# Patient Record
Sex: FEMALE | Race: WHITE | NOT HISPANIC OR LATINO | Employment: OTHER | URBAN - NONMETROPOLITAN AREA
[De-identification: names, ages, dates, MRNs, and addresses within clinical notes are randomized per-mention and may not be internally consistent; named-entity substitution may affect disease eponyms.]

---

## 2017-01-03 RX ORDER — LIOTHYRONINE SODIUM 25 UG/1
TABLET ORAL
Qty: 90 TABLET | Refills: 1 | Status: SHIPPED | OUTPATIENT
Start: 2017-01-03 | End: 2017-03-03 | Stop reason: SDUPTHER

## 2017-01-12 ENCOUNTER — OFFICE VISIT (OUTPATIENT)
Dept: INTERNAL MEDICINE | Facility: CLINIC | Age: 67
End: 2017-01-12

## 2017-01-12 VITALS
RESPIRATION RATE: 12 BRPM | WEIGHT: 222 LBS | DIASTOLIC BLOOD PRESSURE: 70 MMHG | SYSTOLIC BLOOD PRESSURE: 104 MMHG | HEIGHT: 63 IN | HEART RATE: 82 BPM | BODY MASS INDEX: 39.34 KG/M2 | OXYGEN SATURATION: 98 %

## 2017-01-12 DIAGNOSIS — Z12.11 COLON CANCER SCREENING: ICD-10-CM

## 2017-01-12 DIAGNOSIS — D50.8 OTHER IRON DEFICIENCY ANEMIA: ICD-10-CM

## 2017-01-12 DIAGNOSIS — Z23 NEED FOR SHINGLES VACCINE: ICD-10-CM

## 2017-01-12 DIAGNOSIS — E06.3 HASHIMOTO'S THYROIDITIS: Primary | ICD-10-CM

## 2017-01-12 LAB
FERRITIN SERPL-MCNC: 314 NG/ML (ref 10–291)
IRON 24H UR-MRATE: 54 MCG/DL (ref 50–175)
IRON SATN MFR SERPL: 17 % (ref 15–50)
T4 FREE SERPL-MCNC: 0.73 NG/DL (ref 0.89–1.76)
TIBC SERPL-MCNC: 311 MCG/DL (ref 250–450)
TSH SERPL DL<=0.05 MIU/L-ACNC: <0.004 MIU/ML (ref 0.35–5.35)

## 2017-01-12 PROCEDURE — 84481 FREE ASSAY (FT-3): CPT | Performed by: FAMILY MEDICINE

## 2017-01-12 PROCEDURE — 36415 COLL VENOUS BLD VENIPUNCTURE: CPT | Performed by: FAMILY MEDICINE

## 2017-01-12 PROCEDURE — 99213 OFFICE O/P EST LOW 20 MIN: CPT | Performed by: FAMILY MEDICINE

## 2017-01-12 PROCEDURE — 82728 ASSAY OF FERRITIN: CPT | Performed by: FAMILY MEDICINE

## 2017-01-12 PROCEDURE — 84443 ASSAY THYROID STIM HORMONE: CPT | Performed by: FAMILY MEDICINE

## 2017-01-12 PROCEDURE — 83550 IRON BINDING TEST: CPT | Performed by: FAMILY MEDICINE

## 2017-01-12 PROCEDURE — 84439 ASSAY OF FREE THYROXINE: CPT | Performed by: FAMILY MEDICINE

## 2017-01-12 PROCEDURE — 84482 T3 REVERSE: CPT | Performed by: FAMILY MEDICINE

## 2017-01-12 PROCEDURE — 83540 ASSAY OF IRON: CPT | Performed by: FAMILY MEDICINE

## 2017-01-12 NOTE — PROGRESS NOTES
"Follow up, discuss labs, meds.       SUBJECTIVE: Salinas Monroy is a 66 y.o. female seen for a follow up visit;      Hashimotos: she just finished a 12 week course of trying to clear the reverse T3 - took 90 mg NP thyroid total daily, tried going down on the T3, but over the past month she has increased to 25 mcg of T3 daily.  She is concerned that her cortisol is elevated possibly causing the Rt3 to get elevated.  So she started taking \"boving adrenal cortex\" taking 1 tab in AM (150 mg adrenal cortex, 50 mg whole adrenal) & at 1 pm taking \"Holy basil\" (ursolic acid and oleanic acid).  She says she is feeling better.         The following portions of the patient's history were reviewed and updated as appropriate: She  has a past medical history of Anemia (?); Arthritis; Depression; GERD (gastroesophageal reflux disease); and Impingement syndrome of right shoulder (5/8/2016).  She has Hashimoto's thyroiditis and Obsessive-compulsive disorder on her pertinent problem list.  She  has a past surgical history that includes Hysterectomy; Gastric bypass (1978); Total abdominal hysterectomy w/ bilateral salpingoophorectomy (1990); Hernia repair (2012); Small intestine surgery (2014); Appendectomy; Joint replacement (2009 & 2012); and Colonoscopy (2012).  Her family history includes Arthritis in her mother; Asthma in her sister and sister; Cancer in her father; Heart disease in her paternal uncle; Hyperlipidemia in her mother and paternal uncle; Hypertension in her mother and paternal uncle; Kidney cancer in her father; Liver cancer in her father; No Known Problems in her brother and brother; Obesity in her mother; Rheum arthritis in her mother; Stroke in her mother and paternal uncle; Thyroid disease in her mother.  She  reports that she quit smoking about 9 years ago. Her smoking use included Cigarettes. She started smoking about 50 years ago. She has a 40.00 pack-year smoking history. She has never used smokeless " "tobacco. She reports that she does not drink alcohol or use illicit drugs..    Review of Systems   Constitutional: Negative.    Respiratory: Negative.    Cardiovascular: Negative.    Neurological: Positive for tremors.         OBJECTIVE:  Visit Vitals   • /70   • Pulse 82   • Resp 12   • Ht 63\" (160 cm)   • Wt 222 lb (101 kg)   • SpO2 98%   • BMI 39.33 kg/m2        Physical Exam   Constitutional: She appears well-developed and well-nourished. No distress.   Neurological: She is alert.   Worsening essential tremor   Psychiatric: Her speech is normal and behavior is normal. Her mood appears anxious.           ASSESSMENT:   Diagnosis Plan   1. Hashimoto's thyroiditis  T3, Free    TSH    T3, Reverse    T4, Free    T3, Free    TSH    T3, Reverse    T4, Free   2. Other iron deficiency anemia  Iron Profile    Ferritin    Iron Profile    Ferritin   3. Colon cancer screening  Ambulatory Referral to Gastroenterology   4. Need for shingles vaccine  zoster vaccine live PF (ZOSTAVAX) 12442 UNT/0.65ML injection       Again advised pt that I think some of her tremor is from the higher dose of T3.  Recommended decreasing back down to max 2 tabs daily.     Pt wants to try a few more months of adrenal medicine to see if she can get her immune system to settle down.  I advised I still recommend that she see the endocrine surgeon.           "

## 2017-01-12 NOTE — MR AVS SNAPSHOT
Salinas Monroy   1/12/2017 11:15 AM   Office Visit    Provider:  Morenita Carroll MD   Department:  [facility] PRIMARY CARE   Dept Phone:  [phone]                Your Full Care Plan              Today's Medication Changes          These changes are accurate as of: 1/12/17  2:12 PM.  If you have any questions, ask your nurse or doctor.               New Medication(s)Ordered:     zoster vaccine live PF 12410 UNT/0.65ML injection   Commonly known as:  ZOSTAVAX   Inject 19,400 Units under the skin 1 (One) Time for 1 dose.   Started by:  Morenita Carroll MD         Medication(s)that have changed:     liothyronine 25 MCG tablet   Commonly known as:  CYTOMEL   TAKE UP TO 3 TABLETS BY MOUTH DAILY   What changed:  Another medication with the same name was removed. Continue taking this medication, and follow the directions you see here.   Changed by:  Morenita Carroll MD            Where to Get Your Medications      You can get these medications from any pharmacy     Bring a paper prescription for each of these medications     zoster vaccine live PF 36579 UNT/0.65ML injection                  Your Updated Medication List          This list is accurate as of: 1/12/17  2:12 PM.  Always use your most recent med list.                acyclovir 400 MG tablet   Commonly known as:  ZOVIRAX       allopurinol 100 MG tablet   Commonly known as:  ZYLOPRIM   Take 2 tablets by mouth Daily.       escitalopram 5 MG tablet   Commonly known as:  LEXAPRO   TAKE ONE TABLET BY MOUTH DAILY       ferrous gluconate 324 MG tablet   Commonly known as:  FERGON   Take 2 tablets by mouth Daily With Breakfast.       liothyronine 25 MCG tablet   Commonly known as:  CYTOMEL   TAKE UP TO 3 TABLETS BY MOUTH DAILY       NP THYROID 60 MG tablet   Generic drug:  Thyroid   1 in the morning and 2 in the afternoon       zoster vaccine live PF 525[address] UNT/0.65ML injection   Commonly known as:  ZOSTAVAX   Inject 19,400  "Units under the skin 1 (One) Time for 1 dose.               We Performed the Following     Ambulatory Referral to Gastroenterology     Ferritin     Iron Profile     T3, Free     T3, Reverse     T4, Free     TSH       You Were Diagnosed With        Codes Comments    Hashimoto's thyroiditis    -  Primary ICD-10-CM: E06.3  ICD-9-CM: 245.2     Other iron deficiency anemia     ICD-10-CM: D50.8     Colon cancer screening     ICD-10-CM: Z12.11  ICD-9-CM: V76.51     Need for shingles vaccine     ICD-10-CM: Z23  ICD-9-CM: V04.89       Instructions     None    Patient Instructions History      [facility] Signup     Our records indicate that you have an active [email or web address] account.    You can view your After Visit Summary by going to [place] and logging in with your [employer] username and password.  If you don't have a [employer] username and password but a parent or guardian has access to your record, the parent or guardian should login with their own [employer] username and password and access your record to view the After Visit Summary.    If you have questions, you can email [email or web address] or call [phone] to talk to our [employer] staff.  Remember, [employer] is NOT to be used for urgent needs.  For medical emergencies, dial 911.               Other Info from Your Visit           Allergies     Diazepam        Reason for Visit     Hypothyroidism     Depression     Vitamin D Deficiency           Vital Signs     Blood Pressure Pulse Respirations Height Weight Oxygen Saturation    104/70 82 12 63\" (160 cm) 222 lb (101 kg) 98%    Body Mass Index Smoking Status                39.33 kg/m2 Former Smoker          Problems and Diagnoses Noted     Inflammation of the thyroid gland    Iron deficiency anemia    Screen for colon cancer        Need for shingles vaccine          Results       "

## 2017-01-14 LAB — T3FREE SERPL-MCNC: 4.2 PG/ML (ref 2–4.4)

## 2017-01-17 LAB — T3REVERSE SERPL-MCNC: 6.8 NG/DL (ref 9.2–24.1)

## 2017-03-03 RX ORDER — LIOTHYRONINE SODIUM 25 UG/1
TABLET ORAL
Qty: 90 TABLET | Refills: 3 | Status: SHIPPED | OUTPATIENT
Start: 2017-03-03 | End: 2017-06-30 | Stop reason: SDUPTHER

## 2017-03-09 ENCOUNTER — PREP FOR SURGERY (OUTPATIENT)
Dept: GASTROENTEROLOGY | Facility: CLINIC | Age: 67
End: 2017-03-09

## 2017-03-09 ENCOUNTER — OFFICE VISIT (OUTPATIENT)
Dept: GASTROENTEROLOGY | Facility: CLINIC | Age: 67
End: 2017-03-09

## 2017-03-09 VITALS
DIASTOLIC BLOOD PRESSURE: 39 MMHG | TEMPERATURE: 98.8 F | HEART RATE: 78 BPM | SYSTOLIC BLOOD PRESSURE: 59 MMHG | RESPIRATION RATE: 14 BRPM | BODY MASS INDEX: 40.04 KG/M2 | HEIGHT: 63 IN | WEIGHT: 226 LBS

## 2017-03-09 DIAGNOSIS — Z12.11 COLON CANCER SCREENING: ICD-10-CM

## 2017-03-09 DIAGNOSIS — R12 HEARTBURN: ICD-10-CM

## 2017-03-09 DIAGNOSIS — D50.0 IRON DEFICIENCY ANEMIA DUE TO CHRONIC BLOOD LOSS: Primary | ICD-10-CM

## 2017-03-09 DIAGNOSIS — Z12.11 COLON CANCER SCREENING: Primary | ICD-10-CM

## 2017-03-09 PROCEDURE — 99203 OFFICE O/P NEW LOW 30 MIN: CPT | Performed by: INTERNAL MEDICINE

## 2017-03-09 RX ORDER — MELATONIN 10 MG
7500 TABLET, SUBLINGUAL SUBLINGUAL DAILY
COMMUNITY

## 2017-03-09 RX ORDER — ASCORBIC ACID 500 MG
1000 TABLET ORAL 3 TIMES DAILY
COMMUNITY
End: 2018-01-08

## 2017-03-09 NOTE — PATIENT INSTRUCTIONS
1. Antireflux measures.  2. Low fat diet.  3. Weight reduction.  4. Colonoscopy: Description of the procedure, risks benefits alternatives and options including non-operative options were discussed with the patient in detail.  The patient understands and wishes to proceed.  5. It may be prudent to consider an upper endoscopy to exclude Salazar's.  The patient will think about it and will get back to us.  Description of the procedure, risks, benefits, alternatives and options including nonoperative options were discussed with the patient in detail.   6. Discussed with the patient in detail.  Opportunity was given to ask questions.

## 2017-03-09 NOTE — PROGRESS NOTES
[address] [facility]   [address]  [place] [address]    (H) [phone]  (W)     Chief Complaint   Patient presents with   • Colon Cancer Screening     History of Present Illness    For colon cancer screening.  The patient has history of iron deficiency anemia.  There is no history of overt GI bleeding (hematemesis, melena, or hematochezia).  The patient denies nosebleeds.  There is no history of hematuria.  She denies vaginal bleeding.  Of interest the patient is status post hysterectomy in 1990.  An attempt at colonoscopy in 2012 by Dr. Montoya was unsuccessful.  The patient denies recent change in bowel habits.  There is no diarrhea or constipation.  There is no history of recent weight loss.  There is no nausea or vomiting.  There is no family history of colon cancer.    The patient has history of reflux off and on for the last several years.  Reflux was described as moderately severe indigestion, and retrosternal burning sensation.  Frequency being several times a week.  Symptoms were both day and night time.  The patient has taken acid suppressive therapy with good results.  Currently the patient is not acid suppressive therapy.  Her symptoms have significantly improved over the last couple of years.  Now the patient has occasional reflux.  There is no dysphagia or odynophagia.  There is no history of liver or pancreatic disease.  Currently the patient has been having one and at ×2 bowel movements a day.    Review of Systems   Constitutional: Negative for appetite change, chills, fatigue, fever and unexpected weight change.   HENT: Negative for mouth sores, nosebleeds and trouble swallowing.    Eyes: Negative for discharge and redness.   Respiratory: Negative for apnea, cough and shortness of breath.    Cardiovascular: Negative for chest pain, palpitations and leg swelling.   Gastrointestinal: Negative for abdominal distention, abdominal pain, anal bleeding, blood in stool, constipation, diarrhea, nausea and vomiting.    Endocrine: Negative for cold intolerance, heat intolerance and polydipsia.   Genitourinary: Negative for dysuria, hematuria and urgency.   Musculoskeletal: Positive for arthralgias. Negative for joint swelling and myalgias.   Skin: Negative for rash.   Allergic/Immunologic: Positive for food allergies (gluten sensitivity). Negative for immunocompromised state.   Neurological: Negative for dizziness, seizures, syncope and headaches.   Hematological: Negative for adenopathy. Does not bruise/bleed easily.   Psychiatric/Behavioral: Negative for dysphoric mood. The patient is not nervous/anxious and is not hyperactive.      Patient Active Problem List   Diagnosis   • Hashimoto's thyroiditis   • Essential tremor   • Vitamin D deficiency   • Iron deficiency anemia   • Primary osteoarthritis involving multiple joints   • Bipolar affective disorder   • Gout   • Obsessive-compulsive disorder   • Postsurgical menopause     Past Medical History   Diagnosis Date   • Anemia ?     under control   • Arthritis    • Depression    • GERD (gastroesophageal reflux disease)      no longer bothers me...   • Gout    • Hernia 3-2012     repair surgery which failed in 2014   • Hypothyroid    • Impingement syndrome of right shoulder 5/8/2016     Past Surgical History   Procedure Laterality Date   • Hysterectomy     • Gastric bypass  1978   • Total abdominal hysterectomy with salpingo oophorectomy  1990     fibroids   • Hernia repair  2012   • Small intestine surgery  2014     with hernia repair & mesh   • Appendectomy     • Joint replacement  2009 & 2012     Left & Right Full Hip Replacements   • Colonoscopy  2012     failed due to hernia...   • Abdominal surgery     • Upper gastrointestinal endoscopy     • Tubal abdominal ligation       Family History   Problem Relation Age of Onset   • Obesity Mother    • Rheum arthritis Mother    • Thyroid disease Mother    • Hypertension Mother    • Stroke Mother      Most of her problems were caused by  Rheumatoid Ar.   • Hyperlipidemia Mother    • Arthritis Mother      Rheumatoid Arthritis..Passed 2004   • Cancer Father      Passed 1997   • Kidney cancer Father    • Liver cancer Father    • Asthma Sister    • No Known Problems Brother    • Stroke Paternal Uncle    • Hypertension Paternal Uncle    • Hyperlipidemia Paternal Uncle    • Heart disease Paternal Uncle    • No Known Problems Brother    • Asthma Sister      under control   • Colon cancer Neg Hx      Social History   Substance Use Topics   • Smoking status: Former Smoker     Packs/day: 1.00     Years: 40.00     Types: Cigarettes     Start date: 1967     Quit date: 2008   • Smokeless tobacco: Never Used   • Alcohol use No       Current Outpatient Prescriptions:   •  Acetylcarnitine HCl (ACETYL L-CARNITINE PO), Take  by mouth Daily., Disp: , Rfl:   •  Acetylcysteine (N-ACETYL-L-CYSTEINE PO), Take  by mouth Daily., Disp: , Rfl:   •  acyclovir (ZOVIRAX) 400 MG tablet, Take 4 tablets by mouth daily as needed., Disp: , Rfl:   •  allopurinol (ZYLOPRIM) 100 MG tablet, Take 2 tablets by mouth Daily., Disp: 60 tablet, Rfl: 5  •  B Complex Vitamins (VITAMIN B COMPLEX PO), Take  by mouth Daily., Disp: , Rfl:   •  BIOTIN PO, Take  by mouth Daily., Disp: , Rfl:   •  Cholecalciferol (VITAMIN D3) 39821 UNITS tablet, Take  by mouth., Disp: , Rfl:   •  CHOLINE-INOSITOL-METHIONINE-FA PO, Take  by mouth., Disp: , Rfl:   •  COCONUT OIL PO, Take  by mouth., Disp: , Rfl:   •  Coenzyme Q10 (CO Q 10 PO), Take  by mouth Daily., Disp: , Rfl:   •  Digestive Enzymes (DIGESTIVE ENZYME PO), Take  by mouth., Disp: , Rfl:   •  escitalopram (LEXAPRO) 5 MG tablet, TAKE ONE TABLET BY MOUTH DAILY, Disp: 90 tablet, Rfl: 0  •  ferrous gluconate (FERGON) 324 MG tablet, Take 2 tablets by mouth Daily With Breakfast., Disp: 60 tablet, Rfl: 5  •  GARLIC PO, Take  by mouth Daily., Disp: , Rfl:   •  liothyronine (CYTOMEL) 25 MCG tablet, TAKE UP TO 3 TABLETS BY MOUTH DAILY, Disp: 90 tablet, Rfl: 3  •   "MAGNESIUM PO, Take 1,000 mg by mouth Every Night., Disp: , Rfl:   •  Methylsulfonylmethane (MSM PO), Take  by mouth., Disp: , Rfl:   •  MILK THISTLE PO, Take  by mouth., Disp: , Rfl:   •  Misc Natural Products (TART CHERRY ADVANCED PO), Take 2 capsules by mouth Daily., Disp: , Rfl:   •  Multiple Vitamins-Minerals (MULTI VITAMIN/MINERALS) tablet, Take  by mouth., Disp: , Rfl:   •  Multiple Vitamins-Minerals (MULTIVITAMIN PO), Take 1 tablet by mouth Daily., Disp: , Rfl:   •  Multiple Vitamins-Minerals (ZINC PO), Take  by mouth Daily., Disp: , Rfl:   •  NP THYROID 60 MG tablet, 1 in the morning and 2 in the afternoon, Disp: 90 tablet, Rfl: 2  •  Omega-3 Fatty Acids (OMEGA-3 FISH OIL PO), Take 4,000 Units by mouth Daily., Disp: , Rfl:   •  Probiotic Product (PROBIOTIC PO), Take  by mouth., Disp: , Rfl:   •  SELENIUM PO, Take  by mouth Daily., Disp: , Rfl:   •  TURMERIC PO, Take  by mouth Daily., Disp: , Rfl:   •  vitamin C (ASCORBIC ACID) 500 MG tablet, Take 500 mg by mouth 3 (Three) Times a Day., Disp: , Rfl:   •  VITAMIN E PO, Take 1 capsule by mouth Daily., Disp: , Rfl:   •  VITAMIN K PO, Take  by mouth Daily., Disp: , Rfl:   Allergies   Allergen Reactions   • Diazepam      Visit Vitals   • BP (!) 59/39   • Pulse 78   • Temp 98.8 °F (37.1 °C)   • Resp 14   • Ht 63\" (160 cm)   • Wt 226 lb (103 kg)   • BMI 40.03 kg/m2     Physical Exam   Constitutional: She is oriented to person, place, and time. She appears well-developed and well-nourished. No distress.   HENT:   Head: Normocephalic and atraumatic.   Right Ear: Hearing and external ear normal.   Left Ear: Hearing and external ear normal.   Nose: Nose normal.   Mouth/Throat: Oropharynx is clear and moist and mucous membranes are normal. Mucous membranes are not pale, not dry and not cyanotic. No oral lesions. No oropharyngeal exudate.   Eyes: Conjunctivae and EOM are normal. Right eye exhibits no discharge. Left eye exhibits no discharge. No scleral icterus.   Neck: " Trachea normal. Neck supple. No JVD present. No edema present. No thyroid mass and no thyromegaly present.   Cardiovascular: Normal rate, regular rhythm, S2 normal and normal heart sounds.  Exam reveals no gallop, no S3 and no friction rub.    No murmur heard.  Pulmonary/Chest: Effort normal and breath sounds normal. No respiratory distress. She has no wheezes. She has no rales. She exhibits no tenderness.   Abdominal: Soft. Normal appearance and bowel sounds are normal. She exhibits no distension, no ascites and no mass. There is no splenomegaly or hepatomegaly. There is no tenderness. There is no rigidity, no rebound and no guarding. No hernia.   Upper midline well-healed scar.   Musculoskeletal: She exhibits no tenderness or deformity.       Vascular Status -  Her exam exhibits no right foot edema. Her exam exhibits no left foot edema.  Lymphadenopathy:     She has no cervical adenopathy.        Left: No supraclavicular adenopathy present.   Neurological: She is alert and oriented to person, place, and time. She has normal strength. No cranial nerve deficit or sensory deficit. She exhibits normal muscle tone. Coordination normal.   Skin: No rash noted. She is not diaphoretic. No cyanosis. No pallor. Nails show no clubbing.   Psychiatric: She has a normal mood and affect. Her behavior is normal. Judgment and thought content normal.   Nursing note and vitals reviewed.      Laboratory Tests:    Upon review of medical records:    Dated March 3, 2016 sodium 141 potassium 4.6 chloride 108 CO2 27 BUN 33 serum creatinine 0.6 and glucose 103.  Calcium 10.0.  Albumin 4.2.  T bili 0.4 AST 28 ALT 29 alkaline phosphatase 105.  Total cholesterol 121.  Triglycerides 131.    Dated January 12, 2017 serum iron 54.  TIBC 311.  Iron saturation 17%.  Ferritin 314.00.  TSH < 0.004.  Free T4 level 0.73.  Free T3 level 4.2.  Reverse T3 level 6.8.    Abdominal Imaging:  Upon Review of medical records:    Dated February 3, 2014 the  patient underwent a CT of the abdomen and pelvis with IV contrast which revealed: Clear lung bases.  Postsurgical changes from small bowel resection and re-anastomosis in the left upper quadrant.  There are multiple dilated loops of small bowel consistent with at least a partial obstruction.  There is dilated bowel seen within a ventral hernia with adjacent edema within the mesentery worrisome for incarcerated hernia.  There is no free fluid, free air, or adenopathy.  The solid abdominal organs and abdominal aorta is unremarkable.  The lower pelvis is obscured by streak artifact from hip replacement.  The visualized pelvic GI tract is unremarkable.    Assessment and Plan:      Salinas was seen today for colon cancer screening.    Diagnoses and all orders for this visit:    Iron deficiency anemia due to chronic blood loss  Comments:  Concerns regarding colonic source.    Colon cancer screening  Comments:  Previously attempted colonoscopy in 2012 was unsuccessful.    Heartburn  Comments:  History of reflux with significant improvement of symptoms without intervention.  Concerns regarding underlying Salazar's.          Discussion:       Plan     Patient Instructions     1. Antireflux measures.  2. Low fat diet.  3. Weight reduction.  4. Colonoscopy: Description of the procedure, risks benefits alternatives and options including non-operative options were discussed with the patient in detail.  The patient understands and wishes to proceed.  5. It may be prudent to consider an upper endoscopy to exclude Salazar's.  The patient will think about it and will get back to us.  Description of the procedure, risks, benefits, alternatives and options including nonoperative options were discussed with the patient in detail.   6. Discussed with the patient in detail.  Opportunity was given to ask questions.      Patient Care Team:  Moreinta Carroll MD as PCP - General  Morenita Carroll MD as PCP - Family Medicine    Rafiq Huang,  MD

## 2017-03-16 RX ORDER — SODIUM CHLORIDE 9 MG/ML
70 INJECTION, SOLUTION INTRAVENOUS CONTINUOUS PRN
Status: CANCELLED | OUTPATIENT
Start: 2017-06-08

## 2017-03-16 RX ORDER — SODIUM CHLORIDE 0.9 % (FLUSH) 0.9 %
1-10 SYRINGE (ML) INJECTION AS NEEDED
Status: CANCELLED | OUTPATIENT
Start: 2017-06-08

## 2017-04-03 RX ORDER — ESCITALOPRAM OXALATE 5 MG/1
TABLET ORAL
Qty: 90 TABLET | Refills: 3 | Status: SHIPPED | OUTPATIENT
Start: 2017-04-03 | End: 2018-05-02 | Stop reason: SDUPTHER

## 2017-05-01 ENCOUNTER — PATIENT MESSAGE (OUTPATIENT)
Dept: INTERNAL MEDICINE | Facility: CLINIC | Age: 67
End: 2017-05-01

## 2017-05-01 DIAGNOSIS — E06.3 HASHIMOTO'S THYROIDITIS: ICD-10-CM

## 2017-05-02 RX ORDER — LEVOTHYROXINE, LIOTHYRONINE 38; 9 UG/1; UG/1
TABLET ORAL
Qty: 90 TABLET | Refills: 2 | Status: SHIPPED | OUTPATIENT
Start: 2017-05-02 | End: 2017-09-05 | Stop reason: SDUPTHER

## 2017-05-04 ENCOUNTER — OFFICE VISIT (OUTPATIENT)
Dept: INTERNAL MEDICINE | Facility: CLINIC | Age: 67
End: 2017-05-04

## 2017-05-04 VITALS
DIASTOLIC BLOOD PRESSURE: 70 MMHG | WEIGHT: 224 LBS | BODY MASS INDEX: 39.69 KG/M2 | HEART RATE: 83 BPM | RESPIRATION RATE: 12 BRPM | OXYGEN SATURATION: 96 % | SYSTOLIC BLOOD PRESSURE: 106 MMHG | HEIGHT: 63 IN

## 2017-05-04 DIAGNOSIS — E55.9 VITAMIN D DEFICIENCY: ICD-10-CM

## 2017-05-04 DIAGNOSIS — G25.0 ESSENTIAL TREMOR: ICD-10-CM

## 2017-05-04 DIAGNOSIS — M1A.0710 CHRONIC IDIOPATHIC GOUT INVOLVING TOE OF RIGHT FOOT WITHOUT TOPHUS: ICD-10-CM

## 2017-05-04 DIAGNOSIS — Z00.00 HEALTHCARE MAINTENANCE: ICD-10-CM

## 2017-05-04 DIAGNOSIS — A60.09 HERPES SIMPLEX INFECTION OF OTHER SITE OF GENITOURINARY TRACT: ICD-10-CM

## 2017-05-04 DIAGNOSIS — D50.8 OTHER IRON DEFICIENCY ANEMIA: ICD-10-CM

## 2017-05-04 DIAGNOSIS — E06.3 HASHIMOTO'S THYROIDITIS: Primary | ICD-10-CM

## 2017-05-04 DIAGNOSIS — E89.40 POSTSURGICAL MENOPAUSE: ICD-10-CM

## 2017-05-04 PROCEDURE — 99214 OFFICE O/P EST MOD 30 MIN: CPT | Performed by: FAMILY MEDICINE

## 2017-05-04 RX ORDER — ACYCLOVIR 400 MG/1
800 TABLET ORAL 2 TIMES DAILY
Qty: 60 TABLET | Refills: 1 | Status: SHIPPED | OUTPATIENT
Start: 2017-05-04 | End: 2019-04-24 | Stop reason: SDUPTHER

## 2017-05-07 LAB — T3REVERSE SERPL-MCNC: 10.6 NG/DL (ref 9.2–24.1)

## 2017-05-12 LAB
25(OH)D3+25(OH)D2 SERPL-MCNC: 71.3 NG/ML
ALBUMIN SERPL-MCNC: 4.3 G/DL (ref 3.5–5)
ALBUMIN/GLOB SERPL: 1.5 G/DL (ref 1–2)
ALP SERPL-CCNC: 101 U/L (ref 38–126)
ALT SERPL-CCNC: 40 U/L (ref 13–69)
AST SERPL-CCNC: 27 U/L (ref 15–46)
BILIRUB SERPL-MCNC: 0.5 MG/DL (ref 0.2–1.3)
BUN SERPL-MCNC: 22 MG/DL (ref 7–20)
BUN/CREAT SERPL: 31.4 (ref 7.1–23.5)
CALCIUM SERPL-MCNC: 9.9 MG/DL (ref 8.4–10.2)
CHLORIDE SERPL-SCNC: 110 MMOL/L (ref 98–107)
CO2 SERPL-SCNC: 24 MMOL/L (ref 26–30)
CREAT SERPL-MCNC: 0.7 MG/DL (ref 0.6–1.3)
CRP SERPL HS-MCNC: 11.55 MG/L (ref 0–3)
ERYTHROCYTE [DISTWIDTH] IN BLOOD BY AUTOMATED COUNT: 12.3 % (ref 11.5–14.5)
FERRITIN SERPL-MCNC: 299 NG/ML (ref 11.1–264)
GLOBULIN SER CALC-MCNC: 2.8 GM/DL
GLUCOSE SERPL-MCNC: 99 MG/DL (ref 74–98)
HCT VFR BLD AUTO: 43 % (ref 37–47)
HGB BLD-MCNC: 14 G/DL (ref 12–16)
IRON SATN MFR SERPL: 18 % (ref 11–46)
IRON SERPL-MCNC: 54 MCG/DL (ref 37–181)
MCH RBC QN AUTO: 29.5 PG (ref 27–31)
MCHC RBC AUTO-ENTMCNC: 32.6 G/DL (ref 30–37)
MCV RBC AUTO: 90.5 FL (ref 81–99)
PLATELET # BLD AUTO: 263 10*3/MM3 (ref 130–400)
POTASSIUM SERPL-SCNC: 4.7 MMOL/L (ref 3.5–5.1)
PROT SERPL-MCNC: 7.1 G/DL (ref 6.3–8.2)
RBC # BLD AUTO: 4.75 10*6/MM3 (ref 4.2–5.4)
SODIUM SERPL-SCNC: 146 MMOL/L (ref 137–145)
T3FREE SERPL-MCNC: 3.4 PG/ML (ref 2–4.4)
T4 FREE SERPL-MCNC: 0.68 NG/DL (ref 0.78–2.19)
THYROGLOB AB SERPL-ACNC: >2250 IU/ML (ref 0–0.9)
THYROGLOB SERPL-MCNC: 117 NG/ML
THYROPEROXIDASE AB SERPL-ACNC: 169 IU/ML (ref 0–34)
TIBC SERPL-MCNC: 302 MCG/DL (ref 261–497)
TSH SERPL DL<=0.005 MIU/L-ACNC: <0.015 MIU/ML (ref 0.47–4.68)
UIBC SERPL-MCNC: 248 MCG/DL
URATE SERPL-MCNC: 6.5 MG/DL (ref 2.5–8.5)
WBC # BLD AUTO: 7.91 10*3/MM3 (ref 4.8–10.8)

## 2017-06-08 ENCOUNTER — HOSPITAL ENCOUNTER (OUTPATIENT)
Facility: HOSPITAL | Age: 67
Setting detail: HOSPITAL OUTPATIENT SURGERY
Discharge: HOME | End: 2017-06-08
Attending: INTERNAL MEDICINE | Admitting: INTERNAL MEDICINE

## 2017-06-08 ENCOUNTER — ANESTHESIA EVENT (OUTPATIENT)
Dept: GASTROENTEROLOGY | Facility: HOSPITAL | Age: 67
End: 2017-06-08

## 2017-06-08 ENCOUNTER — ANESTHESIA (OUTPATIENT)
Dept: GASTROENTEROLOGY | Facility: HOSPITAL | Age: 67
End: 2017-06-08

## 2017-06-08 VITALS
TEMPERATURE: 97.9 F | HEIGHT: 63 IN | DIASTOLIC BLOOD PRESSURE: 87 MMHG | HEART RATE: 84 BPM | WEIGHT: 215 LBS | OXYGEN SATURATION: 99 % | SYSTOLIC BLOOD PRESSURE: 144 MMHG | BODY MASS INDEX: 38.09 KG/M2 | RESPIRATION RATE: 16 BRPM

## 2017-06-08 DIAGNOSIS — Z12.11 COLON CANCER SCREENING: ICD-10-CM

## 2017-06-08 PROCEDURE — 25010000002 PROPOFOL 200 MG/20ML EMULSION: Performed by: NURSE ANESTHETIST, CERTIFIED REGISTERED

## 2017-06-08 PROCEDURE — 45382 COLONOSCOPY W/CONTROL BLEED: CPT | Performed by: INTERNAL MEDICINE

## 2017-06-08 PROCEDURE — 45390 COLONOSCOPY W/RESECTION: CPT | Performed by: INTERNAL MEDICINE

## 2017-06-08 PROCEDURE — 88305 TISSUE EXAM BY PATHOLOGIST: CPT | Performed by: INTERNAL MEDICINE

## 2017-06-08 PROCEDURE — 45380 COLONOSCOPY AND BIOPSY: CPT | Performed by: INTERNAL MEDICINE

## 2017-06-08 PROCEDURE — S0260 H&P FOR SURGERY: HCPCS | Performed by: INTERNAL MEDICINE

## 2017-06-08 PROCEDURE — 25010000002 PROPOFOL 1000 MG/ML EMULSION: Performed by: NURSE ANESTHETIST, CERTIFIED REGISTERED

## 2017-06-08 RX ORDER — SODIUM CHLORIDE 0.9 % (FLUSH) 0.9 %
1-10 SYRINGE (ML) INJECTION AS NEEDED
Status: DISCONTINUED | OUTPATIENT
Start: 2017-06-08 | End: 2017-06-08 | Stop reason: HOSPADM

## 2017-06-08 RX ORDER — PROPOFOL 10 MG/ML
INJECTION, EMULSION INTRAVENOUS AS NEEDED
Status: DISCONTINUED | OUTPATIENT
Start: 2017-06-08 | End: 2017-06-08 | Stop reason: SURG

## 2017-06-08 RX ORDER — SODIUM CHLORIDE 9 MG/ML
70 INJECTION, SOLUTION INTRAVENOUS CONTINUOUS PRN
Status: DISCONTINUED | OUTPATIENT
Start: 2017-06-08 | End: 2017-06-08 | Stop reason: HOSPADM

## 2017-06-08 RX ADMIN — PROPOFOL 50 MG: 10 INJECTION, EMULSION INTRAVENOUS at 13:03

## 2017-06-08 RX ADMIN — LIDOCAINE HYDROCHLORIDE 40 MG: 20 INJECTION, SOLUTION INTRAVENOUS at 12:45

## 2017-06-08 RX ADMIN — Medication 25 MG: at 12:49

## 2017-06-08 RX ADMIN — PROPOFOL 50 MG: 10 INJECTION, EMULSION INTRAVENOUS at 13:25

## 2017-06-08 RX ADMIN — SODIUM CHLORIDE: 9 INJECTION, SOLUTION INTRAVENOUS at 12:40

## 2017-06-08 RX ADMIN — SODIUM CHLORIDE 70 ML/HR: 9 INJECTION, SOLUTION INTRAVENOUS at 10:00

## 2017-06-08 RX ADMIN — PROPOFOL 100 MG: 10 INJECTION, EMULSION INTRAVENOUS at 12:45

## 2017-06-08 RX ADMIN — Medication 15 MG: at 13:19

## 2017-06-08 RX ADMIN — Medication 10 MG: at 13:06

## 2017-06-08 RX ADMIN — PROPOFOL 100 MCG/KG/MIN: 10 INJECTION, EMULSION INTRAVENOUS at 12:46

## 2017-06-08 NOTE — ANESTHESIA POSTPROCEDURE EVALUATION
Patient: Salinas Monroy    Procedure Summary     Date Anesthesia Start Anesthesia Stop Room / Location    06/08/17 1240 1406 [facility] ENDOSCOPY 2 / [facility] ENDOSCOPY       Procedure Diagnosis Surgeon Provider    COLONOSCOPY with cold snare polypectomies, argon thermal ablation, ns injection, yanira ink injection (N/A Anus) Colon cancer screening  (Colon cancer screening [Z12.11]) MD Armando Funk CRNA          Anesthesia Type: MAC  Last vitals  BP      Temp      Pulse     Resp      SpO2        Post Anesthesia Care and Evaluation    Patient location during evaluation: PACU  Patient participation: complete - patient participated  Level of consciousness: awake and alert  Pain score: 0  Pain management: satisfactory to patient  Airway patency: patent  Anesthetic complications: No anesthetic complications  PONV Status: none  Cardiovascular status: acceptable and hemodynamically stable  Respiratory status: acceptable  Hydration status: acceptable

## 2017-06-08 NOTE — H&P
For colon cancer screening. The patient has history of iron deficiency anemia. There is no history of overt GI bleeding (hematemesis, melena, or hematochezia). The patient denies nosebleeds. There is no history of hematuria. She denies vaginal bleeding. Of interest the patient is status post hysterectomy in 1990. An attempt at colonoscopy in 2012 by Dr. Montoya was unsuccessful. The patient denies recent change in bowel habits. There is no diarrhea or constipation. There is no history of recent weight loss. There is no nausea or vomiting. There is no family history of colon cancer.     The patient has history of reflux off and on for the last several years. Reflux was described as moderately severe indigestion, and retrosternal burning sensation. Frequency being several times a week. Symptoms were both day and night time. The patient has taken acid suppressive therapy with good results. Currently the patient is not acid suppressive therapy. Her symptoms have significantly improved over the last couple of years. Now the patient has occasional reflux. There is no dysphagia or odynophagia.  There is no history of liver or pancreatic disease. Currently the patient has been having one and at ×2 bowel movements a day.     Review of Systems   Constitutional: Negative for appetite change, chills, fatigue, fever and unexpected weight change.   HENT: Negative for mouth sores, nosebleeds and trouble swallowing.   Eyes: Negative for discharge and redness.   Respiratory: Negative for apnea, cough and shortness of breath.   Cardiovascular: Negative for chest pain, palpitations and leg swelling.   Gastrointestinal: Negative for abdominal distention, abdominal pain, anal bleeding, blood in stool, constipation, diarrhea, nausea and vomiting.   Endocrine: Negative for cold intolerance, heat intolerance and polydipsia.   Genitourinary: Negative for dysuria, hematuria and urgency.   Musculoskeletal: Positive for arthralgias. Negative for  joint swelling and myalgias.   Skin: Negative for rash.   Allergic/Immunologic: Positive for food allergies (gluten sensitivity). Negative for immunocompromised state.   Neurological: Negative for dizziness, seizures, syncope and headaches.   Hematological: Negative for adenopathy. Does not bruise/bleed easily.   Psychiatric/Behavioral: Negative for dysphoric mood. The patient is not nervous/anxious and is not hyperactive.          Patient Active Problem List   Diagnosis   • Hashimoto's thyroiditis   • Essential tremor   • Vitamin D deficiency   • Iron deficiency anemia   • Primary osteoarthritis involving multiple joints   • Bipolar affective disorder   • Gout   • Obsessive-compulsive disorder   • Postsurgical menopause       Medical History          Past Medical History   Diagnosis Date   • Anemia ?       under control   • Arthritis     • Depression     • GERD (gastroesophageal reflux disease)         no longer bothers me...   • Gout     • Hernia 3-2012       repair surgery which failed in 2014   • Hypothyroid     • Impingement syndrome of right shoulder 5/8/2016          Surgical History           Past Surgical History   Procedure Laterality Date   • Hysterectomy       • Gastric bypass   1978   • Total abdominal hysterectomy with salpingo oophorectomy   1990       fibroids   • Hernia repair   2012   • Small intestine surgery   2014       with hernia repair & mesh   • Appendectomy       • Joint replacement   2009 & 2012       Left & Right Full Hip Replacements   • Colonoscopy   2012       failed due to hernia...   • Abdominal surgery       • Upper gastrointestinal endoscopy       • Tubal abdominal ligation                    Family History   Problem Relation Age of Onset   • Obesity Mother     • Rheum arthritis Mother     • Thyroid disease Mother     • Hypertension Mother     • Stroke Mother         Most of her problems were caused by Rheumatoid Ar.   • Hyperlipidemia Mother     • Arthritis Mother          Rheumatoid Arthritis..Passed 2004   • Cancer Father         Passed 1997   • Kidney cancer Father     • Liver cancer Father     • Asthma Sister     • No Known Problems Brother     • Stroke Paternal Uncle     • Hypertension Paternal Uncle     • Hyperlipidemia Paternal Uncle     • Heart disease Paternal Uncle     • No Known Problems Brother     • Asthma Sister         under control   • Colon cancer Neg Hx              Social History   Substance Use Topics   • Smoking status: Former Smoker       Packs/day: 1.00       Years: 40.00       Types: Cigarettes       Start date: 1967       Quit date: 2008   • Smokeless tobacco: Never Used   • Alcohol use No         Current Outpatient Prescriptions:   • Acetylcarnitine HCl (ACETYL L-CARNITINE PO), Take by mouth Daily., Disp: , Rfl:   • Acetylcysteine (N-ACETYL-L-CYSTEINE PO), Take by mouth Daily., Disp: , Rfl:   • acyclovir (ZOVIRAX) 400 MG tablet, Take 4 tablets by mouth daily as needed., Disp: , Rfl:   • allopurinol (ZYLOPRIM) 100 MG tablet, Take 2 tablets by mouth Daily., Disp: 60 tablet, Rfl: 5  • B Complex Vitamins (VITAMIN B COMPLEX PO), Take by mouth Daily., Disp: , Rfl:   • BIOTIN PO, Take by mouth Daily., Disp: , Rfl:   • Cholecalciferol (VITAMIN D3) 53162 UNITS tablet, Take by mouth., Disp: , Rfl:   • CHOLINE-INOSITOL-METHIONINE-FA PO, Take by mouth., Disp: , Rfl:   • COCONUT OIL PO, Take by mouth., Disp: , Rfl:   • Coenzyme Q10 (CO Q 10 PO), Take by mouth Daily., Disp: , Rfl:   • Digestive Enzymes (DIGESTIVE ENZYME PO), Take by mouth., Disp: , Rfl:   • escitalopram (LEXAPRO) 5 MG tablet, TAKE ONE TABLET BY MOUTH DAILY, Disp: 90 tablet, Rfl: 0  • ferrous gluconate (FERGON) 324 MG tablet, Take 2 tablets by mouth Daily With Breakfast., Disp: 60 tablet, Rfl: 5  • GARLIC PO, Take by mouth Daily., Disp: , Rfl:   • liothyronine (CYTOMEL) 25 MCG tablet, TAKE UP TO 3 TABLETS BY MOUTH DAILY, Disp: 90 tablet, Rfl: 3  • MAGNESIUM PO, Take 1,000 mg by mouth Every Night., Disp: ,  "Rfl:   • Methylsulfonylmethane (MSM PO), Take by mouth., Disp: , Rfl:   • MILK THISTLE PO, Take by mouth., Disp: , Rfl:   • Misc Natural Products (TART CHERRY ADVANCED PO), Take 2 capsules by mouth Daily., Disp: , Rfl:   • Multiple Vitamins-Minerals (MULTI VITAMIN/MINERALS) tablet, Take by mouth., Disp: , Rfl:   • Multiple Vitamins-Minerals (MULTIVITAMIN PO), Take 1 tablet by mouth Daily., Disp: , Rfl:   • Multiple Vitamins-Minerals (ZINC PO), Take by mouth Daily., Disp: , Rfl:   • NP THYROID 60 MG tablet, 1 in the morning and 2 in the afternoon, Disp: 90 tablet, Rfl: 2  • Omega-3 Fatty Acids (OMEGA-3 FISH OIL PO), Take 4,000 Units by mouth Daily., Disp: , Rfl:   • Probiotic Product (PROBIOTIC PO), Take by mouth., Disp: , Rfl:   • SELENIUM PO, Take by mouth Daily., Disp: , Rfl:   • TURMERIC PO, Take by mouth Daily., Disp: , Rfl:   • vitamin C (ASCORBIC ACID) 500 MG tablet, Take 500 mg by mouth 3 (Three) Times a Day., Disp: , Rfl:   • VITAMIN E PO, Take 1 capsule by mouth Daily., Disp: , Rfl:   • VITAMIN K PO, Take by mouth Daily., Disp: , Rfl:        Allergies   Allergen Reactions   • Diazepam        Blood pressure 133/65, pulse 89, temperature 98.3 °F (36.8 °C), temperature source Temporal Artery , resp. rate 18, height 63\" (160 cm), weight 215 lb (97.5 kg), SpO2 96 %.        Physical Exam   Constitutional: She is oriented to person, place, and time. She appears well-developed and well-nourished. No distress.   HENT:   Head: Normocephalic and atraumatic.   Right Ear: Hearing and external ear normal.   Left Ear: Hearing and external ear normal.   Nose: Nose normal.   Mouth/Throat: Oropharynx is clear and moist and mucous membranes are normal. Mucous membranes are not pale, not dry and not cyanotic. No oral lesions. No oropharyngeal exudate.   Eyes: Conjunctivae and EOM are normal. Right eye exhibits no discharge. Left eye exhibits no discharge. No scleral icterus.   Neck: Trachea normal. Neck supple. No JVD present. " No edema present. No thyroid mass and no thyromegaly present.   Cardiovascular: Normal rate, regular rhythm, S2 normal and normal heart sounds. Exam reveals no gallop, no S3 and no friction rub.   No murmur heard.  Pulmonary/Chest: Effort normal and breath sounds normal. No respiratory distress. She has no wheezes. She has no rales. She exhibits no tenderness.   Abdominal: Soft. Normal appearance and bowel sounds are normal. She exhibits no distension, no ascites and no mass. There is no splenomegaly or hepatomegaly. There is no tenderness. There is no rigidity, no rebound and no guarding. No hernia.   Upper midline well-healed scar.   Musculoskeletal: She exhibits no tenderness or deformity.     Vascular Status - Her exam exhibits no right foot edema. Her exam exhibits no left foot edema.  Lymphadenopathy:   She has no cervical adenopathy.   Left: No supraclavicular adenopathy present.   Neurological: She is alert and oriented to person, place, and time. She has normal strength. No cranial nerve deficit or sensory deficit. She exhibits normal muscle tone. Coordination normal.   Skin: No rash noted. She is not diaphoretic. No cyanosis. No pallor. Nails show no clubbing.   Psychiatric: She has a normal mood and affect. Her behavior is normal. Judgment and thought content normal.   Nursing note and vitals reviewed.        Laboratory Tests:   Upon review of medical records:     Dated March 3, 2016 sodium 141 potassium 4.6 chloride 108 CO2 27 BUN 33 serum creatinine 0.6 and glucose 103. Calcium 10.0. Albumin 4.2. T bili 0.4 AST 28 ALT 29 alkaline phosphatase 105. Total cholesterol 121. Triglycerides 131.     Dated January 12, 2017 serum iron 54. TIBC 311. Iron saturation 17%. Ferritin 314.00. TSH < 0.004. Free T4 level 0.73. Free T3 level 4.2. Reverse T3 level 6.8.     Abdominal Imaging:  Upon Review of medical records:     Dated February 3, 2014 the patient underwent a CT of the abdomen and pelvis with IV contrast  which revealed: Clear lung bases. Postsurgical changes from small bowel resection and re-anastomosis in the left upper quadrant. There are multiple dilated loops of small bowel consistent with at least a partial obstruction. There is dilated bowel seen within a ventral hernia with adjacent edema within the mesentery worrisome for incarcerated hernia. There is no free fluid, free air, or adenopathy. The solid abdominal organs and abdominal aorta is unremarkable. The lower pelvis is obscured by streak artifact from hip replacement. The visualized pelvic GI tract is unremarkable.     Assessment and Plan:       Iron deficiency anemia due to chronic blood loss  Comments:  Concerns regarding colonic source.     Colon cancer screening  Comments:  Previously attempted colonoscopy in 2012 was unsuccessful.     Heartburn  Comments:  History of reflux with significant improvement of symptoms without intervention. Concerns regarding underlying Salazar's.           Discussion:         Plan          Patient Instructions      1. Antireflux measures.  2. Low fat diet.  3. Weight reduction.  4. Colonoscopy: Description of the procedure, risks benefits alternatives and options including non-operative options were discussed with the patient in detail. The patient understands and wishes to proceed.  5. It may be prudent to consider an upper endoscopy to exclude Salazar's. The patient will think about it and will get back to us. Description of the procedure, risks, benefits, alternatives and options including nonoperative options were discussed with the patient in detail.   6. Discussed with the patient in detail. Opportunity was given to ask questions.

## 2017-06-08 NOTE — PLAN OF CARE
Problem: GI Endoscopy (Adult)  Goal: Signs and Symptoms of Listed Potential Problems Will be Absent or Manageable (GI Endoscopy)    06/08/17 0910   GI Endoscopy   Problems Present (GI Endoscopy) none

## 2017-06-08 NOTE — OP NOTE
PROCEDURE:  Colonoscopy to the terminal ileum with thermal ablation of colonic vascular ectasia using argon plasma coagulator, 2 cold biopsy polypectomies submucosal injection of normal saline to raise the polypoid area and submucosal injection of Ju[place] ink for marking     DATE OF PROCEDURE:  June 8, 2017    REFERRING PROVIDER:  Morenita Carroll MD     INSTRUMENT USED:  Olympus PCF H 190 videocolonoscope.      INDICATIONS OF THE PROCEDURE:  This is a 67-year-old white female for colon cancer screening.  There is history of iron deficiency anemia.      BIOPSIES:  Hepatic flexure: A cold biopsy polypectomy.  Descending colon: A cold biopsy polypectomy.      PHOTOGRAPHS:  Photographs were included in the medical records.     MEDICATIONS:  MAC.       CONSENT/PREPROCEDURE EVALUATION:  Risks, benefits, alternatives and options of the procedure including risks of sedation/anesthesia were discussed with the patient and informed consent was obtained prior to the procedure.  History and physical examination were performed and nothing precluded the test.      REPORT:  The patient was placed in left lateral decubitus position and a digital examination was performed.  Once under the influence of IV sedation, the instrument was inserted into the rectum and advanced under direct vision to cecum which was identified by the ileocecal valve, triradiate folds and appendiceal orifice. The scope was then maneuvered into the terminal ileum.        FINDINGS:      Digital rectal examination:  Good anal tone.  No perianal pathology.  No mass.        Terminal ileum:  7-8 cm.  Normal.     Cecum and ascending colon: Ileocecal valve was noted to be lipomatous.  Vascular ectasia was seen.  This was treated with thermal ablation using argon plasma coagulator.     Hepatic flexure, transverse colon, splenic flexure:  Tortuous and spastic.  At the hepatic flexure 1 cm polypoid appearance of the mucosa was seen.  Submucosal injection of normal  saline 1 ml was undertaken to raise the area.  Before the closure of the snare at the time of resection this field was lost. This area is behind a fold and with significant spasm could not be kept in field of vision despite changing patient's positions. Submucosal injection of Ju[place] ink 0.5 ml was undertaken for marking.  A 3 mm sessile polyp was removed with cold biopsy forceps.     Descending colon, sigmoid colon and rectum: Diverticulosis was noted.  A 3 mm sessile polyp ascending colon was removed with cold biopsy forceps.   A retroflex examination within the rectum revealed internal hemorrhoids.        The scope was then straightened, the lower GI tract was decompressed, and the scope was pulled out of the patient.  The patient tolerated the procedure well.  There were no immediate complications and the patient was transferred in stable condition for post procedure observation.      TECHNICAL DATA:   1. Berkeley prep score: 6(2+2+2).   Extensive irrigation of the colon was undertaken.  2. Difficulty of examination:  Average.  3. Initial rectal to cecum time 5 minutes.     4. Withdrawal time: 20 min.    5. Total procedure time:  1 hour 11 minutes   6. Retroflex examination in right colon: Yes.    7. Second look Rectum to cecum with decompression: Yes.    DIAGNOSES:    1. Left-sided diverticulosis.  2. Colon polyps.  3. Nonbleeding Colonic vascular ectasia.  Status post thermal ablation therapy.  4. Polypoid area at hepatic flexure. Not biopsied or removed for technical reasons.   Juana ink marking close to the site.  5. Internal hemorrhoids.     RECOMMENDATIONS:     1. Follow biopsies.    2. Follow-up:  3-4 weeks.    3. Followup colonoscopy: The patient will need a follow-up colonoscopy with extended preparation for removal of the hepatic flexure polypoid lesion.  Will discuss it further.     4. Dietary instructions.     Thank you very much for letting me participate in the care of this patient. Please do not  hesitate to call me if you have any questions.

## 2017-06-08 NOTE — ANESTHESIA PREPROCEDURE EVALUATION
Anesthesia Evaluation     Patient summary reviewed and Nursing notes reviewed   history of anesthetic complications: PONV  NPO Solid Status: > 8 hours  NPO Liquid Status: > 8 hours     Airway   Mallampati: I  TM distance: >3 FB  Neck ROM: full  no difficulty expected  Dental - normal exam     Pulmonary - negative pulmonary ROS and normal exam   Cardiovascular - negative cardio ROS and normal exam        Neuro/Psych- negative ROS  GI/Hepatic/Renal/Endo    (+) obesity, morbid obesity, GERD, hypothyroidism,     Musculoskeletal (-) negative ROS    Abdominal    Substance History - negative use     OB/GYN negative ob/gyn ROS         Other - negative ROS                                       Anesthesia Plan    ASA 3     MAC     intravenous induction   Anesthetic plan and risks discussed with patient.

## 2017-06-13 LAB
LAB AP CASE REPORT: NORMAL
Lab: NORMAL
PATH REPORT.FINAL DX SPEC: NORMAL

## 2017-07-03 RX ORDER — LIOTHYRONINE SODIUM 25 UG/1
TABLET ORAL
Qty: 90 TABLET | Refills: 3 | Status: SHIPPED | OUTPATIENT
Start: 2017-07-03 | End: 2017-09-05 | Stop reason: SDUPTHER

## 2017-07-05 ENCOUNTER — TELEPHONE (OUTPATIENT)
Dept: INTERNAL MEDICINE | Facility: CLINIC | Age: 67
End: 2017-07-05

## 2017-07-05 DIAGNOSIS — E06.3 HASHIMOTO'S THYROIDITIS: ICD-10-CM

## 2017-07-05 RX ORDER — ALLOPURINOL 100 MG/1
TABLET ORAL
Qty: 60 TABLET | Refills: 5 | Status: SHIPPED | OUTPATIENT
Start: 2017-07-05 | End: 2017-09-05 | Stop reason: SDUPTHER

## 2017-07-05 RX ORDER — LEVOTHYROXINE, LIOTHYRONINE 38; 9 UG/1; UG/1
TABLET ORAL
Qty: 90 TABLET | Refills: 2 | Status: CANCELLED | OUTPATIENT
Start: 2017-07-05

## 2017-07-05 RX ORDER — ALLOPURINOL 100 MG/1
200 TABLET ORAL DAILY
Qty: 60 TABLET | Refills: 5 | Status: SHIPPED | OUTPATIENT
Start: 2017-07-05 | End: 2018-01-03 | Stop reason: SDUPTHER

## 2017-07-28 ENCOUNTER — TELEPHONE (OUTPATIENT)
Dept: GASTROENTEROLOGY | Facility: CLINIC | Age: 67
End: 2017-07-28

## 2017-07-28 NOTE — TELEPHONE ENCOUNTER
----- Message from Madelyn Rhodes sent at 7/27/2017 12:36 PM EDT -----  PT WANTS TO GET RESULTS OVER THE PHONE, I ALREADY CX APPT FOR NEXT Thursday.      Called and discussed with patient. Advised she needs to come in to discuss removal of polypoid lesion with extended prep with Dr. Huang. Patient rescheduled office visit.

## 2017-09-05 ENCOUNTER — OFFICE VISIT (OUTPATIENT)
Dept: INTERNAL MEDICINE | Facility: CLINIC | Age: 67
End: 2017-09-05

## 2017-09-05 VITALS
HEIGHT: 63 IN | HEART RATE: 70 BPM | RESPIRATION RATE: 12 BRPM | BODY MASS INDEX: 38.55 KG/M2 | WEIGHT: 217.6 LBS | OXYGEN SATURATION: 96 % | DIASTOLIC BLOOD PRESSURE: 80 MMHG | SYSTOLIC BLOOD PRESSURE: 116 MMHG

## 2017-09-05 DIAGNOSIS — E06.3 HASHIMOTO'S THYROIDITIS: ICD-10-CM

## 2017-09-05 DIAGNOSIS — Z12.31 ENCOUNTER FOR SCREENING MAMMOGRAM FOR MALIGNANT NEOPLASM OF BREAST: ICD-10-CM

## 2017-09-05 DIAGNOSIS — R79.9 ELEVATED BUN: ICD-10-CM

## 2017-09-05 DIAGNOSIS — Z11.59 NEED FOR HEPATITIS C SCREENING TEST: ICD-10-CM

## 2017-09-05 DIAGNOSIS — E89.40 POSTSURGICAL MENOPAUSE: ICD-10-CM

## 2017-09-05 DIAGNOSIS — F42.9 OBSESSIVE-COMPULSIVE DISORDER: ICD-10-CM

## 2017-09-05 DIAGNOSIS — M1A.0710 CHRONIC IDIOPATHIC GOUT INVOLVING TOE OF RIGHT FOOT WITHOUT TOPHUS: ICD-10-CM

## 2017-09-05 DIAGNOSIS — D50.8 OTHER IRON DEFICIENCY ANEMIA: ICD-10-CM

## 2017-09-05 DIAGNOSIS — Z23 NEED FOR PNEUMOCOCCAL VACCINATION: Primary | ICD-10-CM

## 2017-09-05 DIAGNOSIS — M15.9 PRIMARY OSTEOARTHRITIS INVOLVING MULTIPLE JOINTS: ICD-10-CM

## 2017-09-05 DIAGNOSIS — Z87.891 HISTORY OF SMOKING 30 OR MORE PACK YEARS: ICD-10-CM

## 2017-09-05 DIAGNOSIS — G25.0 ESSENTIAL TREMOR: ICD-10-CM

## 2017-09-05 DIAGNOSIS — E55.9 VITAMIN D DEFICIENCY: ICD-10-CM

## 2017-09-05 PROCEDURE — 90732 PPSV23 VACC 2 YRS+ SUBQ/IM: CPT | Performed by: FAMILY MEDICINE

## 2017-09-05 PROCEDURE — 99397 PER PM REEVAL EST PAT 65+ YR: CPT | Performed by: FAMILY MEDICINE

## 2017-09-05 PROCEDURE — G0438 PPPS, INITIAL VISIT: HCPCS | Performed by: FAMILY MEDICINE

## 2017-09-05 PROCEDURE — G0009 ADMIN PNEUMOCOCCAL VACCINE: HCPCS | Performed by: FAMILY MEDICINE

## 2017-09-05 RX ORDER — LIOTHYRONINE SODIUM 25 UG/1
TABLET ORAL
Qty: 90 TABLET | Refills: 3 | Status: SHIPPED | OUTPATIENT
Start: 2017-09-05 | End: 2018-07-03 | Stop reason: SDUPTHER

## 2017-09-05 RX ORDER — PYRANTEL PAMOATE 100 %
POWDER (GRAM) MISCELLANEOUS
COMMUNITY
End: 2018-06-05 | Stop reason: HOSPADM

## 2017-09-05 RX ORDER — LIOTHYRONINE SODIUM 25 UG/1
TABLET ORAL
Qty: 90 TABLET | Refills: 3 | Status: SHIPPED | OUTPATIENT
Start: 2017-09-05 | End: 2017-09-05 | Stop reason: SDUPTHER

## 2017-09-05 RX ORDER — LEVOTHYROXINE, LIOTHYRONINE 38; 9 UG/1; UG/1
TABLET ORAL
Qty: 90 TABLET | Refills: 2
Start: 2017-09-05 | End: 2018-01-08 | Stop reason: SDUPTHER

## 2017-09-05 NOTE — PATIENT INSTRUCTIONS
Medicare Wellness  Personal Prevention Plan of Service     Date of Office Visit:  2017  Encounter Provider:  Morenita Carroll MD  Place of Service:  [facility]  Patient Name: Salinas Monroy  :  1950    As part of the Medicare Wellness portion of your visit today, we are providing you with this personalized preventive plan of services (PPPS). This plan is based upon recommendations of the [place] Preventive Services Task Force (USPSTF) and the Advisory Committee on Immunization Practices (ACIP).    This lists the preventive care services that should be considered, and provides dates of when you are due. Items listed as completed are up-to-date and do not require any further intervention.    Health Maintenance   Topic Date Due   • TDAP/TD VACCINES (1 - Tdap) 1968   • [facility] Lung Cancer Screening  2004   • HEPATITIS C SCREENING  2016   • PNEUMOCOCCAL VACCINES (65+ LOW/MEDIUM RISK) (2 of 2 - PPSV23) 02/10/2017   • INFLUENZA VACCINE  2017   • ZOSTER VACCINE  2018 (Originally 2016)   • MAMMOGRAM  02/10/2018   • MEDICARE ANNUAL WELLNESS  2018   • DXA SCAN  02/10/2021   • COLONOSCOPY  2027       Orders Placed This Encounter   Procedures   • Mammo Screening Digital Tomosynthesis Bilateral With CAD     Standing Status:   Future     Standing Expiration Date:   2018     Scheduling Instructions:      Please schedule at [facility], wants it in November     Order Specific Question:   Reason for Exam:     Answer:   breast cancer screening   • CT Chest Low Dose Wo     Standing Status:   Future     Standing Expiration Date:   2018     Scheduling Instructions:      Please schedule for same day as mammogram, at [facility]     Order Specific Question:   Reason for Exam:     Answer:   former smoker, lung cancer screening   • Pneumococcal Polysaccharide Vaccine 23-Valent Greater Than or Equal To 3yo Subcutaneous / IM      Standing Status:   Future   • Basic Metabolic Panel   • Hepatitis C Antibody   • Ferritin   • Iron Profile   • Vitamin D 25 Hydroxy   • Uric Acid   • Vitamin B12   • CBC (No Diff)   • Magnesium       Return in about 3 months (around 12/5/2017).

## 2017-09-05 NOTE — PROGRESS NOTES
"66 yo female here for Medicare Wellness exam.   Has started iodine, B2 and B3 to \"detox\".    She d/c iron x 2-3 months. She was getting so tired, she went back on one of the prescription strength Fe, and one OTC. She would like to discuss anemia.     QUICK REFERENCE INFORMATION:  The ABCs of the Annual Wellness Visit    Subsequent Medicare Wellness Visit    Subjective   History of Present Illness    Salinas Monroy is a 67 y.o. female who presents for an Subsequent Wellness Visit. In addition, we addressed the following health issues: hashimoto's thyroiditis - has adjusted some of her thyroid medications.  - Decreased to NP 90 mg daily (1 & 1/2) T3 - 25 mcg TID - doing better on specific .      Iron deficiency: had recommended pt to stop iron for a few months.  She started getting tired after 2 months and restarted 1 OTC daily and 1 prescription strength daily.     HEALTH RISK ASSESSMENT    Recent Hospitalizations:  No recent hospitalization(s)..        Current Medical Providers:  Patient Care Team:  Morenita Carroll MD as PCP - General  Morenita Carroll MD as PCP - Family Medicine        Smoking Status:  History   Smoking Status   • Former Smoker   • Packs/day: 1.00   • Years: 40.00   • Types: Cigarettes   • Start date: 1967   • Quit date: 2008   Smokeless Tobacco   • Never Used       Alcohol Consumption:  History   Alcohol Use No       Depression Screen:   PHQ-2/PHQ-9 Depression Screening 9/5/2017   Little interest or pleasure in doing things 0   Feeling down, depressed, or hopeless 0   Total Score 0       Health Habits and Functional and Cognitive Screening:  Functional & Cognitive Status 9/5/2017   Do you have difficulty preparing food and eating? No   Do you have difficulty bathing yourself? No   Do you have difficulty getting dressed? No   Do you have difficulty using the toilet? No   Do you have difficulty moving around from place to place? No   In the past year have you fallen or experienced " a near fall? No   Do you need help using the phone?  No   Are you deaf or do you have serious difficulty hearing?  No   Do you need help with transportation? Yes   Do you need help shopping? No   Do you need help preparing meals?  No   Do you need help with housework?  No   Do you need help with laundry? No   Do you need help taking your medications? No   Do you need help managing money? No   Do you have difficulty concentrating, remembering or making decisions? No       Health Habits  Current Diet:  (no dairy, no gluten, no processed foods. )  Dental Exam: Up to date  Eye Exam: Not up to date  Exercise (times per week): 2 times per week  Current Exercise Activities Include: Cardiovasular Workout on Exercise Equipment (isometric, weights)      Does the patient have evidence of cognitive impairment? No    Asprin use counseling:no    Finger Rub Hearing Test (right ear):passed  Finger Rub Hearing Test (left ear):passed    Recent Lab Results:  CMP:  Lab Results   Component Value Date    GLU 99 (H) 09/05/2017    BUN 20 09/05/2017    CREATININE 0.60 09/05/2017    EGFRIFNONA 99 09/05/2017    EGFRIFAFRI 120 09/05/2017    BCR 33.3 (H) 09/05/2017     09/05/2017    K 4.2 09/05/2017    CO2 21.0 (L) 09/05/2017    CALCIUM 10.2 09/05/2017    PROTENTOTREF 7.1 05/04/2017    ALBUMIN 4.30 05/04/2017    LABGLOBREF 2.8 05/04/2017    LABIL2 1.5 05/04/2017    BILITOT 0.5 05/04/2017    ALKPHOS 101 05/04/2017    AST 27 05/04/2017    ALT 40 05/04/2017     Lipid Panel:  Lab Results   Component Value Date    CHLPL 121 03/03/2016    TRIG 131 03/03/2016    HDL 41 03/03/2016     LDL:     HbA1c:     Urine Microalbumin:     Visual Acuity:  No exam data present    Age-appropriate Screening Schedule:  Refer to the list below for future screening recommendations based on patient's age, sex and/or medical conditions. Orders for these recommended tests are listed in the plan section. The patient has been provided with a written plan.    Health  Maintenance   Topic Date Due   • TDAP/TD VACCINES (1 - Tdap) [birth year]/1968   • INFLUENZA VACCINE  08/01/2017   • ZOSTER VACCINE  09/03/2018 (Originally 5/4/2016)   • MAMMOGRAM  02/10/2018   • DXA SCAN  02/10/2021   • COLONOSCOPY  06/08/2027   • PNEUMOCOCCAL VACCINES (65+ LOW/MEDIUM RISK)  Completed          The following portions of the patient's history were reviewed and updated as appropriate: She  has a past medical history of Anemia (?); Arthritis; Depression; GERD (gastroesophageal reflux disease); Gout; Hashimoto's disease; Hernia (3-2012); Hypothyroid; Impingement syndrome of right shoulder (5/8/2016); PONV (postoperative nausea and vomiting); and Wears glasses.  She has Hashimoto's thyroiditis and Obsessive-compulsive disorder on her pertinent problem list.  She  has a past surgical history that includes Hysterectomy; Gastric bypass (1978); Total abdominal hysterectomy w/ bilateral salpingoophorectomy (1990); Hernia repair (2012); Small intestine surgery (2014); Appendectomy; Joint replacement (2009 & 2012); Colonoscopy (2012); Abdominal surgery; Upper gastrointestinal endoscopy; Tubal ligation; Hernia repair; Hernia mesh removal (2014); and Colonoscopy (N/A, 6/8/2017).  Her family history includes Alcohol abuse in her maternal uncle; Arthritis in her mother; Asthma in her sister; Cancer in her father and maternal uncle; Diabetes in her maternal aunt; Heart disease in her paternal uncle; Hyperlipidemia in her mother and paternal uncle; Hypertension in her mother and paternal uncle; Kidney cancer in her father; Liver cancer in her father; Lung cancer in her maternal uncle; No Known Problems in her brother, brother, and paternal aunt; Obesity in her mother; Rheum arthritis in her mother; Stroke in her mother and paternal uncle; Thyroid disease in her mother. There is no history of Colon cancer.  She  reports that she quit smoking about 9 years ago. Her smoking use included Cigarettes. She started smoking about 50  years ago. She has a 40.00 pack-year smoking history. She has never used smokeless tobacco. She reports that she does not drink alcohol or use illicit drugs..    Outpatient Medications Prior to Visit   Medication Sig Dispense Refill   • Acetylcysteine (N-ACETYL-L-CYSTEINE PO) Take  by mouth Daily.     • acyclovir (ZOVIRAX) 400 MG tablet Take 2 tablets by mouth 2 (Two) Times a Day. As needed for herpes outbreak 60 tablet 1   • allopurinol (ZYLOPRIM) 100 MG tablet Take 2 tablets by mouth Daily. 60 tablet 5   • B Complex Vitamins (VITAMIN B COMPLEX PO) Take  by mouth Daily.     • BIOTIN PO Take 1 tablet by mouth Daily.     • Cholecalciferol (VITAMIN D3) 51958 UNITS tablet Take 5,000 Units by mouth Daily.     • CHOLINE-INOSITOL-METHIONINE-FA PO Take 1 tablet by mouth Daily.     • COCONUT OIL PO Take 1 dose by mouth Daily.     • Coenzyme Q10 (CO Q 10 PO) Take 1 tablet by mouth Daily.     • Cyanocobalamin (VITAMIN B 12) 100 MCG lozenge Place 1 lozenge under the tongue 3 (Three) Times a Week.     • Digestive Enzymes (DIGESTIVE ENZYME PO) Take 1 tablet by mouth Daily.     • escitalopram (LEXAPRO) 5 MG tablet TAKE ONE TABLET BY MOUTH DAILY 90 tablet 3   • GARLIC PO Take 1 tablet by mouth Daily.     • MAGNESIUM PO Take 1,000 mg by mouth Every Night.     • Methylsulfonylmethane (MSM PO) Take 1 tablet by mouth Daily.     • MILK THISTLE PO Take 1 tablet by mouth Daily.     • Misc Natural Products (TART CHERRY ADVANCED PO) Take 2 capsules by mouth Daily.     • Multiple Vitamins-Minerals (MULTI VITAMIN/MINERALS) tablet Take 1 tablet by mouth Daily.     • Omega-3 Fatty Acids (OMEGA-3 FISH OIL PO) Take 4,000 Units by mouth Daily.     • Probiotic Product (PROBIOTIC PO) Take 1 tablet by mouth Daily.     • SELENIUM PO Take 1 tablet by mouth Daily.     • TURMERIC PO Take 1 tablet by mouth Daily.     • vitamin C (ASCORBIC ACID) 500 MG tablet Take 500 mg by mouth 3 (Three) Times a Day.     • VITAMIN E PO Take 1 capsule by mouth Daily.      • VITAMIN K PO Take 1 tablet by mouth Daily.     • liothyronine (CYTOMEL) 25 MCG tablet Take up to 3 tablets per day. Needs to come from [facility]  ONLY 90 tablet 3   • NP THYROID 60 MG tablet 1 in the morning and 2 in the afternoon (Patient taking differently: Take 90 mg by mouth Daily. 1 in the morning and 2 in the afternoon) 90 tablet 2   • allopurinol (ZYLOPRIM) 100 MG tablet TAKE TWO TABLETS BY MOUTH DAILY NEEDS FOLLOW UP APPOINTMENT 60 tablet 5   • liothyronine (CYTOMEL) 25 MCG tablet TAKE UP TO 3 TABLETS BY MOUTH DAILY 90 tablet 3     No facility-administered medications prior to visit.        Patient Active Problem List   Diagnosis   • Hashimoto's thyroiditis   • Essential tremor   • Vitamin D deficiency   • Iron deficiency anemia   • Primary osteoarthritis involving multiple joints   • Bipolar affective disorder   • Chronic idiopathic gout involving toe of right foot without tophus   • Obsessive-compulsive disorder   • Postsurgical menopause       Identification of Risk Factors:  Risk factors include: weight , inadequate social support, isolation, financial stress and polypharmacy.    Review of Systems   Constitutional: Positive for fatigue.   HENT: Negative.    Eyes: Negative.    Respiratory: Negative.    Cardiovascular: Negative.    Gastrointestinal: Negative.    Endocrine: Negative.    Genitourinary: Negative.    Musculoskeletal: Negative.    Skin: Negative.    Allergic/Immunologic: Negative.    Neurological: Positive for tremors.   Hematological: Negative.    Psychiatric/Behavioral: The patient is nervous/anxious.        Compared to one year ago, the patient feels her physical health is better.  Compared to one year ago, the patient feels her mental health is better.    Objective     Physical Exam   Constitutional: She is oriented to person, place, and time. She appears well-developed and well-nourished. No distress.   HENT:   Head: Normocephalic and atraumatic.   Right Ear: Tympanic  "membrane, external ear and ear canal normal.   Left Ear: Tympanic membrane, external ear and ear canal normal.   Nose: No rhinorrhea.   Mouth/Throat: Uvula is midline, oropharynx is clear and moist and mucous membranes are normal. No posterior oropharyngeal erythema. No tonsillar exudate.   Eyes: Conjunctivae and lids are normal. Pupils are equal, round, and reactive to light. Right eye exhibits no discharge. Left eye exhibits no discharge. No scleral icterus.   Neck: Trachea normal, normal range of motion and phonation normal. Neck supple. No thyroid mass and no thyromegaly present.   Cardiovascular: Normal rate, regular rhythm and normal heart sounds.    No murmur heard.  Pulmonary/Chest: Effort normal and breath sounds normal.   Abdominal: Soft. Normal appearance. There is no splenomegaly or hepatomegaly. There is no tenderness. No hernia.   Musculoskeletal: Normal range of motion. She exhibits no edema, tenderness or deformity.   Lymphadenopathy:        Head (right side): No submental, no submandibular, no preauricular and no posterior auricular adenopathy present.        Head (left side): No submental, no submandibular, no preauricular and no posterior auricular adenopathy present.     She has no cervical adenopathy.        Right: No supraclavicular adenopathy present.        Left: No supraclavicular adenopathy present.   Neurological: She is alert and oriented to person, place, and time. She has normal strength. She displays tremor.   Reflex Scores:       Patellar reflexes are 2+ on the right side and 2+ on the left side.  Skin: Skin is warm and dry. No rash noted. No pallor.   Psychiatric: She has a normal mood and affect. Her behavior is normal. Judgment and thought content normal.       Vitals:    09/05/17 1416   BP: 116/80   Pulse: 70   Resp: 12   SpO2: 96%   Weight: 217 lb 9.6 oz (98.7 kg)   Height: 63\" (160 cm)       Body mass index is 38.55 kg/(m^2).  Discussed the patient's BMI with her. The BMI is " above average; no BMI management plan is appropriate..    Assessment/Plan   Patient Self-Management and Personalized Health Advice  The patient has been provided with information about: fall prevention, designing advance directives and supplements and preventive services including:   · Advance directive, Pneumococcal vaccine , Screening mammography, referral placed, lung cancer screening.    Visit Diagnoses:    ICD-10-CM ICD-9-CM   1. Need for pneumococcal vaccination Z23 V03.82   2. History of smoking 30 or more pack years Z87.891 V15.82   3. Encounter for screening mammogram for malignant neoplasm of breast Z12.31 V76.12   4. Hashimoto's thyroiditis E06.3 245.2   5. Other iron deficiency anemia D50.8    6. Vitamin D deficiency E55.9 268.9   7. Chronic idiopathic gout involving toe of right foot without tophus [id number].0710 274.02   8. Elevated BUN R79.9 790.6   9. Need for hepatitis C screening test Z11.59 V73.89   10. Obsessive-compulsive disorder F42.9 300.3   11. Essential tremor G25.0 333.1   12. Postsurgical menopause E89.40 627.4   13. Primary osteoarthritis involving multiple joints M15.0 715.09       Orders Placed This Encounter   Procedures   • Mammo Screening Digital Tomosynthesis Bilateral With CAD     Standing Status:   Future     Standing Expiration Date:   9/5/2018     Scheduling Instructions:      Please schedule at [facility], wants it in November     Order Specific Question:   Reason for Exam:     Answer:   breast cancer screening   • CT Chest Low Dose Wo     Standing Status:   Future     Standing Expiration Date:   9/5/2018     Scheduling Instructions:      Please schedule for same day as mammogram, at [facility]     Order Specific Question:   Reason for Exam:     Answer:   former smoker, lung cancer screening   • Pneumococcal Polysaccharide Vaccine 23-Valent Greater Than or Equal To 1yo Subcutaneous / IM     Standing Status:   Future     Number of Occurrences:   1   • Basic Metabolic Panel      Order Specific Question:   [facility] Has the patient fasted?     Answer:   No   • Hepatitis C Antibody     Order Specific Question:   [facility] Has the patient fasted?     Answer:   No   • Ferritin     Order Specific Question:   [facility] Has the patient fasted?     Answer:   No   • Iron Profile     Order Specific Question:   [facility] Has the patient fasted?     Answer:   No   • Vitamin D 25 Hydroxy     Order Specific Question:   [facility] Has the patient fasted?     Answer:   No   • Uric Acid     Order Specific Question:   [facility] Has the patient fasted?     Answer:   No   • Vitamin B12     Order Specific Question:   [facility] Has the patient fasted?     Answer:   No   • CBC (No Diff)     Order Specific Question:   [facility] Has the patient fasted?     Answer:   No   • Magnesium     Order Specific Question:   [facility] Has the patient fasted?     Answer:   No       Outpatient Encounter Prescriptions as of 9/5/2017   Medication Sig Dispense Refill   • Acetylcysteine (N-ACETYL-L-CYSTEINE PO) Take  by mouth Daily.     • acyclovir (ZOVIRAX) 400 MG tablet Take 2 tablets by mouth 2 (Two) Times a Day. As needed for herpes outbreak 60 tablet 1   • allopurinol (ZYLOPRIM) 100 MG tablet Take 2 tablets by mouth Daily. 60 tablet 5   • B Complex Vitamins (VITAMIN B COMPLEX PO) Take  by mouth Daily.     • BIOTIN PO Take 1 tablet by mouth Daily.     • Cholecalciferol (VITAMIN D3) 64583 UNITS tablet Take 5,000 Units by mouth Daily.     • CHOLINE-INOSITOL-METHIONINE-FA PO Take 1 tablet by mouth Daily.     • COCONUT OIL PO Take 1 dose by mouth Daily.     • Coenzyme Q10 (CO Q 10 PO) Take 1 tablet by mouth Daily.     • Cyanocobalamin (VITAMIN B 12) 100 MCG lozenge Place 1 lozenge under the tongue 3 (Three) Times a Week.     • Digestive Enzymes (DIGESTIVE ENZYME PO) Take 1 tablet by mouth Daily.     • escitalopram (LEXAPRO) 5 MG tablet TAKE ONE TABLET BY MOUTH DAILY 90 tablet 3   • ferrous fumarate (IRMA-SEQUELS) 18 MG CR tablet Take 18 mg by mouth  Daily.     • GARLIC PO Take 1 tablet by mouth Daily.     • Iodine Strong, Lugol's, solution 2%: 20 ggt qd (50 mg) five days a week, no weekends.     • liothyronine (CYTOMEL) 25 MCG tablet Take up to 3 tablets per day. Needs to come from [facility]  ONLY 90 tablet 3   • MAGNESIUM PO Take 1,000 mg by mouth Every Night.     • Methylsulfonylmethane (MSM PO) Take 1 tablet by mouth Daily.     • MILK THISTLE PO Take 1 tablet by mouth Daily.     • Misc Natural Products (TART CHERRY ADVANCED PO) Take 2 capsules by mouth Daily.     • Multiple Vitamins-Minerals (MULTI VITAMIN/MINERALS) tablet Take 1 tablet by mouth Daily.     • Niacin (VITAMIN B-3 PO) Take  by mouth.     • NP THYROID 60 MG tablet 1 in the morning and 1/2 in afternoon 90 tablet 2   • Omega-3 Fatty Acids (OMEGA-3 FISH OIL PO) Take 4,000 Units by mouth Daily.     • Probiotic Product (PROBIOTIC PO) Take 1 tablet by mouth Daily.     • Riboflavin (VITAMIN B-2 PO) Take  by mouth.     • SELENIUM PO Take 1 tablet by mouth Daily.     • TURMERIC PO Take 1 tablet by mouth Daily.     • vitamin C (ASCORBIC ACID) 500 MG tablet Take 500 mg by mouth 3 (Three) Times a Day.     • VITAMIN E PO Take 1 capsule by mouth Daily.     • VITAMIN K PO Take 1 tablet by mouth Daily.     • [DISCONTINUED] liothyronine (CYTOMEL) 25 MCG tablet Take up to 3 tablets per day. Needs to come from [facility]  ONLY 90 tablet 3   • [DISCONTINUED] NP THYROID 60 MG tablet 1 in the morning and 2 in the afternoon (Patient taking differently: Take 90 mg by mouth Daily. 1 in the morning and 2 in the afternoon) 90 tablet 2   • Ferrous Gluconate 325 (36 Fe) MG tablet Take 1 tablet by mouth Daily. 90 tablet 1   • [DISCONTINUED] allopurinol (ZYLOPRIM) 100 MG tablet TAKE TWO TABLETS BY MOUTH DAILY NEEDS FOLLOW UP APPOINTMENT 60 tablet 5   • [DISCONTINUED] liothyronine (CYTOMEL) 25 MCG tablet TAKE UP TO 3 TABLETS BY MOUTH DAILY 90 tablet 3     No facility-administered encounter medications on  file as of 9/5/2017.        Reviewed use of high risk medication in the elderly: yes  Reviewed for potential of harmful drug interactions in the elderly: yes    Follow Up:  Return in about 3 months (around 12/5/2017).     An After Visit Summary and PPPS with all of these plans were given to the patient.

## 2017-09-06 LAB
25(OH)D3+25(OH)D2 SERPL-MCNC: 88.8 NG/ML
BUN SERPL-MCNC: 20 MG/DL (ref 7–20)
BUN/CREAT SERPL: 33.3 (ref 7.1–23.5)
CALCIUM SERPL-MCNC: 10.2 MG/DL (ref 8.4–10.2)
CHLORIDE SERPL-SCNC: 110 MMOL/L (ref 98–107)
CO2 SERPL-SCNC: 21 MMOL/L (ref 26–30)
CREAT SERPL-MCNC: 0.6 MG/DL (ref 0.6–1.3)
ERYTHROCYTE [DISTWIDTH] IN BLOOD BY AUTOMATED COUNT: 13.2 % (ref 11.5–14.5)
FERRITIN SERPL-MCNC: 253 NG/ML (ref 11.1–264)
GLUCOSE SERPL-MCNC: 99 MG/DL (ref 74–98)
HCT VFR BLD AUTO: 43.2 % (ref 37–47)
HCV AB S/CO SERPL IA: <0.1 S/CO RATIO (ref 0–0.9)
HGB BLD-MCNC: 13.9 G/DL (ref 12–16)
IRON SATN MFR SERPL: 12 % (ref 11–46)
IRON SERPL-MCNC: 36 MCG/DL (ref 37–181)
MAGNESIUM SERPL-MCNC: 2.1 MG/DL (ref 1.6–2.3)
MCH RBC QN AUTO: 29.4 PG (ref 27–31)
MCHC RBC AUTO-ENTMCNC: 32.2 G/DL (ref 30–37)
MCV RBC AUTO: 91.3 FL (ref 81–99)
PLATELET # BLD AUTO: 291 10*3/MM3 (ref 130–400)
POTASSIUM SERPL-SCNC: 4.2 MMOL/L (ref 3.5–5.1)
RBC # BLD AUTO: 4.73 10*6/MM3 (ref 4.2–5.4)
SODIUM SERPL-SCNC: 144 MMOL/L (ref 137–145)
TIBC SERPL-MCNC: 307 MCG/DL (ref 261–497)
UIBC SERPL-MCNC: 271 MCG/DL
URATE SERPL-MCNC: 4.6 MG/DL (ref 2.5–8.5)
VIT B12 SERPL-MCNC: >1000 PG/ML (ref 239–931)
WBC # BLD AUTO: 8.53 10*3/MM3 (ref 4.8–10.8)

## 2017-10-05 ENCOUNTER — OFFICE VISIT (OUTPATIENT)
Dept: GASTROENTEROLOGY | Facility: CLINIC | Age: 67
End: 2017-10-05

## 2017-10-05 ENCOUNTER — HOSPITAL ENCOUNTER (OUTPATIENT)
Dept: CT IMAGING | Facility: HOSPITAL | Age: 67
Discharge: HOME | End: 2017-10-05
Attending: FAMILY MEDICINE | Admitting: FAMILY MEDICINE

## 2017-10-05 VITALS
HEIGHT: 63 IN | WEIGHT: 221 LBS | DIASTOLIC BLOOD PRESSURE: 79 MMHG | TEMPERATURE: 98 F | BODY MASS INDEX: 39.16 KG/M2 | RESPIRATION RATE: 15 BRPM | HEART RATE: 75 BPM | SYSTOLIC BLOOD PRESSURE: 157 MMHG

## 2017-10-05 DIAGNOSIS — Z87.891 HISTORY OF SMOKING 30 OR MORE PACK YEARS: ICD-10-CM

## 2017-10-05 DIAGNOSIS — R12 HEARTBURN: ICD-10-CM

## 2017-10-05 DIAGNOSIS — E66.3 OVER WEIGHT: ICD-10-CM

## 2017-10-05 DIAGNOSIS — K57.30 DIVERTICULOSIS OF COLON WITHOUT HEMORRHAGE: Primary | ICD-10-CM

## 2017-10-05 DIAGNOSIS — K55.20 ANGIODYSPLASIA OF COLON WITHOUT HEMORRHAGE: ICD-10-CM

## 2017-10-05 DIAGNOSIS — K63.9 LESION OF COLON: ICD-10-CM

## 2017-10-05 PROCEDURE — 99214 OFFICE O/P EST MOD 30 MIN: CPT | Performed by: INTERNAL MEDICINE

## 2017-10-05 PROCEDURE — G0297 LDCT FOR LUNG CA SCREEN: HCPCS

## 2017-10-05 NOTE — PROGRESS NOTES
"Chief Complaint   Patient presents with   • Follow-up       History of Present Illness     The patient came back for follow visit today.  The patient feels better.  The patient denies recent change in bowel habits.  There is no diarrhea or constipation. The patient moves her bowels perhaps twice a day on average which are formed.  Of interest the patient takes magnesium supplements.  There is no history of abdominal pain.  There is no history of overt GI bleed (hematemesis melena or hematochezia).  The patient denies nausea or vomiting.  The patient has a history of reflux off and on for the last several years.  The reflux is mild to moderately severe.  Symptoms are described as retrosternal burning sensation, and indigestion.  There is history of occasional regurgitative symptoms.  Frequency being several times per week.  The symptoms are worse at night.  With dietary changes and anti-reflex measures for reflux symptoms are under control.  Currently the patient is not taking acid suppressive therapy.  The patient denies dysphagia or odynophagia.  There is no history of recent significant weight loss.  There is no history of liver or pancreatic disease.    There is questionable history of anemia in the past.  However upon review of records the patient was noted to have normal H&H.  Her iron saturation is however low normal.  Previously in 2012 the patient had an attempted colonoscopy by Dr. Montoya which was unsuccessful.  Of further note the patient is on multiple \"nutritional supplements\".     Review of Systems   Constitutional: Negative for appetite change, chills, fatigue, fever and unexpected weight change.   HENT: Negative for mouth sores, nosebleeds and trouble swallowing.    Eyes: Negative for discharge and redness.   Respiratory: Negative for apnea, cough and shortness of breath.    Cardiovascular: Negative for chest pain, palpitations and leg swelling.   Gastrointestinal: Negative for abdominal distention, " abdominal pain, anal bleeding, blood in stool, constipation, diarrhea, nausea and vomiting.   Endocrine: Negative for cold intolerance, heat intolerance and polydipsia.   Genitourinary: Negative for dysuria, hematuria and urgency.   Musculoskeletal: Positive for arthralgias. Negative for joint swelling and myalgias.   Skin: Negative for rash.   Allergic/Immunologic: Positive for food allergies. Negative for immunocompromised state.   Neurological: Negative for dizziness, seizures, syncope and headaches.   Hematological: Negative for adenopathy. Does not bruise/bleed easily.   Psychiatric/Behavioral: Negative for dysphoric mood. The patient is not nervous/anxious and is not hyperactive.      Patient Active Problem List   Diagnosis   • Hashimoto's thyroiditis   • Essential tremor   • Vitamin D deficiency   • Iron deficiency anemia   • Primary osteoarthritis involving multiple joints   • Bipolar affective disorder   • Chronic idiopathic gout involving toe of right foot without tophus   • Obsessive-compulsive disorder   • Postsurgical menopause     Past Medical History:   Diagnosis Date   • Anemia ?    under control   • Arthritis    • Depression    • GERD (gastroesophageal reflux disease)     no longer bothers me...   • Gout    • Hashimoto's disease    • Hernia 3-2012    repair surgery which failed in 2014   • Hypothyroid    • Impingement syndrome of right shoulder 5/8/2016   • PONV (postoperative nausea and vomiting)     NAUSEA   • Wears glasses      Past Surgical History:   Procedure Laterality Date   • ABDOMINAL SURGERY     • APPENDECTOMY     • BARIATRIC SURGERY  1979   • COLONOSCOPY  2012    failed due to hernia...   • COLONOSCOPY N/A 6/8/2017    Procedure: COLONOSCOPY with cold snare polypectomies, argon thermal ablation, ns injection, yanira ink injection;  Surgeon: Rafiq Huang MD;  Location: [facility] ENDOSCOPY;  Service:    • GASTRIC BYPASS  1978   • HERNIA MESH REMOVAL  2014    BOWEL OBSTRUCTION DUE TO MESH   •  HERNIA REPAIR  2012   • HERNIA REPAIR     • HYSTERECTOMY     • JOINT REPLACEMENT  2009 & 2012    Left & Right Full Hip Replacements   • SMALL INTESTINE SURGERY  2014    DUE TO BOWEL OBSTRUCTION WITH SOME REMOVAL   • TOTAL ABDOMINAL HYSTERECTOMY WITH SALPINGO OOPHORECTOMY  1990    fibroids   • TUBAL ABDOMINAL LIGATION     • UPPER GASTROINTESTINAL ENDOSCOPY       Family History   Problem Relation Age of Onset   • Obesity Mother    • Rheum arthritis Mother    • Thyroid disease Mother    • Hypertension Mother    • Stroke Mother      Most of her problems were caused by Rheumatoid Ar.   • Hyperlipidemia Mother    • Arthritis Mother      Rheumatoid Arthritis..Passed 2004   • Cancer Father      Passed 1997   • Kidney cancer Father    • Liver cancer Father    • No Known Problems Brother    • Diabetes Maternal Aunt    • Cancer Maternal Uncle    • Lung cancer Maternal Uncle    • Alcohol abuse Maternal Uncle    • No Known Problems Paternal Aunt    • Stroke Paternal Uncle    • Hypertension Paternal Uncle    • Hyperlipidemia Paternal Uncle    • Heart disease Paternal Uncle    • No Known Problems Brother    • Asthma Sister      under control   • Colon cancer Neg Hx      Social History   Substance Use Topics   • Smoking status: Former Smoker     Packs/day: 1.00     Years: 40.00     Types: Cigarettes     Start date: 1967     Quit date: 2008   • Smokeless tobacco: Never Used   • Alcohol use No       Current Outpatient Prescriptions:   •  Acetylcysteine (N-ACETYL-L-CYSTEINE PO), Take  by mouth Daily., Disp: , Rfl:   •  acyclovir (ZOVIRAX) 400 MG tablet, Take 2 tablets by mouth 2 (Two) Times a Day. As needed for herpes outbreak, Disp: 60 tablet, Rfl: 1  •  allopurinol (ZYLOPRIM) 100 MG tablet, Take 2 tablets by mouth Daily., Disp: 60 tablet, Rfl: 5  •  B Complex Vitamins (VITAMIN B COMPLEX PO), Take  by mouth Daily., Disp: , Rfl:   •  BIOTIN PO, Take 1 tablet by mouth Daily., Disp: , Rfl:   •  Cholecalciferol (VITAMIN D3) 84474 UNITS  tablet, Take 5,000 Units by mouth Daily., Disp: , Rfl:   •  CHOLINE-INOSITOL-METHIONINE-FA PO, Take 1 tablet by mouth Daily., Disp: , Rfl:   •  COCONUT OIL PO, Take 1 dose by mouth Daily., Disp: , Rfl:   •  Coenzyme Q10 (CO Q 10 PO), Take 1 tablet by mouth Daily., Disp: , Rfl:   •  Cyanocobalamin (VITAMIN B 12) 100 MCG lozenge, Place 1 lozenge under the tongue 3 (Three) Times a Week., Disp: , Rfl:   •  Digestive Enzymes (DIGESTIVE ENZYME PO), Take 1 tablet by mouth Daily., Disp: , Rfl:   •  escitalopram (LEXAPRO) 5 MG tablet, TAKE ONE TABLET BY MOUTH DAILY, Disp: 90 tablet, Rfl: 3  •  ferrous fumarate (IRMA-SEQUELS) 18 MG CR tablet, Take 18 mg by mouth Daily., Disp: , Rfl:   •  Ferrous Gluconate 325 (36 Fe) MG tablet, Take 1 tablet by mouth Daily., Disp: 90 tablet, Rfl: 1  •  GARLIC PO, Take 1 tablet by mouth Daily., Disp: , Rfl:   •  Iodine Strong, Lugol's, solution, 2%: 20 ggt qd (50 mg) five days a week, no weekends., Disp: , Rfl:   •  liothyronine (CYTOMEL) 25 MCG tablet, Take up to 3 tablets per day. Needs to come from [place]  ONLY, Disp: 90 tablet, Rfl: 3  •  MAGNESIUM PO, Take 1,000 mg by mouth Every Night., Disp: , Rfl:   •  Methylsulfonylmethane (MSM PO), Take 1 tablet by mouth Daily., Disp: , Rfl:   •  MILK THISTLE PO, Take 1 tablet by mouth Daily., Disp: , Rfl:   •  Misc Natural Products (TART CHERRY ADVANCED PO), Take 2 capsules by mouth Daily., Disp: , Rfl:   •  Multiple Vitamins-Minerals (MULTI VITAMIN/MINERALS) tablet, Take 1 tablet by mouth Daily., Disp: , Rfl:   •  Niacin (VITAMIN B-3 PO), Take  by mouth., Disp: , Rfl:   •  NP THYROID 60 MG tablet, 1 in the morning and 1/2 in afternoon, Disp: 90 tablet, Rfl: 2  •  Omega-3 Fatty Acids (OMEGA-3 FISH OIL PO), Take 4,000 Units by mouth Daily., Disp: , Rfl:   •  Probiotic Product (PROBIOTIC PO), Take 1 tablet by mouth Daily., Disp: , Rfl:   •  Riboflavin (VITAMIN B-2 PO), Take  by mouth., Disp: , Rfl:   •  SELENIUM PO, Take 1 tablet by  "mouth Daily., Disp: , Rfl:   •  TURMERIC PO, Take 1 tablet by mouth Daily., Disp: , Rfl:   •  vitamin C (ASCORBIC ACID) 500 MG tablet, Take 500 mg by mouth 3 (Three) Times a Day., Disp: , Rfl:   •  VITAMIN E PO, Take 1 capsule by mouth Daily., Disp: , Rfl:   •  VITAMIN K PO, Take 1 tablet by mouth Daily., Disp: , Rfl:     Allergies   Allergen Reactions   • Diazepam        Blood pressure 157/79, pulse 75, temperature 98 °F (36.7 °C), resp. rate 15, height 63\" (160 cm), weight 221 lb (100 kg).    Physical Exam   Constitutional: She is oriented to person, place, and time. She appears well-developed and well-nourished. No distress.   HENT:   Head: Normocephalic and atraumatic.   Right Ear: Hearing and external ear normal.   Left Ear: Hearing and external ear normal.   Nose: Nose normal.   Mouth/Throat: Oropharynx is clear and moist and mucous membranes are normal. Mucous membranes are not pale, not dry and not cyanotic. No oral lesions. No oropharyngeal exudate.   Eyes: Conjunctivae and EOM are normal. Right eye exhibits no discharge. Left eye exhibits no discharge. No scleral icterus.   Neck: Trachea normal. Neck supple. No JVD present. No edema present. No thyroid mass and no thyromegaly present.   Cardiovascular: Normal rate, regular rhythm, S2 normal and normal heart sounds.  Exam reveals no gallop, no S3 and no friction rub.    No murmur heard.  Pulmonary/Chest: Effort normal and breath sounds normal. No respiratory distress. She has no wheezes. She has no rales. She exhibits no tenderness.   Abdominal: Soft. Normal appearance and bowel sounds are normal. She exhibits no distension, no ascites and no mass. There is no splenomegaly or hepatomegaly. There is no tenderness. There is no rigidity, no rebound and no guarding. No hernia.   Musculoskeletal: She exhibits no tenderness or deformity.       Vascular Status -  Her exam exhibits no right foot edema. Her exam exhibits no left foot edema.  Lymphadenopathy:     She " has no cervical adenopathy.        Left: No supraclavicular adenopathy present.   Neurological: She is alert and oriented to person, place, and time. She has normal strength. No cranial nerve deficit or sensory deficit. She exhibits normal muscle tone. Coordination normal.   Skin: No rash noted. She is not diaphoretic. No cyanosis. No pallor. Nails show no clubbing.   Psychiatric: She has a normal mood and affect. Her behavior is normal. Judgment and thought content normal.   Nursing note and vitals reviewed.    Stigmata of chronic liver disease:  None.  Asterixis:  None.    Laboratory Testing:  Upon review of medical records:    Dated March 3, 2016 sodium 141 potassium 4.6 chloride 108 CO2 27 BUN 33 serum creatinine 0.6 and glucose 103.  Calcium 10.0.  Albumin 4.2.  T bili 0.4 AST 28 ALT 29 alkaline phosphatase 105.  Total cholesterol 121.  Triglycerides 131.     Dated January 12, 2017 serum iron 54.  TIBC 311.  Iron saturation 17%.  Ferritin 314.00.  TSH < 0.004.  Free T4 level 0.73.  Free T3 level 4.2.  Reverse T3 level 6.8.    Dated May 4, 2017 sodium 146 potassium 4.7 chloride 110 CO2 24 BUN 22 serum creatinine 0.70 and glucose 99.  Calcium 9.9.  Albumin 4.30.  T bili 0.5 AST 27 ALT 40 alkaline phosphatase 101.  TSH < 0.015.  Free T4 level 0.68.  CRP 11.55.  Serum iron 54.  TIBC 302.  UIBC 248 iron saturation 18%.  WBC 7.91 hemoglobin 14.0 hematocrit 43.0 MCV 90.5 and platelet count 263.    Dated September 5, 2017 sodium 144 potassium 4.2 chloride 110 CO2 21 BUN 20 serum creatinine 0.60 glucose 99.  Calcium 10.2.  Magnesium 2.1.  Serum iron 36.  TIBC 307.  UIBC 271.  Iron saturation 12%.  Ferritin 253.00.  Vitamin B12 level > 1000.  Hepatitis C antibody negative.  WBC 8.53 hemoglobin 13.9 hematocrit 43.2 MCV 91.3 and platelet count 291.     Abdominal Imaging:  Upon Review of medical records:     Dated February 3, 2014 the patient underwent a CT of the abdomen and pelvis with IV contrast which revealed: Clear  lung bases.  Postsurgical changes from small bowel resection and re-anastomosis in the left upper quadrant.  There are multiple dilated loops of small bowel consistent with at least a partial obstruction.  There is dilated bowel seen within a ventral hernia with adjacent edema within the mesentery worrisome for incarcerated hernia.  There is no free fluid, free air, or adenopathy.  The solid abdominal organs and abdominal aorta is unremarkable.  The lower pelvis is obscured by streak artifact from hip replacement.  The visualized pelvic GI tract is unremarkable.     Procedure:  Upon review of medical records:     Dated June 8, 2017 the patient underwent a colonoscopy to the terminal ileum which revealed: Left-sided diverticulosis.  Colon polyps.  Nonbleeding colonic vascular ectasia.  Status post thermal ablation therapy.  Polypoid area at hepatic flexure.  Not biopsied or removed for technical reasons.  Juana ink marking close to the site.  Internal hemorrhoids.  Descending colon polyp, biopsy revealed fragments of benign colonic mucosa with prominent lymphoid aggregate; otherwise no specific pathologic diagnosis.  No evidence of dysplasia or malignancy.  Hepatic flexure polyp, biopsy revealed fragments of benign colonic mucosa with prominent lymphoid aggregates and focal mucosal hemorrhage; otherwise no specific pathologic diagnosis.  No evidence of dysplasia or malignancy.    Assessment and Plan:      Salinas was seen today for follow-up.    Diagnoses and all orders for this visit:    Diverticulosis of colon without hemorrhage  Comments:  Uncomplicated left-sided.    Angiodysplasia of colon without hemorrhage  Comments:  Treated with thermal ablation in view of low normal iron saturation.    Heartburn  Comments:  Stable.    Lesion of colon  Comments:  Polypoid area at proximal TC-hepatic flexure area.  At an angulation, this area is difficult to be evaluated.  Area had been marked close to it.    Over  weight  Comments:  BMI 39.2.               Plan  and Patient Instructions:    Patient Instructions     1. Low-fat diet.  2. Anti-reflex measures.  3. Weight reduction.  4. A caution should be taken using multiple nutritional supplements.  5. A follow-up colonoscopy should be considered in one year to reevaluate the area at the proximal transverse colon-hepatic flexure behind the fold.  Perhaps in June 2018.  6. Discussed with the patient in detail.  Opportunity was given to ask questions.        Rafiq Huang MD

## 2017-10-05 NOTE — PATIENT INSTRUCTIONS
1. Low-fat diet.  2. Anti-reflex measures.  3. Weight reduction.  4. A caution should be taken using multiple nutritional supplements.  5. A follow-up colonoscopy should be considered in one year to reevaluate the area at the proximal transverse colon-hepatic flexure behind the fold.  Perhaps in June 2018.  6. Discussed with the patient in detail.  Opportunity was given to ask questions.

## 2017-11-01 DIAGNOSIS — E06.3 HASHIMOTO'S THYROIDITIS: ICD-10-CM

## 2017-11-03 RX ORDER — LEVOTHYROXINE, LIOTHYRONINE 38; 9 UG/1; UG/1
TABLET ORAL
Qty: 90 TABLET | Refills: 1 | Status: SHIPPED | OUTPATIENT
Start: 2017-11-03 | End: 2018-01-08 | Stop reason: SDUPTHER

## 2018-01-03 RX ORDER — ALLOPURINOL 100 MG/1
TABLET ORAL
Qty: 60 TABLET | Refills: 4 | Status: SHIPPED | OUTPATIENT
Start: 2018-01-03 | End: 2018-05-08 | Stop reason: SDUPTHER

## 2018-01-04 ENCOUNTER — APPOINTMENT (OUTPATIENT)
Dept: MAMMOGRAPHY | Facility: HOSPITAL | Age: 68
End: 2018-01-04
Attending: FAMILY MEDICINE

## 2018-01-08 ENCOUNTER — OFFICE VISIT (OUTPATIENT)
Dept: INTERNAL MEDICINE | Facility: CLINIC | Age: 68
End: 2018-01-08

## 2018-01-08 ENCOUNTER — HOSPITAL ENCOUNTER (OUTPATIENT)
Dept: MAMMOGRAPHY | Facility: HOSPITAL | Age: 68
Discharge: HOME | End: 2018-01-08
Attending: FAMILY MEDICINE | Admitting: FAMILY MEDICINE

## 2018-01-08 VITALS
HEIGHT: 63 IN | RESPIRATION RATE: 12 BRPM | HEART RATE: 91 BPM | OXYGEN SATURATION: 97 % | DIASTOLIC BLOOD PRESSURE: 80 MMHG | SYSTOLIC BLOOD PRESSURE: 122 MMHG | BODY MASS INDEX: 39.97 KG/M2 | WEIGHT: 225.6 LBS

## 2018-01-08 DIAGNOSIS — E06.3 HASHIMOTO'S THYROIDITIS: ICD-10-CM

## 2018-01-08 DIAGNOSIS — E55.9 VITAMIN D DEFICIENCY: ICD-10-CM

## 2018-01-08 DIAGNOSIS — D50.8 OTHER IRON DEFICIENCY ANEMIA: ICD-10-CM

## 2018-01-08 DIAGNOSIS — Z12.31 ENCOUNTER FOR SCREENING MAMMOGRAM FOR MALIGNANT NEOPLASM OF BREAST: ICD-10-CM

## 2018-01-08 DIAGNOSIS — T45.2X1A: ICD-10-CM

## 2018-01-08 DIAGNOSIS — M62.81 PROXIMAL LIMB MUSCLE WEAKNESS: Primary | ICD-10-CM

## 2018-01-08 LAB
25(OH)D3+25(OH)D2 SERPL-MCNC: 71.2 NG/ML
ALBUMIN SERPL-MCNC: 4.4 G/DL (ref 3.5–5)
ALBUMIN/GLOB SERPL: 1.7 G/DL (ref 1–2)
ALP SERPL-CCNC: 107 U/L (ref 38–126)
ALT SERPL-CCNC: 36 U/L (ref 13–69)
AST SERPL-CCNC: 28 U/L (ref 15–46)
BILIRUB SERPL-MCNC: 0.4 MG/DL (ref 0.2–1.3)
BUN SERPL-MCNC: 24 MG/DL (ref 7–20)
BUN/CREAT SERPL: 40 (ref 7.1–23.5)
CALCIUM SERPL-MCNC: 10 MG/DL (ref 8.4–10.2)
CHLORIDE SERPL-SCNC: 106 MMOL/L (ref 98–107)
CK SERPL-CCNC: 63 U/L (ref 30–170)
CO2 SERPL-SCNC: 23 MMOL/L (ref 26–30)
CREAT SERPL-MCNC: 0.6 MG/DL (ref 0.6–1.3)
CRP SERPL-MCNC: 3.3 MG/DL (ref 0–1)
ERYTHROCYTE [SEDIMENTATION RATE] IN BLOOD BY WESTERGREN METHOD: 39 MM/HR (ref 0–20)
FERRITIN SERPL-MCNC: 185 NG/ML (ref 11.1–264)
GLOBULIN SER CALC-MCNC: 2.6 GM/DL
GLUCOSE SERPL-MCNC: 86 MG/DL (ref 74–98)
IRON SATN MFR SERPL: 14 % (ref 11–46)
IRON SERPL-MCNC: 39 MCG/DL (ref 37–181)
POTASSIUM SERPL-SCNC: 4.5 MMOL/L (ref 3.5–5.1)
PROT SERPL-MCNC: 7 G/DL (ref 6.3–8.2)
SODIUM SERPL-SCNC: 141 MMOL/L (ref 137–145)
TIBC SERPL-MCNC: 272 MCG/DL (ref 261–497)
UIBC SERPL-MCNC: 233 MCG/DL

## 2018-01-08 PROCEDURE — 77067 SCR MAMMO BI INCL CAD: CPT | Performed by: RADIOLOGY

## 2018-01-08 PROCEDURE — 77063 BREAST TOMOSYNTHESIS BI: CPT | Performed by: RADIOLOGY

## 2018-01-08 PROCEDURE — 77063 BREAST TOMOSYNTHESIS BI: CPT

## 2018-01-08 PROCEDURE — 99214 OFFICE O/P EST MOD 30 MIN: CPT | Performed by: FAMILY MEDICINE

## 2018-01-08 PROCEDURE — 77067 SCR MAMMO BI INCL CAD: CPT

## 2018-01-08 RX ORDER — NICOTINE POLACRILEX 2 MG
5000 LOZENGE BUCCAL 3 TIMES WEEKLY
COMMUNITY
End: 2018-01-08

## 2018-01-08 RX ORDER — LEVOTHYROXINE, LIOTHYRONINE 38; 9 UG/1; UG/1
TABLET ORAL
Qty: 90 TABLET | Refills: 2 | Status: SHIPPED | OUTPATIENT
Start: 2018-01-08 | End: 2018-03-01 | Stop reason: SDUPTHER

## 2018-01-08 NOTE — PROGRESS NOTES
"Follow up visit. C/O weakness, bridger in legs. Last visit in September she got a pneumonia shot. She said she got very sick after that shot, weakness, sore throat x 3 days, cough x 4-5 weeks. In October, she started having leg cramps, took K+. She fell after going to the bathroom, couldn't get up. Scooted around for abut an 1.5 hours. She made it to the bed and got herself up. She has fallen several times since, has difficulty getting up due to weakness. She has to rock back and forth several times from sitting position to try to get up and then she falls.    Tremors are better since taking Liothyronine. She has felt like she has started to have tremors in head now.   She would like to have iron checked today.   Has a question about chest CT results    SUBJECTIVE: Salinas Monroy is a 68 y.o. female seen for a follow up visit;        Thyroid: she thinks her thyroid is doing ok, she started the \"iodine protocol\" from a book called \"The Iodine Crisis\".  She started in the summer with low dose iodine, increased to 50 mg daily for a few weeks.  But she started getting \"detox symptoms\" - headache, flu-like symptoms.  But then decreased to 12.5 mg daily.  She says this is to help \"detox\" her whole body - to try to get rid of bromide, fluoride, and lower her inflammation & autoimmunity.  She was also doing \"salt loading\" with non-processed salt.      Leg weakness: in the fall she started taking more potassium because of leg cramps, and she had severe weakness and couldn't get up from the floor for a few hours.  She worries she took too much potassium.  She has since had several episodes of severe weakness, especially getting up from chairs and sitting on toilet.  Has to rock back and forth to get up from chairs and toilet.      Tremors: she thinks her tremors have spread to head, feeling like her overall tremors are better having switched to brand name liothyronine.  However she is taking it three times a day.  She has " cut coffee out without any improvement in tremors.      The following portions of the patient's history were reviewed and updated as appropriate: She  has a past medical history of Anemia (?); Arthritis; Depression; GERD (gastroesophageal reflux disease); Gout; Hashimoto's disease; Hernia (3-2012); Hypothyroid; Impingement syndrome of right shoulder (5/8/2016); PONV (postoperative nausea and vomiting); and Wears glasses.  She has Hashimoto's thyroiditis and Obsessive-compulsive disorder on her pertinent problem list.  She  has a past surgical history that includes Hysterectomy; Gastric bypass (1978); Total abdominal hysterectomy w/ bilateral salpingoophorectomy (1990); Hernia repair (2012); Small intestine surgery (2014); Appendectomy; Joint replacement (2009 & 2012); Colonoscopy (2012); Abdominal surgery; Upper gastrointestinal endoscopy; Tubal ligation; Hernia repair; Hernia mesh removal (2014); Colonoscopy (N/A, 6/8/2017); and Bariatric Surgery (1979).  Her family history includes Alcohol abuse in her maternal uncle; Arthritis in her mother; Asthma in her sister; Cancer in her father and maternal uncle; Diabetes in her maternal aunt; Heart disease in her paternal uncle; Hyperlipidemia in her mother and paternal uncle; Hypertension in her mother and paternal uncle; Kidney cancer in her father; Liver cancer in her father; Lung cancer in her maternal uncle; No Known Problems in her brother, brother, and paternal aunt; Obesity in her mother; Rheum arthritis in her mother; Stroke in her mother and paternal uncle; Thyroid disease in her mother. There is no history of Colon cancer, Breast cancer, or Ovarian cancer.  She  reports that she quit smoking about 10 years ago. Her smoking use included Cigarettes. She started smoking about 51 years ago. She has a 40.00 pack-year smoking history. She has never used smokeless tobacco. She reports that she does not drink alcohol or use illicit drugs.  She has a current medication  "list which includes the following prescription(s): acetylcysteine, acyclovir, allopurinol, b complex vitamins, biotin, vitamin d3, coconut oil, coenzyme q10, digestive enzymes, escitalopram, ferrous fumarate, ferrous gluconate, garlic, iodine strong (lugol's), liothyronine, magnesium, methylsulfonylmethane, milk thistle, misc natural products, np thyroid, omega-3 fatty acids, probiotic product, riboflavin, selenium, turmeric, vitamin k, choline-inositol-methionine-fa, and multi vitamin/minerals..    Review of Systems   Constitutional: Positive for fatigue.   Respiratory: Negative.    Cardiovascular: Negative.    Musculoskeletal: Positive for gait problem and myalgias.   Neurological: Positive for dizziness and headaches.   Psychiatric/Behavioral: The patient is nervous/anxious.          OBJECTIVE:  /80  Pulse 91  Resp 12  Ht 160 cm (63\")  Wt 102 kg (225 lb 9.6 oz)  SpO2 97%  BMI 39.96 kg/m2     Physical Exam   Constitutional: She appears well-developed and well-nourished. No distress.   Neurological: She displays tremor. She exhibits abnormal muscle tone.   Weakness w/ proximal muscles LE,           ASSESSMENT:   Diagnosis Plan   1. Proximal limb muscle weakness  Comprehensive Metabolic Panel    CK    C-reactive Protein    Sedimentation Rate   2. Hashimoto's thyroiditis  NP THYROID 60 MG tablet   3. Vitamin D deficiency  Vitamin D 25 Hydroxy   4. Vitamin A toxicity, accidental or unintentional, initial encounter  Comprehensive Metabolic Panel    CK    C-reactive Protein    Sedimentation Rate   5. Other iron deficiency anemia  Ferritin    Iron Profile         Medications Discontinued During This Encounter   Medication Reason   • NP THYROID 60 MG tablet Duplicate order   • NP THYROID 60 MG tablet Reorder   • Cyanocobalamin (VITAMIN B 12) 100 MCG lozenge    • vitamin A 92057 UNIT capsule    • Cyanocobalamin (B-12) 5000 MCG sublingual tablet    • Niacin (VITAMIN B-3 PO)    • VITAMIN E PO    • vitamin C " (ASCORBIC ACID) 500 MG tablet          Return in about 6 weeks (around 2/19/2018).            I, Morenita Carroll MD, hereby attest that the medical record entry above accurately reflects signatures/notations that I made in my capacity as Morenita Carroll MD when I treated and diagnosed the above patient.  I do hereby attest that his information is true, accurate, and complete to the best of my knowledge and I understand that any falsification, omission, or concealment of material fact may subject me to administrative, civil, or criminal liability.

## 2018-02-01 RX ORDER — DOXYCYCLINE HYCLATE 50 MG/1
CAPSULE, GELATIN COATED ORAL
Qty: 60 TABLET | Refills: 4 | Status: SHIPPED | OUTPATIENT
Start: 2018-02-01 | End: 2018-06-30 | Stop reason: SDUPTHER

## 2018-03-01 DIAGNOSIS — E06.3 HASHIMOTO'S THYROIDITIS: ICD-10-CM

## 2018-03-01 RX ORDER — LEVOTHYROXINE, LIOTHYRONINE 38; 9 UG/1; UG/1
TABLET ORAL
Qty: 90 TABLET | Refills: 1 | Status: SHIPPED | OUTPATIENT
Start: 2018-03-01 | End: 2018-06-30 | Stop reason: SDUPTHER

## 2018-03-28 ENCOUNTER — TELEPHONE (OUTPATIENT)
Dept: GASTROENTEROLOGY | Facility: CLINIC | Age: 68
End: 2018-03-28

## 2018-03-28 NOTE — TELEPHONE ENCOUNTER
----- Message from VALENTINE Carty sent at 3/27/2018  4:33 PM EDT -----  According to Dr. Huang's note, she is going to need extensive prep. He wanted her to possibly have the colonoscopy in June 2018. She needs to come in to see Dr. Huang for follow up so he can go over the extensive prep and review her procedure. Make her appointment at the end of April or in May and then she can have her procedure in June.      ----- Message -----  From: Madelyn Vogel MA  Sent: 3/27/2018   3:50 PM  To: VALENTINE Carty, Alina Santos MA    PATIENT IS DUE A 1 YEAR COLONOSCOPY. CAN WE SCHEDULE THIS OVER THE PHONE AND IF SO CAN YOU DO THAT FOR HER? THANKS!

## 2018-03-29 ENCOUNTER — TELEPHONE (OUTPATIENT)
Dept: INTERNAL MEDICINE | Facility: CLINIC | Age: 68
End: 2018-03-29

## 2018-04-03 ENCOUNTER — HOSPITAL ENCOUNTER (OUTPATIENT)
Dept: MAMMOGRAPHY | Facility: HOSPITAL | Age: 68
Discharge: HOME | End: 2018-04-03
Admitting: FAMILY MEDICINE

## 2018-04-03 DIAGNOSIS — R92.8 ABNORMAL MAMMOGRAM: ICD-10-CM

## 2018-04-03 PROCEDURE — G0279 TOMOSYNTHESIS, MAMMO: HCPCS | Performed by: RADIOLOGY

## 2018-04-03 PROCEDURE — G0279 TOMOSYNTHESIS, MAMMO: HCPCS

## 2018-04-03 PROCEDURE — 77065 DX MAMMO INCL CAD UNI: CPT | Performed by: RADIOLOGY

## 2018-04-03 PROCEDURE — 77065 DX MAMMO INCL CAD UNI: CPT

## 2018-05-02 RX ORDER — ESCITALOPRAM OXALATE 5 MG/1
TABLET ORAL
Qty: 90 TABLET | Refills: 2 | Status: SHIPPED | OUTPATIENT
Start: 2018-05-02 | End: 2018-05-08 | Stop reason: SDUPTHER

## 2018-05-08 ENCOUNTER — APPOINTMENT (OUTPATIENT)
Dept: LAB | Facility: HOSPITAL | Age: 68
End: 2018-05-08

## 2018-05-08 ENCOUNTER — OFFICE VISIT (OUTPATIENT)
Dept: GASTROENTEROLOGY | Facility: CLINIC | Age: 68
End: 2018-05-08

## 2018-05-08 ENCOUNTER — OFFICE VISIT (OUTPATIENT)
Dept: INTERNAL MEDICINE | Facility: CLINIC | Age: 68
End: 2018-05-08

## 2018-05-08 VITALS
SYSTOLIC BLOOD PRESSURE: 122 MMHG | BODY MASS INDEX: 40.6 KG/M2 | DIASTOLIC BLOOD PRESSURE: 80 MMHG | TEMPERATURE: 98.7 F | HEIGHT: 63 IN | OXYGEN SATURATION: 97 % | HEART RATE: 74 BPM | WEIGHT: 229.13 LBS

## 2018-05-08 VITALS — TEMPERATURE: 97.3 F | RESPIRATION RATE: 15 BRPM | HEIGHT: 63 IN | WEIGHT: 230 LBS | BODY MASS INDEX: 40.75 KG/M2

## 2018-05-08 DIAGNOSIS — Z96.643 STATUS POST BILATERAL HIP REPLACEMENTS: ICD-10-CM

## 2018-05-08 DIAGNOSIS — F42.2 MIXED OBSESSIONAL THOUGHTS AND ACTS: ICD-10-CM

## 2018-05-08 DIAGNOSIS — E06.3 HASHIMOTO'S THYROIDITIS: ICD-10-CM

## 2018-05-08 DIAGNOSIS — D50.8 OTHER IRON DEFICIENCY ANEMIA: ICD-10-CM

## 2018-05-08 DIAGNOSIS — M1A.0710 CHRONIC IDIOPATHIC GOUT INVOLVING TOE OF RIGHT FOOT WITHOUT TOPHUS: ICD-10-CM

## 2018-05-08 DIAGNOSIS — E66.3 OVER WEIGHT: ICD-10-CM

## 2018-05-08 DIAGNOSIS — Z12.11 COLON CANCER SCREENING: Primary | ICD-10-CM

## 2018-05-08 DIAGNOSIS — K63.9 LESION OF COLON: ICD-10-CM

## 2018-05-08 DIAGNOSIS — M25.552 LEFT HIP PAIN: ICD-10-CM

## 2018-05-08 DIAGNOSIS — E55.9 VITAMIN D DEFICIENCY: ICD-10-CM

## 2018-05-08 DIAGNOSIS — M62.81 PROXIMAL MUSCLE WEAKNESS: Primary | ICD-10-CM

## 2018-05-08 LAB
25(OH)D3 SERPL-MCNC: 72.5 NG/ML
ALBUMIN SERPL-MCNC: 4.1 G/DL (ref 3.5–5)
ALBUMIN/GLOB SERPL: 1.4 G/DL (ref 1–2)
ALP SERPL-CCNC: 102 U/L (ref 38–126)
ALT SERPL W P-5'-P-CCNC: 28 U/L (ref 13–69)
ANION GAP SERPL CALCULATED.3IONS-SCNC: 16.2 MMOL/L (ref 10–20)
AST SERPL-CCNC: 28 U/L (ref 15–46)
BILIRUB SERPL-MCNC: 0.2 MG/DL (ref 0.2–1.3)
BUN BLD-MCNC: 17 MG/DL (ref 7–20)
BUN/CREAT SERPL: 28.3 (ref 7.1–23.5)
CALCIUM SPEC-SCNC: 9.9 MG/DL (ref 8.4–10.2)
CHLORIDE SERPL-SCNC: 112 MMOL/L (ref 98–107)
CK SERPL-CCNC: 87 U/L (ref 30–170)
CO2 SERPL-SCNC: 20 MMOL/L (ref 26–30)
CREAT BLD-MCNC: 0.6 MG/DL (ref 0.6–1.3)
CRP SERPL-MCNC: 2.1 MG/DL (ref 0–1)
DEPRECATED RDW RBC AUTO: 42.3 FL (ref 37–54)
ERYTHROCYTE [DISTWIDTH] IN BLOOD BY AUTOMATED COUNT: 12.9 % (ref 11.5–14.5)
ERYTHROCYTE [SEDIMENTATION RATE] IN BLOOD: 30 MM/HR (ref 0–20)
FERRITIN SERPL-MCNC: 266 NG/ML (ref 11.1–264)
GFR SERPL CREATININE-BSD FRML MDRD: 99 ML/MIN/1.73
GLOBULIN UR ELPH-MCNC: 3 GM/DL
GLUCOSE BLD-MCNC: 95 MG/DL (ref 74–98)
HCT VFR BLD AUTO: 40.3 % (ref 37–47)
HGB BLD-MCNC: 12.8 G/DL (ref 12–16)
IRON 24H UR-MRATE: 39 MCG/DL (ref 37–181)
IRON SATN MFR SERPL: 14 % (ref 11–46)
MCH RBC QN AUTO: 28.7 PG (ref 27–31)
MCHC RBC AUTO-ENTMCNC: 31.8 G/DL (ref 30–37)
MCV RBC AUTO: 90.4 FL (ref 81–99)
PLATELET # BLD AUTO: 259 10*3/MM3 (ref 130–400)
PMV BLD AUTO: 10 FL (ref 6–12)
POTASSIUM BLD-SCNC: 4.2 MMOL/L (ref 3.5–5.1)
PROT SERPL-MCNC: 7.1 G/DL (ref 6.3–8.2)
RBC # BLD AUTO: 4.46 10*6/MM3 (ref 4.2–5.4)
SODIUM BLD-SCNC: 144 MMOL/L (ref 137–145)
T4 FREE SERPL-MCNC: 0.58 NG/DL (ref 0.78–2.19)
TIBC SERPL-MCNC: 281 MCG/DL (ref 261–497)
WBC NRBC COR # BLD: 7.64 10*3/MM3 (ref 4.8–10.8)

## 2018-05-08 PROCEDURE — 82306 VITAMIN D 25 HYDROXY: CPT | Performed by: FAMILY MEDICINE

## 2018-05-08 PROCEDURE — 83735 ASSAY OF MAGNESIUM: CPT | Performed by: FAMILY MEDICINE

## 2018-05-08 PROCEDURE — 82550 ASSAY OF CK (CPK): CPT | Performed by: FAMILY MEDICINE

## 2018-05-08 PROCEDURE — 86376 MICROSOMAL ANTIBODY EACH: CPT | Performed by: FAMILY MEDICINE

## 2018-05-08 PROCEDURE — 85027 COMPLETE CBC AUTOMATED: CPT | Performed by: FAMILY MEDICINE

## 2018-05-08 PROCEDURE — S0260 H&P FOR SURGERY: HCPCS | Performed by: INTERNAL MEDICINE

## 2018-05-08 PROCEDURE — 99214 OFFICE O/P EST MOD 30 MIN: CPT | Performed by: FAMILY MEDICINE

## 2018-05-08 PROCEDURE — 82728 ASSAY OF FERRITIN: CPT | Performed by: FAMILY MEDICINE

## 2018-05-08 PROCEDURE — 86800 THYROGLOBULIN ANTIBODY: CPT | Performed by: FAMILY MEDICINE

## 2018-05-08 PROCEDURE — 80053 COMPREHEN METABOLIC PANEL: CPT | Performed by: FAMILY MEDICINE

## 2018-05-08 PROCEDURE — 85651 RBC SED RATE NONAUTOMATED: CPT | Performed by: FAMILY MEDICINE

## 2018-05-08 PROCEDURE — 84482 T3 REVERSE: CPT | Performed by: FAMILY MEDICINE

## 2018-05-08 PROCEDURE — 36415 COLL VENOUS BLD VENIPUNCTURE: CPT | Performed by: FAMILY MEDICINE

## 2018-05-08 PROCEDURE — 84439 ASSAY OF FREE THYROXINE: CPT | Performed by: FAMILY MEDICINE

## 2018-05-08 PROCEDURE — 86140 C-REACTIVE PROTEIN: CPT | Performed by: FAMILY MEDICINE

## 2018-05-08 PROCEDURE — 83550 IRON BINDING TEST: CPT | Performed by: FAMILY MEDICINE

## 2018-05-08 PROCEDURE — 84481 FREE ASSAY (FT-3): CPT | Performed by: FAMILY MEDICINE

## 2018-05-08 PROCEDURE — 83540 ASSAY OF IRON: CPT | Performed by: FAMILY MEDICINE

## 2018-05-08 RX ORDER — ALLOPURINOL 100 MG/1
200 TABLET ORAL DAILY
Qty: 60 TABLET | Refills: 4 | Status: SHIPPED | OUTPATIENT
Start: 2018-05-08 | End: 2018-07-19 | Stop reason: SDUPTHER

## 2018-05-08 RX ORDER — ESCITALOPRAM OXALATE 5 MG/1
5 TABLET ORAL DAILY
Qty: 90 TABLET | Refills: 2 | Status: SHIPPED | OUTPATIENT
Start: 2018-05-08 | End: 2018-07-19 | Stop reason: SDUPTHER

## 2018-05-08 NOTE — PROGRESS NOTES
SUBJECTIVE: Salinas Monroy is a 68 y.o. female seen for a follow up visit;      Proximal muscle weakness: had worsened in her upper thighs with pain, but that has improved.  She is still having some trouble getting up from the toilet but the weakness is a bit better.  At her last visit, I strongly advised that she stop the vitamin A, she had been taking 21044 units daily for a long time (toxic dose is 86763 u daily).  She stopped it for 3 months and restarted it again about a month ago at about 15825 units.  She is now concerned that she is getting toxicity from the chromium and cobalt possibly in her hip joint.    The following portions of the patient's history were reviewed and updated as appropriate: She  has a past medical history of Anemia (?); Arthritis; Depression; GERD (gastroesophageal reflux disease); Gout; Hashimoto's disease; Hernia (3-2012); Hypothyroid; Impingement syndrome of right shoulder (5/8/2016); PONV (postoperative nausea and vomiting); and Wears glasses.  She has Hashimoto's thyroiditis and Obsessive-compulsive disorder on her pertinent problem list.  She  has a past surgical history that includes Hysterectomy; Gastric bypass (1978); Total abdominal hysterectomy w/ bilateral salpingoophorectomy (1990); Hernia repair (2012); Small intestine surgery (2014); Appendectomy; Joint replacement (2009 & 2012); Colonoscopy (2012); Abdominal surgery; Upper gastrointestinal endoscopy; Tubal ligation; Hernia repair; Hernia mesh removal (2014); Colonoscopy (N/A, 6/8/2017); and Bariatric Surgery (1979).  Her family history includes Alcohol abuse in her maternal uncle and maternal uncle; Arthritis in her mother; Asthma in her sister; Cancer in her father and maternal uncle; Diabetes in her maternal aunt; Heart disease in her paternal uncle; Hyperlipidemia in her mother and paternal uncle; Hypertension in her mother and paternal uncle; Kidney cancer in her father; Liver cancer in her father; Lung  "cancer in her maternal uncle; No Known Problems in her brother, brother, and paternal aunt; Obesity in her mother; Rheum arthritis in her mother; Stroke in her mother and paternal uncle; Thyroid disease in her mother.  She  reports that she quit smoking about 10 years ago. Her smoking use included Cigarettes. She started smoking about 51 years ago. She has a 40.00 pack-year smoking history. She has never used smokeless tobacco. She reports that she does not drink alcohol or use drugs.  She has a current medication list which includes the following prescription(s): acetylcysteine, acyclovir, allopurinol, b complex vitamins, biotin, vitamin d3, choline-inositol-methionine-fa, coconut oil, coenzyme q10, digestive enzymes, escitalopram, ferrous fumarate, ferrous gluconate, garlic, iodine strong (lugol's), liothyronine, magnesium, methylsulfonylmethane, milk thistle, misc natural products, multi vitamin/minerals, np thyroid, omega-3 fatty acids, probiotic product, riboflavin, selenium, turmeric, vitamin k, and bladder control pads extra..    Review of Systems   Constitutional: Positive for fatigue.   Musculoskeletal: Positive for arthralgias and myalgias.   Neurological: Positive for tremors and weakness.   Psychiatric/Behavioral: The patient is nervous/anxious.          OBJECTIVE:  /80   Pulse 74   Temp 98.7 °F (37.1 °C)   Ht 160 cm (63\")   Wt 104 kg (229 lb 2 oz)   LMP  (LMP Unknown)   SpO2 97%   BMI 40.59 kg/m²      Physical Exam   Constitutional: She appears well-developed and well-nourished. No distress.   Neurological: She exhibits normal muscle tone.   4/5 strength LLE           ASSESSMENT:   Diagnosis Plan   1. Proximal muscle weakness  Vitamin D 25 Hydroxy    Comprehensive Metabolic Panel    MAGNESIUM, RBC    C-reactive Protein    Sedimentation Rate    CK    EMG 2 Limbs   2. Mixed obsessional thoughts and acts  escitalopram (LEXAPRO) 5 MG tablet   3. 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 Hashimoto's thyroiditis  Anti-Thyroglobulin Antibody    Thyroid Peroxidase Antibody    T3, Free    T4, Free    T3, Reverse   5. Vitamin D deficiency  Vitamin D 25 Hydroxy   6. Other iron deficiency anemia  Ferritin    Iron Profile    CBC (No Diff)     Again explained to her that vitamin A is not a vitamin that she needs to supplement as we get so much in our diet and it has a low threshold for becoming toxic in its non beta-carotene form.            I, Morenita Carroll MD, hereby attest that the medical record entry above accurately reflects signatures/notations that I made in my capacity as Morenita Carroll MD when I treated and diagnosed the above patient.  I do hereby attest that his information is true, accurate, and complete to the best of my knowledge and I understand that any falsification, omission, or concealment of material fact may subject me to administrative, civil, or criminal liability.

## 2018-05-08 NOTE — PROGRESS NOTES
Chief Complaint   Patient presents with   • Colon Cancer Screening     History of Present Illness     For colon cancer screening.  In June 2017 the patient had undergone a colonoscopy to terminal ileum. This revealed a large flat polypoid lesion at hepatic flexure. This was noted to be, which is difficult to visualize. Furthermore the patient had low prep score of 6. Unfortunately, this area could not be relocated despite multiple efforts. Close to this area. Marking with [place] ink was  The patient denies recent change in bowel habits.  There is no diarrhea. Currently the patient is taking magnesium supplements with good bowel movements. There is no history of abdominal pain.  There is no history of overt GI bleed (hematemesis melena or hematochezia).  The patient denies nausea or vomiting.  There is no history of reflux.  The patient denies dysphagia or odynophagia.  There is no history of recent significant weight loss.  There is no history of liver or pancreatic disease. There is no family history of colon cancer or inflammatory bowel disease.     Review of Systems   Constitutional: Negative for appetite change, chills, fatigue, fever and unexpected weight change.   HENT: Negative for mouth sores, nosebleeds and trouble swallowing.    Eyes: Negative for discharge and redness.   Respiratory: Negative for apnea, cough and shortness of breath.    Cardiovascular: Negative for chest pain, palpitations and leg swelling.   Gastrointestinal: Negative for abdominal distention, abdominal pain, anal bleeding, blood in stool, constipation, diarrhea, nausea and vomiting.   Endocrine: Negative for cold intolerance, heat intolerance and polydipsia.   Genitourinary: Negative for dysuria, hematuria and urgency.   Musculoskeletal: Positive for arthralgias. Negative for joint swelling and myalgias.   Skin: Negative for rash.   Allergic/Immunologic: Positive for food allergies. Negative for immunocompromised state.   Neurological:  Positive for tremors. Negative for dizziness, seizures, syncope and headaches.   Hematological: Negative for adenopathy. Does not bruise/bleed easily.   Psychiatric/Behavioral: Negative for dysphoric mood. The patient is not nervous/anxious and is not hyperactive.      Patient Active Problem List   Diagnosis   • Hashimoto's thyroiditis   • Essential tremor   • Vitamin D deficiency   • Iron deficiency anemia   • Primary osteoarthritis involving multiple joints   • Chronic idiopathic gout involving toe of right foot without tophus   • Obsessive-compulsive disorder   • Postsurgical menopause     Past Medical History:   Diagnosis Date   • Anemia ?    under control   • Arthritis    • Depression    • GERD (gastroesophageal reflux disease)     no longer bothers me...   • Gout    • Hashimoto's disease    • Hernia 3-2012    repair surgery which failed in 2014   • Hypothyroid    • Impingement syndrome of right shoulder 5/8/2016   • PONV (postoperative nausea and vomiting)     NAUSEA   • Wears glasses      Past Surgical History:   Procedure Laterality Date   • ABDOMINAL SURGERY     • APPENDECTOMY     • BARIATRIC SURGERY  1979   • COLONOSCOPY  2012    failed due to hernia...   • COLONOSCOPY N/A 6/8/2017    Procedure: COLONOSCOPY with cold snare polypectomies, argon thermal ablation, ns injection, yanira ink injection;  Surgeon: Rafiq Huang MD;  Location: [facility] ENDOSCOPY;  Service:    • GASTRIC BYPASS  1978   • HERNIA MESH REMOVAL  2014    BOWEL OBSTRUCTION DUE TO MESH   • HERNIA REPAIR  2012   • HERNIA REPAIR     • HYSTERECTOMY     • JOINT REPLACEMENT  2009 & 2012    Left & Right Full Hip Replacements   • SMALL INTESTINE SURGERY  2014    DUE TO BOWEL OBSTRUCTION WITH SOME REMOVAL   • TOTAL ABDOMINAL HYSTERECTOMY WITH SALPINGO OOPHORECTOMY  1990    fibroids   • TUBAL ABDOMINAL LIGATION     • UPPER GASTROINTESTINAL ENDOSCOPY       Family History   Problem Relation Age of Onset   • Obesity Mother    • Rheum arthritis Mother     • Thyroid disease Mother    • Hypertension Mother    • Stroke Mother      Most of her problems were caused by Rheumatoid Ar.   • Hyperlipidemia Mother    • Arthritis Mother      Rheumatoid Arthritis..Passed 2004   • Cancer Father      Passed 1997   • Kidney cancer Father    • Liver cancer Father    • No Known Problems Brother    • Diabetes Maternal Aunt    • Cancer Maternal Uncle    • Lung cancer Maternal Uncle    • Alcohol abuse Maternal Uncle    • No Known Problems Paternal Aunt    • Stroke Paternal Uncle    • Hypertension Paternal Uncle    • Hyperlipidemia Paternal Uncle    • Heart disease Paternal Uncle    • No Known Problems Brother    • Asthma Sister      under control   • Alcohol abuse Maternal Uncle    • Colon cancer Neg Hx    • Breast cancer Neg Hx    • Ovarian cancer Neg Hx      Social History   Substance Use Topics   • Smoking status: Former Smoker     Packs/day: 1.00     Years: 40.00     Types: Cigarettes     Start date: 1967     Quit date: 2008   • Smokeless tobacco: Never Used   • Alcohol use No       Current Outpatient Prescriptions:   •  Acetylcysteine (N-ACETYL-L-CYSTEINE PO), Take  by mouth Daily., Disp: , Rfl:   •  acyclovir (ZOVIRAX) 400 MG tablet, Take 2 tablets by mouth 2 (Two) Times a Day. As needed for herpes outbreak, Disp: 60 tablet, Rfl: 1  •  allopurinol (ZYLOPRIM) 100 MG tablet, Take 2 tablets by mouth Daily., Disp: 60 tablet, Rfl: 4  •  B Complex Vitamins (VITAMIN B COMPLEX PO), Take  by mouth Daily., Disp: , Rfl:   •  BIOTIN PO, Take 1 tablet by mouth Daily., Disp: , Rfl:   •  Cholecalciferol (VITAMIN D3) 00304 UNITS tablet, Take 7,500 Units by mouth Daily., Disp: , Rfl:   •  CHOLINE-INOSITOL-METHIONINE-FA PO, Take 1 tablet by mouth Daily., Disp: , Rfl:   •  COCONUT OIL PO, Take 1 dose by mouth Daily., Disp: , Rfl:   •  Coenzyme Q10 (CO Q 10 PO), Take 1 tablet by mouth Daily., Disp: , Rfl:   •  Digestive Enzymes (DIGESTIVE ENZYME PO), Take 1 tablet by mouth Daily., Disp: , Rfl:   •   "escitalopram (LEXAPRO) 5 MG tablet, Take 1 tablet by mouth Daily., Disp: 90 tablet, Rfl: 2  •  ferrous fumarate (IRMA-SEQUELS) 18 MG CR tablet, Take 18 mg by mouth Daily., Disp: , Rfl:   •  ferrous gluconate (FERGON) 324 MG tablet, TAKE TWO TABLETS BY MOUTH DAILY WITH BREAKFAST, Disp: 60 tablet, Rfl: 4  •  GARLIC PO, Take 1 tablet by mouth Daily., Disp: , Rfl:   •  Incontinence Supply Disposable (BLADDER CONTROL PADS EXTRA) misc, , Disp: , Rfl:   •  Iodine Strong, Lugol's, solution, 2%: 20 ggt qd (50 mg) five days a week, no weekends., Disp: , Rfl:   •  liothyronine (CYTOMEL) 25 MCG tablet, Take up to 3 tablets per day. Needs to come from [place]  ONLY, Disp: 90 tablet, Rfl: 3  •  MAGNESIUM PO, Take 1,000 mg by mouth Every Night., Disp: , Rfl:   •  Methylsulfonylmethane (MSM PO), Take 1 tablet by mouth Daily., Disp: , Rfl:   •  MILK THISTLE PO, Take 1 tablet by mouth Daily., Disp: , Rfl:   •  Misc Natural Products (TART CHERRY ADVANCED PO), Take 2 capsules by mouth Daily., Disp: , Rfl:   •  Multiple Vitamins-Minerals (MULTI VITAMIN/MINERALS) tablet, Take 1 tablet by mouth Daily., Disp: , Rfl:   •  NP THYROID 60 MG tablet, TAKE ONE TABLET BY MOUTH EVERY MORNING AND 2 TABLETS EVERY AFTERNOON, Disp: 90 tablet, Rfl: 1  •  Omega-3 Fatty Acids (OMEGA-3 FISH OIL PO), Take 4,000 Units by mouth Daily., Disp: , Rfl:   •  Probiotic Product (PROBIOTIC PO), Take 1 tablet by mouth Daily., Disp: , Rfl:   •  Riboflavin (VITAMIN B-2 PO), Take 300 mg by mouth Daily., Disp: , Rfl:   •  SELENIUM PO, Take 1 tablet by mouth Daily., Disp: , Rfl:   •  TURMERIC PO, Take 1 tablet by mouth Daily., Disp: , Rfl:   •  VITAMIN K PO, Take 100 mcg by mouth Daily., Disp: , Rfl:     Allergies   Allergen Reactions   • Diazepam        Temperature 97.3 °F (36.3 °C), resp. rate 15, height 160 cm (62.99\"), weight 104 kg (230 lb).    Physical Exam   Constitutional: She is oriented to person, place, and time. She appears well-developed and " well-nourished. No distress.   HENT:   Head: Normocephalic and atraumatic.   Right Ear: Hearing and external ear normal.   Left Ear: Hearing and external ear normal.   Nose: Nose normal.   Mouth/Throat: Oropharynx is clear and moist and mucous membranes are normal. Mucous membranes are not pale, not dry and not cyanotic. No oral lesions. No oropharyngeal exudate.   Eyes: Conjunctivae and EOM are normal. Right eye exhibits no discharge. Left eye exhibits no discharge. No scleral icterus.   Neck: Trachea normal. Neck supple. No JVD present. No edema present. No thyroid mass and no thyromegaly present.   Cardiovascular: Normal rate, regular rhythm, S2 normal and normal heart sounds.  Exam reveals no gallop, no S3 and no friction rub.    No murmur heard.  Pulmonary/Chest: Effort normal and breath sounds normal. No respiratory distress. She has no wheezes. She has no rales. She exhibits no tenderness.   Abdominal: Soft. Normal appearance and bowel sounds are normal. She exhibits no distension, no ascites and no mass. There is no splenomegaly or hepatomegaly. There is no tenderness. There is no rigidity, no rebound and no guarding. No hernia.   Musculoskeletal: She exhibits no tenderness or deformity.     Vascular Status -  Her right foot exhibits no edema. Her left foot exhibits no edema.  Lymphadenopathy:     She has no cervical adenopathy.        Left: No supraclavicular adenopathy present.   Neurological: She is alert and oriented to person, place, and time. She has normal strength. No cranial nerve deficit or sensory deficit. She exhibits normal muscle tone. Coordination normal.   Skin: No rash noted. She is not diaphoretic. No cyanosis. No pallor. Nails show no clubbing.   Psychiatric: She has a normal mood and affect. Her behavior is normal. Judgment and thought content normal.   Nursing note and vitals reviewed.  Stigmata of chronic liver disease:  None.  Asterixis:  None.      Laboratory Testing:  Upon review of  medical records:      Dated March 3, 2016 sodium 141 potassium 4.6 chloride 108 CO2 27 BUN 33 serum creatinine 0.6 and glucose 103.  Calcium 10.0.  Albumin 4.2.  T bili 0.4 AST 28 ALT 29 alkaline phosphatase 105.  Total cholesterol 121.  Triglycerides 131.     Dated January 12, 2017 serum iron 54.  TIBC 311.  Iron saturation 17%.  Ferritin 314.00.  TSH < 0.004.  Free T4 level 0.73.  Free T3 level 4.2.  Reverse T3 level 6.8.     Dated May 4, 2017 sodium 146 potassium 4.7 chloride 110 CO2 24 BUN 22 serum creatinine 0.70 and glucose 99.  Calcium 9.9.  Albumin 4.30.  T bili 0.5 AST 27 ALT 40 alkaline phosphatase 101.  TSH < 0.015.  Free T4 level 0.68.  CRP 11.55.  Serum iron 54.  TIBC 302.  UIBC 248 iron saturation 18%.  WBC 7.91 hemoglobin 14.0 hematocrit 43.0 MCV 90.5 and platelet count 263.     Dated September 5, 2017 sodium 144 potassium 4.2 chloride 110 CO2 21 BUN 20 serum creatinine 0.60 glucose 99.  Calcium 10.2.  Magnesium 2.1.  Serum iron 36.  TIBC 307.  UIBC 271.  Iron saturation 12%.  Ferritin 253.00.  Vitamin B12 level > 1000.  Hepatitis C antibody negative.  WBC 8.53 hemoglobin 13.9 hematocrit 43.2 MCV 91.3 and platelet count 291.    Dated January 8, 2018 sodium 141 potassium 4.5 chloride 106 CO2 23 BUN 24 serum creatinine 0.60 glucose 86.  Calcium 10.0.  Total protein 7.0.  Albumin 4.40.  T bili 0.4 AST 28 ALT 36 alkaline phosphatase 107.  Serum iron 39.  TIBC 272.  U .  Iron saturation 14%.  Ferritin 185.00.  C-reactive protein elevated at 3.30.  Sedimentation rate elevated at 39.     Abdominal Imaging:  Upon Review of medical records:     Dated February 3, 2014 the patient underwent a CT of the abdomen and pelvis with IV contrast which revealed: Clear lung bases.  Postsurgical changes from small bowel resection and re-anastomosis in the left upper quadrant.  There are multiple dilated loops of small bowel consistent with at least a partial obstruction.  There is dilated bowel seen within a ventral  hernia with adjacent edema within the mesentery worrisome for incarcerated hernia.  There is no free fluid, free air, or adenopathy.  The solid abdominal organs and abdominal aorta is unremarkable.  The lower pelvis is obscured by streak artifact from hip replacement.  The visualized pelvic GI tract is unremarkable.     Procedure:  Upon review of medical records:     Dated June 8, 2017 the patient underwent a colonoscopy to the terminal ileum which revealed: Left-sided diverticulosis.  Colon polyps.  Nonbleeding colonic vascular ectasia.  Status post thermal ablation therapy.  Polypoid area at hepatic flexure.  Not biopsied or removed for technical reasons.  Ju[place] ink marking close to the site.  Internal hemorrhoids.  Descending colon polyp, biopsy revealed fragments of benign colonic mucosa with prominent lymphoid aggregate; otherwise no specific pathologic diagnosis.  No evidence of dysplasia or malignancy.  Hepatic flexure polyp, biopsy revealed fragments of benign colonic mucosa with prominent lymphoid aggregates and focal mucosal hemorrhage; otherwise no specific pathologic diagnosis.  No evidence of dysplasia or malignancy.         Assessment:      ICD-10-CM ICD-9-CM   1. Colon cancer screening Z12.11 V76.51   2. Lesion of colon K63.9 569.89   3. Over weight E66.3 278.02         Plan/  Patient Instructions   Colonoscopy: Description of the procedure, risks benefits alternatives and options including non-operative options were discussed with the patient in detail.  The patient understands and wishes to proceed. The patient will be getting extended preparation.         Rafiq Huang MD

## 2018-05-09 LAB
MAGNESIUM, RBC: 5.7 MG/DL (ref 4.2–6.8)
T3FREE SERPL-MCNC: 3.4 PG/ML (ref 2–4.4)

## 2018-05-09 NOTE — PATIENT INSTRUCTIONS
Colonoscopy: Description of the procedure, risks benefits alternatives and options including non-operative options were discussed with the patient in detail.  The patient understands and wishes to proceed. The patient will be getting extended preparation.

## 2018-05-10 LAB
THYROGLOB AB SERPL-ACNC: 2106.7 IU/ML (ref 0–0.9)
THYROPEROXIDASE AB SERPL-ACNC: 134 IU/ML (ref 0–34)

## 2018-05-11 LAB — T3REVERSE SERPL-MCNC: 10 NG/DL (ref 9.2–24.1)

## 2018-05-21 ENCOUNTER — PREP FOR SURGERY (OUTPATIENT)
Dept: OTHER | Facility: HOSPITAL | Age: 68
End: 2018-05-21

## 2018-05-21 DIAGNOSIS — Z12.11 COLON CANCER SCREENING: ICD-10-CM

## 2018-05-21 DIAGNOSIS — Z86.010 HISTORY OF COLON POLYPS: Primary | ICD-10-CM

## 2018-05-22 ENCOUNTER — TELEPHONE (OUTPATIENT)
Dept: INTERNAL MEDICINE | Facility: CLINIC | Age: 68
End: 2018-05-22

## 2018-05-24 ENCOUNTER — PREP FOR SURGERY (OUTPATIENT)
Dept: OTHER | Facility: HOSPITAL | Age: 68
End: 2018-05-24

## 2018-05-24 RX ORDER — SODIUM CHLORIDE 9 MG/ML
70 INJECTION, SOLUTION INTRAVENOUS CONTINUOUS PRN
Status: CANCELLED | OUTPATIENT
Start: 2018-05-24

## 2018-05-25 PROBLEM — Z86.010 HISTORY OF COLON POLYPS: Status: ACTIVE | Noted: 2018-05-25

## 2018-05-25 PROBLEM — Z12.11 COLON CANCER SCREENING: Status: ACTIVE | Noted: 2018-05-25

## 2018-05-25 PROBLEM — Z86.0100 HISTORY OF COLON POLYPS: Status: ACTIVE | Noted: 2018-05-25

## 2018-06-05 ENCOUNTER — ANESTHESIA EVENT (OUTPATIENT)
Dept: GASTROENTEROLOGY | Facility: HOSPITAL | Age: 68
End: 2018-06-05

## 2018-06-05 ENCOUNTER — ANESTHESIA (OUTPATIENT)
Dept: GASTROENTEROLOGY | Facility: HOSPITAL | Age: 68
End: 2018-06-05

## 2018-06-05 ENCOUNTER — HOSPITAL ENCOUNTER (EMERGENCY)
Facility: HOSPITAL | Age: 68
Discharge: HOME | End: 2018-06-05
Attending: EMERGENCY MEDICINE | Admitting: EMERGENCY MEDICINE

## 2018-06-05 ENCOUNTER — HOSPITAL ENCOUNTER (OUTPATIENT)
Facility: HOSPITAL | Age: 68
Setting detail: HOSPITAL OUTPATIENT SURGERY
Discharge: HOME | End: 2018-06-05
Attending: INTERNAL MEDICINE | Admitting: INTERNAL MEDICINE

## 2018-06-05 VITALS
BODY MASS INDEX: 38.09 KG/M2 | DIASTOLIC BLOOD PRESSURE: 47 MMHG | OXYGEN SATURATION: 96 % | RESPIRATION RATE: 16 BRPM | TEMPERATURE: 98 F | SYSTOLIC BLOOD PRESSURE: 130 MMHG | HEART RATE: 63 BPM | WEIGHT: 215 LBS | HEIGHT: 63 IN

## 2018-06-05 VITALS
RESPIRATION RATE: 17 BRPM | HEART RATE: 74 BPM | OXYGEN SATURATION: 95 % | DIASTOLIC BLOOD PRESSURE: 90 MMHG | TEMPERATURE: 97.7 F | HEIGHT: 63 IN | BODY MASS INDEX: 38.09 KG/M2 | WEIGHT: 214.95 LBS | SYSTOLIC BLOOD PRESSURE: 145 MMHG

## 2018-06-05 DIAGNOSIS — Z00.00 WELL ADULT HEALTH CHECK: Primary | ICD-10-CM

## 2018-06-05 DIAGNOSIS — Z12.11 COLON CANCER SCREENING: ICD-10-CM

## 2018-06-05 DIAGNOSIS — Z86.010 HISTORY OF COLON POLYPS: ICD-10-CM

## 2018-06-05 DIAGNOSIS — I49.9 CARDIAC ARRHYTHMIA, UNSPECIFIED CARDIAC ARRHYTHMIA TYPE: ICD-10-CM

## 2018-06-05 DIAGNOSIS — Z98.890 STATUS POST COLONOSCOPY: ICD-10-CM

## 2018-06-05 LAB
ALBUMIN SERPL-MCNC: 4.4 G/DL (ref 3.5–5)
ALBUMIN/GLOB SERPL: 1.4 G/DL (ref 1–2)
ALP SERPL-CCNC: 98 U/L (ref 38–126)
ALT SERPL W P-5'-P-CCNC: 31 U/L (ref 13–69)
ANION GAP SERPL CALCULATED.3IONS-SCNC: 19 MMOL/L (ref 10–20)
AST SERPL-CCNC: 29 U/L (ref 15–46)
BASOPHILS # BLD AUTO: 0.02 10*3/MM3 (ref 0–0.2)
BASOPHILS NFR BLD AUTO: 0.3 % (ref 0–2.5)
BILIRUB SERPL-MCNC: 0.4 MG/DL (ref 0.2–1.3)
BUN BLD-MCNC: 22 MG/DL (ref 7–20)
BUN/CREAT SERPL: 27.5 (ref 7.1–23.5)
CALCIUM SPEC-SCNC: 9.7 MG/DL (ref 8.4–10.2)
CHLORIDE SERPL-SCNC: 112 MMOL/L (ref 98–107)
CO2 SERPL-SCNC: 17 MMOL/L (ref 26–30)
CREAT BLD-MCNC: 0.8 MG/DL (ref 0.6–1.3)
DEPRECATED RDW RBC AUTO: 42.5 FL (ref 37–54)
EOSINOPHIL # BLD AUTO: 0.1 10*3/MM3 (ref 0–0.7)
EOSINOPHIL NFR BLD AUTO: 1.3 % (ref 0–7)
ERYTHROCYTE [DISTWIDTH] IN BLOOD BY AUTOMATED COUNT: 12.8 % (ref 11.5–14.5)
GFR SERPL CREATININE-BSD FRML MDRD: 71 ML/MIN/1.73
GLOBULIN UR ELPH-MCNC: 3.1 GM/DL
GLUCOSE BLD-MCNC: 90 MG/DL (ref 74–98)
HCT VFR BLD AUTO: 41.8 % (ref 37–47)
HGB BLD-MCNC: 13.4 G/DL (ref 12–16)
HOLD SPECIMEN: NORMAL
HOLD SPECIMEN: NORMAL
IMM GRANULOCYTES # BLD: 0.03 10*3/MM3 (ref 0–0.06)
IMM GRANULOCYTES NFR BLD: 0.4 % (ref 0–0.6)
LYMPHOCYTES # BLD AUTO: 2.16 10*3/MM3 (ref 0.6–3.4)
LYMPHOCYTES NFR BLD AUTO: 28.9 % (ref 10–50)
MAGNESIUM SERPL-MCNC: 2.2 MG/DL (ref 1.6–2.3)
MCH RBC QN AUTO: 29.4 PG (ref 27–31)
MCHC RBC AUTO-ENTMCNC: 32.1 G/DL (ref 30–37)
MCV RBC AUTO: 91.7 FL (ref 81–99)
MONOCYTES # BLD AUTO: 0.56 10*3/MM3 (ref 0–0.9)
MONOCYTES NFR BLD AUTO: 7.5 % (ref 0–12)
NEUTROPHILS # BLD AUTO: 4.6 10*3/MM3 (ref 2–6.9)
NEUTROPHILS NFR BLD AUTO: 61.6 % (ref 37–80)
NRBC BLD MANUAL-RTO: 0 /100 WBC (ref 0–0)
PLATELET # BLD AUTO: 244 10*3/MM3 (ref 130–400)
PMV BLD AUTO: 9.8 FL (ref 6–12)
POTASSIUM BLD-SCNC: 4 MMOL/L (ref 3.5–5.1)
PROT SERPL-MCNC: 7.5 G/DL (ref 6.3–8.2)
RBC # BLD AUTO: 4.56 10*6/MM3 (ref 4.2–5.4)
SODIUM BLD-SCNC: 144 MMOL/L (ref 137–145)
TROPONIN I SERPL-MCNC: <0.012 NG/ML (ref 0–0.03)
WBC NRBC COR # BLD: 7.47 10*3/MM3 (ref 4.8–10.8)
WHOLE BLOOD HOLD SPECIMEN: NORMAL
WHOLE BLOOD HOLD SPECIMEN: NORMAL

## 2018-06-05 PROCEDURE — 88305 TISSUE EXAM BY PATHOLOGIST: CPT | Performed by: INTERNAL MEDICINE

## 2018-06-05 PROCEDURE — 93005 ELECTROCARDIOGRAM TRACING: CPT | Performed by: EMERGENCY MEDICINE

## 2018-06-05 PROCEDURE — 25010000002 PROPOFOL 200 MG/20ML EMULSION: Performed by: NURSE ANESTHETIST, CERTIFIED REGISTERED

## 2018-06-05 PROCEDURE — 85025 COMPLETE CBC W/AUTO DIFF WBC: CPT | Performed by: EMERGENCY MEDICINE

## 2018-06-05 PROCEDURE — 83735 ASSAY OF MAGNESIUM: CPT | Performed by: EMERGENCY MEDICINE

## 2018-06-05 PROCEDURE — 93005 ELECTROCARDIOGRAM TRACING: CPT | Performed by: INTERNAL MEDICINE

## 2018-06-05 PROCEDURE — 84484 ASSAY OF TROPONIN QUANT: CPT | Performed by: EMERGENCY MEDICINE

## 2018-06-05 PROCEDURE — 99284 EMERGENCY DEPT VISIT MOD MDM: CPT

## 2018-06-05 PROCEDURE — 80053 COMPREHEN METABOLIC PANEL: CPT | Performed by: EMERGENCY MEDICINE

## 2018-06-05 PROCEDURE — 45380 COLONOSCOPY AND BIOPSY: CPT | Performed by: INTERNAL MEDICINE

## 2018-06-05 RX ORDER — VITAMIN E 268 MG
400 CAPSULE ORAL DAILY
COMMUNITY

## 2018-06-05 RX ORDER — VIT C/HESPERIDIN/BIOFLAVONOIDS 500-100 MG
TABLET ORAL DAILY
COMMUNITY

## 2018-06-05 RX ORDER — SODIUM CHLORIDE 0.9 % (FLUSH) 0.9 %
10 SYRINGE (ML) INJECTION AS NEEDED
Status: DISCONTINUED | OUTPATIENT
Start: 2018-06-05 | End: 2018-06-05 | Stop reason: HOSPADM

## 2018-06-05 RX ORDER — SODIUM CHLORIDE 0.9 % (FLUSH) 0.9 %
3 SYRINGE (ML) INJECTION AS NEEDED
Status: DISCONTINUED | OUTPATIENT
Start: 2018-06-05 | End: 2018-06-05 | Stop reason: HOSPADM

## 2018-06-05 RX ORDER — SODIUM CHLORIDE 9 MG/ML
70 INJECTION, SOLUTION INTRAVENOUS CONTINUOUS PRN
Status: DISCONTINUED | OUTPATIENT
Start: 2018-06-05 | End: 2018-06-05 | Stop reason: HOSPADM

## 2018-06-05 RX ORDER — ASCORBIC ACID 500 MG
500 TABLET ORAL 3 TIMES DAILY PRN
COMMUNITY

## 2018-06-05 RX ORDER — PROPOFOL 10 MG/ML
INJECTION, EMULSION INTRAVENOUS AS NEEDED
Status: DISCONTINUED | OUTPATIENT
Start: 2018-06-05 | End: 2018-06-05 | Stop reason: SURG

## 2018-06-05 RX ADMIN — PROPOFOL 20 MG: 10 INJECTION, EMULSION INTRAVENOUS at 12:14

## 2018-06-05 RX ADMIN — PROPOFOL 50 MG: 10 INJECTION, EMULSION INTRAVENOUS at 12:39

## 2018-06-05 RX ADMIN — PROPOFOL 50 MG: 10 INJECTION, EMULSION INTRAVENOUS at 12:27

## 2018-06-05 RX ADMIN — PROPOFOL 20 MG: 10 INJECTION, EMULSION INTRAVENOUS at 12:24

## 2018-06-05 RX ADMIN — PROPOFOL 20 MG: 10 INJECTION, EMULSION INTRAVENOUS at 12:25

## 2018-06-05 RX ADMIN — PROPOFOL 20 MG: 10 INJECTION, EMULSION INTRAVENOUS at 12:16

## 2018-06-05 RX ADMIN — PROPOFOL 70 MG: 10 INJECTION, EMULSION INTRAVENOUS at 12:07

## 2018-06-05 RX ADMIN — SODIUM CHLORIDE 70 ML/HR: 9 INJECTION, SOLUTION INTRAVENOUS at 10:43

## 2018-06-05 RX ADMIN — PROPOFOL 20 MG: 10 INJECTION, EMULSION INTRAVENOUS at 12:23

## 2018-06-05 RX ADMIN — PROPOFOL 50 MG: 10 INJECTION, EMULSION INTRAVENOUS at 12:30

## 2018-06-05 RX ADMIN — PROPOFOL 50 MG: 10 INJECTION, EMULSION INTRAVENOUS at 12:35

## 2018-06-05 RX ADMIN — PROPOFOL 20 MG: 10 INJECTION, EMULSION INTRAVENOUS at 12:10

## 2018-06-05 RX ADMIN — PROPOFOL 20 MG: 10 INJECTION, EMULSION INTRAVENOUS at 12:20

## 2018-06-05 RX ADMIN — PROPOFOL 30 MG: 10 INJECTION, EMULSION INTRAVENOUS at 12:18

## 2018-06-05 RX ADMIN — PROPOFOL 20 MG: 10 INJECTION, EMULSION INTRAVENOUS at 12:12

## 2018-06-05 RX ADMIN — PROPOFOL 20 MG: 10 INJECTION, EMULSION INTRAVENOUS at 12:19

## 2018-06-05 NOTE — H&P (VIEW-ONLY)
Chief Complaint   Patient presents with   • Colon Cancer Screening     History of Present Illness     For colon cancer screening.  In June 2017 the patient had undergone a colonoscopy to terminal ileum. This revealed a large flat polypoid lesion at hepatic flexure. This was noted to be, which is difficult to visualize. Furthermore the patient had low prep score of 6. Unfortunately, this area could not be relocated despite multiple efforts. Close to this area. Marking with Juana ink was  The patient denies recent change in bowel habits.  There is no diarrhea. Currently the patient is taking magnesium supplements with good bowel movements. There is no history of abdominal pain.  There is no history of overt GI bleed (hematemesis melena or hematochezia).  The patient denies nausea or vomiting.  There is no history of reflux.  The patient denies dysphagia or odynophagia.  There is no history of recent significant weight loss.  There is no history of liver or pancreatic disease. There is no family history of colon cancer or inflammatory bowel disease.     Review of Systems   Constitutional: Negative for appetite change, chills, fatigue, fever and unexpected weight change.   HENT: Negative for mouth sores, nosebleeds and trouble swallowing.    Eyes: Negative for discharge and redness.   Respiratory: Negative for apnea, cough and shortness of breath.    Cardiovascular: Negative for chest pain, palpitations and leg swelling.   Gastrointestinal: Negative for abdominal distention, abdominal pain, anal bleeding, blood in stool, constipation, diarrhea, nausea and vomiting.   Endocrine: Negative for cold intolerance, heat intolerance and polydipsia.   Genitourinary: Negative for dysuria, hematuria and urgency.   Musculoskeletal: Positive for arthralgias. Negative for joint swelling and myalgias.   Skin: Negative for rash.   Allergic/Immunologic: Positive for food allergies. Negative for immunocompromised state.   Neurological:  Positive for tremors. Negative for dizziness, seizures, syncope and headaches.   Hematological: Negative for adenopathy. Does not bruise/bleed easily.   Psychiatric/Behavioral: Negative for dysphoric mood. The patient is not nervous/anxious and is not hyperactive.      Patient Active Problem List   Diagnosis   • Hashimoto's thyroiditis   • Essential tremor   • Vitamin D deficiency   • Iron deficiency anemia   • Primary osteoarthritis involving multiple joints   • Chronic idiopathic gout involving toe of right foot without tophus   • Obsessive-compulsive disorder   • Postsurgical menopause     Past Medical History:   Diagnosis Date   • Anemia ?    under control   • Arthritis    • Depression    • GERD (gastroesophageal reflux disease)     no longer bothers me...   • Gout    • Hashimoto's disease    • Hernia 3-2012    repair surgery which failed in 2014   • Hypothyroid    • Impingement syndrome of right shoulder 5/8/2016   • PONV (postoperative nausea and vomiting)     NAUSEA   • Wears glasses      Past Surgical History:   Procedure Laterality Date   • ABDOMINAL SURGERY     • APPENDECTOMY     • BARIATRIC SURGERY  1979   • COLONOSCOPY  2012    failed due to hernia...   • COLONOSCOPY N/A 6/8/2017    Procedure: COLONOSCOPY with cold snare polypectomies, argon thermal ablation, ns injection, yanira ink injection;  Surgeon: Rafiq Huang MD;  Location: [facility] ENDOSCOPY;  Service:    • GASTRIC BYPASS  1978   • HERNIA MESH REMOVAL  2014    BOWEL OBSTRUCTION DUE TO MESH   • HERNIA REPAIR  2012   • HERNIA REPAIR     • HYSTERECTOMY     • JOINT REPLACEMENT  2009 & 2012    Left & Right Full Hip Replacements   • SMALL INTESTINE SURGERY  2014    DUE TO BOWEL OBSTRUCTION WITH SOME REMOVAL   • TOTAL ABDOMINAL HYSTERECTOMY WITH SALPINGO OOPHORECTOMY  1990    fibroids   • TUBAL ABDOMINAL LIGATION     • UPPER GASTROINTESTINAL ENDOSCOPY       Family History   Problem Relation Age of Onset   • Obesity Mother    • Rheum arthritis Mother     • Thyroid disease Mother    • Hypertension Mother    • Stroke Mother      Most of her problems were caused by Rheumatoid Ar.   • Hyperlipidemia Mother    • Arthritis Mother      Rheumatoid Arthritis..Passed 2004   • Cancer Father      Passed 1997   • Kidney cancer Father    • Liver cancer Father    • No Known Problems Brother    • Diabetes Maternal Aunt    • Cancer Maternal Uncle    • Lung cancer Maternal Uncle    • Alcohol abuse Maternal Uncle    • No Known Problems Paternal Aunt    • Stroke Paternal Uncle    • Hypertension Paternal Uncle    • Hyperlipidemia Paternal Uncle    • Heart disease Paternal Uncle    • No Known Problems Brother    • Asthma Sister      under control   • Alcohol abuse Maternal Uncle    • Colon cancer Neg Hx    • Breast cancer Neg Hx    • Ovarian cancer Neg Hx      Social History   Substance Use Topics   • Smoking status: Former Smoker     Packs/day: 1.00     Years: 40.00     Types: Cigarettes     Start date: 1967     Quit date: 2008   • Smokeless tobacco: Never Used   • Alcohol use No       Current Outpatient Prescriptions:   •  Acetylcysteine (N-ACETYL-L-CYSTEINE PO), Take  by mouth Daily., Disp: , Rfl:   •  acyclovir (ZOVIRAX) 400 MG tablet, Take 2 tablets by mouth 2 (Two) Times a Day. As needed for herpes outbreak, Disp: 60 tablet, Rfl: 1  •  allopurinol (ZYLOPRIM) 100 MG tablet, Take 2 tablets by mouth Daily., Disp: 60 tablet, Rfl: 4  •  B Complex Vitamins (VITAMIN B COMPLEX PO), Take  by mouth Daily., Disp: , Rfl:   •  BIOTIN PO, Take 1 tablet by mouth Daily., Disp: , Rfl:   •  Cholecalciferol (VITAMIN D3) 31834 UNITS tablet, Take 7,500 Units by mouth Daily., Disp: , Rfl:   •  CHOLINE-INOSITOL-METHIONINE-FA PO, Take 1 tablet by mouth Daily., Disp: , Rfl:   •  COCONUT OIL PO, Take 1 dose by mouth Daily., Disp: , Rfl:   •  Coenzyme Q10 (CO Q 10 PO), Take 1 tablet by mouth Daily., Disp: , Rfl:   •  Digestive Enzymes (DIGESTIVE ENZYME PO), Take 1 tablet by mouth Daily., Disp: , Rfl:   •   "escitalopram (LEXAPRO) 5 MG tablet, Take 1 tablet by mouth Daily., Disp: 90 tablet, Rfl: 2  •  ferrous fumarate (IRMA-SEQUELS) 18 MG CR tablet, Take 18 mg by mouth Daily., Disp: , Rfl:   •  ferrous gluconate (FERGON) 324 MG tablet, TAKE TWO TABLETS BY MOUTH DAILY WITH BREAKFAST, Disp: 60 tablet, Rfl: 4  •  GARLIC PO, Take 1 tablet by mouth Daily., Disp: , Rfl:   •  Incontinence Supply Disposable (BLADDER CONTROL PADS EXTRA) misc, , Disp: , Rfl:   •  Iodine Strong, Lugol's, solution, 2%: 20 ggt qd (50 mg) five days a week, no weekends., Disp: , Rfl:   •  liothyronine (CYTOMEL) 25 MCG tablet, Take up to 3 tablets per day. Needs to come from [place]  ONLY, Disp: 90 tablet, Rfl: 3  •  MAGNESIUM PO, Take 1,000 mg by mouth Every Night., Disp: , Rfl:   •  Methylsulfonylmethane (MSM PO), Take 1 tablet by mouth Daily., Disp: , Rfl:   •  MILK THISTLE PO, Take 1 tablet by mouth Daily., Disp: , Rfl:   •  Misc Natural Products (TART CHERRY ADVANCED PO), Take 2 capsules by mouth Daily., Disp: , Rfl:   •  Multiple Vitamins-Minerals (MULTI VITAMIN/MINERALS) tablet, Take 1 tablet by mouth Daily., Disp: , Rfl:   •  NP THYROID 60 MG tablet, TAKE ONE TABLET BY MOUTH EVERY MORNING AND 2 TABLETS EVERY AFTERNOON, Disp: 90 tablet, Rfl: 1  •  Omega-3 Fatty Acids (OMEGA-3 FISH OIL PO), Take 4,000 Units by mouth Daily., Disp: , Rfl:   •  Probiotic Product (PROBIOTIC PO), Take 1 tablet by mouth Daily., Disp: , Rfl:   •  Riboflavin (VITAMIN B-2 PO), Take 300 mg by mouth Daily., Disp: , Rfl:   •  SELENIUM PO, Take 1 tablet by mouth Daily., Disp: , Rfl:   •  TURMERIC PO, Take 1 tablet by mouth Daily., Disp: , Rfl:   •  VITAMIN K PO, Take 100 mcg by mouth Daily., Disp: , Rfl:     Allergies   Allergen Reactions   • Diazepam        Temperature 97.3 °F (36.3 °C), resp. rate 15, height 160 cm (62.99\"), weight 104 kg (230 lb).    Physical Exam   Constitutional: She is oriented to person, place, and time. She appears well-developed and " well-nourished. No distress.   HENT:   Head: Normocephalic and atraumatic.   Right Ear: Hearing and external ear normal.   Left Ear: Hearing and external ear normal.   Nose: Nose normal.   Mouth/Throat: Oropharynx is clear and moist and mucous membranes are normal. Mucous membranes are not pale, not dry and not cyanotic. No oral lesions. No oropharyngeal exudate.   Eyes: Conjunctivae and EOM are normal. Right eye exhibits no discharge. Left eye exhibits no discharge. No scleral icterus.   Neck: Trachea normal. Neck supple. No JVD present. No edema present. No thyroid mass and no thyromegaly present.   Cardiovascular: Normal rate, regular rhythm, S2 normal and normal heart sounds.  Exam reveals no gallop, no S3 and no friction rub.    No murmur heard.  Pulmonary/Chest: Effort normal and breath sounds normal. No respiratory distress. She has no wheezes. She has no rales. She exhibits no tenderness.   Abdominal: Soft. Normal appearance and bowel sounds are normal. She exhibits no distension, no ascites and no mass. There is no splenomegaly or hepatomegaly. There is no tenderness. There is no rigidity, no rebound and no guarding. No hernia.   Musculoskeletal: She exhibits no tenderness or deformity.     Vascular Status -  Her right foot exhibits no edema. Her left foot exhibits no edema.  Lymphadenopathy:     She has no cervical adenopathy.        Left: No supraclavicular adenopathy present.   Neurological: She is alert and oriented to person, place, and time. She has normal strength. No cranial nerve deficit or sensory deficit. She exhibits normal muscle tone. Coordination normal.   Skin: No rash noted. She is not diaphoretic. No cyanosis. No pallor. Nails show no clubbing.   Psychiatric: She has a normal mood and affect. Her behavior is normal. Judgment and thought content normal.   Nursing note and vitals reviewed.  Stigmata of chronic liver disease:  None.  Asterixis:  None.      Laboratory Testing:  Upon review of  medical records:      Dated March 3, 2016 sodium 141 potassium 4.6 chloride 108 CO2 27 BUN 33 serum creatinine 0.6 and glucose 103.  Calcium 10.0.  Albumin 4.2.  T bili 0.4 AST 28 ALT 29 alkaline phosphatase 105.  Total cholesterol 121.  Triglycerides 131.     Dated January 12, 2017 serum iron 54.  TIBC 311.  Iron saturation 17%.  Ferritin 314.00.  TSH < 0.004.  Free T4 level 0.73.  Free T3 level 4.2.  Reverse T3 level 6.8.     Dated May 4, 2017 sodium 146 potassium 4.7 chloride 110 CO2 24 BUN 22 serum creatinine 0.70 and glucose 99.  Calcium 9.9.  Albumin 4.30.  T bili 0.5 AST 27 ALT 40 alkaline phosphatase 101.  TSH < 0.015.  Free T4 level 0.68.  CRP 11.55.  Serum iron 54.  TIBC 302.  UIBC 248 iron saturation 18%.  WBC 7.91 hemoglobin 14.0 hematocrit 43.0 MCV 90.5 and platelet count 263.     Dated September 5, 2017 sodium 144 potassium 4.2 chloride 110 CO2 21 BUN 20 serum creatinine 0.60 glucose 99.  Calcium 10.2.  Magnesium 2.1.  Serum iron 36.  TIBC 307.  UIBC 271.  Iron saturation 12%.  Ferritin 253.00.  Vitamin B12 level > 1000.  Hepatitis C antibody negative.  WBC 8.53 hemoglobin 13.9 hematocrit 43.2 MCV 91.3 and platelet count 291.    Dated January 8, 2018 sodium 141 potassium 4.5 chloride 106 CO2 23 BUN 24 serum creatinine 0.60 glucose 86.  Calcium 10.0.  Total protein 7.0.  Albumin 4.40.  T bili 0.4 AST 28 ALT 36 alkaline phosphatase 107.  Serum iron 39.  TIBC 272.  U .  Iron saturation 14%.  Ferritin 185.00.  C-reactive protein elevated at 3.30.  Sedimentation rate elevated at 39.     Abdominal Imaging:  Upon Review of medical records:     Dated February 3, 2014 the patient underwent a CT of the abdomen and pelvis with IV contrast which revealed: Clear lung bases.  Postsurgical changes from small bowel resection and re-anastomosis in the left upper quadrant.  There are multiple dilated loops of small bowel consistent with at least a partial obstruction.  There is dilated bowel seen within a ventral  hernia with adjacent edema within the mesentery worrisome for incarcerated hernia.  There is no free fluid, free air, or adenopathy.  The solid abdominal organs and abdominal aorta is unremarkable.  The lower pelvis is obscured by streak artifact from hip replacement.  The visualized pelvic GI tract is unremarkable.     Procedure:  Upon review of medical records:     Dated June 8, 2017 the patient underwent a colonoscopy to the terminal ileum which revealed: Left-sided diverticulosis.  Colon polyps.  Nonbleeding colonic vascular ectasia.  Status post thermal ablation therapy.  Polypoid area at hepatic flexure.  Not biopsied or removed for technical reasons.  Juana ink marking close to the site.  Internal hemorrhoids.  Descending colon polyp, biopsy revealed fragments of benign colonic mucosa with prominent lymphoid aggregate; otherwise no specific pathologic diagnosis.  No evidence of dysplasia or malignancy.  Hepatic flexure polyp, biopsy revealed fragments of benign colonic mucosa with prominent lymphoid aggregates and focal mucosal hemorrhage; otherwise no specific pathologic diagnosis.  No evidence of dysplasia or malignancy.         Assessment:      ICD-10-CM ICD-9-CM   1. Colon cancer screening Z12.11 V76.51   2. Lesion of colon K63.9 569.89   3. Over weight E66.3 278.02         Plan/  Patient Instructions   Colonoscopy: Description of the procedure, risks benefits alternatives and options including non-operative options were discussed with the patient in detail.  The patient understands and wishes to proceed. The patient will be getting extended preparation.         Rafiq Huang MD

## 2018-06-05 NOTE — PROGRESS NOTES
During the procedure the patient was noted to have some T wave abnormality raising the possibility of sinus arrhythmia monitor. The patient's vital signs otherwise remained stable.   A postprocedure 12-lead EKG was done. The patient was noted to have some changes which are relatively new as compared to the old EKG raising the possibility of ischemia. EKG has been evaluated by cardiology service-Dr. fournier as well. The patient will be sent to emergency room for further evaluation and to ensure stability.

## 2018-06-05 NOTE — INTERVAL H&P NOTE
"  H&P reviewed. The patient was examined and there are no changes to the H&P.       No recent shortness of breath or chest pains.      Blood pressure 139/85, pulse 98, temperature 96.8 °F (36 °C), temperature source Temporal Artery , resp. rate 18, height 160 cm (63\"), weight 97.5 kg (215 lb), SpO2 97 %, not currently breastfeeding.    Chest: clear to auscultation.  Cardiac exam: No S3 no murmurs.   Abdomen: soft bowel sounds present nondistended nontender.      "

## 2018-06-05 NOTE — NURSING NOTE
EKG performed as per ordered by Dr. Huang due to rhythm changes during procedure results of EKG taken to STANLEY Alonzo CRNA    Trilobed Flap Text: The defect edges were debeveled with a #15 scalpel blade.  Given the location of the defect and the proximity to free margins a trilobed flap was deemed most appropriate.  Using a sterile surgical marker, an appropriate trilobed flap drawn around the defect.    The area thus outlined was incised deep to adipose tissue with a #15 scalpel blade.  The skin margins were undermined to an appropriate distance in all directions utilizing iris scissors.

## 2018-06-05 NOTE — DISCHARGE INSTRUCTIONS
Please follow all post op instructions and follow up appointment time from your physician's office included in your discharge packet.    To assist you in voiding:  Drink plenty of fluids  Listen to running water while attempting to void.    If you are unable to urinate and you have an uncomfortable urge to void or it has been   6 hours since you were discharged, return to the Emergency Room    Rest today  No pushing,pulling,tugging,heavy lifting, or strenuous activity   No major decision making,driving,or drinking alcoholic beverages for 24 hours due to the sedation you received  Always use good hand hygiene/washing technique  No driving on pain medication.      ************************************************************************************    Postprocedure instructions:  Nothing by mouth total fully alert.  Bedrest until fully alert.  Once fully alert may have clear liquids.  Avoid soda beverages.  Advance diet as tolerated.  Vital signs as routine.    Diet:   Low Fat Diet.     Blood Thinner Directions:     Avoid Aspirin & other NSAIDS for _7__ days.  Tylenol is okay.      Call [facility] at [phone] or come to the Emergency Department if you experience the following: Chest pain, abdominal pain, bleeding (vomiting of blood or coffee colored material, black stools or tanisha blood in stools), fever/chills, nausea and vomiting or dizziness.      Follow-up:  DR. JACI WELLS in 4 weeks.Office phone # [phone].    Follow-up colonoscopy:  Depending on the biopsy results.    Previously the patient has been advised to avoid certain high-dose vitamins and other nutritional supplements.

## 2018-06-05 NOTE — ED PROVIDER NOTES
"TRIAGE CHIEF COMPLAINT:     Nursing and triage notes reviewed    Chief Complaint   Patient presents with   • Abnormal ECG      HPI: Salinas Monroy is a 68 y.o. female who presents to the emergency department complaining of An abnormal EKG.  Patient was sent over from outpatient surgery where patient had a colonoscopy shortly prior to arrival.  During the procedure, which was uncomplicated, the patient had what they believed to be loss of P waves on the monitor.  Patient was on a 3-lead monitor at that time.  Anesthesiologist states he could not print out a rhythm strip.  He states rhythm primarily still seemed regular, although there is no documentation of the arrhythmia.  Patient is asymptomatic without complaint.  An EKG obtained after the procedure revealed sinus rhythm with a rate of 64 bpm.  There were no signs of arrhythmia or ischemic changes.  Patient had been sent over to the ER for further evaluation.     REVIEW OF SYSTEMS: All other systems reviewed and are negative     PAST MEDICAL HISTORY:   Past Medical History:   Diagnosis Date   • Anemia ?    under control   • Arthritis    • Depression    • Diverticulosis    • GERD (gastroesophageal reflux disease)     no longer bothers me...   • Gout    • Gout    • Hashimoto's disease    • Hernia 3-2012    repair surgery which failed in 2014   • Hypothyroid    • Impingement syndrome of right shoulder 5/8/2016   • PONV (postoperative nausea and vomiting)     Pt states \"only one time\".   • Tremor     worse on left arm/hand   • Wears glasses         FAMILY HISTORY:   Family History   Problem Relation Age of Onset   • Obesity Mother    • Rheum arthritis Mother    • Thyroid disease Mother    • Hypertension Mother    • Stroke Mother         Most of her problems were caused by Rheumatoid Ar.   • Hyperlipidemia Mother    • Arthritis Mother         Rheumatoid Arthritis..Passed 2004   • Cancer Father         Passed 1997   • Kidney cancer Father    • Liver cancer " "Father    • No Known Problems Brother    • Diabetes Maternal Aunt    • Cancer Maternal Uncle    • Lung cancer Maternal Uncle    • Alcohol abuse Maternal Uncle    • No Known Problems Paternal Aunt    • Stroke Paternal Uncle    • Hypertension Paternal Uncle    • Hyperlipidemia Paternal Uncle    • Heart disease Paternal Uncle    • No Known Problems Brother    • Asthma Sister         under control   • Alcohol abuse Maternal Uncle    • Colon cancer Neg Hx    • Breast cancer Neg Hx    • Ovarian cancer Neg Hx         SOCIAL HISTORY:   Social History     Social History   • Marital status:      Spouse name: N/A   • Number of children: N/A   • Years of education: N/A     Occupational History   • Not on file.     Social History Main Topics   • Smoking status: Former Smoker     Packs/day: 1.00     Years: 40.00     Types: Cigarettes     Start date: 1967     Quit date: 2008   • Smokeless tobacco: Never Used      Comment: \"quit 9 years ago\"   • Alcohol use No   • Drug use: No   • Sexual activity: Defer     Other Topics Concern   • Not on file     Social History Narrative   • No narrative on file        SURGICAL HISTORY:   Past Surgical History:   Procedure Laterality Date   • ABDOMINAL SURGERY     • APPENDECTOMY     • COLONOSCOPY  2012    failed due to hernia...   • COLONOSCOPY N/A 6/8/2017    Procedure: COLONOSCOPY with cold snare polypectomies, argon thermal ablation, ns injection, yanira ink injection;  Surgeon: Rafiq Huang MD;  Location: [facility] ENDOSCOPY;  Service:    • GASTRIC BYPASS  1978   • HERNIA MESH REMOVAL  2014    BOWEL OBSTRUCTION DUE TO MESH   • HERNIA REPAIR  2012   • HERNIA REPAIR     • HYSTERECTOMY     • JOINT REPLACEMENT  2009 & 2012    Left & Right Full Hip Replacements   • SMALL INTESTINE SURGERY  2014    DUE TO BOWEL OBSTRUCTION WITH SOME REMOVAL   • TOTAL ABDOMINAL HYSTERECTOMY WITH SALPINGO OOPHORECTOMY  1990    fibroids   • TUBAL ABDOMINAL LIGATION     • UPPER GASTROINTESTINAL ENDOSCOPY      "     CURRENT MEDICATIONS:      Medication List      CONTINUE taking these medications    Bladder Control Pads Extra misc        ASK your doctor about these medications    acyclovir 400 MG tablet  Commonly known as:  ZOVIRAX  Take 2 tablets by mouth 2 (Two) Times a Day. As needed for herpes outbreak     allopurinol 100 MG tablet  Commonly known as:  ZYLOPRIM  Take 2 tablets by mouth Daily.     BIOTIN PO     CHOLINE-INOSITOL-METHIONINE-FA PO     CO Q 10 PO     COCONUT OIL PO     DIGESTIVE ENZYME PO     escitalopram 5 MG tablet  Commonly known as:  LEXAPRO  Take 1 tablet by mouth Daily.     ferrous gluconate 324 MG tablet  Commonly known as:  FERGON  TAKE TWO TABLETS BY MOUTH DAILY WITH BREAKFAST     GARLIC PO     liothyronine 25 MCG tablet  Commonly known as:  CYTOMEL  Take up to 3 tablets per day. Needs to come from [place]  ONLY     MAGNESIUM PO     MILK THISTLE PO     MSM PO     N-ACETYL-L-CYSTEINE PO     NP THYROID 60 MG tablet  Generic drug:  Thyroid  TAKE ONE TABLET BY MOUTH EVERY MORNING AND 2 TABLETS EVERY AFTERNOON     OMEGA-3 FISH OIL PO     PROBIOTIC PO     SELENIUM PO     TART CHERRY ADVANCED PO     TURMERIC PO     VITAMIN B COMPLEX PO     VITAMIN B-2 PO     vitamin C 500 MG tablet  Commonly known as:  ASCORBIC ACID     Vitamin D3 81031 units tablet     vitamin E 400 UNIT capsule     VITAMIN K PO     Zinc 30 MG tablet           ALLERGIES: Diazepam     PHYSICAL EXAM:   VITAL SIGNS:   Vitals:    06/05/18 1714   BP: 145/90   Pulse: 74   Resp: 17   Temp: 97.7 °F (36.5 °C)   SpO2: 95%      CONSTITUTIONAL: Awake, oriented, appears non-toxic   HENT: Atraumatic, normocephalic, oral mucosa pink and moist, airway patent.  EYES: Conjunctiva clear   NECK: Trachea midline, non-tender, supple   CARDIOVASCULAR: Normal heart rate, Normal rhythm, No murmurs, rubs, gallops   PULMONARY/CHEST: Clear to auscultation, no rhonchi, wheezes, or rales. Symmetrical breath sounds.    ABDOMINAL: Non-distended, soft,  non-tender - no rebound or guarding.  NEUROLOGIC: Non-focal, moving all four extremities, no gross sensory or motor deficits.   EXTREMITIES: No clubbing, cyanosis, or edema   SKIN: Warm, Dry, No erythema, No rash     ED COURSE / MEDICAL DECISION MAKING:   Salinas Monroy is a 68 y.o. female who presents to the emergency department for evaluation of Possible arrhythmia.  On arrival patient has stable vital signs.  Physical exam is unremarkable.  Patient in normal sinus rhythm.  EKG reveals sinus rhythm with a rate of 66 bpm.  There is no sign of arrhythmia or any sign of ST segment or T-wave abnormality.  EKG is unchanged from the post procedure EKG.  Obtained basic labs which were within normal ranges.  Patient was personally evaluated by the cardiologist on-call.  At this point time patient asymptomatic we will allow her to return home.  She was observed several hours with no sign of arrhythmia. Return precautions were discussed.    DECISION TO DISCHARGE/ADMIT: see ED care timeline     FINAL IMPRESSION:   1 -- concern for arrhythmia  2 --  status post colonoscopy  3 --     Electronically signed by: Yanni Wyman MD, 6/5/2018 5:16 PM       Yanni Wyman MD  06/05/18 0568

## 2018-06-05 NOTE — OP NOTE
PROCEDURE:  Colonoscopy to the terminal ileum with biopsies.     DATE OF PROCEDURE:  June 5, 2018    REFERRING PROVIDER:  Morenita Carroll MD     INSTRUMENT USED:   Olympus PCF H 190 videocolonoscope.      INDICATIONS OF THE PROCEDURE: This is a 68-year-old white female for colon cancer screening. The patient has history of colon polyps in the past. She was noted to have a possible lack lesion at hepatic flexure. However despite repeated attempts evaluation of this area was limited. Close to the area [place] ink was for marking as a reference. Currently undergoing colonoscopy for further evaluation.    PREVIOUS COLONOSCOPY: June 2017. Polypoid flat lesion in close to the hepatic flexure area.    FAMILY HISTORY OF COLON CANCER:  None.    BIOPSIES:  Multiple biopsies were obtained at the mucosa closely hepatic flexure-previously marked with somewhat polypoid appearance.      PHOTOGRAPHS:  Photographs were included in the medical records.     MEDICATIONS:  MAC.       CONSENT/PREPROCEDURE EVALUATION:  Risks, benefits, alternatives and options of the procedure including risks of sedation/anesthesia were discussed with the patient and informed consent was obtained prior to the procedure.  History and physical examination were performed and nothing precluded the test.      REPORT:  The patient was placed in left lateral decubitus position and a digital examination was performed.  Once under the influence of IV sedation, the instrument was inserted into the rectum and advanced under direct vision to cecum which was identified by the ileocecal valve, triradiate folds and appendiceal orifice. The scope was then maneuvered into the terminal ileum.        FINDINGS:      Digital rectal examination:  Good anal tone.  No perianal pathology.  No mass.        Terminal ileum:  7-8 cm.  Normal.     Cecum and ascending colon: Nonbleeding scant vascular ectasia was seen.  Scant early diverticular change was noted in the right colon.      Hepatic flexure, transverse colon, splenic flexure:  area. area with [place] ink was visualized close to the hepatic flexure.   This area was examined extensively. Somewhat polypoid appearance of the mucosa was seen. Biopsies were obtained. This area was also examined and retroflex view. No additional pathology was noted.      Descending colon, sigmoid colon and rectum:  scant diverticulosis was noted involving the left colon. A retroflex examination within the rectum revealed internal hemorrhoids.        The scope was then straightened, the lower GI tract was decompressed, and the scope was pulled out of the patient.  The patient tolerated the procedure well.  There were no immediate complications and the patient was transferred in stable condition for post procedure observation.      TECHNICAL DATA:   1. [place] prep score: 8 (3+2+3).     2. Anus to cecal time: 7  minutes.  3. Difficulty of examination:  Average.  The colon was noted to be tortuous and spastic.   4. Withdrawal time: 20 minutes.  5. Procedure time: 33 minutes  6. Retroflex examination in right colon: Yes. Retroflex examination at the hepatic flexure and proximal transverse colon was also undertaken.  7. Second look Rectum to cecum with decompression: Yes.    DIAGNOSES:    1. Diverticular change involving the left colon and right colon.   2. Scant nonbleeding vascular ectasia involving the right colon.   3. Somewhat polypoid appearance of the mucosa in the proximal transverse colon close to hepatic flexure. This area had been previously marked with [place] ink.   4. Internal hemorrhoids.    RECOMMENDATIONS:     Dietary instructions.  Follow biopsies.    Follow-up:    2-3 weeks.  Followup colonoscopy:  Depending on the biopsy results.           Thank you very much for letting me participate in the care of this patient. Please do not hesitate to call me if you have any questions.

## 2018-06-08 LAB
LAB AP CASE REPORT: NORMAL
Lab: NORMAL
PATH REPORT.FINAL DX SPEC: NORMAL

## 2018-06-13 ENCOUNTER — PROCEDURE VISIT (OUTPATIENT)
Dept: ORTHOPEDIC SURGERY | Facility: CLINIC | Age: 68
End: 2018-06-13

## 2018-06-13 DIAGNOSIS — M62.81 PROXIMAL MUSCLE WEAKNESS: ICD-10-CM

## 2018-06-13 DIAGNOSIS — M54.17 LUMBOSACRAL NEURITIS: Primary | ICD-10-CM

## 2018-06-13 DIAGNOSIS — M79.605 LEFT LEG PAIN: ICD-10-CM

## 2018-06-13 PROCEDURE — 95886 MUSC TEST DONE W/N TEST COMP: CPT | Performed by: PHYSICAL THERAPIST

## 2018-06-13 PROCEDURE — 95911 NRV CNDJ TEST 9-10 STUDIES: CPT | Performed by: PHYSICAL THERAPIST

## 2018-06-30 DIAGNOSIS — E06.3 HASHIMOTO'S THYROIDITIS: ICD-10-CM

## 2018-07-02 RX ORDER — DOXYCYCLINE HYCLATE 50 MG/1
CAPSULE, GELATIN COATED ORAL
Qty: 60 TABLET | Refills: 3 | Status: SHIPPED | OUTPATIENT
Start: 2018-07-02 | End: 2018-12-07

## 2018-07-02 RX ORDER — LEVOTHYROXINE, LIOTHYRONINE 38; 9 UG/1; UG/1
TABLET ORAL
Qty: 90 TABLET | Refills: 0 | Status: SHIPPED | OUTPATIENT
Start: 2018-07-02 | End: 2018-07-03 | Stop reason: SDUPTHER

## 2018-07-03 ENCOUNTER — OFFICE VISIT (OUTPATIENT)
Dept: INTERNAL MEDICINE | Facility: CLINIC | Age: 68
End: 2018-07-03

## 2018-07-03 VITALS
WEIGHT: 227 LBS | HEIGHT: 63 IN | HEART RATE: 82 BPM | OXYGEN SATURATION: 92 % | TEMPERATURE: 99.2 F | BODY MASS INDEX: 40.22 KG/M2 | DIASTOLIC BLOOD PRESSURE: 80 MMHG | SYSTOLIC BLOOD PRESSURE: 140 MMHG

## 2018-07-03 DIAGNOSIS — E06.3 HASHIMOTO'S THYROIDITIS: ICD-10-CM

## 2018-07-03 DIAGNOSIS — R94.31 LEFT AXIS DEVIATION: Primary | ICD-10-CM

## 2018-07-03 DIAGNOSIS — R94.31 NONSPECIFIC ABNORMAL ELECTROCARDIOGRAM (ECG): ICD-10-CM

## 2018-07-03 DIAGNOSIS — M62.81 PROXIMAL LIMB MUSCLE WEAKNESS: ICD-10-CM

## 2018-07-03 PROCEDURE — 99214 OFFICE O/P EST MOD 30 MIN: CPT | Performed by: FAMILY MEDICINE

## 2018-07-03 RX ORDER — CHOLECALCIFEROL (VITAMIN D3) 125 MCG
CAPSULE ORAL EVERY OTHER DAY
COMMUNITY

## 2018-07-03 RX ORDER — LEVOTHYROXINE, LIOTHYRONINE 38; 9 UG/1; UG/1
TABLET ORAL
Qty: 45 TABLET | Refills: 5 | Status: SHIPPED | OUTPATIENT
Start: 2018-07-03 | End: 2018-12-10 | Stop reason: SDUPTHER

## 2018-07-03 RX ORDER — LIOTHYRONINE SODIUM 25 UG/1
25 TABLET ORAL EVERY 6 HOURS
Qty: 120 TABLET | Refills: 3 | Status: SHIPPED | OUTPATIENT
Start: 2018-07-03 | End: 2018-12-10 | Stop reason: SDUPTHER

## 2018-07-03 NOTE — PROGRESS NOTES
SUBJECTIVE: Salinas Monroy is a 68 y.o. female seen for a follow up visit;      Depression: she tried to increase the lexapro - up to 7.5 mg - but couldn't tolerate the increase much.  Made her feel more restless.     Hypothyroidism: she feels like she is running lower again, has increased her T3 to 4 a day some days, keeping the NP thyroid the same.      Proximal Weakness: had the EMG - it didn't show any muscle breakdown but did show some nerve issues slowing down the nerve signals and possibly explaning the weakness.  She thinks it has gotten marginally better, not worse, but is still having a hard time getting up from low chairs or from the toilet.  She denies taking any vitamin A supplementation and has been continuing her vitamin D supplementation.  She is not willing to try low dose steroids to see if PMR is a possibility if ESR is raised.  She has an appt w/ Dr. Ramos about her hip.     Atrial rhythm: she was sent over to the ED after her colonoscopy for an arrhythmia - not clear what the issue was during the colonoscopy but they mentioned T wave abnormality.        The following portions of the patient's history were reviewed and updated as appropriate: She  has a past medical history of Anemia (?); Arthritis; Depression; Diverticulosis; GERD (gastroesophageal reflux disease); Gout; Gout; Hashimoto's disease; Hernia (3-2012); Hypothyroid; Impingement syndrome of right shoulder (5/8/2016); PONV (postoperative nausea and vomiting); Tremor; and Wears glasses.  She has Hashimoto's thyroiditis and Obsessive-compulsive disorder on her pertinent problem list.  She  has a past surgical history that includes Hysterectomy; Gastric bypass (1978); Total abdominal hysterectomy w/ bilateral salpingoophorectomy (1990); Hernia repair (2012); Small intestine surgery (2014); Appendectomy; Colonoscopy (2012); Abdominal surgery; Upper gastrointestinal endoscopy; Tubal ligation; Hernia repair; Hernia mesh removal  "(2014); Colonoscopy (N/A, 6/8/2017); Joint replacement (2009 & 2012); and Colonoscopy (N/A, 6/5/2018).  Her family history includes Alcohol abuse in her maternal uncle and maternal uncle; Arthritis in her mother; Asthma in her sister; Cancer in her father and maternal uncle; Diabetes in her maternal aunt; Heart disease in her paternal uncle; Hyperlipidemia in her mother and paternal uncle; Hypertension in her mother and paternal uncle; Kidney cancer in her father; Liver cancer in her father; Lung cancer in her maternal uncle; No Known Problems in her brother, brother, and paternal aunt; Obesity in her mother; Rheum arthritis in her mother; Stroke in her mother and paternal uncle; Thyroid disease in her mother.  She  reports that she quit smoking about 10 years ago. Her smoking use included Cigarettes. She started smoking about 51 years ago. She has a 40.00 pack-year smoking history. She has never used smokeless tobacco. She reports that she does not drink alcohol or use drugs.  She has a current medication list which includes the following prescription(s): acetylcysteine, acyclovir, allopurinol, b complex vitamins, biotin, vitamin d3, coenzyme q10, digestive enzymes, escitalopram, ferrous gluconate, garlic, liothyronine, magnesium, methylsulfonylmethane, milk thistle, misc natural products, np thyroid, omega-3 fatty acids, probiotic product, riboflavin, selenium, turmeric, vitamin c, vitamin e, vitamin k, zinc, and b-12 (methylcobalamin)..    Review of Systems   Respiratory: Positive for shortness of breath.    Cardiovascular: Positive for palpitations.   Musculoskeletal: Positive for gait problem. Negative for myalgias.   Neurological: Positive for tremors and weakness.   Psychiatric/Behavioral: Positive for dysphoric mood.         OBJECTIVE:  /80   Pulse 82   Temp 99.2 °F (37.3 °C)   Ht 160 cm (63\")   Wt 103 kg (227 lb)   LMP  (LMP Unknown)   SpO2 92%   BMI 40.21 kg/m²      Physical Exam "   Constitutional: She appears well-developed and well-nourished. No distress.   Neurological:   Reflex Scores:       Patellar reflexes are 2+ on the right side and 2+ on the left side.  4+/5 B/L strength in hip flexors, 5/5 knee and ankle extension and flexion           ASSESSMENT:   Diagnosis Plan   1. Left axis deviation  Adult Transthoracic Echo Complete W/ Cont if Necessary Per Protocol   2. Nonspecific abnormal electrocardiogram (ECG)  Adult Transthoracic Echo Complete W/ Cont if Necessary Per Protocol   3. Hashimoto's thyroiditis  liothyronine (CYTOMEL) 25 MCG tablet    NP THYROID 60 MG tablet   4. Proximal limb muscle weakness  Ambulatory Referral to Neurology         Medications Discontinued During This Encounter   Medication Reason   • COCONUT OIL PO *Therapy completed   • CHOLINE-INOSITOL-METHIONINE-FA PO *Therapy completed   • Incontinence Supply Disposable (BLADDER CONTROL PADS EXTRA) misc *Therapy completed   • liothyronine (CYTOMEL) 25 MCG tablet Reorder   • NP THYROID 60 MG tablet Reorder             I, Morenita Carroll MD, hereby attest that the medical record entry above accurately reflects signatures/notations that I made in my capacity as Morenita Carroll MD when I treated and diagnosed the above patient.  I do hereby attest that his information is true, accurate, and complete to the best of my knowledge and I understand that any falsification, omission, or concealment of material fact may subject me to administrative, civil, or criminal liability.

## 2018-07-09 ENCOUNTER — TELEPHONE (OUTPATIENT)
Dept: GASTROENTEROLOGY | Facility: CLINIC | Age: 68
End: 2018-07-09

## 2018-07-09 NOTE — TELEPHONE ENCOUNTER
Called and discussed results with patient. Advised to keep follow up appointment to discuss further with Dr. Huang. Patient verbalizes understanding.

## 2018-07-09 NOTE — TELEPHONE ENCOUNTER
Wants colon results/path over phone, has appt the 26, but would like call if possible to save her copay

## 2018-07-19 DIAGNOSIS — M1A.0710 CHRONIC IDIOPATHIC GOUT INVOLVING TOE OF RIGHT FOOT WITHOUT TOPHUS: ICD-10-CM

## 2018-07-19 DIAGNOSIS — F42.2 MIXED OBSESSIONAL THOUGHTS AND ACTS: ICD-10-CM

## 2018-07-19 RX ORDER — ALLOPURINOL 100 MG/1
200 TABLET ORAL DAILY
Qty: 180 TABLET | Refills: 1 | Status: SHIPPED | OUTPATIENT
Start: 2018-07-19 | End: 2018-07-23 | Stop reason: SDUPTHER

## 2018-07-19 RX ORDER — ESCITALOPRAM OXALATE 5 MG/1
5 TABLET ORAL DAILY
Qty: 90 TABLET | Refills: 2 | Status: SHIPPED | OUTPATIENT
Start: 2018-07-19 | End: 2018-07-23 | Stop reason: SDUPTHER

## 2018-07-23 DIAGNOSIS — M1A.0710 CHRONIC IDIOPATHIC GOUT INVOLVING TOE OF RIGHT FOOT WITHOUT TOPHUS: ICD-10-CM

## 2018-07-23 DIAGNOSIS — F42.2 MIXED OBSESSIONAL THOUGHTS AND ACTS: ICD-10-CM

## 2018-07-23 RX ORDER — ALLOPURINOL 100 MG/1
200 TABLET ORAL DAILY
Qty: 180 TABLET | Refills: 1 | Status: SHIPPED | OUTPATIENT
Start: 2018-07-23 | End: 2018-11-12 | Stop reason: SDUPTHER

## 2018-07-23 RX ORDER — ESCITALOPRAM OXALATE 5 MG/1
5 TABLET ORAL DAILY
Qty: 90 TABLET | Refills: 2 | Status: SHIPPED | OUTPATIENT
Start: 2018-07-23 | End: 2018-11-12 | Stop reason: SDUPTHER

## 2018-07-26 ENCOUNTER — OFFICE VISIT (OUTPATIENT)
Dept: GASTROENTEROLOGY | Facility: CLINIC | Age: 68
End: 2018-07-26

## 2018-07-26 VITALS
WEIGHT: 226 LBS | BODY MASS INDEX: 40.04 KG/M2 | DIASTOLIC BLOOD PRESSURE: 46 MMHG | HEART RATE: 79 BPM | TEMPERATURE: 97.6 F | RESPIRATION RATE: 17 BRPM | SYSTOLIC BLOOD PRESSURE: 135 MMHG | HEIGHT: 63 IN

## 2018-07-26 DIAGNOSIS — K57.30 DIVERTICULOSIS OF COLON WITHOUT HEMORRHAGE: ICD-10-CM

## 2018-07-26 DIAGNOSIS — R12 HEARTBURN: Primary | ICD-10-CM

## 2018-07-26 PROCEDURE — 99214 OFFICE O/P EST MOD 30 MIN: CPT | Performed by: INTERNAL MEDICINE

## 2018-07-26 NOTE — PATIENT INSTRUCTIONS
"1. Low-fat-moderate fiber diet.  2. Weight reduction.  3. Follow-up colonoscopy is recommended in 5 years.  4. The patient has been counseled regarding use of \"multiple nutritional supplements\".  "

## 2018-07-26 NOTE — PROGRESS NOTES
Chief Complaint   Patient presents with   • Heartburn     History of Present Illness     The patient has a history of reflux off and on for the last .  The reflux is mildly severe.  Symptoms are described as retrosternal burning sensation, and indigestion.  There is no history of occasional regurgitative symptoms.  Frequency being 2-3 per month.  The symptoms are worse at night.  There is no dysphagia or odynophagia.    She denies diarrhea or constipation. The patient denies abdominal pain. There is no nausea or vomiting.There is no history of overt GI bleed (Hematemesis, melena or hematochezia). She she denies liver or pancreatic disease. There is no family history of colon cancer, inflammatory bowel disease or chronic liver.     Review of Systems   Constitutional: Negative for appetite change, chills, fatigue, fever and unexpected weight change.   HENT: Negative for mouth sores, nosebleeds and trouble swallowing.    Eyes: Negative for discharge and redness.   Respiratory: Negative for apnea, cough and shortness of breath.    Cardiovascular: Negative for chest pain, palpitations and leg swelling.   Gastrointestinal: Negative for abdominal distention, abdominal pain, anal bleeding, blood in stool, constipation, diarrhea, nausea and vomiting.   Endocrine: Negative for cold intolerance, heat intolerance and polydipsia.   Genitourinary: Negative for dysuria, hematuria and urgency.   Musculoskeletal: Positive for arthralgias. Negative for joint swelling and myalgias.   Skin: Negative for rash.   Allergic/Immunologic: Positive for food allergies. Negative for immunocompromised state.   Neurological: Positive for tremors. Negative for dizziness, seizures, syncope and headaches.   Hematological: Negative for adenopathy. Does not bruise/bleed easily.   Psychiatric/Behavioral: Negative for dysphoric mood. The patient is not nervous/anxious and is not hyperactive.      Patient Active Problem List   Diagnosis   • Hashimoto's  "thyroiditis   • Essential tremor   • Vitamin D deficiency   • Iron deficiency anemia   • Primary osteoarthritis involving multiple joints   • Chronic idiopathic gout involving toe of right foot without tophus   • Obsessive-compulsive disorder   • Postsurgical menopause   • Colon cancer screening   • History of colon polyps     Past Medical History:   Diagnosis Date   • Anemia ?    under control   • Arthritis    • Depression    • Diverticulosis    • GERD (gastroesophageal reflux disease)     no longer bothers me...   • Gout    • Gout    • Hashimoto's disease    • Hernia 3-2012    repair surgery which failed in 2014   • Hypothyroid    • Impingement syndrome of right shoulder 5/8/2016   • PONV (postoperative nausea and vomiting)     Pt states \"only one time\".   • Tremor     worse on left arm/hand   • Wears glasses      Past Surgical History:   Procedure Laterality Date   • ABDOMINAL SURGERY     • APPENDECTOMY     • COLONOSCOPY  2012    failed due to hernia...   • COLONOSCOPY N/A 6/8/2017    Procedure: COLONOSCOPY with cold snare polypectomies, argon thermal ablation, ns injection, yanira ink injection;  Surgeon: Rafiq Huang MD;  Location: [facility] ENDOSCOPY;  Service:    • COLONOSCOPY N/A 6/5/2018    Procedure: COLONOSCOPY WITH BIOPSIES;  Surgeon: Rafiq Huang MD;  Location: [facility] ENDOSCOPY;  Service: Gastroenterology   • GASTRIC BYPASS  1978   • HERNIA MESH REMOVAL  2014    BOWEL OBSTRUCTION DUE TO MESH   • HERNIA REPAIR  2012   • HERNIA REPAIR     • HYSTERECTOMY     • JOINT REPLACEMENT  2009 & 2012    Left & Right Full Hip Replacements   • SMALL INTESTINE SURGERY  2014    DUE TO BOWEL OBSTRUCTION WITH SOME REMOVAL   • TOTAL ABDOMINAL HYSTERECTOMY WITH SALPINGO OOPHORECTOMY  1990    fibroids   • TUBAL ABDOMINAL LIGATION     • UPPER GASTROINTESTINAL ENDOSCOPY       Family History   Problem Relation Age of Onset   • Obesity Mother    • Rheum arthritis Mother    • Thyroid disease Mother    • Hypertension Mother    • " "Stroke Mother         Most of her problems were caused by Rheumatoid Ar.   • Hyperlipidemia Mother    • Arthritis Mother         Rheumatoid Arthritis..Passed 2004   • Cancer Father         Passed 1997   • Kidney cancer Father    • Liver cancer Father    • No Known Problems Brother    • Diabetes Maternal Aunt    • Cancer Maternal Uncle    • Lung cancer Maternal Uncle    • Alcohol abuse Maternal Uncle    • No Known Problems Paternal Aunt    • Stroke Paternal Uncle    • Hypertension Paternal Uncle    • Hyperlipidemia Paternal Uncle    • Heart disease Paternal Uncle    • No Known Problems Brother    • Asthma Sister         under control   • Alcohol abuse Maternal Uncle    • Colon cancer Neg Hx    • Breast cancer Neg Hx    • Ovarian cancer Neg Hx      Social History   Substance Use Topics   • Smoking status: Former Smoker     Packs/day: 1.00     Years: 40.00     Types: Cigarettes     Start date: 1967     Quit date: 2008   • Smokeless tobacco: Never Used      Comment: \"quit 9 years ago\"   • Alcohol use No       Current Outpatient Prescriptions:   •  Acetylcysteine (N-ACETYL-L-CYSTEINE PO), Take 1 tablet by mouth Daily., Disp: , Rfl:   •  acyclovir (ZOVIRAX) 400 MG tablet, Take 2 tablets by mouth 2 (Two) Times a Day. As needed for herpes outbreak, Disp: 60 tablet, Rfl: 1  •  allopurinol (ZYLOPRIM) 100 MG tablet, Take 2 tablets by mouth Daily., Disp: 180 tablet, Rfl: 1  •  B Complex Vitamins (VITAMIN B COMPLEX PO), Take 1 tablet by mouth Daily., Disp: , Rfl:   •  B-12, Methylcobalamin, 1000 MCG sublingual tablet, , Disp: , Rfl:   •  BIOTIN PO, Take 1 tablet by mouth Daily., Disp: , Rfl:   •  Cholecalciferol (VITAMIN D3) 61140 UNITS tablet, Take 7,500 Units by mouth Daily., Disp: , Rfl:   •  Coenzyme Q10 (CO Q 10 PO), Take 1 tablet by mouth Daily., Disp: , Rfl:   •  Digestive Enzymes (DIGESTIVE ENZYME PO), Take 1 tablet by mouth Daily., Disp: , Rfl:   •  escitalopram (LEXAPRO) 5 MG tablet, Take 1 tablet by mouth Daily., " "Disp: 90 tablet, Rfl: 2  •  ferrous gluconate (FERGON) 324 MG tablet, TAKE TWO TABLETS BY MOUTH DAILY WITH BREAKFAST, Disp: 60 tablet, Rfl: 3  •  GARLIC PO, Take 1 tablet by mouth Daily., Disp: , Rfl:   •  liothyronine (CYTOMEL) 25 MCG tablet, Take 1 tablet by mouth Every 6 (Six) Hours., Disp: 120 tablet, Rfl: 3  •  MAGNESIUM PO, Take 1,000 mg by mouth Every Night., Disp: , Rfl:   •  Methylsulfonylmethane (MSM PO), Take 1 tablet by mouth Daily., Disp: , Rfl:   •  MILK THISTLE PO, Take 1 tablet by mouth Daily., Disp: , Rfl:   •  Misc Natural Products (TART CHERRY ADVANCED PO), Take 2 capsules by mouth Daily., Disp: , Rfl:   •  NP THYROID 60 MG tablet, Take 1 tab in AM and 1/2 tab in afternoon, Disp: 45 tablet, Rfl: 5  •  Omega-3 Fatty Acids (OMEGA-3 FISH OIL PO), Take 4,000 Units by mouth Daily., Disp: , Rfl:   •  Probiotic Product (PROBIOTIC PO), Take 1 tablet by mouth Daily., Disp: , Rfl:   •  Riboflavin (VITAMIN B-2 PO), Take 300 mg by mouth Daily., Disp: , Rfl:   •  SELENIUM PO, Take 1 tablet by mouth Daily., Disp: , Rfl:   •  TURMERIC PO, Take 1 tablet by mouth Daily., Disp: , Rfl:   •  vitamin C (ASCORBIC ACID) 500 MG tablet, Take 500 mg by mouth 3 (Three) Times a Day As Needed., Disp: , Rfl:   •  vitamin E 400 UNIT capsule, Take 400 Units by mouth Daily., Disp: , Rfl:   •  VITAMIN K PO, Take 100 mcg by mouth Daily., Disp: , Rfl:   •  Zinc 30 MG tablet, Take  by mouth Daily., Disp: , Rfl:     Allergies   Allergen Reactions   • Diazepam Rash       Blood pressure 135/46, pulse 79, temperature 97.6 °F (36.4 °C), resp. rate 17, height 160 cm (62.99\"), weight 103 kg (226 lb), not currently breastfeeding.    Physical Exam   Constitutional: She is oriented to person, place, and time. She appears well-developed and well-nourished. No distress.   HENT:   Head: Normocephalic and atraumatic.   Right Ear: Hearing and external ear normal.   Left Ear: Hearing and external ear normal.   Nose: Nose normal.   Mouth/Throat: " Oropharynx is clear and moist and mucous membranes are normal. Mucous membranes are not pale, not dry and not cyanotic. No oral lesions. No oropharyngeal exudate.   Eyes: Conjunctivae and EOM are normal. Right eye exhibits no discharge. Left eye exhibits no discharge. No scleral icterus.   Neck: Trachea normal. Neck supple. No JVD present. No edema present. No thyroid mass and no thyromegaly present.   Cardiovascular: Normal rate, regular rhythm, S2 normal and normal heart sounds.  Exam reveals no gallop, no S3 and no friction rub.    No murmur heard.  Pulmonary/Chest: Effort normal and breath sounds normal. No respiratory distress. She has no wheezes. She has no rales. She exhibits no tenderness.   Abdominal: Soft. Normal appearance and bowel sounds are normal. She exhibits no distension, no ascites and no mass. There is no splenomegaly or hepatomegaly. There is no tenderness. There is no rigidity, no rebound and no guarding. No hernia.   Musculoskeletal: She exhibits no tenderness or deformity.     Vascular Status -  Her right foot exhibits no edema. Her left foot exhibits no edema.  Lymphadenopathy:     She has no cervical adenopathy.        Left: No supraclavicular adenopathy present.   Neurological: She is alert and oriented to person, place, and time. She has normal strength. No cranial nerve deficit or sensory deficit. She exhibits normal muscle tone. Coordination normal.   Skin: No rash noted. She is not diaphoretic. No cyanosis. No pallor. Nails show no clubbing.   Psychiatric: She has a normal mood and affect. Her behavior is normal. Judgment and thought content normal.   Nursing note and vitals reviewed.  Stigmata of chronic liver disease:  None.  Asterixis:  None.    Laboratory Testing:  Upon review of medical records:      Dated March 3, 2016 sodium 141 potassium 4.6 chloride 108 CO2 27 BUN 33 serum creatinine 0.6 and glucose 103.  Calcium 10.0.  Albumin 4.2.  T bili 0.4 AST 28 ALT 29 alkaline  phosphatase 105.  Total cholesterol 121.  Triglycerides 131.     Dated January 12, 2017 serum iron 54.  TIBC 311.  Iron saturation 17%.  Ferritin 314.00.  TSH < 0.004.  Free T4 level 0.73.  Free T3 level 4.2.  Reverse T3 level 6.8.     Dated May 4, 2017 sodium 146 potassium 4.7 chloride 110 CO2 24 BUN 22 serum creatinine 0.70 and glucose 99.  Calcium 9.9.  Albumin 4.30.  T bili 0.5 AST 27 ALT 40 alkaline phosphatase 101.  TSH < 0.015.  Free T4 level 0.68.  CRP 11.55.  Serum iron 54.  TIBC 302.  UIBC 248 iron saturation 18%.  WBC 7.91 hemoglobin 14.0 hematocrit 43.0 MCV 90.5 and platelet count 263.     Dated September 5, 2017 sodium 144 potassium 4.2 chloride 110 CO2 21 BUN 20 serum creatinine 0.60 glucose 99.  Calcium 10.2.  Magnesium 2.1.  Serum iron 36.  TIBC 307.  UIBC 271.  Iron saturation 12%.  Ferritin 253.00.  Vitamin B12 level > 1000.  Hepatitis C antibody negative.  WBC 8.53 hemoglobin 13.9 hematocrit 43.2 MCV 91.3 and platelet count 291.     Dated January 8, 2018 sodium 141 potassium 4.5 chloride 106 CO2 23 BUN 24 serum creatinine 0.60 glucose 86.  Calcium 10.0.  Total protein 7.0.  Albumin 4.40.  T bili 0.4 AST 28 ALT 36 alkaline phosphatase 107.  Serum iron 39.  TIBC 272.  U .  Iron saturation 14%.  Ferritin 185.00.  C-reactive protein elevated at 3.30.  Sedimentation rate elevated at 39.    Dated May 8, 2018 sodium 144 potassium 4.2 chloride 112 CO2 20 BUN 17 serum creatinine 0.60 glucose 95.  Calcium 9.9.  Total protein 7.1.  Albumin 4.10.  T bili 0.2 AST 28 ALT 28 alkaline phosphatase 102.  C-reactive protein elevated at 2.10.  Sedimentation rate 30.  Serum iron 39.  TIBC 281.  Iron saturation 14%.  Ferritin 266.00.  WBC 7.64 hemoglobin 12.8 hematocrit 40.3 MCV 90.4 platelet count 259.    Dated June 5, 2018 sodium 144 potassium 4.0 chloride 112 CO2 17 BUN 22 serum creatinine 0.80 glucose 90.  Calcium 9.7.  Total protein 7.5.  Albumin 4.40.  T bili 0.4 AST 29 ALT 31 alkaline phosphatase 98.  "Magnesium 2.2. WBC 7.47 hemoglobin 13.4 hematocrit 41.8 MCV 91.7 and platelet count 244.       Abdominal Imaging:  Upon Review of medical records:     Dated February 3, 2014 the patient underwent a CT of the abdomen and pelvis with IV contrast which revealed: Clear lung bases.  Postsurgical changes from small bowel resection and re-anastomosis in the left upper quadrant.  There are multiple dilated loops of small bowel consistent with at least a partial obstruction.  There is dilated bowel seen within a ventral hernia with adjacent edema within the mesentery worrisome for incarcerated hernia.  There is no free fluid, free air, or adenopathy.  The solid abdominal organs and abdominal aorta is unremarkable.  The lower pelvis is obscured by streak artifact from hip replacement.  The visualized pelvic GI tract is unremarkable.     Procedure:  Upon review of medical records:     Dated June 5, 2018 the patient underwent a colonoscopy to the terminal ileum which revealed: Diverticular change involving the left colon and right colon.  Scant nonbleeding vascular ectasia involving the right colon.  Somewhat polypoid appearance of the mucosa in the proximal transverse colon close to hepatic flexure.  This area had been previously marked with [place] ink.  Internal hemorrhoids.  Colon, hepatic flexure, near previous marked site, biopsies revealed benign colonic mucosa with no diagnostic abnormality.    Assessment:      ICD-10-CM ICD-9-CM   1. Heartburn R12 787.1   2. Diverticulosis of colon without hemorrhage K57.30 562.10         Patient Instructions   1. Low-fat-moderate fiber diet.  2. Weight reduction.  3. Follow-up colonoscopy is recommended in 5 years.  4. The patient has been counseled regarding use of \"multiple nutritional supplements\".       Rafiq Huang MD  "

## 2018-07-27 ENCOUNTER — TELEPHONE (OUTPATIENT)
Dept: INTERNAL MEDICINE | Facility: CLINIC | Age: 68
End: 2018-07-27

## 2018-07-27 DIAGNOSIS — I10 ESSENTIAL HYPERTENSION: Primary | ICD-10-CM

## 2018-07-27 DIAGNOSIS — I51.89 DIASTOLIC DYSFUNCTION WITHOUT HEART FAILURE: ICD-10-CM

## 2018-07-27 DIAGNOSIS — I51.7 LEFT VENTRICULAR HYPERTROPHY: ICD-10-CM

## 2018-07-30 PROBLEM — I10 ESSENTIAL HYPERTENSION: Status: ACTIVE | Noted: 2018-07-30

## 2018-07-30 PROBLEM — I51.7 LEFT VENTRICULAR HYPERTROPHY: Status: ACTIVE | Noted: 2018-07-30

## 2018-07-30 PROBLEM — I51.89 DIASTOLIC DYSFUNCTION WITHOUT HEART FAILURE: Status: ACTIVE | Noted: 2018-07-30

## 2018-07-30 RX ORDER — LISINOPRIL 10 MG/1
10 TABLET ORAL DAILY
Qty: 30 TABLET | Refills: 2 | Status: SHIPPED | OUTPATIENT
Start: 2018-07-30 | End: 2018-11-12 | Stop reason: SDDI

## 2018-08-10 ENCOUNTER — TRANSCRIBE ORDERS (OUTPATIENT)
Dept: CT IMAGING | Facility: HOSPITAL | Age: 68
End: 2018-08-10

## 2018-08-10 DIAGNOSIS — M25.552 LEFT HIP PAIN: Primary | ICD-10-CM

## 2018-08-15 ENCOUNTER — APPOINTMENT (OUTPATIENT)
Dept: CT IMAGING | Facility: HOSPITAL | Age: 68
End: 2018-08-15
Attending: ORTHOPAEDIC SURGERY

## 2018-08-21 ENCOUNTER — APPOINTMENT (OUTPATIENT)
Dept: LAB | Facility: HOSPITAL | Age: 68
End: 2018-08-21
Attending: ORTHOPAEDIC SURGERY

## 2018-08-21 ENCOUNTER — TRANSCRIBE ORDERS (OUTPATIENT)
Dept: ADMINISTRATIVE | Facility: HOSPITAL | Age: 68
End: 2018-08-21

## 2018-08-21 ENCOUNTER — HOSPITAL ENCOUNTER (OUTPATIENT)
Dept: CT IMAGING | Facility: HOSPITAL | Age: 68
Discharge: HOME | End: 2018-08-21
Attending: ORTHOPAEDIC SURGERY | Admitting: ORTHOPAEDIC SURGERY

## 2018-08-21 DIAGNOSIS — M25.552 PAIN IN LEFT HIP: Primary | ICD-10-CM

## 2018-08-21 DIAGNOSIS — M25.552 LEFT HIP PAIN: ICD-10-CM

## 2018-08-21 LAB
BASOPHILS # BLD AUTO: 0.04 10*3/MM3 (ref 0–0.2)
BASOPHILS NFR BLD AUTO: 0.4 % (ref 0–2.5)
CRP SERPL-MCNC: 1.9 MG/DL (ref 0–1)
DEPRECATED RDW RBC AUTO: 42.1 FL (ref 37–54)
EOSINOPHIL # BLD AUTO: 0.17 10*3/MM3 (ref 0–0.7)
EOSINOPHIL NFR BLD AUTO: 1.7 % (ref 0–7)
ERYTHROCYTE [DISTWIDTH] IN BLOOD BY AUTOMATED COUNT: 12.4 % (ref 11.5–14.5)
ERYTHROCYTE [SEDIMENTATION RATE] IN BLOOD: 27 MM/HR (ref 0–20)
HCT VFR BLD AUTO: 43.9 % (ref 37–47)
HGB BLD-MCNC: 13.7 G/DL (ref 12–16)
IMM GRANULOCYTES # BLD: 0.05 10*3/MM3 (ref 0–0.06)
IMM GRANULOCYTES NFR BLD: 0.5 % (ref 0–0.6)
LYMPHOCYTES # BLD AUTO: 2.46 10*3/MM3 (ref 0.6–3.4)
LYMPHOCYTES NFR BLD AUTO: 25.3 % (ref 10–50)
MCH RBC QN AUTO: 28.8 PG (ref 27–31)
MCHC RBC AUTO-ENTMCNC: 31.2 G/DL (ref 30–37)
MCV RBC AUTO: 92.4 FL (ref 81–99)
MONOCYTES # BLD AUTO: 0.68 10*3/MM3 (ref 0–0.9)
MONOCYTES NFR BLD AUTO: 7 % (ref 0–12)
NEUTROPHILS # BLD AUTO: 6.32 10*3/MM3 (ref 2–6.9)
NEUTROPHILS NFR BLD AUTO: 65.1 % (ref 37–80)
NRBC BLD MANUAL-RTO: 0 /100 WBC (ref 0–0)
PLATELET # BLD AUTO: 300 10*3/MM3 (ref 130–400)
PMV BLD AUTO: 9.6 FL (ref 6–12)
RBC # BLD AUTO: 4.75 10*6/MM3 (ref 4.2–5.4)
WBC NRBC COR # BLD: 9.72 10*3/MM3 (ref 4.8–10.8)

## 2018-08-21 PROCEDURE — 86140 C-REACTIVE PROTEIN: CPT | Performed by: ORTHOPAEDIC SURGERY

## 2018-08-21 PROCEDURE — 85651 RBC SED RATE NONAUTOMATED: CPT | Performed by: ORTHOPAEDIC SURGERY

## 2018-08-21 PROCEDURE — 83018 HEAVY METAL QUAN EACH NES: CPT | Performed by: ORTHOPAEDIC SURGERY

## 2018-08-21 PROCEDURE — 36415 COLL VENOUS BLD VENIPUNCTURE: CPT | Performed by: ORTHOPAEDIC SURGERY

## 2018-08-21 PROCEDURE — 82495 ASSAY OF CHROMIUM: CPT | Performed by: ORTHOPAEDIC SURGERY

## 2018-08-21 PROCEDURE — 85025 COMPLETE CBC W/AUTO DIFF WBC: CPT | Performed by: ORTHOPAEDIC SURGERY

## 2018-08-21 PROCEDURE — 73700 CT LOWER EXTREMITY W/O DYE: CPT

## 2018-08-22 LAB
COBALT SERPL-MCNC: 5.8 UG/L (ref 0–0.9)
CR SERPL-MCNC: 1.1 UG/L (ref 0.1–2.1)

## 2018-11-06 ENCOUNTER — TRANSCRIBE ORDERS (OUTPATIENT)
Dept: GENERAL RADIOLOGY | Facility: HOSPITAL | Age: 68
End: 2018-11-06

## 2018-11-06 DIAGNOSIS — T84.031A MECHANICAL LOOSENING OF INTERNAL LEFT HIP PROSTHETIC JOINT, INITIAL ENCOUNTER (HCC): Primary | ICD-10-CM

## 2018-11-12 ENCOUNTER — OFFICE VISIT (OUTPATIENT)
Dept: INTERNAL MEDICINE | Facility: CLINIC | Age: 68
End: 2018-11-12

## 2018-11-12 VITALS
SYSTOLIC BLOOD PRESSURE: 132 MMHG | BODY MASS INDEX: 40.18 KG/M2 | HEART RATE: 95 BPM | WEIGHT: 226.75 LBS | RESPIRATION RATE: 17 BRPM | DIASTOLIC BLOOD PRESSURE: 55 MMHG | TEMPERATURE: 98.7 F | OXYGEN SATURATION: 96 %

## 2018-11-12 DIAGNOSIS — F33.0 MILD EPISODE OF RECURRENT MAJOR DEPRESSIVE DISORDER (HCC): ICD-10-CM

## 2018-11-12 DIAGNOSIS — E78.2 MIXED HYPERLIPIDEMIA: Primary | ICD-10-CM

## 2018-11-12 DIAGNOSIS — R79.89 ABNORMAL THYROID BLOOD TEST: ICD-10-CM

## 2018-11-12 DIAGNOSIS — D50.9 IRON DEFICIENCY ANEMIA, UNSPECIFIED IRON DEFICIENCY ANEMIA TYPE: ICD-10-CM

## 2018-11-12 PROCEDURE — 99214 OFFICE O/P EST MOD 30 MIN: CPT | Performed by: INTERNAL MEDICINE

## 2018-11-12 RX ORDER — ALLOPURINOL 100 MG/1
200 TABLET ORAL DAILY
Qty: 180 TABLET | Refills: 3 | Status: SHIPPED | OUTPATIENT
Start: 2018-11-12 | End: 2020-02-18 | Stop reason: SDUPTHER

## 2018-11-12 RX ORDER — ESCITALOPRAM OXALATE 5 MG/1
5 TABLET ORAL DAILY
Qty: 90 TABLET | Refills: 3 | Status: SHIPPED | OUTPATIENT
Start: 2018-11-12 | End: 2020-02-18 | Stop reason: SDUPTHER

## 2018-11-12 NOTE — PROGRESS NOTES
Subjective   Salinas Monroy is a 68 y.o. female.     Chief Complaint   Patient presents with   • Establish Care   • Thyroid Problem   • Hyperlipidemia   • Anemia   • Depression       History of Present Illness patient is here for an initial visit  And was a patient of dr maria who has now left this practice. Patient states that she has self diagnosed hashimotos  Nearly 20 years ago But had antibodies done at [facility] - pcp , and after 'years of research' she is on cytomel 25 mcg - 4 pills daily and np thyroid 60mg  - takes one and  Half tab daily . Patient has never seen a specialist - endocrinologist - she states she has never been to one as she states the specialist may not agree with her assessment and management of her disease process the way she does it , she doesn't want to take leothyroxine ,   She has never had thyroid usg   She is also here to follow on bipolar disorder and currently only takes lexapro 5mg qd for it, she also wants to take lactoferrin for her low ferritin levels , I have informed the patient ferritin levels done may 208 were too high and to stop iron tablets at this time, she insists that she is yet anemic and her last labs have been reviewed with her today  she declines a  Flu shot , she has informed me that I need to order the following labs for her : t3, t4 , reverse t3, thyroid antibodies, she also states she has chronic inflammation and would need ;crp and esr  And ferritin levels ordered , I have informed her given abnormal thyroid status , I am unable to take care if it and  Prescribe medications and obtain labs, I have advised the patient she will need to seen the endocrinologist for her thyroid issues and it is beyond my expertise that this time , she has currently agreed with this plan    Review of Systems   Constitutional: Negative for appetite change, fatigue and fever.   HENT: Negative for congestion, ear discharge, ear pain, sinus pressure and sore throat.   "  Eyes: Negative for pain and discharge.   Respiratory: Negative for cough, chest tightness, shortness of breath and wheezing.    Cardiovascular: Negative for chest pain, palpitations and leg swelling.   Gastrointestinal: Negative for abdominal pain, blood in stool, constipation, diarrhea and nausea.   Endocrine: Negative for cold intolerance and heat intolerance.   Genitourinary: Negative for dysuria, flank pain and frequency.   Musculoskeletal: Negative for back pain and joint swelling.   Skin: Negative for color change.   Allergic/Immunologic: Negative for environmental allergies and food allergies.   Neurological: Negative for dizziness, weakness, numbness and headaches.   Hematological: Negative for adenopathy. Does not bruise/bleed easily.   Psychiatric/Behavioral: Positive for dysphoric mood. Negative for behavioral problems. The patient is not nervous/anxious.        Past Medical History:   Diagnosis Date   • Anemia ?    under control   • Arthritis    • Depression    • Diverticulosis    • Essential hypertension 7/30/2018   • GERD (gastroesophageal reflux disease)     no longer bothers me...   • Gout    • Gout    • Hashimoto's disease    • Hernia 3-2012    repair surgery which failed in 2014   • Hypothyroid    • Impingement syndrome of right shoulder 5/8/2016   • PONV (postoperative nausea and vomiting)     Pt states \"only one time\".   • Tremor     worse on left arm/hand   • Wears glasses        Past Surgical History:   Procedure Laterality Date   • ABDOMINAL SURGERY     • APPENDECTOMY     • COLONOSCOPY  2012    failed due to hernia...   • GASTRIC BYPASS  1978   • HERNIA MESH REMOVAL  2014    BOWEL OBSTRUCTION DUE TO MESH   • HERNIA REPAIR  2012   • HERNIA REPAIR     • HYSTERECTOMY     • JOINT REPLACEMENT  2009 & 2012    Left & Right Full Hip Replacements   • SMALL INTESTINE SURGERY  2014    DUE TO BOWEL OBSTRUCTION WITH SOME REMOVAL   • TOTAL ABDOMINAL HYSTERECTOMY WITH SALPINGO OOPHORECTOMY  1990    " fibroids   • TUBAL ABDOMINAL LIGATION     • UPPER GASTROINTESTINAL ENDOSCOPY         Family History   Problem Relation Age of Onset   • Obesity Mother    • Rheum arthritis Mother    • Thyroid disease Mother    • Hypertension Mother    • Stroke Mother         Most of her problems were caused by Rheumatoid Ar.   • Hyperlipidemia Mother    • Arthritis Mother         Rheumatoid Arthritis..Passed 2004   • Cancer Father         Passed 1997   • Kidney cancer Father    • Liver cancer Father    • No Known Problems Brother    • Diabetes Maternal Aunt    • Cancer Maternal Uncle    • Lung cancer Maternal Uncle    • Alcohol abuse Maternal Uncle    • No Known Problems Paternal Aunt    • Stroke Paternal Uncle    • Hypertension Paternal Uncle    • Hyperlipidemia Paternal Uncle    • Heart disease Paternal Uncle    • No Known Problems Brother    • Asthma Sister         under control   • Alcohol abuse Maternal Uncle    • Colon cancer Neg Hx    • Breast cancer Neg Hx    • Ovarian cancer Neg Hx         reports that she quit smoking about 10 years ago. Her smoking use included cigarettes. She started smoking about 51 years ago. She has a 40.00 pack-year smoking history. she has never used smokeless tobacco. She reports that she does not drink alcohol or use drugs.    Allergies   Allergen Reactions   • Diazepam Rash           Current Outpatient Medications:   •  Acetylcysteine (N-ACETYL-L-CYSTEINE PO), Take 1 tablet by mouth Daily., Disp: , Rfl:   •  acyclovir (ZOVIRAX) 400 MG tablet, Take 2 tablets by mouth 2 (Two) Times a Day. As needed for herpes outbreak, Disp: 60 tablet, Rfl: 1  •  allopurinol (ZYLOPRIM) 100 MG tablet, Take 2 tablets by mouth Daily., Disp: 180 tablet, Rfl: 3  •  B Complex Vitamins (VITAMIN B COMPLEX PO), Take 1 tablet by mouth Daily., Disp: , Rfl:   •  B-12, Methylcobalamin, 1000 MCG sublingual tablet, , Disp: , Rfl:   •  BIOTIN PO, Take 1 tablet by mouth Daily., Disp: , Rfl:   •  Cholecalciferol (VITAMIN D3) 95742  UNITS tablet, Take 7,500 Units by mouth Daily., Disp: , Rfl:   •  Coenzyme Q10 (CO Q 10 PO), Take 1 tablet by mouth Daily., Disp: , Rfl:   •  Digestive Enzymes (DIGESTIVE ENZYME PO), Take 1 tablet by mouth Daily., Disp: , Rfl:   •  escitalopram (LEXAPRO) 5 MG tablet, Take 1 tablet by mouth Daily., Disp: 90 tablet, Rfl: 3  •  ferrous gluconate (FERGON) 324 MG tablet, TAKE TWO TABLETS BY MOUTH DAILY WITH BREAKFAST, Disp: 60 tablet, Rfl: 3  •  GARLIC PO, Take 1 tablet by mouth Daily., Disp: , Rfl:   •  liothyronine (CYTOMEL) 25 MCG tablet, Take 1 tablet by mouth Every 6 (Six) Hours., Disp: 120 tablet, Rfl: 3  •  MAGNESIUM PO, Take 1,000 mg by mouth Every Night., Disp: , Rfl:   •  Methylsulfonylmethane (MSM PO), Take 1 tablet by mouth Daily., Disp: , Rfl:   •  MILK THISTLE PO, Take 1 tablet by mouth Daily., Disp: , Rfl:   •  Misc Natural Products (TART CHERRY ADVANCED PO), Take 2 capsules by mouth Daily., Disp: , Rfl:   •  NP THYROID 60 MG tablet, Take 1 tab in AM and 1/2 tab in afternoon, Disp: 45 tablet, Rfl: 5  •  Omega-3 Fatty Acids (OMEGA-3 FISH OIL PO), Take 4,000 Units by mouth Daily., Disp: , Rfl:   •  Probiotic Product (PROBIOTIC PO), Take 1 tablet by mouth Daily., Disp: , Rfl:   •  Riboflavin (VITAMIN B-2 PO), Take 300 mg by mouth Daily., Disp: , Rfl:   •  SELENIUM PO, Take 1 tablet by mouth Daily., Disp: , Rfl:   •  TURMERIC PO, Take 1 tablet by mouth Daily., Disp: , Rfl:   •  vitamin C (ASCORBIC ACID) 500 MG tablet, Take 500 mg by mouth 3 (Three) Times a Day As Needed., Disp: , Rfl:   •  vitamin E 400 UNIT capsule, Take 400 Units by mouth Daily., Disp: , Rfl:   •  VITAMIN K PO, Take 100 mcg by mouth Daily., Disp: , Rfl:   •  Zinc 30 MG tablet, Take  by mouth Daily., Disp: , Rfl:       Objective   Blood pressure 132/55, pulse 95, temperature 98.7 °F (37.1 °C), temperature source Temporal, resp. rate 17, weight 103 kg (226 lb 12 oz), SpO2 96 %, not currently breastfeeding.    Physical Exam   Constitutional:  She is oriented to person, place, and time. She appears well-developed and well-nourished. No distress.   HENT:   Head: Normocephalic and atraumatic.   Right Ear: External ear normal.   Left Ear: External ear normal.   Nose: Nose normal.   Mouth/Throat: Oropharynx is clear and moist.   Eyes: Conjunctivae and EOM are normal. Pupils are equal, round, and reactive to light.   Neck: Neck supple. No thyromegaly present.   Cardiovascular: Normal rate, regular rhythm and normal heart sounds.   Pulmonary/Chest: Effort normal and breath sounds normal. No respiratory distress.   Abdominal: Soft. Bowel sounds are normal. She exhibits no distension. There is no tenderness. There is no rebound.   Musculoskeletal: Normal range of motion. She exhibits no edema.   Lymphadenopathy:     She has no cervical adenopathy.   Neurological: She is alert and oriented to person, place, and time.   No gross motor or sensory deficits   Skin: Skin is warm. She is not diaphoretic.   Psychiatric: She has a normal mood and affect.   Nursing note and vitals reviewed.        Results for orders placed or performed in visit on 08/21/18   Sedimentation Rate   Result Value Ref Range    Sed Rate 27 (H) 0 - 20 mm/hr   C-reactive Protein   Result Value Ref Range    C-Reactive Protein 1.90 (H) 0.00 - 1.00 mg/dL   Chromium Level   Result Value Ref Range    Chromium, Plasma 1.1 0.1 - 2.1 ug/L   Cobalt   Result Value Ref Range    Cobalt 5.8 (H) 0.0 - 0.9 ug/L   CBC Auto Differential   Result Value Ref Range    WBC 9.72 4.80 - 10.80 10*3/mm3    RBC 4.75 4.20 - 5.40 10*6/mm3    Hemoglobin 13.7 12.0 - 16.0 g/dL    Hematocrit 43.9 37.0 - 47.0 %    MCV 92.4 81.0 - 99.0 fL    MCH 28.8 27.0 - 31.0 pg    MCHC 31.2 30.0 - 37.0 g/dL    RDW 12.4 11.5 - 14.5 %    RDW-SD 42.1 37.0 - 54.0 fl    MPV 9.6 6.0 - 12.0 fL    Platelets 300 130 - 400 10*3/mm3    Neutrophil % 65.1 37.0 - 80.0 %    Lymphocyte % 25.3 10.0 - 50.0 %    Monocyte % 7.0 0.0 - 12.0 %    Eosinophil % 1.7 0.0  - 7.0 %    Basophil % 0.4 0.0 - 2.5 %    Immature Grans % 0.5 0.0 - 0.6 %    Neutrophils, Absolute 6.32 2.00 - 6.90 10*3/mm3    Lymphocytes, Absolute 2.46 0.60 - 3.40 10*3/mm3    Monocytes, Absolute 0.68 0.00 - 0.90 10*3/mm3    Eosinophils, Absolute 0.17 0.00 - 0.70 10*3/mm3    Basophils, Absolute 0.04 0.00 - 0.20 10*3/mm3    Immature Grans, Absolute 0.05 0.00 - 0.06 10*3/mm3    nRBC 0.0 0.0 - 0.0 /100 WBC         Assessment/Plan   Salinas was seen today for establish care, thyroid problem, hyperlipidemia, anemia and depression.    Diagnoses and all orders for this visit:    Mixed hyperlipidemia  -     CBC & Differential  -     Comprehensive Metabolic Panel  -     Lipid Panel    Abnormal thyroid blood test  -     TSH  -     Ambulatory Referral to Endocrinology    Mild episode of recurrent major depressive disorder (CMS/HCC)    Iron deficiency anemia, unspecified iron deficiency anemia type  -     Ferritin    Other orders  -     allopurinol (ZYLOPRIM) 100 MG tablet; Take 2 tablets by mouth Daily.  -     escitalopram (LEXAPRO) 5 MG tablet; Take 1 tablet by mouth Daily.      Plan:  1.  mixed hyperlipidemia: will obtain   fasting CMP and lipid panel.  Diet and excise counseled,  Will continue current medications  2. Abnormal thyroid test : will obtain tsh , and  I have sent a new prescription for her current medications until she sees endocrinology and she has been informed that her thyroid issues will need to be take care of by the specialist as I am unable to do the same  3. Major depression : refilled lexapro ,   Get labs  4. Anemia : will get labs             Romy Prince MD

## 2018-11-13 ENCOUNTER — LAB REQUISITION (OUTPATIENT)
Dept: LAB | Facility: HOSPITAL | Age: 68
End: 2018-11-13

## 2018-11-13 ENCOUNTER — HOSPITAL ENCOUNTER (OUTPATIENT)
Dept: GENERAL RADIOLOGY | Facility: HOSPITAL | Age: 68
Discharge: HOME | End: 2018-11-13
Attending: ORTHOPAEDIC SURGERY | Admitting: ORTHOPAEDIC SURGERY

## 2018-11-13 DIAGNOSIS — T84.031A MECHANICAL LOOSENING OF INTERNAL LEFT HIP PROSTHETIC JOINT (HCC): ICD-10-CM

## 2018-11-13 DIAGNOSIS — T84.031A MECHANICAL LOOSENING OF INTERNAL LEFT HIP PROSTHETIC JOINT, INITIAL ENCOUNTER (HCC): ICD-10-CM

## 2018-11-13 LAB
APPEARANCE FLD: ABNORMAL
COLOR FLD: COLORLESS
LYMPHOCYTES NFR FLD MANUAL: 83 %
NEUTROPHILS NFR FLD MANUAL: 17 %
RBC # FLD AUTO: 2000 /MM3 (ref 0–200000)
WBC # FLD: 51 /MM3 (ref 0–1000)

## 2018-11-13 PROCEDURE — 87205 SMEAR GRAM STAIN: CPT | Performed by: ORTHOPAEDIC SURGERY

## 2018-11-13 PROCEDURE — 77002 NEEDLE LOCALIZATION BY XRAY: CPT

## 2018-11-13 PROCEDURE — 89051 BODY FLUID CELL COUNT: CPT | Performed by: ORTHOPAEDIC SURGERY

## 2018-11-13 RX ORDER — LIDOCAINE HYDROCHLORIDE 10 MG/ML
10 INJECTION, SOLUTION INFILTRATION; PERINEURAL ONCE
Status: COMPLETED | OUTPATIENT
Start: 2018-11-13 | End: 2018-11-13

## 2018-11-13 RX ADMIN — LIDOCAINE HYDROCHLORIDE 10 ML: 10 INJECTION, SOLUTION INFILTRATION; PERINEURAL at 13:29

## 2018-11-15 ENCOUNTER — HOSPITAL ENCOUNTER (EMERGENCY)
Facility: HOSPITAL | Age: 68
Discharge: HOME | End: 2018-11-15
Attending: EMERGENCY MEDICINE | Admitting: EMERGENCY MEDICINE

## 2018-11-15 ENCOUNTER — APPOINTMENT (OUTPATIENT)
Dept: CT IMAGING | Facility: HOSPITAL | Age: 68
End: 2018-11-15

## 2018-11-15 VITALS
HEIGHT: 63 IN | SYSTOLIC BLOOD PRESSURE: 136 MMHG | OXYGEN SATURATION: 98 % | WEIGHT: 215 LBS | RESPIRATION RATE: 18 BRPM | BODY MASS INDEX: 38.09 KG/M2 | HEART RATE: 81 BPM | TEMPERATURE: 98.5 F | DIASTOLIC BLOOD PRESSURE: 53 MMHG

## 2018-11-15 DIAGNOSIS — R11.2 NON-INTRACTABLE VOMITING WITH NAUSEA, UNSPECIFIED VOMITING TYPE: ICD-10-CM

## 2018-11-15 DIAGNOSIS — R10.9 ABDOMINAL PAIN, UNSPECIFIED ABDOMINAL LOCATION: Primary | ICD-10-CM

## 2018-11-15 LAB
ALBUMIN SERPL-MCNC: 4.4 G/DL (ref 3.5–5)
ALBUMIN/GLOB SERPL: 1.5 G/DL (ref 1–2)
ALP SERPL-CCNC: 95 U/L (ref 38–126)
ALT SERPL W P-5'-P-CCNC: 41 U/L (ref 13–69)
ANION GAP SERPL CALCULATED.3IONS-SCNC: 7.6 MMOL/L (ref 10–20)
AST SERPL-CCNC: 30 U/L (ref 15–46)
BASOPHILS # BLD AUTO: 0.03 10*3/MM3 (ref 0–0.2)
BASOPHILS NFR BLD AUTO: 0.4 % (ref 0–2.5)
BILIRUB SERPL-MCNC: 0.5 MG/DL (ref 0.2–1.3)
BUN BLD-MCNC: 36 MG/DL (ref 7–20)
BUN/CREAT SERPL: 40 (ref 7.1–23.5)
CALCIUM SPEC-SCNC: 10.5 MG/DL (ref 8.4–10.2)
CHLORIDE SERPL-SCNC: 112 MMOL/L (ref 98–107)
CO2 SERPL-SCNC: 25 MMOL/L (ref 26–30)
CREAT BLD-MCNC: 0.9 MG/DL (ref 0.6–1.3)
D-LACTATE SERPL-SCNC: 1 MMOL/L (ref 0.5–2)
DEPRECATED RDW RBC AUTO: 43.8 FL (ref 37–54)
EOSINOPHIL # BLD AUTO: 0.2 10*3/MM3 (ref 0–0.7)
EOSINOPHIL NFR BLD AUTO: 2.4 % (ref 0–7)
ERYTHROCYTE [DISTWIDTH] IN BLOOD BY AUTOMATED COUNT: 13 % (ref 11.5–14.5)
GFR SERPL CREATININE-BSD FRML MDRD: 62 ML/MIN/1.73
GLOBULIN UR ELPH-MCNC: 3 GM/DL
GLUCOSE BLD-MCNC: 99 MG/DL (ref 74–98)
HCT VFR BLD AUTO: 41.5 % (ref 37–47)
HGB BLD-MCNC: 13 G/DL (ref 12–16)
HOLD SPECIMEN: NORMAL
HOLD SPECIMEN: NORMAL
IMM GRANULOCYTES # BLD: 0.03 10*3/MM3 (ref 0–0.06)
IMM GRANULOCYTES NFR BLD: 0.4 % (ref 0–0.6)
LIPASE SERPL-CCNC: 192 U/L (ref 23–300)
LYMPHOCYTES # BLD AUTO: 1.46 10*3/MM3 (ref 0.6–3.4)
LYMPHOCYTES NFR BLD AUTO: 17.3 % (ref 10–50)
MCH RBC QN AUTO: 29 PG (ref 27–31)
MCHC RBC AUTO-ENTMCNC: 31.3 G/DL (ref 30–37)
MCV RBC AUTO: 92.6 FL (ref 81–99)
MONOCYTES # BLD AUTO: 0.54 10*3/MM3 (ref 0–0.9)
MONOCYTES NFR BLD AUTO: 6.4 % (ref 0–12)
NEUTROPHILS # BLD AUTO: 6.16 10*3/MM3 (ref 2–6.9)
NEUTROPHILS NFR BLD AUTO: 73.1 % (ref 37–80)
NRBC BLD MANUAL-RTO: 0 /100 WBC (ref 0–0)
PLATELET # BLD AUTO: 250 10*3/MM3 (ref 130–400)
PMV BLD AUTO: 9.7 FL (ref 6–12)
POTASSIUM BLD-SCNC: 4.6 MMOL/L (ref 3.5–5.1)
PROT SERPL-MCNC: 7.4 G/DL (ref 6.3–8.2)
RBC # BLD AUTO: 4.48 10*6/MM3 (ref 4.2–5.4)
SODIUM BLD-SCNC: 140 MMOL/L (ref 137–145)
WBC NRBC COR # BLD: 8.42 10*3/MM3 (ref 4.8–10.8)
WHOLE BLOOD HOLD SPECIMEN: NORMAL
WHOLE BLOOD HOLD SPECIMEN: NORMAL

## 2018-11-15 PROCEDURE — 96374 THER/PROPH/DIAG INJ IV PUSH: CPT

## 2018-11-15 PROCEDURE — 25010000002 ONDANSETRON PER 1 MG: Performed by: EMERGENCY MEDICINE

## 2018-11-15 PROCEDURE — 85025 COMPLETE CBC W/AUTO DIFF WBC: CPT | Performed by: EMERGENCY MEDICINE

## 2018-11-15 PROCEDURE — 83605 ASSAY OF LACTIC ACID: CPT | Performed by: EMERGENCY MEDICINE

## 2018-11-15 PROCEDURE — 83690 ASSAY OF LIPASE: CPT | Performed by: EMERGENCY MEDICINE

## 2018-11-15 PROCEDURE — 74176 CT ABD & PELVIS W/O CONTRAST: CPT

## 2018-11-15 PROCEDURE — 96361 HYDRATE IV INFUSION ADD-ON: CPT

## 2018-11-15 PROCEDURE — 80053 COMPREHEN METABOLIC PANEL: CPT | Performed by: EMERGENCY MEDICINE

## 2018-11-15 PROCEDURE — 99284 EMERGENCY DEPT VISIT MOD MDM: CPT

## 2018-11-15 RX ORDER — ONDANSETRON 2 MG/ML
4 INJECTION INTRAMUSCULAR; INTRAVENOUS ONCE
Status: COMPLETED | OUTPATIENT
Start: 2018-11-15 | End: 2018-11-15

## 2018-11-15 RX ORDER — SODIUM CHLORIDE 0.9 % (FLUSH) 0.9 %
10 SYRINGE (ML) INJECTION AS NEEDED
Status: DISCONTINUED | OUTPATIENT
Start: 2018-11-15 | End: 2018-11-16 | Stop reason: HOSPADM

## 2018-11-15 RX ADMIN — SODIUM CHLORIDE 1000 ML: 9 INJECTION, SOLUTION INTRAVENOUS at 20:42

## 2018-11-15 RX ADMIN — ONDANSETRON 4 MG: 2 INJECTION INTRAMUSCULAR; INTRAVENOUS at 20:43

## 2018-11-16 NOTE — ED PROVIDER NOTES
"Subjective   68-year-old female presents to the ED with a chief complaint of abdominal discomfort.  She states that she had an episode of abdominal discomfort earlier today that lasted approximately 2 hours.  She states that it was relieved with an episode of vomiting.  She states that she is slightly nauseous but states that it is improved as well.  She denies diarrhea or constipation.  No fever or chills.  No chest pain or shortness of breath.  No prior treatments or alleviating factors.  No other complaints at this time.            Review of Systems   Gastrointestinal: Positive for abdominal pain, nausea and vomiting. Negative for constipation and diarrhea.   All other systems reviewed and are negative.      Past Medical History:   Diagnosis Date   • Anemia ?    under control   • Arthritis    • Depression    • Diverticulosis    • Essential hypertension 7/30/2018   • GERD (gastroesophageal reflux disease)     no longer bothers me...   • Gout    • Gout    • Hashimoto's disease    • Hernia 3-2012    repair surgery which failed in 2014   • Hypothyroid    • Impingement syndrome of right shoulder 5/8/2016   • PONV (postoperative nausea and vomiting)     Pt states \"only one time\".   • Tremor     worse on left arm/hand   • Wears glasses        Allergies   Allergen Reactions   • Diazepam Rash       Past Surgical History:   Procedure Laterality Date   • ABDOMINAL SURGERY     • APPENDECTOMY     • COLONOSCOPY  2012    failed due to hernia...   • GASTRIC BYPASS  1978   • HERNIA MESH REMOVAL  2014    BOWEL OBSTRUCTION DUE TO MESH   • HERNIA REPAIR  2012   • HERNIA REPAIR     • HYSTERECTOMY     • JOINT REPLACEMENT  2009 & 2012    Left & Right Full Hip Replacements   • SMALL INTESTINE SURGERY  2014    DUE TO BOWEL OBSTRUCTION WITH SOME REMOVAL   • TOTAL ABDOMINAL HYSTERECTOMY WITH SALPINGO OOPHORECTOMY  1990    fibroids   • TUBAL ABDOMINAL LIGATION     • UPPER GASTROINTESTINAL ENDOSCOPY         Family History   Problem Relation " "Age of Onset   • Obesity Mother    • Rheum arthritis Mother    • Thyroid disease Mother    • Hypertension Mother    • Stroke Mother         Most of her problems were caused by Rheumatoid Ar.   • Hyperlipidemia Mother    • Arthritis Mother         Rheumatoid Arthritis..Passed 2004   • Cancer Father         Passed 1997   • Kidney cancer Father    • Liver cancer Father    • No Known Problems Brother    • Diabetes Maternal Aunt    • Cancer Maternal Uncle    • Lung cancer Maternal Uncle    • Alcohol abuse Maternal Uncle    • No Known Problems Paternal Aunt    • Stroke Paternal Uncle    • Hypertension Paternal Uncle    • Hyperlipidemia Paternal Uncle    • Heart disease Paternal Uncle    • No Known Problems Brother    • Asthma Sister         under control   • Alcohol abuse Maternal Uncle    • Colon cancer Neg Hx    • Breast cancer Neg Hx    • Ovarian cancer Neg Hx        Social History     Socioeconomic History   • Marital status:      Spouse name: Not on file   • Number of children: Not on file   • Years of education: Not on file   • Highest education level: Not on file   Tobacco Use   • Smoking status: Former Smoker     Packs/day: 1.00     Years: 40.00     Pack years: 40.00     Types: Cigarettes     Start date: 1967     Last attempt to quit: 2008     Years since quitting: 10.8   • Smokeless tobacco: Never Used   • Tobacco comment: \"quit 9 years ago\"   Substance and Sexual Activity   • Alcohol use: No   • Drug use: No   • Sexual activity: No           Objective   Physical Exam   Constitutional: She is oriented to person, place, and time. No distress.   Chronically ill appearing.  No acute distress.   HENT:   Head: Normocephalic and atraumatic.   Nose: Nose normal.   Eyes: Conjunctivae and EOM are normal.   Cardiovascular: Normal rate and regular rhythm.   Pulmonary/Chest: Effort normal and breath sounds normal. No respiratory distress.   Abdominal: Soft. She exhibits no distension. There is no tenderness. There is " no guarding.   Obese  Nontender.   Musculoskeletal: She exhibits no edema or deformity.   Neurological: She is alert and oriented to person, place, and time. No cranial nerve deficit.   Skin: She is not diaphoretic.   Nursing note and vitals reviewed.      Procedures           ED Course      Nonspecific abdominal pain.  Treated symptomatically with IV fluid rehydration and antiemetics.  Laboratory results unremarkable.  Physical exam shows abdomen is soft nontender nondistended.  CT abdomen and pelvis negative for acute intra-abdominal process.  Patient will be discharged follow-up as needed.            MDM      Final diagnoses:   Abdominal pain, unspecified abdominal location   Non-intractable vomiting with nausea, unspecified vomiting type            Sukhi Medrano, DO  11/16/18 [id number]

## 2018-11-27 LAB
GRAM STN SPEC: NORMAL
GRAM STN SPEC: NORMAL

## 2018-12-03 LAB
ALBUMIN SERPL-MCNC: 4.7 G/DL (ref 3.5–5)
ALBUMIN/GLOB SERPL: 1.7 G/DL (ref 1–2)
ALP SERPL-CCNC: 111 U/L (ref 38–126)
ALT SERPL-CCNC: 27 U/L (ref 13–69)
AST SERPL-CCNC: 26 U/L (ref 15–46)
BASOPHILS # BLD AUTO: 0.05 10*3/MM3 (ref 0–0.2)
BASOPHILS NFR BLD AUTO: 0.6 % (ref 0–2.5)
BILIRUB SERPL-MCNC: 0.5 MG/DL (ref 0.2–1.3)
BUN SERPL-MCNC: 41 MG/DL (ref 7–20)
BUN/CREAT SERPL: 45.6 (ref 7.1–23.5)
CALCIUM SERPL-MCNC: 10.7 MG/DL (ref 8.4–10.2)
CHLORIDE SERPL-SCNC: 107 MMOL/L (ref 98–107)
CHOLEST SERPL-MCNC: 151 MG/DL (ref 0–199)
CO2 SERPL-SCNC: 22 MMOL/L (ref 26–30)
CREAT SERPL-MCNC: 0.9 MG/DL (ref 0.6–1.3)
EOSINOPHIL # BLD AUTO: 0.21 10*3/MM3 (ref 0–0.7)
EOSINOPHIL NFR BLD AUTO: 2.5 % (ref 0–7)
ERYTHROCYTE [DISTWIDTH] IN BLOOD BY AUTOMATED COUNT: 12.9 % (ref 11.5–14.5)
FERRITIN SERPL-MCNC: 373 NG/ML (ref 11.1–264)
GLOBULIN SER CALC-MCNC: 2.8 GM/DL
GLUCOSE SERPL-MCNC: 94 MG/DL (ref 74–98)
HCT VFR BLD AUTO: 44.6 % (ref 37–47)
HDLC SERPL-MCNC: 44 MG/DL (ref 40–60)
HGB BLD-MCNC: 13.9 G/DL (ref 12–16)
IMM GRANULOCYTES # BLD: 0.02 10*3/MM3 (ref 0–0.06)
IMM GRANULOCYTES NFR BLD: 0.2 % (ref 0–0.6)
LDLC SERPL CALC-MCNC: 80 MG/DL (ref 0–99)
LYMPHOCYTES # BLD AUTO: 1.76 10*3/MM3 (ref 0.6–3.4)
LYMPHOCYTES NFR BLD AUTO: 21.1 % (ref 10–50)
MCH RBC QN AUTO: 28.8 PG (ref 27–31)
MCHC RBC AUTO-ENTMCNC: 31.2 G/DL (ref 30–37)
MCV RBC AUTO: 92.3 FL (ref 81–99)
MONOCYTES # BLD AUTO: 0.61 10*3/MM3 (ref 0–0.9)
MONOCYTES NFR BLD AUTO: 7.3 % (ref 0–12)
NEUTROPHILS # BLD AUTO: 5.68 10*3/MM3 (ref 2–6.9)
NEUTROPHILS NFR BLD AUTO: 68.3 % (ref 37–80)
NRBC BLD AUTO-RTO: 0 /100 WBC (ref 0–0)
PLATELET # BLD AUTO: 336 10*3/MM3 (ref 130–400)
POTASSIUM SERPL-SCNC: 5.4 MMOL/L (ref 3.5–5.1)
PROT SERPL-MCNC: 7.5 G/DL (ref 6.3–8.2)
RBC # BLD AUTO: 4.83 10*6/MM3 (ref 4.2–5.4)
SODIUM SERPL-SCNC: 141 MMOL/L (ref 137–145)
TRIGL SERPL-MCNC: 135 MG/DL
TSH SERPL DL<=0.005 MIU/L-ACNC: <0.015 MIU/ML (ref 0.47–4.68)
VLDLC SERPL CALC-MCNC: 27 MG/DL
WBC # BLD AUTO: 8.33 10*3/MM3 (ref 4.8–10.8)

## 2018-12-10 ENCOUNTER — OFFICE VISIT (OUTPATIENT)
Dept: INTERNAL MEDICINE | Facility: CLINIC | Age: 68
End: 2018-12-10

## 2018-12-10 VITALS
HEART RATE: 95 BPM | DIASTOLIC BLOOD PRESSURE: 61 MMHG | SYSTOLIC BLOOD PRESSURE: 137 MMHG | WEIGHT: 223 LBS | RESPIRATION RATE: 18 BRPM | OXYGEN SATURATION: 96 % | TEMPERATURE: 97.9 F | BODY MASS INDEX: 39.5 KG/M2

## 2018-12-10 DIAGNOSIS — E07.9 THYROID DISORDER: Primary | ICD-10-CM

## 2018-12-10 DIAGNOSIS — E83.52 HYPERCALCEMIA: ICD-10-CM

## 2018-12-10 DIAGNOSIS — E87.5 HYPERKALEMIA: ICD-10-CM

## 2018-12-10 PROCEDURE — 99214 OFFICE O/P EST MOD 30 MIN: CPT | Performed by: INTERNAL MEDICINE

## 2018-12-10 RX ORDER — LIOTHYRONINE SODIUM 25 UG/1
25 TABLET ORAL EVERY 6 HOURS
Qty: 120 TABLET | Refills: 3 | Status: SHIPPED | OUTPATIENT
Start: 2018-12-10 | End: 2019-04-26

## 2018-12-10 RX ORDER — LEVOTHYROXINE, LIOTHYRONINE 38; 9 UG/1; UG/1
TABLET ORAL
Qty: 45 TABLET | Refills: 3 | Status: SHIPPED | OUTPATIENT
Start: 2018-12-10 | End: 2019-04-26 | Stop reason: DRUGHIGH

## 2018-12-10 NOTE — PROGRESS NOTES
Subjective   Salinas Monroy is a 68 y.o. female.     Chief Complaint   Patient presents with   • Thyroid Problem       History of Present Illness    patient is here to follow-up he thyroid. She had labwork done.  Patient states she has a self diagnosis of Hashimoto's thyroiditis.  She has been referred to endocrinology and she currently takes Cytomel and NP thyroid , I have informed her that I will refill her medications at this time until she sees endocrinology , I have informed the patient that a further refills on these medications  Needs to come from endocrinology , .  She also had labs for cholesterol and anemia.  She is advised to stop her iron tablets    The following portions of the patient's history were reviewed and updated as appropriate: allergies, current medications, past family history, past medical history, past social history, past surgical history and problem list.    Review of Systems   Constitutional: Negative for appetite change, fatigue and fever.   HENT: Negative for congestion, ear discharge, ear pain, sinus pressure and sore throat.    Eyes: Negative for pain and discharge.   Respiratory: Negative for cough, chest tightness, shortness of breath and wheezing.    Cardiovascular: Negative for chest pain, palpitations and leg swelling.   Gastrointestinal: Negative for abdominal pain, blood in stool, constipation, diarrhea and nausea.   Endocrine: Negative for cold intolerance and heat intolerance.   Genitourinary: Negative for dysuria, flank pain and frequency.   Musculoskeletal: Negative for back pain and joint swelling.   Skin: Negative for color change.   Allergic/Immunologic: Negative for environmental allergies and food allergies.   Neurological: Negative for dizziness, weakness, numbness and headaches.   Hematological: Negative for adenopathy. Does not bruise/bleed easily.   Psychiatric/Behavioral: Negative for behavioral problems and dysphoric mood. The patient is not  nervous/anxious.          Current Outpatient Medications:   •  Acetylcysteine (N-ACETYL-L-CYSTEINE PO), Take 1 tablet by mouth Daily., Disp: , Rfl:   •  acyclovir (ZOVIRAX) 400 MG tablet, Take 2 tablets by mouth 2 (Two) Times a Day. As needed for herpes outbreak, Disp: 60 tablet, Rfl: 1  •  allopurinol (ZYLOPRIM) 100 MG tablet, Take 2 tablets by mouth Daily., Disp: 180 tablet, Rfl: 3  •  B Complex Vitamins (VITAMIN B COMPLEX PO), Take 1 tablet by mouth Daily., Disp: , Rfl:   •  B-12, Methylcobalamin, 1000 MCG sublingual tablet, , Disp: , Rfl:   •  BIOTIN PO, Take 1 tablet by mouth Daily., Disp: , Rfl:   •  Cholecalciferol (VITAMIN D3) 81387 UNITS tablet, Take 7,500 Units by mouth Daily., Disp: , Rfl:   •  Coenzyme Q10 (CO Q 10 PO), Take 1 tablet by mouth Daily., Disp: , Rfl:   •  Digestive Enzymes (DIGESTIVE ENZYME PO), Take 1 tablet by mouth Daily., Disp: , Rfl:   •  escitalopram (LEXAPRO) 5 MG tablet, Take 1 tablet by mouth Daily., Disp: 90 tablet, Rfl: 3  •  GARLIC PO, Take 1 tablet by mouth Daily., Disp: , Rfl:   •  liothyronine (CYTOMEL) 25 MCG tablet, Take 1 tablet by mouth Every 6 (Six) Hours., Disp: 120 tablet, Rfl: 3  •  MAGNESIUM PO, Take 1,000 mg by mouth Every Night., Disp: , Rfl:   •  Methylsulfonylmethane (MSM PO), Take 1 tablet by mouth Daily., Disp: , Rfl:   •  MILK THISTLE PO, Take 1 tablet by mouth Daily., Disp: , Rfl:   •  Misc Natural Products (TART CHERRY ADVANCED PO), Take 2 capsules by mouth Daily., Disp: , Rfl:   •  NP THYROID 60 MG tablet, Take 1 tab in AM and 1/2 tab in afternoon, Disp: 45 tablet, Rfl: 3  •  Omega-3 Fatty Acids (OMEGA-3 FISH OIL PO), Take 4,000 Units by mouth Daily., Disp: , Rfl:   •  Probiotic Product (PROBIOTIC PO), Take 1 tablet by mouth Daily., Disp: , Rfl:   •  Riboflavin (VITAMIN B-2 PO), Take 300 mg by mouth Daily., Disp: , Rfl:   •  SELENIUM PO, Take 1 tablet by mouth Daily., Disp: , Rfl:   •  TURMERIC PO, Take 1 tablet by mouth Daily., Disp: , Rfl:   •  vitamin C  (ASCORBIC ACID) 500 MG tablet, Take 500 mg by mouth 3 (Three) Times a Day As Needed., Disp: , Rfl:   •  vitamin E 400 UNIT capsule, Take 400 Units by mouth Daily., Disp: , Rfl:   •  VITAMIN K PO, Take 100 mcg by mouth Daily., Disp: , Rfl:   •  Zinc 30 MG tablet, Take  by mouth Daily., Disp: , Rfl:     Objective     Blood pressure 137/61, pulse 95, temperature 97.9 °F (36.6 °C), temperature source Temporal, resp. rate 18, weight 101 kg (223 lb), SpO2 96 %, not currently breastfeeding.    Physical Exam   Constitutional: She is oriented to person, place, and time. She appears well-developed and well-nourished. No distress.   HENT:   Head: Normocephalic and atraumatic.   Right Ear: External ear normal.   Left Ear: External ear normal.   Nose: Nose normal.   Mouth/Throat: Oropharynx is clear and moist.   Eyes: Conjunctivae and EOM are normal. Pupils are equal, round, and reactive to light.   Neck: Neck supple. No thyromegaly present.   Cardiovascular: Normal rate, regular rhythm and normal heart sounds.   Pulmonary/Chest: Effort normal and breath sounds normal. No respiratory distress.   Abdominal: Soft. Bowel sounds are normal. She exhibits no distension. There is no tenderness. There is no rebound.   Musculoskeletal: Normal range of motion. She exhibits no edema.   Lymphadenopathy:     She has no cervical adenopathy.   Neurological: She is alert and oriented to person, place, and time.   No gross motor or sensory deficits   Skin: Skin is warm. She is not diaphoretic.   Psychiatric: She has a normal mood and affect.   Nursing note and vitals reviewed.      Results for orders placed or performed during the hospital encounter of 11/15/18   Comprehensive Metabolic Panel   Result Value Ref Range    Glucose 99 (H) 74 - 98 mg/dL    BUN 36 (H) 7 - 20 mg/dL    Creatinine 0.90 0.60 - 1.30 mg/dL    Sodium 140 137 - 145 mmol/L    Potassium 4.6 3.5 - 5.1 mmol/L    Chloride 112 (H) 98 - 107 mmol/L    CO2 25.0 (L) 26.0 - 30.0 mmol/L     Calcium 10.5 (H) 8.4 - 10.2 mg/dL    Total Protein 7.4 6.3 - 8.2 g/dL    Albumin 4.40 3.50 - 5.00 g/dL    ALT (SGPT) 41 13 - 69 U/L    AST (SGOT) 30 15 - 46 U/L    Alkaline Phosphatase 95 38 - 126 U/L    Total Bilirubin 0.5 0.2 - 1.3 mg/dL    eGFR Non African Amer 62 >60 mL/min/1.73    Globulin 3.0 gm/dL    A/G Ratio 1.5 1.0 - 2.0 g/dL    BUN/Creatinine Ratio 40.0 (H) 7.1 - 23.5    Anion Gap 7.6 (L) 10.0 - 20.0 mmol/L   Lipase   Result Value Ref Range    Lipase 192 23 - 300 U/L   Lactic Acid, Plasma   Result Value Ref Range    Lactate 1.0 0.5 - 2.0 mmol/L   CBC Auto Differential   Result Value Ref Range    WBC 8.42 4.80 - 10.80 10*3/mm3    RBC 4.48 4.20 - 5.40 10*6/mm3    Hemoglobin 13.0 12.0 - 16.0 g/dL    Hematocrit 41.5 37.0 - 47.0 %    MCV 92.6 81.0 - 99.0 fL    MCH 29.0 27.0 - 31.0 pg    MCHC 31.3 30.0 - 37.0 g/dL    RDW 13.0 11.5 - 14.5 %    RDW-SD 43.8 37.0 - 54.0 fl    MPV 9.7 6.0 - 12.0 fL    Platelets 250 130 - 400 10*3/mm3    Neutrophil % 73.1 37.0 - 80.0 %    Lymphocyte % 17.3 10.0 - 50.0 %    Monocyte % 6.4 0.0 - 12.0 %    Eosinophil % 2.4 0.0 - 7.0 %    Basophil % 0.4 0.0 - 2.5 %    Immature Grans % 0.4 0.0 - 0.6 %    Neutrophils, Absolute 6.16 2.00 - 6.90 10*3/mm3    Lymphocytes, Absolute 1.46 0.60 - 3.40 10*3/mm3    Monocytes, Absolute 0.54 0.00 - 0.90 10*3/mm3    Eosinophils, Absolute 0.20 0.00 - 0.70 10*3/mm3    Basophils, Absolute 0.03 0.00 - 0.20 10*3/mm3    Immature Grans, Absolute 0.03 0.00 - 0.06 10*3/mm3    nRBC 0.0 0.0 - 0.0 /100 WBC   Light Blue Top   Result Value Ref Range    Extra Tube hold for add-on    Lavender Top   Result Value Ref Range    Extra Tube hold for add-on    Gold Top - SST   Result Value Ref Range    Extra Tube Hold for add-ons.    Green Top (No Gel)   Result Value Ref Range    Extra Tube Hold for add-ons.          Assessment/Plan   Salinas was seen today for thyroid problem.    Diagnoses and all orders for this visit:    Thyroid  disorder    Hyperkalemia    Hypercalcemia    Other orders  -     liothyronine (CYTOMEL) 25 MCG tablet; Take 1 tablet by mouth Every 6 (Six) Hours.  -     NP THYROID 60 MG tablet; Take 1 tab in AM and 1/2 tab in afternoon    plan:   1.   Thyroid disorder: Labs reviewed.  Patient advised to keep endocrinology appointment,   Refills provided until she sees endocrinology.  2. Hyperkalemia : low potassium diet ,she takes several otc supplements  3. Hypercalcemia : oral hydration , monitor labs            Romy Prince MD

## 2019-01-03 ENCOUNTER — TRANSCRIBE ORDERS (OUTPATIENT)
Dept: NUCLEAR MEDICINE | Facility: HOSPITAL | Age: 69
End: 2019-01-03

## 2019-01-03 DIAGNOSIS — T84.013A BROKEN INTERNAL LEFT KNEE PROSTHESIS, INITIAL ENCOUNTER (HCC): Primary | ICD-10-CM

## 2019-01-14 ENCOUNTER — HOSPITAL ENCOUNTER (OUTPATIENT)
Dept: NUCLEAR MEDICINE | Facility: HOSPITAL | Age: 69
Discharge: HOME | End: 2019-01-14
Attending: ORTHOPAEDIC SURGERY

## 2019-01-14 ENCOUNTER — LAB (OUTPATIENT)
Dept: LAB | Facility: HOSPITAL | Age: 69
End: 2019-01-14
Attending: ORTHOPAEDIC SURGERY

## 2019-01-14 ENCOUNTER — TRANSCRIBE ORDERS (OUTPATIENT)
Dept: ADMINISTRATIVE | Facility: HOSPITAL | Age: 69
End: 2019-01-14

## 2019-01-14 ENCOUNTER — ANCILLARY ORDERS (OUTPATIENT)
Dept: NUCLEAR MEDICINE | Facility: HOSPITAL | Age: 69
End: 2019-01-14

## 2019-01-14 DIAGNOSIS — T84.031A MECHANICAL LOOSENING OF INTERNAL LEFT HIP PROSTHETIC JOINT, INITIAL ENCOUNTER (HCC): ICD-10-CM

## 2019-01-14 DIAGNOSIS — T84.013A BROKEN INTERNAL LEFT KNEE PROSTHESIS, INITIAL ENCOUNTER (HCC): ICD-10-CM

## 2019-01-14 DIAGNOSIS — M25.552 LEFT HIP PAIN: Primary | ICD-10-CM

## 2019-01-14 DIAGNOSIS — M25.552 LEFT HIP PAIN: ICD-10-CM

## 2019-01-14 LAB
BASOPHILS # BLD AUTO: 0.04 10*3/MM3 (ref 0–0.2)
BASOPHILS NFR BLD AUTO: 0.5 % (ref 0–2.5)
CRP SERPL-MCNC: 1.5 MG/DL (ref 0–1)
DEPRECATED RDW RBC AUTO: 43.6 FL (ref 37–54)
EOSINOPHIL # BLD AUTO: 0.25 10*3/MM3 (ref 0–0.7)
EOSINOPHIL NFR BLD AUTO: 2.9 % (ref 0–7)
ERYTHROCYTE [DISTWIDTH] IN BLOOD BY AUTOMATED COUNT: 12.8 % (ref 11.5–14.5)
ERYTHROCYTE [SEDIMENTATION RATE] IN BLOOD: 25 MM/HR (ref 0–20)
HCT VFR BLD AUTO: 42.2 % (ref 37–47)
HGB BLD-MCNC: 13.3 G/DL (ref 12–16)
IMM GRANULOCYTES # BLD AUTO: 0.03 10*3/MM3 (ref 0–0.06)
IMM GRANULOCYTES NFR BLD AUTO: 0.4 % (ref 0–0.6)
LYMPHOCYTES # BLD AUTO: 1.91 10*3/MM3 (ref 0.6–3.4)
LYMPHOCYTES NFR BLD AUTO: 22.3 % (ref 10–50)
MCH RBC QN AUTO: 29.3 PG (ref 27–31)
MCHC RBC AUTO-ENTMCNC: 31.5 G/DL (ref 30–37)
MCV RBC AUTO: 93 FL (ref 81–99)
MONOCYTES # BLD AUTO: 0.62 10*3/MM3 (ref 0–0.9)
MONOCYTES NFR BLD AUTO: 7.2 % (ref 0–12)
NEUTROPHILS # BLD AUTO: 5.71 10*3/MM3 (ref 2–6.9)
NEUTROPHILS NFR BLD AUTO: 66.7 % (ref 37–80)
NRBC BLD AUTO-RTO: 0 /100 WBC (ref 0–0)
PLATELET # BLD AUTO: 295 10*3/MM3 (ref 130–400)
PMV BLD AUTO: 9.6 FL (ref 6–12)
RBC # BLD AUTO: 4.54 10*6/MM3 (ref 4.2–5.4)
WBC NRBC COR # BLD: 8.56 10*3/MM3 (ref 4.8–10.8)

## 2019-01-14 PROCEDURE — 85651 RBC SED RATE NONAUTOMATED: CPT | Performed by: ORTHOPAEDIC SURGERY

## 2019-01-14 PROCEDURE — 85025 COMPLETE CBC W/AUTO DIFF WBC: CPT

## 2019-01-14 PROCEDURE — 36415 COLL VENOUS BLD VENIPUNCTURE: CPT | Performed by: ORTHOPAEDIC SURGERY

## 2019-01-14 PROCEDURE — 0 TECHNETIUM MEDRONATE KIT: Performed by: ORTHOPAEDIC SURGERY

## 2019-01-14 PROCEDURE — A9503 TC99M MEDRONATE: HCPCS | Performed by: ORTHOPAEDIC SURGERY

## 2019-01-14 PROCEDURE — 86140 C-REACTIVE PROTEIN: CPT

## 2019-01-14 PROCEDURE — 82495 ASSAY OF CHROMIUM: CPT

## 2019-01-14 PROCEDURE — 83018 HEAVY METAL QUAN EACH NES: CPT

## 2019-01-14 PROCEDURE — 78315 BONE IMAGING 3 PHASE: CPT

## 2019-01-14 RX ORDER — TC 99M MEDRONATE 20 MG/10ML
27 INJECTION, POWDER, LYOPHILIZED, FOR SOLUTION INTRAVENOUS
Status: COMPLETED | OUTPATIENT
Start: 2019-01-14 | End: 2019-01-14

## 2019-01-14 RX ADMIN — TC 99M MEDRONATE 27 MILLICURIE: 20 INJECTION, POWDER, LYOPHILIZED, FOR SOLUTION INTRAVENOUS at 10:30

## 2019-01-15 LAB
COBALT SERPL-MCNC: 5.1 UG/L (ref 0–0.9)
CR SERPL-MCNC: 1.1 UG/L (ref 0.1–2.1)

## 2019-01-29 ENCOUNTER — OFFICE VISIT (OUTPATIENT)
Dept: NEUROLOGY | Facility: CLINIC | Age: 69
End: 2019-01-29

## 2019-01-29 VITALS
HEIGHT: 63 IN | HEART RATE: 77 BPM | DIASTOLIC BLOOD PRESSURE: 66 MMHG | SYSTOLIC BLOOD PRESSURE: 140 MMHG | BODY MASS INDEX: 39.5 KG/M2 | OXYGEN SATURATION: 97 %

## 2019-01-29 DIAGNOSIS — M25.552 GREATER TROCHANTERIC PAIN SYNDROME OF LEFT LOWER EXTREMITY: ICD-10-CM

## 2019-01-29 DIAGNOSIS — M70.62 TROCHANTERIC BURSITIS OF LEFT HIP: Primary | ICD-10-CM

## 2019-01-29 DIAGNOSIS — G25.0 ESSENTIAL TREMOR: ICD-10-CM

## 2019-01-29 PROCEDURE — 99204 OFFICE O/P NEW MOD 45 MIN: CPT | Performed by: PSYCHIATRY & NEUROLOGY

## 2019-01-29 RX ORDER — PRIMIDONE 50 MG/1
TABLET ORAL
Qty: 30 TABLET | Refills: 3 | Status: SHIPPED | OUTPATIENT
Start: 2019-01-29 | End: 2019-03-07 | Stop reason: SDUPTHER

## 2019-01-30 NOTE — PROGRESS NOTES
"[facility] NEUROLOGY [place] CONSULTATION   History of Present Illness     Date: 1/29/2019    Patient Identification  Salinas Monroy is a 69 y.o. female.    Patient information was obtained from patient.  History/Exam limitations: none.    CONSULTATION requested by: Romy Prince MD    Chief Complaint   Tremors (NP, in office today for consult. Pt c/o tremors and weakness in legs/hips. States sx started years ago. Pt underwent EMG 06/2018 ) and Extremity Weakness (Pt states she has been going to PT )      History of Present Illness   Patient is a pleasant 69-year-old referred to [facility] neurology [place] for evaluation of bilateral hand tremor.  Her tremor is usually worse in maintaining posture.  Patient is also complaining of pain in her left hip at the greater trochanteric region.  Pain involving this side of her buttock and in her lateral aspect of her hip.  Patient reports worsening of pain especially lying on the left hip.  Patient reports to have pain following weight bearing activity such as walking climbing stairs.  She also reports to have hip muscle weakness especially hip flexor.      PMH:   Past Medical History:   Diagnosis Date   • Anemia ?    under control   • Arthritis    • Depression    • Diverticulosis    • Essential hypertension 7/30/2018   • GERD (gastroesophageal reflux disease)     no longer bothers me...   • Gout    • Gout    • Hashimoto's disease    • Hernia 3-2012    repair surgery which failed in 2014   • Hypothyroid    • Impingement syndrome of right shoulder 5/8/2016   • Movement disorder Hand Tremors   • PONV (postoperative nausea and vomiting)     Pt states \"only one time\".   • Tremor     worse on left arm/hand   • Wears glasses        Past Surgical History:   Past Surgical History:   Procedure Laterality Date   • ABDOMINAL SURGERY     • APPENDECTOMY     • COLONOSCOPY  2012    failed due to hernia...   • COLONOSCOPY N/A 6/8/2017    Procedure: COLONOSCOPY with cold " snare polypectomies, argon thermal ablation, ns injection, yanira ink injection;  Surgeon: Rafiq Huang MD;  Location: [facility] ENDOSCOPY;  Service:    • COLONOSCOPY N/A 6/5/2018    Procedure: COLONOSCOPY WITH BIOPSIES;  Surgeon: Rafiq Huang MD;  Location: [facility] ENDOSCOPY;  Service: Gastroenterology   • GASTRIC BYPASS  1978   • HERNIA MESH REMOVAL  2014    BOWEL OBSTRUCTION DUE TO MESH   • HERNIA REPAIR  2012   • HERNIA REPAIR     • HYSTERECTOMY     • JOINT REPLACEMENT  2009 & 2012    Left & Right Full Hip Replacements   • SMALL INTESTINE SURGERY  2014    DUE TO BOWEL OBSTRUCTION WITH SOME REMOVAL   • TOTAL ABDOMINAL HYSTERECTOMY WITH SALPINGO OOPHORECTOMY  1990    fibroids   • TUBAL ABDOMINAL LIGATION     • UPPER GASTROINTESTINAL ENDOSCOPY         Family Hisotry:   Family History   Problem Relation Age of Onset   • Obesity Mother    • Rheum arthritis Mother    • Thyroid disease Mother    • Hypertension Mother    • Stroke Mother         Most of her problems were caused by Rheumatoid Ar.   • Hyperlipidemia Mother    • Arthritis Mother         Rheumatoid Arthritis..Passed 2004   • Cancer Father         Passed 1997   • Kidney cancer Father    • Liver cancer Father    • No Known Problems Brother    • Diabetes Maternal Aunt    • Cancer Maternal Uncle    • Lung cancer Maternal Uncle    • Alcohol abuse Maternal Uncle    • No Known Problems Paternal Aunt    • Stroke Paternal Uncle    • Hypertension Paternal Uncle    • Hyperlipidemia Paternal Uncle    • Heart disease Paternal Uncle    • No Known Problems Brother    • Asthma Sister         under control   • Alcohol abuse Maternal Uncle    • Colon cancer Neg Hx    • Breast cancer Neg Hx    • Ovarian cancer Neg Hx        Social History:   Social History     Socioeconomic History   • Marital status:      Spouse name: Not on file   • Number of children: Not on file   • Years of education: Not on file   • Highest education level: Not on file   Social Needs   • Financial  "resource strain: Not on file   • Food insecurity - worry: Not on file   • Food insecurity - inability: Not on file   • Transportation needs - medical: Not on file   • Transportation needs - non-medical: Not on file   Occupational History   • Not on file   Tobacco Use   • Smoking status: Former Smoker     Packs/day: 1.00     Years: 40.00     Pack years: 40.00     Types: Cigarettes     Start date:      Last attempt to quit:      Years since quittin.0   • Smokeless tobacco: Never Used   • Tobacco comment: \"quit 9 years ago\"   Substance and Sexual Activity   • Alcohol use: No   • Drug use: No   • Sexual activity: No   Other Topics Concern   • Not on file   Social History Narrative   • Not on file       Medications:   Current Outpatient Medications   Medication Sig Dispense Refill   • Acetylcysteine (N-ACETYL-L-CYSTEINE PO) Take 1 tablet by mouth Daily.     • acyclovir (ZOVIRAX) 400 MG tablet Take 2 tablets by mouth 2 (Two) Times a Day. As needed for herpes outbreak 60 tablet 1   • allopurinol (ZYLOPRIM) 100 MG tablet Take 2 tablets by mouth Daily. 180 tablet 3   • B Complex Vitamins (VITAMIN B COMPLEX PO) Take 1 tablet by mouth Daily.     • B-12, Methylcobalamin, 1000 MCG sublingual tablet      • BIOTIN PO Take 1 tablet by mouth Daily.     • Cholecalciferol (VITAMIN D3) 23588 UNITS tablet Take 7,500 Units by mouth Daily.     • Coenzyme Q10 (CO Q 10 PO) Take 1 tablet by mouth Daily.     • Digestive Enzymes (DIGESTIVE ENZYME PO) Take 1 tablet by mouth Daily.     • escitalopram (LEXAPRO) 5 MG tablet Take 1 tablet by mouth Daily. 90 tablet 3   • GARLIC PO Take 1 tablet by mouth Daily.     • liothyronine (CYTOMEL) 25 MCG tablet Take 1 tablet by mouth Every 6 (Six) Hours. 120 tablet 3   • MAGNESIUM PO Take 1,000 mg by mouth Every Night.     • Methylsulfonylmethane (MSM PO) Take 1 tablet by mouth Daily.     • MILK THISTLE PO Take 1 tablet by mouth Daily.     • Misc Natural Products (TART CHERRY ADVANCED PO) Take 2 " capsules by mouth Daily.     • NP THYROID 60 MG tablet Take 1 tab in AM and 1/2 tab in afternoon 45 tablet 3   • Omega-3 Fatty Acids (OMEGA-3 FISH OIL PO) Take 4,000 Units by mouth Daily.     • Probiotic Product (PROBIOTIC PO) Take 1 tablet by mouth Daily.     • Riboflavin (VITAMIN B-2 PO) Take 300 mg by mouth Daily.     • SELENIUM PO Take 1 tablet by mouth Daily.     • TURMERIC PO Take 1 tablet by mouth Daily.     • vitamin C (ASCORBIC ACID) 500 MG tablet Take 500 mg by mouth 3 (Three) Times a Day As Needed.     • vitamin E 400 UNIT capsule Take 400 Units by mouth Daily.     • VITAMIN K PO Take 100 mcg by mouth Daily.     • Zinc 30 MG tablet Take  by mouth Daily.     • primidone (MYSOLINE) 50 MG tablet One pill two hours before bedtime 30 tablet 3     No current facility-administered medications for this visit.        Allergy:   Allergies   Allergen Reactions   • Diazepam Rash       Review of Systems:  Review of Systems   Constitutional: Negative for chills and fever.   HENT: Negative for congestion, ear pain, hearing loss, rhinorrhea and sore throat.    Eyes: Negative for pain, discharge and redness.   Respiratory: Negative for cough, shortness of breath, wheezing and stridor.    Cardiovascular: Negative for chest pain, palpitations and leg swelling.   Gastrointestinal: Negative for abdominal pain, constipation, nausea and vomiting.   Endocrine: Negative for cold intolerance, heat intolerance and polyphagia.   Genitourinary: Negative for dysuria, flank pain, frequency and urgency.   Musculoskeletal: Positive for gait problem. Negative for joint swelling, myalgias, neck pain and neck stiffness.   Skin: Negative for pallor, rash and wound.   Allergic/Immunologic: Negative for environmental allergies.   Neurological: Positive for tremors. Negative for dizziness, seizures, syncope, facial asymmetry, speech difficulty, weakness, light-headedness, numbness and headaches.   Hematological: Negative for adenopathy.  "  Psychiatric/Behavioral: Negative for confusion and hallucinations. The patient is not nervous/anxious.        Physical Exam     Vitals:    01/29/19 1157   BP: 140/66   Pulse: 77   SpO2: 97%   Height: 160 cm (63\")     GENERAL: Patient is pleasant, cooperative, appears to be stated age.  Body habitus is endomorphic.  SKIN AND EXTREMITIES:  No skin rashes or lesions are noted.  No cyanosis, clubbing or edema of the extremities.    HEAD:  Head is normocephalic and atraumatic.    NECK: Nontender without thyromegaly or adenopathy.  Carotid upstrokes are 1+/4.  No cranial or cervical bruits.  The neck is supple with a full range of motion.   ENT: Palate elevates symmetrically.  No evidence of high arch palate.  Tongue midline.  No erythema in posterior pharynx.  Mallampati Classification Class III   CARDIOVASCULAR:  Regular rate and rhythm with normal S1 and S2 without rub or gallop.  RESPIRATORY:  Clear to auscultation without wheezes or crackle   ABDOMEN:  Soft and nontender, positive bowel sound without hepatosplenomegaly  BACK:  Back is straight without midline defect.    PSYCH:  Higher cortical function/mental status:  The patient is alert.  The patient is oriented x3 to time, place and person.  Recent and the remote memory appear normal.  The patient has a good fund of knowledge.  There is no visual or auditory hallucination or suicidal or homicidal ideation.  SPEECH:There is no gross evidence of aphasia, dysarthria or agnosia.      CRANIAL NERVES:  Pupils are 4mm, equal round reactive to light, reacting briskly to 2mm without afferent pupillary defect.  Visual fields are intact to confrontation testing.  Funduscopic examination reveals sharp disc margins with normal vasculature.  No papilledema, hemorrhages or exudates.  Extraocular movements are full and smooth with normal pursuits and saccades.  No nystagmus noted.  The face is symmetric. Palate elevates symmetrically, Tongue midline, positive gag reflex. The " remainder of the cranial nerves are intact and symmetrical.    MOTOR: Strength is 5/5 throughout with normal tone and bulk with the following exceptions, 4/5 intrinsic muscles of the hands and feet.  No involuntary movements noted.    Deep Tendon Reflexes: are 2/4 and symmetrical in the upper extremities, 2/4 and symmetrical at the knees and 1/4 and symmetrical at the Achilles tendon.  Plantar responses were down-going bilaterally.    SENSATION:  Intact to pinprick, light touch, vibration and proprioception.  Coordination:  The patient normally performs finger-nose-finger, heel-to-knee-to-shin and rapid alternating movements in symmetrical fashion.    COORDINATION AND GAIT:  The patient walks with a narrow-based gait.  Patient is able to heel-toe and tandem walk forward and backwards without difficulty.  Romberg and monopedal  Romberg are negative.    MUSCULOSKELETAL: Patient is noted to have pin point pain in greater trochanteric region           Records Reviewed: I have personally reviewed her previous medical record.    Salinas was seen today for tremors and extremity weakness.    Diagnoses and all orders for this visit:    Trochanteric bursitis of left hip    Greater trochanteric pain syndrome of left lower extremity    Essential tremor    Other orders  -     primidone (MYSOLINE) 50 MG tablet; One pill two hours before bedtime      Discussion:  In summary, 69-year-old referred to [facility] neurology [place] for evaluation of bilateral hand tremor.  Her tremor is usually worse in maintaining posture.  Patient is also complaining of pain in her left hip at the greater trochanteric region.  Pain involving this side of her buttock and in her lateral aspect of her hip.  Patient reports worsening of pain especially lying on the left hip.  Patient reports to have pain following weight bearing activity such as walking climbing stairs.  She also reports to have hip muscle weakness especially hip flexor.  Patient  is referred here by her orthopedic doctor.  I have encouraged patient can follow-up with her orthopedic DrEstefani for injection but I would be happy to provide the injection for her if her orthopedic Dr. Defer to us for the treatment of her greater trochanteric pain syndrome.    I have also provided patient with Mysoline 50 mg 1-2 hours before bedtime for Essential Tremor    Essential tremor (ET) is a nerve disorder characterized by uncontrollable shaking in different parts and on different sides of the body. Areas affected often include the hands, arms, head, larynx (voice box), tongue, and chin. Rarely, the lower body is affected.  Essential tremor is the most common movement disorder, affecting an estimated five million people in the [place] Some figures estimate that one in 20 people over age 40 and one in 5 people over age 65 have ET.  Mild essential tremor may not require treatment. However, if ET interferes with one's ability to function there are treatments that may improve symptoms. Treatments may include medications or surgery.  Medications: Oral drugs can significantly reduce the severity of essential tremor. Medications include Inderal, Mysoline, Topamax and Neurontin.  Other drug options include the tranquilizers Klonopin, Valium, Xanax, and Ativan. Botox injections may also be a treatment option. This treatment has been effective for vocal and head tremors.  Surgery: Deep brain stimulation (DBS) is a surgical treatment option for people with severe tremor despite medical therapy. DBS involves surgical implantation of electrical leads into the thalamus. This is an area deep within the brain that coordinates muscle control that is thought to be affected in ET.      This Document is signed by Uday Barnes MD, FAAN, FAASM January 29, 201910:08 PM

## 2019-03-04 ENCOUNTER — TELEPHONE (OUTPATIENT)
Dept: NEUROLOGY | Facility: CLINIC | Age: 69
End: 2019-03-04

## 2019-03-04 NOTE — TELEPHONE ENCOUNTER
PATIENT CALLED AND STATED THAT SHE DONE VERY GOOD TAKING PRIMIDONE 50MG FOR A COUPLE WEEKS  BUT HER TREMORS HAVE COME BACK.  SHE WANTS TO KNOW IF THE PRIMIDONE COULD BE INCREASED BEFORE SHE COMES TO HER [facility] APPT.

## 2019-03-07 RX ORDER — PRIMIDONE 50 MG/1
TABLET ORAL
Qty: 60 TABLET | Refills: 3 | Status: SHIPPED | OUTPATIENT
Start: 2019-03-07 | End: 2019-05-01

## 2019-04-11 ENCOUNTER — TRANSCRIBE ORDERS (OUTPATIENT)
Dept: ADMINISTRATIVE | Facility: HOSPITAL | Age: 69
End: 2019-04-11

## 2019-04-11 ENCOUNTER — LAB (OUTPATIENT)
Dept: LAB | Facility: HOSPITAL | Age: 69
End: 2019-04-11

## 2019-04-11 DIAGNOSIS — M25.551 HIP PAIN, BILATERAL: Primary | ICD-10-CM

## 2019-04-11 DIAGNOSIS — M25.551 HIP PAIN, BILATERAL: ICD-10-CM

## 2019-04-11 DIAGNOSIS — M25.552 HIP PAIN, BILATERAL: Primary | ICD-10-CM

## 2019-04-11 DIAGNOSIS — M25.552 HIP PAIN, BILATERAL: ICD-10-CM

## 2019-04-11 LAB
CRP SERPL-MCNC: 2.8 MG/DL (ref 0–1)
ERYTHROCYTE [SEDIMENTATION RATE] IN BLOOD: 25 MM/HR (ref 0–20)

## 2019-04-11 PROCEDURE — 86140 C-REACTIVE PROTEIN: CPT

## 2019-04-11 PROCEDURE — 85651 RBC SED RATE NONAUTOMATED: CPT

## 2019-04-11 PROCEDURE — 82495 ASSAY OF CHROMIUM: CPT

## 2019-04-11 PROCEDURE — 83018 HEAVY METAL QUAN EACH NES: CPT

## 2019-04-11 PROCEDURE — 36415 COLL VENOUS BLD VENIPUNCTURE: CPT

## 2019-04-15 LAB
COBALT SERPL-MCNC: 4.9 UG/L (ref 0–0.9)
CR SERPL-MCNC: 1 UG/L (ref 0.1–2.1)

## 2019-04-18 ENCOUNTER — OFFICE VISIT (OUTPATIENT)
Dept: ENDOCRINOLOGY | Facility: CLINIC | Age: 69
End: 2019-04-18

## 2019-04-18 VITALS
HEART RATE: 80 BPM | HEIGHT: 63 IN | OXYGEN SATURATION: 98 % | DIASTOLIC BLOOD PRESSURE: 74 MMHG | SYSTOLIC BLOOD PRESSURE: 132 MMHG | WEIGHT: 224.3 LBS | BODY MASS INDEX: 39.74 KG/M2

## 2019-04-18 DIAGNOSIS — G25.0 ESSENTIAL TREMOR: ICD-10-CM

## 2019-04-18 DIAGNOSIS — E03.9 HYPOTHYROIDISM, ADULT: ICD-10-CM

## 2019-04-18 DIAGNOSIS — E05.80 IATROGENIC HYPERTHYROIDISM: ICD-10-CM

## 2019-04-18 DIAGNOSIS — E06.3 HASHIMOTO'S THYROIDITIS: Primary | ICD-10-CM

## 2019-04-18 PROCEDURE — 99214 OFFICE O/P EST MOD 30 MIN: CPT | Performed by: INTERNAL MEDICINE

## 2019-04-18 RX ORDER — PROPRANOLOL HYDROCHLORIDE 20 MG/1
20 TABLET ORAL 2 TIMES DAILY
Qty: 60 TABLET | Refills: 6 | Status: SHIPPED | OUTPATIENT
Start: 2019-04-18 | End: 2019-11-11 | Stop reason: SINTOL

## 2019-04-18 NOTE — PROGRESS NOTES
Subjective:   Thyroid Problem (COnsult for Romy Denise) and Hashimoto's Thyroiditis        Salinas Monroy is a 69 y.o. female who is being seen for consultation today at the request of Romy Prince MD   Hashimoto thyroiditis dx in 1992, presented with mood disorder. Lithium was started and she developed thyroid medications shortly after/ SHe started levothyroxine. 2015 she was dx with Hashimoto thyroiditis and changed to [place]. Her mood problems resolved and she felt much better. [id number] she tried different regimen and added cytomel 4 tabs daily on top of NP thyroid 90 mg 1.5 tablet and 3 tabs of cytomel 25 mg.   Her labs reviewed and she had suppressed TSH < 0.015 over the last 3 years with various levels of T3 and T4.   Patient is convinced that she is feeling great on the current regimen. She states that she needs to keep TSH suppressed for optimal thyroid management. She told that previous provider stated that Hashimoto thyroiditis requires TSH suppression to prevent reverse t3.   Patient stated that her tremors are essential and were present prior to thyroid treatment. She stated that they are slowly getting worse because of the normal progression of the condition.   She appears anxious but denies it and states that she feels better than ever. She would like to have her levels tested and continue cytomel /NP thyroid combination.     Essential tremors are managed by neurologist and primidone trial was not effective.     Review of Systems  Review of Systems   Constitutional: Positive for fatigue and unexpected weight gain.   All other systems reviewed and are negative.    Current medications:  Current Outpatient Medications   Medication Sig Dispense Refill   • Acetylcysteine (N-ACETYL-L-CYSTEINE PO) Take 1 tablet by mouth Daily.     • acyclovir (ZOVIRAX) 400 MG tablet Take 2 tablets by mouth 2 (Two) Times a Day. As needed for herpes outbreak 60 tablet 1   • allopurinol (ZYLOPRIM) 100 MG tablet Take  2 tablets by mouth Daily. 180 tablet 3   • B Complex Vitamins (VITAMIN B COMPLEX PO) Take 1 tablet by mouth Daily.     • B-12, Methylcobalamin, 1000 MCG sublingual tablet      • BIOTIN PO Take 1 tablet by mouth Daily.     • Cholecalciferol (VITAMIN D3) 08733 UNITS tablet Take 7,500 Units by mouth Daily.     • Coenzyme Q10 (CO Q 10 PO) Take 1 tablet by mouth Daily.     • Digestive Enzymes (DIGESTIVE ENZYME PO) Take 1 tablet by mouth Daily.     • escitalopram (LEXAPRO) 5 MG tablet Take 1 tablet by mouth Daily. 90 tablet 3   • GARLIC PO Take 1 tablet by mouth Daily.     • liothyronine (CYTOMEL) 25 MCG tablet Take 1 tablet by mouth Every 6 (Six) Hours. 120 tablet 3   • MAGNESIUM PO Take 1,000 mg by mouth Every Night.     • Methylsulfonylmethane (MSM PO) Take 1 tablet by mouth Daily.     • MILK THISTLE PO Take 1 tablet by mouth Daily.     • Misc Natural Products (TART CHERRY ADVANCED PO) Take 2 capsules by mouth Daily.     • NP THYROID 60 MG tablet Take 1 tab in AM and 1/2 tab in afternoon 45 tablet 3   • Omega-3 Fatty Acids (OMEGA-3 FISH OIL PO) Take 4,000 Units by mouth Daily.     • primidone (MYSOLINE) 50 MG tablet Two pills two hours before bedtime 60 tablet 3   • Probiotic Product (PROBIOTIC PO) Take 1 tablet by mouth Daily.     • Riboflavin (VITAMIN B-2 PO) Take 300 mg by mouth Daily.     • SELENIUM PO Take 1 tablet by mouth Daily.     • TURMERIC PO Take 1 tablet by mouth Daily.     • vitamin C (ASCORBIC ACID) 500 MG tablet Take 500 mg by mouth 3 (Three) Times a Day As Needed.     • vitamin E 400 UNIT capsule Take 400 Units by mouth Daily.     • VITAMIN K PO Take 100 mcg by mouth Daily.     • Zinc 30 MG tablet Take  by mouth Daily.     • propranolol (INDERAL) 20 MG tablet Take 1 tablet by mouth 2 (Two) Times a Day. 60 tablet 6     No current facility-administered medications for this visit.        The following portions of the patient's history were reviewed and updated as appropriate: She  has a past medical  history of Anemia (?), Arthritis, Depression, Diverticulosis, Essential hypertension (7/30/2018), GERD (gastroesophageal reflux disease), Gout, Gout, Hashimoto's disease, Hernia (3-2012), Hypothyroid, Impingement syndrome of right shoulder (5/8/2016), Movement disorder (Hand Tremors), PONV (postoperative nausea and vomiting), Tremor, and Wears glasses.  She  has a past surgical history that includes Hysterectomy; Gastric bypass (1978); Total abdominal hysterectomy w/ bilateral salpingoophorectomy (1990); Hernia repair (2012); Small intestine surgery (2014); Appendectomy; Colonoscopy (2012); Abdominal surgery; Upper gastrointestinal endoscopy; Tubal ligation; Hernia repair; Hernia mesh removal (2014); Colonoscopy (N/A, 6/8/2017); Joint replacement (2009 & 2012); and Colonoscopy (N/A, 6/5/2018).  Her family history includes Alcohol abuse in her maternal uncle and maternal uncle; Arthritis in her mother; Asthma in her sister; Cancer in her father and maternal uncle; Diabetes in her maternal aunt; Heart disease in her paternal uncle; Hyperlipidemia in her mother and paternal uncle; Hypertension in her mother and paternal uncle; Kidney cancer in her father; Liver cancer in her father; Lung cancer in her maternal uncle; No Known Problems in her brother, brother, and paternal aunt; Obesity in her mother; Rheum arthritis in her mother; Stroke in her mother and paternal uncle; Thyroid disease in her mother.  She  reports that she quit smoking about 11 years ago. Her smoking use included cigarettes. She started smoking about 52 years ago. She has a 40.00 pack-year smoking history. She has never used smokeless tobacco. She reports that she does not drink alcohol or use drugs.  She is allergic to diazepam..      Objective:     Vitals:    04/18/19 1059   BP: 132/74   Pulse: 80   SpO2: 98%   Body mass index is 39.73 kg/m².  Physical Exam   Constitutional: She is oriented to person, place, and time. She appears well-developed and  well-nourished. She appears distressed.   HENT:   Head: Normocephalic and atraumatic.   Eyes: Conjunctivae are normal.   Neck: Normal range of motion. No thyromegaly present.   Cardiovascular: Normal rate, regular rhythm, normal heart sounds and intact distal pulses.   Pulmonary/Chest: Effort normal and breath sounds normal.   Musculoskeletal: She exhibits no edema.   Lymphadenopathy:     She has no cervical adenopathy.   Neurological: She is alert and oriented to person, place, and time.   Reflex Scores:       Bicep reflexes are 3+ on the right side and 3+ on the left side.  Tremors of the hands and head present    Skin: No rash noted.   Psychiatric: Thought content normal. Her mood appears anxious. She is agitated and hyperactive.   Nursing note and vitals reviewed.      LABS AND IMAGING  Lab Results   Component Value Date    TSH <0.006 (L) 04/18/2019    L3XVJFB 199 (H) 04/18/2019    G8JHYAU 4.50 (L) 10/06/2016    FREET4 0.64 (L) 04/18/2019    THYROIDAB 104 (H) 04/18/2019               Assessment:        Problem List Items Addressed This Visit        Endocrine    Hashimoto's thyroiditis - Primary    Relevant Medications    propranolol (INDERAL) 20 MG tablet    Other Relevant Orders    T4, Free (Completed)    T3 (Completed)    TSH (Completed)    Thyroid Peroxidase Antibody (Completed)    TSH    T4, Free    T3    T3 Free    T3, Reverse    Anti-Thyroglobulin Antibody    Hypothyroidism, adult    Relevant Medications    propranolol (INDERAL) 20 MG tablet    Other Relevant Orders    T4, Free (Completed)    T3 (Completed)    TSH (Completed)    Thyroid Peroxidase Antibody (Completed)    TSH    T4, Free    T3    T3 Free    T3, Reverse    Anti-Thyroglobulin Antibody       Nervous and Auditory    Essential tremor      Other Visit Diagnoses     Iatrogenic hyperthyroidism        Relevant Medications    propranolol (INDERAL) 20 MG tablet               Plan:         Patient appears clinically hyperthyroid due to taking  inappropriate high dose of the medication. I have explained to her at length the dangerous effects of taking cytomel high doses on top of the NP thyroid which already has T3. I have explained that it is very likely that her tremors are partially triggerred by the extra cytomel and may improve when she discontinue this medication. She is reluctant to stop cytomel at first but after length discussion and explanation of thyroid physiology she has agreed on trying some changed in the management.     I clearly stated that I will not manage her thyroid with the goal of TSH suppression to undetectable range because it carries significant cardiac risk.   For essential tremors I have given the rx for propranolol to use twice a day.    Old records were reviewed and summarized in HPI section of the note.  I have not seen a normal thyroid function test for her since 2016.    The risks and benefits of my recommendations, as well as other treatment options were discussed with the patient today. Questions were answered.           35 min  of 60 min face-to-face visit time spent for coordination of care and counselling regarding identified problems as outlined in the objective, assessment and discussion portions of the documentation.

## 2019-04-22 LAB
T3 SERPL-MCNC: 199 NG/DL (ref 71–180)
T3REVERSE SERPL-MCNC: 14.8 NG/DL (ref 9.2–24.1)
T4 FREE SERPL-MCNC: 0.64 NG/DL (ref 0.93–1.7)
THYROGLOB AB SERPL-ACNC: 1494.6 IU/ML (ref 0–0.9)
THYROPEROXIDASE AB SERPL-ACNC: 104 IU/ML (ref 0–34)
TSH SERPL DL<=0.005 MIU/L-ACNC: <0.006 UIU/ML (ref 0.45–4.5)

## 2019-04-24 ENCOUNTER — OFFICE VISIT (OUTPATIENT)
Dept: INTERNAL MEDICINE | Facility: CLINIC | Age: 69
End: 2019-04-24

## 2019-04-24 VITALS
WEIGHT: 225 LBS | RESPIRATION RATE: 16 BRPM | DIASTOLIC BLOOD PRESSURE: 83 MMHG | BODY MASS INDEX: 39.87 KG/M2 | OXYGEN SATURATION: 97 % | HEART RATE: 87 BPM | SYSTOLIC BLOOD PRESSURE: 137 MMHG | TEMPERATURE: 97.5 F | HEIGHT: 63 IN

## 2019-04-24 DIAGNOSIS — Z13.220 LIPID SCREENING: ICD-10-CM

## 2019-04-24 DIAGNOSIS — R25.1 TREMORS OF NERVOUS SYSTEM: ICD-10-CM

## 2019-04-24 DIAGNOSIS — D64.9 ANEMIA, UNSPECIFIED TYPE: ICD-10-CM

## 2019-04-24 DIAGNOSIS — R79.89 ABNORMAL THYROID BLOOD TEST: ICD-10-CM

## 2019-04-24 DIAGNOSIS — L23.9 ALLERGIC DERMATITIS: Primary | ICD-10-CM

## 2019-04-24 PROCEDURE — 99214 OFFICE O/P EST MOD 30 MIN: CPT | Performed by: INTERNAL MEDICINE

## 2019-04-24 RX ORDER — CLOTRIMAZOLE AND BETAMETHASONE DIPROPIONATE 10; .64 MG/G; MG/G
CREAM TOPICAL 2 TIMES DAILY
Qty: 15 G | Refills: 4 | Status: SHIPPED | OUTPATIENT
Start: 2019-04-24 | End: 2019-09-30 | Stop reason: SDUPTHER

## 2019-04-24 RX ORDER — ACYCLOVIR 400 MG/1
800 TABLET ORAL 2 TIMES DAILY
Qty: 60 TABLET | Refills: 0 | Status: SHIPPED | OUTPATIENT
Start: 2019-04-24 | End: 2019-06-27 | Stop reason: SDUPTHER

## 2019-04-24 NOTE — PROGRESS NOTES
Subjective   Salinas Monroy is a 69 y.o. female.     Chief Complaint   Patient presents with   • Thyroid Problem   • Rash   • Tremors       History of Present Illness   HPI:   The patient is also here to follow up on the cholesterol and is trying to follow a diet. The patient  Had  lab work done .  The patient also needs refills on medications for herpes outbreak  . The patient states she 'has learned about her thyroid over years' and 'does not want to want to go in to t3 dominance', she does not agree  With what the endocrinologist says and needs her full iron profile and cortisol levels to be checked , she wants several other tests done for her  'iron ' , I have informed her we did her cbc and ferritin and  She is not anemic ,  I have informed her that I will not be getting tests done unless they are required , I have informed her that I would only go with the endocrinologist -  dr elizabeth recommendation regarding her thyroid issues as she is the endocrinologist and my knowledge in this field is limited  and  For her ' iron level ' , which I have informed her with in range I can set her with hematology   , she complains of a rash in the abdomen , she was put on primidone  And is tapering it  And will be start,   She is not happy with my  Assessments or plans and I have informed that there are several other physicians that she can see for her medical treatment , she states she would like to transfer to a family practice physician who can work with her on her thyroid , patient states she will take the names of other doctors that she can transfer her care to  , she also complains of tremors and was seen by neurology and was put on primadone and she didn't not think it was helping her and she is tapering it off , she did inform neurology and endocrinology and has a script for propranolol , she states she was in lithium for bipolar disorder in the 1990's     The following portions of the patient's history  were reviewed and updated as appropriate: allergies, current medications, past family history, past medical history, past social history, past surgical history and problem list.    Review of Systems   Constitutional: Negative for appetite change, fatigue and fever.   HENT: Negative for congestion, ear discharge, ear pain, sinus pressure and sore throat.    Eyes: Negative for pain and discharge.   Respiratory: Negative for cough, chest tightness, shortness of breath and wheezing.    Cardiovascular: Negative for chest pain, palpitations and leg swelling.   Gastrointestinal: Negative for abdominal pain, blood in stool, constipation, diarrhea and nausea.   Endocrine: Negative for cold intolerance and heat intolerance.   Genitourinary: Negative for dysuria, flank pain and frequency.   Musculoskeletal: Negative for back pain and joint swelling.   Skin: Positive for rash. Negative for color change.   Allergic/Immunologic: Negative for environmental allergies and food allergies.   Neurological: Positive for tremors. Negative for dizziness, weakness, numbness and headaches.   Hematological: Negative for adenopathy. Does not bruise/bleed easily.   Psychiatric/Behavioral: Negative for behavioral problems and dysphoric mood. The patient is not nervous/anxious.          Current Outpatient Medications:   •  Acetylcysteine (N-ACETYL-L-CYSTEINE PO), Take 1 tablet by mouth Daily., Disp: , Rfl:   •  acyclovir (ZOVIRAX) 400 MG tablet, Take 2 tablets by mouth 2 (Two) Times a Day. As needed for herpes outbreak, Disp: 60 tablet, Rfl: 0  •  allopurinol (ZYLOPRIM) 100 MG tablet, Take 2 tablets by mouth Daily., Disp: 180 tablet, Rfl: 3  •  B Complex Vitamins (VITAMIN B COMPLEX PO), Take 1 tablet by mouth Daily., Disp: , Rfl:   •  B-12, Methylcobalamin, 1000 MCG sublingual tablet, , Disp: , Rfl:   •  BIOTIN PO, Take 1 tablet by mouth Daily., Disp: , Rfl:   •  Cholecalciferol (VITAMIN D3) 00911 UNITS tablet, Take 7,500 Units by mouth Daily.,  Disp: , Rfl:   •  Coenzyme Q10 (CO Q 10 PO), Take 1 tablet by mouth Daily., Disp: , Rfl:   •  Digestive Enzymes (DIGESTIVE ENZYME PO), Take 1 tablet by mouth Daily., Disp: , Rfl:   •  escitalopram (LEXAPRO) 5 MG tablet, Take 1 tablet by mouth Daily., Disp: 90 tablet, Rfl: 3  •  GARLIC PO, Take 1 tablet by mouth Daily., Disp: , Rfl:   •  MAGNESIUM PO, Take 1,000 mg by mouth Every Night., Disp: , Rfl:   •  Methylsulfonylmethane (MSM PO), Take 1 tablet by mouth Daily., Disp: , Rfl:   •  MILK THISTLE PO, Take 1 tablet by mouth Daily., Disp: , Rfl:   •  Misc Natural Products (TART CHERRY ADVANCED PO), Take 2 capsules by mouth Daily., Disp: , Rfl:   •  Omega-3 Fatty Acids (OMEGA-3 FISH OIL PO), Take 4,000 Units by mouth Daily., Disp: , Rfl:   •  primidone (MYSOLINE) 50 MG tablet, Two pills two hours before bedtime, Disp: 60 tablet, Rfl: 3  •  Probiotic Product (PROBIOTIC PO), Take 1 tablet by mouth Daily., Disp: , Rfl:   •  propranolol (INDERAL) 20 MG tablet, Take 1 tablet by mouth 2 (Two) Times a Day., Disp: 60 tablet, Rfl: 6  •  Riboflavin (VITAMIN B-2 PO), Take 300 mg by mouth Daily., Disp: , Rfl:   •  SELENIUM PO, Take 1 tablet by mouth Daily., Disp: , Rfl:   •  TURMERIC PO, Take 1 tablet by mouth Daily., Disp: , Rfl:   •  vitamin C (ASCORBIC ACID) 500 MG tablet, Take 500 mg by mouth 3 (Three) Times a Day As Needed., Disp: , Rfl:   •  vitamin E 400 UNIT capsule, Take 400 Units by mouth Daily., Disp: , Rfl:   •  VITAMIN K PO, Take 100 mcg by mouth Daily., Disp: , Rfl:   •  Zinc 30 MG tablet, Take  by mouth Daily., Disp: , Rfl:   •  clotrimazole-betamethasone (LOTRISONE) 1-0.05 % cream, Apply  topically to the appropriate area as directed 2 (Two) Times a Day., Disp: 15 g, Rfl: 4  •  levothyroxine (SYNTHROID) 50 MCG tablet, 1 po daily in addition to 120 NP Thyroid, Disp: 30 tablet, Rfl: 5  •  THYROID 120 MG PO tablet, 1 Po Daily in addition to 50 mcg of levothyroixine, Disp: 30 tablet, Rfl: 5    Objective     Blood  "pressure 137/83, pulse 87, temperature 97.5 °F (36.4 °C), resp. rate 16, height 160 cm (62.99\"), weight 102 kg (225 lb), SpO2 97 %, not currently breastfeeding.    Physical Exam   Constitutional: She is oriented to person, place, and time. She appears well-developed and well-nourished. No distress.   HENT:   Head: Normocephalic and atraumatic.   Right Ear: External ear normal.   Left Ear: External ear normal.   Nose: Nose normal.   Mouth/Throat: Oropharynx is clear and moist.   Eyes: Conjunctivae and EOM are normal. Pupils are equal, round, and reactive to light.   Neck: Neck supple. No thyromegaly present.   Cardiovascular: Normal rate, regular rhythm and normal heart sounds.   Pulmonary/Chest: Effort normal and breath sounds normal. No respiratory distress.   Abdominal: Soft. Bowel sounds are normal. She exhibits no distension. There is no tenderness. There is no rebound.   Musculoskeletal: Normal range of motion. She exhibits no edema.   Lymphadenopathy:     She has no cervical adenopathy.   Neurological: She is alert and oriented to person, place, and time.   Tremors    Skin: Skin is warm. Rash noted. She is not diaphoretic.   Macular papular   rash on right side of abdomen , 2-3 cms size   Psychiatric: She has a normal mood and affect.   Nursing note and vitals reviewed.    Patient's Body mass index is 39.87 kg/m². BMI is above normal parameters. Recommendations include: educational material, exercise counseling and nutrition counseling.      Results for orders placed or performed in visit on 04/18/19   T4, Free   Result Value Ref Range    Free T4 0.64 (L) 0.93 - 1.70 ng/dL   T3   Result Value Ref Range    T3, Total 199 (H) 71 - 180 ng/dL   TSH   Result Value Ref Range    TSH <0.006 (L) 0.450 - 4.500 uIU/mL   Thyroid Peroxidase Antibody   Result Value Ref Range    Thyroid Peroxidase Antibody 104 (H) 0 - 34 IU/mL   T3, Reverse   Result Value Ref Range    T3, Reverse 14.8 9.2 - 24.1 ng/dL   Anti-Thyroglobulin " Antibody   Result Value Ref Range    Thyroglobulin Ab 1,494.6 (H) 0.0 - 0.9 IU/mL         Assessment/Plan   Salinas was seen today for thyroid problem, rash and tremors.    Diagnoses and all orders for this visit:    Allergic dermatitis    Abnormal thyroid blood test    Anemia, unspecified type  -     Ambulatory Referral to Hematology    Lipid screening  -     CBC & Differential  -     Comprehensive Metabolic Panel  -     Lipid Panel    Tremors of nervous system    Other orders  -     acyclovir (ZOVIRAX) 400 MG tablet; Take 2 tablets by mouth 2 (Two) Times a Day. As needed for herpes outbreak  -     clotrimazole-betamethasone (LOTRISONE) 1-0.05 % cream; Apply  topically to the appropriate area as directed 2 (Two) Times a Day.    plan:  1. lipid screening : labs reviewed, ldl  80 in , diet and exercise counseled  2. Abnormal thyroid tests : Patient is very insistent that I take care of her thyroid labs, I have informed the patient that I am unable to take care of her  abnormal thyroid labs and she needs to follow the recommendations given to her by the endocrinologist who is a specialist in this field, patient stated that she does not agree with what the endocrinologist recommends, she[patient  ] states that I do not understand what she[ the patient ] is trying to recommend to me, I have informed the patient that compliance is important for treatment, she is very insistent that the treatment be followed as to what she[ the patient] decides what of the drugs that she needs to take and the patient believes that the treatment that she receives will be decided by her [ the patient ] .  I have informed the patient that I cannot take care of of her thyroid issues and she does need to follow-up with endocrinologist and needs to be compliant with that recommendations  3.  Anemia: Labs reviewed, patient was informed that she is not anemic based on her current labs but she insists the diet due for the labs which I have  told her that I will not do unnecessary medical tests at this time but I am willing to refer her to the hematologist was a specialist in anemia and she can discuss the same with them , she insists that her ferritin levels and iron profile is important for her t3 dominance  4.  Allergic dermatitis: We will give her topical Lotrisone and monitor , etiology unknown, she will need dermatology referral if no improvement  5.  Tremors: Patient advised that she can stop propranolol. she does need to follow-up with neurology.  Labs reviewed  I have spent more than 30 minutes trying to convince the patient about compliance and recommendations of the specialist, patient stated that she would like to transfer care to a family practice physician who can take care of her thyroid and iron issues, I have provided her the names of the doctors in this practice. she does state that she needs a wellness visit and is given a follow-up appointment for that with me which she can cancel once she finds the physician of her choice to help with her medical condition and treatment           Romy Prince MD

## 2019-04-25 ENCOUNTER — TELEPHONE (OUTPATIENT)
Dept: INTERNAL MEDICINE | Facility: CLINIC | Age: 69
End: 2019-04-25

## 2019-04-25 NOTE — TELEPHONE ENCOUNTER
"She also wants iron pills and a full iron work up when hers is normal. She wants someone who will treat her iron and it is willing to self diagnosis Hashimotos. She doesn't want to do what the endo told her to do and she wants someone who wont make her go. She also states she needs iron and wants \"ALL\" Iron labs because she has researched it and knows she is iron def. She also wants someone who will not just treat her T4 like the endo. She said her and Dr Prince just was not on the same page. She also stated Dr Carroll didn't take her insurance. I advised her that looking at her labs that you would follow what the endo said. She wanted all new thyroid labs and I told her that you would not order if not due, the same with iron workup. She wants to know if you will accept her to just meet her and see if you would fit her needs.   "

## 2019-04-25 NOTE — TELEPHONE ENCOUNTER
Pt called back. She stated she has spoken to an endo office and she is willing to see an Endo Dr. She also stated she has had low iron before and knows this is an issue she will need treated int the future.   I told her I would relay this information to Dr Turk and someone would call her back.

## 2019-04-25 NOTE — TELEPHONE ENCOUNTER
Talked with patient and explained that Dr Turk would want her to see endocrinologist as well. She wants someone who would not make her see a endo.

## 2019-04-25 NOTE — TELEPHONE ENCOUNTER
Patient was referred by Dr. Prince to see if switching to Dr. Turk would be a good fit. The patient would like to speak to [place] to see if Dr. Turk would be a good fit before she officially made the request to switch PCPs.    Please advise.

## 2019-04-26 ENCOUNTER — TELEPHONE (OUTPATIENT)
Dept: INTERNAL MEDICINE | Facility: CLINIC | Age: 69
End: 2019-04-26

## 2019-04-26 RX ORDER — LEVOTHYROXINE AND LIOTHYRONINE 76; 18 UG/1; UG/1
TABLET ORAL
Qty: 30 TABLET | Refills: 5 | Status: SHIPPED | OUTPATIENT
Start: 2019-04-26 | End: 2019-05-02

## 2019-04-26 RX ORDER — LEVOTHYROXINE SODIUM 0.05 MG/1
TABLET ORAL
Qty: 30 TABLET | Refills: 5 | Status: SHIPPED | OUTPATIENT
Start: 2019-04-26 | End: 2019-07-31

## 2019-04-26 NOTE — TELEPHONE ENCOUNTER
Patient called asking for Evonne to continue the conversation about potentially changing PCPs. Patient was informed that she would most likely be reached out to on Monday.    Please advise.

## 2019-04-26 NOTE — TELEPHONE ENCOUNTER
Spoke with patient she will try to take what you suggested 120 NP thyroid and 50 mcg of synthroid.  She changed her mind and believes she can make the appt in June, however, I can not longer see the date & time you selected.  Please resend and I will arrange on your schedule

## 2019-04-26 NOTE — TELEPHONE ENCOUNTER
Pt calling back after speaking with you yesterday because she has an update on transportation that she would like to share with you. Please call her back at [phone]

## 2019-04-29 NOTE — TELEPHONE ENCOUNTER
Discussed with Dr. Prince this morning.  She instructed me to hold off on any decision at this time.  She is working with Sarah Estevez regarding this patient.

## 2019-04-30 ENCOUNTER — TELEPHONE (OUTPATIENT)
Dept: INTERNAL MEDICINE | Facility: CLINIC | Age: 69
End: 2019-04-30

## 2019-04-30 NOTE — TELEPHONE ENCOUNTER
PT. DIDN'T MAKE A F/U APPT. WHEN SHE WAS HERE IN April; SHE STATED THAT  WANTED TO SEE HER BACK IN 2 MONTHS; IS THERE SOMEWHERE THAT PT. CAN BE FIT IN; PLEASE CALL AND SCHEDULE [phone]

## 2019-05-01 ENCOUNTER — TELEPHONE (OUTPATIENT)
Dept: INTERNAL MEDICINE | Facility: CLINIC | Age: 69
End: 2019-05-01

## 2019-05-01 ENCOUNTER — OFFICE VISIT (OUTPATIENT)
Dept: NEUROLOGY | Facility: CLINIC | Age: 69
End: 2019-05-01

## 2019-05-01 VITALS — OXYGEN SATURATION: 98 % | BODY MASS INDEX: 39.86 KG/M2 | HEART RATE: 96 BPM | HEIGHT: 63 IN

## 2019-05-01 DIAGNOSIS — G47.19 EXCESSIVE DAYTIME SLEEPINESS: ICD-10-CM

## 2019-05-01 DIAGNOSIS — G47.34 NOCTURNAL OXYGEN DESATURATION: ICD-10-CM

## 2019-05-01 DIAGNOSIS — G47.33 OBSTRUCTIVE SLEEP APNEA: ICD-10-CM

## 2019-05-01 DIAGNOSIS — E06.3 HASHIMOTO'S THYROIDITIS: ICD-10-CM

## 2019-05-01 DIAGNOSIS — G25.0 ESSENTIAL TREMOR: Primary | ICD-10-CM

## 2019-05-01 PROCEDURE — 99214 OFFICE O/P EST MOD 30 MIN: CPT | Performed by: PSYCHIATRY & NEUROLOGY

## 2019-05-01 NOTE — PROGRESS NOTES
"[facility] NEUROLOGY [place] CONSULTATION   History of Present Illness     Date: 5/1/2019    Patient Identification  Salinas Monroy is a 69 y.o. female.    Patient information was obtained from patient.  History/Exam limitations: none.    CONSULTATION requested by: Romy Prince MD    Chief Complaint   Tremors (Pt in office today for follow up, discuss medications. Pt states she has weaned herself off Primidone )      History of Present Illness   Patient is a 69 year-old female with a past medical history of Hashimoto's Thyroiditis, essential hypertension, and essential tremor coming to clinic for follow-up of essential tremor. Her first visit to the clinic was 3 months ago when she was started on 50mg of Primidone. This was the first time that the patient sought out medical care for her tremors. She has had tremors in bilateral hands and arms for the past 20-30 years, which she just \"lived with.\" She has an extensive family history of essential tremor as well. When the patient began the Primidone at the beginning of February, she noticed a mild improvement of her essential tremor in the first 1-2 weeks, followed by worsening of the tremor. The patient contacted the clinic and was instructed to increase her dose of primidone to 100mg. The patient reports that this increase actually worsened her tremors, so she spent the next month weaning herself completely off the Primidone. As of today in clinic, the patient has been completely off her primidone for the last week. Her tremor is unchanged from her visit 3 months ago. She is interested in trying propranolol for treatment of her tremors.     Patient also complains of loud snoring witnessed apneic episode and excessive daytime sleepiness patient reports that on the average she will be sleeping 9 to 10 hours but waking up still on refresh.  We have counseled patient extensively how obstructive sleep apnea patient in light of hyperthyroidism can " "precipitate myocardial infarction or ischemic stroke by increasing metabolism without adequate oxygen supply to the myocardium or the central nervous system.     PMH:   Past Medical History:   Diagnosis Date   • Anemia ?    under control   • Arthritis    • Depression    • Diverticulosis    • Essential hypertension 7/30/2018   • GERD (gastroesophageal reflux disease)     no longer bothers me...   • Gout    • Gout    • Hashimoto's disease    • Hernia 3-2012    repair surgery which failed in 2014   • Hypothyroid    • Impingement syndrome of right shoulder 5/8/2016   • Movement disorder Hand Tremors   • PONV (postoperative nausea and vomiting)     Pt states \"only one time\".   • Tremor     worse on left arm/hand   • Wears glasses        Past Surgical History:   Past Surgical History:   Procedure Laterality Date   • ABDOMINAL SURGERY     • APPENDECTOMY     • COLONOSCOPY  2012    failed due to hernia...   • COLONOSCOPY N/A 6/8/2017    Procedure: COLONOSCOPY with cold snare polypectomies, argon thermal ablation, ns injection, yanira ink injection;  Surgeon: Rafiq Huang MD;  Location: [facility] ENDOSCOPY;  Service:    • COLONOSCOPY N/A 6/5/2018    Procedure: COLONOSCOPY WITH BIOPSIES;  Surgeon: Rafiq Huang MD;  Location: [facility] ENDOSCOPY;  Service: Gastroenterology   • GASTRIC BYPASS  1978   • HERNIA MESH REMOVAL  2014    BOWEL OBSTRUCTION DUE TO MESH   • HERNIA REPAIR  2012   • HERNIA REPAIR     • HYSTERECTOMY     • JOINT REPLACEMENT  2009 & 2012    Left & Right Full Hip Replacements   • SMALL INTESTINE SURGERY  2014    DUE TO BOWEL OBSTRUCTION WITH SOME REMOVAL   • TOTAL ABDOMINAL HYSTERECTOMY WITH SALPINGO OOPHORECTOMY  1990    fibroids   • TUBAL ABDOMINAL LIGATION     • UPPER GASTROINTESTINAL ENDOSCOPY         Family Hisotry:   Family History   Problem Relation Age of Onset   • Obesity Mother    • Rheum arthritis Mother    • Thyroid disease Mother    • Hypertension Mother    • Stroke Mother         Most of her problems " "were caused by Rheumatoid Ar.   • Hyperlipidemia Mother    • Arthritis Mother         Rheumatoid Arthritis..Passed    • Cancer Father         Passed    • Kidney cancer Father    • Liver cancer Father    • No Known Problems Brother    • Diabetes Maternal Aunt    • Cancer Maternal Uncle    • Lung cancer Maternal Uncle    • Alcohol abuse Maternal Uncle    • No Known Problems Paternal Aunt    • Stroke Paternal Uncle    • Hypertension Paternal Uncle    • Hyperlipidemia Paternal Uncle    • Heart disease Paternal Uncle    • No Known Problems Brother    • Asthma Sister         under control   • Alcohol abuse Maternal Uncle    • Colon cancer Neg Hx    • Breast cancer Neg Hx    • Ovarian cancer Neg Hx        Social History:   Social History     Socioeconomic History   • Marital status:      Spouse name: Not on file   • Number of children: Not on file   • Years of education: Not on file   • Highest education level: Not on file   Tobacco Use   • Smoking status: Former Smoker     Packs/day: 1.00     Years: 40.00     Pack years: 40.00     Types: Cigarettes     Start date:      Last attempt to quit:      Years since quittin.3   • Smokeless tobacco: Never Used   • Tobacco comment: \"quit 9 years ago\"   Substance and Sexual Activity   • Alcohol use: No   • Drug use: No   • Sexual activity: Not Currently     Partners: Male     Birth control/protection: None       Medications:   Current Outpatient Medications   Medication Sig Dispense Refill   • Acetylcysteine (N-ACETYL-L-CYSTEINE PO) Take 1 tablet by mouth Daily.     • acyclovir (ZOVIRAX) 400 MG tablet Take 2 tablets by mouth 2 (Two) Times a Day. As needed for herpes outbreak 60 tablet 0   • allopurinol (ZYLOPRIM) 100 MG tablet Take 2 tablets by mouth Daily. 180 tablet 3   • B Complex Vitamins (VITAMIN B COMPLEX PO) Take 1 tablet by mouth Daily.     • B-12, Methylcobalamin, 1000 MCG sublingual tablet      • BIOTIN PO Take 1 tablet by mouth Daily.     • " Cholecalciferol (VITAMIN D3) 84453 UNITS tablet Take 7,500 Units by mouth Daily.     • clotrimazole-betamethasone (LOTRISONE) 1-0.05 % cream Apply  topically to the appropriate area as directed 2 (Two) Times a Day. 15 g 4   • Coenzyme Q10 (CO Q 10 PO) Take 1 tablet by mouth Daily.     • Digestive Enzymes (DIGESTIVE ENZYME PO) Take 1 tablet by mouth Daily.     • escitalopram (LEXAPRO) 5 MG tablet Take 1 tablet by mouth Daily. 90 tablet 3   • GARLIC PO Take 1 tablet by mouth Daily.     • levothyroxine (SYNTHROID) 50 MCG tablet 1 po daily in addition to 120 NP Thyroid 30 tablet 5   • MAGNESIUM PO Take 1,000 mg by mouth Every Night.     • Methylsulfonylmethane (MSM PO) Take 1 tablet by mouth Daily.     • MILK THISTLE PO Take 1 tablet by mouth Daily.     • Misc Natural Products (TART CHERRY ADVANCED PO) Take 2 capsules by mouth Daily.     • Omega-3 Fatty Acids (OMEGA-3 FISH OIL PO) Take 4,000 Units by mouth Daily.     • Probiotic Product (PROBIOTIC PO) Take 1 tablet by mouth Daily.     • propranolol (INDERAL) 20 MG tablet Take 1 tablet by mouth 2 (Two) Times a Day. 60 tablet 6   • Riboflavin (VITAMIN B-2 PO) Take 300 mg by mouth Daily.     • SELENIUM PO Take 1 tablet by mouth Daily.     • THYROID 120 MG PO tablet 1 Po Daily in addition to 50 mcg of levothyroixine 30 tablet 5   • TURMERIC PO Take 1 tablet by mouth Daily.     • vitamin C (ASCORBIC ACID) 500 MG tablet Take 500 mg by mouth 3 (Three) Times a Day As Needed.     • vitamin E 400 UNIT capsule Take 400 Units by mouth Daily.     • VITAMIN K PO Take 100 mcg by mouth Daily.     • Zinc 30 MG tablet Take  by mouth Daily.       No current facility-administered medications for this visit.        Allergy:   Allergies   Allergen Reactions   • Diazepam Rash       Review of Systems:  Review of Systems   Constitutional: Positive for fatigue. Negative for chills and fever.   HENT: Negative for congestion, ear pain, hearing loss, rhinorrhea and sore throat.    Eyes: Negative  "for pain, discharge and redness.   Respiratory: Positive for apnea. Negative for cough, shortness of breath, wheezing and stridor.    Cardiovascular: Negative for chest pain, palpitations and leg swelling.   Gastrointestinal: Negative for abdominal pain, constipation, nausea and vomiting.   Endocrine: Negative for cold intolerance, heat intolerance and polyphagia.   Genitourinary: Negative for dysuria, flank pain, frequency and urgency.   Musculoskeletal: Negative for joint swelling, myalgias, neck pain and neck stiffness.   Skin: Negative for pallor, rash and wound.   Allergic/Immunologic: Negative for environmental allergies.   Neurological: Positive for tremors. Negative for dizziness, seizures, syncope, facial asymmetry, speech difficulty, weakness, light-headedness, numbness and headaches.   Hematological: Negative for adenopathy.   Psychiatric/Behavioral: Positive for sleep disturbance. Negative for confusion and hallucinations. The patient is not nervous/anxious.        Physical Exam     Vitals:    05/01/19 1128   BP: Comment: Unable to get BP due to pt movement   Pulse: 96   SpO2: 98%   Height: 160 cm (63\")     GENERAL: Patient is pleasant, cooperative, appears to be stated age.  Body habitus is endomorphic.  SKIN AND EXTREMITIES:  No skin rashes or lesions are noted.  No cyanosis, clubbing or edema of the extremities.    HEAD:  Head is normocephalic and atraumatic.    NECK: Nontender without thyromegaly or adenopathy.  Carotid upstrokes are 1+/4.  No cranial or cervical bruits.  The neck is supple with a full range of motion.   ENT: Palate elevates symmetrically.  No evidence of high arch palate.  Tongue midline.  No erythema in posterior pharynx.  Mallampati Classification Class III   CARDIOVASCULAR:  Regular rate and rhythm with normal S1 and S2 without rub or gallop.  RESPIRATORY:  Clear to auscultation without wheezes or crackle   ABDOMEN:  Soft and nontender, positive bowel sound without " hepatosplenomegaly  BACK:  Back is straight without midline defect.    PSYCH:  Higher cortical function/mental status:  The patient is alert.  The patient is oriented x3 to time, place and person.  Recent and the remote memory appear normal.  The patient has a good fund of knowledge.  There is no visual or auditory hallucination or suicidal or homicidal ideation.  SPEECH:There is no gross evidence of aphasia, dysarthria or agnosia.      CRANIAL NERVES:  Pupils are 4mm, equal round reactive to light, reacting briskly to 2mm without afferent pupillary defect.  Visual fields are intact to confrontation testing.  Funduscopic examination reveals sharp disc margins with normal vasculature.  No papilledema, hemorrhages or exudates.  Extraocular movements are full and smooth with normal pursuits and saccades.  No nystagmus noted.  The face is symmetric. Palate elevates symmetrically, Tongue midline, positive gag reflex. The remainder of the cranial nerves are intact and symmetrical.    MOTOR: Strength is 5/5 throughout with normal tone and bulk with the following exceptions, 4/5 intrinsic muscles of the hands and feet.  No involuntary movements noted.    Deep Tendon Reflexes: are 2/4 and symmetrical in the upper extremities, 2/4 and symmetrical at the knees and 1/4 and symmetrical at the Achilles tendon.  Plantar responses were down-going bilaterally.    SENSATION:  Intact to pinprick, light touch, vibration and proprioception.  Coordination:  The patient normally performs finger-nose-finger, heel-to-knee-to-shin and rapid alternating movements in symmetrical fashion.    COORDINATION AND GAIT:  The patient walks with a narrow-based gait.  Patient is able to heel-toe and tandem walk forward and backwards without difficulty.  Romberg and monopedal  Romberg are negative.    MUSCULOSKELETAL: Range of motion normal, no clubbing, cyanosis, or edema.  No joint swelling.            Studies: I have personally reviewed the following  "and discussed with the patient.  Results for orders placed or performed in visit on 04/18/19   T4, Free   Result Value Ref Range    Free T4 0.64 (L) 0.93 - 1.70 ng/dL   T3   Result Value Ref Range    T3, Total 199 (H) 71 - 180 ng/dL   TSH   Result Value Ref Range    TSH <0.006 (L) 0.450 - 4.500 uIU/mL   Thyroid Peroxidase Antibody   Result Value Ref Range    Thyroid Peroxidase Antibody 104 (H) 0 - 34 IU/mL   T3, Reverse   Result Value Ref Range    T3, Reverse 14.8 9.2 - 24.1 ng/dL   Anti-Thyroglobulin Antibody   Result Value Ref Range    Thyroglobulin Ab 1,494.6 (H) 0.0 - 0.9 IU/mL     Records Reviewed: I have personally reviewed her previous medical record.    Salinas was seen today for tremors.    Diagnoses and all orders for this visit:    Essential tremor    Hashimoto's thyroiditis    Obstructive sleep apnea    Excessive daytime sleepiness    Nocturnal oxygen desaturation        Discussion:  In summary, 69 year-old female with a past medical history of Hashimoto's Thyroiditis, essential hypertension, and essential tremor coming to clinic for follow-up of essential tremor. Her first visit to the clinic was 3 months ago when she was started on 50mg of Primidone. This was the first time that the patient sought out medical care for her tremors. She has had tremors in bilateral hands and arms for the past 20-30 years, which she just \"lived with.\" She has an extensive family history of essential tremor as well. When the patient began the Primidone at the beginning of February, she noticed a mild improvement of her essential tremor in the first 1-2 weeks, followed by worsening of the tremor. The patient contacted the clinic and was instructed to increase her dose of primidone to 100mg. The patient reports that this increase actually worsened her tremors, so she spent the next month weaning herself completely off the Primidone. As of today in clinic, the patient has been completely off her primidone for the last week. " Her tremor is unchanged from her visit 3 months ago. She is interested in trying propranolol for treatment of her tremors.     Patient also complains of loud snoring witnessed apneic episode and excessive daytime sleepiness patient reports that on the average she will be sleeping 9 to 10 hours but waking up still on refresh.  We have counseled patient extensively how obstructive sleep apnea patient in light of hyperthyroidism can precipitate myocardial infarction or ischemic stroke by increasing metabolism without adequate oxygen supply to the myocardium or the central nervous system.     I have counseled patient extensively on Sleep Hygiene including regular sleep wake schedule and stimulus control therapy.  I have also discussed the importance of weight reduction because 10% reduction in body weight can reduce sleep apnea by 50 %. We have also discussed abstaining from smoking and drinking.  I have explained to patient that obstructive apnea episode is defined as the absence of airflow for at least 10 seconds.  Sleep apnea is usually accompanied by snoring, disturbed sleep, and daytime sleepiness. Patients with micrognathia, retrognathia, enlarged tonsils, tongue enlargement, and acromegaly are especially predisposed to obstructive sleep apnea. Abnormalities or weakness in the muscles can also contribute to obstructive sleep apnea. Obesity can also contribute to sleep apnea.     Sleep apnea can lead to a number of complications, ranging from daytime sleepiness to possible increased risk of cardiovascular risks.   Daytime sleepiness is the most serious.  Daytime sleepiness can also increase the risk for accident-related injuries. Several studies have suggested that people with sleep apnea have two to three times as many car accidents, and five to seven times the risk for multiple accidents.   A number of cardiovascular diseases -- including high blood pressure, heart failure, stroke, and heart arrhythmias -- have  an association with obstructive sleep apnea.   Up to a third of patients with heart failure also have sleep apnea. Both central and obstructive sleep apnea are linked with heart failure. Obstructive sleep apnea is also noted to be associated with type 2 diabetes according to Dr. Cabezas at [facility].  The best treatment for symptomatic obstructive sleep apnea is continuous positive airflow pressure (CPAP). Bilevel positive airway pressure (BPAP) systems may be particularly helpful for patients with coexisting lung disease and those with excessive levels of carbon dioxide.  Other treatment options including UPPP surgery, LAUP surgery, radiofrequency somnoplasty and dental appliances such [place] or [place].  This Document is signed by Uday Barnes MD, FAAN, FAASM May 1, 25612:17 PM

## 2019-05-01 NOTE — TELEPHONE ENCOUNTER
Pt is calling because her pharmacy told her that she will need to have Dr Freeman resend the prescription with a clarification for the thyroid medication.    She said the clarification needs to specify NP thyroid 120 mg     She uses [facility] in [place]

## 2019-05-02 RX ORDER — LEVOTHYROXINE, LIOTHYRONINE 76; 18 UG/1; UG/1
120 TABLET ORAL DAILY
Qty: 30 TABLET | Refills: 11 | Status: SHIPPED | OUTPATIENT
Start: 2019-05-02 | End: 2019-09-30 | Stop reason: ALTCHOICE

## 2019-05-29 ENCOUNTER — HOSPITAL ENCOUNTER (OUTPATIENT)
Dept: SLEEP MEDICINE | Facility: HOSPITAL | Age: 69
Setting detail: THERAPIES SERIES
End: 2019-05-29

## 2019-05-29 DIAGNOSIS — G47.34 NOCTURNAL OXYGEN DESATURATION: ICD-10-CM

## 2019-05-29 DIAGNOSIS — G47.33 OBSTRUCTIVE SLEEP APNEA: ICD-10-CM

## 2019-05-29 DIAGNOSIS — G47.19 EXCESSIVE DAYTIME SLEEPINESS: ICD-10-CM

## 2019-05-29 PROCEDURE — 95806 SLEEP STUDY UNATT&RESP EFFT: CPT | Performed by: PSYCHIATRY & NEUROLOGY

## 2019-05-29 PROCEDURE — 95806 SLEEP STUDY UNATT&RESP EFFT: CPT

## 2019-06-11 ENCOUNTER — OFFICE VISIT (OUTPATIENT)
Dept: INTERNAL MEDICINE | Facility: CLINIC | Age: 69
End: 2019-06-11

## 2019-06-11 VITALS
HEIGHT: 63 IN | HEART RATE: 67 BPM | WEIGHT: 230 LBS | SYSTOLIC BLOOD PRESSURE: 134 MMHG | BODY MASS INDEX: 40.75 KG/M2 | DIASTOLIC BLOOD PRESSURE: 82 MMHG | RESPIRATION RATE: 16 BRPM | OXYGEN SATURATION: 98 % | TEMPERATURE: 97 F

## 2019-06-11 DIAGNOSIS — Z00.00 ROUTINE GENERAL MEDICAL EXAMINATION AT A HEALTH CARE FACILITY: Primary | ICD-10-CM

## 2019-06-11 DIAGNOSIS — R21 RASH AND NONSPECIFIC SKIN ERUPTION: ICD-10-CM

## 2019-06-11 PROCEDURE — 96160 PT-FOCUSED HLTH RISK ASSMT: CPT | Performed by: INTERNAL MEDICINE

## 2019-06-11 PROCEDURE — 99397 PER PM REEVAL EST PAT 65+ YR: CPT | Performed by: INTERNAL MEDICINE

## 2019-06-11 PROCEDURE — G0439 PPPS, SUBSEQ VISIT: HCPCS | Performed by: INTERNAL MEDICINE

## 2019-06-11 NOTE — PATIENT INSTRUCTIONS
Exercising to Stay Healthy  Exercising regularly is important. It has many health benefits, such as:  · Improving your overall fitness, flexibility, and endurance.  · Increasing your bone density.  · Helping with weight control.  · Decreasing your body fat.  · Increasing your muscle strength.  · Reducing stress and tension.  · Improving your overall health.    In order to become healthy and stay healthy, it is recommended that you do moderate-intensity and vigorous-intensity exercise. You can tell that you are exercising at a moderate intensity if you have a higher heart rate and faster breathing, but you are still able to hold a conversation. You can tell that you are exercising at a vigorous intensity if you are breathing much harder and faster and cannot hold a conversation while exercising.  How often should I exercise?  Choose an activity that you enjoy and set realistic goals. Your health care provider can help you to make an activity plan that works for you. Exercise regularly as directed by your health care provider. This may include:  · Doing resistance training twice each week, such as:  ? Push-ups.  ? Sit-ups.  ? Lifting weights.  ? Using resistance bands.  · Doing a given intensity of exercise for a given amount of time. Choose from these options:  ? 150 minutes of moderate-intensity exercise every week.  ? 75 minutes of vigorous-intensity exercise every week.  ? A mix of moderate-intensity and vigorous-intensity exercise every week.    Children, pregnant women, people who are out of shape, people who are overweight, and older adults may need to consult a health care provider for individual recommendations. If you have any sort of medical condition, be sure to consult your health care provider before starting a new exercise program.  What are some exercise ideas?  Some moderate-intensity exercise ideas include:  · Walking at a rate of 1 mile in 15  minutes.  · Biking.  · Hiking.  · Golfing.  · Dancing.    Some vigorous-intensity exercise ideas include:  · Walking at a rate of at least 4.5 miles per hour.  · Jogging or running at a rate of 5 miles per hour.  · Biking at a rate of at least 10 miles per hour.  · Lap swimming.  · Roller-skating or in-line skating.  · Cross-country skiing.  · Vigorous competitive sports, such as football, basketball, and soccer.  · Jumping rope.  · Aerobic dancing.    What are some everyday activities that can help me to get exercise?  · Yard work, such as:  ? Pushing a .  ? Raking and bagging leaves.  · Washing and waxing your car.  · Pushing a stroller.  · Shoveling snow.  · Gardening.  · Washing windows or floors.  How can I be more active in my day-to-day activities?  · Use the stairs instead of the elevator.  · Take a walk during your lunch break.  · If you drive, park your car farther away from work or school.  · If you take public transportation, get off one stop early and walk the rest of the way.  · Make all of your phone calls while standing up and walking around.  · Get up, stretch, and walk around every 30 minutes throughout the day.  What guidelines should I follow while exercising?  · Do not exercise so much that you hurt yourself, feel dizzy, or get very short of breath.  · Consult your health care provider before starting a new exercise program.  · Wear comfortable clothes and shoes with good support.  · Drink plenty of water while you exercise to prevent dehydration or heat stroke. Body water is lost during exercise and must be replaced.  · Work out until you breathe faster and your heart beats faster.  This information is not intended to replace advice given to you by your health care provider. Make sure you discuss any questions you have with your health care provider.  Document Released: 01/20/2012 Document Revised: 05/25/2017 Document Reviewed: 05/21/2015  [employer] Interactive Patient Education © 2018  [employer]    Medicare Wellness  Personal Prevention Plan of Service     Date of Office Visit:  2019  Encounter Provider:  Romy Prince MD  Place of Service:  [facility] PRIMARY CARE  Patient Name: Salinas Monroy  :  1950    As part of the Medicare Wellness portion of your visit today, we are providing you with this personalized preventive plan of services (PPPS). This plan is based upon recommendations of the [place] Preventive Services Task Force (USPSTF) and the Advisory Committee on Immunization Practices (ACIP).    This lists the preventive care services that should be considered, and provides dates of when you are due. Items listed as completed are up-to-date and do not require any further intervention.    Health Maintenance   Topic Date Due   • TDAP/TD VACCINES (1 - Tdap) 1968   • ZOSTER VACCINE (1 of 2) 1999   • MEDICARE ANNUAL WELLNESS  2018   • PNEUMOCOCCAL VACCINES (65+ LOW/MEDIUM RISK) (2 of 2 - PCV13) 2018   • LUNG CANCER SCREENING  10/05/2018   • INFLUENZA VACCINE  2019   • LIPID PANEL  2019   • MAMMOGRAM  2020   • DXA SCAN  02/10/2021   • COLONOSCOPY  2028   • HEPATITIS C SCREENING  Completed       No orders of the defined types were placed in this encounter.      Return in 1 year (on 2020) for Medicare Wellness.

## 2019-06-11 NOTE — PROGRESS NOTES
"QUICK REFERENCE INFORMATION:  The ABCs of the Annual Wellness Visit    Subsequent Medicare Wellness Visit     HEALTH RISK ASSESSMENT    : 1950    Recent Hospitalizations:  No hospitalization(s) within the last year..        Current Medical Providers:  Patient Care Team:  Romy Prince MD as PCP - General (Internal Medicine)  Niraj Ramos MD as PCP - Claims Attributed        Smoking Status:  Social History     Tobacco Use   Smoking Status Former Smoker   • Packs/day: 1.00   • Years: 40.00   • Pack years: 40.00   • Types: Cigarettes   • Start date:    • Last attempt to quit:    • Years since quittin.4   Smokeless Tobacco Never Used   Tobacco Comment    \"quit 9 years ago\"       Alcohol Consumption:  Social History     Substance and Sexual Activity   Alcohol Use No       Depression Screen:   PHQ-2/PHQ-9 Depression Screening 2019   Little interest or pleasure in doing things 1   Feeling down, depressed, or hopeless 1   Trouble falling or staying asleep, or sleeping too much 0   Feeling tired or having little energy 3   Poor appetite or overeating 0   Feeling bad about yourself - or that you are a failure or have let yourself or your family down 0   Trouble concentrating on things, such as reading the newspaper or watching television 0   Moving or speaking so slowly that other people could have noticed. Or the opposite - being so fidgety or restless that you have been moving around a lot more than usual 0   Thoughts that you would be better off dead, or of hurting yourself in some way 0   Total Score 5   If you checked off any problems, how difficult have these problems made it for you to do your work, take care of things at home, or get along with other people? Not difficult at all       Health Habits and Functional and Cognitive Screening:  Functional & Cognitive Status 2019   Do you have difficulty preparing food and eating? No   Do you have difficulty bathing yourself, getting " dressed or grooming yourself? No   Do you have difficulty using the toilet? No   Do you have difficulty moving around from place to place? No   Do you have trouble with steps or getting out of a bed or a chair? No   In the past year have you fallen or experienced a near fall? No   Current Diet Well Balanced Diet   Dental Exam Not up to date   Eye Exam Not up to date   Exercise (times per week) 2 times per week   Current Exercise Activities Include Housecleaning   Do you need help using the phone?  No   Are you deaf or do you have serious difficulty hearing?  No   Do you need help with transportation? Yes   Do you need help shopping? No   Do you need help preparing meals?  No   Do you need help with housework?  No   Do you need help with laundry? No   Do you need help taking your medications? No   Do you need help managing money? No   Do you ever drive or ride in a car without wearing a seat belt? No   Have you felt unusual stress, anger or loneliness in the last month? No   Who do you live with? Alone   If you need help, do you have trouble finding someone available to you? No   Have you been bothered in the last four weeks by sexual problems? No   Do you have difficulty concentrating, remembering or making decisions? No           Does the patient have evidence of cognitive impairment? No    Asiprin use counseling: Does not need ASA (and currently is not on it)      Recent Lab Results:    Lab Results   Component Value Date     (H) 06/13/2019        Lab Results   Component Value Date    TRIG 109 06/13/2019    HDL 53 06/13/2019    VLDL 21.8 06/13/2019           Age-appropriate Screening Schedule:  Refer to the list below for future screening recommendations based on patient's age, sex and/or medical conditions. Orders for these recommended tests are listed in the plan section. The patient has been provided with a written plan.    Health Maintenance   Topic Date Due   • TDAP/TD VACCINES (1 - Tdap) 01/18/1968   •  ZOSTER VACCINE (1 of 2) 01/18/1999   • PNEUMOCOCCAL VACCINES (65+ LOW/MEDIUM RISK) (2 of 2 - PCV13) 09/05/2018   • INFLUENZA VACCINE  08/01/2019   • LIPID PANEL  12/03/2019   • MAMMOGRAM  04/03/2020   • DXA SCAN  02/10/2021   • COLONOSCOPY  06/05/2028        Chief Complaint   Patient presents with   • Medicare Wellness-subsequent   • Rash       Subjective   History of Present Illness    Salinas Monroy is a 69 y.o. female who presents for an Annual Wellness Visit , physical  And [facility] [facility]  .  She complains of a rash in her neck area     The following portions of the patient's history were reviewed and updated as appropriate: allergies, current medications, past family history, past medical history, past social history, past surgical history and problem list.    Outpatient Medications Prior to Visit   Medication Sig Dispense Refill   • Acetylcysteine (N-ACETYL-L-CYSTEINE PO) Take 1 tablet by mouth Daily.     • acyclovir (ZOVIRAX) 400 MG tablet Take 2 tablets by mouth 2 (Two) Times a Day. As needed for herpes outbreak 60 tablet 0   • allopurinol (ZYLOPRIM) 100 MG tablet Take 2 tablets by mouth Daily. 180 tablet 3   • B Complex Vitamins (VITAMIN B COMPLEX PO) Take 1 tablet by mouth Daily.     • B-12, Methylcobalamin, 1000 MCG sublingual tablet      • BIOTIN PO Take 1 tablet by mouth Daily.     • Cholecalciferol (VITAMIN D3) 10554 UNITS tablet Take 7,500 Units by mouth Daily.     • clotrimazole-betamethasone (LOTRISONE) 1-0.05 % cream Apply  topically to the appropriate area as directed 2 (Two) Times a Day. 15 g 4   • Coenzyme Q10 (CO Q 10 PO) Take 1 tablet by mouth Daily.     • Digestive Enzymes (DIGESTIVE ENZYME PO) Take 1 tablet by mouth Daily.     • escitalopram (LEXAPRO) 5 MG tablet Take 1 tablet by mouth Daily. 90 tablet 3   • GARLIC PO Take 1 tablet by mouth Daily.     • levothyroxine (SYNTHROID) 50 MCG tablet 1 po daily in addition to 120 NP Thyroid 30 tablet 5   • MAGNESIUM PO Take 1,000 mg by mouth  Every Night.     • Methylsulfonylmethane (MSM PO) Take 1 tablet by mouth Daily.     • MILK THISTLE PO Take 1 tablet by mouth Daily.     • Misc Natural Products (TART CHERRY ADVANCED PO) Take 2 capsules by mouth Daily.     • NP THYROID 120 MG tablet Take 1 tablet by mouth Daily. 30 tablet 11   • Omega-3 Fatty Acids (OMEGA-3 FISH OIL PO) Take 4,000 Units by mouth Daily.     • Probiotic Product (PROBIOTIC PO) Take 1 tablet by mouth Daily.     • propranolol (INDERAL) 20 MG tablet Take 1 tablet by mouth 2 (Two) Times a Day. 60 tablet 6   • Riboflavin (VITAMIN B-2 PO) Take 300 mg by mouth Daily.     • SELENIUM PO Take 1 tablet by mouth Daily.     • TURMERIC PO Take 1 tablet by mouth Daily.     • vitamin C (ASCORBIC ACID) 500 MG tablet Take 500 mg by mouth 3 (Three) Times a Day As Needed.     • vitamin E 400 UNIT capsule Take 400 Units by mouth Daily.     • VITAMIN K PO Take 100 mcg by mouth Daily.     • Zinc 30 MG tablet Take  by mouth Daily.       No facility-administered medications prior to visit.        Patient Active Problem List   Diagnosis   • Hashimoto's thyroiditis   • Essential tremor   • Vitamin D deficiency   • Primary osteoarthritis involving multiple joints   • Chronic idiopathic gout involving toe of right foot without tophus   • Obsessive-compulsive disorder   • Postsurgical menopause   • Colon cancer screening   • History of colon polyps   • Essential hypertension   • Left ventricular hypertrophy   • Diastolic dysfunction without heart failure   • Hypothyroidism, adult       Advance Care Planning:  Patient has an advance directive - a copy has not been provided. Have asked the patient to send this to us to add to record    Identification of Risk Factors:  Risk factors include: weight , unhealthy diet, cardiovascular risk, inactivity and increased fall risk.    Review of Systems   Constitutional: Negative for appetite change, fatigue and fever.   HENT: Negative for congestion, ear discharge, ear pain,  sinus pressure and sore throat.    Eyes: Negative for pain and discharge.   Respiratory: Negative for cough, chest tightness, shortness of breath and wheezing.    Cardiovascular: Negative for chest pain, palpitations and leg swelling.   Gastrointestinal: Negative for abdominal pain, blood in stool, constipation, diarrhea and nausea.   Endocrine: Negative for cold intolerance and heat intolerance.   Genitourinary: Negative for dysuria, flank pain and frequency.   Musculoskeletal: Negative for back pain and joint swelling.   Skin: Positive for rash. Negative for color change.   Allergic/Immunologic: Negative for environmental allergies and food allergies.   Neurological: Negative for dizziness, weakness, numbness and headaches.   Hematological: Negative for adenopathy. Does not bruise/bleed easily.   Psychiatric/Behavioral: Negative for behavioral problems and dysphoric mood. The patient is not nervous/anxious.        Compared to one year ago, the patient feels her physical health is the same.  Compared to one year ago, the patient feels her mental health is the same.    Objective     Physical Exam   Constitutional: She is oriented to person, place, and time. She appears well-developed and well-nourished. No distress.   HENT:   Head: Normocephalic and atraumatic.   Right Ear: External ear normal.   Left Ear: External ear normal.   Nose: Nose normal.   Mouth/Throat: Oropharynx is clear and moist.   Eyes: Conjunctivae and EOM are normal. Pupils are equal, round, and reactive to light.   Neck: Neck supple. No thyromegaly present.   Cardiovascular: Normal rate, regular rhythm and normal heart sounds.   Pulmonary/Chest: Effort normal and breath sounds normal. No respiratory distress.   Abdominal: Soft. Bowel sounds are normal. She exhibits no distension. There is no tenderness. There is no rebound.   Musculoskeletal: Normal range of motion. She exhibits no edema.   Lymphadenopathy:     She has no cervical adenopathy.  "  Neurological: She is alert and oriented to person, place, and time.   No gross motor or sensory deficits   Skin: Skin is warm. Rash noted. She is not diaphoretic.   neck   Psychiatric: She has a normal mood and affect.   Nursing note and vitals reviewed.      Vitals:    06/11/19 1625   BP: 134/82   Pulse: 67   Resp: 16   Temp: 97 °F (36.1 °C)   SpO2: 98%   Weight: 104 kg (230 lb)   Height: 160 cm (62.99\")   PainSc: 0-No pain       Patient's Body mass index is 40.75 kg/m². BMI is above normal parameters. Recommendations include: educational material, exercise counseling and nutrition counseling.      Assessment/Plan   Patient Self-Management and Personalized Health Advice  The patient has been provided with information about: diet, exercise, weight management and prevention of cardiac or vascular disease and preventive services including:   · Advance directive, Exercise counseling provided, Fall Risk assessment done, Fall Risk plan of care done, Influenza vaccine, Nutrition counseling provided, Pneumococcal vaccine .    Visit Diagnoses:    ICD-10-CM ICD-9-CM   1. Routine general medical examination at a health care facility Z00.00 V70.0   2. Rash and nonspecific skin eruption R21 782.1     Plan:  1.physical: Physical exam conducted today, reviewed fasting lab work,   Impression: healthy adult Patient. Currently, patient eats a healthy diet. Screening lab work includes glucose, lipid profile and complete blood count . The risks and benefits of immunizations were discussed and immunizations are up to date. Advice and education were given regarding nutrition and aerobic exercise. Patient discussion: discussed with the patient.  Annual Wellness Visit , physical  And [facility] [facility]  with preventive exam as well as age and risk appropriate counseling completed.   Advance Directive Planning: paperwork and instructions were given to the patient.   Patient Discussion: plan discussed with the patient, follow-up visit needed in " one year.   Medication Review and medication list updated  2. Rash : refer to dermatology    Orders Placed This Encounter   Procedures   • Ambulatory Referral to Dermatology     Referral Priority:   Routine     Referral Type:   Consultation     Referral Reason:   Specialty Services Required     Requested Specialty:   Dermatology     Number of Visits Requested:   1       Outpatient Encounter Medications as of 6/11/2019   Medication Sig Dispense Refill   • Acetylcysteine (N-ACETYL-L-CYSTEINE PO) Take 1 tablet by mouth Daily.     • acyclovir (ZOVIRAX) 400 MG tablet Take 2 tablets by mouth 2 (Two) Times a Day. As needed for herpes outbreak 60 tablet 0   • allopurinol (ZYLOPRIM) 100 MG tablet Take 2 tablets by mouth Daily. 180 tablet 3   • B Complex Vitamins (VITAMIN B COMPLEX PO) Take 1 tablet by mouth Daily.     • B-12, Methylcobalamin, 1000 MCG sublingual tablet      • BIOTIN PO Take 1 tablet by mouth Daily.     • Cholecalciferol (VITAMIN D3) 61906 UNITS tablet Take 7,500 Units by mouth Daily.     • clotrimazole-betamethasone (LOTRISONE) 1-0.05 % cream Apply  topically to the appropriate area as directed 2 (Two) Times a Day. 15 g 4   • Coenzyme Q10 (CO Q 10 PO) Take 1 tablet by mouth Daily.     • Digestive Enzymes (DIGESTIVE ENZYME PO) Take 1 tablet by mouth Daily.     • escitalopram (LEXAPRO) 5 MG tablet Take 1 tablet by mouth Daily. 90 tablet 3   • GARLIC PO Take 1 tablet by mouth Daily.     • levothyroxine (SYNTHROID) 50 MCG tablet 1 po daily in addition to 120 NP Thyroid 30 tablet 5   • MAGNESIUM PO Take 1,000 mg by mouth Every Night.     • Methylsulfonylmethane (MSM PO) Take 1 tablet by mouth Daily.     • MILK THISTLE PO Take 1 tablet by mouth Daily.     • Misc Natural Products (TART CHERRY ADVANCED PO) Take 2 capsules by mouth Daily.     • NP THYROID 120 MG tablet Take 1 tablet by mouth Daily. 30 tablet 11   • Omega-3 Fatty Acids (OMEGA-3 FISH OIL PO) Take 4,000 Units by mouth Daily.     • Probiotic Product  (PROBIOTIC PO) Take 1 tablet by mouth Daily.     • propranolol (INDERAL) 20 MG tablet Take 1 tablet by mouth 2 (Two) Times a Day. 60 tablet 6   • Riboflavin (VITAMIN B-2 PO) Take 300 mg by mouth Daily.     • SELENIUM PO Take 1 tablet by mouth Daily.     • TURMERIC PO Take 1 tablet by mouth Daily.     • vitamin C (ASCORBIC ACID) 500 MG tablet Take 500 mg by mouth 3 (Three) Times a Day As Needed.     • vitamin E 400 UNIT capsule Take 400 Units by mouth Daily.     • VITAMIN K PO Take 100 mcg by mouth Daily.     • Zinc 30 MG tablet Take  by mouth Daily.       No facility-administered encounter medications on file as of 6/11/2019.        Reviewed use of high risk medication in the elderly: no  Reviewed for potential of harmful drug interactions in the elderly: no    Follow Up:  Return in 1 year (on 6/11/2020) for Medicare Wellness.     An After Visit Summary and PPPS with all of these plans were given to the patient.

## 2019-06-13 ENCOUNTER — OFFICE VISIT (OUTPATIENT)
Dept: ENDOCRINOLOGY | Facility: CLINIC | Age: 69
End: 2019-06-13

## 2019-06-13 VITALS
TEMPERATURE: 98.1 F | HEIGHT: 63 IN | BODY MASS INDEX: 41.11 KG/M2 | WEIGHT: 232 LBS | OXYGEN SATURATION: 97 % | HEART RATE: 61 BPM | DIASTOLIC BLOOD PRESSURE: 78 MMHG | SYSTOLIC BLOOD PRESSURE: 128 MMHG

## 2019-06-13 DIAGNOSIS — E55.9 VITAMIN D DEFICIENCY: ICD-10-CM

## 2019-06-13 DIAGNOSIS — G25.0 ESSENTIAL TREMOR: ICD-10-CM

## 2019-06-13 DIAGNOSIS — E03.9 HYPOTHYROIDISM, ADULT: Primary | ICD-10-CM

## 2019-06-13 DIAGNOSIS — E61.1 IRON DEFICIENCY: ICD-10-CM

## 2019-06-13 DIAGNOSIS — E06.3 HASHIMOTO'S THYROIDITIS: ICD-10-CM

## 2019-06-13 DIAGNOSIS — R53.83 FATIGUE, UNSPECIFIED TYPE: ICD-10-CM

## 2019-06-13 PROCEDURE — 99214 OFFICE O/P EST MOD 30 MIN: CPT | Performed by: INTERNAL MEDICINE

## 2019-06-13 NOTE — PROGRESS NOTES
Subjective:   Hashimoto's Thyroiditis (Follow up: Since starting the new RX causing more tirdness )        Salinas Monroy is a 69 y.o. female who is being seen for follow-up   Hashimoto thyroiditis dx in 1992, presented with mood disorder. And started Lithium.  2015 she was dx with Hashimoto thyroiditis and changed to Lavalette. Her mood problems resolved and she felt much better. [id number] she tried different regimen and added cytomel 4 tabs daily on top of NP thyroid 90 mg 1.5 tablet and 3 tabs of cytomel 25 mg.   Her labs reviewed and she had suppressed TSH < 0.015 over the last 3 years with various levels of T3 and T4.   Patient is convinced that she is feeling great on the current regimen. She states that she needs to keep TSH suppressed for optimal thyroid management. She told that previous provider stated that Hashimoto thyroiditis requires TSH suppression to prevent reverse T3.   Patient had clear signs of hyperthyroidism - worsened tremors, anxiety and irritability. She denies the sx and was asking to increase the dose. Labs showed TSH < 0.015 with low T4 of 6.3 and high T3. I have changed her regimen to levothyroxine 50 mcg and NP thyroid 120 mg.     Essential tremors are managed by neurologist and primidone trial was not effective. I have given her propranolol and it is effective. Takes 20 mg BID.  She discotninued primidone .    NP thyroid 120 mg and levothyroxine 50 mcg.     Fatigue is getting worse. Tremors improved.       Review of Systems  Review of Systems   Constitutional: Positive for fatigue and unexpected weight gain.   Neurological: Positive for tremors (improved).   All other systems reviewed and are negative.    Current medications:  Current Outpatient Medications   Medication Sig Dispense Refill   • Acetylcysteine (N-ACETYL-L-CYSTEINE PO) Take 1 tablet by mouth Daily.     • acyclovir (ZOVIRAX) 400 MG tablet Take 2 tablets by mouth 2 (Two) Times a Day. As needed for herpes outbreak 60  tablet 0   • allopurinol (ZYLOPRIM) 100 MG tablet Take 2 tablets by mouth Daily. 180 tablet 3   • B Complex Vitamins (VITAMIN B COMPLEX PO) Take 1 tablet by mouth Daily.     • B-12, Methylcobalamin, 1000 MCG sublingual tablet      • BIOTIN PO Take 1 tablet by mouth Daily.     • Cholecalciferol (VITAMIN D3) 18114 UNITS tablet Take 7,500 Units by mouth Daily.     • clotrimazole-betamethasone (LOTRISONE) 1-0.05 % cream Apply  topically to the appropriate area as directed 2 (Two) Times a Day. 15 g 4   • Coenzyme Q10 (CO Q 10 PO) Take 1 tablet by mouth Daily.     • Digestive Enzymes (DIGESTIVE ENZYME PO) Take 1 tablet by mouth Daily.     • escitalopram (LEXAPRO) 5 MG tablet Take 1 tablet by mouth Daily. 90 tablet 3   • GARLIC PO Take 1 tablet by mouth Daily.     • levothyroxine (SYNTHROID) 50 MCG tablet 1 po daily in addition to 120 NP Thyroid 30 tablet 5   • MAGNESIUM PO Take 1,000 mg by mouth Every Night.     • Methylsulfonylmethane (MSM PO) Take 1 tablet by mouth Daily.     • MILK THISTLE PO Take 1 tablet by mouth Daily.     • Misc Natural Products (TART CHERRY ADVANCED PO) Take 2 capsules by mouth Daily.     • NP THYROID 120 MG tablet Take 1 tablet by mouth Daily. 30 tablet 11   • Omega-3 Fatty Acids (OMEGA-3 FISH OIL PO) Take 4,000 Units by mouth Daily.     • Probiotic Product (PROBIOTIC PO) Take 1 tablet by mouth Daily.     • propranolol (INDERAL) 20 MG tablet Take 1 tablet by mouth 2 (Two) Times a Day. 60 tablet 6   • Riboflavin (VITAMIN B-2 PO) Take 300 mg by mouth Daily.     • SELENIUM PO Take 1 tablet by mouth Daily.     • TURMERIC PO Take 1 tablet by mouth Daily.     • vitamin C (ASCORBIC ACID) 500 MG tablet Take 500 mg by mouth 3 (Three) Times a Day As Needed.     • vitamin E 400 UNIT capsule Take 400 Units by mouth Daily.     • VITAMIN K PO Take 100 mcg by mouth Daily.     • Zinc 30 MG tablet Take  by mouth Daily.       No current facility-administered medications for this visit.        PMH  The following  portions of the patient's history were reviewed and updated as appropriate: allergies, current medications, past family history, past medical history, past social history, past surgical history and problem list.        Objective:     Vitals:    06/13/19 1301   BP: 128/78   Pulse: 61   Temp: 98.1 °F (36.7 °C)   SpO2: 97%   Body mass index is 41.11 kg/m².  Physical Exam   Constitutional: She is oriented to person, place, and time. She appears well-developed and well-nourished. She appears distressed.   HENT:   Head: Normocephalic and atraumatic.   Eyes: Conjunctivae are normal.   Neck: Normal range of motion. No thyromegaly present.   Cardiovascular: Normal rate, regular rhythm, normal heart sounds and intact distal pulses.   Pulmonary/Chest: Effort normal and breath sounds normal.   Musculoskeletal: She exhibits no edema.   Lymphadenopathy:     She has no cervical adenopathy.   Neurological: She is alert and oriented to person, place, and time.   Reflex Scores:       Bicep reflexes are 3+ on the right side and 3+ on the left side.  Skin: No rash noted.   Psychiatric: Thought content normal. Her mood appears anxious. She is agitated and hyperactive.   Nursing note and vitals reviewed.      LABS AND IMAGING  Lab Results   Component Value Date    TSH <0.006 (L) 06/13/2019    W4KYSEM 110 06/13/2019    I4LXOSS 4.50 (L) 10/06/2016    FREET4 1.26 06/13/2019    THYROIDAB 104 (H) 04/18/2019               Assessment:        Problem List Items Addressed This Visit        Digestive    Vitamin D deficiency    Relevant Orders    Vitamin D 25 Hydroxy (Completed)       Endocrine    Hashimoto's thyroiditis    Relevant Orders    T3 (Completed)    T3 Free    T4, Free (Completed)    TSH (Completed)    T3, Reverse    Comprehensive Metabolic Panel (Completed)    Lipid Panel (Completed)    Iron Profile (Completed)    Vitamin B12 (Completed)    T3, Reverse (Completed)    Hypothyroidism, adult - Primary    Relevant Orders    T3 (Completed)     T3 Free    T4, Free (Completed)    TSH (Completed)    T3, Reverse    Comprehensive Metabolic Panel (Completed)    Lipid Panel (Completed)    Iron Profile (Completed)    T3, Reverse (Completed)       Nervous and Auditory    Essential tremor      Other Visit Diagnoses     Fatigue, unspecified type        Relevant Orders    Iron Profile (Completed)    Vitamin B12 (Completed)    Iron deficiency        Relevant Orders    Iron Profile (Completed)    Vitamin B12 (Completed)               Plan:        Patient appears clinically hyperthyroid due to taking inappropriate high dose of the medication. With decrease in medication dose some of her symptoms resolved.    She feels better on the regimen, just has severe fatigue.   Will repeat the levels and adjust the dose accordingly.   Thyroid function showed normal T3 and T4, suppressed TSH (which could be indication of the previously high doses.). Other  Labs - normal Vit levels and metabolic panel are normal. Iron saturation is low and I advised her to take some supplements.

## 2019-06-16 LAB
25(OH)D3+25(OH)D2 SERPL-MCNC: 88.9 NG/ML (ref 30–100)
ALBUMIN SERPL-MCNC: 4.1 G/DL (ref 3.5–5.2)
ALBUMIN/GLOB SERPL: 1.5 G/DL
ALP SERPL-CCNC: 126 U/L (ref 39–117)
ALT SERPL-CCNC: 16 U/L (ref 1–33)
AST SERPL-CCNC: 17 U/L (ref 1–32)
BILIRUB SERPL-MCNC: 0.3 MG/DL (ref 0.2–1.2)
BUN SERPL-MCNC: 25 MG/DL (ref 8–23)
BUN/CREAT SERPL: 35.2 (ref 7–25)
CALCIUM SERPL-MCNC: 9.6 MG/DL (ref 8.6–10.5)
CHLORIDE SERPL-SCNC: 110 MMOL/L (ref 98–107)
CHOLEST SERPL-MCNC: 151 MG/DL (ref 0–200)
CO2 SERPL-SCNC: 24.6 MMOL/L (ref 22–29)
CREAT SERPL-MCNC: 0.71 MG/DL (ref 0.57–1)
GLOBULIN SER CALC-MCNC: 2.7 GM/DL
GLUCOSE SERPL-MCNC: 104 MG/DL (ref 65–99)
HDLC SERPL-MCNC: 53 MG/DL (ref 40–60)
IRON SATN MFR SERPL: 14 % (ref 20–50)
IRON SERPL-MCNC: 52 MCG/DL (ref 37–145)
LDLC SERPL CALC-MCNC: 76 MG/DL (ref 0–100)
POTASSIUM SERPL-SCNC: 5.1 MMOL/L (ref 3.5–5.2)
PROT SERPL-MCNC: 6.8 G/DL (ref 6–8.5)
SODIUM SERPL-SCNC: 144 MMOL/L (ref 136–145)
T3 SERPL-MCNC: 110 NG/DL (ref 71–180)
T3REVERSE SERPL-MCNC: 20 NG/DL (ref 9.2–24.1)
T4 FREE SERPL-MCNC: 1.26 NG/DL (ref 0.93–1.7)
TIBC SERPL-MCNC: 361 MCG/DL
TRIGL SERPL-MCNC: 109 MG/DL (ref 0–150)
TSH SERPL DL<=0.005 MIU/L-ACNC: <0.006 UIU/ML (ref 0.45–4.5)
UIBC SERPL-MCNC: 309 MCG/DL (ref 112–346)
VIT B12 SERPL-MCNC: 1830 PG/ML (ref 211–946)
VLDLC SERPL CALC-MCNC: 21.8 MG/DL

## 2019-06-18 ENCOUNTER — OFFICE VISIT (OUTPATIENT)
Dept: NEUROLOGY | Facility: CLINIC | Age: 69
End: 2019-06-18

## 2019-06-18 VITALS
HEART RATE: 63 BPM | DIASTOLIC BLOOD PRESSURE: 80 MMHG | TEMPERATURE: 97.9 F | OXYGEN SATURATION: 95 % | BODY MASS INDEX: 40.76 KG/M2 | SYSTOLIC BLOOD PRESSURE: 130 MMHG | WEIGHT: 230 LBS

## 2019-06-18 DIAGNOSIS — G47.19 EXCESSIVE DAYTIME SLEEPINESS: ICD-10-CM

## 2019-06-18 DIAGNOSIS — G47.33 OBSTRUCTIVE SLEEP APNEA: Primary | ICD-10-CM

## 2019-06-18 DIAGNOSIS — G47.34 NOCTURNAL OXYGEN DESATURATION: ICD-10-CM

## 2019-06-18 PROCEDURE — 99214 OFFICE O/P EST MOD 30 MIN: CPT | Performed by: PSYCHIATRY & NEUROLOGY

## 2019-06-19 NOTE — PROGRESS NOTES
"[facility] NEUROLOGY [place] CONSULTATION   History of Present Illness     Date: 6/18/2019    Patient Identification  Salinas Monroy is a 69 y.o. female.    Patient information was obtained from patient.  History/Exam limitations: none.    CONSULTATION requested by: Romy Quintanilla MD    Chief Complaint   Follow-up (Pt states she's here to follow up on HST and tremors. )      History of Present Illness   Patient is a pleasant 69-year-old referred to [facility] neurology [place] for evaluation of obstructive sleep apnea.  History since last visit patient underwent home sleep testing.  Home sleep testing was performed on May 30, 2019.  She was found to have an apnea hypotony index of 30 events per hour with desaturation down to 86%.      I have counseled patient extensively the importance of compliance with nasal CPAP will provided patient with auto CPAP at 5 to 15 cm water pressure.  I encouraged patient to have a good sleep hygiene encourage regular sleep-wake schedule and avoid sleep deprivation.    PMH:   Past Medical History:   Diagnosis Date   • Anemia ?    under control   • Arthritis    • Depression    • Diverticulosis    • Essential hypertension 7/30/2018   • GERD (gastroesophageal reflux disease)     no longer bothers me...   • Gout    • Gout    • Hashimoto's disease    • Hernia 3-2012    repair surgery which failed in 2014   • Hypothyroid    • Impingement syndrome of right shoulder 5/8/2016   • Movement disorder Hand Tremors   • PONV (postoperative nausea and vomiting)     Pt states \"only one time\".   • Tremor     worse on left arm/hand   • Wears glasses        Past Surgical History:   Past Surgical History:   Procedure Laterality Date   • ABDOMINAL SURGERY     • APPENDECTOMY     • COLONOSCOPY  2012    failed due to hernia...   • COLONOSCOPY N/A 6/8/2017    Procedure: COLONOSCOPY with cold snare polypectomies, argon thermal ablation, ns injection, yanira ink injection;  Surgeon: Rafiq TORIBIO" MD Sal;  Location: [facility] ENDOSCOPY;  Service:    • COLONOSCOPY N/A 6/5/2018    Procedure: COLONOSCOPY WITH BIOPSIES;  Surgeon: Rafiq Huang MD;  Location: [facility] ENDOSCOPY;  Service: Gastroenterology   • GASTRIC BYPASS  1978   • HERNIA MESH REMOVAL  2014    BOWEL OBSTRUCTION DUE TO MESH   • HERNIA REPAIR  2012   • HERNIA REPAIR     • HYSTERECTOMY     • JOINT REPLACEMENT  2009 & 2012    Left & Right Full Hip Replacements   • SMALL INTESTINE SURGERY  2014    DUE TO BOWEL OBSTRUCTION WITH SOME REMOVAL   • TOTAL ABDOMINAL HYSTERECTOMY WITH SALPINGO OOPHORECTOMY  1990    fibroids   • TUBAL ABDOMINAL LIGATION     • UPPER GASTROINTESTINAL ENDOSCOPY         Family Hisotry:   Family History   Problem Relation Age of Onset   • Obesity Mother    • Rheum arthritis Mother    • Thyroid disease Mother    • Hypertension Mother    • Stroke Mother         Most of her problems were caused by Rheumatoid Ar.   • Hyperlipidemia Mother    • Arthritis Mother         Rheumatoid Arthritis..Passed 2004   • Cancer Father         Passed 1997   • Kidney cancer Father    • Liver cancer Father    • No Known Problems Brother    • Diabetes Maternal Aunt    • Cancer Maternal Uncle    • Lung cancer Maternal Uncle    • Alcohol abuse Maternal Uncle    • No Known Problems Paternal Aunt    • Stroke Paternal Uncle    • Hypertension Paternal Uncle    • Hyperlipidemia Paternal Uncle    • Heart disease Paternal Uncle    • No Known Problems Brother    • Asthma Sister         under control   • Alcohol abuse Maternal Uncle    • Colon cancer Neg Hx    • Breast cancer Neg Hx    • Ovarian cancer Neg Hx        Social History:   Social History     Socioeconomic History   • Marital status:      Spouse name: Not on file   • Number of children: Not on file   • Years of education: Not on file   • Highest education level: Not on file   Tobacco Use   • Smoking status: Former Smoker     Packs/day: 1.00     Years: 40.00     Pack years: 40.00     Types:  "Cigarettes     Start date:      Last attempt to quit: 2008     Years since quittin.4   • Smokeless tobacco: Never Used   • Tobacco comment: \"quit 9 years ago\"   Substance and Sexual Activity   • Alcohol use: No   • Drug use: No   • Sexual activity: Not Currently     Partners: Male     Birth control/protection: None       Medications:   Current Outpatient Medications   Medication Sig Dispense Refill   • Acetylcysteine (N-ACETYL-L-CYSTEINE PO) Take 1 tablet by mouth Daily.     • acyclovir (ZOVIRAX) 400 MG tablet Take 2 tablets by mouth 2 (Two) Times a Day. As needed for herpes outbreak 60 tablet 0   • allopurinol (ZYLOPRIM) 100 MG tablet Take 2 tablets by mouth Daily. 180 tablet 3   • B Complex Vitamins (VITAMIN B COMPLEX PO) Take 1 tablet by mouth Daily.     • B-12, Methylcobalamin, 1000 MCG sublingual tablet      • BIOTIN PO Take 1 tablet by mouth Daily.     • Cholecalciferol (VITAMIN D3) 74068 UNITS tablet Take 7,500 Units by mouth Daily.     • clotrimazole-betamethasone (LOTRISONE) 1-0.05 % cream Apply  topically to the appropriate area as directed 2 (Two) Times a Day. 15 g 4   • Coenzyme Q10 (CO Q 10 PO) Take 1 tablet by mouth Daily.     • Digestive Enzymes (DIGESTIVE ENZYME PO) Take 1 tablet by mouth Daily.     • escitalopram (LEXAPRO) 5 MG tablet Take 1 tablet by mouth Daily. 90 tablet 3   • GARLIC PO Take 1 tablet by mouth Daily.     • levothyroxine (SYNTHROID) 50 MCG tablet 1 po daily in addition to 120 NP Thyroid 30 tablet 5   • MAGNESIUM PO Take 1,000 mg by mouth Every Night.     • Methylsulfonylmethane (MSM PO) Take 1 tablet by mouth Daily.     • MILK THISTLE PO Take 1 tablet by mouth Daily.     • Misc Natural Products (TART CHERRY ADVANCED PO) Take 2 capsules by mouth Daily.     • NP THYROID 120 MG tablet Take 1 tablet by mouth Daily. 30 tablet 11   • Omega-3 Fatty Acids (OMEGA-3 FISH OIL PO) Take 4,000 Units by mouth Daily.     • Probiotic Product (PROBIOTIC PO) Take 1 tablet by mouth Daily.   "   • propranolol (INDERAL) 20 MG tablet Take 1 tablet by mouth 2 (Two) Times a Day. 60 tablet 6   • Riboflavin (VITAMIN B-2 PO) Take 300 mg by mouth Daily.     • SELENIUM PO Take 1 tablet by mouth Daily.     • TURMERIC PO Take 1 tablet by mouth Daily.     • vitamin C (ASCORBIC ACID) 500 MG tablet Take 500 mg by mouth 3 (Three) Times a Day As Needed.     • vitamin E 400 UNIT capsule Take 400 Units by mouth Daily.     • VITAMIN K PO Take 100 mcg by mouth Daily.     • Zinc 30 MG tablet Take  by mouth Daily.       No current facility-administered medications for this visit.        Allergy:   Allergies   Allergen Reactions   • Diazepam Rash       Review of Systems:  Review of Systems   Constitutional: Positive for fatigue. Negative for chills and fever.   HENT: Negative for congestion, ear pain, hearing loss, rhinorrhea and sore throat.    Eyes: Negative for pain, discharge and redness.   Respiratory: Positive for apnea. Negative for cough, shortness of breath, wheezing and stridor.    Cardiovascular: Negative for chest pain, palpitations and leg swelling.   Gastrointestinal: Negative for abdominal pain, constipation, nausea and vomiting.   Endocrine: Negative for cold intolerance, heat intolerance and polyphagia.   Genitourinary: Negative for dysuria, flank pain, frequency and urgency.   Musculoskeletal: Negative for joint swelling, myalgias, neck pain and neck stiffness.   Skin: Negative for pallor, rash and wound.   Allergic/Immunologic: Negative for environmental allergies.   Neurological: Positive for tremors. Negative for dizziness, seizures, syncope, facial asymmetry, speech difficulty, weakness, light-headedness, numbness and headaches.   Hematological: Negative for adenopathy.   Psychiatric/Behavioral: Positive for sleep disturbance. Negative for confusion and hallucinations. The patient is not nervous/anxious.        Physical Exam     Vitals:    06/18/19 1416   BP: 130/80   Pulse: 63   Temp: 97.9 °F (36.6 °C)    SpO2: 95%   Weight: 104 kg (230 lb)     GENERAL: Patient is pleasant, cooperative, appears to be stated age.  Body habitus is endomorphic.  SKIN AND EXTREMITIES:  No skin rashes or lesions are noted.  No cyanosis, clubbing or edema of the extremities.    HEAD:  Head is normocephalic and atraumatic.    NECK: Nontender without thyromegaly or adenopathy.  Carotid upstrokes are 1+/4.  No cranial or cervical bruits.  The neck is supple with a full range of motion.   ENT: Palate elevates symmetrically.  No evidence of high arch palate.  Tongue midline.  No erythema in posterior pharynx.  Mallampati Classification Class III   CARDIOVASCULAR:  Regular rate and rhythm with normal S1 and S2 without rub or gallop.  RESPIRATORY:  Clear to auscultation without wheezes or crackle   ABDOMEN:  Soft and nontender, positive bowel sound without hepatosplenomegaly  BACK:  Back is straight without midline defect.    PSYCH:  Higher cortical function/mental status:  The patient is alert.  The patient is oriented x3 to time, place and person.  Recent and the remote memory appear normal.  The patient has a good fund of knowledge.  There is no visual or auditory hallucination or suicidal or homicidal ideation.  SPEECH:There is no gross evidence of aphasia, dysarthria or agnosia.      CRANIAL NERVES:  Pupils are 4mm, equal round reactive to light, reacting briskly to 2mm without afferent pupillary defect.  Visual fields are intact to confrontation testing.  Funduscopic examination reveals sharp disc margins with normal vasculature.  No papilledema, hemorrhages or exudates.  Extraocular movements are full and smooth with normal pursuits and saccades.  No nystagmus noted.  The face is symmetric. Palate elevates symmetrically, Tongue midline, positive gag reflex. The remainder of the cranial nerves are intact and symmetrical.    MOTOR: Strength is 5/5 throughout with normal tone and bulk with the following exceptions, 4/5 intrinsic muscles of  the hands and feet.  No involuntary movements noted.    Deep Tendon Reflexes: are 2/4 and symmetrical in the upper extremities, 2/4 and symmetrical at the knees and 1/4 and symmetrical at the Achilles tendon.  Plantar responses were down-going bilaterally.    SENSATION:  Intact to pinprick, light touch, vibration and proprioception.  Coordination:  The patient normally performs finger-nose-finger, heel-to-knee-to-shin and rapid alternating movements in symmetrical fashion.    COORDINATION AND GAIT:  The patient walks with a narrow-based gait.  Patient is able to heel-toe and tandem walk forward and backwards without difficulty.  Romberg and monopedal  Romberg are negative.    MUSCULOSKELETAL: Range of motion normal, no clubbing, cyanosis, or edema.  No joint swelling.            Records Reviewed: I have personally reviewed her previous medical record.    Salinas was seen today for follow-up.    Diagnoses and all orders for this visit:    Obstructive sleep apnea    Excessive daytime sleepiness    Nocturnal oxygen desaturation      Discussion:  In summary, 69-year-old referred to [facility] neurology [place] for evaluation of obstructive sleep apnea.  History since last visit patient underwent home sleep testing.  Home sleep testing was performed on May 30, 2019.  She was found to have an apnea hypotony index of 30 events per hour with desaturation down to 86%.      I have counseled patient extensively the importance of compliance with nasal CPAP will provided patient with auto CPAP at 5 to 15 cm water pressure.  I encouraged patient to have a good sleep hygiene encourage regular sleep-wake schedule and avoid sleep deprivation.    I have counseled patient extensively on Sleep Hygiene including regular sleep wake schedule and stimulus control therapy.  I have also discussed the importance of weight reduction because 10% reduction in body weight can reduce sleep apnea by 50 %. We have also discussed abstaining  from smoking and drinking.  I have explained to patient that obstructive apnea episode is defined as the absence of airflow for at least 10 seconds.  Sleep apnea is usually accompanied by snoring, disturbed sleep, and daytime sleepiness. Patients with micrognathia, retrognathia, enlarged tonsils, tongue enlargement, and acromegaly are especially predisposed to obstructive sleep apnea. Abnormalities or weakness in the muscles can also contribute to obstructive sleep apnea. Obesity can also contribute to sleep apnea.     Sleep apnea can lead to a number of complications, ranging from daytime sleepiness to possible increased risk of cardiovascular risks.   Daytime sleepiness is the most serious.  Daytime sleepiness can also increase the risk for accident-related injuries. Several studies have suggested that people with sleep apnea have two to three times as many car accidents, and five to seven times the risk for multiple accidents.   A number of cardiovascular diseases -- including high blood pressure, heart failure, stroke, and heart arrhythmias -- have an association with obstructive sleep apnea.   Up to a third of patients with heart failure also have sleep apnea. Both central and obstructive sleep apnea are linked with heart failure. Obstructive sleep apnea is also noted to be associated with type 2 diabetes according to Dr. Cabezas at [facility].  The best treatment for symptomatic obstructive sleep apnea is continuous positive airflow pressure (CPAP). Bilevel positive airway pressure (BPAP) systems may be particularly helpful for patients with coexisting lung disease and those with excessive levels of carbon dioxide.  Other treatment options including UPPP surgery, LAUP surgery, radiofrequency somnoplasty and dental appliances such Chastity or Clearway.    This Document is signed by Uday Barnes MD, FAAN, FAASM June 18, 201911:45 PM

## 2019-06-27 RX ORDER — ACYCLOVIR 400 MG/1
800 TABLET ORAL 2 TIMES DAILY
Qty: 60 TABLET | Refills: 0 | Status: SHIPPED | OUTPATIENT
Start: 2019-06-27 | End: 2019-09-30 | Stop reason: ALTCHOICE

## 2019-06-27 NOTE — TELEPHONE ENCOUNTER
Patient called requesting a refill of her acyclovir because she is not able to get into the dermatologist until the end of the month. Please call her at [phone] if you have any questions.

## 2019-07-23 ENCOUNTER — TELEPHONE (OUTPATIENT)
Dept: INTERNAL MEDICINE | Facility: CLINIC | Age: 69
End: 2019-07-23

## 2019-07-23 NOTE — TELEPHONE ENCOUNTER
Pt has an appt next week she wanted to get her labs in [place] Thursday  But she need to know today because she has to get a ride there  She wanted these labs checked free t3 free t4 tsh and reverse t3  Can we please put order in and call the pt  [phone]

## 2019-07-24 DIAGNOSIS — E03.9 HYPOTHYROIDISM, ADULT: Primary | ICD-10-CM

## 2019-07-25 ENCOUNTER — LAB (OUTPATIENT)
Dept: LAB | Facility: HOSPITAL | Age: 69
End: 2019-07-25

## 2019-07-25 DIAGNOSIS — E03.9 HYPOTHYROIDISM, ADULT: ICD-10-CM

## 2019-07-25 LAB
T4 FREE SERPL-MCNC: 1.11 NG/DL (ref 0.78–2.19)
TSH SERPL DL<=0.05 MIU/L-ACNC: <0.015 MIU/ML (ref 0.47–4.68)

## 2019-07-25 PROCEDURE — 84480 ASSAY TRIIODOTHYRONINE (T3): CPT

## 2019-07-25 PROCEDURE — 36415 COLL VENOUS BLD VENIPUNCTURE: CPT

## 2019-07-25 PROCEDURE — 84482 T3 REVERSE: CPT

## 2019-07-25 PROCEDURE — 84439 ASSAY OF FREE THYROXINE: CPT

## 2019-07-25 PROCEDURE — 84481 FREE ASSAY (FT-3): CPT

## 2019-07-25 PROCEDURE — 84443 ASSAY THYROID STIM HORMONE: CPT

## 2019-07-26 LAB — T3 SERPL-MCNC: 81 NG/DL (ref 71–180)

## 2019-07-30 LAB — T3REVERSE SERPL-MCNC: 29.3 NG/DL (ref 9.2–24.1)

## 2019-07-31 ENCOUNTER — OFFICE VISIT (OUTPATIENT)
Dept: ENDOCRINOLOGY | Facility: CLINIC | Age: 69
End: 2019-07-31

## 2019-07-31 VITALS
BODY MASS INDEX: 41.18 KG/M2 | OXYGEN SATURATION: 97 % | HEIGHT: 63 IN | WEIGHT: 232.4 LBS | SYSTOLIC BLOOD PRESSURE: 122 MMHG | DIASTOLIC BLOOD PRESSURE: 78 MMHG | HEART RATE: 62 BPM

## 2019-07-31 DIAGNOSIS — E03.9 HYPOTHYROIDISM, ADULT: Primary | ICD-10-CM

## 2019-07-31 PROCEDURE — 99214 OFFICE O/P EST MOD 30 MIN: CPT | Performed by: INTERNAL MEDICINE

## 2019-07-31 RX ORDER — LIOTHYRONINE SODIUM 25 UG/1
12.5 TABLET ORAL 2 TIMES DAILY
Qty: 30 TABLET | Refills: 5 | Status: SHIPPED | OUTPATIENT
Start: 2019-07-31 | End: 2019-09-17 | Stop reason: SDUPTHER

## 2019-07-31 NOTE — PATIENT INSTRUCTIONS
Results for orders placed or performed in visit on 07/25/19   T3   Result Value Ref Range    T3, Total 81 71 - 180 ng/dL   T4, Free   Result Value Ref Range    Free T4 1.11 0.78 - 2.19 ng/dL   TSH   Result Value Ref Range    TSH <0.015 (L) 0.470 - 4.680 mIU/mL   T3, Reverse   Result Value Ref Range    T3, Reverse 29.3 (H) 9.2 - 24.1 ng/dL

## 2019-07-31 NOTE — PROGRESS NOTES
Subjective:   Thyroid Problem (f/u)        Salinas Monroy is a 69 y.o. female who is being seen for follow-up   Hashimoto thyroiditis dx in 1992, presented with mood disorder. And started Lithium.  2015 she was dx with Hashimoto thyroiditis and changed to [place]. Her mood problems resolved and she felt much better. [id number] she tried different regimen and added cytomel 4 tabs daily on top of NP thyroid 90 mg 1.5 tablet and 3 tabs of cytomel 25 mg.   Her labs reviewed and she had suppressed TSH < 0.015 over the last 3 years with various levels of T3 and T4.   Patient is convinced that she is feeling great on the current regimen. She states that she needs to keep TSH suppressed for optimal thyroid management. She told that previous provider stated that Hashimoto thyroiditis requires TSH suppression to prevent reverse T3.   Patient had clear signs of hyperthyroidism - worsened tremors, anxiety and irritability. She denies the sx and was asking to increase the dose. Labs showed TSH < 0.015 with low T4 of 6.3 and high T3. I have changed her regimen to levothyroxine 50 mcg and NP thyroid 120 mg. She felt better initially and then reported sx of fatigue, brain fog and concentration problems.     Essential tremors are managed with propranolol and it is effective. Takes 20 mg BID.      NP thyroid 120 mg and levothyroxine 50 mcg. She completed labs recently and reported that because of the high reverse T3 she is feeling awful and needs cytomel restarted. She appears anxious.      Fatigue is getting worse. Tremors improved.       Review of Systems  Review of Systems   Constitutional: Positive for fatigue and unexpected weight gain.   Neurological: Positive for tremors (improved).   All other systems reviewed and are negative.    Current medications:  Current Outpatient Medications   Medication Sig Dispense Refill   • Acetylcysteine (N-ACETYL-L-CYSTEINE PO) Take 1 tablet by mouth Daily.     • acyclovir (ZOVIRAX) 400  MG tablet Take 2 tablets by mouth 2 (Two) Times a Day. As needed for herpes outbreak 60 tablet 0   • allopurinol (ZYLOPRIM) 100 MG tablet Take 2 tablets by mouth Daily. 180 tablet 3   • B Complex Vitamins (VITAMIN B COMPLEX PO) Take 1 tablet by mouth Daily.     • B-12, Methylcobalamin, 1000 MCG sublingual tablet      • BIOTIN PO Take 1 tablet by mouth Daily.     • Cholecalciferol (VITAMIN D3) 01003 UNITS tablet Take 7,500 Units by mouth Daily.     • clotrimazole-betamethasone (LOTRISONE) 1-0.05 % cream Apply  topically to the appropriate area as directed 2 (Two) Times a Day. 15 g 4   • Coenzyme Q10 (CO Q 10 PO) Take 1 tablet by mouth Daily.     • Digestive Enzymes (DIGESTIVE ENZYME PO) Take 1 tablet by mouth Daily.     • escitalopram (LEXAPRO) 5 MG tablet Take 1 tablet by mouth Daily. 90 tablet 3   • GARLIC PO Take 1 tablet by mouth Daily.     • levothyroxine (SYNTHROID) 50 MCG tablet 1 po daily in addition to 120 NP Thyroid 30 tablet 5   • MAGNESIUM PO Take 1,000 mg by mouth Every Night.     • Methylsulfonylmethane (MSM PO) Take 1 tablet by mouth Daily.     • MILK THISTLE PO Take 1 tablet by mouth Daily.     • Misc Natural Products (TART CHERRY ADVANCED PO) Take 2 capsules by mouth Daily.     • NP THYROID 120 MG tablet Take 1 tablet by mouth Daily. 30 tablet 11   • Omega-3 Fatty Acids (OMEGA-3 FISH OIL PO) Take 4,000 Units by mouth Daily.     • Probiotic Product (PROBIOTIC PO) Take 1 tablet by mouth Daily.     • propranolol (INDERAL) 20 MG tablet Take 1 tablet by mouth 2 (Two) Times a Day. 60 tablet 6   • Riboflavin (VITAMIN B-2 PO) Take 300 mg by mouth Daily.     • SELENIUM PO Take 1 tablet by mouth Daily.     • TURMERIC PO Take 1 tablet by mouth Daily.     • vitamin C (ASCORBIC ACID) 500 MG tablet Take 500 mg by mouth 3 (Three) Times a Day As Needed.     • vitamin E 400 UNIT capsule Take 400 Units by mouth Daily.     • VITAMIN K PO Take 100 mcg by mouth Daily.     • Zinc 30 MG tablet Take  by mouth Daily.        No current facility-administered medications for this visit.        [facility]  The following portions of the patient's history were reviewed and updated as appropriate: allergies, current medications, past family history, past medical history, past social history, past surgical history and problem list.        Objective:     Vitals:    07/31/19 1112   BP: 122/78   Pulse: 62   SpO2: 97%   Body mass index is 41.18 kg/m².  Physical Exam   Constitutional: She is oriented to person, place, and time. She appears well-developed and well-nourished. She appears distressed.   HENT:   Head: Normocephalic and atraumatic.   Eyes: Conjunctivae are normal.   Neck: Normal range of motion. No thyromegaly present.   Cardiovascular: Normal rate, regular rhythm, normal heart sounds and intact distal pulses.   Pulmonary/Chest: Effort normal and breath sounds normal.   Musculoskeletal: She exhibits no edema.   Lymphadenopathy:     She has no cervical adenopathy.   Neurological: She is alert and oriented to person, place, and time.   Reflex Scores:       Bicep reflexes are 3+ on the right side and 3+ on the left side.  Skin: No rash noted.   Psychiatric: Thought content normal. Her mood appears anxious. She is agitated and hyperactive.   Nursing note and vitals reviewed.      LABS AND IMAGING  Lab Results   Component Value Date    TSH <0.015 (L) 07/25/2019    G9KGZGH 81 07/25/2019    F2ZVZQA 4.50 (L) 10/06/2016    FREET4 1.11 07/25/2019    THYROIDAB 104 (H) 04/18/2019               Assessment:        Problem List Items Addressed This Visit        Endocrine    Hypothyroidism, adult - Primary               Plan:        Patient appears clinically and biochemically hyperthyroid due to taking inappropriate high dose of the medication. With decrease in medication dose some of her symptoms resolved.    She feels worse now and would like to d/c levothyroxine and insisting on restart the low dose cytomel. She brought the articles explaining why this  is the best approach and the effect on the reverse T3.     Thyroid function showed normal T3 and T4, suppressed TSH     Stop levothyroxine  reduce NP thyroid to 90 mg and add cytomel 12.5 mg BID.  Patient was insisting on trying the low dose cytomel and I explained that we could be running into the danger of thyrotoxicosis. I have reviewed the side effects of worsening tremors, anxiety and increased heart risk.        We will monitor the levels every 4 weeks and agreed on the short trial of reducing the dose of NP thyroid, discontinuing the levothyroxine and using low dose cytomel in divided doses.    Follow up in 4-5 months, continue monitoring levels monthly.

## 2019-08-01 PROCEDURE — 36415 COLL VENOUS BLD VENIPUNCTURE: CPT

## 2019-08-01 PROCEDURE — 84481 FREE ASSAY (FT-3): CPT

## 2019-08-02 ENCOUNTER — TELEPHONE (OUTPATIENT)
Dept: INTERNAL MEDICINE | Facility: CLINIC | Age: 69
End: 2019-08-02

## 2019-08-02 LAB
SPECIMEN STATUS: NORMAL
T3FREE SERPL-MCNC: 2.3 PG/ML (ref 2–4.4)

## 2019-08-02 NOTE — TELEPHONE ENCOUNTER
Patient called regarding the letter she received about discontinuing service. Patient stated that she had only brought up possibly switching but upon receipt of the letter she was wondering if she could switch providers to Dr. Turk.  Please advise.  Thank you.

## 2019-08-05 ENCOUNTER — TELEPHONE (OUTPATIENT)
Dept: INTERNAL MEDICINE | Facility: CLINIC | Age: 69
End: 2019-08-05

## 2019-08-05 LAB — REF LAB TEST METHOD: NORMAL

## 2019-08-23 NOTE — TELEPHONE ENCOUNTER
Patient called back stating that she has medical assistance within [facility] and she also doesn't have a vehicle to go to a doctor outside of [place] to another [facility] facility. I contacted [employer] (the bus service used by the patient) and confirmed that they would be able to take her to any appointments at [facility] in [place]. I gave her the information and patient will contact them for an appointment.

## 2019-08-23 NOTE — TELEPHONE ENCOUNTER
I spoke with patient about her dismissal from the practice. She denied that she had asked to switch PCPs to Dr. Turk (see note from 08/02) at first. After explanation that Dr. Prince (her PCP) has the authority to deny transfers of care, patient was understanding and requested information on other providers in the area.

## 2019-08-28 NOTE — TELEPHONE ENCOUNTER
Patient called back stating that she would like Dr. Prince to call her in regards to her discharge. She said that she is still very confused about exactly what's going on. I explained the situation to her in several different ways previously and continued to do so today. After a long conversation, the patient has agreed to go to [facility] [place] for her PCP needs as it is the only other [facility] provider in [facility].

## 2019-09-04 ENCOUNTER — OFFICE VISIT (OUTPATIENT)
Dept: NEUROLOGY | Facility: CLINIC | Age: 69
End: 2019-09-04

## 2019-09-04 VITALS
OXYGEN SATURATION: 96 % | SYSTOLIC BLOOD PRESSURE: 118 MMHG | WEIGHT: 230 LBS | TEMPERATURE: 99 F | HEART RATE: 77 BPM | DIASTOLIC BLOOD PRESSURE: 72 MMHG | BODY MASS INDEX: 40.76 KG/M2

## 2019-09-04 DIAGNOSIS — G25.0 ESSENTIAL TREMOR: Primary | ICD-10-CM

## 2019-09-04 DIAGNOSIS — G20 PD (PARKINSON'S DISEASE) (HCC): ICD-10-CM

## 2019-09-04 PROCEDURE — 99213 OFFICE O/P EST LOW 20 MIN: CPT | Performed by: NURSE PRACTITIONER

## 2019-09-04 NOTE — PROGRESS NOTES
Subjective   Salinas Monroy is a 69 y.o. female     Chief Complaint   Patient presents with   • Follow-up   • Sleep Apnea   • Tremors       History of Present Illness     Pt is a very pleasant 69 yr old female in clinic to follow up ISABEL and PD. Pt sleep compliance reviewed in clinic with pt 100% >4 hours AHI 5.8 @ 5-15 cmH20 Pt continues to c/o fatigue but still more energy.     Pt with c/o bilat hand tremors with activity states not able to use primidone and propanolol makes her short of breath. Pt Left UE  + tremor @ rest. + balance issues pt unsure if PD worsening or if balance related to hip replacement    Past history patient is a pleasant 69-year-old referred to [facility] neurology [place] for evaluation of obstructive sleep apnea.  History since last visit patient underwent home sleep testing.  Home sleep testing was performed on May 30, 2019.  She was found to have an apnea hypotony index of 30 events per hour with desaturation down to 86%.      The following portions of the patient's history were reviewed and updated as appropriate: allergies, current medications, past family history, past medical history, past social history, past surgical history and problem list.    Review of Systems   Constitutional: Negative for chills and fever.   HENT: Negative for congestion, ear pain, hearing loss, rhinorrhea and sore throat.    Eyes: Negative for pain, discharge and redness.   Respiratory: Negative for cough, shortness of breath, wheezing and stridor.    Cardiovascular: Negative for chest pain, palpitations and leg swelling.   Gastrointestinal: Negative for abdominal pain, constipation, nausea and vomiting.   Endocrine: Negative for cold intolerance, heat intolerance and polyphagia.   Genitourinary: Negative for dysuria, flank pain, frequency and urgency.   Musculoskeletal: Positive for arthralgias and myalgias. Negative for joint swelling, neck pain and neck stiffness.   Skin: Negative for pallor,  rash and wound.   Allergic/Immunologic: Negative for environmental allergies.   Neurological: Positive for tremors. Negative for dizziness, seizures, syncope, facial asymmetry, speech difficulty, weakness, light-headedness, numbness and headaches.   Hematological: Negative for adenopathy.   Psychiatric/Behavioral: Positive for sleep disturbance. Negative for confusion and hallucinations. The patient is not nervous/anxious.        Objective       Vitals:    09/04/19 1311   BP: 118/72   Pulse: 77   Temp: 99 °F (37.2 °C)   SpO2: 96%   Weight: 104 kg (230 lb)     GENERAL: Patient is pleasant, cooperative, appears to be stated age.    HEAD:  Head is normocephalic and atraumatic.    NECK: Neck are non-tender without thyromegaly or adenopathy.  Carotic upstrokes are 1+/4.  No cranial or cervical bruits.  The neck is supple with a full range of motion.   CARDIOVASCULAR:  Regular rate and rhythm with normal S1 and S2 without rub or gallop.  RESPIRATORY:  Clear to auscultation without wheezes or crackle   PSYCH:  Higher cortical function/mental status:  The patient is alert.  She  is oriented x3 to time, place and person.  Recent and the remote memory appear normal.  The patient has a good fund of knowledge.  There is no visual or auditory hallucination or suicidal or homicidal ideation.  SPEECH:There is no gross evidence of aphasia, dysarthria or agnosia.      CRANIAL NERVES: Extraocular movements are full and smooth with normal pursuits and saccades.  No nystagmus noted.  The face is symmetric. Tongue midline.   MOTOR: Strength is 5/5 throughout with normal tone and bulk  + involuntary movements noted.   Pt with fine bilat action tremors hands. + Left UE pill rolling rest tremor    DTR: are 2/4 and symmetrical in the upper extremities, 2/4 and symmetrical at the knees   COORDINATION AND GAIT:  The patient walks with a shuffled gait Left straight arm gait   Romberg are negative.  The patient performs  rapid alternating  movements with bilat hand hesitation      Results for orders placed or performed in visit on 07/25/19   T3   Result Value Ref Range    T3, Total 81 71 - 180 ng/dL   T4, Free   Result Value Ref Range    Free T4 1.11 0.78 - 2.19 ng/dL   TSH   Result Value Ref Range    TSH <0.015 (L) 0.470 - 4.680 mIU/mL   T3, Reverse   Result Value Ref Range    T3, Reverse 29.3 (H) 9.2 - 24.1 ng/dL   T3, Free   Result Value Ref Range    Reference Lab Report     T3, Free   Result Value Ref Range    T3, Free 2.3 2.0 - 4.4 pg/mL   Specimen Status Report   Result Value Ref Range    Specimen Status Comment        I have personally reviewed the above labs. Pt follows with PCP.    Assessment/Plan     1 ISABEL: Increase pressure 7-16 cm H20    2. ET: Pt using propanolol once daily without SOA and assisting with ET    3. PD: Continue low dose sinemet

## 2019-09-06 ENCOUNTER — RESULTS ENCOUNTER (OUTPATIENT)
Dept: ENDOCRINOLOGY | Facility: CLINIC | Age: 69
End: 2019-09-06

## 2019-09-06 DIAGNOSIS — E03.9 HYPOTHYROIDISM, ADULT: ICD-10-CM

## 2019-09-12 ENCOUNTER — LAB (OUTPATIENT)
Dept: LAB | Facility: HOSPITAL | Age: 69
End: 2019-09-12

## 2019-09-12 DIAGNOSIS — E03.9 HYPOTHYROIDISM, ADULT: ICD-10-CM

## 2019-09-12 DIAGNOSIS — E03.9 HYPOTHYROIDISM IN ADULT: ICD-10-CM

## 2019-09-12 DIAGNOSIS — I51.9 MYXEDEMA HEART DISEASE: Primary | ICD-10-CM

## 2019-09-12 DIAGNOSIS — E03.9 MYXEDEMA HEART DISEASE: Primary | ICD-10-CM

## 2019-09-12 LAB
T3 SERPL-MCNC: 99.4 NG/DL (ref 80–200)
T3FREE SERPL-MCNC: 2.67 PG/ML (ref 2–4.4)
T4 FREE SERPL-MCNC: 0.7 NG/DL (ref 0.93–1.7)
TSH SERPL DL<=0.05 MIU/L-ACNC: <0.005 UIU/ML (ref 0.27–4.2)

## 2019-09-12 PROCEDURE — 84480 ASSAY TRIIODOTHYRONINE (T3): CPT

## 2019-09-12 PROCEDURE — 84482 T3 REVERSE: CPT

## 2019-09-12 PROCEDURE — 84481 FREE ASSAY (FT-3): CPT

## 2019-09-12 PROCEDURE — 84443 ASSAY THYROID STIM HORMONE: CPT

## 2019-09-12 PROCEDURE — 84439 ASSAY OF FREE THYROXINE: CPT

## 2019-09-12 PROCEDURE — 36415 COLL VENOUS BLD VENIPUNCTURE: CPT

## 2019-09-17 LAB — T3REVERSE SERPL-MCNC: 10.3 NG/DL (ref 9.2–24.1)

## 2019-09-17 RX ORDER — LIOTHYRONINE SODIUM 5 UG/1
15 TABLET ORAL DAILY
Qty: 90 TABLET | Refills: 5 | Status: SHIPPED | OUTPATIENT
Start: 2019-09-17 | End: 2019-09-30 | Stop reason: SDUPTHER

## 2019-09-18 ENCOUNTER — TELEPHONE (OUTPATIENT)
Dept: INTERNAL MEDICINE | Facility: CLINIC | Age: 69
End: 2019-09-18

## 2019-09-24 PROBLEM — G20.A1 PD (PARKINSON'S DISEASE): Status: ACTIVE | Noted: 2019-09-24

## 2019-09-24 PROBLEM — Z99.89 OSA ON CPAP: Status: ACTIVE | Noted: 2019-09-24

## 2019-09-24 PROBLEM — G47.33 OSA ON CPAP: Status: ACTIVE | Noted: 2019-09-24

## 2019-09-24 PROBLEM — G20 PD (PARKINSON'S DISEASE): Status: ACTIVE | Noted: 2019-09-24

## 2019-09-30 ENCOUNTER — OFFICE VISIT (OUTPATIENT)
Dept: FAMILY MEDICINE CLINIC | Facility: CLINIC | Age: 69
End: 2019-09-30

## 2019-09-30 VITALS
HEIGHT: 63 IN | DIASTOLIC BLOOD PRESSURE: 70 MMHG | BODY MASS INDEX: 41.46 KG/M2 | WEIGHT: 234 LBS | OXYGEN SATURATION: 97 % | RESPIRATION RATE: 14 BRPM | TEMPERATURE: 98.8 F | SYSTOLIC BLOOD PRESSURE: 118 MMHG | HEART RATE: 72 BPM

## 2019-09-30 DIAGNOSIS — I10 ESSENTIAL HYPERTENSION: Primary | ICD-10-CM

## 2019-09-30 DIAGNOSIS — M1A.0710 CHRONIC IDIOPATHIC GOUT INVOLVING TOE OF RIGHT FOOT WITHOUT TOPHUS: ICD-10-CM

## 2019-09-30 DIAGNOSIS — E03.9 HYPOTHYROIDISM, ADULT: ICD-10-CM

## 2019-09-30 DIAGNOSIS — I50.32 CHRONIC DIASTOLIC HEART FAILURE (HCC): Chronic | ICD-10-CM

## 2019-09-30 DIAGNOSIS — G25.0 ESSENTIAL TREMOR: ICD-10-CM

## 2019-09-30 DIAGNOSIS — E55.9 VITAMIN D DEFICIENCY: ICD-10-CM

## 2019-09-30 DIAGNOSIS — M15.9 PRIMARY OSTEOARTHRITIS INVOLVING MULTIPLE JOINTS: ICD-10-CM

## 2019-09-30 PROCEDURE — 99214 OFFICE O/P EST MOD 30 MIN: CPT | Performed by: FAMILY MEDICINE

## 2019-09-30 RX ORDER — LIOTHYRONINE SODIUM 5 UG/1
5 TABLET ORAL 2 TIMES DAILY
Qty: 60 TABLET | Refills: 3 | Status: SHIPPED | OUTPATIENT
Start: 2019-09-30 | End: 2019-11-11 | Stop reason: SDUPTHER

## 2019-09-30 RX ORDER — LEVOTHYROXINE SODIUM 0.15 MG/1
150 TABLET ORAL DAILY
Qty: 30 TABLET | Refills: 3 | Status: SHIPPED | OUTPATIENT
Start: 2019-09-30 | End: 2019-11-11 | Stop reason: SDUPTHER

## 2019-09-30 RX ORDER — CLOTRIMAZOLE AND BETAMETHASONE DIPROPIONATE 10; .64 MG/G; MG/G
CREAM TOPICAL 2 TIMES DAILY
Qty: 45 G | Refills: 3 | Status: SHIPPED | OUTPATIENT
Start: 2019-09-30 | End: 2021-11-15

## 2019-09-30 NOTE — PROGRESS NOTES
Established Patient        Chief Complaint:   Chief Complaint   Patient presents with   • Establish Care     Establish new PCP, discuss thyroid        Salinas Madelyn Monroy is a 69 y.o. female    History of Present Illness:   Here for establishment with a new primary care provider.  Patient has had a long-standing history of acquired hypothyroidism, noted autoimmune antibodies on review of lab results.  She has been recently established and followed by endocrinology concerning her thyroid disorder.  Patient has had a myriad of symptoms changes including malaise/fatigue and generalized not feeling well.  She denies any significant weight changes, in fact has had difficulty with weight loss as she desires.  She denies any problems with her other medication regimen.  She was diagnosed in the past is Parkinson syndrome, however upon evaluation by neurology was deemed to be atypical essential tremor of familial nature.  She is currently not using Sinemet.  She does utilize propanolol 20 mg twice daily dosing.  She has had numerous changes to her thyroid supplementation regimen, she is currently not satisfied with her response to most recent changes, as she had hoped that she would feel significantly improved.    Subjective     The following portions of the patient's history were reviewed and updated as appropriate: allergies, current medications, past family history, past medical history, past social history, past surgical history and problem list.    Allergies   Allergen Reactions   • Diazepam Rash       Review of Systems  1. Constitutional: Negative for fever. Negative for chills, diaphoresis, fatigue and unexpected weight change.   2. HENT: No dysphagia; no changes to vision/hearing/smell/taste; no epistaxis  3. Eyes: Negative for redness and visual disturbance.   4. Respiratory: negative for shortness of breath. Negative for chest pain . Negative for cough and chest tightness.   5. Cardiovascular: Negative for  "chest pain and palpitations.   6. Gastrointestinal: Negative for abdominal distention, abdominal pain and blood in stool.   7. Endocrine: Negative for cold intolerance and heat intolerance.   8. Genitourinary: Negative for difficulty urinating, dysuria and frequency.   9. Musculoskeletal: Negative for arthralgias, back pain and myalgias.   10. Skin: Negative for color change, rash and wound.   11. Neurological: Negative for syncope, weakness and headaches.   12. Hematological: Negative for adenopathy. Does not bruise/bleed easily.   13. Psychiatric/Behavioral: Negative for confusion. The patient is not nervous/anxious.    Objective     Physical Exam   Vital Signs: /70   Pulse 72   Temp 98.8 °F (37.1 °C)   Resp 14   Ht 160 cm (63\")   Wt 106 kg (234 lb)   LMP  (LMP Unknown)   SpO2 97%   BMI 41.45 kg/m²     General Appearance: alert, oriented x 3, no acute distress.  Skin: warm and dry.   HEENT: Atraumatic.  pupils round and reactive to light and accommodation, oral mucosa pink and moist.  Nares patent without epistaxis.  External auditory canals are patent tympanic membranes intact.  Neck: supple, no JVD, trachea midline.  No thyromegaly  Lungs: CTA, unlabored breathing effort.  Heart: RRR, normal S1 and S2, no S3, no rub.  Abdomen: soft, non-tender, no palpable bladder, present bowel sounds to auscultation ×4.  No guarding or rigidity.  No CVA tenderness; post-surgical scarring.  Extremities: no clubbing, cyanosis or edema.  Good range of motion actively and passively.  Symmetric muscle strength and development; post-surgical scarring emeka hips; no leg length discrepancies.  Neuro: normal speech and mental status.  Cranial nerves II through XII intact.  No anosmia. DTR 2+; proprioception intact.  No focal motor/sensory deficits.    Assessment and Plan      Assessment:   Salinas was seen today for establish care.    Diagnoses and all orders for this visit:    Essential hypertension    Essential " tremor    Hypothyroidism, adult  -     levothyroxine (SYNTHROID) 150 MCG tablet; Take 1 tablet by mouth Daily.  -     liothyronine (CYTOMEL) 5 MCG tablet; Take 1 tablet by mouth 2 (Two) Times a Day.    Chronic idiopathic gout involving toe of right foot without tophus    Primary osteoarthritis involving multiple joints    Vitamin D deficiency    Chronic diastolic heart failure (CMS/HCC)    Other orders  -     clotrimazole-betamethasone (LOTRISONE) 1-0.05 % cream; Apply  topically to the appropriate area as directed 2 (Two) Times a Day.        Plan:  Consider transition to XL formulation of propranolol.    Patient remains insistent on changes to her thyroid supplementation dosing.  I recommended that she reconsider use of levothyroxine, as well as a decreased dosing of her liothyronine.  Plan to follow clinically with repeat labs in approximately 2 weeks have already been ordered by endocrinology.  Hopefully patient will do well with a transition to levothyroxine only, although I am hesitant to say that this will be the case that she is quite insistent on supplementing both T3 and T4 at this time, despite reporting not feeling any significant improvement.    Blood pressure is at goal, surveillance labs when needed.    Continue noninvasive positive airway pressure support.  Discussion Summary:    Discussed plan of care in detail with pt today; pt verb understanding and agrees.  Follow up:  No Follow-up on file.     There are no Patient Instructions on file for this visit.    Jm Marie,   09/30/19  2:02 PM    Please note that portions of this note may have been completed with a voice recognition program. Efforts were made to edit the dictations, but occasionally words are mistranscribed.

## 2019-10-24 ENCOUNTER — LAB (OUTPATIENT)
Dept: LAB | Facility: HOSPITAL | Age: 69
End: 2019-10-24

## 2019-10-24 ENCOUNTER — TRANSCRIBE ORDERS (OUTPATIENT)
Dept: LAB | Facility: HOSPITAL | Age: 69
End: 2019-10-24

## 2019-10-24 DIAGNOSIS — M25.559 ARTHRALGIA OF HIP, UNSPECIFIED LATERALITY: Primary | ICD-10-CM

## 2019-10-24 DIAGNOSIS — M25.559 ARTHRALGIA OF HIP, UNSPECIFIED LATERALITY: ICD-10-CM

## 2019-10-24 LAB
CRP SERPL-MCNC: 1.33 MG/DL (ref 0–0.5)
ERYTHROCYTE [SEDIMENTATION RATE] IN BLOOD: 30 MM/HR (ref 0–30)
T3 SERPL-MCNC: 80.2 NG/DL (ref 80–200)
T4 FREE SERPL-MCNC: 1.27 NG/DL (ref 0.93–1.7)
TSH SERPL DL<=0.05 MIU/L-ACNC: 0.01 UIU/ML (ref 0.27–4.2)

## 2019-10-24 PROCEDURE — 36415 COLL VENOUS BLD VENIPUNCTURE: CPT

## 2019-10-24 PROCEDURE — 84482 T3 REVERSE: CPT | Performed by: INTERNAL MEDICINE

## 2019-10-24 PROCEDURE — 85652 RBC SED RATE AUTOMATED: CPT

## 2019-10-24 PROCEDURE — 84439 ASSAY OF FREE THYROXINE: CPT

## 2019-10-24 PROCEDURE — 82495 ASSAY OF CHROMIUM: CPT

## 2019-10-24 PROCEDURE — 84443 ASSAY THYROID STIM HORMONE: CPT

## 2019-10-24 PROCEDURE — 84480 ASSAY TRIIODOTHYRONINE (T3): CPT

## 2019-10-24 PROCEDURE — 83018 HEAVY METAL QUAN EACH NES: CPT

## 2019-10-24 PROCEDURE — 86140 C-REACTIVE PROTEIN: CPT

## 2019-10-25 ENCOUNTER — TELEPHONE (OUTPATIENT)
Dept: INTERNAL MEDICINE | Facility: CLINIC | Age: 69
End: 2019-10-25

## 2019-10-28 LAB — COBALT SERPL-MCNC: 3.6 UG/L (ref 0–0.9)

## 2019-10-29 LAB — CR SERPL-MCNC: 1.4 UG/L (ref 0.1–2.1)

## 2019-10-30 ENCOUNTER — TELEPHONE (OUTPATIENT)
Dept: INTERNAL MEDICINE | Facility: CLINIC | Age: 69
End: 2019-10-30

## 2019-10-30 LAB — T3REVERSE SERPL-MCNC: 16.6 NG/DL (ref 9.2–24.1)

## 2019-10-31 ENCOUNTER — TELEPHONE (OUTPATIENT)
Dept: INTERNAL MEDICINE | Facility: CLINIC | Age: 69
End: 2019-10-31

## 2019-10-31 NOTE — TELEPHONE ENCOUNTER
Reverse t3 level are within the normal range. Is she able to see the results? I didn't put them on the initial result note cause results return later . It doesn't change the recommendations on therapy

## 2019-10-31 NOTE — TELEPHONE ENCOUNTER
PATIENT IS WAITING TO SEE RESULTS OF T3 AND REVERSE T3 RESULTS ON MYCHART. SHE STATES THEY ARE NOT AVAILABLE TO HER YET.

## 2019-10-31 NOTE — TELEPHONE ENCOUNTER
Returned patient call, she asked about the t3 free results. I advised her that the labs collected were T3 and Reverse T3. I did not see a T3 Free collected. She stated she will contact the lab to see why it was not added or recorded.  I advised her it would still not effect her results, or dosage

## 2019-11-11 ENCOUNTER — OFFICE VISIT (OUTPATIENT)
Dept: FAMILY MEDICINE CLINIC | Facility: CLINIC | Age: 69
End: 2019-11-11

## 2019-11-11 VITALS
WEIGHT: 241 LBS | HEIGHT: 63 IN | OXYGEN SATURATION: 96 % | SYSTOLIC BLOOD PRESSURE: 118 MMHG | DIASTOLIC BLOOD PRESSURE: 80 MMHG | BODY MASS INDEX: 42.7 KG/M2 | TEMPERATURE: 98.8 F | HEART RATE: 78 BPM

## 2019-11-11 DIAGNOSIS — M1A.0710 CHRONIC IDIOPATHIC GOUT INVOLVING TOE OF RIGHT FOOT WITHOUT TOPHUS: ICD-10-CM

## 2019-11-11 DIAGNOSIS — G25.0 ESSENTIAL TREMOR: ICD-10-CM

## 2019-11-11 DIAGNOSIS — E03.9 HYPOTHYROIDISM, ADULT: Primary | ICD-10-CM

## 2019-11-11 DIAGNOSIS — Z99.89 OSA ON CPAP: ICD-10-CM

## 2019-11-11 DIAGNOSIS — G47.33 OSA ON CPAP: ICD-10-CM

## 2019-11-11 PROBLEM — I10 ESSENTIAL HYPERTENSION: Status: RESOLVED | Noted: 2018-07-30 | Resolved: 2019-11-11

## 2019-11-11 PROCEDURE — 99214 OFFICE O/P EST MOD 30 MIN: CPT | Performed by: FAMILY MEDICINE

## 2019-11-11 RX ORDER — LIOTHYRONINE SODIUM 25 UG/1
75 TABLET ORAL DAILY
Qty: 90 TABLET | Refills: 0 | Status: SHIPPED | OUTPATIENT
Start: 2019-11-11 | End: 2020-02-03

## 2019-11-11 RX ORDER — LEVOTHYROXINE SODIUM 0.07 MG/1
75 TABLET ORAL DAILY
Qty: 30 TABLET | Refills: 2 | Status: SHIPPED | OUTPATIENT
Start: 2019-11-11 | End: 2020-02-03

## 2019-11-11 NOTE — PROGRESS NOTES
Established Patient        Chief Complaint:   Chief Complaint   Patient presents with   • Follow-up     HTN Hypothyroidism and CHF; Discuss labs        Salinas Monroy is a 69 y.o. female    History of Present Illness:   Here for scheduled f/u of ET/Thyroid disorder/Gouty Arthritis/Chronic Diastolic CHF, as well as medication changes made at previous visit.    Has tolerated the change to thyroid supplementation at previous visit.    Denies palpitations/syncope/vertigo.    No longer taking sinemet.    Pt states she has adjusted her liothyronine to currently taking 25 mcg daily.      Denies orthopnea     Subjective     The following portions of the patient's history were reviewed and updated as appropriate: allergies, current medications, past family history, past medical history, past social history, past surgical history and problem list.    Allergies   Allergen Reactions   • Diazepam Rash       Review of Systems  1. Constitutional: Negative for fever. Negative for chills, diaphoresis, fatigue and unexpected weight change.   2. HENT: No dysphagia; no changes to vision/hearing/smell/taste; no epistaxis  3. Eyes: Negative for redness and visual disturbance.   4. Respiratory: negative for shortness of breath. Negative for chest pain . Negative for cough and chest tightness.   5. Cardiovascular: Negative for chest pain and palpitations.   6. Gastrointestinal: Negative for abdominal distention, abdominal pain and blood in stool.   7. Endocrine: Negative for cold intolerance and heat intolerance.   8. Genitourinary: Negative for difficulty urinating, dysuria and frequency.   9. Musculoskeletal: Negative for arthralgias, back pain and myalgias.   10. Skin: Negative for color change, rash and wound.   11. Neurological: Negative for syncope, weakness and headaches.   12. Hematological: Negative for adenopathy. Does not bruise/bleed easily.   13. Psychiatric/Behavioral: Negative for confusion. The patient is not  "nervous/anxious.    Objective     Physical Exam   Vital Signs: /80   Pulse 78   Temp 98.8 °F (37.1 °C)   Ht 160 cm (63\")   Wt 109 kg (241 lb)   LMP  (LMP Unknown)   SpO2 96%   BMI 42.69 kg/m²     General Appearance: alert, oriented x 3, no acute distress.  Skin: warm and dry.   HEENT: Atraumatic.  pupils round and reactive to light and accommodation, oral mucosa pink and moist.  Nares patent without epistaxis.  External auditory canals are patent tympanic membranes intact.  Neck: supple, no JVD, trachea midline.  No thyromegaly  Lungs: CTA, unlabored breathing effort.  Heart: RRR, normal S1 and S2, no S3, no rub.  Abdomen: soft, non-tender, no palpable bladder, present bowel sounds to auscultation ×4.  No guarding or rigidity.  No CVA tenderness; post-surgical scarring.  Extremities: no clubbing, cyanosis or edema.  Good range of motion actively and passively.  Symmetric muscle strength and development; post-surgical scarring emeka hips; no leg length discrepancies.  Neuro: normal speech and mental status.  Cranial nerves II through XII intact.  No anosmia. DTR 2+; proprioception intact.  No focal motor/sensory deficits.    Assessment and Plan      Assessment:   Salinas was seen today for follow-up.    Diagnoses and all orders for this visit:    Hypothyroidism, adult  -     levothyroxine (SYNTHROID, LEVOTHROID) 75 MCG tablet; Take 1 tablet by mouth Daily.  -     liothyronine (CYTOMEL) 25 MCG tablet; Take 3 tablets by mouth Daily.    IASBEL on CPAP    Essential tremor    Chronic idiopathic gout involving toe of right foot without tophus        Plan:  Pt elects to inc liothyroinine by 5 mcg increments until reaching 50 mcg.  Planned reduction of levothyroxine to 50 mcg daily.     Repeat thyroid labs in 4 weeks.  Free T3/Free T4/TSH/Reverse T3    Cont CPAP.    Continue uric acid lowering medication regimen.  Uric acid level at next labs.      Discussion Summary:    Discussed plan of care in detail with pt " today; pt verb understanding and agrees.      I have reviewed and updated all copied forward information, as appropriate.  I attest to the accuracy and relevance of any unchanged information.      Follow up:  No Follow-up on file.     There are no Patient Instructions on file for this visit.    Jm Marie,   11/11/19  1:58 PM    Please note that portions of this note may have been completed with a voice recognition program. Efforts were made to edit the dictations, but occasionally words are mistranscribed.

## 2019-11-29 ENCOUNTER — RESULTS ENCOUNTER (OUTPATIENT)
Dept: ENDOCRINOLOGY | Facility: CLINIC | Age: 69
End: 2019-11-29

## 2019-11-29 DIAGNOSIS — E03.9 HYPOTHYROIDISM, ADULT: ICD-10-CM

## 2019-12-09 ENCOUNTER — APPOINTMENT (OUTPATIENT)
Dept: LAB | Facility: HOSPITAL | Age: 69
End: 2019-12-09

## 2019-12-09 LAB
T3FREE SERPL-MCNC: 2.96 PG/ML (ref 2–4.4)
T4 FREE SERPL-MCNC: 0.73 NG/DL (ref 0.93–1.7)
TSH SERPL DL<=0.05 MIU/L-ACNC: 0.01 UIU/ML (ref 0.27–4.2)
URATE SERPL-MCNC: 4.8 MG/DL (ref 2.4–5.7)

## 2019-12-09 PROCEDURE — 84482 T3 REVERSE: CPT | Performed by: FAMILY MEDICINE

## 2019-12-09 PROCEDURE — 84443 ASSAY THYROID STIM HORMONE: CPT | Performed by: FAMILY MEDICINE

## 2019-12-09 PROCEDURE — 84481 FREE ASSAY (FT-3): CPT | Performed by: FAMILY MEDICINE

## 2019-12-09 PROCEDURE — 84550 ASSAY OF BLOOD/URIC ACID: CPT | Performed by: FAMILY MEDICINE

## 2019-12-09 PROCEDURE — 36415 COLL VENOUS BLD VENIPUNCTURE: CPT | Performed by: FAMILY MEDICINE

## 2019-12-09 PROCEDURE — 84439 ASSAY OF FREE THYROXINE: CPT | Performed by: FAMILY MEDICINE

## 2019-12-11 ENCOUNTER — RESULTS ENCOUNTER (OUTPATIENT)
Dept: FAMILY MEDICINE CLINIC | Facility: CLINIC | Age: 69
End: 2019-12-11

## 2019-12-11 DIAGNOSIS — E03.9 HYPOTHYROIDISM, ADULT: ICD-10-CM

## 2019-12-11 DIAGNOSIS — M1A.0710 CHRONIC IDIOPATHIC GOUT INVOLVING TOE OF RIGHT FOOT WITHOUT TOPHUS: ICD-10-CM

## 2019-12-12 LAB — T3REVERSE SERPL-MCNC: 11.9 NG/DL (ref 9.2–24.1)

## 2019-12-16 ENCOUNTER — OFFICE VISIT (OUTPATIENT)
Dept: NEUROLOGY | Facility: CLINIC | Age: 69
End: 2019-12-16

## 2019-12-16 VITALS
WEIGHT: 241 LBS | SYSTOLIC BLOOD PRESSURE: 110 MMHG | DIASTOLIC BLOOD PRESSURE: 68 MMHG | HEART RATE: 92 BPM | OXYGEN SATURATION: 94 % | BODY MASS INDEX: 42.69 KG/M2 | TEMPERATURE: 99.3 F

## 2019-12-16 DIAGNOSIS — G47.33 OSA ON CPAP: Primary | ICD-10-CM

## 2019-12-16 DIAGNOSIS — Z99.89 OSA ON CPAP: Primary | ICD-10-CM

## 2019-12-16 DIAGNOSIS — G20 PD (PARKINSON'S DISEASE) (HCC): ICD-10-CM

## 2019-12-16 PROCEDURE — 99214 OFFICE O/P EST MOD 30 MIN: CPT | Performed by: NURSE PRACTITIONER

## 2019-12-16 RX ORDER — ACYCLOVIR 200 MG/1
CAPSULE ORAL
COMMUNITY
Start: 2019-12-02 | End: 2020-09-21 | Stop reason: ALTCHOICE

## 2019-12-16 RX ORDER — LIOTHYRONINE SODIUM 5 UG/1
TABLET ORAL
COMMUNITY
Start: 2019-12-02 | End: 2019-12-17 | Stop reason: SDUPTHER

## 2019-12-16 RX ORDER — LEVOTHYROXINE, LIOTHYRONINE 76; 18 UG/1; UG/1
TABLET ORAL
COMMUNITY
Start: 2019-10-03 | End: 2019-12-17

## 2019-12-17 ENCOUNTER — OFFICE VISIT (OUTPATIENT)
Dept: FAMILY MEDICINE CLINIC | Facility: CLINIC | Age: 69
End: 2019-12-17

## 2019-12-17 VITALS
BODY MASS INDEX: 41.99 KG/M2 | HEIGHT: 63 IN | SYSTOLIC BLOOD PRESSURE: 120 MMHG | OXYGEN SATURATION: 97 % | DIASTOLIC BLOOD PRESSURE: 80 MMHG | TEMPERATURE: 98.6 F | HEART RATE: 84 BPM | WEIGHT: 237 LBS

## 2019-12-17 DIAGNOSIS — F42.9 OBSESSIVE-COMPULSIVE DISORDER, UNSPECIFIED TYPE: Primary | ICD-10-CM

## 2019-12-17 DIAGNOSIS — E03.9 HYPOTHYROIDISM, ADULT: ICD-10-CM

## 2019-12-17 DIAGNOSIS — G20 PD (PARKINSON'S DISEASE) (HCC): ICD-10-CM

## 2019-12-17 DIAGNOSIS — G47.33 OSA ON CPAP: ICD-10-CM

## 2019-12-17 DIAGNOSIS — M15.9 PRIMARY OSTEOARTHRITIS INVOLVING MULTIPLE JOINTS: ICD-10-CM

## 2019-12-17 DIAGNOSIS — Z99.89 OSA ON CPAP: ICD-10-CM

## 2019-12-17 DIAGNOSIS — I50.32 CHRONIC DIASTOLIC HEART FAILURE (HCC): Chronic | ICD-10-CM

## 2019-12-17 PROCEDURE — 99214 OFFICE O/P EST MOD 30 MIN: CPT | Performed by: FAMILY MEDICINE

## 2019-12-17 RX ORDER — LIOTHYRONINE SODIUM 5 UG/1
15 TABLET ORAL DAILY
Qty: 90 TABLET | Refills: 1 | Status: SHIPPED | OUTPATIENT
Start: 2019-12-17 | End: 2020-02-19

## 2019-12-17 NOTE — PROGRESS NOTES
Established Patient        Chief Complaint:   Chief Complaint   Patient presents with   • Follow-up     CHF, Hypothyroidism and PD; Discuss Lab        Salinas Monroy is a 69 y.o. female    History of Present Illness:   Here for scheduled f/u of CHF/thyroid disorder/Parkinson's.    Pt currently taking 55 mcg liothyronine; denies palpitations/CP/SOA; no F/C; pt states she feels sig improved since inc in liothyronine.    No dysphagia, N or V.  Good UOP, no hematuria or dysuria.    No SI/HI.    Subjective     The following portions of the patient's history were reviewed and updated as appropriate: allergies, current medications, past family history, past medical history, past social history, past surgical history and problem list.    Allergies   Allergen Reactions   • Diazepam Rash       Review of Systems  1. Constitutional: Negative for fever. Negative for chills, diaphoresis, fatigue and unexpected weight change.   2. HENT: No dysphagia; no changes to vision/hearing/smell/taste; no epistaxis  3. Eyes: Negative for redness and visual disturbance.   4. Respiratory: negative for shortness of breath. Negative for chest pain . Negative for cough and chest tightness.   5. Cardiovascular: Negative for chest pain and palpitations.   6. Gastrointestinal: Negative for abdominal distention, abdominal pain and blood in stool.   7. Endocrine: Negative for cold intolerance and heat intolerance.   8. Genitourinary: Negative for difficulty urinating, dysuria and frequency.   9. Musculoskeletal: chronic arthralgias, back pain and myalgias.   10. Skin: Negative for color change, rash and wound.   11. Neurological: Negative for syncope, weakness and headaches.   12. Hematological: Negative for adenopathy. Does not bruise/bleed easily.   13. Psychiatric/Behavioral: Negative for confusion. The patient is not nervous/anxious.    Objective     Physical Exam   Vital Signs: /80   Pulse 84   Temp 98.6 °F (37 °C)   Ht 160  "cm (63\")   Wt 108 kg (237 lb)   LMP  (LMP Unknown)   SpO2 97%   BMI 41.98 kg/m²     General Appearance: alert, oriented x 3, no acute distress.  Skin: warm and dry.   HEENT: Atraumatic.  pupils round and reactive to light and accommodation, oral mucosa pink and moist.  Nares patent without epistaxis.  External auditory canals are patent tympanic membranes intact.  Neck: supple, no JVD, trachea midline.  No thyromegaly  Lungs: CTA, unlabored breathing effort.  Heart: RRR, normal S1 and S2, no S3, no rub.  Abdomen: soft, non-tender, no palpable bladder, present bowel sounds to auscultation ×4.  No guarding or rigidity. No CVA tenderness, post-surgical scarring noted.  Extremities: no clubbing, cyanosis or edema.  Good range of motion actively and passively.  Symmetric muscle strength and development.  Crepitance to emeka knees A/P ROM testing; no leg length discrepancies; Ponce's/Sparks neg; quad mech intact; medial/lateral joint line tenderness on palpation.  Mild cogwheeling rigidity of emeka UE; resting tremor noted.  Post-surgical scarring noted to emeka hips.  Neuro: normal speech and mental status.  Cranial nerves II through XII intact.  No anosmia. DTR 2+; proprioception intact.  No focal motor/sensory deficits.    Assessment and Plan      Assessment:   Salinas was seen today for follow-up.    Diagnoses and all orders for this visit:    Obsessive-compulsive disorder, unspecified type    Primary osteoarthritis involving multiple joints    PD (Parkinson's disease) (CMS/[facility])    Hypothyroidism, adult  -     liothyronine (CYTOMEL) 5 MCG tablet; Take 3 tablets by mouth Daily.    ISABEL on CPAP    Chronic diastolic heart failure (CMS/HCC)        Plan:  VSS, asymptomatic at present; without findings/symptoms of acute exac of diastolic CHF.    Continue adjustment to thyroid supplementation.    Continue current mood stabilizer.    Repeat labs in 2M or so, or as needed.    Normal Uric Acid level.    Followed by sleep " medicine for ISABEL.  Discussion Summary:    Discussed plan of care in detail with pt today; pt verb understanding and agrees.  Follow up:  Return in about 2 months (around 2/17/2020) for Recheck.     There are no Patient Instructions on file for this visit.    Jm Marie, DO  12/17/19  1:42 PM          Please note that portions of this note may have been completed with a voice recognition program. Efforts were made to edit the dictations, but occasionally words are mistranscribed.

## 2020-01-10 ENCOUNTER — RESULTS ENCOUNTER (OUTPATIENT)
Dept: ENDOCRINOLOGY | Facility: CLINIC | Age: 70
End: 2020-01-10

## 2020-01-10 DIAGNOSIS — E03.9 HYPOTHYROIDISM, ADULT: ICD-10-CM

## 2020-02-02 ENCOUNTER — APPOINTMENT (OUTPATIENT)
Dept: GENERAL RADIOLOGY | Facility: HOSPITAL | Age: 70
End: 2020-02-02

## 2020-02-02 ENCOUNTER — APPOINTMENT (OUTPATIENT)
Dept: CT IMAGING | Facility: HOSPITAL | Age: 70
End: 2020-02-02

## 2020-02-02 ENCOUNTER — HOSPITAL ENCOUNTER (EMERGENCY)
Facility: HOSPITAL | Age: 70
Discharge: HOME | End: 2020-02-02
Attending: EMERGENCY MEDICINE | Admitting: EMERGENCY MEDICINE

## 2020-02-02 VITALS
BODY MASS INDEX: 42.74 KG/M2 | HEART RATE: 65 BPM | TEMPERATURE: 98.1 F | OXYGEN SATURATION: 96 % | DIASTOLIC BLOOD PRESSURE: 89 MMHG | HEIGHT: 63 IN | SYSTOLIC BLOOD PRESSURE: 129 MMHG | RESPIRATION RATE: 18 BRPM | WEIGHT: 241.2 LBS

## 2020-02-02 DIAGNOSIS — R06.00 DYSPNEA, UNSPECIFIED TYPE: Primary | ICD-10-CM

## 2020-02-02 LAB
ALBUMIN SERPL-MCNC: 3.8 G/DL (ref 3.5–5.2)
ALBUMIN/GLOB SERPL: 1.2 G/DL
ALP SERPL-CCNC: 82 U/L (ref 39–117)
ALT SERPL W P-5'-P-CCNC: 17 U/L (ref 1–33)
ANION GAP SERPL CALCULATED.3IONS-SCNC: 15.6 MMOL/L (ref 5–15)
AST SERPL-CCNC: 22 U/L (ref 1–32)
BACTERIA UR QL AUTO: NORMAL /HPF
BASOPHILS # BLD AUTO: 0.02 10*3/MM3 (ref 0–0.2)
BASOPHILS NFR BLD AUTO: 0.2 % (ref 0–1.5)
BILIRUB SERPL-MCNC: 0.5 MG/DL (ref 0.2–1.2)
BILIRUB UR QL STRIP: NEGATIVE
BUN BLD-MCNC: 34 MG/DL (ref 8–23)
BUN/CREAT SERPL: 28.8 (ref 7–25)
CALCIUM SPEC-SCNC: 9.8 MG/DL (ref 8.6–10.5)
CHLORIDE SERPL-SCNC: 105 MMOL/L (ref 98–107)
CLARITY UR: CLEAR
CO2 SERPL-SCNC: 19.4 MMOL/L (ref 22–29)
COLOR UR: YELLOW
CREAT BLD-MCNC: 1.18 MG/DL (ref 0.57–1)
D DIMER PPP FEU-MCNC: 1.28 MCGFEU/ML (ref 0–0.57)
D-LACTATE SERPL-SCNC: 1 MMOL/L (ref 0.5–2)
DEPRECATED RDW RBC AUTO: 39.9 FL (ref 37–54)
EOSINOPHIL # BLD AUTO: 0.08 10*3/MM3 (ref 0–0.4)
EOSINOPHIL NFR BLD AUTO: 1 % (ref 0.3–6.2)
ERYTHROCYTE [DISTWIDTH] IN BLOOD BY AUTOMATED COUNT: 11.9 % (ref 12.3–15.4)
FLUAV AG NPH QL: NEGATIVE
FLUBV AG NPH QL IA: NEGATIVE
GFR SERPL CREATININE-BSD FRML MDRD: 45 ML/MIN/1.73
GLOBULIN UR ELPH-MCNC: 3.3 GM/DL
GLUCOSE BLD-MCNC: 98 MG/DL (ref 65–99)
GLUCOSE UR STRIP-MCNC: NEGATIVE MG/DL
HCT VFR BLD AUTO: 35.8 % (ref 34–46.6)
HGB BLD-MCNC: 11.5 G/DL (ref 12–15.9)
HGB UR QL STRIP.AUTO: NEGATIVE
HOLD SPECIMEN: NORMAL
HOLD SPECIMEN: NORMAL
HYALINE CASTS UR QL AUTO: NORMAL /LPF
IMM GRANULOCYTES # BLD AUTO: 0.03 10*3/MM3 (ref 0–0.05)
IMM GRANULOCYTES NFR BLD AUTO: 0.4 % (ref 0–0.5)
KETONES UR QL STRIP: NEGATIVE
LEUKOCYTE ESTERASE UR QL STRIP.AUTO: ABNORMAL
LIPASE SERPL-CCNC: 44 U/L (ref 13–60)
LYMPHOCYTES # BLD AUTO: 1.36 10*3/MM3 (ref 0.7–3.1)
LYMPHOCYTES NFR BLD AUTO: 16.2 % (ref 19.6–45.3)
MCH RBC QN AUTO: 29.6 PG (ref 26.6–33)
MCHC RBC AUTO-ENTMCNC: 32.1 G/DL (ref 31.5–35.7)
MCV RBC AUTO: 92.3 FL (ref 79–97)
MONOCYTES # BLD AUTO: 0.55 10*3/MM3 (ref 0.1–0.9)
MONOCYTES NFR BLD AUTO: 6.5 % (ref 5–12)
NEUTROPHILS # BLD AUTO: 6.37 10*3/MM3 (ref 1.7–7)
NEUTROPHILS NFR BLD AUTO: 75.7 % (ref 42.7–76)
NITRITE UR QL STRIP: NEGATIVE
NRBC BLD AUTO-RTO: 0 /100 WBC (ref 0–0.2)
NT-PROBNP SERPL-MCNC: 3353 PG/ML (ref 5–900)
PH UR STRIP.AUTO: <=5 [PH] (ref 5–8)
PLATELET # BLD AUTO: 215 10*3/MM3 (ref 140–450)
PMV BLD AUTO: 9.3 FL (ref 6–12)
POTASSIUM BLD-SCNC: 4 MMOL/L (ref 3.5–5.2)
PROT SERPL-MCNC: 7.1 G/DL (ref 6–8.5)
PROT UR QL STRIP: NEGATIVE
RBC # BLD AUTO: 3.88 10*6/MM3 (ref 3.77–5.28)
RBC # UR: NORMAL /HPF
REF LAB TEST METHOD: NORMAL
SODIUM BLD-SCNC: 140 MMOL/L (ref 136–145)
SP GR UR STRIP: >=1.03 (ref 1–1.03)
SQUAMOUS #/AREA URNS HPF: NORMAL /HPF
TROPONIN T SERPL-MCNC: <0.01 NG/ML (ref 0–0.03)
TSH SERPL DL<=0.05 MIU/L-ACNC: <0.005 UIU/ML (ref 0.27–4.2)
UROBILINOGEN UR QL STRIP: ABNORMAL
WBC NRBC COR # BLD: 8.41 10*3/MM3 (ref 3.4–10.8)
WBC UR QL AUTO: NORMAL /HPF
WHOLE BLOOD HOLD SPECIMEN: NORMAL
WHOLE BLOOD HOLD SPECIMEN: NORMAL

## 2020-02-02 PROCEDURE — 84484 ASSAY OF TROPONIN QUANT: CPT | Performed by: EMERGENCY MEDICINE

## 2020-02-02 PROCEDURE — 83880 ASSAY OF NATRIURETIC PEPTIDE: CPT | Performed by: EMERGENCY MEDICINE

## 2020-02-02 PROCEDURE — 71275 CT ANGIOGRAPHY CHEST: CPT

## 2020-02-02 PROCEDURE — 80053 COMPREHEN METABOLIC PANEL: CPT | Performed by: EMERGENCY MEDICINE

## 2020-02-02 PROCEDURE — 81001 URINALYSIS AUTO W/SCOPE: CPT | Performed by: PHYSICIAN ASSISTANT

## 2020-02-02 PROCEDURE — 84443 ASSAY THYROID STIM HORMONE: CPT | Performed by: PHYSICIAN ASSISTANT

## 2020-02-02 PROCEDURE — 87880 STREP A ASSAY W/OPTIC: CPT | Performed by: PHYSICIAN ASSISTANT

## 2020-02-02 PROCEDURE — 83690 ASSAY OF LIPASE: CPT | Performed by: PHYSICIAN ASSISTANT

## 2020-02-02 PROCEDURE — 71046 X-RAY EXAM CHEST 2 VIEWS: CPT

## 2020-02-02 PROCEDURE — 87804 INFLUENZA ASSAY W/OPTIC: CPT | Performed by: PHYSICIAN ASSISTANT

## 2020-02-02 PROCEDURE — 87081 CULTURE SCREEN ONLY: CPT | Performed by: PHYSICIAN ASSISTANT

## 2020-02-02 PROCEDURE — 93005 ELECTROCARDIOGRAM TRACING: CPT | Performed by: EMERGENCY MEDICINE

## 2020-02-02 PROCEDURE — 25010000002 IOPAMIDOL 61 % SOLUTION: Performed by: EMERGENCY MEDICINE

## 2020-02-02 PROCEDURE — 85379 FIBRIN DEGRADATION QUANT: CPT | Performed by: PHYSICIAN ASSISTANT

## 2020-02-02 PROCEDURE — 83605 ASSAY OF LACTIC ACID: CPT | Performed by: PHYSICIAN ASSISTANT

## 2020-02-02 PROCEDURE — 99284 EMERGENCY DEPT VISIT MOD MDM: CPT

## 2020-02-02 PROCEDURE — 85025 COMPLETE CBC W/AUTO DIFF WBC: CPT | Performed by: EMERGENCY MEDICINE

## 2020-02-02 RX ORDER — SODIUM CHLORIDE 0.9 % (FLUSH) 0.9 %
10 SYRINGE (ML) INJECTION AS NEEDED
Status: DISCONTINUED | OUTPATIENT
Start: 2020-02-02 | End: 2020-02-02 | Stop reason: HOSPADM

## 2020-02-02 RX ADMIN — IOPAMIDOL 100 ML: 612 INJECTION, SOLUTION INTRAVENOUS at 19:32

## 2020-02-02 NOTE — ED NOTES
Pt attempted to go to the restroom but was unable to go at this time.     Kaylynn Tomlinson  02/02/20 1847

## 2020-02-02 NOTE — ED PROVIDER NOTES
"Subjective   Patient is here with complaint of some shortness of breath symptoms for the past couple of days uses CPAP at home to help with her breathing but apparently having trouble with this since yesterday ....also describes some generalized weakness ...some sore throat symptoms reported as well... patient does have history of Hashimoto's disease, hypothyroidism depression anemia arthritis.. No abdominal pain reported no vomiting reported she states she did have some intermittent diarrhea this week.. No chest pain reported, endorses some shortness of breath symptoms intermittently, presents here for further evaluation      History provided by:  Patient      Review of Systems   Constitutional: Negative.  Negative for fever.   HENT: Positive for congestion.    Eyes: Negative.    Respiratory: Positive for cough and shortness of breath.    Cardiovascular: Negative.  Negative for leg swelling.   Gastrointestinal: Positive for diarrhea. Negative for vomiting.   Genitourinary: Negative.    Musculoskeletal: Positive for arthralgias. Negative for neck pain and neck stiffness.   Skin: Negative.    Neurological: Negative for speech difficulty and numbness.   Psychiatric/Behavioral: The patient is nervous/anxious.    All other systems reviewed and are negative.      Past Medical History:   Diagnosis Date   • Anemia ?    under control   • Arthritis    • Depression    • Diverticulosis    • Essential hypertension 7/30/2018   • GERD (gastroesophageal reflux disease)     no longer bothers me...   • Gout    • Gout    • Hashimoto's disease    • Hernia 3-2012    repair surgery which failed in 2014   • Hypothyroid    • Impingement syndrome of right shoulder 5/8/2016   • Movement disorder Hand Tremors   • Obesity    • ISABEL on CPAP 9/24/2019   • PONV (postoperative nausea and vomiting)     Pt states \"only one time\".   • Tremor     worse on left arm/hand   • Wears glasses        Allergies   Allergen Reactions   • Diazepam Rash "       Past Surgical History:   Procedure Laterality Date   • ABDOMINAL SURGERY     • APPENDECTOMY     • COLONOSCOPY  2012    failed due to hernia...   • COLONOSCOPY N/A 6/8/2017    Procedure: COLONOSCOPY with cold snare polypectomies, argon thermal ablation, ns injection, yanira ink injection;  Surgeon: Rafiq Huang MD;  Location: [facility] ENDOSCOPY;  Service:    • COLONOSCOPY N/A 6/5/2018    Procedure: COLONOSCOPY WITH BIOPSIES;  Surgeon: Rafiq Huang MD;  Location: [facility] ENDOSCOPY;  Service: Gastroenterology   • GASTRIC BYPASS  1978   • HERNIA MESH REMOVAL  2014    BOWEL OBSTRUCTION DUE TO MESH   • HERNIA REPAIR  2012   • HERNIA REPAIR     • HYSTERECTOMY     • JOINT REPLACEMENT  2009 & 2012    Left & Right Full Hip Replacements   • SMALL INTESTINE SURGERY  2014    DUE TO BOWEL OBSTRUCTION WITH SOME REMOVAL   • TOTAL ABDOMINAL HYSTERECTOMY WITH SALPINGO OOPHORECTOMY  1990    fibroids   • TUBAL ABDOMINAL LIGATION     • UPPER GASTROINTESTINAL ENDOSCOPY         Family History   Problem Relation Age of Onset   • Obesity Mother    • Rheum arthritis Mother    • Thyroid disease Mother    • Hypertension Mother    • Stroke Mother         Most of her problems were caused by Rheumatoid Ar.   • Hyperlipidemia Mother    • Arthritis Mother         Rheumatoid Arthritis..Passed 2004   • Cancer Father         Passed 1997   • Kidney cancer Father    • Liver cancer Father    • No Known Problems Brother    • Diabetes Maternal Aunt    • Cancer Maternal Uncle    • Lung cancer Maternal Uncle    • Alcohol abuse Maternal Uncle    • No Known Problems Paternal Aunt    • Stroke Paternal Uncle    • Hypertension Paternal Uncle    • Hyperlipidemia Paternal Uncle    • Heart disease Paternal Uncle    • No Known Problems Brother    • Asthma Sister         under control   • Alcohol abuse Maternal Uncle    • Colon cancer Neg Hx    • Breast cancer Neg Hx    • Ovarian cancer Neg Hx        Social History     Socioeconomic History   • Marital status:  "     Spouse name: Not on file   • Number of children: Not on file   • Years of education: Not on file   • Highest education level: Not on file   Tobacco Use   • Smoking status: Former Smoker     Packs/day: 1.00     Years: 40.00     Pack years: 40.00     Types: Cigarettes     Start date:      Last attempt to quit:      Years since quittin.0   • Smokeless tobacco: Never Used   • Tobacco comment: \"quit 9 years ago\"   Substance and Sexual Activity   • Alcohol use: No   • Drug use: No   • Sexual activity: Not Currently     Partners: Male     Birth control/protection: None           Objective   Physical Exam   Constitutional: She is oriented to person, place, and time. She appears well-developed and well-nourished. No distress.   Afebrile vital signs stable no acute distress patient seems to be somewhat anxious   HENT:   Head: Normocephalic and atraumatic.   Mouth/Throat: Oropharynx is clear and moist. No oropharyngeal exudate.   Eyes: Pupils are equal, round, and reactive to light. Conjunctivae and EOM are normal. No scleral icterus.   Neck: Normal range of motion. Neck supple.   Cardiovascular: Normal rate, regular rhythm and intact distal pulses.   Pulmonary/Chest: Effort normal and breath sounds normal.   Abdominal: Soft. Bowel sounds are normal. There is no tenderness.   Musculoskeletal: Normal range of motion. She exhibits no edema.   Neurological: She is alert and oriented to person, place, and time. No cranial nerve deficit or sensory deficit. She exhibits normal muscle tone. Coordination normal.   Skin: Skin is warm and dry. Capillary refill takes less than 2 seconds. No rash noted. She is not diaphoretic. No erythema.   Psychiatric: Her behavior is normal. Judgment and thought content normal.   Anxious   Nursing note and vitals reviewed.      Procedures           ED Course  ED Course as of 417   Sun 2020   5530 EKG and interpreted by me reveals sinus rhythm at 72    [PF] "   1842 Lactate: 1.0 [PF]   1845 Case and management reviewed with Dr. Oconnor... Labs reviewed as well, as well as with CT will proceed with CT PE protocol elevated d-dimer, shortness of breath symptoms    [SC]   1929 Patient disposition handoff to Dr. Medrano further labs, CT chest results pending    [SC]   2110 EKG interpreted by me.  Sinus rhythm.  Rate of 60.  Occasional PVCs.  No ST segment or T wave abnormalities.  Normal EKG    [CG]      ED Course User Index  [CG] Sukhi Medrano DO  [PF] Gold Oconnor,   [SC] Vasiliy Smith, DIYVA                                               MDM  Number of Diagnoses or Management Options     Amount and/or Complexity of Data Reviewed  Decide to obtain previous medical records or to obtain history from someone other than the patient: yes  Review and summarize past medical records: yes  Discuss the patient with other providers: yes    Risk of Complications, Morbidity, and/or Mortality  Presenting problems: moderate  Diagnostic procedures: low  Management options: low        Final diagnoses:   Dyspnea, unspecified type            Sukhi Medrano DO  02/03/20 0417

## 2020-02-03 ENCOUNTER — NURSE TRIAGE (OUTPATIENT)
Dept: CALL CENTER | Facility: HOSPITAL | Age: 70
End: 2020-02-03

## 2020-02-03 ENCOUNTER — EPISODE CHANGES (OUTPATIENT)
Dept: CASE MANAGEMENT | Facility: OTHER | Age: 70
End: 2020-02-03

## 2020-02-03 DIAGNOSIS — E03.9 HYPOTHYROIDISM, ADULT: ICD-10-CM

## 2020-02-03 LAB — S PYO AG THROAT QL: NEGATIVE

## 2020-02-03 RX ORDER — LIOTHYRONINE SODIUM 25 UG/1
TABLET ORAL
Qty: 90 TABLET | Refills: 0 | Status: SHIPPED | OUTPATIENT
Start: 2020-02-03 | End: 2020-02-12

## 2020-02-03 RX ORDER — LEVOTHYROXINE SODIUM 0.07 MG/1
TABLET ORAL
Qty: 90 TABLET | Refills: 1 | Status: SHIPPED | OUTPATIENT
Start: 2020-02-03 | End: 2020-04-27 | Stop reason: SDUPTHER

## 2020-02-03 NOTE — TELEPHONE ENCOUNTER
"This is a HUB call, she is having pain in shoulders and down her both arms and she gets sob, it is worse with any activity. She always feels weak. Advised to go to the ED. She has been to the Ed, does not want go back. She needs to see her PCP Asap. She does not have a car. Advised to call 911. She is not wanting to do any of the care advice. She did not feel that she was treated well. She finally decided she would possibly go to the ED.l     Reason for Disposition  • [1] MODERATE difficulty breathing (e.g., speaks in phrases, SOB even at rest, pulse 100-120) AND [2] NEW-onset or WORSE than normal    Additional Information  • Negative: [1] Breathing stopped AND [2] hasn't returned  • Negative: Choking on something  • Negative: Severe difficulty breathing (e.g., struggling for each breath, speaks in single words)  • Negative: Bluish (or gray) lips or face now  • Negative: Difficult to awaken or acting confused (e.g., disoriented, slurred speech)  • Negative: Passed out (i.e., lost consciousness, collapsed and was not responding)  • Negative: Wheezing started suddenly after medicine, an allergic food or bee sting  • Negative: Stridor  • Negative: Slow, shallow and weak breathing  • Negative: Sounds like a life-threatening emergency to the triager  • Negative: Chest pain  • Negative: [1] Wheezing (high pitched whistling sound) AND [2] previous asthma attacks or use of asthma medicines  • Negative: [1] Difficulty breathing AND [2] only present when coughing  • Negative: [1] Difficulty breathing AND [2] only from stuffy or runny nose    Answer Assessment - Initial Assessment Questions  1. RESPIRATORY STATUS: \"Describe your breathing?\" (e.g., wheezing, shortness of breath, unable to speak, severe coughing)       She gets sob with walking  2. ONSET: \"When did this breathing problem begin?\"       It has been getting worse.   3. PATTERN \"Does the difficult breathing come and go, or has it been constant since it started?\"     " "  It happens often.   4. SEVERITY: \"How bad is your breathing?\" (e.g., mild, moderate, severe)     - MILD: No SOB at rest, mild SOB with walking, speaks normally in sentences, can lay down, no retractions, pulse < 100.     - MODERATE: SOB at rest, SOB with minimal exertion and prefers to sit, cannot lie down flat, speaks in phrases, mild retractions, audible wheezing, pulse 100-120.     - SEVERE: Very SOB at rest, speaks in single words, struggling to breathe, sitting hunched forward, retractions, pulse > 120       He gets sob at rest.   5. RECURRENT SYMPTOM: \"Have you had difficulty breathing before?\" If so, ask: \"When was the last time?\" and \"What happened that time?\"       Yes she has had problem before.   6. CARDIAC HISTORY: \"Do you have any history of heart disease?\" (e.g., heart attack, angina, bypass surgery, angioplasty)       no  7. LUNG HISTORY: \"Do you have any history of lung disease?\"  (e.g., pulmonary embolus, asthma, emphysema)      n  8. CAUSE: \"What do you think is causing the breathing problem?\"       She does not know   9. OTHER SYMPTOMS: \"Do you have any other symptoms? (e.g., dizziness, runny nose, cough, chest pain, fever)      Arm pain   10. PREGNANCY: \"Is there any chance you are pregnant?\" \"When was your last menstrual period?\"        n  11. TRAVEL: \"Have you traveled out of the country in the last month?\" (e.g., travel history, exposures)        n    Protocols used: BREATHING DIFFICULTY-ADULT-AH      "

## 2020-02-04 ENCOUNTER — PATIENT OUTREACH (OUTPATIENT)
Dept: CASE MANAGEMENT | Facility: OTHER | Age: 70
End: 2020-02-04

## 2020-02-04 LAB — BACTERIA SPEC AEROBE CULT: NORMAL

## 2020-02-04 NOTE — OUTREACH NOTE
Care Plan Note      Responses   Annual Wellness Visit:   Patient Has Completed   Care Gaps Addressed  Mammogram, Colon Cancer Screening, Flu Shot, Pneumonia Vaccine   Colon Cancer Screening Type  Colonoscopy   Colonoscopy Status  Up to Date (< 10 yrs)   Flu Shot Status  Refused   Mammogram Status  Up to Date   Pneumonia Vaccine Status  Up to Date   Pneumonia Vaccine Comments  Patient said she has had both Pneumonia vaccines 1 year apart.   Other Patient Education/Resources   24/7 [facility] Nurse Call Line, Advanced Care Planning   24/7 Nurse Call Line Education Method  Verbal   ACP Education Method  Send Materials   Does patient have depression diagnosis?  Yes   Advanced Directives:  Send Materials   Ed Visits past 12 months:  1   Hospitalizations past 12 months  None   Medication Adherence  Medications understood        The main concerns and/or symptoms the patient would like to address are:   Had ED visit 2-2-20 with SOA/ dyspnea.  Patient said she is not feeling a lot better; is 4th day of feeling short of breath. States her shoulder and arm pain she has been having is better today; is taking Ibuprofen and Tylenol.  Lives alone; has a sister as support system.  States she uses a CPAP while sleeping or napping.  Voiced independence with ADL's, Mobility, Medications; compliance with daily meds as ordered.  Patient said she called her PCP office after ED visit; has an appt with PCP 2-18-20.  Patient said she called the Cardio. Recommended from the ED, and left a message 2-3-20, has not heard back from them yet.  Stated she uses the free transportation to MD appts, provided by her [employer].  Patient talked about her ED experience.  ACM informed her she can call the hospital Patient Experience Representative and discuss this with her if she chooses.     Education/instruction provided by Care Coordinator:   Explained role of Ambulatory  and contact information given.  Discussed ED discharge  recommendations.  Encouraged patient to call Cardiologist office back in the AM if they have not called her back before.  Noted AWV is up to date; discussed Care Gaps (see above).  Discussed Advanced Directives; patient requested information be mailed.  MyChart is active; no questions regarding use.  Gave patient the 24/7 [facility] Nurse phone line #.  Patient requested information on [facility] Orthopedic office in [place]; information and phone # given.  Discussed Human Nurse Case Management service available to patient; she said they have had recent contact, and she has the nurse's number.  No other questions or concerns voiced at this time.      Follow Up Outreach Due:   As needed.    Gauri Causey RN  Ambulatory     2/4/2020, 5:02 PM

## 2020-02-04 NOTE — OUTREACH NOTE
Care Coordination Assessment    Documented/Reviewed By:  Gauri Causey RN Date/time:  2/4/2020  4:55 PM   Assessment completed with:  patient  Enrolled in care management program:  No  Living arrangement:  alone  Support system:  family  Type of residence:  apartment  Home care services:  No  Equipment used at home:  oxygen/respiratory treatment (Comment: CPAP)  Communication device:  No  Bed or wheelchair confined:  No  Inadequate nutrition:  No  Medication adherence problem:  No  Experiencing side effects from current medications:  No  History of fall(s) in last 6 months:  No  Difficulty keeping appointments:  No  Family aware of the patient's advance care planning wishes:  No

## 2020-02-05 ENCOUNTER — TELEPHONE (OUTPATIENT)
Dept: FAMILY MEDICINE CLINIC | Facility: CLINIC | Age: 70
End: 2020-02-05

## 2020-02-07 ENCOUNTER — CONSULT (OUTPATIENT)
Dept: CARDIOLOGY | Facility: CLINIC | Age: 70
End: 2020-02-07

## 2020-02-07 VITALS — HEART RATE: 80 BPM | OXYGEN SATURATION: 98 % | BODY MASS INDEX: 42.7 KG/M2 | WEIGHT: 241 LBS | HEIGHT: 63 IN

## 2020-02-07 DIAGNOSIS — I50.33 ACUTE ON CHRONIC DIASTOLIC HEART FAILURE (HCC): Primary | ICD-10-CM

## 2020-02-07 DIAGNOSIS — N17.9 ACUTE KIDNEY INJURY (HCC): ICD-10-CM

## 2020-02-07 DIAGNOSIS — E66.01 MORBID OBESITY WITH BMI OF 40.0-44.9, ADULT (HCC): ICD-10-CM

## 2020-02-07 DIAGNOSIS — Z99.89 OSA ON CPAP: ICD-10-CM

## 2020-02-07 DIAGNOSIS — G47.33 OSA ON CPAP: ICD-10-CM

## 2020-02-07 PROCEDURE — 99204 OFFICE O/P NEW MOD 45 MIN: CPT | Performed by: INTERNAL MEDICINE

## 2020-02-07 RX ORDER — FUROSEMIDE 20 MG/1
20 TABLET ORAL DAILY
Qty: 30 TABLET | Refills: 0 | Status: SHIPPED | OUTPATIENT
Start: 2020-02-07 | End: 2020-03-04 | Stop reason: SDUPTHER

## 2020-02-07 NOTE — PROGRESS NOTES
[facility] Cardiology OP Consult Note    Salinas Monroy  [id number]  02/07/2020    Referred By: Jm Marie DO    Chief Complaint: Shortness of breath    History of Present Illness:   Mrs. Salinas Monroy is a 70 y.o. female who presents to the Cardiology Clinic for evaluation of shortness of breath.  The patient has a past medical history significant for hypertension, hypothyroidism in the setting of prior Hashimoto's thyroiditis, Parkinson's disease, osteoarthritis, obesity with a BMI of 42.7, and ISABEL on CPAP.  Her past cardiac history is significant for an echocardiogram in 7/18 revealing mild concentric LVH, grade 1 diastolic dysfunction, and moderate tricuspid regurgitation.  Her recent medical history significant for presentation to the emergency department on 2/2/2020 for evaluation of shortness of breath.  She was found to have a mildly elevated d-dimer, however CT PE protocol did not show any evidence of pulmonary embolism.  She was, however, found to have mild interstitial edema as well as small bilateral pleural effusions with a proBNP elevated at 3353.  Of note, she also had abnormal renal function with a creatinine 1.18 and GFR 45.  She presents today to further evaluate her exertional dyspnea.  The patient reports her dyspnea first began approximately 1 to 1.5 weeks ago.  At that time, she does endorse more frequent eating out as well as increased sodium intake.  She subsequently began noting exertional dyspnea.  She does also report mitten episodes of mild dyspnea at rest.  The patient checks her weight regularly, and notes an approximate 10 to 15 pound weight gain over the past 1 month.  In addition, she notes mild lower extremity swelling, which also appears to be new.  The patient denies exertional chest pain or chest discomfort.  She does, however, endorse bilateral shoulder pain which appears to be chronic.  No reported palpitations, dizziness,  "lightheadedness, or syncope.  She denies orthopnea, however states she rarely lies flat.  No other specific complaints at this time.      Past Cardiac Testin. Last Coronary Angio: None  2. Prior Stress Testing: None  3. Last Echo: 2018   1.  Normal LV systolic function, LVEF 67%   2.  Mild concentric LVH.   3.  Grade 1 diastolic dysfunction.   4.  Moderate tricuspid regurgitation.  4. Prior Holter Monitor: None    Review of Systems:   Review of Systems   Constitutional: Negative for activity change, appetite change, chills, diaphoresis, fatigue, fever, unexpected weight gain and unexpected weight loss.   Respiratory: Positive for shortness of breath. Negative for cough, chest tightness and wheezing.    Cardiovascular: Positive for leg swelling. Negative for chest pain and palpitations.   Gastrointestinal: Negative for abdominal pain, anal bleeding, blood in stool and GERD.   Endocrine: Negative for cold intolerance and heat intolerance.   Genitourinary: Negative for hematuria.   Musculoskeletal: Positive for arthralgias, gait problem and myalgias.   Neurological: Positive for tremors. Negative for dizziness, syncope, weakness and light-headedness.   Hematological: Does not bruise/bleed easily.   Psychiatric/Behavioral: Negative for depressed mood and stress. The patient is not nervous/anxious.        Past Medical History:   Past Medical History:   Diagnosis Date   • Anemia ?    under control   • Arthritis    • Depression    • Diverticulosis    • Essential hypertension 2018   • GERD (gastroesophageal reflux disease)     no longer bothers me...   • Gout    • Gout    • Hashimoto's disease    • Hernia 3-2012    repair surgery which failed in    • Hypothyroid    • Impingement syndrome of right shoulder 2016   • Movement disorder Hand Tremors   • Obesity    • ISABEL on CPAP 2019   • PONV (postoperative nausea and vomiting)     Pt states \"only one time\".   • Tremor     worse on left arm/hand   • " Wears glasses        Past Surgical History:   Past Surgical History:   Procedure Laterality Date   • ABDOMINAL SURGERY     • APPENDECTOMY     • COLONOSCOPY  2012    failed due to hernia...   • COLONOSCOPY N/A 6/8/2017    Procedure: COLONOSCOPY with cold snare polypectomies, argon thermal ablation, ns injection, yanira ink injection;  Surgeon: Rafiq Huang MD;  Location: [facility] ENDOSCOPY;  Service:    • COLONOSCOPY N/A 6/5/2018    Procedure: COLONOSCOPY WITH BIOPSIES;  Surgeon: Rafiq Huang MD;  Location: [facility] ENDOSCOPY;  Service: Gastroenterology   • GASTRIC BYPASS  1978   • HERNIA MESH REMOVAL  2014    BOWEL OBSTRUCTION DUE TO MESH   • HERNIA REPAIR  2012   • HERNIA REPAIR     • HYSTERECTOMY     • JOINT REPLACEMENT  2009 & 2012    Left & Right Full Hip Replacements   • SMALL INTESTINE SURGERY  2014    DUE TO BOWEL OBSTRUCTION WITH SOME REMOVAL   • TOTAL ABDOMINAL HYSTERECTOMY WITH SALPINGO OOPHORECTOMY  1990    fibroids   • TUBAL ABDOMINAL LIGATION     • UPPER GASTROINTESTINAL ENDOSCOPY         Family History:   Family History   Problem Relation Age of Onset   • Obesity Mother    • Rheum arthritis Mother    • Thyroid disease Mother    • Hypertension Mother    • Stroke Mother         Most of her problems were caused by Rheumatoid Ar.   • Hyperlipidemia Mother    • Arthritis Mother         Rheumatoid Arthritis..Passed 2004   • Cancer Father         Passed 1997   • Kidney cancer Father    • Liver cancer Father    • No Known Problems Brother    • Diabetes Maternal Aunt    • Cancer Maternal Uncle    • Lung cancer Maternal Uncle    • Alcohol abuse Maternal Uncle    • No Known Problems Paternal Aunt    • Stroke Paternal Uncle    • Hypertension Paternal Uncle    • Hyperlipidemia Paternal Uncle    • Heart disease Paternal Uncle    • No Known Problems Brother    • Asthma Sister         under control   • Alcohol abuse Maternal Uncle    • Colon cancer Neg Hx    • Breast cancer Neg Hx    • Ovarian cancer Neg Hx   "      Social History:   Social History     Socioeconomic History   • Marital status:      Spouse name: Not on file   • Number of children: Not on file   • Years of education: Not on file   • Highest education level: Not on file   Tobacco Use   • Smoking status: Former Smoker     Packs/day: 1.00     Years: 40.00     Pack years: 40.00     Types: Cigarettes     Start date:      Last attempt to quit:      Years since quittin.1   • Smokeless tobacco: Never Used   • Tobacco comment: \"quit 9 years ago\"   Substance and Sexual Activity   • Alcohol use: No   • Drug use: No   • Sexual activity: Not Currently     Partners: Male     Birth control/protection: None       Medications:     Current Outpatient Medications:   •  Acetylcysteine (N-ACETYL-L-CYSTEINE PO), Take 1 tablet by mouth Daily., Disp: , Rfl:   •  acyclovir (ZOVIRAX) 200 MG capsule, , Disp: , Rfl:   •  allopurinol (ZYLOPRIM) 100 MG tablet, Take 2 tablets by mouth Daily., Disp: 180 tablet, Rfl: 3  •  B Complex Vitamins (VITAMIN B COMPLEX PO), Take 1 tablet by mouth Daily., Disp: , Rfl:   •  B-12, Methylcobalamin, 1000 MCG sublingual tablet, , Disp: , Rfl:   •  BIOTIN PO, Take 1 tablet by mouth Daily., Disp: , Rfl:   •  Cholecalciferol (VITAMIN D3) 73266 UNITS tablet, Take 7,500 Units by mouth Daily., Disp: , Rfl:   •  clotrimazole-betamethasone (LOTRISONE) 1-0.05 % cream, Apply  topically to the appropriate area as directed 2 (Two) Times a Day., Disp: 45 g, Rfl: 3  •  Coenzyme Q10 400 MG capsule, Take  by mouth., Disp: , Rfl:   •  Digestive Enzymes (DIGESTIVE ENZYME PO), Take 1 tablet by mouth Daily., Disp: , Rfl:   •  escitalopram (LEXAPRO) 5 MG tablet, Take 1 tablet by mouth Daily. (Patient taking differently: Take 20 mg by mouth Daily.), Disp: 90 tablet, Rfl: 3  •  GARLIC PO, Take 1 tablet by mouth Daily., Disp: , Rfl:   •  L-THEANINE PO, Take  by mouth., Disp: , Rfl:   •  levothyroxine (SYNTHROID, LEVOTHROID) 75 MCG tablet, TAKE ONE TABLET BY " "MOUTH DAILY, Disp: 90 tablet, Rfl: 1  •  liothyronine (CYTOMEL) 25 MCG tablet, TAKE THREE TABLETS BY MOUTH DAILY, Disp: 90 tablet, Rfl: 0  •  liothyronine (CYTOMEL) 5 MCG tablet, Take 3 tablets by mouth Daily., Disp: 90 tablet, Rfl: 1  •  MAGNESIUM PO, Take 1,000 mg by mouth Every Night., Disp: , Rfl:   •  Methylsulfonylmethane (MSM PO), Take 1 tablet by mouth Daily., Disp: , Rfl:   •  MILK THISTLE PO, Take 1 tablet by mouth Daily., Disp: , Rfl:   •  Misc Natural Products (TART CHERRY ADVANCED PO), Take 2 capsules by mouth Daily., Disp: , Rfl:   •  NON FORMULARY, , Disp: , Rfl:   •  Omega-3 Fatty Acids (OMEGA-3 FISH OIL PO), Take 4,000 Units by mouth Daily., Disp: , Rfl:   •  Probiotic Product (PROBIOTIC PO), Take 1 tablet by mouth Daily., Disp: , Rfl:   •  SELENIUM PO, Take 1 tablet by mouth Daily., Disp: , Rfl:   •  TAURINE PO, Take  by mouth., Disp: , Rfl:   •  TURMERIC PO, Take 1 tablet by mouth Daily., Disp: , Rfl:   •  VITAMIN A PO, Take  by mouth., Disp: , Rfl:   •  vitamin C (ASCORBIC ACID) 500 MG tablet, Take 500 mg by mouth 3 (Three) Times a Day As Needed., Disp: , Rfl:   •  vitamin E 400 UNIT capsule, Take 400 Units by mouth Daily., Disp: , Rfl:   •  VITAMIN K PO, Take 100 mcg by mouth Daily., Disp: , Rfl:   •  Zinc 30 MG tablet, Take  by mouth Daily., Disp: , Rfl:   •  furosemide (LASIX) 20 MG tablet, Take 1 tablet by mouth Daily., Disp: 30 tablet, Rfl: 0    Allergies:   Allergies   Allergen Reactions   • Diazepam Rash       Physical Exam:  Vital Signs:   Vitals:    02/07/20 1112   Pulse: 80   SpO2: 98%   Weight: 109 kg (241 lb)   Height: 160 cm (63\")       Physical Exam   Constitutional: She is oriented to person, place, and time. She appears well-developed and well-nourished. No distress.   HENT:   Head: Normocephalic and atraumatic.   Moist Mucous Membranes.    Eyes: Pupils are equal, round, and reactive to light. EOM are normal. No scleral icterus.   Neck: No tracheal deviation present. "   Cardiovascular: Normal rate, regular rhythm, normal heart sounds and intact distal pulses. Exam reveals no gallop and no friction rub.   No murmur heard.  Trace bilateral lower extremity edema.  2+ peripheral pulses.   Pulmonary/Chest: Effort normal and breath sounds normal. No stridor. No respiratory distress. She has no wheezes. She has no rales. She exhibits no tenderness.   CTA bilaterally.   Abdominal: Soft. Bowel sounds are normal. She exhibits no distension. There is no tenderness. There is no rebound and no guarding.   Musculoskeletal: Normal range of motion. She exhibits no edema.   Ambulates with a walker.   Lymphadenopathy:     She has no cervical adenopathy.   Neurological: She is alert and oriented to person, place, and time.   Tremor.   Skin: Skin is warm and dry. She is not diaphoretic. No erythema.   Psychiatric:   Appears anxious.       Results Review:   I personally viewed and interpreted the patient's EKG/Telemetry data    ECG 2/2/2020: Sinus rhythm at 60 bpm.  Normal axis.  Occasional premature atrial complexes.  Low voltage ECG.      Assessment / Plan:     1.  Acute on chronic diastolic heart failure   --Presents today primarily for evaluation of exertional dyspnea  --Prior echocardiogram in 2018 revealed mild concentric LVH with grade 1 diastolic dysfunction, moderate tricuspid regurgitation  --Recent ED visit significant for pulmonary edema and small pleural effusions on CT imaging, elevated proBNP consistent with volume overload  --Continues to have some mild volume overload today with trace lower extremity edema  --Suspect exertional dyspnea likely secondary to volume overload in the setting of acute on chronic diastolic heart failure  --Will repeat a cardiogram to reevaluate systolic function, as well evaluate for evidence of pulmonary hypertension  --Will start 5-day course of Lasix 20 mg p.o. daily, repeat renal function after 5 days to ensure stability  --Pending renal function, will  then consider continuation of oral Lasix daily  --The patient was advised to continue a daily weight log  --Currently has follow-up scheduled with PCP in 2 weeks, will therefore schedule cardiology follow-up in 1 month to reevaluate volume status and symptoms    2.  Acute kidney injury  --Recent labs upon ED evaluation significant for mild creatinine elevation with GFR 45  --Possible congestive nephropathy in the setting of volume overload  --Reevaluate kidney function following initiation of diuresis    3.  Morbid obesity with BMI of 40.0-44.9  --Advised weight loss through diet and exercise    4.  ISABEL on CPAP  --Compliant with CPAP        Follow Up:   Return in about 4 weeks (around 3/6/2020).      Thank you for allowing me to participate in the care of your patient. Please to not hesitate to contact me with additional questions or concerns.     JEWELL Pires MD  Interventional Cardiology   02/07/2020  11:13 AM

## 2020-02-10 ENCOUNTER — EPISODE CHANGES (OUTPATIENT)
Dept: CASE MANAGEMENT | Facility: OTHER | Age: 70
End: 2020-02-10

## 2020-02-12 ENCOUNTER — RESULTS ENCOUNTER (OUTPATIENT)
Dept: CARDIOLOGY | Facility: CLINIC | Age: 70
End: 2020-02-12

## 2020-02-12 ENCOUNTER — TELEPHONE (OUTPATIENT)
Dept: FAMILY MEDICINE CLINIC | Facility: CLINIC | Age: 70
End: 2020-02-12

## 2020-02-12 ENCOUNTER — LAB (OUTPATIENT)
Dept: LAB | Facility: HOSPITAL | Age: 70
End: 2020-02-12

## 2020-02-12 DIAGNOSIS — E03.9 HYPOTHYROIDISM, ADULT: ICD-10-CM

## 2020-02-12 DIAGNOSIS — I10 ESSENTIAL HYPERTENSION: Primary | ICD-10-CM

## 2020-02-12 DIAGNOSIS — I10 ESSENTIAL HYPERTENSION: ICD-10-CM

## 2020-02-12 DIAGNOSIS — D50.9 IRON DEFICIENCY ANEMIA, UNSPECIFIED IRON DEFICIENCY ANEMIA TYPE: ICD-10-CM

## 2020-02-12 DIAGNOSIS — I50.33 ACUTE ON CHRONIC DIASTOLIC HEART FAILURE (HCC): ICD-10-CM

## 2020-02-12 LAB
ALBUMIN SERPL-MCNC: 4.4 G/DL (ref 3.5–5.2)
ALBUMIN/GLOB SERPL: 1.5 G/DL
ALP SERPL-CCNC: 83 U/L (ref 39–117)
ALT SERPL W P-5'-P-CCNC: 18 U/L (ref 1–33)
ANION GAP SERPL CALCULATED.3IONS-SCNC: 14.3 MMOL/L (ref 5–15)
AST SERPL-CCNC: 22 U/L (ref 1–32)
BASOPHILS # BLD AUTO: 0.06 10*3/MM3 (ref 0–0.2)
BASOPHILS NFR BLD AUTO: 0.5 % (ref 0–1.5)
BILIRUB SERPL-MCNC: 0.3 MG/DL (ref 0.2–1.2)
BUN BLD-MCNC: 30 MG/DL (ref 8–23)
BUN/CREAT SERPL: 31.6 (ref 7–25)
CALCIUM SPEC-SCNC: 9.3 MG/DL (ref 8.6–10.5)
CHLORIDE SERPL-SCNC: 100 MMOL/L (ref 98–107)
CO2 SERPL-SCNC: 21.7 MMOL/L (ref 22–29)
CREAT BLD-MCNC: 0.95 MG/DL (ref 0.57–1)
DEPRECATED RDW RBC AUTO: 37 FL (ref 37–54)
EOSINOPHIL # BLD AUTO: 0.15 10*3/MM3 (ref 0–0.4)
EOSINOPHIL NFR BLD AUTO: 1.3 % (ref 0.3–6.2)
ERYTHROCYTE [DISTWIDTH] IN BLOOD BY AUTOMATED COUNT: 11.7 % (ref 12.3–15.4)
FERRITIN SERPL-MCNC: 264 NG/ML (ref 13–150)
GFR SERPL CREATININE-BSD FRML MDRD: 58 ML/MIN/1.73
GLOBULIN UR ELPH-MCNC: 3 GM/DL
GLUCOSE BLD-MCNC: 97 MG/DL (ref 65–99)
HCT VFR BLD AUTO: 39.6 % (ref 34–46.6)
HGB BLD-MCNC: 13 G/DL (ref 12–15.9)
IMM GRANULOCYTES # BLD AUTO: 0.06 10*3/MM3 (ref 0–0.05)
IMM GRANULOCYTES NFR BLD AUTO: 0.5 % (ref 0–0.5)
IRON 24H UR-MRATE: 26 MCG/DL (ref 37–145)
IRON SATN MFR SERPL: 8 % (ref 20–50)
LYMPHOCYTES # BLD AUTO: 2.17 10*3/MM3 (ref 0.7–3.1)
LYMPHOCYTES NFR BLD AUTO: 19.5 % (ref 19.6–45.3)
MAGNESIUM SERPL-MCNC: 2.2 MG/DL (ref 1.6–2.4)
MCH RBC QN AUTO: 28.8 PG (ref 26.6–33)
MCHC RBC AUTO-ENTMCNC: 32.8 G/DL (ref 31.5–35.7)
MCV RBC AUTO: 87.8 FL (ref 79–97)
MONOCYTES # BLD AUTO: 0.71 10*3/MM3 (ref 0.1–0.9)
MONOCYTES NFR BLD AUTO: 6.4 % (ref 5–12)
NEUTROPHILS # BLD AUTO: 7.97 10*3/MM3 (ref 1.7–7)
NEUTROPHILS NFR BLD AUTO: 71.8 % (ref 42.7–76)
NRBC BLD AUTO-RTO: 0 /100 WBC (ref 0–0.2)
PLATELET # BLD AUTO: 431 10*3/MM3 (ref 140–450)
PMV BLD AUTO: 9.7 FL (ref 6–12)
POTASSIUM BLD-SCNC: 4.3 MMOL/L (ref 3.5–5.2)
PROT SERPL-MCNC: 7.4 G/DL (ref 6–8.5)
RBC # BLD AUTO: 4.51 10*6/MM3 (ref 3.77–5.28)
SODIUM BLD-SCNC: 136 MMOL/L (ref 136–145)
T3FREE SERPL-MCNC: 4.65 PG/ML (ref 2–4.4)
T4 FREE SERPL-MCNC: 0.7 NG/DL (ref 0.93–1.7)
TIBC SERPL-MCNC: 329 MCG/DL (ref 298–536)
TRANSFERRIN SERPL-MCNC: 221 MG/DL (ref 200–360)
TSH SERPL DL<=0.05 MIU/L-ACNC: <0.005 UIU/ML (ref 0.27–4.2)
WBC NRBC COR # BLD: 11.12 10*3/MM3 (ref 3.4–10.8)

## 2020-02-12 PROCEDURE — 84439 ASSAY OF FREE THYROXINE: CPT

## 2020-02-12 PROCEDURE — 83540 ASSAY OF IRON: CPT

## 2020-02-12 PROCEDURE — 84481 FREE ASSAY (FT-3): CPT

## 2020-02-12 PROCEDURE — 84482 T3 REVERSE: CPT

## 2020-02-12 PROCEDURE — 84466 ASSAY OF TRANSFERRIN: CPT

## 2020-02-12 PROCEDURE — 82728 ASSAY OF FERRITIN: CPT

## 2020-02-12 PROCEDURE — 84443 ASSAY THYROID STIM HORMONE: CPT

## 2020-02-12 PROCEDURE — 83735 ASSAY OF MAGNESIUM: CPT | Performed by: INTERNAL MEDICINE

## 2020-02-12 PROCEDURE — 80053 COMPREHEN METABOLIC PANEL: CPT

## 2020-02-12 PROCEDURE — 36415 COLL VENOUS BLD VENIPUNCTURE: CPT

## 2020-02-12 PROCEDURE — 85025 COMPLETE CBC W/AUTO DIFF WBC: CPT

## 2020-02-12 RX ORDER — LIOTHYRONINE SODIUM 25 UG/1
TABLET ORAL
Qty: 90 TABLET | Refills: 0 | Status: SHIPPED | OUTPATIENT
Start: 2020-02-12 | End: 2020-03-19 | Stop reason: SDUPTHER

## 2020-02-12 NOTE — TELEPHONE ENCOUNTER
Mindy from [facility] called to inform us that she has the patient there waiting with her and there are supposed to be orders in the computer for the patient to have the following labs done:    -TSH  -Reverse T3  -T3 Free  -T4 Free  -Ferritin  -Total Iron Panel  -CMP  -CBC    Patient is waiting to get these tests done currently.    Mindy can be reached at : [phone]

## 2020-02-12 NOTE — TELEPHONE ENCOUNTER
Patient states that her liothyronine (CYTOMEL) 25 MCG tablet was given to her through another different  and can't take them cause they are nasty and hard to swallow. She was informed by her pharmacy of [facility] that they sent over a new refill request for this medication with the  of [facility]. Patient called for the office to be on the look out for this.

## 2020-02-17 LAB — T3REVERSE SERPL-MCNC: 14.3 NG/DL (ref 9.2–24.1)

## 2020-02-18 ENCOUNTER — OFFICE VISIT (OUTPATIENT)
Dept: FAMILY MEDICINE CLINIC | Facility: CLINIC | Age: 70
End: 2020-02-18

## 2020-02-18 VITALS
HEART RATE: 87 BPM | DIASTOLIC BLOOD PRESSURE: 70 MMHG | HEIGHT: 63 IN | WEIGHT: 227 LBS | TEMPERATURE: 98.7 F | BODY MASS INDEX: 40.22 KG/M2 | SYSTOLIC BLOOD PRESSURE: 130 MMHG | OXYGEN SATURATION: 100 %

## 2020-02-18 DIAGNOSIS — F42.9 OBSESSIVE-COMPULSIVE DISORDER, UNSPECIFIED TYPE: ICD-10-CM

## 2020-02-18 DIAGNOSIS — E03.9 HYPOTHYROIDISM, ADULT: Primary | ICD-10-CM

## 2020-02-18 DIAGNOSIS — E61.1 IRON DEFICIENCY: ICD-10-CM

## 2020-02-18 DIAGNOSIS — I50.33 ACUTE ON CHRONIC DIASTOLIC HEART FAILURE (HCC): ICD-10-CM

## 2020-02-18 DIAGNOSIS — G25.0 ESSENTIAL TREMOR: ICD-10-CM

## 2020-02-18 DIAGNOSIS — M1A.0710 CHRONIC IDIOPATHIC GOUT INVOLVING TOE OF RIGHT FOOT WITHOUT TOPHUS: ICD-10-CM

## 2020-02-18 PROCEDURE — 99214 OFFICE O/P EST MOD 30 MIN: CPT | Performed by: FAMILY MEDICINE

## 2020-02-18 RX ORDER — DOXYCYCLINE HYCLATE 50 MG/1
324 CAPSULE, GELATIN COATED ORAL
Qty: 30 TABLET | Refills: 5 | Status: SHIPPED | OUTPATIENT
Start: 2020-02-18

## 2020-02-18 RX ORDER — ALLOPURINOL 100 MG/1
200 TABLET ORAL DAILY
Qty: 180 TABLET | Refills: 3 | Status: SHIPPED | OUTPATIENT
Start: 2020-02-18 | End: 2021-03-05 | Stop reason: SDUPTHER

## 2020-02-18 RX ORDER — HYDROCODONE BITARTRATE AND ACETAMINOPHEN 5; 325 MG/1; MG/1
1 TABLET ORAL EVERY 6 HOURS PRN
Qty: 20 TABLET | Refills: 0 | Status: SHIPPED | OUTPATIENT
Start: 2020-02-18 | End: 2020-03-16

## 2020-02-18 RX ORDER — ESCITALOPRAM OXALATE 5 MG/1
20 TABLET ORAL NIGHTLY
Qty: 90 TABLET | Refills: 3 | Status: SHIPPED | OUTPATIENT
Start: 2020-02-18 | End: 2020-02-19 | Stop reason: SDUPTHER

## 2020-02-18 RX ORDER — BUPROPION HYDROCHLORIDE 100 MG/1
100 TABLET, EXTENDED RELEASE ORAL EVERY MORNING
Qty: 30 TABLET | Refills: 5 | Status: SHIPPED | OUTPATIENT
Start: 2020-02-18 | End: 2020-03-16 | Stop reason: ALTCHOICE

## 2020-02-18 NOTE — PROGRESS NOTES
Established Patient        Chief Complaint:   Chief Complaint   Patient presents with   • Follow-up     ER follow up; Shortness of Breath; patient was seen by Dr Pires 02/07/2020; patient was started on Lasix and symptoms have improved; also would like to discuss Thyroid labs/medications        Salinas Monroy is a 70 y.o. female    History of Present Illness:   Here for scheduled follow-up visit concerning her chronic comorbid conditions of acquired hypothyroidism, iron deficiency and chronic idiopathic gout of the right foot.  She was recently seen at the emergency room secondary to some progressive worsening of shortness of breath and dyspnea on exertion.  She was evaluated and diagnosed with acute on chronic diastolic heart failure exacerbation.  There were no known preceding triggers, she denies any significant changes to her diet.  She does utilize numerous dietary/nutritional supplements for a wide range of potential health benefits.  She is routinely been followed by endocrinology in the past concerning her underlying thyroid disorder, however patient has elected not to continue specialty guided management of this condition, instead electing to utilize at times her own treatment regimen of thyroid supplementation.  She continues to have essential tremor.  Patient also gives a history of chronic obsessive-compulsive disorder, previously treated with escitalopram.  Patient states that her anxiety has acutely worsened, not adequately controlled with current treatment regimen.  She denies any suicidal or homicidal ideation.    Patient states that her edema has improved considerably, she denies any orthopnea.  She maintains good urine output without hematuria or dysuria.  Denies any significant dyspnea on exertion or shortness of breath at this time.  Denies any vertigo or syncope.  She responded well to diuretic therapy as prescribed by cardiology.    Subjective     The following portions of the  "patient's history were reviewed and updated as appropriate: allergies, current medications, past family history, past medical history, past social history, past surgical history and problem list.    Allergies   Allergen Reactions   • Diazepam Rash       Review of Systems  Constitutional: Negative for fever. Negative for chills, diaphoresis, fatigue and unexpected weight change.   HENT: No dysphagia; no changes to vision/hearing/smell/taste; no epistaxis  Eyes: Negative for redness and visual disturbance.   Respiratory: negative for shortness of breath. Negative for chest pain . Negative for cough and chest tightness.   Cardiovascular: Negative for chest pain and palpitations.   Gastrointestinal: Negative for abdominal distention, abdominal pain and blood in stool.   Endocrine: Negative for cold intolerance and heat intolerance.   Genitourinary: Negative for difficulty urinating, dysuria and frequency.   Musculoskeletal: Chronic arthralgias, back pain and myalgias.   Skin: Negative for color change, rash and wound.   Neurological: Negative for syncope, weakness and headaches.   Hematological: Negative for adenopathy. Does not bruise/bleed easily.   Psychiatric/Behavioral: Negative for confusion. The patient is not nervous/anxious.    Objective     Physical Exam   Vital Signs: /70   Pulse 87   Temp 98.7 °F (37.1 °C)   Ht 160 cm (63\")   Wt 103 kg (227 lb)   LMP  (LMP Unknown)   SpO2 100%   BMI 40.21 kg/m²     General Appearance: alert, oriented x 3, no acute distress.  Skin: warm and dry.   HEENT: Atraumatic.  pupils round and reactive to light and accommodation, oral mucosa pink and moist.  Nares patent without epistaxis.  External auditory canals are patent tympanic membranes intact.  Neck: supple, no JVD, trachea midline.  No thyromegaly  Lungs: CTA, unlabored breathing effort.  Heart: RRR, normal S1 and S2, no S3, no rub.  Abdomen: soft, non-tender, no palpable bladder, present bowel sounds to " auscultation ×4.  No guarding or rigidity.  Extremities: no clubbing, cyanosis or edema.  Good range of motion actively and passively.  Symmetric muscle strength and development  Neuro: normal speech and mental status.  Cranial nerves II through XII intact.  No anosmia. DTR 2+; proprioception intact.  No focal motor/sensory deficits.  Chronic essential/attention tremor.    Assessment and Plan      Assessment:   Salinas was seen today for follow-up.    Diagnoses and all orders for this visit:    Hypothyroidism, adult    Iron deficiency  -     ferrous gluconate (FERGON) 324 MG tablet; Take 1 tablet by mouth Daily With Breakfast.    Acute on chronic diastolic heart failure (CMS/HCC)    Chronic idiopathic gout involving toe of right foot without tophus  -     allopurinol (ZYLOPRIM) 100 MG tablet; Take 2 tablets by mouth Daily.    Essential tremor    Obsessive-compulsive disorder, unspecified type  -     buPROPion SR (WELLBUTRIN SR) 100 MG 12 hr tablet; Take 1 tablet by mouth Every Morning.  -     Discontinue: escitalopram (LEXAPRO) 5 MG tablet; Take 4 tablets by mouth Every Night.  -     escitalopram (LEXAPRO) 20 MG tablet; Take 1 tablet by mouth Every Night.    Other orders  -     HYDROcodone-acetaminophen (NORCO) 5-325 MG per tablet; Take 1 tablet by mouth Every 6 (Six) Hours As Needed for Moderate Pain .        Plan:  I have recommended the patient avoid any excessive or additional iodine intake.    Routine surveillance of her thyroid function as needed.    Continue allopurinol at current dosing.    I recommended transition to evening dosing of escitalopram, and addition of once daily SR formulation bupropion in the morning.  Plan to follow clinically for therapeutic response.  Should she develop any ill effects to this medication regimen, she can discontinue the bupropion, and notify the office immediately.  She can also schedule follow-up visit sooner than her next already scheduled time.    I recommended the  patient continue with her iron supplementation, however she will only take ferrous gluconate once daily, as well as continue with her vitamin C supplementation.    I have asked the patient keep her scheduled follow-up visit with cardiology additionally.  Discussion Summary:    Discussed plan of care in detail with pt today; pt verb understanding and agrees.  Follow up:  Return in about 6 weeks (around 3/31/2020) for Recheck, Med Change/New Meds.     There are no Patient Instructions on file for this visit.    Jm Marie,   02/19/20  8:43 AM          Please note that portions of this note may have been completed with a voice recognition program. Efforts were made to edit the dictations, but occasionally words are mistranscribed.

## 2020-02-19 DIAGNOSIS — E03.9 HYPOTHYROIDISM, ADULT: ICD-10-CM

## 2020-02-19 RX ORDER — LIOTHYRONINE SODIUM 5 UG/1
TABLET ORAL
Qty: 60 TABLET | Refills: 2 | Status: SHIPPED | OUTPATIENT
Start: 2020-02-19 | End: 2020-04-03 | Stop reason: ALTCHOICE

## 2020-02-19 RX ORDER — ESCITALOPRAM OXALATE 20 MG/1
20 TABLET ORAL NIGHTLY
Qty: 90 TABLET | Refills: 3 | Status: SHIPPED | OUTPATIENT
Start: 2020-02-19 | End: 2020-03-16 | Stop reason: ALTCHOICE

## 2020-03-03 ENCOUNTER — HOSPITAL ENCOUNTER (EMERGENCY)
Facility: HOSPITAL | Age: 70
Discharge: HOME | End: 2020-03-03
Attending: EMERGENCY MEDICINE | Admitting: EMERGENCY MEDICINE

## 2020-03-03 ENCOUNTER — APPOINTMENT (OUTPATIENT)
Dept: CT IMAGING | Facility: HOSPITAL | Age: 70
End: 2020-03-03

## 2020-03-03 VITALS
BODY MASS INDEX: 38.98 KG/M2 | HEART RATE: 84 BPM | WEIGHT: 220 LBS | RESPIRATION RATE: 16 BRPM | TEMPERATURE: 98.7 F | DIASTOLIC BLOOD PRESSURE: 61 MMHG | OXYGEN SATURATION: 95 % | HEIGHT: 63 IN | SYSTOLIC BLOOD PRESSURE: 131 MMHG

## 2020-03-03 DIAGNOSIS — M48.061 SPINAL STENOSIS OF LUMBAR REGION, UNSPECIFIED WHETHER NEUROGENIC CLAUDICATION PRESENT: ICD-10-CM

## 2020-03-03 DIAGNOSIS — M54.50 MIDLINE LOW BACK PAIN, UNSPECIFIED CHRONICITY, UNSPECIFIED WHETHER SCIATICA PRESENT: Primary | ICD-10-CM

## 2020-03-03 PROCEDURE — 63710000001 DEXAMETHASONE PER 0.25 MG: Performed by: EMERGENCY MEDICINE

## 2020-03-03 PROCEDURE — 96372 THER/PROPH/DIAG INJ SC/IM: CPT

## 2020-03-03 PROCEDURE — 72131 CT LUMBAR SPINE W/O DYE: CPT

## 2020-03-03 PROCEDURE — 99283 EMERGENCY DEPT VISIT LOW MDM: CPT

## 2020-03-03 PROCEDURE — 25010000002 KETOROLAC TROMETHAMINE PER 15 MG: Performed by: EMERGENCY MEDICINE

## 2020-03-03 PROCEDURE — 72128 CT CHEST SPINE W/O DYE: CPT

## 2020-03-03 RX ORDER — KETOROLAC TROMETHAMINE 30 MG/ML
30 INJECTION, SOLUTION INTRAMUSCULAR; INTRAVENOUS ONCE
Status: COMPLETED | OUTPATIENT
Start: 2020-03-03 | End: 2020-03-03

## 2020-03-03 RX ORDER — PREDNISONE 50 MG/1
50 TABLET ORAL DAILY
Qty: 5 TABLET | Refills: 0 | Status: SHIPPED | OUTPATIENT
Start: 2020-03-03 | End: 2020-03-08

## 2020-03-03 RX ORDER — CYCLOBENZAPRINE HCL 10 MG
5 TABLET ORAL ONCE
Status: COMPLETED | OUTPATIENT
Start: 2020-03-03 | End: 2020-03-03

## 2020-03-03 RX ADMIN — CYCLOBENZAPRINE HYDROCHLORIDE 5 MG: 10 TABLET, FILM COATED ORAL at 16:16

## 2020-03-03 RX ADMIN — KETOROLAC TROMETHAMINE 30 MG: 30 INJECTION, SOLUTION INTRAMUSCULAR at 16:18

## 2020-03-03 RX ADMIN — DEXAMETHASONE 10 MG: 2 TABLET ORAL at 16:16

## 2020-03-03 NOTE — DISCHARGE INSTRUCTIONS
You have a spinal stenosis which is likely causing your pain.  May continue ibuprofen, Tylenol and pain medication as prescribed by your primary care provider.  May take steroid burst to help with inflammation.  You will need to follow-up with your primary care provider in the next few days to reevaluate symptoms.  You will also need to follow-up with spine surgeon Dr. Elizondo for further outpatient evaluation.  Return to the ER for any change, worsening symptoms, or any additional concerns including but not limited to inability to urinate, bowel incontinence, groin paresthesias, chest pain, difficulty breathing.

## 2020-03-03 NOTE — ED PROVIDER NOTES
"Subjective   Patient is a 70-year-old man with a history of chronic back pain, obesity, tremor, hypothyroid, gout, GERD, essential hypertension, anemia, arthritis and depression presenting to the ER for evaluation of back pain.  Patient states for the past 2 weeks she has had worsening back pain in her lower and mid back.  She states that it is very sharp in nature, seems to be better when she leans forward.  She states that she saw her PCP and they gave her pain pills to help with this.  She states she has not had any imaging.  She denies any injury or trauma other than feeling as if she was \"tensed up\" when she was driving last night.  She states she is unsure whether she is pulled something.  She states she is been alternating ibuprofen, Tylenol and pain pills to help with her pain.  She denies any dysuria, hematuria, inability to urinate, bowel incontinence, saddle paresthesias, chest pain, difficulty breathing.  Denies any IV drug use.          Review of Systems   Constitutional: Negative for chills and fever.   HENT: Negative.    Eyes: Negative.    Respiratory: Negative.    Cardiovascular: Negative.    Gastrointestinal: Negative.    Genitourinary: Negative.    Musculoskeletal: Positive for back pain and myalgias.   Skin: Negative.    Allergic/Immunologic: Negative for immunocompromised state.   Neurological: Negative.    Psychiatric/Behavioral: Negative.        Past Medical History:   Diagnosis Date   • Anemia ?    under control   • Arthritis    • Depression    • Diverticulosis    • Essential hypertension 7/30/2018   • GERD (gastroesophageal reflux disease)     no longer bothers me...   • Gout    • Gout    • Hashimoto's disease    • Hernia 3-2012    repair surgery which failed in 2014   • Hypothyroid    • Impingement syndrome of right shoulder 5/8/2016   • Movement disorder Hand Tremors   • Obesity    • ISABEL on CPAP 9/24/2019   • PONV (postoperative nausea and vomiting)     Pt states \"only one time\".   • Tremor "     worse on left arm/hand   • Wears glasses        Allergies   Allergen Reactions   • Diazepam Rash       Past Surgical History:   Procedure Laterality Date   • ABDOMINAL SURGERY     • APPENDECTOMY     • COLONOSCOPY  2012    failed due to hernia...   • COLONOSCOPY N/A 6/8/2017    Procedure: COLONOSCOPY with cold snare polypectomies, argon thermal ablation, ns injection, yanira ink injection;  Surgeon: Rafiq Huang MD;  Location: [facility] ENDOSCOPY;  Service:    • COLONOSCOPY N/A 6/5/2018    Procedure: COLONOSCOPY WITH BIOPSIES;  Surgeon: Rafiq Huang MD;  Location: [facility] ENDOSCOPY;  Service: Gastroenterology   • GASTRIC BYPASS  1978   • HERNIA MESH REMOVAL  2014    BOWEL OBSTRUCTION DUE TO MESH   • HERNIA REPAIR  2012   • HERNIA REPAIR     • HYSTERECTOMY     • JOINT REPLACEMENT  2009 & 2012    Left & Right Full Hip Replacements   • SMALL INTESTINE SURGERY  2014    DUE TO BOWEL OBSTRUCTION WITH SOME REMOVAL   • TOTAL ABDOMINAL HYSTERECTOMY WITH SALPINGO OOPHORECTOMY  1990    fibroids   • TUBAL ABDOMINAL LIGATION     • UPPER GASTROINTESTINAL ENDOSCOPY         Family History   Problem Relation Age of Onset   • Obesity Mother    • Rheum arthritis Mother    • Thyroid disease Mother    • Hypertension Mother    • Stroke Mother         Most of her problems were caused by Rheumatoid Ar.   • Hyperlipidemia Mother    • Arthritis Mother         Rheumatoid Arthritis..Passed 2004   • Cancer Father         Passed 1997   • Kidney cancer Father    • Liver cancer Father    • No Known Problems Brother    • Diabetes Maternal Aunt    • Cancer Maternal Uncle    • Lung cancer Maternal Uncle    • Alcohol abuse Maternal Uncle    • No Known Problems Paternal Aunt    • Stroke Paternal Uncle    • Hypertension Paternal Uncle    • Hyperlipidemia Paternal Uncle    • Heart disease Paternal Uncle    • No Known Problems Brother    • Asthma Sister         under control   • Alcohol abuse Maternal Uncle    • Colon cancer Neg Hx    • Breast cancer  "Neg Hx    • Ovarian cancer Neg Hx        Social History     Socioeconomic History   • Marital status:      Spouse name: Not on file   • Number of children: Not on file   • Years of education: Not on file   • Highest education level: Not on file   Tobacco Use   • Smoking status: Former Smoker     Packs/day: 1.00     Years: 40.00     Pack years: 40.00     Types: Cigarettes     Start date:      Last attempt to quit:      Years since quittin.1   • Smokeless tobacco: Never Used   • Tobacco comment: \"quit 9 years ago\"   Substance and Sexual Activity   • Alcohol use: No   • Drug use: No   • Sexual activity: Not Currently     Partners: Male     Birth control/protection: None           Objective   Physical Exam   Nursing note and vitals reviewed.  /61   Pulse 84   Temp 98.7 °F (37.1 °C) (Oral)   Resp 16   Ht 160 cm (63\")   Wt 99.8 kg (220 lb)   LMP  (LMP Unknown)   SpO2 95%   BMI 38.97 kg/m²     GEN: No acute distress, sitting upright in stretcher.  Awake and alert.  Very talkative and tangential with conversation.  Head: Normocephalic, atraumatic  Eyes: EOM intact  Cardiovascular: Regular rate  Lungs: Clear to auscultation bilaterally without adventitious sounds.  Abdomen: Soft, nontender, nondistended, no peritoneal signs or guarding  Extremities: No edema, normal appearance, full ROM.  Radial and Dorsalis pedis pulses are 2+ and equal  Back: Symmetric.  Patient has significant tenderness along the midline thoracic and lumbar spine.  She does have some tenderness over the paraspinal muscles as well.  No midline cervical tenderness.  Neuro: GCS 15  Psych: Mood and affect are appropriate    Procedures           ED Course  ED Course as of Mar 03 1940   Tue Mar 03, 2020   1614 PROCEDURE: CT LUMBAR SPINE WO CONTRAST-     HISTORY:  Midline lumbar back pain     TECHNIQUE: Thin section axial CT with sagittal and coronal  reconstructions     FINDINGS: No fracture is present. Grade 2 " spondylolisthesis is seen L4-5  withsignificant vacuum degeneration. Advanced facet arthropathy is noted  lower lumbar spine.     Advanced degenerative canal stenosis is noted L4-5with lateral recess  stenosis.     Moderate neural foraminal narrowing is noted L4-5 bilaterally.     IMPRESSION:  1. Negative CT evaluation of the lumbar spine for acute bony injury.   2. Severe degenerative changes L4-5 level as above           This study was performed with techniques to keep radiation doses as low  as reasonably achievable (ALARA). Individualized dose reduction  techniques using automated exposure control or adjustment of vA and/or  kV according to the patient size were employed.      This report was finalized on 3/3/2020 4:11 PM by Marty Porter MD.    [LA]   1614 PROCEDURE: CT THORACIC SPINE WO CONTRAST-     HISTORY: Midline back pain, thoracic and lumbar     TECHNIQUE: Thin section axial CT with sagittal and coronal  reconstructions     FINDINGS: No fracture is present. Alignment is normal. Thoracic kyphosis  is present. Vacuum disc degeneration is seen at multiple levels within  the inferior portion of the thoracic spine. No bony canal stenosis is  present.     IMPRESSION:  1. Negative CT evaluation of the thoracic spine for acute bony injury.   2. Moderate degenerative changes           This study was performed with techniques to keep radiation doses as low  as reasonably achievable (ALARA). Individualized dose reduction  techniques using automated exposure control or adjustment of vA and/or  kV according to the patient size were employed.      This report was finalized on 3/3/2020 4:08 PM by Marty Porter MD.    [LA]   5361 Discussed all results with the patient.  Did discuss the case with Dr. Medrano briefly.  Patient states that she has a back brace that is supposed to be coming from her primary care provider.  I discussed that I will give her a dose of steroids to help with inflammation and she can continue  taking her other medications.  We will give her follow-up with orthopedic spine surgeon Dr. Elizondo..  Discussed strict return precautions.  She verbalized understanding and was in agreement with this plan of care    [LA]      ED Course User Index  [LA] Elizabeth Reyes PA-C                                           MDM  Number of Diagnoses or Management Options  Midline low back pain, unspecified chronicity, unspecified whether sciatica present:   Spinal stenosis of lumbar region, unspecified whether neurogenic claudication present:   Diagnosis management comments: On arrival, patient is stable, no acute distress, nontoxic appearance.  Differential includes compression fracture, muscle strain or spasm, spinal stenosis, spondylolisthesis, and other concerns.  Very low concern for any kind of aortic dissection, cauda equina, spinal abscess.  Will obtain CT scans of the thoracic and lumbar spine.  Will give cyclobenzaprine, prednisone and Toradol to help with pain.    CT scans as read by the radiologist revealed spinal stenosis, no acute bony abnormalities otherwise noted.  Discussed case briefly Dr. Medrano.  Will give patient steroid burst to take with her anti-inflammatories and pain medication.  She states that she has a back brace prescriber PCP is being sent to her house.  Discussed follow-up with her PCP as well as spine surgeon Dr. Elizondo.  Discussed strict return precautions to the ER.  She verbalized understanding was in agreement with this plan of care       Amount and/or Complexity of Data Reviewed  Tests in the radiology section of CPT®: reviewed and ordered  Review and summarize past medical records: yes  Discuss the patient with other providers: yes    Risk of Complications, Morbidity, and/or Mortality  Presenting problems: low  Diagnostic procedures: moderate  Management options: low    Patient Progress  Patient progress: stable      Final diagnoses:   Midline low back pain, unspecified chronicity,  unspecified whether sciatica present   Spinal stenosis of lumbar region, unspecified whether neurogenic claudication present            Elizabeth Reyes PA-C  03/03/20 1940

## 2020-03-04 ENCOUNTER — EPISODE CHANGES (OUTPATIENT)
Dept: CASE MANAGEMENT | Facility: OTHER | Age: 70
End: 2020-03-04

## 2020-03-04 ENCOUNTER — HOSPITAL ENCOUNTER (EMERGENCY)
Facility: HOSPITAL | Age: 70
Discharge: HOME | End: 2020-03-05
Attending: EMERGENCY MEDICINE | Admitting: EMERGENCY MEDICINE

## 2020-03-04 ENCOUNTER — TELEPHONE (OUTPATIENT)
Dept: FAMILY MEDICINE CLINIC | Facility: CLINIC | Age: 70
End: 2020-03-04

## 2020-03-04 DIAGNOSIS — E87.6 HYPOKALEMIA: ICD-10-CM

## 2020-03-04 DIAGNOSIS — M79.89 LEG SWELLING: Primary | ICD-10-CM

## 2020-03-04 LAB
ANION GAP SERPL CALCULATED.3IONS-SCNC: 14 MMOL/L (ref 5–15)
BUN BLD-MCNC: 38 MG/DL (ref 8–23)
BUN/CREAT SERPL: 37.3 (ref 7–25)
CALCIUM SPEC-SCNC: 9.3 MG/DL (ref 8.6–10.5)
CHLORIDE SERPL-SCNC: 105 MMOL/L (ref 98–107)
CO2 SERPL-SCNC: 24 MMOL/L (ref 22–29)
CREAT BLD-MCNC: 1.02 MG/DL (ref 0.57–1)
GFR SERPL CREATININE-BSD FRML MDRD: 53 ML/MIN/1.73
GLUCOSE BLD-MCNC: 111 MG/DL (ref 65–99)
POTASSIUM BLD-SCNC: 3.4 MMOL/L (ref 3.5–5.2)
SODIUM BLD-SCNC: 143 MMOL/L (ref 136–145)

## 2020-03-04 PROCEDURE — 36415 COLL VENOUS BLD VENIPUNCTURE: CPT

## 2020-03-04 PROCEDURE — 99283 EMERGENCY DEPT VISIT LOW MDM: CPT

## 2020-03-04 PROCEDURE — 80048 BASIC METABOLIC PNL TOTAL CA: CPT | Performed by: EMERGENCY MEDICINE

## 2020-03-04 RX ORDER — POTASSIUM CHLORIDE 750 MG/1
40 CAPSULE, EXTENDED RELEASE ORAL ONCE
Status: COMPLETED | OUTPATIENT
Start: 2020-03-04 | End: 2020-03-04

## 2020-03-04 RX ORDER — POTASSIUM CHLORIDE 20 MEQ/1
20 TABLET, EXTENDED RELEASE ORAL DAILY
Qty: 7 TABLET | Refills: 0 | Status: SHIPPED | OUTPATIENT
Start: 2020-03-04 | End: 2020-03-11

## 2020-03-04 RX ORDER — FUROSEMIDE 20 MG/1
20 TABLET ORAL DAILY
Qty: 15 TABLET | Refills: 0 | Status: SHIPPED | OUTPATIENT
Start: 2020-03-04 | End: 2020-03-16 | Stop reason: SDUPTHER

## 2020-03-04 RX ADMIN — POTASSIUM CHLORIDE 40 MEQ: 750 CAPSULE, EXTENDED RELEASE ORAL at 23:27

## 2020-03-04 NOTE — TELEPHONE ENCOUNTER
"Patients sister Antoinette (on [place]) called to let you know that Ms Monroy is in a \"manic stage\" right now.  She said that she has done things that are out of the ordinary for her.  She has taken out loans and even bought a car that she can not afford.  She saw Dr Pires and has been taking an increased dose of Lasix.  She is worried that maybe this has contributed to out of the ordinary actions.  She does NOT want her sister to know that she called. She is not requesting a return call.  She just wanted to notify you....  "

## 2020-03-04 NOTE — TELEPHONE ENCOUNTER
PATIENT CALLED AND WANTED TO KNOW IF IT WAS POSSIBLE FOR HER TO GET A REFERRAL TO SOMEONE WHO CAN HELP HER WITH HER BACK PAINS. SHE WOULD ALSO LIKE TO KNOW IF SHE COULD POSSIBLY GET A PRESCRIPTION FOR A MATTRESS THAT GOES TO A HOSPITAL BED SHE HAD KEPT FROM WHEN HER MOTHER PASSED AWAY. SHE SAID SHE HAS BEEN USING THE BED BECAUSE IT HELPS WITH HER PAINS BUT HER LEGS HAVE BEEN SWELLING AND SHE BELIEVES THE MATTRESS IS NO LONGER HELPING. PLEASE ADVISE.     PATIENT CALL BACK [phone]

## 2020-03-05 ENCOUNTER — EPISODE CHANGES (OUTPATIENT)
Dept: CASE MANAGEMENT | Facility: OTHER | Age: 70
End: 2020-03-05

## 2020-03-05 VITALS
RESPIRATION RATE: 16 BRPM | DIASTOLIC BLOOD PRESSURE: 85 MMHG | SYSTOLIC BLOOD PRESSURE: 105 MMHG | WEIGHT: 220 LBS | OXYGEN SATURATION: 99 % | HEIGHT: 63 IN | BODY MASS INDEX: 38.98 KG/M2 | HEART RATE: 80 BPM | TEMPERATURE: 98.2 F

## 2020-03-05 NOTE — ED PROVIDER NOTES
"Subjective   70-year-old female presenting with leg swelling.  She states that a month ago she was diagnosed with some possible fluid on her lungs and legs.  She was started on Lasix.  She has been out of that for a few days and has had increased leg swelling.  No trouble breathing, chest pain, shortness of breath or other complaints.            Review of Systems   Constitutional: Negative.    HENT: Negative.    Eyes: Negative.    Respiratory: Negative.    Cardiovascular: Positive for leg swelling. Negative for chest pain.   Gastrointestinal: Negative.    Genitourinary: Negative.    Musculoskeletal: Negative.    Skin: Negative.    Neurological: Negative.    Psychiatric/Behavioral: Negative.        Past Medical History:   Diagnosis Date   • Anemia ?    under control   • Arthritis    • Depression    • Diverticulosis    • Essential hypertension 7/30/2018   • GERD (gastroesophageal reflux disease)     no longer bothers me...   • Gout    • Gout    • Hashimoto's disease    • Hernia 3-2012    repair surgery which failed in 2014   • Hypothyroid    • Impingement syndrome of right shoulder 5/8/2016   • Movement disorder Hand Tremors   • Obesity    • ISABEL on CPAP 9/24/2019   • PONV (postoperative nausea and vomiting)     Pt states \"only one time\".   • Tremor     worse on left arm/hand   • Wears glasses        Allergies   Allergen Reactions   • Diazepam Rash       Past Surgical History:   Procedure Laterality Date   • ABDOMINAL SURGERY     • APPENDECTOMY     • COLONOSCOPY  2012    failed due to hernia...   • COLONOSCOPY N/A 6/8/2017    Procedure: COLONOSCOPY with cold snare polypectomies, argon thermal ablation, ns injection, yanira ink injection;  Surgeon: Rafiq Huang MD;  Location: [facility] ENDOSCOPY;  Service:    • COLONOSCOPY N/A 6/5/2018    Procedure: COLONOSCOPY WITH BIOPSIES;  Surgeon: Rafiq Huang MD;  Location: [facility] ENDOSCOPY;  Service: Gastroenterology   • GASTRIC BYPASS  1978   • HERNIA MESH REMOVAL  2014    BOWEL " "OBSTRUCTION DUE TO MESH   • HERNIA REPAIR     • HERNIA REPAIR     • HYSTERECTOMY     • JOINT REPLACEMENT   &     Left & Right Full Hip Replacements   • SMALL INTESTINE SURGERY      DUE TO BOWEL OBSTRUCTION WITH SOME REMOVAL   • TOTAL ABDOMINAL HYSTERECTOMY WITH SALPINGO OOPHORECTOMY      fibroids   • TUBAL ABDOMINAL LIGATION     • UPPER GASTROINTESTINAL ENDOSCOPY         Family History   Problem Relation Age of Onset   • Obesity Mother    • Rheum arthritis Mother    • Thyroid disease Mother    • Hypertension Mother    • Stroke Mother         Most of her problems were caused by Rheumatoid Ar.   • Hyperlipidemia Mother    • Arthritis Mother         Rheumatoid Arthritis..Passed    • Cancer Father         Passed    • Kidney cancer Father    • Liver cancer Father    • No Known Problems Brother    • Diabetes Maternal Aunt    • Cancer Maternal Uncle    • Lung cancer Maternal Uncle    • Alcohol abuse Maternal Uncle    • No Known Problems Paternal Aunt    • Stroke Paternal Uncle    • Hypertension Paternal Uncle    • Hyperlipidemia Paternal Uncle    • Heart disease Paternal Uncle    • No Known Problems Brother    • Asthma Sister         under control   • Alcohol abuse Maternal Uncle    • Colon cancer Neg Hx    • Breast cancer Neg Hx    • Ovarian cancer Neg Hx        Social History     Socioeconomic History   • Marital status:      Spouse name: Not on file   • Number of children: Not on file   • Years of education: Not on file   • Highest education level: Not on file   Tobacco Use   • Smoking status: Former Smoker     Packs/day: 1.00     Years: 40.00     Pack years: 40.00     Types: Cigarettes     Start date:      Last attempt to quit:      Years since quittin.1   • Smokeless tobacco: Never Used   • Tobacco comment: \"quit 9 years ago\"   Substance and Sexual Activity   • Alcohol use: No   • Drug use: No   • Sexual activity: Not Currently     Partners: Male     Birth " control/protection: None           Objective   Physical Exam   Constitutional: She is oriented to person, place, and time. She appears well-developed and well-nourished. No distress.   HENT:   Head: Normocephalic and atraumatic.   Right Ear: External ear normal.   Left Ear: External ear normal.   Nose: Nose normal.   Mouth/Throat: Oropharynx is clear and moist.   Eyes: Pupils are equal, round, and reactive to light. Conjunctivae and EOM are normal.   Neck: Normal range of motion. Neck supple.   Cardiovascular: Regular rhythm, normal heart sounds and intact distal pulses.   Mild tachycardia   Pulmonary/Chest: Effort normal and breath sounds normal. No respiratory distress.   Abdominal: Soft. Bowel sounds are normal. She exhibits no distension. There is no tenderness. There is no rebound and no guarding.   Musculoskeletal: Normal range of motion. She exhibits no tenderness or deformity.   Trace lower extreme edema   Neurological: She is alert and oriented to person, place, and time.   Skin: Skin is warm and dry. No rash noted.   Psychiatric: She has a normal mood and affect. Her behavior is normal.   Nursing note and vitals reviewed.      Procedures           ED Course                                           MDM  Number of Diagnoses or Management Options  Hypokalemia:   Leg swelling:   Diagnosis management comments: 70-year-old female with leg swelling in the setting of stopping her Lasix.  Well-developed, well-nourished lady in no distress with exam as above.  Will obtain basic metabolic panel to assess kidney function.  Disposition pending.    DDX: Medication noncompliance, peripheral edema, kidney failure    Labs notable for mild hypokalemia. Will write for some potassium and refill her lasix. Encouraged her to follow up as scheduled.        Amount and/or Complexity of Data Reviewed  Clinical lab tests: reviewed  Decide to obtain previous medical records or to obtain history from someone other than the patient:  yes        Final diagnoses:   Leg swelling   Hypokalemia            Bruce Kirkland MD  03/04/20 7204

## 2020-03-06 ENCOUNTER — PATIENT OUTREACH (OUTPATIENT)
Dept: CASE MANAGEMENT | Facility: OTHER | Age: 70
End: 2020-03-06

## 2020-03-06 NOTE — OUTREACH NOTE
Patient Outreach Note    Called patient in follow up to ED visits 3/3/20 for low back pain, and 3/4/20 for leg swelling/ hypokalemia.  Reminded patient of Ambulatory Case Management role and contact information.  Discussed ED discharge recommendations.  Patient is talkative today, saying she is feeling better.  Voiced compliance with taking both the Prednisone and the Potassium from her ED visits.  Patient said her legs and feet are less swolen this morning than they have been; is taking the Lasix as prescribed. Stated her back is feeling better. RN-ACM reviewed follow up appts; w/ Cardio., Dr. Pires on 3-10-20; needs ED f/u w/ Dr. Marie (PCP) as next scheduled appt is 4-3-20.  Patient declined RN-ACM offer to call PCP office for f/u appt, saying she would call them today for the ED f/u appt.  Patient has [facility] free transportaiton to MD appts. Patient voiced no other concerns or questions at this time.  Voiced appreciation for the call.    Gauri Causey RN  Ambulatory     3/6/2020, 10:46 AM

## 2020-03-09 ENCOUNTER — APPOINTMENT (OUTPATIENT)
Dept: GENERAL RADIOLOGY | Facility: HOSPITAL | Age: 70
End: 2020-03-09

## 2020-03-09 ENCOUNTER — HOSPITAL ENCOUNTER (EMERGENCY)
Facility: HOSPITAL | Age: 70
Discharge: HOME | End: 2020-03-09
Attending: EMERGENCY MEDICINE | Admitting: EMERGENCY MEDICINE

## 2020-03-09 VITALS
OXYGEN SATURATION: 93 % | DIASTOLIC BLOOD PRESSURE: 80 MMHG | RESPIRATION RATE: 20 BRPM | SYSTOLIC BLOOD PRESSURE: 132 MMHG | HEART RATE: 85 BPM | TEMPERATURE: 98.1 F

## 2020-03-09 DIAGNOSIS — R07.9 CHEST PAIN, UNSPECIFIED TYPE: Primary | ICD-10-CM

## 2020-03-09 LAB
ALBUMIN SERPL-MCNC: 4.2 G/DL (ref 3.5–5.2)
ALBUMIN/GLOB SERPL: 1.5 G/DL
ALP SERPL-CCNC: 72 U/L (ref 39–117)
ALT SERPL W P-5'-P-CCNC: 26 U/L (ref 1–33)
ANION GAP SERPL CALCULATED.3IONS-SCNC: 11.7 MMOL/L (ref 5–15)
AST SERPL-CCNC: 26 U/L (ref 1–32)
BASOPHILS # BLD AUTO: 0.05 10*3/MM3 (ref 0–0.2)
BASOPHILS NFR BLD AUTO: 0.4 % (ref 0–1.5)
BILIRUB SERPL-MCNC: 0.4 MG/DL (ref 0.2–1.2)
BUN BLD-MCNC: 31 MG/DL (ref 8–23)
BUN/CREAT SERPL: 38.3 (ref 7–25)
CALCIUM SPEC-SCNC: 9.7 MG/DL (ref 8.6–10.5)
CHLORIDE SERPL-SCNC: 101 MMOL/L (ref 98–107)
CO2 SERPL-SCNC: 24.3 MMOL/L (ref 22–29)
CREAT BLD-MCNC: 0.81 MG/DL (ref 0.57–1)
DEPRECATED RDW RBC AUTO: 43.5 FL (ref 37–54)
EOSINOPHIL # BLD AUTO: 0.09 10*3/MM3 (ref 0–0.4)
EOSINOPHIL NFR BLD AUTO: 0.7 % (ref 0.3–6.2)
ERYTHROCYTE [DISTWIDTH] IN BLOOD BY AUTOMATED COUNT: 13.1 % (ref 12.3–15.4)
GFR SERPL CREATININE-BSD FRML MDRD: 70 ML/MIN/1.73
GLOBULIN UR ELPH-MCNC: 2.8 GM/DL
GLUCOSE BLD-MCNC: 97 MG/DL (ref 65–99)
HCT VFR BLD AUTO: 37.8 % (ref 34–46.6)
HGB BLD-MCNC: 11.8 G/DL (ref 12–15.9)
IMM GRANULOCYTES # BLD AUTO: 0.05 10*3/MM3 (ref 0–0.05)
IMM GRANULOCYTES NFR BLD AUTO: 0.4 % (ref 0–0.5)
LYMPHOCYTES # BLD AUTO: 3.32 10*3/MM3 (ref 0.7–3.1)
LYMPHOCYTES NFR BLD AUTO: 25.7 % (ref 19.6–45.3)
MCH RBC QN AUTO: 28.7 PG (ref 26.6–33)
MCHC RBC AUTO-ENTMCNC: 31.2 G/DL (ref 31.5–35.7)
MCV RBC AUTO: 92 FL (ref 79–97)
MONOCYTES # BLD AUTO: 0.83 10*3/MM3 (ref 0.1–0.9)
MONOCYTES NFR BLD AUTO: 6.4 % (ref 5–12)
NEUTROPHILS # BLD AUTO: 8.6 10*3/MM3 (ref 1.7–7)
NEUTROPHILS NFR BLD AUTO: 66.4 % (ref 42.7–76)
NRBC BLD AUTO-RTO: 0 /100 WBC (ref 0–0.2)
NT-PROBNP SERPL-MCNC: 824.2 PG/ML (ref 5–900)
PLATELET # BLD AUTO: 350 10*3/MM3 (ref 140–450)
PMV BLD AUTO: 9.2 FL (ref 6–12)
POTASSIUM BLD-SCNC: 4.4 MMOL/L (ref 3.5–5.2)
PROT SERPL-MCNC: 7 G/DL (ref 6–8.5)
RBC # BLD AUTO: 4.11 10*6/MM3 (ref 3.77–5.28)
SODIUM BLD-SCNC: 137 MMOL/L (ref 136–145)
TROPONIN T SERPL-MCNC: <0.01 NG/ML (ref 0–0.03)
TROPONIN T SERPL-MCNC: <0.01 NG/ML (ref 0–0.03)
WBC NRBC COR # BLD: 12.94 10*3/MM3 (ref 3.4–10.8)

## 2020-03-09 PROCEDURE — 85025 COMPLETE CBC W/AUTO DIFF WBC: CPT | Performed by: EMERGENCY MEDICINE

## 2020-03-09 PROCEDURE — 99284 EMERGENCY DEPT VISIT MOD MDM: CPT

## 2020-03-09 PROCEDURE — 84484 ASSAY OF TROPONIN QUANT: CPT | Performed by: EMERGENCY MEDICINE

## 2020-03-09 PROCEDURE — 71046 X-RAY EXAM CHEST 2 VIEWS: CPT

## 2020-03-09 PROCEDURE — 80053 COMPREHEN METABOLIC PANEL: CPT | Performed by: EMERGENCY MEDICINE

## 2020-03-09 PROCEDURE — 93005 ELECTROCARDIOGRAM TRACING: CPT | Performed by: EMERGENCY MEDICINE

## 2020-03-09 PROCEDURE — 83880 ASSAY OF NATRIURETIC PEPTIDE: CPT | Performed by: EMERGENCY MEDICINE

## 2020-03-09 NOTE — ED PROVIDER NOTES
"Subjective   70-year-old female presenting with chest pain.  She states that just prior to arrival she was shopping at [employer] when she had sudden onset of left-sided chest pain.  This was a sharp pain, did not radiate, there are no alleviating or aggravating factors.  Resolved spontaneously.  It was associated with the sensation that \"my breath was taken away\".  She denies any fevers, chills, cough, nausea, vomiting, palpitations.  Of note she was seen in the ER recently for leg swelling and is on Lasix, she has an appointment with her cardiologist tomorrow.          Review of Systems   Constitutional: Negative.    HENT: Negative.    Eyes: Negative.    Respiratory: Positive for shortness of breath.    Cardiovascular: Positive for chest pain. Negative for palpitations and leg swelling.   Gastrointestinal: Negative.    Genitourinary: Negative.    Musculoskeletal: Negative.    Skin: Negative.    Neurological: Negative.    Psychiatric/Behavioral: Negative.        Past Medical History:   Diagnosis Date   • Anemia ?    under control   • Arthritis    • Depression    • Diverticulosis    • Essential hypertension 7/30/2018   • GERD (gastroesophageal reflux disease)     no longer bothers me...   • Gout    • Gout    • Hashimoto's disease    • Hernia 3-2012    repair surgery which failed in 2014   • Hypothyroid    • Impingement syndrome of right shoulder 5/8/2016   • Movement disorder Hand Tremors   • Obesity    • ISABEL on CPAP 9/24/2019   • PONV (postoperative nausea and vomiting)     Pt states \"only one time\".   • Tremor     worse on left arm/hand   • Wears glasses        Allergies   Allergen Reactions   • Diazepam Rash       Past Surgical History:   Procedure Laterality Date   • ABDOMINAL SURGERY     • APPENDECTOMY     • COLONOSCOPY  2012    failed due to hernia...   • COLONOSCOPY N/A 6/8/2017    Procedure: COLONOSCOPY with cold snare polypectomies, argon thermal ablation, ns injection, yanira ink injection;  Surgeon: Rafiq TORIBIO" MD Sal;  Location: [facility] ENDOSCOPY;  Service:    • COLONOSCOPY N/A 6/5/2018    Procedure: COLONOSCOPY WITH BIOPSIES;  Surgeon: Rafiq Huang MD;  Location: [facility] ENDOSCOPY;  Service: Gastroenterology   • GASTRIC BYPASS  1978   • HERNIA MESH REMOVAL  2014    BOWEL OBSTRUCTION DUE TO MESH   • HERNIA REPAIR  2012   • HERNIA REPAIR     • HYSTERECTOMY     • JOINT REPLACEMENT  2009 & 2012    Left & Right Full Hip Replacements   • SMALL INTESTINE SURGERY  2014    DUE TO BOWEL OBSTRUCTION WITH SOME REMOVAL   • TOTAL ABDOMINAL HYSTERECTOMY WITH SALPINGO OOPHORECTOMY  1990    fibroids   • TUBAL ABDOMINAL LIGATION     • UPPER GASTROINTESTINAL ENDOSCOPY         Family History   Problem Relation Age of Onset   • Obesity Mother    • Rheum arthritis Mother    • Thyroid disease Mother    • Hypertension Mother    • Stroke Mother         Most of her problems were caused by Rheumatoid Ar.   • Hyperlipidemia Mother    • Arthritis Mother         Rheumatoid Arthritis..Passed 2004   • Cancer Father         Passed 1997   • Kidney cancer Father    • Liver cancer Father    • No Known Problems Brother    • Diabetes Maternal Aunt    • Cancer Maternal Uncle    • Lung cancer Maternal Uncle    • Alcohol abuse Maternal Uncle    • No Known Problems Paternal Aunt    • Stroke Paternal Uncle    • Hypertension Paternal Uncle    • Hyperlipidemia Paternal Uncle    • Heart disease Paternal Uncle    • No Known Problems Brother    • Asthma Sister         under control   • Alcohol abuse Maternal Uncle    • Colon cancer Neg Hx    • Breast cancer Neg Hx    • Ovarian cancer Neg Hx        Social History     Socioeconomic History   • Marital status:      Spouse name: Not on file   • Number of children: Not on file   • Years of education: Not on file   • Highest education level: Not on file   Tobacco Use   • Smoking status: Former Smoker     Packs/day: 1.00     Years: 40.00     Pack years: 40.00     Types: Cigarettes     Start date: 1967     Last  "attempt to quit: 2008     Years since quittin.1   • Smokeless tobacco: Never Used   • Tobacco comment: \"quit 9 years ago\"   Substance and Sexual Activity   • Alcohol use: No   • Drug use: No   • Sexual activity: Not Currently     Partners: Male     Birth control/protection: None           Objective   Physical Exam   Constitutional: She is oriented to person, place, and time. She appears well-developed and well-nourished. No distress.   HENT:   Head: Normocephalic and atraumatic.   Right Ear: External ear normal.   Left Ear: External ear normal.   Nose: Nose normal.   Mouth/Throat: Oropharynx is clear and moist.   Eyes: Pupils are equal, round, and reactive to light. Conjunctivae and EOM are normal.   Neck: Normal range of motion. Neck supple.   Cardiovascular: Normal rate, regular rhythm, normal heart sounds and intact distal pulses.   Pulmonary/Chest: Effort normal and breath sounds normal. No respiratory distress.   Abdominal: Soft. Bowel sounds are normal. She exhibits no distension. There is no tenderness. There is no rebound and no guarding.   Musculoskeletal: Normal range of motion. She exhibits no tenderness or deformity.   Trace lower extremity edema   Neurological: She is alert and oriented to person, place, and time.   Skin: Skin is warm and dry. No rash noted.   Psychiatric: She has a normal mood and affect. Her behavior is normal.   Nursing note and vitals reviewed.      Procedures           ED Course                                           MDM  Number of Diagnoses or Management Options  Chest pain, unspecified type:   Diagnosis management comments: 70-year-old female with chest pain.  Well-developed, well-nourished obese elderly lady no distress with exam as above.  She has normal vital signs.  Her exam is otherwise fairly nonfocal.  Will obtain labs, EKG and chest x-ray.  She is pain-free at this point.  She received aspirin in route.  Disposition pending.    DDX: ACS, anxiety, musculoskeletal " pain, GERD    EKG interpreted by me: Sinus rhythm, normal rate, no acute ST/T changes, RV conduction delay, this is a borderline EKG    Lab work is without significant abnormality.  Serial enzymes are negative.  Chest x-ray per interpretation reveals no abnormality. She has remained pain free. Will discharge home with follow up in the morning as planned.        Amount and/or Complexity of Data Reviewed  Clinical lab tests: reviewed  Decide to obtain previous medical records or to obtain history from someone other than the patient: yes        Final diagnoses:   Chest pain, unspecified type            Bruce Kirkland MD  03/09/20 3686

## 2020-03-10 ENCOUNTER — OFFICE VISIT (OUTPATIENT)
Dept: CARDIOLOGY | Facility: CLINIC | Age: 70
End: 2020-03-10

## 2020-03-10 ENCOUNTER — EPISODE CHANGES (OUTPATIENT)
Dept: CASE MANAGEMENT | Facility: OTHER | Age: 70
End: 2020-03-10

## 2020-03-10 VITALS
HEIGHT: 63 IN | DIASTOLIC BLOOD PRESSURE: 60 MMHG | SYSTOLIC BLOOD PRESSURE: 98 MMHG | BODY MASS INDEX: 40.57 KG/M2 | OXYGEN SATURATION: 97 % | WEIGHT: 229 LBS | HEART RATE: 76 BPM

## 2020-03-10 DIAGNOSIS — R07.89 CHEST PAIN, ATYPICAL: ICD-10-CM

## 2020-03-10 DIAGNOSIS — I51.7 LEFT VENTRICULAR HYPERTROPHY: ICD-10-CM

## 2020-03-10 DIAGNOSIS — I50.32 CHRONIC DIASTOLIC HEART FAILURE (HCC): Primary | ICD-10-CM

## 2020-03-10 DIAGNOSIS — E66.01 MORBID OBESITY WITH BMI OF 40.0-44.9, ADULT (HCC): ICD-10-CM

## 2020-03-10 PROCEDURE — 99214 OFFICE O/P EST MOD 30 MIN: CPT | Performed by: INTERNAL MEDICINE

## 2020-03-10 NOTE — PROGRESS NOTES
[facility] Cardiology Office Follow Up Note    Salinas Monroy  [id number]  03/10/2020    Primary Care Provider: Jm Marie DO    Chief Complaint: Regular follow-up    History of Present Illness:   Mrs. Salinas Monroy is a 70 y.o. female who presents to the Cardiology Clinic for follow-up of chronic diastolic heart failure.  The patient has a past medical history significant for hypertension, hypothyroidism in the setting of prior Hashimoto's thyroiditis, Parkinson's disease, osteoarthritis, obesity with a BMI of 42.7, and ISABEL on CPAP.  Her past cardiac history is significant for chronic diastolic heart failure with grade 1 diastolic dysfunction noted on prior echocardiogram.  She returns to Cardiology clinic today for regular follow-up.  Since her last follow-up, the patient notes improvement in her bilateral lower extremity swelling.  She continues to experience mild exertional dyspnea.  She denies orthopnea or PND.  In addition to her dyspnea, she notes intermittent episodes of a chest pain described as intermittently dull to sharp sensation located across her bilateral anterior chest.  No clear association with exertion.  No clear aggravating or alleviating factors.  No associated nausea, vomiting, or diaphoresis.  No other specific complaints at this time.     Past Cardiac Testin. Last Coronary Angio: None  2. Prior Stress Testing: None  3. Last Echo:    1.  2018               1.  Normal LV systolic function, LVEF 67%               2.  Mild concentric LVH.               3.  Grade 1 diastolic dysfunction.               4.  Moderate tricuspid regurgitation.   2.  2020    1.  Normal left ventricular size and systolic function, LVEF 55-60%.    2.  Mild concentric LVH.    3.  Indeterminate LV diastolic filling pattern.    4.  Normal right ventricular size and systolic function.    5.  Normal left and right atrial size.    6.  Trace MR and TR.  4.  Prior Holter Monitor: None    Review of Systems:   Review of Systems   Constitutional: Negative for activity change, appetite change, chills, diaphoresis, fatigue, fever, unexpected weight gain and unexpected weight loss.   Respiratory: Positive for chest tightness and shortness of breath. Negative for cough and wheezing.    Cardiovascular: Positive for chest pain. Negative for palpitations and leg swelling.   Gastrointestinal: Negative for abdominal pain, anal bleeding, blood in stool and GERD.   Endocrine: Negative for cold intolerance and heat intolerance.   Genitourinary: Negative for hematuria.   Neurological: Negative for dizziness, syncope, weakness and light-headedness.   Hematological: Does not bruise/bleed easily.   Psychiatric/Behavioral: Negative for depressed mood and stress. The patient is not nervous/anxious.        I have reviewed and/or updated the patient's past medical, past surgical, family, social history, problem list and allergies as appropriate.     Medications:     Current Outpatient Medications:   •  Acetylcysteine (N-ACETYL-L-CYSTEINE PO), Take 1 tablet by mouth Daily., Disp: , Rfl:   •  acyclovir (ZOVIRAX) 200 MG capsule, , Disp: , Rfl:   •  allopurinol (ZYLOPRIM) 100 MG tablet, Take 2 tablets by mouth Daily., Disp: 180 tablet, Rfl: 3  •  B Complex Vitamins (VITAMIN B COMPLEX PO), Take 1 tablet by mouth Daily., Disp: , Rfl:   •  B-12, Methylcobalamin, 1000 MCG sublingual tablet, , Disp: , Rfl:   •  BIOTIN PO, Take 1 tablet by mouth Daily., Disp: , Rfl:   •  buPROPion SR (WELLBUTRIN SR) 100 MG 12 hr tablet, Take 1 tablet by mouth Every Morning., Disp: 30 tablet, Rfl: 5  •  Cholecalciferol (VITAMIN D3) 63661 UNITS tablet, Take 7,500 Units by mouth Daily., Disp: , Rfl:   •  clotrimazole-betamethasone (LOTRISONE) 1-0.05 % cream, Apply  topically to the appropriate area as directed 2 (Two) Times a Day., Disp: 45 g, Rfl: 3  •  Coenzyme Q10 400 MG capsule, Take  by mouth., Disp: , Rfl:   •   Digestive Enzymes (DIGESTIVE ENZYME PO), Take 1 tablet by mouth Daily., Disp: , Rfl:   •  escitalopram (LEXAPRO) 20 MG tablet, Take 1 tablet by mouth Every Night., Disp: 90 tablet, Rfl: 3  •  ferrous gluconate (FERGON) 324 MG tablet, Take 1 tablet by mouth Daily With Breakfast., Disp: 30 tablet, Rfl: 5  •  furosemide (LASIX) 20 MG tablet, Take 1 tablet by mouth Daily., Disp: 15 tablet, Rfl: 0  •  GARLIC PO, Take 1 tablet by mouth Daily., Disp: , Rfl:   •  HYDROcodone-acetaminophen (NORCO) 5-325 MG per tablet, Take 1 tablet by mouth Every 6 (Six) Hours As Needed for Moderate Pain ., Disp: 20 tablet, Rfl: 0  •  L-THEANINE PO, Take  by mouth., Disp: , Rfl:   •  levothyroxine (SYNTHROID, LEVOTHROID) 75 MCG tablet, TAKE ONE TABLET BY MOUTH DAILY, Disp: 90 tablet, Rfl: 1  •  liothyronine (CYTOMEL) 25 MCG tablet, TAKE THREE TABLETS BY MOUTH DAILY, Disp: 90 tablet, Rfl: 0  •  liothyronine (CYTOMEL) 5 MCG tablet, TAKE ONE TABLET BY MOUTH TWICE A DAY, Disp: 60 tablet, Rfl: 2  •  MAGNESIUM PO, Take 1,000 mg by mouth Every Night., Disp: , Rfl:   •  Methylsulfonylmethane (MSM PO), Take 1 tablet by mouth Daily., Disp: , Rfl:   •  MILK THISTLE PO, Take 1 tablet by mouth Daily., Disp: , Rfl:   •  Misc Natural Products (TART CHERRY ADVANCED PO), Take 2 capsules by mouth Daily., Disp: , Rfl:   •  NON FORMULARY, , Disp: , Rfl:   •  Omega-3 Fatty Acids (OMEGA-3 FISH OIL PO), Take 4,000 Units by mouth Daily., Disp: , Rfl:   •  potassium chloride (K-DUR,KLOR-CON) 20 MEQ CR tablet, Take 1 tablet by mouth Daily for 7 days., Disp: 7 tablet, Rfl: 0  •  Probiotic Product (PROBIOTIC PO), Take 1 tablet by mouth Daily., Disp: , Rfl:   •  SELENIUM PO, Take 1 tablet by mouth Daily., Disp: , Rfl:   •  TAURINE PO, Take  by mouth., Disp: , Rfl:   •  TURMERIC PO, Take 1 tablet by mouth Daily., Disp: , Rfl:   •  VITAMIN A PO, Take  by mouth., Disp: , Rfl:   •  vitamin C (ASCORBIC ACID) 500 MG tablet, Take 500 mg by mouth 3 (Three) Times a Day As  "Needed., Disp: , Rfl:   •  vitamin E 400 UNIT capsule, Take 400 Units by mouth Daily., Disp: , Rfl:   •  VITAMIN K PO, Take 100 mcg by mouth Daily., Disp: , Rfl:   •  Zinc 30 MG tablet, Take  by mouth Daily., Disp: , Rfl:     Physical Exam:  Vital Signs:   Vitals:    03/10/20 1151   BP: 98/60   BP Location: Right arm   Patient Position: Sitting   Cuff Size: Adult   Pulse: 76   SpO2: 97%   Weight: 104 kg (229 lb)   Height: 160 cm (63\")       Physical Exam   Constitutional: She is oriented to person, place, and time. She appears well-developed and well-nourished.   Obese female no acute distress.   HENT:   Head: Normocephalic and atraumatic.   Moist Mucous Membranes.    Eyes: Pupils are equal, round, and reactive to light. EOM are normal. No scleral icterus.   Neck: No tracheal deviation present.   Cardiovascular: Normal rate, regular rhythm, normal heart sounds and intact distal pulses. Exam reveals no gallop and no friction rub.   No murmur heard.  No significant lower extremity edema.   Pulmonary/Chest: Effort normal and breath sounds normal. No stridor. No respiratory distress. She has no wheezes. She has no rales. She exhibits no tenderness.   Abdominal: Soft. Bowel sounds are normal. She exhibits no distension. There is no tenderness. There is no rebound and no guarding.   Musculoskeletal: Normal range of motion. She exhibits no edema.   Lymphadenopathy:     She has no cervical adenopathy.   Neurological: She is alert and oriented to person, place, and time.   Skin: Skin is warm and dry. She is not diaphoretic. No erythema.   Psychiatric: She has a normal mood and affect. Her behavior is normal.       Results Review:   I reviewed the patient's new clinical results.      Assessment / Plan:     1.    Chronic diastolic heart failure   --History of chronic diastolic CHF, with grade 1 diastolic dysfunction on prior echocardiogram  --Volume status appears to be improved, near euvolemic on exam with recent BNP 3/9 " being within normal limits  --Repeat renal function continues to tolerate diuresis, will therefore continue daily Lasix  --Discussed additional PRN dose of Lasix should she experience increased lower extremity swelling or weight gain > 3 pounds in 3 days  --BP remains controlled  --Follow-up in 6 months, or sooner if required    2.  Chest pain  --Current reports episodes of chest pain, with atypical features  --No exertional component to her chest discomfort  --ECG 3/9/2020 without acute ischemic changes and troponins unremarkable at that time  --Given current symptoms in the setting of cardiovascular risk factors, will obtain pharmacologic MPS (inability to exercise) to further rule out underlying ischemia     3.  Morbid obesity with BMI of 40.0-44.9  --Advised weight loss through diet and exercise     4.  ISABEL on CPAP  --Compliant with CPAP      Preventative Cardiology:   Tobacco Cessation: N/A  Obstructive Sleep Apnea Screening: Completed  AAA Screening: N/A  Peripheral Arterial Disease Screening: N/A    Follow Up:   Return in about 6 months (around 9/10/2020) for eh Barboza.      Thank you for allowing me to participate in the care of your patient. Please to not hesitate to contact me with additional questions or concerns.     JEWELL Pires MD  Interventional Cardiology   03/10/2020  11:52 AM

## 2020-03-11 PROBLEM — I50.32 CHRONIC DIASTOLIC HEART FAILURE (HCC): Status: ACTIVE | Noted: 2018-07-30

## 2020-03-13 ENCOUNTER — PATIENT OUTREACH (OUTPATIENT)
Dept: CASE MANAGEMENT | Facility: OTHER | Age: 70
End: 2020-03-13

## 2020-03-13 NOTE — OUTREACH NOTE
Patient Outreach Note    Called patient in follow up to ED visit 3-9-20 with chest pain.  Reminded patient of RN-ACM role and contact information.  Patient said she is feeling better; that the ED told her her pain did not appear to be heart related.  Patient said she saw Cardiology, Dr. Pires on Tuesday, 3-10-20, and has a f/u appt with PCP, Dr. Marie, on Monday, 3-16-20.  Reports no further chest pain, no SOA.  Voiced compliance with daily medications as ordered. Remains independent with all daily activities.  States she changed from [facility] on 3-1-20, and they offer her the same services as [facility], plus more; Said she has their contact information when needed; gets their free transportation to MD appts.  No other questions or concerns voiced at this time.    Gauri Causey RN  Ambulatory     3/13/2020, 17:22

## 2020-03-15 ENCOUNTER — EPISODE CHANGES (OUTPATIENT)
Dept: CASE MANAGEMENT | Facility: OTHER | Age: 70
End: 2020-03-15

## 2020-03-16 ENCOUNTER — TELEPHONE (OUTPATIENT)
Dept: FAMILY MEDICINE CLINIC | Facility: CLINIC | Age: 70
End: 2020-03-16

## 2020-03-16 ENCOUNTER — OFFICE VISIT (OUTPATIENT)
Dept: FAMILY MEDICINE CLINIC | Facility: CLINIC | Age: 70
End: 2020-03-16

## 2020-03-16 VITALS
TEMPERATURE: 98.1 F | DIASTOLIC BLOOD PRESSURE: 80 MMHG | SYSTOLIC BLOOD PRESSURE: 100 MMHG | BODY MASS INDEX: 40.57 KG/M2 | HEIGHT: 63 IN

## 2020-03-16 DIAGNOSIS — F31.11 BIPOLAR AFFECTIVE DISORDER, CURRENTLY MANIC, MILD (HCC): ICD-10-CM

## 2020-03-16 DIAGNOSIS — Z99.89 OSA ON CPAP: ICD-10-CM

## 2020-03-16 DIAGNOSIS — I50.32 CHRONIC DIASTOLIC HEART FAILURE (HCC): Primary | ICD-10-CM

## 2020-03-16 DIAGNOSIS — G47.33 OSA ON CPAP: ICD-10-CM

## 2020-03-16 DIAGNOSIS — J02.9 PHARYNGITIS, UNSPECIFIED ETIOLOGY: ICD-10-CM

## 2020-03-16 DIAGNOSIS — E03.9 HYPOTHYROIDISM, ADULT: ICD-10-CM

## 2020-03-16 PROBLEM — J45.909 REACTIVE AIRWAY DISEASE WITHOUT COMPLICATION: Chronic | Status: ACTIVE | Noted: 2020-03-16

## 2020-03-16 PROCEDURE — 87804 INFLUENZA ASSAY W/OPTIC: CPT | Performed by: FAMILY MEDICINE

## 2020-03-16 PROCEDURE — 87880 STREP A ASSAY W/OPTIC: CPT | Performed by: FAMILY MEDICINE

## 2020-03-16 PROCEDURE — 99214 OFFICE O/P EST MOD 30 MIN: CPT | Performed by: FAMILY MEDICINE

## 2020-03-16 RX ORDER — FUROSEMIDE 20 MG/1
20 TABLET ORAL DAILY
Qty: 30 TABLET | Refills: 0 | Status: SHIPPED | OUTPATIENT
Start: 2020-03-16 | End: 2020-03-16 | Stop reason: SDUPTHER

## 2020-03-16 RX ORDER — FUROSEMIDE 20 MG/1
20 TABLET ORAL DAILY
Qty: 30 TABLET | Refills: 0 | Status: SHIPPED | OUTPATIENT
Start: 2020-03-16 | End: 2020-03-17 | Stop reason: SDUPTHER

## 2020-03-16 NOTE — TELEPHONE ENCOUNTER
PATIENT'S SISTER, PB, CALLED AND REQUESTED A REFILL ON RX, LASIX (FUROSEMIDE) 20 MG ONCE A DAY BUT IS NOT ON THE MED LIST. PLEASE ADVISE.    [facility] IN [place] CONFIRMED    PATIENT CALLBACK # [phone]

## 2020-03-16 NOTE — TELEPHONE ENCOUNTER
Patient's sister stated the pharmacy told her that they still have not received the prescriptions.     furosemide (LASIX) 20 MG tablet  Cariprazine HCl (VRAYLAR) 3 MG capsule  Cariprazine HCl (VRAYLAR) 1.5 MG capsule capsule    Please call back and advise.  [phone]

## 2020-03-17 DIAGNOSIS — F31.11 BIPOLAR AFFECTIVE DISORDER, CURRENTLY MANIC, MILD (HCC): ICD-10-CM

## 2020-03-17 RX ORDER — FUROSEMIDE 20 MG/1
20 TABLET ORAL DAILY
Qty: 30 TABLET | Refills: 0 | Status: SHIPPED | OUTPATIENT
Start: 2020-03-17 | End: 2020-04-03 | Stop reason: SDUPTHER

## 2020-03-18 ENCOUNTER — HOSPITAL ENCOUNTER (OUTPATIENT)
Dept: NUCLEAR MEDICINE | Facility: HOSPITAL | Age: 70
Discharge: HOME | End: 2020-03-18

## 2020-03-18 DIAGNOSIS — R07.89 CHEST PAIN, ATYPICAL: ICD-10-CM

## 2020-03-18 PROCEDURE — A9500 TC99M SESTAMIBI: HCPCS | Performed by: INTERNAL MEDICINE

## 2020-03-18 PROCEDURE — 93017 CV STRESS TEST TRACING ONLY: CPT

## 2020-03-18 PROCEDURE — 78452 HT MUSCLE IMAGE SPECT MULT: CPT

## 2020-03-18 PROCEDURE — 25010000002 REGADENOSON 0.4 MG/5ML SOLUTION: Performed by: INTERNAL MEDICINE

## 2020-03-18 PROCEDURE — 78452 HT MUSCLE IMAGE SPECT MULT: CPT | Performed by: INTERNAL MEDICINE

## 2020-03-18 PROCEDURE — 0 TECHNETIUM SESTAMIBI: Performed by: INTERNAL MEDICINE

## 2020-03-18 PROCEDURE — 93018 CV STRESS TEST I&R ONLY: CPT | Performed by: INTERNAL MEDICINE

## 2020-03-18 RX ADMIN — TECHNETIUM TC 99M SESTAMIBI 1 DOSE: 1 INJECTION INTRAVENOUS at 09:30

## 2020-03-18 RX ADMIN — REGADENOSON 0.4 MG: 0.08 INJECTION, SOLUTION INTRAVENOUS at 09:30

## 2020-03-19 ENCOUNTER — HOSPITAL ENCOUNTER (OUTPATIENT)
Dept: NUCLEAR MEDICINE | Facility: HOSPITAL | Age: 70
Discharge: HOME | End: 2020-03-19

## 2020-03-19 ENCOUNTER — TELEPHONE (OUTPATIENT)
Dept: FAMILY MEDICINE CLINIC | Facility: CLINIC | Age: 70
End: 2020-03-19

## 2020-03-19 DIAGNOSIS — E03.9 HYPOTHYROIDISM, ADULT: ICD-10-CM

## 2020-03-19 LAB
ALBUMIN SERPL-MCNC: 4 G/DL (ref 3.5–5.2)
ALBUMIN/GLOB SERPL: 1.6 G/DL
ALP SERPL-CCNC: 82 U/L (ref 39–117)
ALT SERPL-CCNC: 22 U/L (ref 1–33)
AST SERPL-CCNC: 20 U/L (ref 1–32)
BASOPHILS # BLD AUTO: 0.03 10*3/MM3 (ref 0–0.2)
BASOPHILS NFR BLD AUTO: 0.3 % (ref 0–1.5)
BILIRUB SERPL-MCNC: 0.3 MG/DL (ref 0.2–1.2)
BUN SERPL-MCNC: 25 MG/DL (ref 8–23)
BUN/CREAT SERPL: 27.8 (ref 7–25)
CALCIUM SERPL-MCNC: 8.9 MG/DL (ref 8.6–10.5)
CHLORIDE SERPL-SCNC: 105 MMOL/L (ref 98–107)
CO2 SERPL-SCNC: 25.4 MMOL/L (ref 22–29)
CREAT SERPL-MCNC: 0.9 MG/DL (ref 0.57–1)
EOSINOPHIL # BLD AUTO: 0.1 10*3/MM3 (ref 0–0.4)
EOSINOPHIL NFR BLD AUTO: 1.1 % (ref 0.3–6.2)
ERYTHROCYTE [DISTWIDTH] IN BLOOD BY AUTOMATED COUNT: 12.4 % (ref 12.3–15.4)
GLOBULIN SER CALC-MCNC: 2.5 GM/DL
GLUCOSE SERPL-MCNC: 111 MG/DL (ref 65–99)
HCT VFR BLD AUTO: 35.8 % (ref 34–46.6)
HGB BLD-MCNC: 11.8 G/DL (ref 12–15.9)
IMM GRANULOCYTES # BLD AUTO: 0.04 10*3/MM3 (ref 0–0.05)
IMM GRANULOCYTES NFR BLD AUTO: 0.4 % (ref 0–0.5)
LYMPHOCYTES # BLD AUTO: 1.62 10*3/MM3 (ref 0.7–3.1)
LYMPHOCYTES NFR BLD AUTO: 17.9 % (ref 19.6–45.3)
MCH RBC QN AUTO: 29 PG (ref 26.6–33)
MCHC RBC AUTO-ENTMCNC: 33 G/DL (ref 31.5–35.7)
MCV RBC AUTO: 88 FL (ref 79–97)
MONOCYTES # BLD AUTO: 0.72 10*3/MM3 (ref 0.1–0.9)
MONOCYTES NFR BLD AUTO: 8 % (ref 5–12)
NEUTROPHILS # BLD AUTO: 6.54 10*3/MM3 (ref 1.7–7)
NEUTROPHILS NFR BLD AUTO: 72.3 % (ref 42.7–76)
NRBC BLD AUTO-RTO: 0.1 /100 WBC (ref 0–0.2)
PLATELET # BLD AUTO: 299 10*3/MM3 (ref 140–450)
POTASSIUM SERPL-SCNC: 4.1 MMOL/L (ref 3.5–5.2)
PROT SERPL-MCNC: 6.5 G/DL (ref 6–8.5)
RBC # BLD AUTO: 4.07 10*6/MM3 (ref 3.77–5.28)
SODIUM SERPL-SCNC: 144 MMOL/L (ref 136–145)
T3FREE SERPL-MCNC: 10.9 PG/ML (ref 2–4.4)
T3REVERSE SERPL-MCNC: 9 NG/DL (ref 9.2–24.1)
T4 FREE SERPL-MCNC: 0.74 NG/DL (ref 0.93–1.7)
TSH SERPL DL<=0.005 MIU/L-ACNC: <0.005 UIU/ML (ref 0.27–4.2)
WBC # BLD AUTO: 9.05 10*3/MM3 (ref 3.4–10.8)

## 2020-03-19 PROCEDURE — A9500 TC99M SESTAMIBI: HCPCS | Performed by: INTERNAL MEDICINE

## 2020-03-19 PROCEDURE — 0 TECHNETIUM SESTAMIBI: Performed by: INTERNAL MEDICINE

## 2020-03-19 RX ORDER — LIOTHYRONINE SODIUM 25 UG/1
75 TABLET ORAL DAILY
Qty: 90 TABLET | Refills: 0 | Status: SHIPPED | OUTPATIENT
Start: 2020-03-19 | End: 2020-04-03 | Stop reason: SDUPTHER

## 2020-03-19 RX ADMIN — TECHNETIUM TC 99M SESTAMIBI 1 DOSE: 1 INJECTION INTRAVENOUS at 09:00

## 2020-03-23 LAB
BH CV STRESS COMMENTS STAGE 1: NORMAL
BH CV STRESS DOSE REGADENOSON STAGE 1: 0.4
BH CV STRESS DURATION MIN STAGE 1: 0
BH CV STRESS DURATION SEC STAGE 1: 10
BH CV STRESS PROTOCOL 1: NORMAL
BH CV STRESS RECOVERY BP: NORMAL MMHG
BH CV STRESS RECOVERY HR: 88 BPM
BH CV STRESS STAGE 1: 1
LV EF NUC BP: 54 %
MAXIMAL PREDICTED HEART RATE: 149 BPM
PERCENT MAX PREDICTED HR: 69.8 %
STRESS BASELINE BP: NORMAL MMHG
STRESS BASELINE HR: 84 BPM
STRESS PERCENT HR: 82 %
STRESS POST PEAK BP: NORMAL MMHG
STRESS POST PEAK HR: 104 BPM
STRESS TARGET HR: 127 BPM

## 2020-03-26 ENCOUNTER — TELEPHONE (OUTPATIENT)
Dept: FAMILY MEDICINE CLINIC | Facility: CLINIC | Age: 70
End: 2020-03-26

## 2020-03-26 NOTE — TELEPHONE ENCOUNTER
Patient states that she had labs done on 3/16 and she wanted to see if the results were in.  Please advise.  She can be reached at [phone]    Also, [employer] is going to be calling Dr. Marie about her C-PAP Supplies.

## 2020-04-03 ENCOUNTER — OFFICE VISIT (OUTPATIENT)
Dept: FAMILY MEDICINE CLINIC | Facility: CLINIC | Age: 70
End: 2020-04-03

## 2020-04-03 DIAGNOSIS — F31.11 BIPOLAR AFFECTIVE DISORDER, CURRENTLY MANIC, MILD (HCC): ICD-10-CM

## 2020-04-03 DIAGNOSIS — I50.32 CHRONIC DIASTOLIC HEART FAILURE (HCC): Primary | ICD-10-CM

## 2020-04-03 DIAGNOSIS — E03.9 HYPOTHYROIDISM, ADULT: ICD-10-CM

## 2020-04-03 LAB
EXPIRATION DATE: NORMAL
EXPIRATION DATE: NORMAL
FLUAV AG NPH QL: NEGATIVE
FLUBV AG NPH QL: NEGATIVE
INTERNAL CONTROL: NORMAL
INTERNAL CONTROL: NORMAL
Lab: 1
Lab: 1
S PYO AG THROAT QL: NEGATIVE

## 2020-04-03 PROCEDURE — 99213 OFFICE O/P EST LOW 20 MIN: CPT | Performed by: FAMILY MEDICINE

## 2020-04-03 RX ORDER — LIOTHYRONINE SODIUM 25 UG/1
75 TABLET ORAL DAILY
Qty: 90 TABLET | Refills: 1 | Status: SHIPPED | OUTPATIENT
Start: 2020-04-03 | End: 2020-04-27 | Stop reason: ALTCHOICE

## 2020-04-03 RX ORDER — FUROSEMIDE 20 MG/1
20 TABLET ORAL DAILY
Qty: 30 TABLET | Refills: 1 | Status: SHIPPED | OUTPATIENT
Start: 2020-04-03 | End: 2020-06-04 | Stop reason: SDUPTHER

## 2020-04-15 ENCOUNTER — TELEPHONE (OUTPATIENT)
Dept: FAMILY MEDICINE CLINIC | Facility: CLINIC | Age: 70
End: 2020-04-15

## 2020-04-15 NOTE — TELEPHONE ENCOUNTER
Pt called and stated that she takes 3 MG of a medication daily, patient could not give me the name. Pt states that the medication has her sleeping a lot, eating a lot and having irritable outbursts.  Pt would like to know if she can take one pill every other day.      Pt callback: [phone]    Please advise.

## 2020-04-17 ENCOUNTER — RESULTS ENCOUNTER (OUTPATIENT)
Dept: FAMILY MEDICINE CLINIC | Facility: CLINIC | Age: 70
End: 2020-04-17

## 2020-04-17 DIAGNOSIS — E03.9 HYPOTHYROIDISM, ADULT: ICD-10-CM

## 2020-04-17 DIAGNOSIS — I50.32 CHRONIC DIASTOLIC HEART FAILURE (HCC): ICD-10-CM

## 2020-04-22 ENCOUNTER — APPOINTMENT (OUTPATIENT)
Dept: LAB | Facility: HOSPITAL | Age: 70
End: 2020-04-22

## 2020-04-22 LAB
ANION GAP SERPL CALCULATED.3IONS-SCNC: 15.9 MMOL/L (ref 5–15)
BUN BLD-MCNC: 32 MG/DL (ref 8–23)
BUN/CREAT SERPL: 41 (ref 7–25)
CALCIUM SPEC-SCNC: 9.3 MG/DL (ref 8.6–10.5)
CHLORIDE SERPL-SCNC: 104 MMOL/L (ref 98–107)
CO2 SERPL-SCNC: 20.1 MMOL/L (ref 22–29)
CREAT BLD-MCNC: 0.78 MG/DL (ref 0.57–1)
GFR SERPL CREATININE-BSD FRML MDRD: 73 ML/MIN/1.73
GLUCOSE BLD-MCNC: 112 MG/DL (ref 65–99)
POTASSIUM BLD-SCNC: 4 MMOL/L (ref 3.5–5.2)
SODIUM BLD-SCNC: 140 MMOL/L (ref 136–145)
T4 FREE SERPL-MCNC: 0.6 NG/DL (ref 0.93–1.7)
TSH SERPL DL<=0.05 MIU/L-ACNC: <0.005 UIU/ML (ref 0.27–4.2)

## 2020-04-22 PROCEDURE — 36415 COLL VENOUS BLD VENIPUNCTURE: CPT | Performed by: FAMILY MEDICINE

## 2020-04-22 PROCEDURE — 84443 ASSAY THYROID STIM HORMONE: CPT | Performed by: FAMILY MEDICINE

## 2020-04-22 PROCEDURE — 80048 BASIC METABOLIC PNL TOTAL CA: CPT | Performed by: FAMILY MEDICINE

## 2020-04-22 PROCEDURE — 84439 ASSAY OF FREE THYROXINE: CPT | Performed by: FAMILY MEDICINE

## 2020-04-27 ENCOUNTER — TELEPHONE (OUTPATIENT)
Dept: FAMILY MEDICINE CLINIC | Facility: CLINIC | Age: 70
End: 2020-04-27

## 2020-04-27 DIAGNOSIS — E03.9 HYPOTHYROIDISM, ADULT: Primary | ICD-10-CM

## 2020-04-27 DIAGNOSIS — E03.9 HYPOTHYROIDISM, ADULT: ICD-10-CM

## 2020-04-27 RX ORDER — LEVOTHYROXINE SODIUM 112 UG/1
112 TABLET ORAL DAILY
Qty: 30 TABLET | Refills: 2 | Status: SHIPPED | OUTPATIENT
Start: 2020-04-27 | End: 2020-07-29

## 2020-04-27 NOTE — TELEPHONE ENCOUNTER
From what I can see these results have not been signed off on.  Please let me know when they have been reviewed.

## 2020-04-27 NOTE — TELEPHONE ENCOUNTER
PT IS REQUESTING HER LAB RESULTS FOR FREE T3 TEST.  PT IS REQUESTING A CALL BACK ASAP    PLEASE ADVISE PT @ [phone]

## 2020-04-29 ENCOUNTER — OFFICE VISIT (OUTPATIENT)
Dept: FAMILY MEDICINE CLINIC | Facility: CLINIC | Age: 70
End: 2020-04-29

## 2020-04-29 VITALS
DIASTOLIC BLOOD PRESSURE: 80 MMHG | HEART RATE: 82 BPM | OXYGEN SATURATION: 97 % | HEIGHT: 63 IN | WEIGHT: 235 LBS | SYSTOLIC BLOOD PRESSURE: 120 MMHG | BODY MASS INDEX: 41.64 KG/M2 | TEMPERATURE: 98.7 F

## 2020-04-29 DIAGNOSIS — I50.32 CHRONIC DIASTOLIC HEART FAILURE (HCC): ICD-10-CM

## 2020-04-29 DIAGNOSIS — F31.11 BIPOLAR AFFECTIVE DISORDER, CURRENTLY MANIC, MILD (HCC): Primary | ICD-10-CM

## 2020-04-29 DIAGNOSIS — E03.9 HYPOTHYROIDISM, ADULT: ICD-10-CM

## 2020-04-29 PROCEDURE — 99214 OFFICE O/P EST MOD 30 MIN: CPT | Performed by: FAMILY MEDICINE

## 2020-04-29 RX ORDER — LIOTHYRONINE SODIUM 25 UG/1
25 TABLET ORAL DAILY
Qty: 30 TABLET | Refills: 0
Start: 2020-04-29 | End: 2020-06-04 | Stop reason: SDUPTHER

## 2020-04-29 RX ORDER — ESCITALOPRAM OXALATE 10 MG/1
10 TABLET ORAL DAILY
Qty: 90 TABLET | Refills: 0 | Status: SHIPPED | OUTPATIENT
Start: 2020-04-29 | End: 2020-07-29

## 2020-04-29 NOTE — PROGRESS NOTES
Established Patient        Chief Complaint:   Chief Complaint   Patient presents with   • Follow-up     depression; patient dc'd vraylar due to side effects; also would like to discuss lab results        Salinas Monroy is a 70 y.o. female    History of Present Illness:   Here today for evaluation of her mood disorder, as well as her intolerance to previously prescribed medication for maintenance of this.  She is also here for follow-up of her known thyroid disorder and chronic diastolic heart failure.  Patient reports continued diuresis, denies any orthopnea or chest pain.  Denies any palpitations.  She does admit to profound malaise and fatigue, stating she has been compliant with her thyroid hormone supplementation.  She states she was intolerant to the samples provided of VRAYLAR during last in office visit, despite a telephone visit in which she stated her mood was significantly improved.  Subsequent telephone message was relayed to me that she was having worsened and problematic daytime drowsiness and malaise/fatigue as a result of increasing the dose of the medication to 3 mg.  She wanted to see how she did with an every other day dosing of the medication, of which she has done until recently.  Patient states her drowsiness and malaise/fatigue is continued, as well as slight weight gain as a result of increased appetite.  She discontinued the medication.  She denies any suicidal or homicidal ideation.  Patient attributes her poor feeling to incorrect thyroid levels, stating that she needs more free T3.  I have discussed with her, during last an office visit, as well as on review of her most recent labs, that she needs to transition to a lower dose of Cytomel, and increase her levothyroxine dosing.  She has been resistant to do such, attributing her malaise/fatigue and overall drowsiness more so to the thyroid disorder than she does to her underlying mood disorder.  She denies any fever chills, no  "complaints of night sweats.  She has had continued episodes of generalized anxiety, most recently after reviewing request to change her thyroid dosing.  Patient does admit to no significant change with respect to her drowsiness or malaise/fatigue while on the new mood stabilizing medication, as well as without it.    Subjective     The following portions of the patient's history were reviewed and updated as appropriate: allergies, current medications, past family history, past medical history, past social history, past surgical history and problem list.    Allergies   Allergen Reactions   • Diazepam Rash       Review of Systems  Constitutional: Negative for fever. Negative for chills, diaphoresis.  Malaise/fatigue as per above and unexpected weight change.   HENT: No dysphagia; no changes to vision/hearing/smell/taste; no epistaxis  Eyes: Negative for redness and visual disturbance.   Respiratory: negative for shortness of breath. Negative for chest pain . Negative for cough and chest tightness.   Cardiovascular: Negative for chest pain and palpitations.   Gastrointestinal: Negative for abdominal distention, abdominal pain and blood in stool.   Endocrine: Negative for cold intolerance and heat intolerance.   Genitourinary: Negative for difficulty urinating, dysuria and frequency.   Musculoskeletal: Negative for arthralgias, back pain and myalgias.   Skin: Negative for color change, rash and wound.   Neurological: Negative for syncope, weakness and headaches.   Hematological: Negative for adenopathy. Does not bruise/bleed easily.   Psychiatric/Behavioral: Negative for confusion. The patient is not nervous/anxious.    Objective     Physical Exam   Vital Signs: /80   Pulse 82   Temp 98.7 °F (37.1 °C)   Ht 160 cm (63\")   Wt 107 kg (235 lb)   LMP  (LMP Unknown)   SpO2 97%   BMI 41.63 kg/m²     General Appearance: alert, oriented x 3, no acute distress.  Skin: warm and dry.   HEENT: Atraumatic.  pupils round " and reactive to light and accommodation, oral mucosa pink and moist.  Nares patent without epistaxis.  External auditory canals are patent tympanic membranes intact.  Neck: supple, no JVD, trachea midline.  No thyromegaly  Lungs: CTA, unlabored breathing effort.  Audible air exchange noted all lung fields  Heart: RRR, normal S1 and S2, no S3, no rub.  Abdomen: soft, non-tender, no palpable bladder, present bowel sounds to auscultation ×4.  No guarding or rigidity.  Postsurgical scarring noted to the abdomen.  No CVA tenderness.  Extremities: no clubbing, cyanosis or edema.  Good range of motion actively and passively.  Symmetric muscle strength and development.  Postsurgical scarring noted to bilateral hips.  Neuro: normal speech and mental status.  Cranial nerves II through XII intact.  No anosmia. DTR 2+; proprioception intact.  No focal motor/sensory deficits.  Chronic attention tremor noted.    Assessment and Plan      Assessment:   Salinas was seen today for follow-up.    Diagnoses and all orders for this visit:    Bipolar affective disorder, currently manic, mild (CMS/HCC)  -     escitalopram (LEXAPRO) 10 MG tablet; Take 1 tablet by mouth Daily.    Hypothyroidism, adult  -     T3, free; Future  -     T3, Reverse; Future  -     liothyronine (Cytomel) 25 MCG tablet; Take 1 tablet by mouth Daily.    Chronic diastolic heart failure (CMS/HCC)  -     Basic Metabolic Panel; Future        Plan:  I have recommended the patient continue with her diuresis, as well as dietary modifications including low sodium intake.  Of asked that she maintain appropriate hydration status.  Surveillance BMP will be ordered with her next labs.    I have had a lengthy discussion with patient today concerning the need to decrease her Cytomel dosing to only 1 tablet daily.  I have also advised patient to avoid self directed adjustments of her medication dosing, as this behavior will make it quite difficult to effectively evaluate her  response both clinically and with laboratory evaluation.  We have planned an increase in her levothyroxine dose and 112 mcg daily.  Planned repeat thyroid labs in approximately 3 weeks or so.  I remain cautious as to the validity of those future levels, as patient has made numerous self mediated adjustments to her thyroid dosing and treatment regimen in the past independent of recommendations from endocrinology or from me.    Patient would like to restart Lexapro, at 10 mg dosing, in an effort to improve her underlying mood disorder.  She seems resistant to the notion that she has 2 independent problems, namely hypothyroidism and mood disorder, with a mood disorder predating the thyroid abnormality.  I have counseled her today that it will be quite difficult to regulate her mood disorder without additionally addressing her thyroid dysfunction appropriately.  Should she develop any ill effects to the new medication, she can notify the office immediately for potential change to her treatment regimen.  She has taken escitalopram in the past without difficulty.      Discussion Summary:    Discussed plan of care in detail with pt today; pt verb understanding and agrees.  Follow up:  No follow-ups on file.     There are no Patient Instructions on file for this visit.    Jm Marie,   04/29/20  12:32          Please note that portions of this note may have been completed with a voice recognition program. Efforts were made to edit the dictations, but occasionally words are mistranscribed.

## 2020-05-06 ENCOUNTER — EPISODE CHANGES (OUTPATIENT)
Dept: CASE MANAGEMENT | Facility: OTHER | Age: 70
End: 2020-05-06

## 2020-05-06 ENCOUNTER — APPOINTMENT (OUTPATIENT)
Dept: CT IMAGING | Facility: HOSPITAL | Age: 70
End: 2020-05-06

## 2020-05-06 ENCOUNTER — APPOINTMENT (OUTPATIENT)
Dept: GENERAL RADIOLOGY | Facility: HOSPITAL | Age: 70
End: 2020-05-06

## 2020-05-06 ENCOUNTER — OFFICE VISIT (OUTPATIENT)
Dept: FAMILY MEDICINE CLINIC | Facility: CLINIC | Age: 70
End: 2020-05-06

## 2020-05-06 ENCOUNTER — HOSPITAL ENCOUNTER (EMERGENCY)
Facility: HOSPITAL | Age: 70
Discharge: HOME | End: 2020-05-06
Attending: EMERGENCY MEDICINE | Admitting: EMERGENCY MEDICINE

## 2020-05-06 ENCOUNTER — TELEPHONE (OUTPATIENT)
Dept: FAMILY MEDICINE CLINIC | Facility: CLINIC | Age: 70
End: 2020-05-06

## 2020-05-06 VITALS
WEIGHT: 235 LBS | RESPIRATION RATE: 18 BRPM | HEART RATE: 85 BPM | TEMPERATURE: 98.3 F | DIASTOLIC BLOOD PRESSURE: 67 MMHG | HEIGHT: 63 IN | SYSTOLIC BLOOD PRESSURE: 152 MMHG | OXYGEN SATURATION: 95 % | BODY MASS INDEX: 41.64 KG/M2

## 2020-05-06 DIAGNOSIS — R45.851 DEPRESSION WITH SUICIDAL IDEATION: Primary | ICD-10-CM

## 2020-05-06 DIAGNOSIS — F31.32 BIPOLAR AFFECTIVE DISORDER, CURRENTLY DEPRESSED, MODERATE (HCC): ICD-10-CM

## 2020-05-06 DIAGNOSIS — N39.0 URINARY TRACT INFECTION WITHOUT HEMATURIA, SITE UNSPECIFIED: ICD-10-CM

## 2020-05-06 DIAGNOSIS — E03.9 HYPOTHYROIDISM, ADULT: ICD-10-CM

## 2020-05-06 DIAGNOSIS — R25.1 TREMOR: Primary | ICD-10-CM

## 2020-05-06 DIAGNOSIS — F32.A DEPRESSION WITH SUICIDAL IDEATION: Primary | ICD-10-CM

## 2020-05-06 DIAGNOSIS — G25.0 ESSENTIAL TREMOR: ICD-10-CM

## 2020-05-06 LAB
ALBUMIN SERPL-MCNC: 4.1 G/DL (ref 3.5–5.2)
ALBUMIN/GLOB SERPL: 1.4 G/DL
ALP SERPL-CCNC: 82 U/L (ref 39–117)
ALT SERPL W P-5'-P-CCNC: 17 U/L (ref 1–33)
AMPHET+METHAMPHET UR QL: NEGATIVE
AMPHETAMINES UR QL: NEGATIVE
ANION GAP SERPL CALCULATED.3IONS-SCNC: 13.7 MMOL/L (ref 5–15)
AST SERPL-CCNC: 19 U/L (ref 1–32)
BACTERIA UR QL AUTO: ABNORMAL /HPF
BARBITURATES UR QL SCN: NEGATIVE
BASOPHILS # BLD AUTO: 0.02 10*3/MM3 (ref 0–0.2)
BASOPHILS NFR BLD AUTO: 0.2 % (ref 0–1.5)
BENZODIAZ UR QL SCN: NEGATIVE
BILIRUB SERPL-MCNC: 0.3 MG/DL (ref 0.2–1.2)
BILIRUB UR QL STRIP: NEGATIVE
BUN BLD-MCNC: 16 MG/DL (ref 8–23)
BUN/CREAT SERPL: 21.3 (ref 7–25)
BUPRENORPHINE SERPL-MCNC: NEGATIVE NG/ML
CALCIUM SPEC-SCNC: 9.8 MG/DL (ref 8.6–10.5)
CANNABINOIDS SERPL QL: NEGATIVE
CHLORIDE SERPL-SCNC: 105 MMOL/L (ref 98–107)
CLARITY UR: CLEAR
CO2 SERPL-SCNC: 24.3 MMOL/L (ref 22–29)
COCAINE UR QL: NEGATIVE
COLOR UR: YELLOW
CREAT BLD-MCNC: 0.75 MG/DL (ref 0.57–1)
DEPRECATED RDW RBC AUTO: 44.3 FL (ref 37–54)
EOSINOPHIL # BLD AUTO: 0.05 10*3/MM3 (ref 0–0.4)
EOSINOPHIL NFR BLD AUTO: 0.6 % (ref 0.3–6.2)
ERYTHROCYTE [DISTWIDTH] IN BLOOD BY AUTOMATED COUNT: 13.2 % (ref 12.3–15.4)
GFR SERPL CREATININE-BSD FRML MDRD: 76 ML/MIN/1.73
GLOBULIN UR ELPH-MCNC: 3 GM/DL
GLUCOSE BLD-MCNC: 103 MG/DL (ref 65–99)
GLUCOSE UR STRIP-MCNC: NEGATIVE MG/DL
HCT VFR BLD AUTO: 41.6 % (ref 34–46.6)
HGB BLD-MCNC: 13.1 G/DL (ref 12–15.9)
HGB UR QL STRIP.AUTO: NEGATIVE
HYALINE CASTS UR QL AUTO: ABNORMAL /LPF
IMM GRANULOCYTES # BLD AUTO: 0.03 10*3/MM3 (ref 0–0.05)
IMM GRANULOCYTES NFR BLD AUTO: 0.4 % (ref 0–0.5)
KETONES UR QL STRIP: NEGATIVE
LEUKOCYTE ESTERASE UR QL STRIP.AUTO: ABNORMAL
LYMPHOCYTES # BLD AUTO: 1.32 10*3/MM3 (ref 0.7–3.1)
LYMPHOCYTES NFR BLD AUTO: 16.1 % (ref 19.6–45.3)
MAGNESIUM SERPL-MCNC: 2.3 MG/DL (ref 1.6–2.4)
MCH RBC QN AUTO: 28.5 PG (ref 26.6–33)
MCHC RBC AUTO-ENTMCNC: 31.5 G/DL (ref 31.5–35.7)
MCV RBC AUTO: 90.4 FL (ref 79–97)
METHADONE UR QL SCN: NEGATIVE
MONOCYTES # BLD AUTO: 0.49 10*3/MM3 (ref 0.1–0.9)
MONOCYTES NFR BLD AUTO: 6 % (ref 5–12)
NEUTROPHILS # BLD AUTO: 6.28 10*3/MM3 (ref 1.7–7)
NEUTROPHILS NFR BLD AUTO: 76.7 % (ref 42.7–76)
NITRITE UR QL STRIP: NEGATIVE
NRBC BLD AUTO-RTO: 0 /100 WBC (ref 0–0.2)
OPIATES UR QL: NEGATIVE
OXYCODONE UR QL SCN: NEGATIVE
PCP UR QL SCN: NEGATIVE
PH UR STRIP.AUTO: 6.5 [PH] (ref 5–8)
PLATELET # BLD AUTO: 265 10*3/MM3 (ref 140–450)
PMV BLD AUTO: 9.1 FL (ref 6–12)
POTASSIUM BLD-SCNC: 3.8 MMOL/L (ref 3.5–5.2)
PROCALCITONIN SERPL-MCNC: 0.04 NG/ML (ref 0.1–0.25)
PROPOXYPH UR QL: NEGATIVE
PROT SERPL-MCNC: 7.1 G/DL (ref 6–8.5)
PROT UR QL STRIP: NEGATIVE
RBC # BLD AUTO: 4.6 10*6/MM3 (ref 3.77–5.28)
RBC # UR: ABNORMAL /HPF
REF LAB TEST METHOD: ABNORMAL
SODIUM BLD-SCNC: 143 MMOL/L (ref 136–145)
SP GR UR STRIP: <=1.005 (ref 1–1.03)
SQUAMOUS #/AREA URNS HPF: ABNORMAL /HPF
TRICYCLICS UR QL SCN: NEGATIVE
TROPONIN T SERPL-MCNC: <0.01 NG/ML (ref 0–0.03)
UROBILINOGEN UR QL STRIP: ABNORMAL
WBC NRBC COR # BLD: 8.19 10*3/MM3 (ref 3.4–10.8)
WBC UR QL AUTO: ABNORMAL /HPF

## 2020-05-06 PROCEDURE — 80306 DRUG TEST PRSMV INSTRMNT: CPT | Performed by: EMERGENCY MEDICINE

## 2020-05-06 PROCEDURE — 99284 EMERGENCY DEPT VISIT MOD MDM: CPT

## 2020-05-06 PROCEDURE — 85025 COMPLETE CBC W/AUTO DIFF WBC: CPT | Performed by: EMERGENCY MEDICINE

## 2020-05-06 PROCEDURE — 99443 PR PHYS/QHP TELEPHONE EVALUATION 21-30 MIN: CPT | Performed by: FAMILY MEDICINE

## 2020-05-06 PROCEDURE — 81001 URINALYSIS AUTO W/SCOPE: CPT | Performed by: EMERGENCY MEDICINE

## 2020-05-06 PROCEDURE — 25010000002 LORAZEPAM PER 2 MG: Performed by: EMERGENCY MEDICINE

## 2020-05-06 PROCEDURE — 84145 PROCALCITONIN (PCT): CPT | Performed by: EMERGENCY MEDICINE

## 2020-05-06 PROCEDURE — 71045 X-RAY EXAM CHEST 1 VIEW: CPT

## 2020-05-06 PROCEDURE — 83735 ASSAY OF MAGNESIUM: CPT | Performed by: EMERGENCY MEDICINE

## 2020-05-06 PROCEDURE — 93005 ELECTROCARDIOGRAM TRACING: CPT | Performed by: EMERGENCY MEDICINE

## 2020-05-06 PROCEDURE — 70450 CT HEAD/BRAIN W/O DYE: CPT

## 2020-05-06 PROCEDURE — 80053 COMPREHEN METABOLIC PANEL: CPT | Performed by: EMERGENCY MEDICINE

## 2020-05-06 PROCEDURE — 96374 THER/PROPH/DIAG INJ IV PUSH: CPT

## 2020-05-06 PROCEDURE — 96376 TX/PRO/DX INJ SAME DRUG ADON: CPT

## 2020-05-06 PROCEDURE — 84484 ASSAY OF TROPONIN QUANT: CPT | Performed by: EMERGENCY MEDICINE

## 2020-05-06 RX ORDER — LORAZEPAM 2 MG/ML
0.25 INJECTION INTRAMUSCULAR ONCE
Status: COMPLETED | OUTPATIENT
Start: 2020-05-06 | End: 2020-05-06

## 2020-05-06 RX ORDER — LORAZEPAM 2 MG/ML
0.5 INJECTION INTRAMUSCULAR ONCE
Status: COMPLETED | OUTPATIENT
Start: 2020-05-06 | End: 2020-05-06

## 2020-05-06 RX ORDER — PROPRANOLOL HCL 60 MG
60 CAPSULE, EXTENDED RELEASE 24HR ORAL ONCE
Status: COMPLETED | OUTPATIENT
Start: 2020-05-06 | End: 2020-05-06

## 2020-05-06 RX ORDER — PROPRANOLOL HCL 60 MG
60 CAPSULE, EXTENDED RELEASE 24HR ORAL DAILY
Qty: 30 CAPSULE | Refills: 0 | Status: SHIPPED | OUTPATIENT
Start: 2020-05-06 | End: 2020-05-18 | Stop reason: ALTCHOICE

## 2020-05-06 RX ORDER — CEPHALEXIN 500 MG/1
500 CAPSULE ORAL 2 TIMES DAILY
Qty: 14 CAPSULE | Refills: 0 | Status: SHIPPED | OUTPATIENT
Start: 2020-05-06 | End: 2020-05-18

## 2020-05-06 RX ADMIN — PROPRANOLOL HYDROCHLORIDE 60 MG: 60 CAPSULE, EXTENDED RELEASE ORAL at 14:33

## 2020-05-06 RX ADMIN — LORAZEPAM 0.5 MG: 2 INJECTION, SOLUTION INTRAMUSCULAR; INTRAVENOUS at 14:30

## 2020-05-06 RX ADMIN — LORAZEPAM 0.25 MG: 2 INJECTION, SOLUTION INTRAMUSCULAR; INTRAVENOUS at 12:47

## 2020-05-06 NOTE — CONSULTS
"Salinas Monroy  1950    TIME: 6845-1510    Is patient agreeable to admission/treatment? No - Patient agreeable to take resources for exploring outpatient psychiatric care    Guardian: Patient is own guardian    Pt Lives With:  Self    Presenting Problems: Patient made isolated suicidal statement regarding \"rather being dead to get peace from tremors\" to nursing staff during a video PCP visit.  Patient denied any suicidal plan, intention or behaviors.  Patient denies history of suicidal ideation with intention, plan or behaviors.  Patient denied history of suicide attempts.  Patient reports \"I think what I said was taken more seriously than what I meant.\"  Patient denies SI/HI/AVH throughout her entire ED visit, to all members of the treatment team.    Current Stressors: Recent increased social isolation (global pandemic); Patient reports worsening tremors, causing frustration; patient reports recent diet changes that influence her thyroid functioning, causing \"mood swings\" and anxiety; Patient describes her thyroid and tremor conditions as the root of her frustration more so than an underlying mood disorder     Depression: 5     Anxiety: 9    Previous Psychiatric Treatment: No    If yes, describe: Patient reports outpatient tx hx    Last inpatient admission: Denies history of inpatient psychiatric admissions    Number of admissions: Denies history of inpatient psychiatric admissions    Last outpatient visit: 5/6/2020 virtual visit with PCP who manages medications (Dr. Marie)    Suicidal: Absent    Previous Attempts: no prior suicide attempts per patient report    Number of Previous Attempts: N/A    Most Recent Attempt: N/A    [place]-SUICIDE SEVERITY RATING SCALE  Psychiatric Inpatient Setting - Discharge Screener    Ask questions that are bold and underlined Discharge   Ask Questions 1 and 2 YES NO   1) Wish to be Dead:   Person endorses thoughts about a wish to be dead or not alive anymore, or " wish to fall asleep and not wake up.  While you were here in the hospital, have you wished you were dead or wished you could go to sleep and not wake up?  No   2) Suicidal Thoughts:   General non-specific thoughts of wanting to end one's life/die by suicide, “I've thought about killing myself” without general thoughts of ways to kill oneself/associated methods, intent, or plan.   While you were here in the hospital, have you actually had thoughts about killing yourself?   No   If YES to 2, ask questions 3, 4, 5, and 6.  If NO to 2, go directly to question 6   3) Suicidal Thoughts with Method (without Specific Plan or Intent to Act):   Person endorses thoughts of suicide and has thought of a least one method during the assessment period. This is different than a specific plan with time, place or method details worked out. “I thought about taking an overdose but I never made a specific plan as to when where or how I would actually do it….and I would never go through with it.”   Have you been thinking about how you might kill yourself?   No   4) Suicidal Intent (without Specific Plan):   Active suicidal thoughts of killing oneself and patient reports having some intent to act on such thoughts, as opposed to “I have the thoughts but I definitely will not do anything about them.”   Have you had these thoughts and had some intention of acting on them or do you have some intention of acting on them after you leave the hospital?   No   5) Suicide Intent with Specific Plan:   Thoughts of killing oneself with details of plan fully or partially worked out and person has some intent to carry it out.   Have you started to work out or worked out the details of how to kill yourself either for while you were here in the hospital or for after you leave the hospital? Do you intend to carry out this plan?   No     6) Suicide Behavior    While you were here in the hospital, have you done anything, started to do anything, or prepared to  do anything to end your life?    Examples: Took pills, cut yourself, tried to hang yourself, took out pills but didn't swallow any because you changed your mind or someone took them from you, collected pills, secured a means of obtaining a gun, gave away valuables, wrote a will or suicide note, etc.  No       Delusions: Patient demonstrated rational thought, free of delusions     Hallucinations: Not demonstrated today - Denies    Mood: Within normal limits; Calm, cooperative and talkative.    Homicidal Ideations: Absent     Abuse History: Patient does not disclose    Does this require reporting: N/A    Legal History / History of Violence: Patient does not disclose     Sleep: Fair    Appetite: Fair    Current Medical Conditions: Yes    If yes, explain: Chronic diastolic heart failure; Hypothyroidism; Bipolar Affective Disorder; Obesity; GERD; Hashimoto's Disease; Gout; Arthritis; Tremors; Essential hypertension; Hx DVT and Hernia    Current Psychiatric Medications: Lexapro 10 MG     History of Inappropriate Sexual Behavior: N/A    Hopelessness: Denies.  Patient is future oriented, speaks with goal orientation    Orientation: alert and oriented to person, place, and time     Substance Abuse: Patient denies - UDS suggestively negative on 5/7/2020    COWS: N/A    CIWA: N/A    Withdrawal Symptoms: N/A     SUBSTANCE ABUSE HISTORY:      DRUG   PRESENT USE  Y/N   AGE @ 1ST USE    ROUTE   HOW MUCH   HOW OFTEN   HOW LONG AT THIS RATE   Date of last use/  Amount used   Nicotine   N Teens Smoke/ Oral 1ppd Daily 39 yrs Quit 9 yrs ago   Alcohol   N         Marijuana   N         Benzos     N         Neurontin   N         Methadone   N         Opiates     N         Cocaine    N         Heroin   N         Meth   N         Suboxone   N             DATA:   This therapist received a call from [facility] staff (Tanner TORIBIO RN; Destiny ALEGRIA RN) with orders from Dr. Wyman for a behavioral health consult.  The patient serves as her own guardian and  "is agreeable to speak with me.  Met with patient at bedside. Patient is not under 1:1 security monitoring during assessment, due to denying all current psychiatric indicators during ED visit.  Patient is a 70 year old, , , female residing in [place], [place]. Patient currently lives in an apartment.  She reports her sister is her main support system.  Patient is retired.      Patient presents today with chief compliant of \"tremors\".  She arrived via EMS after virtual visit with PCP.  During triage, patient states she believes recent medication changes in her thyroid medication have caused worsening anxiety.  Patient also reports worsening tremors and anxiety.  She reports frustration with her medication changes, and reports she had suicidal ideation related to frustration.  She adamantly denies any suicidal ideation with plan, intention, or behaviors.  She adamantly denies a wish to be dead.  She reports she made this remark to her PCP and believes \"it was taken way more seriously than what I meant when I said it.\"   She stated she \"wanted to be dead to get peace from my tremors.\"  Patient denies any intention behind this statement. Patient denies any history of suicidal ideation, suicidal intention, or suicidal gestures/behaviors.  Patient reports she is frustrated due to having \"worsening tremors\" and made the statement \"because I was mad.  I think it was a little more dramatic than I meant it.\"  Patient laughed, affect is noted to be calm.  She did ask for medications multiple times for her tremors (Ativan and Propanolol).  Patient denies all psychiatric indicators (SI/HI/AVH) throughout her entire ED visit and to all tx team members.  Dr. Wyman states he believes the patient is appropriate for discharge, however wanted to offer patient further behavioral health consultation.  She respectfully participated but declined needing intensive services.    Salinsa tells me she has a history of " "Bipolar Affective Disorder dx.  Patient reports she was diagnosed \"in the 80's or 90's\" with Bipolar.  Patient reports she took medications in the past, but reports the Bipolar Disorder \"went away\" for several years and she didn't need to utilize medication management.  She reports she recently had been struggling to manage her thyroid disorder, as well as her Hashimoto's condition.  Patient reports she has \"done lots of research on the connection between thyroid disorders and mood regulation.\"  Patient voices frustration with the influence her thyroid disease has on her mood stability.  Patient reports her Bipolar disorder \"came back a few months ago\" and she was \"manic for 2 months.\"  When inquiring about symptoms, patient reports \"I shopped a lot and spent a lot of money.\"  Patient denied all other physiological symptoms or indicators of hugo.  Patient reports PCP prescribed Vrayvlar, although she had \"negative effects, it sent me down.\"  Patient reports she is currently taking Lexapro and feels \"okay, just anxious about my tremors.\"  Patient denies any history of suicidal ideations, acute manic episodes, or acute depressive episodes.  Patient reports her moods fluctuate somewhat at baseline.     ASSESSMENT:    Therapist completed CSSRS with patient for suicide risk assessment.  The results of patient’s CSSRS suggest that patient denies any current suicidal ideation, suicidal intention, or suicidal gestures/behaviors.  Patient holds attention and is Cooperative with assessment.  Patient’s appearance is clean and casually dressed, appropriate.  The patient displays Appropriate psychomotor behavior. She is resting comfortably in bed.  Patient is not observed to be restless, elevated or hyperactive.  At times, tremors are observed to be worse than others.  The patient's affect appears mood-congruent, calm and appropriate.  She is somewhat theatrical when describing stories or feelings, although does not present " with any flight of ideas, circumstantial speech, or delusional thinking. The patient is observed to have normal rate, tone and rhythm of speech.   Patient observed to have Good eye contact. The patient's displays fair insight, with fair impulse control and fair judgement.  Patient does demonstrate some knowledge of the mind/body connection as it pertains to her physical and emotional health.  We discussed adopting a holistic approach to this such as practicing a mindfulness of diet, exercise, mental wellness and physical wellness.  Patient does appear to fixate on medications at times, and attributes the majority of her mood disturbances to her physical health.    PLAN:    Salinas is denying all current psychiatric indicators. Patient respectfully declined any behavioral health services and reports she would be agreeable for outpatient resources.  She was particularly interested in the [facility] for follow up.  Although Salinas has potential to benefit from the inpatient level of care for an medication evaluation, Salinas is not agreeable for an acute admission at this time.  Patient does not present with criteria to warrant an involuntary petition or psychiatric hold at this time as she is not actively suicidal, homicidal, or displaying symptoms of an acute psychotic episode.  In addition, the patient has multiple protective factors including willingness to establish/engage in outpatient behavioral health services, and existing established PCP for follow up in the interim.  She also appears to be future oriented and is discussing plans with her sister.  I staffed with Dr. Wyman who is in full agreement with this disposition. I provided resources for outpatient providers in the area.  I also discussed the availability of emergency behavioral health services 24/7 at [facility].  Assisted patient in identifying risk factors that would indicate the need for higher level of care, such as  thoughts to harm self or others and/or self-harming behavior(s). Encouraged patient to call 911, crisis hotlines, or present to the nearest emergency department should symptoms worsen, or in any crisis/emergency. Patient agreeable and voiced understanding.  Encouraged virtual follow up with PCP.      -[facility]

## 2020-05-06 NOTE — ED NOTES
Food taken to pt at this time. No other needs at this time.     Kaylynn Tomlinson  05/06/20 5020

## 2020-05-06 NOTE — TELEPHONE ENCOUNTER
Patient is having really bad tremors and her whole body is shaking. Patient stated that she is battling some bad depression and not doing so well mentally. Patient was not sure if her tremors could be anxiety related or not but she cant stop shaking. Patient would like to discuss this with Dr. Marie. Please advise and call back    [phone]

## 2020-05-06 NOTE — TELEPHONE ENCOUNTER
Officer Kavitha called to let us know that patient has been transported to [facility]; patients emergency contact has been advised.

## 2020-05-06 NOTE — ED PROVIDER NOTES
"TRIAGE CHIEF COMPLAINT:     Nursing and triage notes reviewed    Chief Complaint   Patient presents with   • Tremors      HPI: Salinas Monroy is a 70 y.o. female who presents to the emergency department complaining of tremors and anxiety.  The patient has a history of an essential tremor but states that generally is in her left upper extremity and she has been shaking all over her body for the past several days.  She states she has been feeling very anxious during this time as well.  Patient states she had a bad panic attack approximately 10 days ago.  She has been in contact with her primary care physician who has recommended some changes to her thyroid medications which she has been resistant to.  Her primary care physician sent her to the emergency department today as patient had expressed some suicidal thoughts in the recent past.  Patient states she does not feel suicidal now she does just at wits end with her shaking.  She denies any recent illness, nausea, vomiting, chest pain, cough, shortness of breath, fevers, chills, body aches, dysuria.    REVIEW OF SYSTEMS: All other systems reviewed and are negative     PAST MEDICAL HISTORY:   Past Medical History:   Diagnosis Date   • Anemia ?    under control   • Arthritis    • Depression    • Diverticulosis    • Essential hypertension 7/30/2018   • GERD (gastroesophageal reflux disease)     no longer bothers me...   • Gout    • Gout    • Hashimoto's disease    • Hernia 3-2012    repair surgery which failed in 2014   • Hypothyroid    • Impingement syndrome of right shoulder 5/8/2016   • Movement disorder Hand Tremors   • Obesity    • ISABEL on CPAP 9/24/2019   • PONV (postoperative nausea and vomiting)     Pt states \"only one time\".   • Tremor     worse on left arm/hand   • Wears glasses         FAMILY HISTORY:   Family History   Problem Relation Age of Onset   • Obesity Mother    • Rheum arthritis Mother    • Thyroid disease Mother    • Hypertension Mother  " "  • Stroke Mother         Most of her problems were caused by Rheumatoid Ar.   • Hyperlipidemia Mother    • Arthritis Mother         Rheumatoid Arthritis..Passed    • Cancer Father         Passed    • Kidney cancer Father    • Liver cancer Father    • No Known Problems Brother    • Diabetes Maternal Aunt    • Cancer Maternal Uncle    • Lung cancer Maternal Uncle    • Alcohol abuse Maternal Uncle    • No Known Problems Paternal Aunt    • Stroke Paternal Uncle    • Hypertension Paternal Uncle    • Hyperlipidemia Paternal Uncle    • Heart disease Paternal Uncle    • No Known Problems Brother    • Asthma Sister         under control   • Alcohol abuse Maternal Uncle    • Colon cancer Neg Hx    • Breast cancer Neg Hx    • Ovarian cancer Neg Hx         SOCIAL HISTORY:   Social History     Socioeconomic History   • Marital status:      Spouse name: Not on file   • Number of children: Not on file   • Years of education: Not on file   • Highest education level: Not on file   Tobacco Use   • Smoking status: Former Smoker     Packs/day: 1.00     Years: 40.00     Pack years: 40.00     Types: Cigarettes     Start date:      Last attempt to quit:      Years since quittin.3   • Smokeless tobacco: Never Used   • Tobacco comment: \"quit 9 years ago\"   Substance and Sexual Activity   • Alcohol use: No   • Drug use: No   • Sexual activity: Not Currently     Partners: Male     Birth control/protection: None        SURGICAL HISTORY:   Past Surgical History:   Procedure Laterality Date   • ABDOMINAL SURGERY     • APPENDECTOMY     • COLONOSCOPY      failed due to hernia...   • COLONOSCOPY N/A 2017    Procedure: COLONOSCOPY with cold snare polypectomies, argon thermal ablation, ns injection, yanira ink injection;  Surgeon: Rafiq Huang MD;  Location: [facility] ENDOSCOPY;  Service:    • COLONOSCOPY N/A 2018    Procedure: COLONOSCOPY WITH BIOPSIES;  Surgeon: Rafiq Huang MD;  Location: [facility] ENDOSCOPY; "  Service: Gastroenterology   • GASTRIC BYPASS  1978   • HERNIA MESH REMOVAL  2014    BOWEL OBSTRUCTION DUE TO MESH   • HERNIA REPAIR  2012   • HERNIA REPAIR     • HYSTERECTOMY     • JOINT REPLACEMENT  2009 & 2012    Left & Right Full Hip Replacements   • SMALL INTESTINE SURGERY  2014    DUE TO BOWEL OBSTRUCTION WITH SOME REMOVAL   • TOTAL ABDOMINAL HYSTERECTOMY WITH SALPINGO OOPHORECTOMY  1990    fibroids   • TUBAL ABDOMINAL LIGATION     • UPPER GASTROINTESTINAL ENDOSCOPY          CURRENT MEDICATIONS:      Medication List      ASK your doctor about these medications    acyclovir 200 MG capsule  Commonly known as:  ZOVIRAX     allopurinol 100 MG tablet  Commonly known as:  ZYLOPRIM  Take 2 tablets by mouth Daily.     B-12 (Methylcobalamin) 1000 MCG sublingual tablet     clotrimazole-betamethasone 1-0.05 % cream  Commonly known as:  Lotrisone  Apply  topically to the appropriate area as directed 2 (Two) Times a Day.     Coenzyme Q10 400 MG capsule     DIGESTIVE ENZYME PO     escitalopram 10 MG tablet  Commonly known as:  LEXAPRO  Take 1 tablet by mouth Daily.     ferrous gluconate 324 MG tablet  Commonly known as:  FERGON  Take 1 tablet by mouth Daily With Breakfast.     furosemide 20 MG tablet  Commonly known as:  LASIX  Take 1 tablet by mouth Daily.     GARLIC PO     L-THEANINE PO     levothyroxine 112 MCG tablet  Commonly known as:  SYNTHROID, LEVOTHROID  Take 1 tablet by mouth Daily.     liothyronine 25 MCG tablet  Commonly known as:  Cytomel  Take 1 tablet by mouth Daily.     MAGNESIUM PO     MILK THISTLE PO     MSM PO     N-ACETYL-L-CYSTEINE PO     NON FORMULARY     OMEGA-3 FISH OIL PO     PROBIOTIC PO     SELENIUM PO     TART CHERRY ADVANCED PO     TAURINE PO     TURMERIC PO     VITAMIN A PO     VITAMIN B COMPLEX PO     vitamin C 500 MG tablet  Commonly known as:  ASCORBIC ACID     Vitamin D3 250 MCG (15697 UT) tablet     vitamin E 400 UNIT capsule     VITAMIN K PO     Zinc 30 MG tablet           ALLERGIES:  Diazepam     PHYSICAL EXAM:   VITAL SIGNS:   Vitals:    05/06/20 1156   BP:    Pulse:    Resp:    Temp: 98.3 °F (36.8 °C)   SpO2:       CONSTITUTIONAL: Awake, oriented, appears non-toxic   HENT: Atraumatic, normocephalic, oral mucosa pink and moist, airway patent. Nares patent without drainage. External ears normal.   EYES: Conjunctiva clear   NECK: Trachea midline, non-tender, supple   CARDIOVASCULAR: Normal heart rate, Normal rhythm, No murmurs, rubs, gallops   PULMONARY/CHEST: Clear to auscultation, no rhonchi, wheezes, or rales. Symmetrical breath sounds.  ABDOMINAL: Non-distended, soft, non-tender - no rebound or guarding.  NEUROLOGIC: Patient does exhibit a tremor in all of her extremities even while at rest.  Does not exhibit any obvious facial droop or cranial nerve deficits.  There is no weakness in her extremities.  No other obvious deficits appreciated.  EXTREMITIES: No clubbing, cyanosis, or edema   SKIN: Warm, Dry, No erythema, No rash     ED COURSE / MEDICAL DECISION MAKING:   Salinas Monroy is a 70 y.o. female who presents to the emergency department for evaluation of tremors and anxiety.  Patient does appear anxious on arrival and does exhibit a tremor at rest.  Patient denies suicidal ideation currently.  She denies any other accessory symptoms.  We will undertake a work-up for further evaluation and to make sure there are no metabolic causes of patient's tremor.    EKG interpreted by me reveals sinus rhythm with rate of 61 bpm.  There are no obvious acute ST segment or T wave changes only some nonspecific findings.  This is an atypical appearing EKG.    Chest x-ray and CT scan of the head per radiology interpretation do not reveal any obvious acute abnormalities.    Patient's laboratory tests were largely unremarkable.  Patient did have evidence for a mild urinary infection.  Patient will be started on antibiotics for this.    Patient was given a dose of propranolol as well as Ativan with  some mild improvement in her tremor.  I did call and speak to her primary care physician.  He was able to provide some further history.  He had a teleconference visit with the patient today and states that she had mentioned suicidal thoughts today and that is why he sent her to the emergency department.  He also states that patient has tremor similar to what was witnessed today for years and that the description of her current symptoms is the same that she has had previously.  He states she has had a work-up and has been seen by neurology for this.    Given this we did have behavioral health evaluate the patient.  They felt she was stable to be discharged home.  Patient stated that she was just overwhelmed with everything that was going on.  We will discharge with return precaution.    DECISION TO DISCHARGE/ADMIT: see ED care timeline     FINAL IMPRESSION:   1 --tremor  2 --urinary tract infection  3 --     Electronically signed by: Yanni Wyman MD, 5/6/2020 12:32       Yanni Wyman MD  05/06/20 8906

## 2020-05-06 NOTE — PROGRESS NOTES
You have chosen to receive care through a telephone visit. Do you consent to use a telephone visit for your medical care today? Yes      Established Patient        Chief Complaint:   Chief Complaint   Patient presents with   • Follow-up     tremors and suicial thoughts        Salinas Monroy is a 70 y.o. female    History of Present Illness:   Presents today with request for a telemedicine visit due to the current viral epidemic and healthcare guidelines.  Patient reports significant worsening to her mood instability, as well as tremors.  Nursing triage on the phone demonstrated patient complaint of suicidal ideation additionally.  She has a longstanding history of essential tremors, states they have been more problematic over the course of the early part of this week, namely the last 2 to 3 days.  She denies any known triggers for her mood irregularity, stating her depression has become worse, as well as periods of extreme anxiety.  When questioned today she does state that she has had suicidal ideation, although she denies any definitive plan of action.  She denies any fever or chills.    Subjective     The following portions of the patient's history were reviewed and updated as appropriate: allergies, current medications, past family history, past medical history, past social history, past surgical history and problem list.    Allergies   Allergen Reactions   • Diazepam Rash       Review of Systems  Constitutional: Negative for fever. Negative for chills, diaphoresis.  Malaise/fatigue, without unexpected weight change.   HENT: No dysphagia; no changes to vision/hearing/smell/taste; no epistaxis  Eyes: Negative for redness and visual disturbance.   Respiratory: negative for shortness of breath. Negative for chest pain . Negative for cough and chest tightness.   Cardiovascular: Negative for chest pain and palpitations.   Gastrointestinal: Negative for abdominal distention, abdominal pain and blood in  stool.   Endocrine: Negative for cold intolerance and heat intolerance.   Genitourinary: Negative for difficulty urinating, dysuria and frequency.   Musculoskeletal: Negative for arthralgias, back pain and myalgias.   Skin: Negative for color change, rash and wound.   Neurological: Tremors as per above.  Hematological: Negative for adenopathy. Does not bruise/bleed easily.   Psychiatric/Behavioral: Negative for confusion.  Otherwise, as per above.    Objective     Physical Exam   Vital Signs: LMP  (LMP Unknown)     General Appearance: alert, oriented x 3.  Interactive during questioning.  Neck: Without stridor  Lungs: unlabored breathing effort.  Abdomen: soft, non-tender on patient mediated self exam  Extremities: no clubbing, cyanosis.  Chronic gravity dependent edema to the lower extremities.  Neuro: normal speech.  Somber speech when compared to previous interactions.    Assessment and Plan      Assessment:   Salinas was seen today for follow-up.    Diagnoses and all orders for this visit:    Depression with suicidal ideation    Essential tremor    Bipolar affective disorder, currently depressed, moderate (CMS/HCC)    Hypothyroidism, adult        Plan:  Due to her persistence of suicidal ideation, nursing has already contacted [place] [employer] who arrive during my conversation with her on the phone.  I discussed with the officer after arriving that patient has unfortunately dealt with verified suicidal ideation and is currently a threat to herself.  I recommended transition to the closest emergency room, which [facility] be [facility].  Discussion Summary:  I have spent a total of 25 minutes in direct patient care time today.    Discussed plan of care in detail with pt today; pt verb understanding and agrees to inpatient psychiatric stay, including EMS transportation to the closest ER.  Follow up:  No follow-ups on file.     There are no Patient Instructions on file for this visit.    Jm MIKE  DO Frank  05/06/20  11:54          Please note that portions of this note may have been completed with a voice recognition program. Efforts were made to edit the dictations, but occasionally words are mistranscribed.

## 2020-05-06 NOTE — TELEPHONE ENCOUNTER
"Spoke with patient while triaging for telehealth visit.  She stated that she was having suicidal thoughts.  I asked the patient if she had a plan.  She said, \"I don't know\".  I connected the patient with Dr Marie and contacted 911 dispatch.  [employer] will send an officer to do a wellfare check on Ms Monroy.  "

## 2020-05-08 ENCOUNTER — EPISODE CHANGES (OUTPATIENT)
Dept: CASE MANAGEMENT | Facility: OTHER | Age: 70
End: 2020-05-08

## 2020-05-11 ENCOUNTER — EPISODE CHANGES (OUTPATIENT)
Dept: CASE MANAGEMENT | Facility: OTHER | Age: 70
End: 2020-05-11

## 2020-05-11 ENCOUNTER — RESULTS ENCOUNTER (OUTPATIENT)
Dept: FAMILY MEDICINE CLINIC | Facility: CLINIC | Age: 70
End: 2020-05-11

## 2020-05-11 ENCOUNTER — TELEPHONE (OUTPATIENT)
Dept: FAMILY MEDICINE CLINIC | Facility: CLINIC | Age: 70
End: 2020-05-11

## 2020-05-11 DIAGNOSIS — E03.9 HYPOTHYROIDISM, ADULT: ICD-10-CM

## 2020-05-11 NOTE — TELEPHONE ENCOUNTER
PT CALLING ABOUT HER TREMORS AND ARE BETTER THAN Wednesday; PT SAYS SHE IS HAVING A HARD TIME FUNCTIONING-THINKS HER ANXIETY IS NOT GETTING BETTER.     SAYS  PREVIOUSLY GAVE HER  ADVANT IN THE HOSPITAL ALONG WITH THE propranolol LA (Inderal LA) 60 MG 24 hr capsule---PT SAYS IT SEEMED TO WORK BETTER.     BUT NOW ONLY TAKING THE propranolol LA (Inderal LA) 60 MG 24 hr capsule---PT WONDERING IF SHE NEEDS A HIGHER DOSAGE. TREMORS STILL BAD AT TIMES AND SHAKING. NOT SURE WHAT TO DO AT THIS POINT.     PT WAS ALSO DIAGNOSED WITH A UTI IN ER WHICH SHE HAS 2 DAYS LEFT ON THAT MEDICINE.  SAYS SHE WAS TOLD THAT THE UTI CAUSES CONFUSION AND HER BAD THOUGHTS BUT SAYS SHE IS NO LONGER HAVING THOSE THOUGHTS.     [facility]      PT CALLBACK [phone]

## 2020-05-12 ENCOUNTER — OFFICE VISIT (OUTPATIENT)
Dept: FAMILY MEDICINE CLINIC | Facility: CLINIC | Age: 70
End: 2020-05-12

## 2020-05-12 VITALS
WEIGHT: 234 LBS | TEMPERATURE: 97.5 F | BODY MASS INDEX: 41.46 KG/M2 | OXYGEN SATURATION: 98 % | SYSTOLIC BLOOD PRESSURE: 140 MMHG | HEIGHT: 63 IN | DIASTOLIC BLOOD PRESSURE: 80 MMHG | HEART RATE: 72 BPM

## 2020-05-12 DIAGNOSIS — F31.62 BIPOLAR DISORDER, CURRENT EPISODE MIXED, MODERATE (HCC): ICD-10-CM

## 2020-05-12 DIAGNOSIS — F42.9 OBSESSIVE-COMPULSIVE DISORDER, UNSPECIFIED TYPE: ICD-10-CM

## 2020-05-12 DIAGNOSIS — G25.0 ESSENTIAL TREMOR: Primary | ICD-10-CM

## 2020-05-12 PROCEDURE — 99214 OFFICE O/P EST MOD 30 MIN: CPT | Performed by: FAMILY MEDICINE

## 2020-05-12 RX ORDER — DIAZEPAM 2 MG/1
2 TABLET ORAL 2 TIMES DAILY
Qty: 30 TABLET | Refills: 0 | Status: SHIPPED | OUTPATIENT
Start: 2020-05-12 | End: 2020-05-18 | Stop reason: SDUPTHER

## 2020-05-12 NOTE — PROGRESS NOTES
Established Patient        Chief Complaint:   Chief Complaint   Patient presents with   • Tremors     patient c/o difficulty speaking at times.  She knows what she wants to say however is having hard time getting it out.  Patient denies any numbness or HA; patient denies any confusion or memory loss        Salinas Monroy is a 70 y.o. female    History of Present Illness:   Here today accompanied by her sister for hospital follow-up.  Patient continues to have significant increase in anxiety, as well as tremors becoming worsened with increasing anxiety.  She denies any suicidal or homicidal ideation.  She denies any chest pain or any shortness of breath.  Denies any aspiration.  Reports positive p.o. intake, no orthopnea.  Maintains good urine output without hematuria or dysuria.  Denies any bright red blood or black tarry stools.    Subjective     The following portions of the patient's history were reviewed and updated as appropriate: allergies, current medications, past family history, past medical history, past social history, past surgical history and problem list.    No Known Allergies    Review of Systems  Constitutional: Negative for fever. Negative for chills, diaphoresis, fatigue and unexpected weight change.   HENT: No dysphagia; no changes to vision/hearing/smell/taste; no epistaxis  Eyes: Negative for redness and visual disturbance.   Respiratory: negative for shortness of breath. Negative for chest pain . Negative for cough and chest tightness.   Cardiovascular: Negative for chest pain and palpitations.   Gastrointestinal: Negative for abdominal distention, abdominal pain and blood in stool.   Endocrine: Negative for cold intolerance and heat intolerance.   Genitourinary: Negative for difficulty urinating, dysuria and frequency.   Musculoskeletal: Negative for arthralgias, back pain and myalgias.   Skin: Negative for color change, rash and wound.   Neurological: Negative for syncope,  "weakness and headaches.  Chronic tremors.  Hematological: Negative for adenopathy. Does not bruise/bleed easily.   Psychiatric/Behavioral: Negative for confusion. The patient is chronically anxious.    Objective     Physical Exam   Vital Signs: /80   Pulse 72   Temp 97.5 °F (36.4 °C)   Ht 160 cm (63\")   Wt 106 kg (234 lb)   LMP  (LMP Unknown)   SpO2 98%   BMI 41.45 kg/m²     General Appearance: alert, oriented x 3, no acute distress.  Well-nourished and developed, interactive during questioning.  Skin: warm and dry.  No jaundice.  HEENT: Atraumatic.  pupils round and reactive to light and accommodation, oral mucosa pink and moist.  Nares patent without epistaxis.  External auditory canals are patent tympanic membranes intact.  Neck: supple, no JVD, trachea midline.  No thyromegaly  Lungs: CTA, unlabored breathing effort.  Heart: RRR, normal S1 and S2, no S3, no rub.  Abdomen: soft, non-tender, no palpable bladder, present bowel sounds to auscultation ×4.  No guarding or rigidity.  Extremities: no clubbing, cyanosis or edema.  Good range of motion actively and passively.  Symmetric muscle strength and development  Neuro: normal speech and mental status.  Cranial nerves II through XII intact.  No anosmia. DTR 2+; proprioception intact.  Tremulousness noted, worsened with anxiety.    Assessment and Plan      Assessment:   Salinas was seen today for tremors.    Diagnoses and all orders for this visit:    Essential tremor  -     diazePAM (VALIUM) 2 MG tablet; Take 1 tablet by mouth 2 (Two) Times a Day.    Obsessive-compulsive disorder, unspecified type    Bipolar disorder, current episode mixed, moderate (CMS/HCC)  -     diazePAM (VALIUM) 2 MG tablet; Take 1 tablet by mouth 2 (Two) Times a Day.        Plan:  Planned scheduled dosing of diazepam.  She will begin with twice daily dosing at present.  Planned titration until discontinuation of propanolol.    I have asked that she continue with her prescribed " thyroid supplementation regimen.    I will plan to see patient back in approximately 1 week's time, likely will need updated thyroid surveillance labs as well as potential change to her mood stabilizing treatment regimen.  I have recommended that she continue with formal counseling additionally.  She will continue use of once daily Lexapro.  Discussion Summary:    Discussed plan of care in detail with pt today; pt verb understanding and agrees.  Follow up:  Return in about 1 week (around 5/19/2020) for Recheck, Med Change/New Meds.     There are no Patient Instructions on file for this visit.    Jm Marie,   05/18/20  17:10          Please note that portions of this note may have been completed with a voice recognition program. Efforts were made to edit the dictations, but occasionally words are mistranscribed.

## 2020-05-13 ENCOUNTER — RESULTS ENCOUNTER (OUTPATIENT)
Dept: FAMILY MEDICINE CLINIC | Facility: CLINIC | Age: 70
End: 2020-05-13

## 2020-05-13 ENCOUNTER — PATIENT OUTREACH (OUTPATIENT)
Dept: CASE MANAGEMENT | Facility: OTHER | Age: 70
End: 2020-05-13

## 2020-05-13 DIAGNOSIS — E03.9 HYPOTHYROIDISM, ADULT: ICD-10-CM

## 2020-05-13 DIAGNOSIS — I50.32 CHRONIC DIASTOLIC HEART FAILURE (HCC): ICD-10-CM

## 2020-05-13 NOTE — OUTREACH NOTE
"Patient Outreach Note    Called patient in follow up to ED visit 5-6-20 with tremors/ UTI. Patient said she is still having some tremors, but she is able to speak better.  Reported seeing Dr. Marie (PCP) yesterday, and he changed a medication to try to help the tremors.  Patient voiced compliance with the medications daily as ordered.  Reported she finished the antibiotic that the ED ordered for a UTI.  Patient denied any urinary symptoms; voiced no c/o with urinary or bowels. Advised patient to continue to drink plenty of fluids to keep urine pale yellow.  Patient reports she is eating and drinking okay. Patient did have a question about taking the new medication prescribed, Valium, with a couple of \"natural supplements\" that she takes daily.  Advised patient to call her local pharmacist, to get their input on any drug interactions. Patient voiced agreement. Reviewed with patient her next appt with PCP, Dr. Marie, Monday, 5-18-20; patient voiced agreement, and stated her sister is taking her.  No other questions or concerns voiced at this time.       Gauri Causey RN  Ambulatory     5/13/2020, 13:24      "

## 2020-05-18 ENCOUNTER — OFFICE VISIT (OUTPATIENT)
Dept: FAMILY MEDICINE CLINIC | Facility: CLINIC | Age: 70
End: 2020-05-18

## 2020-05-18 ENCOUNTER — TELEPHONE (OUTPATIENT)
Dept: FAMILY MEDICINE CLINIC | Facility: CLINIC | Age: 70
End: 2020-05-18

## 2020-05-18 VITALS
BODY MASS INDEX: 42.17 KG/M2 | TEMPERATURE: 98.2 F | WEIGHT: 238 LBS | DIASTOLIC BLOOD PRESSURE: 80 MMHG | SYSTOLIC BLOOD PRESSURE: 130 MMHG | OXYGEN SATURATION: 98 % | HEIGHT: 63 IN | HEART RATE: 74 BPM

## 2020-05-18 DIAGNOSIS — F31.62 BIPOLAR DISORDER, CURRENT EPISODE MIXED, MODERATE (HCC): Primary | ICD-10-CM

## 2020-05-18 DIAGNOSIS — E03.9 HYPOTHYROIDISM, ADULT: ICD-10-CM

## 2020-05-18 DIAGNOSIS — Z99.89 OSA ON CPAP: ICD-10-CM

## 2020-05-18 DIAGNOSIS — G47.33 OSA ON CPAP: ICD-10-CM

## 2020-05-18 DIAGNOSIS — I50.32 CHRONIC DIASTOLIC HEART FAILURE (HCC): ICD-10-CM

## 2020-05-18 DIAGNOSIS — G25.0 ESSENTIAL TREMOR: ICD-10-CM

## 2020-05-18 DIAGNOSIS — F42.9 OBSESSIVE-COMPULSIVE DISORDER, UNSPECIFIED TYPE: ICD-10-CM

## 2020-05-18 PROCEDURE — 99214 OFFICE O/P EST MOD 30 MIN: CPT | Performed by: FAMILY MEDICINE

## 2020-05-18 RX ORDER — DIAZEPAM 2 MG/1
2 TABLET ORAL 2 TIMES DAILY
Qty: 30 TABLET | Refills: 0 | Status: SHIPPED | OUTPATIENT
Start: 2020-05-18 | End: 2020-05-19 | Stop reason: SDUPTHER

## 2020-05-18 NOTE — PROGRESS NOTES
Established Patient        Chief Complaint:   Chief Complaint   Patient presents with   • Follow-up     tremors; patient seems to be having insomnia possible side effect of diazepam        Salinas Monroy is a 70 y.o. female    History of Present Illness:   Here today accompanied by her sister, reports significant improvement to her overall anxiety and tremulousness since last visit.  She has had much better days overall compared to last visit.  She denies any palpitations or chest pain.  Without dyspnea on exertion.  Maintains an active lifestyle, as well as adequate dietary intake.  Maintains good urine output.  Denies any fever, chills or night sweats.    Subjective     The following portions of the patient's history were reviewed and updated as appropriate: allergies, current medications, past family history, past medical history, past social history, past surgical history and problem list.    No Known Allergies    Review of Systems  Constitutional: Negative for fever. Negative for chills, diaphoresis, fatigue and unexpected weight change.   HENT: No dysphagia; no changes to vision/hearing/smell/taste; no epistaxis  Eyes: Negative for redness and visual disturbance.   Respiratory: negative for shortness of breath. Negative for chest pain . Negative for cough and chest tightness.   Cardiovascular: Negative for chest pain and palpitations.   Gastrointestinal: Negative for abdominal distention, abdominal pain and blood in stool.   Endocrine: Negative for cold intolerance and heat intolerance.   Genitourinary: Negative for difficulty urinating, dysuria and frequency.   Musculoskeletal: Negative for arthralgias, back pain and myalgias.   Skin: Negative for color change, rash and wound.   Neurological: Negative for syncope, weakness and headaches.   Hematological: Negative for adenopathy. Does not bruise/bleed easily.   Psychiatric/Behavioral: Negative for confusion. The patient is not nervous/anxious at  "present.    Objective     Physical Exam   Vital Signs: /80   Pulse 74   Temp 98.2 °F (36.8 °C)   Ht 160 cm (63\")   Wt 108 kg (238 lb)   LMP  (LMP Unknown)   SpO2 98%   BMI 42.16 kg/m²     General Appearance: alert, oriented x 3, no acute distress.  Skin: warm and dry.   HEENT: Atraumatic.  pupils round and reactive to light and accommodation, oral mucosa pink and moist.  Nares patent without epistaxis.  External auditory canals are patent tympanic membranes intact.  Neck: supple, no JVD, trachea midline.  No thyromegaly  Lungs: CTA, unlabored breathing effort.  Heart: RRR, normal S1 and S2, no S3, no rub.  Abdomen: soft, non-tender, no palpable bladder, present bowel sounds to auscultation ×4.  No guarding or rigidity.  Extremities: no clubbing, cyanosis or edema.  Good range of motion actively and passively.  Symmetric muscle strength and development  Neuro: normal speech and mental status.  Cranial nerves II through XII intact.  No anosmia. DTR 2+; proprioception intact.  Significantly improved tremulousness.    Assessment and Plan      Assessment:   Salinas was seen today for follow-up.    Diagnoses and all orders for this visit:    Bipolar disorder, current episode mixed, moderate (CMS/HCC)    Obsessive-compulsive disorder, unspecified type    Essential tremor    Hypothyroidism, adult  -     TSH; Future  -     T4, Free; Future  -     T3, free; Future  -     T3, Reverse; Future    ISABEL on CPAP    Chronic diastolic heart failure (CMS/HCC)        Plan:  Plan continuation of diazepam on scheduled dosing, changing to 9 AM and 5 PM dosing.  Plan to follow clinically.  Continue SSRI at current dosing.    Plan to update of thyroid surveillance labs, including reverse T3, free T3, free T4 and TSH.    Vital signs demonstrate hemodynamic stability.    I will plan to see patient back at scheduled follow-up visit in early June.  Discussion Summary:    Discussed plan of care in detail with pt today; pt verb " understanding and agrees.  Follow up:  No follow-ups on file.     There are no Patient Instructions on file for this visit.    Jm Marie DO  05/18/20  15:27          Please note that portions of this note may have been completed with a voice recognition program. Efforts were made to edit the dictations, but occasionally words are mistranscribed.

## 2020-05-19 ENCOUNTER — RESULTS ENCOUNTER (OUTPATIENT)
Dept: FAMILY MEDICINE CLINIC | Facility: CLINIC | Age: 70
End: 2020-05-19

## 2020-05-19 DIAGNOSIS — E03.9 HYPOTHYROIDISM, ADULT: ICD-10-CM

## 2020-05-19 DIAGNOSIS — G25.0 ESSENTIAL TREMOR: ICD-10-CM

## 2020-05-19 DIAGNOSIS — F31.62 BIPOLAR DISORDER, CURRENT EPISODE MIXED, MODERATE (HCC): ICD-10-CM

## 2020-05-24 ENCOUNTER — EPISODE CHANGES (OUTPATIENT)
Dept: CASE MANAGEMENT | Facility: OTHER | Age: 70
End: 2020-05-24

## 2020-05-29 ENCOUNTER — APPOINTMENT (OUTPATIENT)
Dept: LAB | Facility: HOSPITAL | Age: 70
End: 2020-05-29

## 2020-05-29 LAB
ANION GAP SERPL CALCULATED.3IONS-SCNC: 11.4 MMOL/L (ref 5–15)
BUN BLD-MCNC: 23 MG/DL (ref 8–23)
BUN/CREAT SERPL: 25.8 (ref 7–25)
CALCIUM SPEC-SCNC: 9.4 MG/DL (ref 8.6–10.5)
CHLORIDE SERPL-SCNC: 105 MMOL/L (ref 98–107)
CO2 SERPL-SCNC: 24.6 MMOL/L (ref 22–29)
CREAT BLD-MCNC: 0.89 MG/DL (ref 0.57–1)
GFR SERPL CREATININE-BSD FRML MDRD: 63 ML/MIN/1.73
GLUCOSE BLD-MCNC: 84 MG/DL (ref 65–99)
POTASSIUM BLD-SCNC: 4 MMOL/L (ref 3.5–5.2)
SODIUM BLD-SCNC: 141 MMOL/L (ref 136–145)
T3FREE SERPL-MCNC: 2.6 PG/ML (ref 2–4.4)
T4 FREE SERPL-MCNC: 1.23 NG/DL (ref 0.93–1.7)
TSH SERPL DL<=0.05 MIU/L-ACNC: <0.005 UIU/ML (ref 0.27–4.2)

## 2020-05-29 PROCEDURE — 80048 BASIC METABOLIC PNL TOTAL CA: CPT | Performed by: FAMILY MEDICINE

## 2020-05-29 PROCEDURE — 84443 ASSAY THYROID STIM HORMONE: CPT | Performed by: FAMILY MEDICINE

## 2020-05-29 PROCEDURE — 84439 ASSAY OF FREE THYROXINE: CPT | Performed by: FAMILY MEDICINE

## 2020-05-29 PROCEDURE — 36415 COLL VENOUS BLD VENIPUNCTURE: CPT | Performed by: FAMILY MEDICINE

## 2020-05-29 PROCEDURE — 84482 T3 REVERSE: CPT | Performed by: FAMILY MEDICINE

## 2020-05-29 PROCEDURE — 84481 FREE ASSAY (FT-3): CPT | Performed by: FAMILY MEDICINE

## 2020-06-03 LAB — T3REVERSE SERPL-MCNC: 19.6 NG/DL (ref 9.2–24.1)

## 2020-06-03 RX ORDER — DIAZEPAM 2 MG/1
2 TABLET ORAL 2 TIMES DAILY
Qty: 60 TABLET | Refills: 0 | Status: SHIPPED | OUTPATIENT
Start: 2020-06-03 | End: 2020-08-04

## 2020-06-04 ENCOUNTER — OFFICE VISIT (OUTPATIENT)
Dept: FAMILY MEDICINE CLINIC | Facility: CLINIC | Age: 70
End: 2020-06-04

## 2020-06-04 VITALS
BODY MASS INDEX: 42.17 KG/M2 | TEMPERATURE: 97 F | SYSTOLIC BLOOD PRESSURE: 124 MMHG | OXYGEN SATURATION: 95 % | HEART RATE: 83 BPM | HEIGHT: 63 IN | WEIGHT: 238 LBS | DIASTOLIC BLOOD PRESSURE: 78 MMHG

## 2020-06-04 DIAGNOSIS — E03.9 HYPOTHYROIDISM, ADULT: Primary | ICD-10-CM

## 2020-06-04 DIAGNOSIS — F31.62 BIPOLAR DISORDER, CURRENT EPISODE MIXED, MODERATE (HCC): ICD-10-CM

## 2020-06-04 DIAGNOSIS — I50.32 CHRONIC DIASTOLIC HEART FAILURE (HCC): ICD-10-CM

## 2020-06-04 DIAGNOSIS — G25.0 ESSENTIAL TREMOR: ICD-10-CM

## 2020-06-04 PROCEDURE — 99214 OFFICE O/P EST MOD 30 MIN: CPT | Performed by: FAMILY MEDICINE

## 2020-06-04 RX ORDER — FUROSEMIDE 20 MG/1
20 TABLET ORAL DAILY
Qty: 90 TABLET | Refills: 2 | Status: SHIPPED | OUTPATIENT
Start: 2020-06-04 | End: 2021-04-05 | Stop reason: SDUPTHER

## 2020-06-04 RX ORDER — LIOTHYRONINE SODIUM 25 UG/1
25 TABLET ORAL DAILY
Qty: 60 TABLET | Refills: 3
Start: 2020-06-04 | End: 2020-06-15

## 2020-06-15 DIAGNOSIS — E03.9 HYPOTHYROIDISM, ADULT: ICD-10-CM

## 2020-06-15 RX ORDER — LIOTHYRONINE SODIUM 25 UG/1
TABLET ORAL
Qty: 90 TABLET | Refills: 0 | Status: SHIPPED | OUTPATIENT
Start: 2020-06-15 | End: 2020-07-29

## 2020-06-30 ENCOUNTER — OFFICE VISIT (OUTPATIENT)
Dept: PULMONOLOGY | Facility: CLINIC | Age: 70
End: 2020-06-30

## 2020-06-30 VITALS
HEIGHT: 63 IN | DIASTOLIC BLOOD PRESSURE: 72 MMHG | BODY MASS INDEX: 41.29 KG/M2 | WEIGHT: 233 LBS | HEART RATE: 78 BPM | TEMPERATURE: 96.9 F | OXYGEN SATURATION: 95 % | RESPIRATION RATE: 18 BRPM | SYSTOLIC BLOOD PRESSURE: 118 MMHG

## 2020-06-30 DIAGNOSIS — G47.33 OBSTRUCTIVE SLEEP APNEA: Primary | ICD-10-CM

## 2020-06-30 DIAGNOSIS — E66.01 MORBID OBESITY, UNSPECIFIED OBESITY TYPE (HCC): ICD-10-CM

## 2020-06-30 DIAGNOSIS — G47.19 EXCESSIVE DAYTIME SLEEPINESS: ICD-10-CM

## 2020-06-30 PROCEDURE — 99204 OFFICE O/P NEW MOD 45 MIN: CPT | Performed by: INTERNAL MEDICINE

## 2020-06-30 NOTE — PROGRESS NOTES
CONSULT NOTE    Requested by:   Kassie Nuñez A* Devers, Dustin K, DO      Chief Complaint   Patient presents with   • Consult   • Sleeping Problem       Subjective:  Salinas Monroy is a 70 y.o. female.     History of Present Illness   Patient comes in today for consultation because of sleep apnea.  The patient says that  she was diagnosed with sleep apnea 1 year ago.  It appears that she has been on a PAP device since then.    Patient doesn't report any issues with the device. Patient says that the compliance with the use of the equipment is good. Apart from occasional night, when she feels that the PAP device doesn't function properly, she says that her symptoms of fatigue & daytime sleepiness have been helped greatly with the use of PAP, as prescribed.    Patient does report improvement in her daytime sleepiness.    Patient is not aware of snoring through the device.     she is not complaining of occasional headaches.    The patient describes no significant issues with her mask either.     Patient's sleep schedule was reviewed.     she does not have a family history of ISABEL.       The following portions of the patient's history were reviewed and updated as appropriate: allergies, current medications, past family history, past medical history, past social history and past surgical history.    Review of Systems   Constitutional: Positive for fatigue. Negative for chills and fever.   HENT: Negative for sinus pressure, sneezing and sore throat.    Respiratory: Positive for cough. Negative for chest tightness, shortness of breath and wheezing.    Cardiovascular: Negative for palpitations and leg swelling.   Psychiatric/Behavioral: Positive for sleep disturbance.   All other systems reviewed and are negative.      Past Medical History:   Diagnosis Date   • Anemia ?    under control   • Arthritis    • Depression    • Diverticulosis    • Essential hypertension 7/30/2018   • GERD (gastroesophageal  "reflux disease)     no longer bothers me...   • Gout    • Gout    • Hashimoto's disease    • Hernia 3-2012    repair surgery which failed in    • Hypothyroid    • Impingement syndrome of right shoulder 2016   • Movement disorder Hand Tremors   • Obesity    • ISABEL on CPAP 2019   • PONV (postoperative nausea and vomiting)     Pt states \"only one time\".   • Tremor     worse on left arm/hand   • Wears glasses        Social History     Tobacco Use   • Smoking status: Former Smoker     Packs/day: 1.00     Years: 40.00     Pack years: 40.00     Types: Cigarettes     Start date:      Last attempt to quit:      Years since quittin.5   • Smokeless tobacco: Never Used   • Tobacco comment: \"quit 9 years ago\"   Substance Use Topics   • Alcohol use: No       Objective:  Visit Vitals  /72   Pulse 78   Temp 96.9 °F (36.1 °C)   Resp 18   Ht 160 cm (62.99\")   Wt 106 kg (233 lb)   LMP  (LMP Unknown)   SpO2 95%   BMI 41.28 kg/m²       Physical Exam   Constitutional: She is oriented to person, place, and time. She appears well-developed and well-nourished.   HENT:   Head: Atraumatic.   Crowded Oropharynx.    Eyes: Pupils are equal, round, and reactive to light. EOM are normal.   Neck: No JVD present. No tracheal deviation present. No thyromegaly present.   Increased adipose tissue.    Cardiovascular: Normal rate and regular rhythm.   Pulmonary/Chest: Effort normal and breath sounds normal. No respiratory distress. She has no wheezes.   Musculoskeletal: Normal range of motion. She exhibits no edema.   Gait was normal.   Neurological: She is alert and oriented to person, place, and time.   Skin: Skin is warm and dry.   Psychiatric: She has a normal mood and affect. Her behavior is normal.   Vitals reviewed.      Assessment/Plan:  Salinas was seen today for consult and sleeping problem.    Diagnoses and all orders for this visit:    Obstructive sleep apnea    Excessive daytime sleepiness    Morbid obesity, " unspecified obesity type (CMS/HCC)        Return in about 6 months (around 12/30/2020) for Tip/Kassie, ....Also 16 mths w/ Dr. Fonseca.    DISCUSSION(if any):  Laboratory workup was also reviewed which showed   Lab Results   Component Value Date    CO2 24.6 05/29/2020       ===========================  ===========================    I was able to review her last sleep study, from May 2019.  It showed that the patient has significant sleep apnea and her AHI was 30/hour.     I told the patient that her symptoms are consistent with well controlled sleep apnea.    Continue treatment with AutoPAP at a pressure of 8/18, with dream wear nasal pillows.    Patient seems to be compliant with PAP device, based on the available data and her account of improved symptoms.     The patient was reminded to continue using the PAP device regularly, every night for atleast 4 hours.    Patient was advised to call this office with any issues.    Weight loss was also discussed and advised.      Dictated utilizing Dragon dictation.    This document was electronically signed by Ambrose Fonseca MD on 06/30/20 at 13:45

## 2020-07-04 ENCOUNTER — RESULTS ENCOUNTER (OUTPATIENT)
Dept: FAMILY MEDICINE CLINIC | Facility: CLINIC | Age: 70
End: 2020-07-04

## 2020-07-04 DIAGNOSIS — E03.9 HYPOTHYROIDISM, ADULT: ICD-10-CM

## 2020-07-04 DIAGNOSIS — I50.32 CHRONIC DIASTOLIC HEART FAILURE (HCC): ICD-10-CM

## 2020-07-28 DIAGNOSIS — F31.11 BIPOLAR AFFECTIVE DISORDER, CURRENTLY MANIC, MILD (HCC): ICD-10-CM

## 2020-07-28 DIAGNOSIS — E03.9 HYPOTHYROIDISM, ADULT: ICD-10-CM

## 2020-07-29 ENCOUNTER — LAB (OUTPATIENT)
Dept: LAB | Facility: HOSPITAL | Age: 70
End: 2020-07-29

## 2020-07-29 LAB
ANION GAP SERPL CALCULATED.3IONS-SCNC: 12.4 MMOL/L (ref 5–15)
BUN SERPL-MCNC: 25 MG/DL (ref 8–23)
BUN/CREAT SERPL: 29.8 (ref 7–25)
CALCIUM SPEC-SCNC: 10 MG/DL (ref 8.6–10.5)
CHLORIDE SERPL-SCNC: 104 MMOL/L (ref 98–107)
CO2 SERPL-SCNC: 23.6 MMOL/L (ref 22–29)
CREAT SERPL-MCNC: 0.84 MG/DL (ref 0.57–1)
GFR SERPL CREATININE-BSD FRML MDRD: 67 ML/MIN/1.73
GLUCOSE SERPL-MCNC: 95 MG/DL (ref 65–99)
POTASSIUM SERPL-SCNC: 4.5 MMOL/L (ref 3.5–5.2)
SODIUM SERPL-SCNC: 140 MMOL/L (ref 136–145)
T3FREE SERPL-MCNC: 3.77 PG/ML (ref 2–4.4)
T4 FREE SERPL-MCNC: 1.21 NG/DL (ref 0.93–1.7)
TSH SERPL DL<=0.05 MIU/L-ACNC: <0.005 UIU/ML (ref 0.27–4.2)

## 2020-07-29 PROCEDURE — 84481 FREE ASSAY (FT-3): CPT | Performed by: FAMILY MEDICINE

## 2020-07-29 PROCEDURE — 84482 T3 REVERSE: CPT | Performed by: FAMILY MEDICINE

## 2020-07-29 PROCEDURE — 80048 BASIC METABOLIC PNL TOTAL CA: CPT | Performed by: FAMILY MEDICINE

## 2020-07-29 PROCEDURE — 84439 ASSAY OF FREE THYROXINE: CPT | Performed by: FAMILY MEDICINE

## 2020-07-29 PROCEDURE — 36415 COLL VENOUS BLD VENIPUNCTURE: CPT | Performed by: FAMILY MEDICINE

## 2020-07-29 PROCEDURE — 84443 ASSAY THYROID STIM HORMONE: CPT | Performed by: FAMILY MEDICINE

## 2020-07-29 RX ORDER — LIOTHYRONINE SODIUM 25 UG/1
TABLET ORAL
Qty: 90 TABLET | Refills: 0 | Status: SHIPPED | OUTPATIENT
Start: 2020-07-29 | End: 2020-09-03

## 2020-07-29 RX ORDER — LEVOTHYROXINE SODIUM 112 UG/1
TABLET ORAL
Qty: 90 TABLET | Refills: 0 | Status: SHIPPED | OUTPATIENT
Start: 2020-07-29 | End: 2020-08-04 | Stop reason: SDUPTHER

## 2020-07-29 RX ORDER — ESCITALOPRAM OXALATE 10 MG/1
TABLET ORAL
Qty: 90 TABLET | Refills: 0 | Status: SHIPPED | OUTPATIENT
Start: 2020-07-29 | End: 2020-11-04 | Stop reason: SDUPTHER

## 2020-08-02 LAB — T3REVERSE SERPL-MCNC: 24.1 NG/DL (ref 9.2–24.1)

## 2020-08-04 ENCOUNTER — OFFICE VISIT (OUTPATIENT)
Dept: FAMILY MEDICINE CLINIC | Facility: CLINIC | Age: 70
End: 2020-08-04

## 2020-08-04 VITALS
SYSTOLIC BLOOD PRESSURE: 120 MMHG | BODY MASS INDEX: 40.75 KG/M2 | WEIGHT: 230 LBS | OXYGEN SATURATION: 98 % | HEIGHT: 63 IN | HEART RATE: 90 BPM | TEMPERATURE: 98.9 F | DIASTOLIC BLOOD PRESSURE: 80 MMHG

## 2020-08-04 DIAGNOSIS — Z00.00 ROUTINE GENERAL MEDICAL EXAMINATION AT HEALTH CARE FACILITY: ICD-10-CM

## 2020-08-04 DIAGNOSIS — E03.9 HYPOTHYROIDISM, ADULT: Primary | ICD-10-CM

## 2020-08-04 DIAGNOSIS — Z12.39 BREAST CANCER SCREENING: ICD-10-CM

## 2020-08-04 DIAGNOSIS — Z12.31 ENCOUNTER FOR SCREENING MAMMOGRAM FOR MALIGNANT NEOPLASM OF BREAST: ICD-10-CM

## 2020-08-04 PROCEDURE — G0439 PPPS, SUBSEQ VISIT: HCPCS | Performed by: FAMILY MEDICINE

## 2020-08-04 RX ORDER — LEVOTHYROXINE SODIUM 0.07 MG/1
75 TABLET ORAL DAILY
Qty: 90 TABLET | Refills: 1 | Status: SHIPPED | OUTPATIENT
Start: 2020-08-04 | End: 2021-03-03

## 2020-08-04 NOTE — PATIENT INSTRUCTIONS
Breast Self-Awareness  Breast self-awareness means being familiar with how your breasts look and feel. It involves checking your breasts regularly and reporting any changes to your health care provider.  Practicing breast self-awareness is important. Sometimes changes may not be harmful (are benign), but sometimes a change in your breasts can be a sign of a serious medical problem. It is important to learn how to do this procedure correctly so that you can catch problems early, when treatment is more likely to be successful. All women should practice breast self-awareness, including women who have had breast implants.  What you need:  · A mirror.  · A well-lit room.  How to do a breast self-exam  A breast self-exam is one way to learn what is normal for your breasts and whether your breasts are changing. To do a breast self-exam:  Look for changes    1. Remove all the clothing above your waist.  2.  front of a mirror in a room with good lighting.  3. Put your hands on your hips.  4. Push your hands firmly downward.  5. Compare your breasts in the mirror. Look for differences between them (asymmetry), such as:  ? Differences in shape.  ? Differences in size.  ? Puckers, dips, and bumps in one breast and not the other.  6. Look at each breast for changes in the skin, such as:  ? Redness.  ? Scaly areas.  7. Look for changes in your nipples, such as:  ? Discharge.  ? Bleeding.  ? Dimpling.  ? Redness.  ? A change in position.  Feel for changes  Carefully feel your breasts for lumps and changes. It is best to do this while lying on your back on the floor, and again while sitting or standing in the tub or shower with soapy water on your skin. Feel each breast in the following way:  1. Place the arm on the side of the breast you are examining above your head.  2. Feel your breast with the other hand.  3. Start in the nipple area and make ¾-inch (2 cm) overlapping circles to feel your breast. Use the pads of  your three middle fingers to do this. Apply light pressure, then medium pressure, then firm pressure. The light pressure will allow you to feel the tissue closest to the skin. The medium pressure will allow you to feel the tissue that is a little deeper. The firm pressure will allow you to feel the tissue close to the ribs.  4. Continue the overlapping circles, moving downward over the breast until you feel your ribs below your breast.  5. Move one finger-width toward the center of the body. Continue to use the ¾-inch (2 cm) overlapping circles to feel your breast as you move slowly up toward your collarbone.  6. Continue the up-and-down exam using all three pressures until you reach your armpit.    Write down what you find  Writing down what you find can help you remember what to discuss with your health care provider. Write down:  · What is normal for each breast.  · Any changes that you find in each breast, including:  ? The kind of changes you find.  ? Any pain or tenderness.  ? Size and location of any lumps.  · Where you are in your menstrual cycle, if you are still menstruating.  General tips and recommendations  · Examine your breasts every month.  · If you are breastfeeding, the best time to examine your breasts is after a feeding or after using a breast pump.  · If you menstruate, the best time to examine your breasts is 5-7 days after your period. Breasts are generally lumpier during menstrual periods, and it may be more difficult to notice changes.  · With time and practice, you will become more familiar with the variations in your breasts and more comfortable with the exam.  Contact a health care provider if you:  · See a change in the shape or size of your breasts or nipples.  · See a change in the skin of your breast or nipples, such as a reddened or scaly area.  · Have unusual discharge from your nipples.  · Find a lump or thick area that was not there before.  · Have pain in your breasts.  · Have  any concerns related to your breast health.  Summary  · Breast self-awareness includes looking for physical changes in your breasts, as well as feeling for any changes within your breasts.  · Breast self-awareness should be performed in front of a mirror in a well-lit room.  · You should examine your breasts every month. If you menstruate, the best time to examine your breasts is 5-7 days after your menstrual period.  · Let your health care provider know of any changes you notice in your breasts, including changes in size, changes on the skin, pain or tenderness, or unusual fluid from your nipples.  This information is not intended to replace advice given to you by your health care provider. Make sure you discuss any questions you have with your health care provider.  Document Released: 12/18/2006 Document Revised: 08/06/2019 Document Reviewed: 08/06/2019  Elsevier Patient Education © 2020 [employer]      Breast Self-Awareness  Breast self-awareness is knowing how your breasts look and feel. Doing breast self-awareness is important. It allows you to catch a breast problem early while it is still small and can be treated. All women should do breast self-awareness, including women who have had breast implants. Tell your doctor if you notice a change in your breasts.  What you need:  · A mirror.  · A well-lit room.  How to do a breast self-exam  A breast self-exam is one way to learn what is normal for your breasts and to check for changes. To do a breast self-exam:  Look for changes    1. Take off all the clothes above your waist.  2.  front of a mirror in a room with good lighting.  3. Put your hands on your hips.  4. Push your hands down.  5. Look at your breasts and nipples in the mirror to see if one breast or nipple looks different from the other. Check to see if:  ? The shape of one breast is different.  ? The size of one breast is different.  ? There are wrinkles, dips, and bumps in one breast and  not the other.  6. Look at each breast for changes in the skin, such as:  ? Redness.  ? Scaly areas.  7. Look for changes in your nipples, such as:  ? Liquid around the nipples.  ? Bleeding.  ? Dimpling.  ? Redness.  ? A change in where the nipples are.  Feel for changes    1. Lie on your back on the floor.  2. Feel each breast. To do this, follow these steps:  ? Pick a breast to feel.  ? Put the arm closest to that breast above your head.  ? Use your other arm to feel the nipple area of your breast. Feel the area with the pads of your three middle fingers by making small circles with your fingers. For the first [place], press lightly. For the second [place], press harder. For the third [place], press even harder.  ? Keep making circles with your fingers at the different pressures as you move down your breast. Stop when you feel your ribs.  ? Move your fingers a little toward the center of your body.  ? Start making circles with your fingers again, this time going up until you reach your collarbone.  ? Keep making up-and-down circles until you reach your armpit. Remember to keep using the three pressures.  ? Feel the other breast in the same way.  3. Sit or  the tub or shower.  4. With soapy water on your skin, feel each breast the same way you did in step 2 when you were lying on the floor.  Write down what you find  Writing down what you find can help you remember what to tell your doctor. Write down:  · What is normal for each breast.  · Any changes you find in each breast, including:  ? The kind of changes you find.  ? Whether you have pain.  ? Size and location of any lumps.  · When you last had your menstrual period.  General tips  · Check your breasts every month.  · If you are breastfeeding, the best time to check your breasts is after you feed your baby or after you use a breast pump.  · If you get menstrual periods, the best time to check your breasts is 5-7 days after your menstrual period is  over.  · With time, you will become comfortable with the self-exam, and you will begin to know if there are changes in your breasts.  Contact a doctor if you:  · See a change in the shape or size of your breasts or nipples.  · See a change in the skin of your breast or nipples, such as red or scaly skin.  · Have fluid coming from your nipples that is not normal.  · Find a lump or thick area that was not there before.  · Have pain in your breasts.  · Have any concerns about your breast health.  Summary  · Breast self-awareness includes looking for changes in your breasts, as well as feeling for changes within your breasts.  · Breast self-awareness should be done in front of a mirror in a well-lit room.  · You should check your breasts every month. If you get menstrual periods, the best time to check your breasts is 5-7 days after your menstrual period is over.  · Let your doctor know of any changes you see in your breasts, including changes in size, changes on the skin, pain or tenderness, or fluid from your nipples that is not normal.  This information is not intended to replace advice given to you by your health care provider. Make sure you discuss any questions you have with your health care provider.  Document Released: 06/05/2009 Document Revised: 08/06/2019 Document Reviewed: 08/06/2019  [employer] Patient Education © 2020 [employer]      Exercising to Lose Weight  Exercise is structured, repetitive physical activity to improve fitness and health. Getting regular exercise is important for everyone. It is especially important if you are overweight. Being overweight increases your risk of heart disease, stroke, diabetes, high blood pressure, and several types of cancer. Reducing your calorie intake and exercising can help you lose weight.  Exercise is usually categorized as moderate or vigorous intensity. To lose weight, most people need to do a certain amount of moderate-intensity or vigorous-intensity exercise  each week.  Moderate-intensity exercise    Moderate-intensity exercise is any activity that gets you moving enough to burn at least three times more energy (calories) than if you were sitting.  Examples of moderate exercise include:  · Walking a mile in 15 minutes.  · Doing light yard work.  · Biking at an easy pace.  Most people should get at least 150 minutes (2 hours and 30 minutes) a week of moderate-intensity exercise to maintain their body weight.  Vigorous-intensity exercise  Vigorous-intensity exercise is any activity that gets you moving enough to burn at least six times more calories than if you were sitting. When you exercise at this intensity, you should be working hard enough that you are not able to carry on a conversation.  Examples of vigorous exercise include:  · Running.  · Playing a team sport, such as football, basketball, and soccer.  · Jumping rope.  Most people should get at least 75 minutes (1 hour and 15 minutes) a week of vigorous-intensity exercise to maintain their body weight.  How can exercise affect me?  When you exercise enough to burn more calories than you eat, you lose weight. Exercise also reduces body fat and builds muscle. The more muscle you have, the more calories you burn. Exercise also:  · Improves mood.  · Reduces stress and tension.  · Improves your overall fitness, flexibility, and endurance.  · Increases bone strength.  The amount of exercise you need to lose weight depends on:  · Your age.  · The type of exercise.  · Any health conditions you have.  · Your overall physical ability.  Talk to your health care provider about how much exercise you need and what types of activities are safe for you.  What actions can I take to lose weight?  Nutrition    · Make changes to your diet as told by your health care provider or diet and nutrition specialist (dietitian). This may include:  ? Eating fewer calories.  ? Eating more protein.  ? Eating less unhealthy fats.  ? Eating a  diet that includes fresh fruits and vegetables, whole grains, low-fat dairy products, and lean protein.  ? Avoiding foods with added fat, salt, and sugar.  · Drink plenty of water while you exercise to prevent dehydration or heat stroke.  Activity  · Choose an activity that you enjoy and set realistic goals. Your health care provider can help you make an exercise plan that works for you.  · Exercise at a moderate or vigorous intensity most days of the week.  ? The intensity of exercise may vary from person to person. You can tell how intense a workout is for you by paying attention to your breathing and heartbeat. Most people will notice their breathing and heartbeat get faster with more intense exercise.  · Do resistance training twice each week, such as:  ? Push-ups.  ? Sit-ups.  ? Lifting weights.  ? Using resistance bands.  · Getting short amounts of exercise can be just as helpful as long structured periods of exercise. If you have trouble finding time to exercise, try to include exercise in your daily routine.  ? Get up, stretch, and walk around every 30 minutes throughout the day.  ? Go for a walk during your lunch break.  ? Park your car farther away from your destination.  ? If you take public transportation, get off one stop early and walk the rest of the way.  ? Make phone calls while standing up and walking around.  ? Take the stairs instead of elevators or escalators.  · Wear comfortable clothes and shoes with good support.  · Do not exercise so much that you hurt yourself, feel dizzy, or get very short of breath.  Where to find more information  · [place] Department of Health and Human Services: www.hhs.gov  · Centers for Disease Control and Prevention (CDC): www.cdc.gov  Contact a health care provider:  · Before starting a new exercise program.  · If you have questions or concerns about your weight.  · If you have a medical problem that keeps you from exercising.  Get help right away if you have any of  the following while exercising:  · Injury.  · Dizziness.  · Difficulty breathing or shortness of breath that does not go away when you stop exercising.  · Chest pain.  · Rapid heartbeat.  Summary  · Being overweight increases your risk of heart disease, stroke, diabetes, high blood pressure, and several types of cancer.  · Losing weight happens when you burn more calories than you eat.  · Reducing the amount of calories you eat in addition to getting regular moderate or vigorous exercise each week helps you lose weight.  This information is not intended to replace advice given to you by your health care provider. Make sure you discuss any questions you have with your health care provider.  Document Released: 01/20/2012 Document Revised: 12/31/2018 Document Reviewed: 12/31/2018  [employer] Patient Education © 2020 [employer]      Exercising to Stay Healthy  To become healthy and stay healthy, it is recommended that you do moderate-intensity and vigorous-intensity exercise. You can tell that you are exercising at a moderate intensity if your heart starts beating faster and you start breathing faster but can still hold a conversation. You can tell that you are exercising at a vigorous intensity if you are breathing much harder and faster and cannot hold a conversation while exercising.  Exercising regularly is important. It has many health benefits, such as:  · Improving overall fitness, flexibility, and endurance.  · Increasing bone density.  · Helping with weight control.  · Decreasing body fat.  · Increasing muscle strength.  · Reducing stress and tension.  · Improving overall health.  How often should I exercise?  Choose an activity that you enjoy, and set realistic goals. Your health care provider can help you make an activity plan that works for you.  Exercise regularly as told by your health care provider. This may include:  · Doing strength training two times a week, such as:  ? Lifting weights.  ? Using  resistance bands.  ? Push-ups.  ? Sit-ups.  ? Yoga.  · Doing a certain intensity of exercise for a given amount of time. Choose from these options:  ? A total of 150 minutes of moderate-intensity exercise every week.  ? A total of 75 minutes of vigorous-intensity exercise every week.  ? A mix of moderate-intensity and vigorous-intensity exercise every week.  Children, pregnant women, people who have not exercised regularly, people who are overweight, and older adults may need to talk with a health care provider about what activities are safe to do. If you have a medical condition, be sure to talk with your health care provider before you start a new exercise program.  What are some exercise ideas?  Moderate-intensity exercise ideas include:  · Walking 1 mile (1.6 km) in about 15 minutes.  · Biking.  · Hiking.  · Golfing.  · Dancing.  · Water aerobics.  Vigorous-intensity exercise ideas include:  · Walking 4.5 miles (7.2 km) or more in about 1 hour.  · Jogging or running 5 miles (8 km) in about 1 hour.  · Biking 10 miles (16.1 km) or more in about 1 hour.  · Lap swimming.  · Roller-skating or in-line skating.  · Cross-country skiing.  · Vigorous competitive sports, such as football, basketball, and soccer.  · Jumping rope.  · Aerobic dancing.  What are some everyday activities that can help me to get exercise?  · Yard work, such as:  ? Pushing a .  ? Raking and bagging leaves.  · Washing your car.  · Pushing a stroller.  · Shoveling snow.  · Gardening.  · Washing windows or floors.  How can I be more active in my day-to-day activities?  · Use stairs instead of an elevator.  · Take a walk during your lunch break.  · If you drive, park your car farther away from your work or school.  · If you take public transportation, get off one stop early and walk the rest of the way.  · Stand up or walk around during all of your indoor phone calls.  · Get up, stretch, and walk around every 30 minutes throughout the  day.  · Enjoy exercise with a friend. Support to continue exercising will help you keep a regular routine of activity.  What guidelines can I follow while exercising?  · Before you start a new exercise program, talk with your health care provider.  · Do not exercise so much that you hurt yourself, feel dizzy, or get very short of breath.  · Wear comfortable clothes and wear shoes with good support.  · Drink plenty of water while you exercise to prevent dehydration or heat stroke.  · Work out until your breathing and your heartbeat get faster.  Where to find more information  · [place] Department of Health and Human Services: www.hhs.gov  · Centers for Disease Control and Prevention (CDC): www.cdc.gov  Summary  · Exercising regularly is important. It will improve your overall fitness, flexibility, and endurance.  · Regular exercise also will improve your overall health. It can help you control your weight, reduce stress, and improve your bone density.  · Do not exercise so much that you hurt yourself, feel dizzy, or get very short of breath.  · Before you start a new exercise program, talk with your health care provider.  This information is not intended to replace advice given to you by your health care provider. Make sure you discuss any questions you have with your health care provider.  Document Released: 01/20/2012 Document Revised: 11/30/2018 Document Reviewed: 11/08/2018  [employer] Patient Education © 2020 [employer]      Fall Prevention in the Home, Adult  Falls can cause injuries and can affect people from all age groups. There are many simple things that you can do to make your home safe and to help prevent falls. Ask for help when making these changes, if needed.  What actions can I take to prevent falls?  General instructions  · Use good lighting in all rooms. Replace any light bulbs that burn out.  · Turn on lights if it is dark. Use night-lights.  · Place frequently used items in easy-to-reach places.  Lower the shelves around your home if necessary.  · Set up furniture so that there are clear paths around it. Avoid moving your furniture around.  · Remove throw rugs and other tripping hazards from the floor.  · Avoid walking on wet floors.  · Fix any uneven floor surfaces.  · Add color or contrast paint or tape to grab bars and handrails in your home. Place contrasting color strips on the first and last steps of stairways.  · When you use a stepladder, make sure that it is completely opened and that the sides are firmly locked. Have someone hold the ladder while you are using it. Do not climb a closed stepladder.  · Be aware of any and all pets.  What can I do in the bathroom?         · Keep the floor dry. Immediately clean up any water that spills onto the floor.  · Remove soap buildup in the tub or shower on a regular basis.  · Use non-skid mats or decals on the floor of the tub or shower.  · Attach bath mats securely with double-sided, non-slip rug tape.  · If you need to sit down while you are in the shower, use a plastic, non-slip stool.  · Install grab bars by the toilet and in the tub and shower. Do not use towel bars as grab bars.  What can I do in the bedroom?  · Make sure that a bedside light is easy to reach.  · Do not use oversized bedding that drapes onto the floor.  · Have a firm chair that has side arms to use for getting dressed.  What can I do in the kitchen?  · Clean up any spills right away.  · If you need to reach for something above you, use a sturdy step stool that has a grab bar.  · Keep electrical cables out of the way.  · Do not use floor polish or wax that makes floors slippery. If you must use wax, make sure that it is non-skid floor wax.  What can I do in the stairways?  · Do not leave any items on the stairs.  · Make sure that you have a light switch at the top of the stairs and the bottom of the stairs. Have them installed if you do not have them.  · Make sure that there are handrails  on both sides of the stairs. Fix handrails that are broken or loose. Make sure that handrails are as long as the stairways.  · Install non-slip stair treads on all stairs in your home.  · Avoid having throw rugs at the top or bottom of stairways, or secure the rugs with carpet tape to prevent them from moving.  · Choose a carpet design that does not hide the edge of steps on the stairway.  · Check any carpeting to make sure that it is firmly attached to the stairs. Fix any carpet that is loose or worn.  What can I do on the outside of my home?  · Use bright outdoor lighting.  · Regularly repair the edges of walkways and driveways and fix any cracks.  · Remove high doorway thresholds.  · Trim any shrubbery on the main path into your home.  · Regularly check that handrails are securely fastened and in good repair. Both sides of any steps should have handrails.  · Install guardrails along the edges of any raised decks or porches.  · Clear walkways of debris and clutter, including tools and rocks.  · Have leaves, snow, and ice cleared regularly.  · Use sand or salt on walkways during winter months.  · In the garage, clean up any spills right away, including grease or oil spills.  What other actions can I take?  · Wear closed-toe shoes that fit well and support your feet. Wear shoes that have rubber soles or low heels.  · Use mobility aids as needed, such as canes, walkers, scooters, and crutches.  · Review your medicines with your health care provider. Some medicines can cause dizziness or changes in blood pressure, which increase your risk of falling.  Talk with your health care provider about other ways that you can decrease your risk of falls. This may include working with a physical therapist or  to improve your strength, balance, and endurance.  Where to find more information  · Centers for [place] Control [place]: https://www.cdc.gov  · [place] [employer]:  https://sg8jybt.ruben.nih.gov  Contact a health care provider if:  · You are afraid of falling at home.  · You feel weak, drowsy, or dizzy at home.  · You fall at home.  Summary  · There are many simple things that you can do to make your home safe and to help prevent falls.  · Ways to make your home safe include removing tripping hazards and installing grab bars in the bathroom.  · Ask for help when making these changes in your home.  This information is not intended to replace advice given to you by your health care provider. Make sure you discuss any questions you have with your health care provider.  Document Released: 12/08/2003 Document Revised: 11/30/2018 Document Reviewed: 08/02/2018  [employer] Patient Education © 2020 [employer]      Fall Prevention in the Home, Adult  Falls can cause injuries. They can happen to people of all ages. There are many things you can do to make your home safe and to help prevent falls. Ask for help when making these changes, if needed.  What actions can I take to prevent falls?  General Instructions  · Use good lighting in all rooms. Replace any light bulbs that burn out.  · Turn on the lights when you go into a dark area. Use night-lights.  · Keep items that you use often in easy-to-reach places. Lower the shelves around your home if necessary.  · Set up your furniture so you have a clear path. Avoid moving your furniture around.  · Do not have throw rugs and other things on the floor that can make you trip.  · Avoid walking on wet floors.  · If any of your floors are uneven, fix them.  · Add color or contrast paint or tape to clearly steve and help you see:  ? Any grab bars or handrails.  ? First and last steps of stairways.  ? Where the edge of each step is.  · If you use a stepladder:  ? Make sure that it is fully opened. Do not climb a closed stepladder.  ? Make sure that both sides of the stepladder are locked into place.  ? Ask someone to hold the stepladder for you while  you use it.  · If there are any pets around you, be aware of where they are.  What can I do in the bathroom?         · Keep the floor dry. Clean up any water that spills onto the floor as soon as it happens.  · Remove soap buildup in the tub or shower regularly.  · Use non-skid mats or decals on the floor of the tub or shower.  · Attach bath mats securely with double-sided, non-slip rug tape.  · If you need to sit down in the shower, use a plastic, non-slip stool.  · Install grab bars by the toilet and in the tub and shower. Do not use towel bars as grab bars.  What can I do in the bedroom?  · Make sure that you have a light by your bed that is easy to reach.  · Do not use any sheets or blankets that are too big for your bed. They should not hang down onto the floor.  · Have a firm chair that has side arms. You can use this for support while you get dressed.  What can I do in the kitchen?  · Clean up any spills right away.  · If you need to reach something above you, use a strong step stool that has a grab bar.  · Keep electrical cords out of the way.  · Do not use floor polish or wax that makes floors slippery. If you must use wax, use non-skid floor wax.  What can I do with my stairs?  · Do not leave any items on the stairs.  · Make sure that you have a light switch at the top of the stairs and the bottom of the stairs. If you do not have them, ask someone to add them for you.  · Make sure that there are handrails on both sides of the stairs, and use them. Fix handrails that are broken or loose. Make sure that handrails are as long as the stairways.  · Install non-slip stair treads on all stairs in your home.  · Avoid having throw rugs at the top or bottom of the stairs. If you do have throw rugs, attach them to the floor with carpet tape.  · Choose a carpet that does not hide the edge of the steps on the stairway.  · Check any carpeting to make sure that it is firmly attached to the stairs. Fix any carpet that is  loose or worn.  What can I do on the outside of my home?  · Use bright outdoor lighting.  · Regularly fix the edges of walkways and driveways and fix any cracks.  · Remove anything that might make you trip as you walk through a door, such as a raised step or threshold.  · Trim any bushes or trees on the path to your home.  · Regularly check to see if handrails are loose or broken. Make sure that both sides of any steps have handrails.  · Install guardrails along the edges of any raised decks and porches.  · Clear walking paths of anything that might make someone trip, such as tools or rocks.  · Have any leaves, snow, or ice cleared regularly.  · Use sand or salt on walking paths during winter.  · Clean up any spills in your garage right away. This includes grease or oil spills.  What other actions can I take?  · Wear shoes that:  ? Have a low heel. Do not wear high heels.  ? Have rubber bottoms.  ? Are comfortable and fit you well.  ? Are closed at the toe. Do not wear open-toe sandals.  · Use tools that help you move around (mobility aids) if they are needed. These include:  ? Canes.  ? Walkers.  ? Scooters.  ? Crutches.  · Review your medicines with your doctor. Some medicines can make you feel dizzy. This can increase your chance of falling.  Ask your doctor what other things you can do to help prevent falls.  Where to find more information  · Centers for Disease Control and Prevention, [place]: https://cdc.gov  · [employer] [place] [employer]: https://pe7kxny.ruben.nih.gov  Contact a doctor if:  · You are afraid of falling at home.  · You feel weak, drowsy, or dizzy at home.  · You fall at home.  Summary  · There are many simple things that you can do to make your home safe and to help prevent falls.  · Ways to make your home safe include removing tripping hazards and installing grab bars in the bathroom.  · Ask for help when making these changes in your home.  This information is not intended to replace advice given  to you by your health care provider. Make sure you discuss any questions you have with your health care provider.  Document Released: 10/14/2010 Document Revised: 04/09/2020 Document Reviewed: 08/02/2018  [employer]vier Patient Education © 2020 [employer]

## 2020-08-04 NOTE — PROGRESS NOTES
"The ABCs of the Annual Wellness Visit  Subsequent Medicare Wellness Visit    Chief Complaint   Patient presents with   • Medicare Wellness-subsequent       Subjective   History of Present Illness:  Salinas Monroy is a 70 y.o. female who presents for a Subsequent Medicare Wellness Visit.    HEALTH RISK ASSESSMENT    Recent Hospitalizations:  Recently treated at the following:  [facility]    Current Medical Providers:  Patient Care Team:  Jm Marie DO as PCP - General (Family Medicine)  Jm Marie DO as Consulting Physician (Family Medicine)    Smoking Status:  Social History     Tobacco Use   Smoking Status Former Smoker   • Packs/day: 1.00   • Years: 40.00   • Pack years: 40.00   • Types: Cigarettes   • Start date:    • Last attempt to quit:    • Years since quittin.6   Smokeless Tobacco Never Used   Tobacco Comment    \"quit 9 years ago\"       Alcohol Consumption:  Social History     Substance and Sexual Activity   Alcohol Use No       Depression Screen:   PHQ-2/PHQ-9 Depression Screening 2019   Little interest or pleasure in doing things 1   Feeling down, depressed, or hopeless 1   Trouble falling or staying asleep, or sleeping too much 0   Feeling tired or having little energy 3   Poor appetite or overeating 0   Feeling bad about yourself - or that you are a failure or have let yourself or your family down 0   Trouble concentrating on things, such as reading the newspaper or watching television 0   Moving or speaking so slowly that other people could have noticed. Or the opposite - being so fidgety or restless that you have been moving around a lot more than usual 0   Thoughts that you would be better off dead, or of hurting yourself in some way 0   Total Score 5   If you checked off any problems, how difficult have these problems made it for you to do your work, take care of things at home, or get along with other people? Not difficult at all       Fall Risk " Screen:  CAROLINA Fall Risk Assessment has not been completed.    Health Habits and Functional and Cognitive Screening:  Functional & Cognitive Status 6/11/2019   Do you have difficulty preparing food and eating? No   Do you have difficulty bathing yourself, getting dressed or grooming yourself? No   Do you have difficulty using the toilet? No   Do you have difficulty moving around from place to place? No   Do you have trouble with steps or getting out of a bed or a chair? No   Current Diet Well Balanced Diet   Dental Exam Not up to date   Eye Exam Not up to date   Exercise (times per week) 2 times per week   Current Exercise Activities Include Housecleaning   Do you need help using the phone?  No   Are you deaf or do you have serious difficulty hearing?  No   Do you need help with transportation? Yes   Do you need help shopping? No   Do you need help preparing meals?  No   Do you need help with housework?  No   Do you need help with laundry? No   Do you need help taking your medications? No   Do you need help managing money? No   Do you ever drive or ride in a car without wearing a seat belt? No   Have you felt unusual stress, anger or loneliness in the last month? No   Who do you live with? Alone   If you need help, do you have trouble finding someone available to you? No   Have you been bothered in the last four weeks by sexual problems? No   Do you have difficulty concentrating, remembering or making decisions? No         Does the patient have evidence of cognitive impairment? No    Asprin use counseling:Does not need ASA (and currently is not on it)    Age-appropriate Screening Schedule:  Refer to the list below for future screening recommendations based on patient's age, sex and/or medical conditions. Orders for these recommended tests are listed in the plan section. The patient has been provided with a written plan.    Health Maintenance   Topic Date Due   • TDAP/TD VACCINES (1 - Tdap) 01[birth year]18/1960   • ZOSTER  VACCINE (1 of 2) 01/18/1999   • MAMMOGRAM  04/03/2020   • LIPID PANEL  06/13/2020   • INFLUENZA VACCINE  08/01/2020   • DXA SCAN  02/10/2021   • COLONOSCOPY  06/05/2028          The following portions of the patient's history were reviewed and updated as appropriate: allergies, current medications, past family history, past medical history, past social history, past surgical history and problem list.    Outpatient Medications Prior to Visit   Medication Sig Dispense Refill   • acyclovir (ZOVIRAX) 200 MG capsule      • allopurinol (ZYLOPRIM) 100 MG tablet Take 2 tablets by mouth Daily. 180 tablet 3   • B Complex Vitamins (VITAMIN B COMPLEX PO) Take 1 tablet by mouth Daily.     • B-12, Methylcobalamin, 1000 MCG sublingual tablet      • Cholecalciferol (VITAMIN D3) 48516 UNITS tablet Take 7,500 Units by mouth Daily.     • clotrimazole-betamethasone (LOTRISONE) 1-0.05 % cream Apply  topically to the appropriate area as directed 2 (Two) Times a Day. 45 g 3   • Coenzyme Q10 400 MG capsule Take  by mouth.     • Digestive Enzymes (DIGESTIVE ENZYME PO) Take 1 tablet by mouth Daily.     • escitalopram (LEXAPRO) 10 MG tablet Take 1 tablet by mouth once daily 90 tablet 0   • ferrous gluconate (FERGON) 324 MG tablet Take 1 tablet by mouth Daily With Breakfast. 30 tablet 5   • furosemide (LASIX) 20 MG tablet Take 1 tablet by mouth Daily. 90 tablet 2   • GARLIC PO Take 1 tablet by mouth Daily.     • L-THEANINE PO Take  by mouth.     • liothyronine (CYTOMEL) 25 MCG tablet Take 3 tablets by mouth once daily 90 tablet 0   • MAGNESIUM PO Take 1,000 mg by mouth Every Night.     • Methylsulfonylmethane (MSM PO) Take 1 tablet by mouth Daily.     • MILK THISTLE PO Take 1 tablet by mouth Daily.     • Misc Natural Products (TART CHERRY ADVANCED PO) Take 2 capsules by mouth Daily.     • Omega-3 Fatty Acids (OMEGA-3 FISH OIL PO) Take 4,000 Units by mouth Daily.     • Probiotic Product (PROBIOTIC PO) Take 1 tablet by mouth Daily.     •  SELENIUM PO Take 1 tablet by mouth Daily.     • TAURINE PO Take  by mouth.     • TURMERIC PO Take 1 tablet by mouth Daily.     • VITAMIN A PO Take  by mouth.     • vitamin C (ASCORBIC ACID) 500 MG tablet Take 500 mg by mouth 3 (Three) Times a Day As Needed.     • vitamin E 400 UNIT capsule Take 400 Units by mouth Daily.     • VITAMIN K PO Take 100 mcg by mouth Daily.     • Zinc 30 MG tablet Take  by mouth Daily.     • Acetylcysteine (N-ACETYL-L-CYSTEINE PO) Take 1 tablet by mouth Daily.     • diazePAM (VALIUM) 2 MG tablet Take 1 tablet by mouth 2 (Two) Times a Day. 60 tablet 0   • EUTHYROX 112 MCG tablet Take 1 tablet by mouth once daily 90 tablet 0   • NON FORMULARY        No facility-administered medications prior to visit.        Patient Active Problem List   Diagnosis   • Hashimoto's thyroiditis   • Essential tremor   • Vitamin D deficiency   • Primary osteoarthritis involving multiple joints   • Chronic idiopathic gout involving toe of right foot without tophus   • Obsessive-compulsive disorder   • Postsurgical menopause   • Colon cancer screening   • History of colon polyps   • Left ventricular hypertrophy   • Chronic diastolic heart failure (CMS/HCC)   • Hypothyroidism, adult   • ISABEL on CPAP   • PD (Parkinson's disease) (CMS/HCC)   • Morbid obesity with BMI of 40.0-44.9, adult (CMS/HCC)   • Iron deficiency   • Bipolar disorder, current episode mixed, moderate (CMS/HCC)   • Reactive airway disease without complication       Advanced Care Planning:  ACP discussion was held with the patient during this visit. Patient does not have an advance directive, information provided.    Review of Systems  1. Constitutional: Negative for fever. Negative for chills, diaphoresis, fatigue and unexpected weight change.   2. HENT: No dysphagia; no changes to vision/hearing/smell/taste; no epistaxis  3. Eyes: Negative for redness and visual disturbance.   4. Respiratory: negative for shortness of breath. Negative for chest pain  ". Negative for cough and chest tightness.   5. Cardiovascular: Negative for chest pain and palpitations.   6. Gastrointestinal: Negative for abdominal distention, abdominal pain and blood in stool.   7. Endocrine: Negative for cold intolerance and heat intolerance.   8. Genitourinary: Negative for difficulty urinating, dysuria and frequency.   9. Musculoskeletal: Chronic arthralgias, back pain and myalgias.   10. Skin: Negative for color change, rash and wound.   11. Neurological: Negative for syncope, weakness and headaches.  Chronic tremors.  12. Hematological: Negative for adenopathy. Does not bruise/bleed easily.   13. Psychiatric/Behavioral: Negative for confusion. The patient is not nervous/anxious at present    Compared to one year ago, the patient feels her physical health is better.  Compared to one year ago, the patient feels her mental health is better.    Reviewed chart for potential of high risk medication in the elderly: not applicable  Reviewed chart for potential of harmful drug interactions in the elderly:not applicable    Objective         Vitals:    08/04/20 1259   BP: 120/80   Pulse: 90   Temp: 98.9 °F (37.2 °C)   SpO2: 98%   Weight: 104 kg (230 lb)   Height: 160 cm (63\")   PainSc: 0-No pain       Body mass index is 40.74 kg/m².  Discussed the patient's BMI with her. The BMI is above average; BMI management plan is completed.    Physical Exam  General Appearance: alert, oriented x 3, no acute distress.  Skin: warm and dry.   HEENT: Atraumatic.  pupils round and reactive to light and accommodation, oral mucosa pink and moist.  Nares patent without epistaxis.  External auditory canals are patent tympanic membranes intact.  Neck: supple, no JVD, trachea midline.  No thyromegaly  Lungs: CTA, unlabored breathing effort.  Heart: RRR, normal S1 and S2, no S3, no rub.  Abdomen: soft, non-tender, no palpable bladder, present bowel sounds to auscultation ×4.  No guarding or rigidity.  Extremities: no " clubbing, cyanosis or edema.  Good range of motion actively and passively.  Symmetric muscle strength and development  Neuro: normal speech and mental status.  Cranial nerves II through XII intact.  No anosmia. DTR 2+; proprioception intact.  Significantly improved tremulousness            Assessment/Plan   Medicare Risks and Personalized Health Plan  CMS Preventative Services Quick Reference  Advance Directive Discussion  Cardiovascular risk  Inactivity/Sedentary    The above risks/problems have been discussed with the patient.  Pertinent information has been shared with the patient in the After Visit Summary.  Follow up plans and orders are seen below in the Assessment/Plan Section.    Diagnoses and all orders for this visit:    1. Hypothyroidism, adult (Primary)  -     levothyroxine (SYNTHROID, LEVOTHROID) 75 MCG tablet; Take 1 tablet by mouth Daily.  Dispense: 90 tablet; Refill: 1  -     TSH; Future  -     T4, Free; Future  -     T3, Reverse; Future  -     T3, free; Future    2. Encounter for screening mammogram for malignant neoplasm of breast   -     Mammo Screening Digital Tomosynthesis Bilateral With CAD; Future    3. Breast cancer screening  -     Mammo Screening Digital Tomosynthesis Bilateral With CAD; Future    4. Routine general medical examination at health care facility  -     Mammo Screening Digital Tomosynthesis Bilateral With CAD; Future      Follow Up:  Return in about 3 months (around 11/4/2020) for Recheck, Med Change/New Meds.     An After Visit Summary and PPPS were given to the patient.      Patient would like to decrease her levothyroxine dosing to 75 mcg daily, as well as increase her Cytomel to 3 tablets daily.  I have asked that she repeat her thyroid function labs in approximately 6 weeks after this change.      Breast Self-Awareness  Breast self-awareness means being familiar with how your breasts look and feel. It involves checking your breasts regularly and reporting any changes to  your health care provider.  Practicing breast self-awareness is important. Sometimes changes may not be harmful (are benign), but sometimes a change in your breasts can be a sign of a serious medical problem. It is important to learn how to do this procedure correctly so that you can catch problems early, when treatment is more likely to be successful. All women should practice breast self-awareness, including women who have had breast implants.  What you need:  · A mirror.  · A well-lit room.  How to do a breast self-exam  A breast self-exam is one way to learn what is normal for your breasts and whether your breasts are changing. To do a breast self-exam:  Look for changes    1. Remove all the clothing above your waist.  2.  front of a mirror in a room with good lighting.  3. Put your hands on your hips.  4. Push your hands firmly downward.  5. Compare your breasts in the mirror. Look for differences between them (asymmetry), such as:  ? Differences in shape.  ? Differences in size.  ? Puckers, dips, and bumps in one breast and not the other.  6. Look at each breast for changes in the skin, such as:  ? Redness.  ? Scaly areas.  7. Look for changes in your nipples, such as:  ? Discharge.  ? Bleeding.  ? Dimpling.  ? Redness.  ? A change in position.  Feel for changes  Carefully feel your breasts for lumps and changes. It is best to do this while lying on your back on the floor, and again while sitting or standing in the tub or shower with soapy water on your skin. Feel each breast in the following way:  1. Place the arm on the side of the breast you are examining above your head.  2. Feel your breast with the other hand.  3. Start in the nipple area and make ¾-inch (2 cm) overlapping circles to feel your breast. Use the pads of your three middle fingers to do this. Apply light pressure, then medium pressure, then firm pressure. The light pressure will allow you to feel the tissue closest to the skin. The  medium pressure will allow you to feel the tissue that is a little deeper. The firm pressure will allow you to feel the tissue close to the ribs.  4. Continue the overlapping circles, moving downward over the breast until you feel your ribs below your breast.  5. Move one finger-width toward the center of the body. Continue to use the ¾-inch (2 cm) overlapping circles to feel your breast as you move slowly up toward your collarbone.  6. Continue the up-and-down exam using all three pressures until you reach your armpit.    Write down what you find  Writing down what you find can help you remember what to discuss with your health care provider. Write down:  · What is normal for each breast.  · Any changes that you find in each breast, including:  ? The kind of changes you find.  ? Any pain or tenderness.  ? Size and location of any lumps.  · Where you are in your menstrual cycle, if you are still menstruating.  General tips and recommendations  · Examine your breasts every month.  · If you are breastfeeding, the best time to examine your breasts is after a feeding or after using a breast pump.  · If you menstruate, the best time to examine your breasts is 5-7 days after your period. Breasts are generally lumpier during menstrual periods, and it may be more difficult to notice changes.  · With time and practice, you will become more familiar with the variations in your breasts and more comfortable with the exam.  Contact a health care provider if you:  · See a change in the shape or size of your breasts or nipples.  · See a change in the skin of your breast or nipples, such as a reddened or scaly area.  · Have unusual discharge from your nipples.  · Find a lump or thick area that was not there before.  · Have pain in your breasts.  · Have any concerns related to your breast health.  Summary  · Breast self-awareness includes looking for physical changes in your breasts, as well as feeling for any changes within your  breasts.  · Breast self-awareness should be performed in front of a mirror in a well-lit room.  · You should examine your breasts every month. If you menstruate, the best time to examine your breasts is 5-7 days after your menstrual period.  · Let your health care provider know of any changes you notice in your breasts, including changes in size, changes on the skin, pain or tenderness, or unusual fluid from your nipples.  This information is not intended to replace advice given to you by your health care provider. Make sure you discuss any questions you have with your health care provider.  Document Released: 12/18/2006 Document Revised: 08/06/2019 Document Reviewed: 08/06/2019  [employer] Patient Education © 2020 [employer]      Breast Self-Awareness  Breast self-awareness is knowing how your breasts look and feel. Doing breast self-awareness is important. It allows you to catch a breast problem early while it is still small and can be treated. All women should do breast self-awareness, including women who have had breast implants. Tell your doctor if you notice a change in your breasts.  What you need:  · A mirror.  · A well-lit room.  How to do a breast self-exam  A breast self-exam is one way to learn what is normal for your breasts and to check for changes. To do a breast self-exam:  Look for changes    1. Take off all the clothes above your waist.  2.  front of a mirror in a room with good lighting.  3. Put your hands on your hips.  4. Push your hands down.  5. Look at your breasts and nipples in the mirror to see if one breast or nipple looks different from the other. Check to see if:  ? The shape of one breast is different.  ? The size of one breast is different.  ? There are wrinkles, dips, and bumps in one breast and not the other.  6. Look at each breast for changes in the skin, such as:  ? Redness.  ? Scaly areas.  7. Look for changes in your nipples, such as:  ? Liquid around the  nipples.  ? Bleeding.  ? Dimpling.  ? Redness.  ? A change in where the nipples are.  Feel for changes    1. Lie on your back on the floor.  2. Feel each breast. To do this, follow these steps:  ? Pick a breast to feel.  ? Put the arm closest to that breast above your head.  ? Use your other arm to feel the nipple area of your breast. Feel the area with the pads of your three middle fingers by making small circles with your fingers. For the first Benton, press lightly. For the second Benton, press harder. For the third Benton, press even harder.  ? Keep making circles with your fingers at the different pressures as you move down your breast. Stop when you feel your ribs.  ? Move your fingers a little toward the center of your body.  ? Start making circles with your fingers again, this time going up until you reach your collarbone.  ? Keep making up-and-down circles until you reach your armpit. Remember to keep using the three pressures.  ? Feel the other breast in the same way.  3. Sit or  the tub or shower.  4. With soapy water on your skin, feel each breast the same way you did in step 2 when you were lying on the floor.  Write down what you find  Writing down what you find can help you remember what to tell your doctor. Write down:  · What is normal for each breast.  · Any changes you find in each breast, including:  ? The kind of changes you find.  ? Whether you have pain.  ? Size and location of any lumps.  · When you last had your menstrual period.  General tips  · Check your breasts every month.  · If you are breastfeeding, the best time to check your breasts is after you feed your baby or after you use a breast pump.  · If you get menstrual periods, the best time to check your breasts is 5-7 days after your menstrual period is over.  · With time, you will become comfortable with the self-exam, and you will begin to know if there are changes in your breasts.  Contact a doctor if you:  · See a change  in the shape or size of your breasts or nipples.  · See a change in the skin of your breast or nipples, such as red or scaly skin.  · Have fluid coming from your nipples that is not normal.  · Find a lump or thick area that was not there before.  · Have pain in your breasts.  · Have any concerns about your breast health.  Summary  · Breast self-awareness includes looking for changes in your breasts, as well as feeling for changes within your breasts.  · Breast self-awareness should be done in front of a mirror in a well-lit room.  · You should check your breasts every month. If you get menstrual periods, the best time to check your breasts is 5-7 days after your menstrual period is over.  · Let your doctor know of any changes you see in your breasts, including changes in size, changes on the skin, pain or tenderness, or fluid from your nipples that is not normal.  This information is not intended to replace advice given to you by your health care provider. Make sure you discuss any questions you have with your health care provider.  Document Released: 06/05/2009 Document Revised: 08/06/2019 Document Reviewed: 08/06/2019  [employer] Patient Education © 2020 [employer]      Exercising to Lose Weight  Exercise is structured, repetitive physical activity to improve fitness and health. Getting regular exercise is important for everyone. It is especially important if you are overweight. Being overweight increases your risk of heart disease, stroke, diabetes, high blood pressure, and several types of cancer. Reducing your calorie intake and exercising can help you lose weight.  Exercise is usually categorized as moderate or vigorous intensity. To lose weight, most people need to do a certain amount of moderate-intensity or vigorous-intensity exercise each week.  Moderate-intensity exercise    Moderate-intensity exercise is any activity that gets you moving enough to burn at least three times more energy (calories) than if  you were sitting.  Examples of moderate exercise include:  · Walking a mile in 15 minutes.  · Doing light yard work.  · Biking at an easy pace.  Most people should get at least 150 minutes (2 hours and 30 minutes) a week of moderate-intensity exercise to maintain their body weight.  Vigorous-intensity exercise  Vigorous-intensity exercise is any activity that gets you moving enough to burn at least six times more calories than if you were sitting. When you exercise at this intensity, you should be working hard enough that you are not able to carry on a conversation.  Examples of vigorous exercise include:  · Running.  · Playing a team sport, such as football, basketball, and soccer.  · Jumping rope.  Most people should get at least 75 minutes (1 hour and 15 minutes) a week of vigorous-intensity exercise to maintain their body weight.  How can exercise affect me?  When you exercise enough to burn more calories than you eat, you lose weight. Exercise also reduces body fat and builds muscle. The more muscle you have, the more calories you burn. Exercise also:  · Improves mood.  · Reduces stress and tension.  · Improves your overall fitness, flexibility, and endurance.  · Increases bone strength.  The amount of exercise you need to lose weight depends on:  · Your age.  · The type of exercise.  · Any health conditions you have.  · Your overall physical ability.  Talk to your health care provider about how much exercise you need and what types of activities are safe for you.  What actions can I take to lose weight?  Nutrition    · Make changes to your diet as told by your health care provider or diet and nutrition specialist (dietitian). This may include:  ? Eating fewer calories.  ? Eating more protein.  ? Eating less unhealthy fats.  ? Eating a diet that includes fresh fruits and vegetables, whole grains, low-fat dairy products, and lean protein.  ? Avoiding foods with added fat, salt, and sugar.  · Drink plenty of  water while you exercise to prevent dehydration or heat stroke.  Activity  · Choose an activity that you enjoy and set realistic goals. Your health care provider can help you make an exercise plan that works for you.  · Exercise at a moderate or vigorous intensity most days of the week.  ? The intensity of exercise may vary from person to person. You can tell how intense a workout is for you by paying attention to your breathing and heartbeat. Most people will notice their breathing and heartbeat get faster with more intense exercise.  · Do resistance training twice each week, such as:  ? Push-ups.  ? Sit-ups.  ? Lifting weights.  ? Using resistance bands.  · Getting short amounts of exercise can be just as helpful as long structured periods of exercise. If you have trouble finding time to exercise, try to include exercise in your daily routine.  ? Get up, stretch, and walk around every 30 minutes throughout the day.  ? Go for a walk during your lunch break.  ? Park your car farther away from your destination.  ? If you take public transportation, get off one stop early and walk the rest of the way.  ? Make phone calls while standing up and walking around.  ? Take the stairs instead of elevators or escalators.  · Wear comfortable clothes and shoes with good support.  · Do not exercise so much that you hurt yourself, feel dizzy, or get very short of breath.  Where to find more information  · [place] Department of Health and Human Services: www.hhs.gov  · Centers for Disease Control and Prevention (CDC): www.cdc.gov  Contact a health care provider:  · Before starting a new exercise program.  · If you have questions or concerns about your weight.  · If you have a medical problem that keeps you from exercising.  Get help right away if you have any of the following while exercising:  · Injury.  · Dizziness.  · Difficulty breathing or shortness of breath that does not go away when you stop exercising.  · Chest pain.  · Rapid  heartbeat.  Summary  · Being overweight increases your risk of heart disease, stroke, diabetes, high blood pressure, and several types of cancer.  · Losing weight happens when you burn more calories than you eat.  · Reducing the amount of calories you eat in addition to getting regular moderate or vigorous exercise each week helps you lose weight.  This information is not intended to replace advice given to you by your health care provider. Make sure you discuss any questions you have with your health care provider.  Document Released: 01/20/2012 Document Revised: 12/31/2018 Document Reviewed: 12/31/2018  [employer] Patient Education © 2020 [employer]      Exercising to Stay Healthy  To become healthy and stay healthy, it is recommended that you do moderate-intensity and vigorous-intensity exercise. You can tell that you are exercising at a moderate intensity if your heart starts beating faster and you start breathing faster but can still hold a conversation. You can tell that you are exercising at a vigorous intensity if you are breathing much harder and faster and cannot hold a conversation while exercising.  Exercising regularly is important. It has many health benefits, such as:  · Improving overall fitness, flexibility, and endurance.  · Increasing bone density.  · Helping with weight control.  · Decreasing body fat.  · Increasing muscle strength.  · Reducing stress and tension.  · Improving overall health.  How often should I exercise?  Choose an activity that you enjoy, and set realistic goals. Your health care provider can help you make an activity plan that works for you.  Exercise regularly as told by your health care provider. This may include:  · Doing strength training two times a week, such as:  ? Lifting weights.  ? Using resistance bands.  ? Push-ups.  ? Sit-ups.  ? Yoga.  · Doing a certain intensity of exercise for a given amount of time. Choose from these options:  ? A total of 150 minutes of  moderate-intensity exercise every week.  ? A total of 75 minutes of vigorous-intensity exercise every week.  ? A mix of moderate-intensity and vigorous-intensity exercise every week.  Children, pregnant women, people who have not exercised regularly, people who are overweight, and older adults may need to talk with a health care provider about what activities are safe to do. If you have a medical condition, be sure to talk with your health care provider before you start a new exercise program.  What are some exercise ideas?  Moderate-intensity exercise ideas include:  · Walking 1 mile (1.6 km) in about 15 minutes.  · Biking.  · Hiking.  · Golfing.  · Dancing.  · Water aerobics.  Vigorous-intensity exercise ideas include:  · Walking 4.5 miles (7.2 km) or more in about 1 hour.  · Jogging or running 5 miles (8 km) in about 1 hour.  · Biking 10 miles (16.1 km) or more in about 1 hour.  · Lap swimming.  · Roller-skating or in-line skating.  · Cross-country skiing.  · Vigorous competitive sports, such as football, basketball, and soccer.  · Jumping rope.  · Aerobic dancing.  What are some everyday activities that can help me to get exercise?  · Yard work, such as:  ? Pushing a .  ? Raking and bagging leaves.  · Washing your car.  · Pushing a stroller.  · Shoveling snow.  · Gardening.  · Washing windows or floors.  How can I be more active in my day-to-day activities?  · Use stairs instead of an elevator.  · Take a walk during your lunch break.  · If you drive, park your car farther away from your work or school.  · If you take public transportation, get off one stop early and walk the rest of the way.  · Stand up or walk around during all of your indoor phone calls.  · Get up, stretch, and walk around every 30 minutes throughout the day.  · Enjoy exercise with a friend. Support to continue exercising will help you keep a regular routine of activity.  What guidelines can I follow while exercising?  · Before you  start a new exercise program, talk with your health care provider.  · Do not exercise so much that you hurt yourself, feel dizzy, or get very short of breath.  · Wear comfortable clothes and wear shoes with good support.  · Drink plenty of water while you exercise to prevent dehydration or heat stroke.  · Work out until your breathing and your heartbeat get faster.  Where to find more information  · [place] Department of Health and Human Services: www.hhs.gov  · Centers for Disease Control and Prevention (CDC): www.cdc.gov  Summary  · Exercising regularly is important. It will improve your overall fitness, flexibility, and endurance.  · Regular exercise also will improve your overall health. It can help you control your weight, reduce stress, and improve your bone density.  · Do not exercise so much that you hurt yourself, feel dizzy, or get very short of breath.  · Before you start a new exercise program, talk with your health care provider.  This information is not intended to replace advice given to you by your health care provider. Make sure you discuss any questions you have with your health care provider.  Document Released: 01/20/2012 Document Revised: 11/30/2018 Document Reviewed: 11/08/2018  [employer] Patient Education © 2020 [employer]      Fall Prevention in the Home, Adult  Falls can cause injuries and can affect people from all age groups. There are many simple things that you can do to make your home safe and to help prevent falls. Ask for help when making these changes, if needed.  What actions can I take to prevent falls?  General instructions  · Use good lighting in all rooms. Replace any light bulbs that burn out.  · Turn on lights if it is dark. Use night-lights.  · Place frequently used items in easy-to-reach places. Lower the shelves around your home if necessary.  · Set up furniture so that there are clear paths around it. Avoid moving your furniture around.  · Remove throw rugs and other  tripping hazards from the floor.  · Avoid walking on wet floors.  · Fix any uneven floor surfaces.  · Add color or contrast paint or tape to grab bars and handrails in your home. Place contrasting color strips on the first and last steps of stairways.  · When you use a stepladder, make sure that it is completely opened and that the sides are firmly locked. Have someone hold the ladder while you are using it. Do not climb a closed stepladder.  · Be aware of any and all pets.  What can I do in the bathroom?         · Keep the floor dry. Immediately clean up any water that spills onto the floor.  · Remove soap buildup in the tub or shower on a regular basis.  · Use non-skid mats or decals on the floor of the tub or shower.  · Attach bath mats securely with double-sided, non-slip rug tape.  · If you need to sit down while you are in the shower, use a plastic, non-slip stool.  · Install grab bars by the toilet and in the tub and shower. Do not use towel bars as grab bars.  What can I do in the bedroom?  · Make sure that a bedside light is easy to reach.  · Do not use oversized bedding that drapes onto the floor.  · Have a firm chair that has side arms to use for getting dressed.  What can I do in the kitchen?  · Clean up any spills right away.  · If you need to reach for something above you, use a sturdy step stool that has a grab bar.  · Keep electrical cables out of the way.  · Do not use floor polish or wax that makes floors slippery. If you must use wax, make sure that it is non-skid floor wax.  What can I do in the stairways?  · Do not leave any items on the stairs.  · Make sure that you have a light switch at the top of the stairs and the bottom of the stairs. Have them installed if you do not have them.  · Make sure that there are handrails on both sides of the stairs. Fix handrails that are broken or loose. Make sure that handrails are as long as the stairways.  · Install non-slip stair treads on all stairs in  your home.  · Avoid having throw rugs at the top or bottom of stairways, or secure the rugs with carpet tape to prevent them from moving.  · Choose a carpet design that does not hide the edge of steps on the stairway.  · Check any carpeting to make sure that it is firmly attached to the stairs. Fix any carpet that is loose or worn.  What can I do on the outside of my home?  · Use bright outdoor lighting.  · Regularly repair the edges of walkways and driveways and fix any cracks.  · Remove high doorway thresholds.  · Trim any shrubbery on the main path into your home.  · Regularly check that handrails are securely fastened and in good repair. Both sides of any steps should have handrails.  · Install guardrails along the edges of any raised decks or porches.  · Clear walkways of debris and clutter, including tools and rocks.  · Have leaves, snow, and ice cleared regularly.  · Use sand or salt on walkways during winter months.  · In the garage, clean up any spills right away, including grease or oil spills.  What other actions can I take?  · Wear closed-toe shoes that fit well and support your feet. Wear shoes that have rubber soles or low heels.  · Use mobility aids as needed, such as canes, walkers, scooters, and crutches.  · Review your medicines with your health care provider. Some medicines can cause dizziness or changes in blood pressure, which increase your risk of falling.  Talk with your health care provider about other ways that you can decrease your risk of falls. This may include working with a physical therapist or  to improve your strength, balance, and endurance.  Where to find more information  · Centers for Disease Control and Prevention, [place]: https://www.cdc.gov  · National [place] on Aging: https://lu3uqiu.ruben.nih.gov  Contact a health care provider if:  · You are afraid of falling at home.  · You feel weak, drowsy, or dizzy at home.  · You fall at home.  Summary  · There are many simple  things that you can do to make your home safe and to help prevent falls.  · Ways to make your home safe include removing tripping hazards and installing grab bars in the bathroom.  · Ask for help when making these changes in your home.  This information is not intended to replace advice given to you by your health care provider. Make sure you discuss any questions you have with your health care provider.  Document Released: 12/08/2003 Document Revised: 11/30/2018 Document Reviewed: 08/02/2018  Elsevier Patient Education © 2020 [employer]      Fall Prevention in the Home, Adult  Falls can cause injuries. They can happen to people of all ages. There are many things you can do to make your home safe and to help prevent falls. Ask for help when making these changes, if needed.  What actions can I take to prevent falls?  General Instructions  · Use good lighting in all rooms. Replace any light bulbs that burn out.  · Turn on the lights when you go into a dark area. Use night-lights.  · Keep items that you use often in easy-to-reach places. Lower the shelves around your home if necessary.  · Set up your furniture so you have a clear path. Avoid moving your furniture around.  · Do not have throw rugs and other things on the floor that can make you trip.  · Avoid walking on wet floors.  · If any of your floors are uneven, fix them.  · Add color or contrast paint or tape to clearly steve and help you see:  ? Any grab bars or handrails.  ? First and last steps of stairways.  ? Where the edge of each step is.  · If you use a stepladder:  ? Make sure that it is fully opened. Do not climb a closed stepladder.  ? Make sure that both sides of the stepladder are locked into place.  ? Ask someone to hold the stepladder for you while you use it.  · If there are any pets around you, be aware of where they are.  What can I do in the bathroom?         · Keep the floor dry. Clean up any water that spills onto the floor as soon as it  happens.  · Remove soap buildup in the tub or shower regularly.  · Use non-skid mats or decals on the floor of the tub or shower.  · Attach bath mats securely with double-sided, non-slip rug tape.  · If you need to sit down in the shower, use a plastic, non-slip stool.  · Install grab bars by the toilet and in the tub and shower. Do not use towel bars as grab bars.  What can I do in the bedroom?  · Make sure that you have a light by your bed that is easy to reach.  · Do not use any sheets or blankets that are too big for your bed. They should not hang down onto the floor.  · Have a firm chair that has side arms. You can use this for support while you get dressed.  What can I do in the kitchen?  · Clean up any spills right away.  · If you need to reach something above you, use a strong step stool that has a grab bar.  · Keep electrical cords out of the way.  · Do not use floor polish or wax that makes floors slippery. If you must use wax, use non-skid floor wax.  What can I do with my stairs?  · Do not leave any items on the stairs.  · Make sure that you have a light switch at the top of the stairs and the bottom of the stairs. If you do not have them, ask someone to add them for you.  · Make sure that there are handrails on both sides of the stairs, and use them. Fix handrails that are broken or loose. Make sure that handrails are as long as the stairways.  · Install non-slip stair treads on all stairs in your home.  · Avoid having throw rugs at the top or bottom of the stairs. If you do have throw rugs, attach them to the floor with carpet tape.  · Choose a carpet that does not hide the edge of the steps on the stairway.  · Check any carpeting to make sure that it is firmly attached to the stairs. Fix any carpet that is loose or worn.  What can I do on the outside of my home?  · Use bright outdoor lighting.  · Regularly fix the edges of walkways and driveways and fix any cracks.  · Remove anything that might make  you trip as you walk through a door, such as a raised step or threshold.  · Trim any bushes or trees on the path to your home.  · Regularly check to see if handrails are loose or broken. Make sure that both sides of any steps have handrails.  · Install guardrails along the edges of any raised decks and porches.  · Clear walking paths of anything that might make someone trip, such as tools or rocks.  · Have any leaves, snow, or ice cleared regularly.  · Use sand or salt on walking paths during winter.  · Clean up any spills in your garage right away. This includes grease or oil spills.  What other actions can I take?  · Wear shoes that:  ? Have a low heel. Do not wear high heels.  ? Have rubber bottoms.  ? Are comfortable and fit you well.  ? Are closed at the toe. Do not wear open-toe sandals.  · Use tools that help you move around (mobility aids) if they are needed. These include:  ? Canes.  ? Walkers.  ? Scooters.  ? Crutches.  · Review your medicines with your doctor. Some medicines can make you feel dizzy. This can increase your chance of falling.  Ask your doctor what other things you can do to help prevent falls.  Where to find more information  · Centers for Disease Control and Prevention, [place]: https://cdc.gov  · National [place] on Aging: https://eo1kevg.ruben.nih.gov  Contact a doctor if:  · You are afraid of falling at home.  · You feel weak, drowsy, or dizzy at home.  · You fall at home.  Summary  · There are many simple things that you can do to make your home safe and to help prevent falls.  · Ways to make your home safe include removing tripping hazards and installing grab bars in the bathroom.  · Ask for help when making these changes in your home.  This information is not intended to replace advice given to you by your health care provider. Make sure you discuss any questions you have with your health care provider.  Document Released: 10/14/2010 Document Revised: 04/09/2020 Document Reviewed:  08/02/2018  Elsevier Patient Education © 2020 [employer]

## 2020-09-02 DIAGNOSIS — E03.9 HYPOTHYROIDISM, ADULT: ICD-10-CM

## 2020-09-03 RX ORDER — LIOTHYRONINE SODIUM 25 UG/1
TABLET ORAL
Qty: 90 TABLET | Refills: 0 | Status: SHIPPED | OUTPATIENT
Start: 2020-09-03 | End: 2020-10-05

## 2020-09-04 ENCOUNTER — RESULTS ENCOUNTER (OUTPATIENT)
Dept: FAMILY MEDICINE CLINIC | Facility: CLINIC | Age: 70
End: 2020-09-04

## 2020-09-04 DIAGNOSIS — E03.9 HYPOTHYROIDISM, ADULT: ICD-10-CM

## 2020-09-08 ENCOUNTER — LAB (OUTPATIENT)
Dept: LAB | Facility: HOSPITAL | Age: 70
End: 2020-09-08

## 2020-09-08 ENCOUNTER — HOSPITAL ENCOUNTER (OUTPATIENT)
Dept: MAMMOGRAPHY | Facility: HOSPITAL | Age: 70
Discharge: HOME | End: 2020-09-08
Admitting: FAMILY MEDICINE

## 2020-09-08 DIAGNOSIS — Z12.31 ENCOUNTER FOR SCREENING MAMMOGRAM FOR MALIGNANT NEOPLASM OF BREAST: ICD-10-CM

## 2020-09-08 DIAGNOSIS — Z12.39 BREAST CANCER SCREENING: ICD-10-CM

## 2020-09-08 DIAGNOSIS — Z00.00 ROUTINE GENERAL MEDICAL EXAMINATION AT HEALTH CARE FACILITY: ICD-10-CM

## 2020-09-08 LAB
T3FREE SERPL-MCNC: 3.52 PG/ML (ref 2–4.4)
T4 FREE SERPL-MCNC: 0.88 NG/DL (ref 0.93–1.7)

## 2020-09-08 PROCEDURE — 84481 FREE ASSAY (FT-3): CPT | Performed by: FAMILY MEDICINE

## 2020-09-08 PROCEDURE — 77063 BREAST TOMOSYNTHESIS BI: CPT

## 2020-09-08 PROCEDURE — 36415 COLL VENOUS BLD VENIPUNCTURE: CPT | Performed by: FAMILY MEDICINE

## 2020-09-08 PROCEDURE — 77067 SCR MAMMO BI INCL CAD: CPT

## 2020-09-08 PROCEDURE — 84443 ASSAY THYROID STIM HORMONE: CPT | Performed by: FAMILY MEDICINE

## 2020-09-08 PROCEDURE — 84482 T3 REVERSE: CPT | Performed by: FAMILY MEDICINE

## 2020-09-08 PROCEDURE — 84439 ASSAY OF FREE THYROXINE: CPT | Performed by: FAMILY MEDICINE

## 2020-09-09 ENCOUNTER — TELEPHONE (OUTPATIENT)
Dept: FAMILY MEDICINE CLINIC | Facility: CLINIC | Age: 70
End: 2020-09-09

## 2020-09-09 LAB — TSH SERPL DL<=0.05 MIU/L-ACNC: <0.005 UIU/ML (ref 0.27–4.2)

## 2020-09-09 NOTE — TELEPHONE ENCOUNTER
----- Message from Jm Marie DO sent at 9/8/2020 10:09 PM EDT -----  Del,    Mammogram was negative.

## 2020-09-13 LAB — T3REVERSE SERPL-MCNC: 14.9 NG/DL (ref 9.2–24.1)

## 2020-09-17 ENCOUNTER — TELEPHONE (OUTPATIENT)
Dept: FAMILY MEDICINE CLINIC | Facility: CLINIC | Age: 70
End: 2020-09-17

## 2020-09-17 NOTE — TELEPHONE ENCOUNTER
PATIENT CALLED AND WANTED TO LET YOU KNOW THAT SHE IS FEELING SO MUCH BETTER AND SHE WOULD LIKE TO STAY ON HER CURRENT MEDICATIONS OF liothyronine (CYTOMEL) 25 MCG tablet AND levothyroxine (SYNTHROID, LEVOTHROID) 75 MCG tablet IF THAT'S OKAY.    SHE SAID THE DERMATOLOGIST SHE SAW IN THE PAST IS NOT WITH [facility] AND SHE DOESN'T NEED TO GO BACK.  HOWEVER, THEY WERE  THE ONES TO PRESCRIBE acyclovir (ZOVIRAX) 200 MG capsule.  SHE WOULD LIKE TO KNOW IF YOU CAN PRESCRIBE THAT FOR HER.  IT'S WORKED WELL OVER THE LAST YEAR AND SHE HAS HAD NO OUTBREAKS.    PLEASE CONTACT PATIENT TO ADVISE.    CALLBACK:  [phone]

## 2020-09-21 RX ORDER — ACYCLOVIR 400 MG/1
400 TABLET ORAL 3 TIMES DAILY
Qty: 21 TABLET | Refills: 3 | Status: SHIPPED | OUTPATIENT
Start: 2020-09-21 | End: 2021-07-01

## 2020-09-21 NOTE — TELEPHONE ENCOUNTER
Patient called again inquiring on the the Acyclovir  200MG. Patient states she has taken this medication before and has had success with controlling the breakouts. Patient is wanting to know if Dr Marie could prescribe her a 90-day supply and include refills.    [facility] pharmacy   [address] [phone] [facility] [phone] FX     Patient Call Back  [phone]

## 2020-09-24 ENCOUNTER — OFFICE VISIT (OUTPATIENT)
Dept: CARDIOLOGY | Facility: CLINIC | Age: 70
End: 2020-09-24

## 2020-09-24 VITALS
HEART RATE: 89 BPM | WEIGHT: 226.3 LBS | DIASTOLIC BLOOD PRESSURE: 76 MMHG | SYSTOLIC BLOOD PRESSURE: 122 MMHG | OXYGEN SATURATION: 96 % | RESPIRATION RATE: 20 BRPM | HEIGHT: 63 IN | BODY MASS INDEX: 40.1 KG/M2

## 2020-09-24 DIAGNOSIS — G47.33 OSA ON CPAP: ICD-10-CM

## 2020-09-24 DIAGNOSIS — I50.32 CHRONIC DIASTOLIC HEART FAILURE (HCC): ICD-10-CM

## 2020-09-24 DIAGNOSIS — I51.7 LEFT VENTRICULAR HYPERTROPHY: Primary | ICD-10-CM

## 2020-09-24 DIAGNOSIS — Z99.89 OSA ON CPAP: ICD-10-CM

## 2020-09-24 PROCEDURE — 99214 OFFICE O/P EST MOD 30 MIN: CPT | Performed by: NURSE PRACTITIONER

## 2020-09-24 NOTE — PROGRESS NOTES
Salinas Monroy is a 70 y.o. female.  MRN #: [id number]    Referring Provider: Jm Marie MD    Chief Complaint:   Follow-up for prior complaints of chest pain and shortness of breath.  Patient reports doing well today    History of Present Illness:  Ms Monroy is a 70-year-old female who presents for 6-month cardiac follow-up.  Patient has history of chronic diastolic congestive heart failure.  Patient also has history of essential hypertension, hypothyroidism, obesity with BMI 40.  Patient was evaluated in the emergency department, May 2020, for sudden onset of chest pain.  Her cardiac work-up was negative for any acute cardiac findings.  Patient reports she has a bipolar condition with anxiety.  She has since had no episodes of chest pain, chest palpitations, shortness of breath diaphoresis.    The patient presents today with their self who contributes to the history of their care.     The following portions of the patient's history were reviewed and updated as appropriate: allergies, current medications, past family history, past medical history, past social history, past surgical history and problem list.      Prior Cardiac Testing:   Past Cardiac Testin. Last Coronary Angio: None  2. Prior Stress Testin.  3/19/2020                   No ECG evidence of myocardial ischemia.Negative clinical evidence of myocardial ischemia. Findings consistent with a normal ECG stress test.                   Myocardial perfusion imaging indicates a normal myocardial perfusion study with no evidence of ischemia. Impressions are consistent with a low risk study. There is no prior study available for comparison.     3. Last Echo:               1.  2018                          1.  Normal LV systolic function, LVEF 67%                          2.  Mild concentric LVH.                          3.  Grade 1 diastolic dysfunction.                          4.  Moderate tricuspid regurgitation.               2.  2/24/2020                          1.  Normal left ventricular size and systolic function, LVEF 55-60%.                          2.  Mild concentric LVH.                          3.  Indeterminate LV diastolic filling pattern.                          4.  Normal right ventricular size and systolic function.                          5.  Normal left and right atrial size.                          6.  Trace MR and TR.  4. Prior Holter Monitor: None  Review of Systems:     Review of Systems   Constitutional: Negative.  Negative for activity change, appetite change, diaphoresis, fatigue, unexpected weight gain and unexpected weight loss.   HENT: Negative.    Eyes: Negative.  Negative for blurred vision, double vision, photophobia and visual disturbance.   Respiratory: Negative.  Negative for apnea, cough, chest tightness, shortness of breath and wheezing.    Cardiovascular: Negative.  Negative for chest pain, palpitations and leg swelling.   Gastrointestinal: Negative.  Negative for abdominal distention, abdominal pain, nausea, vomiting, GERD and indigestion.   Endocrine: Negative.  Negative for cold intolerance, heat intolerance, polydipsia, polyphagia and polyuria.   Genitourinary: Negative.  Negative for decreased libido, frequency, genital sores and urgency.   Musculoskeletal: Negative.  Negative for back pain, joint swelling, myalgias, neck pain and neck stiffness.   Skin: Negative for color change, dry skin and bruise.   Allergic/Immunologic: Negative.  Negative for environmental allergies, food allergies and immunocompromised state.   Neurological: Positive for tremors. Negative for dizziness, seizures, syncope, facial asymmetry, speech difficulty, weakness, light-headedness, numbness, headache, memory problem and confusion.        Patient with history of essential tremors.   Hematological: Negative.  Negative for adenopathy. Does not bruise/bleed easily.   Psychiatric/Behavioral: Negative.   Negative for agitation, decreased concentration, self-injury, sleep disturbance, suicidal ideas, negative for hyperactivity and stress. The patient is not nervous/anxious.    All other systems reviewed and are negative.         Current Outpatient Medications:   •  acyclovir (ZOVIRAX) 400 MG tablet, Take 1 tablet by mouth 3 (Three) Times a Day. Take no more than 5 doses a day., Disp: 21 tablet, Rfl: 3  •  allopurinol (ZYLOPRIM) 100 MG tablet, Take 2 tablets by mouth Daily., Disp: 180 tablet, Rfl: 3  •  B Complex Vitamins (VITAMIN B COMPLEX PO), Take 1 tablet by mouth Daily., Disp: , Rfl:   •  B-12, Methylcobalamin, 1000 MCG sublingual tablet, , Disp: , Rfl:   •  Cholecalciferol (VITAMIN D3) 46423 UNITS tablet, Take 7,500 Units by mouth Daily., Disp: , Rfl:   •  clotrimazole-betamethasone (LOTRISONE) 1-0.05 % cream, Apply  topically to the appropriate area as directed 2 (Two) Times a Day., Disp: 45 g, Rfl: 3  •  Coenzyme Q10 400 MG capsule, Take  by mouth., Disp: , Rfl:   •  Digestive Enzymes (DIGESTIVE ENZYME PO), Take 1 tablet by mouth Daily., Disp: , Rfl:   •  escitalopram (LEXAPRO) 10 MG tablet, Take 1 tablet by mouth once daily, Disp: 90 tablet, Rfl: 0  •  ferrous gluconate (FERGON) 324 MG tablet, Take 1 tablet by mouth Daily With Breakfast., Disp: 30 tablet, Rfl: 5  •  furosemide (LASIX) 20 MG tablet, Take 1 tablet by mouth Daily., Disp: 90 tablet, Rfl: 2  •  GARLIC PO, Take 1 tablet by mouth Daily., Disp: , Rfl:   •  L-THEANINE PO, Take  by mouth., Disp: , Rfl:   •  levothyroxine (SYNTHROID, LEVOTHROID) 75 MCG tablet, Take 1 tablet by mouth Daily., Disp: 90 tablet, Rfl: 1  •  liothyronine (CYTOMEL) 25 MCG tablet, Take 3 tablets by mouth once daily, Disp: 90 tablet, Rfl: 0  •  MAGNESIUM PO, Take 1,000 mg by mouth Every Night., Disp: , Rfl:   •  Methylsulfonylmethane (MSM PO), Take 1 tablet by mouth Daily., Disp: , Rfl:   •  MILK THISTLE PO, Take 1 tablet by mouth Daily., Disp: , Rfl:   •  Misc Natural Products  "(TART CHERRY ADVANCED PO), Take 2 capsules by mouth Daily., Disp: , Rfl:   •  NON FORMULARY, , Disp: , Rfl:   •  Omega-3 Fatty Acids (OMEGA-3 FISH OIL PO), Take 4,000 Units by mouth Daily., Disp: , Rfl:   •  Probiotic Product (PROBIOTIC PO), Take 1 tablet by mouth Daily., Disp: , Rfl:   •  SELENIUM PO, Take 1 tablet by mouth Daily., Disp: , Rfl:   •  TAURINE PO, Take  by mouth., Disp: , Rfl:   •  TURMERIC PO, Take 1 tablet by mouth Daily., Disp: , Rfl:   •  VITAMIN A PO, Take  by mouth., Disp: , Rfl:   •  vitamin C (ASCORBIC ACID) 500 MG tablet, Take 500 mg by mouth 3 (Three) Times a Day As Needed., Disp: , Rfl:   •  vitamin E 400 UNIT capsule, Take 400 Units by mouth Daily., Disp: , Rfl:   •  VITAMIN K PO, Take 100 mcg by mouth Daily., Disp: , Rfl:   •  Zinc 30 MG tablet, Take  by mouth Daily., Disp: , Rfl:     Vitals:    09/24/20 1341   BP: 122/76   BP Location: Right arm   Patient Position: Sitting   Cuff Size: Large Adult   Pulse: 89   Resp: 20   SpO2: 96%   Weight: 103 kg (226 lb 4.8 oz)   Height: 160 cm (62.99\")       Physical Exam:     Physical Exam  Vitals signs and nursing note reviewed.   Constitutional:       General: She is not in acute distress.     Appearance: She is well-developed. She is obese. She is not ill-appearing.   HENT:      Head: Normocephalic and atraumatic.      Nose: Nose normal.      Mouth/Throat:      Mouth: Mucous membranes are moist.   Eyes:      General: No scleral icterus.     Conjunctiva/sclera: Conjunctivae normal.   Neck:      Musculoskeletal: Normal range of motion and neck supple.      Thyroid: No thyromegaly.      Vascular: No carotid bruit or JVD.   Cardiovascular:      Rate and Rhythm: Normal rate and regular rhythm.      Chest Wall: PMI is not displaced.      Heart sounds: Normal heart sounds. No murmur. No friction rub. No gallop.    Pulmonary:      Effort: Pulmonary effort is normal. No respiratory distress.      Breath sounds: Normal breath sounds. No wheezing or rales. "   Chest:      Chest wall: No tenderness.   Abdominal:      General: Bowel sounds are normal.      Palpations: Abdomen is soft. There is no mass.      Tenderness: There is no abdominal tenderness.   Musculoskeletal: Normal range of motion.   Skin:     General: Skin is warm and dry.      Capillary Refill: Capillary refill takes less than 2 seconds.      Findings: No rash.   Neurological:      General: No focal deficit present.      Mental Status: She is alert and oriented to person, place, and time.   Psychiatric:         Mood and Affect: Mood normal.         Behavior: Behavior normal.         Thought Content: Thought content normal.         Judgment: Judgment normal.         Procedures    Results:   Reviewed prior cardiology note from Dr. Pires  Reviewed prior cardiac diagnostic testing  Vital signs stable 122/76  Reviewed medication regimen.  Reviewed medical, social, surgical history.    Assessment/Plan:   No cardiac diagnostic testing is indicated with this visit.  Follow-up 1 year or as needed with Dr. Pires.  Salinas was seen today for follow-up and congestive heart failure.    Diagnoses and all orders for this visit:    Chest pain  Reports no recurrence of chest pain, chest palpitations, shortness of breath.  Chronic diastolic heart failure (CMS/[facility])  Chronic grade 1 diastolic CHF  Patient reports improvement with shortness of breath and continues to take her Lasix.  She was advised on replenishing potassium sources in her diet.  ISABEL on CPAP  Reports compliance with CPAP  Obesity BMI of 40.0  Counseled on increased activity, increased aerobic exercise.  Improved dietary habits, reduce carbohydrate intake.  Also instructed on no added salt and restrict sodium to less than 1500 mg per 24 hours.      Return for F/U as needed if any problems or concerns..    VALENTINE Palmer

## 2020-10-05 DIAGNOSIS — E03.9 HYPOTHYROIDISM, ADULT: ICD-10-CM

## 2020-10-05 RX ORDER — LIOTHYRONINE SODIUM 25 UG/1
TABLET ORAL
Qty: 90 TABLET | Refills: 2 | Status: SHIPPED | OUTPATIENT
Start: 2020-10-05 | End: 2020-12-30

## 2020-11-04 DIAGNOSIS — F31.11 BIPOLAR AFFECTIVE DISORDER, CURRENTLY MANIC, MILD (HCC): ICD-10-CM

## 2020-11-04 RX ORDER — ESCITALOPRAM OXALATE 10 MG/1
TABLET ORAL
Qty: 90 TABLET | Refills: 1 | Status: SHIPPED | OUTPATIENT
Start: 2020-11-04 | End: 2021-05-06 | Stop reason: SDUPTHER

## 2020-11-09 ENCOUNTER — RESULTS ENCOUNTER (OUTPATIENT)
Dept: FAMILY MEDICINE CLINIC | Facility: CLINIC | Age: 70
End: 2020-11-09

## 2020-11-09 ENCOUNTER — OFFICE VISIT (OUTPATIENT)
Dept: FAMILY MEDICINE CLINIC | Facility: CLINIC | Age: 70
End: 2020-11-09

## 2020-11-09 VITALS
SYSTOLIC BLOOD PRESSURE: 120 MMHG | DIASTOLIC BLOOD PRESSURE: 70 MMHG | HEIGHT: 63 IN | OXYGEN SATURATION: 96 % | WEIGHT: 230 LBS | BODY MASS INDEX: 40.75 KG/M2 | HEART RATE: 83 BPM | TEMPERATURE: 97.7 F

## 2020-11-09 DIAGNOSIS — Z86.010 HISTORY OF COLON POLYPS: ICD-10-CM

## 2020-11-09 DIAGNOSIS — E55.9 VITAMIN D DEFICIENCY: ICD-10-CM

## 2020-11-09 DIAGNOSIS — R10.84 ABDOMINAL PAIN, GENERALIZED: ICD-10-CM

## 2020-11-09 DIAGNOSIS — F31.62 BIPOLAR DISORDER, CURRENT EPISODE MIXED, MODERATE (HCC): ICD-10-CM

## 2020-11-09 DIAGNOSIS — E03.9 HYPOTHYROIDISM, ADULT: Primary | ICD-10-CM

## 2020-11-09 DIAGNOSIS — E03.9 HYPOTHYROIDISM, ADULT: ICD-10-CM

## 2020-11-09 DIAGNOSIS — Z23 NEED FOR INFLUENZA VACCINATION: ICD-10-CM

## 2020-11-09 DIAGNOSIS — I50.32 CHRONIC DIASTOLIC HEART FAILURE (HCC): ICD-10-CM

## 2020-11-09 DIAGNOSIS — R14.0 ABDOMINAL BLOATING: ICD-10-CM

## 2020-11-09 PROCEDURE — G0008 ADMIN INFLUENZA VIRUS VAC: HCPCS | Performed by: FAMILY MEDICINE

## 2020-11-09 PROCEDURE — 99214 OFFICE O/P EST MOD 30 MIN: CPT | Performed by: FAMILY MEDICINE

## 2020-11-09 PROCEDURE — 90694 VACC AIIV4 NO PRSRV 0.5ML IM: CPT | Performed by: FAMILY MEDICINE

## 2020-11-09 NOTE — PROGRESS NOTES
Subjective   Salinas Monroy is a 70 y.o. female.     History of Present Illness The patient is here for follow up of hypothyroidism. She states she finally feels like the her thyroid is balance. She would like her free T3 and reverse T3 checked. She denies heat or cold intolerance, rapid or slow heart rate, or changes to skin or hair. She would also like to see a gastroenterologist. She is complaining of epigastric pain and distention, and frequent belching. She states she has been taking some digestive enzymes for several years, but she continues to feel bloated.   {Common H&P Review Areas:28061}    Review of Systems   Constitutional: Negative.    HENT: Negative.    Eyes: Positive for blurred vision (she is due for an eye exam. c/o night blindness). Negative for pain and redness.   Respiratory: Negative.    Cardiovascular: Negative.    Gastrointestinal: Positive for abdominal distention and constipation (occational ). Negative for diarrhea, nausea, vomiting and GERD.   Endocrine: Negative for cold intolerance and heat intolerance.   Genitourinary: Negative for urgency and urinary incontinence.        She denies frequency burning or urgency but is concerned that her urine has a foul smell   Musculoskeletal: Negative.    Skin: Negative.    Neurological: Negative.    Psychiatric/Behavioral: Negative for suicidal ideas and depressed mood. The patient is not nervous/anxious.        Objective   Physical Exam  Constitutional:       General: She is not in acute distress.     Appearance: Normal appearance. She is obese. She is not ill-appearing, toxic-appearing or diaphoretic.   HENT:      Head: Normocephalic and atraumatic.      Ears:      Comments: Dry skin noted to right outer ear.     Nose: Nose normal. No congestion.      Mouth/Throat:      Mouth: Mucous membranes are moist.   Eyes:      General:         Right eye: No discharge.         Left eye: No discharge.      Extraocular Movements: Extraocular movements  intact.      Conjunctiva/sclera: Conjunctivae normal.   Neck:      Musculoskeletal: Normal range of motion and neck supple. No neck rigidity or muscular tenderness.      Vascular: No carotid bruit.   Cardiovascular:      Rate and Rhythm: Normal rate and regular rhythm.      Pulses: Normal pulses.      Heart sounds: Normal heart sounds. No murmur. No friction rub. No gallop.    Pulmonary:      Effort: Pulmonary effort is normal. No respiratory distress.      Breath sounds: Normal breath sounds. No stridor. No rhonchi.   Abdominal:      General: Abdomen is flat. Bowel sounds are normal. There is no distension.      Palpations: Abdomen is soft. There is no mass.      Tenderness: There is no abdominal tenderness.   Musculoskeletal: Normal range of motion.         General: No swelling, tenderness or deformity.   Lymphadenopathy:      Cervical: No cervical adenopathy.   Skin:     General: Skin is warm and dry.      Capillary Refill: Capillary refill takes less than 2 seconds.      Coloration: Skin is not jaundiced or pale.   Neurological:      General: No focal deficit present.      Mental Status: She is alert and oriented to person, place, and time.      Cranial Nerves: No cranial nerve deficit.      Sensory: No sensory deficit.   Psychiatric:         Mood and Affect: Mood normal.         Behavior: Behavior normal.         Thought Content: Thought content normal.         Judgment: Judgment normal.           Assessment/Plan   {Assess/PlanSmartLinks:08587}

## 2020-11-09 NOTE — PROGRESS NOTES
Established Patient        Chief Complaint:   Chief Complaint   Patient presents with   • Follow-up     scheduled follow up; patient  is doing well with current medications        Salinas Monroy is a 70 y.o. female    History of Present Illness:   Answers for HPI/ROS submitted by the patient on 11/6/2020   What is the primary reason for your visit?: Other  Please describe your symptoms.: follow up  Have you had these symptoms before?: Yes  How long have you been having these symptoms?: Greater than 2 weeks    Here for scheduled follow-up visit for her chronic diastolic heart failure, hypothyroidism, essential tremor and vitamin D deficiency.  She denies any active chest pain, having recently been evaluated by cardiology with a planned follow-up 1 year from now.  She denies any orthopnea.    Patient continues to utilize thyroid supplementation, states she feels very well with current dosing.    She denies any SI/HI, mood has been quite stable, very pleasant and interactive during questioning today.  She does describe abdominal bloating and generalized abdominal pain, problematic and that it is cyclical and severe in nature at times.  She denies any aspiration or dysphagia, denies any bright red blood or black tarry stools.  She does have a history of colon polyps with bowel resection.  She would like to be referred to a new gastroenterologist as her previous gastroenterologist has retired.  Subjective     The following portions of the patient's history were reviewed and updated as appropriate: allergies, current medications, past family history, past medical history, past social history, past surgical history and problem list.    No Known Allergies    Review of Systems  Constitutional: Negative for fever. Negative for chills, diaphoresis, fatigue and unexpected weight change.   HENT: No dysphagia; no changes to vision/hearing/smell/taste; no epistaxis  Eyes: Negative for redness and visual disturbance.  "  Respiratory: negative for shortness of breath. Negative for chest pain . Negative for cough and chest tightness.   Cardiovascular: Negative for chest pain and palpitations.   Gastrointestinal: As per above.  Endocrine: Negative for cold intolerance and heat intolerance.   Genitourinary: Negative for difficulty urinating, dysuria and frequency.   Musculoskeletal: Chronic arthralgias, back pain and myalgias.   Skin: Negative for color change, rash and wound.   Neurological: Negative for syncope, weakness and headaches.  Chronic tremors.  Hematological: Negative for adenopathy. Does not bruise/bleed easily.   Psychiatric/Behavioral: Negative for confusion. The patient is not nervous/anxious at present.    Objective     Physical Exam   Vital Signs: /70   Pulse 83   Temp 97.7 °F (36.5 °C)   Ht 160 cm (63\")   Wt 104 kg (230 lb)   LMP  (LMP Unknown)   SpO2 96%   BMI 40.74 kg/m²     General Appearance: alert, oriented x 3, no acute distress.  Skin: warm and dry.   HEENT: Atraumatic.  pupils round and reactive to light and accommodation, oral mucosa pink and moist.  Nares patent without epistaxis.  External auditory canals are patent tympanic membranes intact.  Neck: supple, no JVD, trachea midline.  No thyromegaly  Lungs: CTA, unlabored breathing effort.  Heart: RRR, normal S1 and S2, no S3, no rub.  Abdomen: soft, non-tender, no palpable bladder, present bowel sounds to auscultation ×4.  No guarding or rigidity.  Extremities: no clubbing, cyanosis or edema.  Good range of motion actively and passively.  Symmetric muscle strength and development  Neuro: normal speech and mental status.  Cranial nerves II through XII intact.  No anosmia. DTR 2+; proprioception intact.  Significantly improved tremulousness    Assessment and Plan      Assessment:   Diagnoses and all orders for this visit:    1. Hypothyroidism, adult (Primary)  -     T3, Reverse; Future  -     TSH; Future  -     T4, Free; Future  -     T3, free; " Future  -     Comprehensive Metabolic Panel; Future    2. Chronic diastolic heart failure (CMS/HCC)  -     CBC & Differential; Future  -     Lipid Panel; Future  -     Comprehensive Metabolic Panel; Future    3. Bipolar disorder, current episode mixed, moderate (CMS/HCC)    4. Vitamin D deficiency  -     Vitamin D 25 Hydroxy; Future    5. Abdominal pain, generalized  -     Ambulatory Referral to Gastroenterology    6. Abdominal bloating  -     Ambulatory Referral to Gastroenterology    7. History of colon polyps  -     Ambulatory Referral to Gastroenterology    8. Need for influenza vaccination  -     Fluad Quad 65+ yrs ([id number])        Plan:  Referral has been placed to gastroenterology.    Plan surveillance thyroid function studies.    Continue low-sodium dietary intake, maintain adequate hydration status.  Surveillance CMP/CBC/lipid panel has been ordered today.    Vital signs demonstrate hemodynamic stability, blood pressure is at goal.    Continue current mood stabilizer treatment regimen.    I will plan to see patient back in 3 months time, sooner if needed.  Discussion Summary:    Discussed plan of care in detail with pt today; pt verb understanding and agrees.    I have reviewed and updated all copied forward information, as appropriate.  I attest to the accuracy and relevance of any unchanged information.    Follow up:  Return in about 3 months (around 2/9/2021) for Recheck.     There are no Patient Instructions on file for this visit.    Jm Marie, DO  11/09/20  18:06 EST          Please note that portions of this note may have been completed with a voice recognition program. Efforts were made to edit the dictations, but occasionally words are mistranscribed.

## 2020-12-03 ENCOUNTER — TELEPHONE (OUTPATIENT)
Dept: FAMILY MEDICINE CLINIC | Facility: CLINIC | Age: 70
End: 2020-12-03

## 2020-12-03 RX ORDER — SULFAMETHOXAZOLE AND TRIMETHOPRIM 800; 160 MG/1; MG/1
1 TABLET ORAL 2 TIMES DAILY
Qty: 14 TABLET | Refills: 0 | Status: SHIPPED | OUTPATIENT
Start: 2020-12-03 | End: 2020-12-10

## 2020-12-03 NOTE — TELEPHONE ENCOUNTER
Caller: ElianaDalekwanruben Madelyn    Relationship: Self    Best call back number: [phone]    What medication are you requesting: Antibiotic     What are your current symptoms: Burning when urinating, Foul smell to urine     How long have you been experiencing symptoms: 7 days     Have you had these symptoms before:    [x] Yes  [] No    Have you been treated for these symptoms before:   [x] Yes  [] No    If a prescription is needed, what is your preferred pharmacy and phone number: [address] [phone] [facility] [phone]      Additional notes: Patient states that she would like a medication for her issues.

## 2020-12-09 ENCOUNTER — LAB (OUTPATIENT)
Dept: LAB | Facility: HOSPITAL | Age: 70
End: 2020-12-09

## 2020-12-09 LAB
25(OH)D3 SERPL-MCNC: 73.8 NG/ML (ref 30–100)
ALBUMIN SERPL-MCNC: 4.3 G/DL (ref 3.5–5.2)
ALBUMIN/GLOB SERPL: 1.3 G/DL
ALP SERPL-CCNC: 95 U/L (ref 39–117)
ALT SERPL W P-5'-P-CCNC: 17 U/L (ref 1–33)
ANION GAP SERPL CALCULATED.3IONS-SCNC: 9 MMOL/L (ref 5–15)
AST SERPL-CCNC: 21 U/L (ref 1–32)
BASOPHILS # BLD AUTO: 0.03 10*3/MM3 (ref 0–0.2)
BASOPHILS NFR BLD AUTO: 0.4 % (ref 0–1.5)
BILIRUB SERPL-MCNC: 0.5 MG/DL (ref 0–1.2)
BUN SERPL-MCNC: 21 MG/DL (ref 8–23)
BUN/CREAT SERPL: 28 (ref 7–25)
CALCIUM SPEC-SCNC: 9.8 MG/DL (ref 8.6–10.5)
CHLORIDE SERPL-SCNC: 99 MMOL/L (ref 98–107)
CHOLEST SERPL-MCNC: 140 MG/DL (ref 0–200)
CO2 SERPL-SCNC: 28 MMOL/L (ref 22–29)
CREAT SERPL-MCNC: 0.75 MG/DL (ref 0.57–1)
DEPRECATED RDW RBC AUTO: 39.6 FL (ref 37–54)
EOSINOPHIL # BLD AUTO: 0.1 10*3/MM3 (ref 0–0.4)
EOSINOPHIL NFR BLD AUTO: 1.4 % (ref 0.3–6.2)
ERYTHROCYTE [DISTWIDTH] IN BLOOD BY AUTOMATED COUNT: 12.3 % (ref 12.3–15.4)
GFR SERPL CREATININE-BSD FRML MDRD: 76 ML/MIN/1.73
GLOBULIN UR ELPH-MCNC: 3.3 GM/DL
GLUCOSE SERPL-MCNC: 97 MG/DL (ref 65–99)
HCT VFR BLD AUTO: 41.9 % (ref 34–46.6)
HDLC SERPL-MCNC: 47 MG/DL (ref 40–60)
HGB BLD-MCNC: 13.7 G/DL (ref 12–15.9)
IMM GRANULOCYTES # BLD AUTO: 0.03 10*3/MM3 (ref 0–0.05)
IMM GRANULOCYTES NFR BLD AUTO: 0.4 % (ref 0–0.5)
LDLC SERPL CALC-MCNC: 72 MG/DL (ref 0–100)
LDLC/HDLC SERPL: 1.49 {RATIO}
LYMPHOCYTES # BLD AUTO: 1.51 10*3/MM3 (ref 0.7–3.1)
LYMPHOCYTES NFR BLD AUTO: 21.2 % (ref 19.6–45.3)
MCH RBC QN AUTO: 28.9 PG (ref 26.6–33)
MCHC RBC AUTO-ENTMCNC: 32.7 G/DL (ref 31.5–35.7)
MCV RBC AUTO: 88.4 FL (ref 79–97)
MONOCYTES # BLD AUTO: 0.49 10*3/MM3 (ref 0.1–0.9)
MONOCYTES NFR BLD AUTO: 6.9 % (ref 5–12)
NEUTROPHILS NFR BLD AUTO: 4.97 10*3/MM3 (ref 1.7–7)
NEUTROPHILS NFR BLD AUTO: 69.7 % (ref 42.7–76)
NRBC BLD AUTO-RTO: 0 /100 WBC (ref 0–0.2)
PLATELET # BLD AUTO: 225 10*3/MM3 (ref 140–450)
PMV BLD AUTO: 10.4 FL (ref 6–12)
POTASSIUM SERPL-SCNC: 4.5 MMOL/L (ref 3.5–5.2)
PROT SERPL-MCNC: 7.6 G/DL (ref 6–8.5)
RBC # BLD AUTO: 4.74 10*6/MM3 (ref 3.77–5.28)
SODIUM SERPL-SCNC: 136 MMOL/L (ref 136–145)
T3FREE SERPL-MCNC: 4 PG/ML (ref 2–4.4)
T4 FREE SERPL-MCNC: 0.78 NG/DL (ref 0.93–1.7)
TRIGL SERPL-MCNC: 114 MG/DL (ref 0–150)
TSH SERPL DL<=0.05 MIU/L-ACNC: <0.005 UIU/ML (ref 0.27–4.2)
VLDLC SERPL-MCNC: 21 MG/DL (ref 5–40)
WBC # BLD AUTO: 7.13 10*3/MM3 (ref 3.4–10.8)

## 2020-12-09 PROCEDURE — 80053 COMPREHEN METABOLIC PANEL: CPT | Performed by: FAMILY MEDICINE

## 2020-12-09 PROCEDURE — 84482 T3 REVERSE: CPT | Performed by: FAMILY MEDICINE

## 2020-12-09 PROCEDURE — 36415 COLL VENOUS BLD VENIPUNCTURE: CPT | Performed by: FAMILY MEDICINE

## 2020-12-09 PROCEDURE — 80061 LIPID PANEL: CPT | Performed by: FAMILY MEDICINE

## 2020-12-09 PROCEDURE — 84443 ASSAY THYROID STIM HORMONE: CPT | Performed by: FAMILY MEDICINE

## 2020-12-09 PROCEDURE — 84439 ASSAY OF FREE THYROXINE: CPT | Performed by: FAMILY MEDICINE

## 2020-12-09 PROCEDURE — 85025 COMPLETE CBC W/AUTO DIFF WBC: CPT | Performed by: FAMILY MEDICINE

## 2020-12-09 PROCEDURE — 84481 FREE ASSAY (FT-3): CPT | Performed by: FAMILY MEDICINE

## 2020-12-09 PROCEDURE — 82306 VITAMIN D 25 HYDROXY: CPT | Performed by: FAMILY MEDICINE

## 2020-12-15 LAB — T3REVERSE SERPL-MCNC: 12.1 NG/DL (ref 9.2–24.1)

## 2020-12-23 ENCOUNTER — OFFICE VISIT (OUTPATIENT)
Dept: GASTROENTEROLOGY | Facility: CLINIC | Age: 70
End: 2020-12-23

## 2020-12-23 VITALS
SYSTOLIC BLOOD PRESSURE: 172 MMHG | BODY MASS INDEX: 40.57 KG/M2 | RESPIRATION RATE: 16 BRPM | HEIGHT: 63 IN | WEIGHT: 229 LBS | TEMPERATURE: 97.8 F | HEART RATE: 84 BPM | DIASTOLIC BLOOD PRESSURE: 95 MMHG

## 2020-12-23 DIAGNOSIS — R14.2 BURPING: Chronic | ICD-10-CM

## 2020-12-23 DIAGNOSIS — R10.13 EPIGASTRIC PAIN: Primary | Chronic | ICD-10-CM

## 2020-12-23 DIAGNOSIS — Z86.010 PERSONAL HISTORY OF COLONIC POLYPS: Chronic | ICD-10-CM

## 2020-12-23 DIAGNOSIS — Z01.818 PREOP TESTING: Primary | ICD-10-CM

## 2020-12-23 DIAGNOSIS — K21.9 GASTROESOPHAGEAL REFLUX DISEASE, UNSPECIFIED WHETHER ESOPHAGITIS PRESENT: Chronic | ICD-10-CM

## 2020-12-23 PROCEDURE — 99214 OFFICE O/P EST MOD 30 MIN: CPT | Performed by: NURSE PRACTITIONER

## 2020-12-23 RX ORDER — FAMOTIDINE 20 MG/1
20 TABLET, FILM COATED ORAL 2 TIMES DAILY
Qty: 60 TABLET | Refills: 5 | Status: SHIPPED | OUTPATIENT
Start: 2020-12-23 | End: 2022-05-19 | Stop reason: SDUPTHER

## 2020-12-23 RX ORDER — SODIUM CHLORIDE 9 MG/ML
70 INJECTION, SOLUTION INTRAVENOUS CONTINUOUS PRN
Status: CANCELLED | OUTPATIENT
Start: 2020-12-23

## 2020-12-23 NOTE — PATIENT INSTRUCTIONS
1. Antireflux measures: Avoid fried, fatty foods, alcohol, chocolate, coffee, tea,  soft drinks, peppermint and spearmint, spicy foods, tomatoes and tomato based foods, onion based foods, and smoking. Other antireflux measures include weight reduction if overweight, avoiding tight clothing around the abdomen, elevating the head of the bed 6 inches with blocks under the head board, and don't drink or eat before going to bed and avoid lying down immediately after meals.  2. Pepcid 20 mg 1 po twice a day.  3. High fiber, low fat diet with liberal water intake.   4. Upper endoscopy-EGD: Description of the procedure, risks, benefits, alternatives and options, including nonoperative options, were discussed with the patient in detail. The patient understands and wishes to proceed.  5. COVID-19 testing prior to procedure. The patient will need to self-quarantine after testing until the procedure. Instructions given to patient.

## 2020-12-23 NOTE — PROGRESS NOTES
"     Follow Up Note     Date: 2020   Patient Name: Salinas Monroy  MRN: [id number]  : 1950     Primary Care Provider: Jm Marie DO     Chief Complaint:    Chief Complaint   Patient presents with   • Follow-up     History of present illness:   2020  Salinas Monroy is a 70 y.o. female who is here today for follow up for abdominal pain.    She has been having pain in the epigastric area for a few months that will come and go. She has bloating and her abdomen feels distended. Eating makes symptoms worse. She has a long standing history of reflux. She is not taking anything for reflux at this time. She has been having frequent burping. No nausea or vomiting. No melena. No history of difficulty swallowing. Of interest, the patient is taking apple cider vinegar as she feels it helps. She is also on multiple vitamin supplements as she had gastric bypass many years ago and she feels she needs to be on them.    A few months ago she had an episode of lower abdominal cramping with diarrhea that lasted about 2 weeks. It had improved. She may occasionally have diarrhea, but this is not very often now. No history of rectal bleeding. The patient had \"mesh failure\" in  and had small bowel obstruction that resulted in resection.    The patient's last colonoscopy was in 2018. Her last EGD was in . No family history of colon or stomach cancer. Her father had liver and lung cancer.    Subjective      Past Medical History:   Diagnosis Date   • Anemia ?    under control   • Arthritis    • Colon polyp 2018   • Depression    • Diverticulosis    • Essential hypertension 2018   • GERD (gastroesophageal reflux disease)     no longer bothers me...   • Gout    • Gout    • Hashimoto's disease    • Hernia 3-    repair surgery which failed in    • Hypothyroid    • Impingement syndrome of right shoulder 2016   • Movement disorder Hand Tremors   • Obesity    • ISABEL on CPAP " "9/24/2019   • PONV (postoperative nausea and vomiting)     Pt states \"only one time\".   • Tremor     worse on left arm/hand   • Wears glasses      Past Surgical History:   Procedure Laterality Date   • ABDOMINAL SURGERY     • APPENDECTOMY     • COLONOSCOPY  2012    failed due to hernia...   • COLONOSCOPY N/A 6/8/2017    Procedure: COLONOSCOPY with cold snare polypectomies, argon thermal ablation, ns injection, yanira ink injection;  Surgeon: Rafiq Huang MD;  Location: [facility] ENDOSCOPY;  Service:    • COLONOSCOPY N/A 6/5/2018    Procedure: COLONOSCOPY WITH BIOPSIES;  Surgeon: Rafiq Huang MD;  Location: [facility] ENDOSCOPY;  Service: Gastroenterology   • GASTRIC BYPASS  1978   • HERNIA MESH REMOVAL  2014    BOWEL OBSTRUCTION DUE TO MESH   • HERNIA REPAIR  2012   • HERNIA REPAIR     • HYSTERECTOMY     • JOINT REPLACEMENT  2009 & 2012    Left & Right Full Hip Replacements   • SMALL INTESTINE SURGERY  2014    DUE TO BOWEL OBSTRUCTION WITH SOME REMOVAL   • TOTAL ABDOMINAL HYSTERECTOMY WITH SALPINGO OOPHORECTOMY  1990    fibroids   • TUBAL ABDOMINAL LIGATION     • UPPER GASTROINTESTINAL ENDOSCOPY  2010     Family History   Problem Relation Age of Onset   • Obesity Mother    • Rheum arthritis Mother    • Thyroid disease Mother    • Hypertension Mother    • Stroke Mother         Most of her problems were caused by Rheumatoid Ar.   • Hyperlipidemia Mother    • Arthritis Mother         Rheumatoid Arthritis..Passed 2004   • Cancer Father         Passed 1997   • Kidney cancer Father    • Liver cancer Father    • No Known Problems Brother    • Diabetes Maternal Aunt    • Cancer Maternal Uncle    • Lung cancer Maternal Uncle    • Alcohol abuse Maternal Uncle    • No Known Problems Paternal Aunt    • Stroke Paternal Uncle    • Hypertension Paternal Uncle    • Hyperlipidemia Paternal Uncle    • Heart disease Paternal Uncle    • No Known Problems Brother    • Asthma Sister         under control   • Alcohol abuse Maternal Uncle    • " "Colon cancer Neg Hx    • Breast cancer Neg Hx    • Ovarian cancer Neg Hx      Social History     Socioeconomic History   • Marital status:      Spouse name: Not on file   • Number of children: Not on file   • Years of education: Not on file   • Highest education level: Not on file   Tobacco Use   • Smoking status: Former Smoker     Packs/day: 1.00     Years: 40.00     Pack years: 40.00     Types: Cigarettes     Start date:      Quit date:      Years since quittin.9   • Smokeless tobacco: Never Used   • Tobacco comment: \"quit 9 years ago\"   Substance and Sexual Activity   • Alcohol use: No   • Drug use: No   • Sexual activity: Defer       Current Outpatient Medications:   •  acyclovir (ZOVIRAX) 400 MG tablet, Take 1 tablet by mouth 3 (Three) Times a Day. Take no more than 5 doses a day., Disp: 21 tablet, Rfl: 3  •  allopurinol (ZYLOPRIM) 100 MG tablet, Take 2 tablets by mouth Daily., Disp: 180 tablet, Rfl: 3  •  B Complex Vitamins (VITAMIN B COMPLEX PO), Take 1 tablet by mouth Daily., Disp: , Rfl:   •  B-12, Methylcobalamin, 1000 MCG sublingual tablet, , Disp: , Rfl:   •  Cholecalciferol (VITAMIN D3) 94055 UNITS tablet, Take 7,500 Units by mouth Daily., Disp: , Rfl:   •  clotrimazole-betamethasone (LOTRISONE) 1-0.05 % cream, Apply  topically to the appropriate area as directed 2 (Two) Times a Day., Disp: 45 g, Rfl: 3  •  Coenzyme Q10 400 MG capsule, Take  by mouth., Disp: , Rfl:   •  Digestive Enzymes (DIGESTIVE ENZYME PO), Take 1 tablet by mouth Daily., Disp: , Rfl:   •  escitalopram (LEXAPRO) 10 MG tablet, Take 1 tablet by mouth once daily, Disp: 90 tablet, Rfl: 1  •  ferrous gluconate (FERGON) 324 MG tablet, Take 1 tablet by mouth Daily With Breakfast., Disp: 30 tablet, Rfl: 5  •  furosemide (LASIX) 20 MG tablet, Take 1 tablet by mouth Daily., Disp: 90 tablet, Rfl: 2  •  GARLIC PO, Take 1 tablet by mouth Daily., Disp: , Rfl:   •  L-THEANINE PO, Take  by mouth., Disp: , Rfl:   •  levothyroxine " (SYNTHROID, LEVOTHROID) 75 MCG tablet, Take 1 tablet by mouth Daily., Disp: 90 tablet, Rfl: 1  •  liothyronine (CYTOMEL) 25 MCG tablet, Take 3 tablets by mouth once daily, Disp: 90 tablet, Rfl: 2  •  MAGNESIUM PO, Take 1,000 mg by mouth Every Night., Disp: , Rfl:   •  Methylsulfonylmethane (MSM PO), Take 1 tablet by mouth Daily., Disp: , Rfl:   •  MILK THISTLE PO, Take 1 tablet by mouth Daily., Disp: , Rfl:   •  Misc Natural Products (TART CHERRY ADVANCED PO), Take 2 capsules by mouth Daily., Disp: , Rfl:   •  Omega-3 Fatty Acids (OMEGA-3 FISH OIL PO), Take 4,000 Units by mouth Daily., Disp: , Rfl:   •  Probiotic Product (PROBIOTIC PO), Take 1 tablet by mouth Daily., Disp: , Rfl:   •  SELENIUM PO, Take 1 tablet by mouth Daily., Disp: , Rfl:   •  TAURINE PO, Take  by mouth., Disp: , Rfl:   •  TURMERIC PO, Take 1 tablet by mouth Daily., Disp: , Rfl:   •  VITAMIN A PO, Take  by mouth., Disp: , Rfl:   •  vitamin C (ASCORBIC ACID) 500 MG tablet, Take 500 mg by mouth 3 (Three) Times a Day As Needed., Disp: , Rfl:   •  vitamin E 400 UNIT capsule, Take 400 Units by mouth Daily., Disp: , Rfl:   •  VITAMIN K PO, Take 100 mcg by mouth Daily., Disp: , Rfl:   •  Zinc 30 MG tablet, Take  by mouth Daily., Disp: , Rfl:   •  famotidine (PEPCID) 20 MG tablet, Take 1 tablet by mouth 2 (Two) Times a Day., Disp: 60 tablet, Rfl: 5     No Known Allergies     Review of Systems   Constitutional: Negative for appetite change and unexpected weight loss.   HENT: Negative for trouble swallowing.    Eyes: Negative for blurred vision.   Respiratory: Negative for choking and chest tightness.    Cardiovascular: Negative for leg swelling.   Gastrointestinal: Positive for abdominal distention, abdominal pain and GERD. Negative for anal bleeding, blood in stool, constipation, diarrhea, nausea, rectal pain, vomiting and indigestion.        Burping   Endocrine: Negative for polyphagia.   Genitourinary: Negative for hematuria.   Musculoskeletal: Negative  for arthralgias and myalgias.   Skin: Positive for rash.   Allergic/Immunologic: Negative for food allergies.   Neurological: Positive for tremors. Negative for dizziness, syncope and confusion.   Hematological: Does not bruise/bleed easily.   Psychiatric/Behavioral: Negative for depressed mood.      The following portions of the patient's history were reviewed and updated as appropriate: allergies, current medications, past family history, past medical history, past social history, past surgical history and problem list.  Objective     Physical Exam  Vitals signs and nursing note reviewed.   Constitutional:       General: She is awake. She is not in acute distress.     Appearance: Normal appearance. She is well-developed. She is not diaphoretic.   HENT:      Head: Normocephalic and atraumatic.      Right Ear: Hearing and external ear normal.      Left Ear: Hearing and external ear normal.      Nose: Nose normal.      Mouth/Throat:      Lips: Pink.      Mouth: Mucous membranes are not pale, not dry and not cyanotic. No oral lesions.      Pharynx: No oropharyngeal exudate.   Eyes:      General: Lids are normal.         Right eye: No discharge.         Left eye: No discharge.      Conjunctiva/sclera: Conjunctivae normal.   Neck:      Musculoskeletal: Neck supple. No edema.      Thyroid: No thyroid mass or thyromegaly.      Vascular: No JVD.      Trachea: Trachea normal.   Cardiovascular:      Rate and Rhythm: Normal rate and regular rhythm.      Heart sounds: Normal heart sounds and S2 normal. No murmur. No friction rub. No gallop. No S3 sounds.    Pulmonary:      Effort: Pulmonary effort is normal. No respiratory distress.      Breath sounds: Normal breath sounds.   Chest:      Chest wall: No tenderness.   Abdominal:      General: Bowel sounds are normal. There is no distension.      Palpations: Abdomen is not rigid. There is no hepatomegaly, splenomegaly or mass.      Tenderness: There is no abdominal tenderness.  "There is no guarding or rebound.      Hernia: No hernia is present.       Musculoskeletal: Normal range of motion.   Lymphadenopathy:      Cervical: No cervical adenopathy.      Upper Body:      Left upper body: No supraclavicular adenopathy.   Skin:     General: Skin is warm and dry.      Coloration: Skin is not pale.      Findings: No rash.      Nails: There is no clubbing.     Neurological:      Mental Status: She is alert and oriented to person, place, and time.      Cranial Nerves: No cranial nerve deficit.      Sensory: No sensory deficit.      Motor: Tremor present.   Psychiatric:         Mood and Affect: Mood normal.         Speech: Speech normal.         Behavior: Behavior is cooperative.       Vitals:    12/23/20 1306   BP: 172/95   Pulse: 84   Resp: 16   Temp: 97.8 °F (36.6 °C)   Weight: 104 kg (229 lb)   Height: 160 cm (63\")     Results Review:   I reviewed the patient's new clinical results.    Results Encounter on 11/09/2020   Component Date Value Ref Range Status   • 25 Hydroxy, Vitamin D 12/09/2020 73.8  30.0 - 100.0 ng/ml Final   • Total Cholesterol 12/09/2020 140  0 - 200 mg/dL Final   • Triglycerides 12/09/2020 114  0 - 150 mg/dL Final   • HDL Cholesterol 12/09/2020 47  40 - 60 mg/dL Final   • LDL Cholesterol  12/09/2020 72  0 - 100 mg/dL Final   • VLDL Cholesterol 12/09/2020 21  5 - 40 mg/dL Final   • LDL/HDL Ratio 12/09/2020 1.49   Final   • T3, Reverse 12/09/2020 12.1  9.2 - 24.1 ng/dL Final    This test was developed and its performance characteristics  determined by [facility]. It has not been cleared or approved  by the Food and Drug Administration.   • Free T4 12/09/2020 0.78* 0.93 - 1.70 ng/dL Final   • T3, Free 12/09/2020 4.00  2.00 - 4.40 pg/mL Final   • Glucose 12/09/2020 97  65 - 99 mg/dL Final   • BUN 12/09/2020 21  8 - 23 mg/dL Final   • Creatinine 12/09/2020 0.75  0.57 - 1.00 mg/dL Final   • Sodium 12/09/2020 136  136 - 145 mmol/L Final   • Potassium 12/09/2020 4.5  3.5 - 5.2 mmol/L " Final   • Chloride 12/09/2020 99  98 - 107 mmol/L Final   • CO2 12/09/2020 28.0  22.0 - 29.0 mmol/L Final   • Calcium 12/09/2020 9.8  8.6 - 10.5 mg/dL Final   • Total Protein 12/09/2020 7.6  6.0 - 8.5 g/dL Final   • Albumin 12/09/2020 4.30  3.50 - 5.20 g/dL Final   • ALT (SGPT) 12/09/2020 17  1 - 33 U/L Final   • AST (SGOT) 12/09/2020 21  1 - 32 U/L Final   • Alkaline Phosphatase 12/09/2020 95  39 - 117 U/L Final   • Total Bilirubin 12/09/2020 0.5  0.0 - 1.2 mg/dL Final   • eGFR Non African Amer 12/09/2020 76  >60 mL/min/1.73 Final   • Globulin 12/09/2020 3.3  gm/dL Final   • A/G Ratio 12/09/2020 1.3  g/dL Final   • BUN/Creatinine Ratio 12/09/2020 28.0* 7.0 - 25.0 Final   • Anion Gap 12/09/2020 9.0  5.0 - 15.0 mmol/L Final   • WBC 12/09/2020 7.13  3.40 - 10.80 10*3/mm3 Final   • RBC 12/09/2020 4.74  3.77 - 5.28 10*6/mm3 Final   • Hemoglobin 12/09/2020 13.7  12.0 - 15.9 g/dL Final   • Hematocrit 12/09/2020 41.9  34.0 - 46.6 % Final   • MCV 12/09/2020 88.4  79.0 - 97.0 fL Final   • MCH 12/09/2020 28.9  26.6 - 33.0 pg Final   • MCHC 12/09/2020 32.7  31.5 - 35.7 g/dL Final   • RDW 12/09/2020 12.3  12.3 - 15.4 % Final   • RDW-SD 12/09/2020 39.6  37.0 - 54.0 fl Final   • MPV 12/09/2020 10.4  6.0 - 12.0 fL Final   • Platelets 12/09/2020 225  140 - 450 10*3/mm3 Final   • Neutrophil % 12/09/2020 69.7  42.7 - 76.0 % Final   • Lymphocyte % 12/09/2020 21.2  19.6 - 45.3 % Final   • Monocyte % 12/09/2020 6.9  5.0 - 12.0 % Final   • Eosinophil % 12/09/2020 1.4  0.3 - 6.2 % Final   • Basophil % 12/09/2020 0.4  0.0 - 1.5 % Final   • Immature Grans % 12/09/2020 0.4  0.0 - 0.5 % Final   • Neutrophils, Absolute 12/09/2020 4.97  1.70 - 7.00 10*3/mm3 Final   • Lymphocytes, Absolute 12/09/2020 1.51  0.70 - 3.10 10*3/mm3 Final   • Monocytes, Absolute 12/09/2020 0.49  0.10 - 0.90 10*3/mm3 Final   • Eosinophils, Absolute 12/09/2020 0.10  0.00 - 0.40 10*3/mm3 Final   • Basophils, Absolute 12/09/2020 0.03  0.00 - 0.20 10*3/mm3 Final   •  Immature Grans, Absolute 12/09/2020 0.03  0.00 - 0.05 10*3/mm3 Final   • nRBC 12/09/2020 0.0  0.0 - 0.2 /100 WBC Final       Dated June 5, 2018 colonoscopy to the terminal ileum: Diverticular change involving the left colon and right colon.  Scant nonbleeding vascular ectasia involving the right colon.  Somewhat polypoid appearance of the mucosa in the proximal transverse colon close to hepatic flexure.  This area had been previously marked with [place] ink.  Internal hemorrhoids.  Colon, hepatic flexure, near previous marked site, biopsies revealed benign colonic mucosa with no diagnostic abnormality.    Assessment / Plan      1. Epigastric pain  2. Burping  History of epigastric pain for the past few months it will come and go.  The patient frequently has bloating and feels her abdomen is distended.  Eating makes symptoms worse.  Basic labs unremarkable, TSH low, managed by PCP.  She is on multiple vitamin supplements, which may contribute to her symptoms, but does not want to decrease or stop any of them. Last EGD was over 10 years ago.  Anti-reflux measures.  Pepcid 20 mg BID.  EGD to rule out peptic ulcer disease.    3. Gastroesophageal reflux disease, unspecified whether esophagitis present  History of reflux for several years with recent worsening.  Not on medications at this time.  Antireflux measures.  Pepcid 20 mg twice daily.  EGD to rule out Salazar's.    - famotidine (PEPCID) 20 MG tablet; Take 1 tablet by mouth 2 (Two) Times a Day.  Dispense: 60 tablet; Refill: 5  - Case Request; Standing  - Implement Anesthesia Orders Day of Procedure; Standing  - Obtain Informed Consent; Standing  - Verify NPO; Standing  - Oxygen Therapy- Nasal Cannula; 2 LPM; Titrate for SPO2: equal to or greater than, 90%; Standing  - sodium chloride 0.9 % infusion  - Case Request    4. Personal history of colonic polyps  Colonoscopy in June 2017 with colon polyps removed, polypoid area at hepatic flexure. Surveillance colonoscopy in  June 2018 without polyps, biopsy of polypoid area unremarkable. No family history of colon cancer.  Colonoscopy for surveillance in 5 years, 2023, or sooner if any problems.    Patient Instructions   1. Antireflux measures: Avoid fried, fatty foods, alcohol, chocolate, coffee, tea,  soft drinks, peppermint and spearmint, spicy foods, tomatoes and tomato based foods, onion based foods, and smoking. Other antireflux measures include weight reduction if overweight, avoiding tight clothing around the abdomen, elevating the head of the bed 6 inches with blocks under the head board, and don't drink or eat before going to bed and avoid lying down immediately after meals.  2. Pepcid 20 mg 1 po twice a day.  3. High fiber, low fat diet with liberal water intake.   4. Upper endoscopy-EGD: Description of the procedure, risks, benefits, alternatives and options, including nonoperative options, were discussed with the patient in detail. The patient understands and wishes to proceed.  5. COVID-19 testing prior to procedure. The patient will need to self-quarantine after testing until the procedure. Instructions given to patient.    Lashonda Montelongo, APRN  12/23/2020    Please note that portions of this note may have been completed with a voice recognition program. Efforts were made to edit the dictations, but occasionally words are mistranscribed.

## 2020-12-30 DIAGNOSIS — E03.9 HYPOTHYROIDISM, ADULT: ICD-10-CM

## 2020-12-30 RX ORDER — LIOTHYRONINE SODIUM 25 UG/1
TABLET ORAL
Qty: 270 TABLET | Refills: 0 | Status: SHIPPED | OUTPATIENT
Start: 2020-12-30 | End: 2021-04-05 | Stop reason: SDUPTHER

## 2021-01-21 DIAGNOSIS — G47.33 OSA (OBSTRUCTIVE SLEEP APNEA): Primary | ICD-10-CM

## 2021-01-25 ENCOUNTER — LAB (OUTPATIENT)
Dept: LAB | Facility: HOSPITAL | Age: 71
End: 2021-01-25

## 2021-01-25 DIAGNOSIS — Z01.818 PREOP TESTING: ICD-10-CM

## 2021-01-25 PROCEDURE — U0004 COV-19 TEST NON-CDC HGH THRU: HCPCS

## 2021-01-25 PROCEDURE — C9803 HOPD COVID-19 SPEC COLLECT: HCPCS

## 2021-01-26 LAB — SARS-COV-2 RNA RESP QL NAA+PROBE: NOT DETECTED

## 2021-01-27 ENCOUNTER — ANESTHESIA EVENT (OUTPATIENT)
Dept: GASTROENTEROLOGY | Facility: HOSPITAL | Age: 71
End: 2021-01-27

## 2021-01-27 ENCOUNTER — HOSPITAL ENCOUNTER (OUTPATIENT)
Facility: HOSPITAL | Age: 71
Setting detail: HOSPITAL OUTPATIENT SURGERY
Discharge: HOME | End: 2021-01-27
Attending: INTERNAL MEDICINE | Admitting: INTERNAL MEDICINE

## 2021-01-27 ENCOUNTER — ANESTHESIA (OUTPATIENT)
Dept: GASTROENTEROLOGY | Facility: HOSPITAL | Age: 71
End: 2021-01-27

## 2021-01-27 VITALS
BODY MASS INDEX: 40.57 KG/M2 | HEART RATE: 79 BPM | DIASTOLIC BLOOD PRESSURE: 66 MMHG | WEIGHT: 229 LBS | RESPIRATION RATE: 20 BRPM | SYSTOLIC BLOOD PRESSURE: 107 MMHG | TEMPERATURE: 97.7 F | OXYGEN SATURATION: 20 % | HEIGHT: 63 IN

## 2021-01-27 DIAGNOSIS — K21.9 GASTROESOPHAGEAL REFLUX DISEASE, UNSPECIFIED WHETHER ESOPHAGITIS PRESENT: ICD-10-CM

## 2021-01-27 PROCEDURE — 43239 EGD BIOPSY SINGLE/MULTIPLE: CPT | Performed by: INTERNAL MEDICINE

## 2021-01-27 PROCEDURE — 25010000002 PROPOFOL 10 MG/ML EMULSION: Performed by: NURSE ANESTHETIST, CERTIFIED REGISTERED

## 2021-01-27 DEVICE — DEV CLIP GI QUICKCLIPPRO FIX ROT 2300MM: Type: IMPLANTABLE DEVICE | Site: STOMACH | Status: FUNCTIONAL

## 2021-01-27 RX ORDER — SODIUM CHLORIDE 9 MG/ML
70 INJECTION, SOLUTION INTRAVENOUS CONTINUOUS PRN
Status: DISCONTINUED | OUTPATIENT
Start: 2021-01-27 | End: 2021-01-27 | Stop reason: HOSPADM

## 2021-01-27 RX ORDER — SODIUM CHLORIDE 0.9 % (FLUSH) 0.9 %
10 SYRINGE (ML) INJECTION AS NEEDED
Status: DISCONTINUED | OUTPATIENT
Start: 2021-01-27 | End: 2021-01-27 | Stop reason: HOSPADM

## 2021-01-27 RX ORDER — LIDOCAINE HYDROCHLORIDE 20 MG/ML
INJECTION, SOLUTION INTRAVENOUS AS NEEDED
Status: DISCONTINUED | OUTPATIENT
Start: 2021-01-27 | End: 2021-01-27 | Stop reason: SURG

## 2021-01-27 RX ORDER — PROPOFOL 10 MG/ML
VIAL (ML) INTRAVENOUS AS NEEDED
Status: DISCONTINUED | OUTPATIENT
Start: 2021-01-27 | End: 2021-01-27 | Stop reason: SURG

## 2021-01-27 RX ADMIN — PROPOFOL 50 MG: 10 INJECTION, EMULSION INTRAVENOUS at 11:02

## 2021-01-27 RX ADMIN — LIDOCAINE HYDROCHLORIDE 40 MG: 20 INJECTION, SOLUTION INTRAVENOUS at 10:57

## 2021-01-27 RX ADMIN — PROPOFOL 50 MG: 10 INJECTION, EMULSION INTRAVENOUS at 10:59

## 2021-01-27 RX ADMIN — PROPOFOL 150 MG: 10 INJECTION, EMULSION INTRAVENOUS at 10:57

## 2021-01-27 RX ADMIN — SODIUM CHLORIDE 70 ML/HR: 9 INJECTION, SOLUTION INTRAVENOUS at 09:33

## 2021-01-27 NOTE — ANESTHESIA POSTPROCEDURE EVALUATION
Patient: Salinas Monroy    Procedure Summary     Date: 01/27/21 Room / Location: [facility] ENDOSCOPY 1 / [facility] ENDOSCOPY    Anesthesia Start: 1051 Anesthesia Stop:     Procedure: ESOPHAGOGASTRODUODENOSCOPY WITH BIOPSIES AND CLIPS (N/A Esophagus) Diagnosis:       Gastroesophageal reflux disease, unspecified whether esophagitis present      (Gastroesophageal reflux disease, unspecified whether esophagitis present [K21.9])    Surgeon: Katie Shannon MD Provider: Alejandro Ferrell CRNA    Anesthesia Type: MAC ASA Status: 3          Anesthesia Type: MAC    Vitals  HR 78  Sat 95  Resp 16  \66  Temp 98        Post Anesthesia Care and Evaluation    Patient location during evaluation: bedside  Patient participation: complete - patient participated  Level of consciousness: awake and alert and sleepy but conscious  Pain score: 0  Pain management: adequate  Airway patency: patent  Anesthetic complications: No anesthetic complications  PONV Status: none  Cardiovascular status: acceptable  Respiratory status: acceptable and nasal cannula  Hydration status: acceptable

## 2021-01-27 NOTE — ANESTHESIA PREPROCEDURE EVALUATION
Anesthesia Evaluation     Patient summary reviewed and Nursing notes reviewed   history of anesthetic complications: PONV  NPO Solid Status: > 8 hours  NPO Liquid Status: > 8 hours           Airway   Mallampati: I  TM distance: >3 FB  Neck ROM: full  no difficulty expected  Dental - normal exam     Pulmonary - normal exam   (+) a smoker Former, sleep apnea on CPAP,   Cardiovascular - normal exam    Rhythm: regular  Rate: normal    (+) hypertension,       Neuro/Psych  (+) psychiatric history Anxiety and Depression,     GI/Hepatic/Renal/Endo    (+) obesity, morbid obesity, GERD,  thyroid problem hypothyroidism    Musculoskeletal     Abdominal  - normal exam    Abdomen: soft.  Bowel sounds: normal.   Substance History - negative use     OB/GYN negative ob/gyn ROS         Other   arthritis,                        Anesthesia Plan    ASA 3     MAC   (Pt told that intravenous sedation will be used as the primary anesthetic. Every effort will be made to make sure the patient is comfortable.     The patient was told that they may experience recall for the procedure. Risks and benefits discussed including risk of aspiration and dental damage. All patient questions answered. Pt verbalized understanding and agrees to plan of care.)  intravenous induction     Anesthetic plan, all risks, benefits, and alternatives have been provided, discussed and informed consent has been obtained with: patient.

## 2021-01-30 LAB
LAB AP CASE REPORT: NORMAL
PATH REPORT.FINAL DX SPEC: NORMAL

## 2021-02-08 ENCOUNTER — OFFICE VISIT (OUTPATIENT)
Dept: FAMILY MEDICINE CLINIC | Facility: CLINIC | Age: 71
End: 2021-02-08

## 2021-02-08 VITALS
HEIGHT: 63 IN | SYSTOLIC BLOOD PRESSURE: 120 MMHG | TEMPERATURE: 97.1 F | DIASTOLIC BLOOD PRESSURE: 80 MMHG | OXYGEN SATURATION: 98 % | BODY MASS INDEX: 41.11 KG/M2 | HEART RATE: 82 BPM | WEIGHT: 232 LBS

## 2021-02-08 DIAGNOSIS — F31.62 BIPOLAR DISORDER, CURRENT EPISODE MIXED, MODERATE (HCC): ICD-10-CM

## 2021-02-08 DIAGNOSIS — I50.32 CHRONIC DIASTOLIC HEART FAILURE (HCC): Primary | ICD-10-CM

## 2021-02-08 DIAGNOSIS — E03.9 HYPOTHYROIDISM, ADULT: ICD-10-CM

## 2021-02-08 DIAGNOSIS — K21.9 GASTROESOPHAGEAL REFLUX DISEASE WITHOUT ESOPHAGITIS: Chronic | ICD-10-CM

## 2021-02-08 PROCEDURE — 99214 OFFICE O/P EST MOD 30 MIN: CPT | Performed by: FAMILY MEDICINE

## 2021-02-09 LAB
BUN SERPL-MCNC: 26 MG/DL (ref 8–27)
BUN/CREAT SERPL: 39 (ref 12–28)
CALCIUM SERPL-MCNC: 9.4 MG/DL (ref 8.7–10.3)
CHLORIDE SERPL-SCNC: 108 MMOL/L (ref 96–106)
CO2 SERPL-SCNC: 21 MMOL/L (ref 20–29)
CREAT SERPL-MCNC: 0.66 MG/DL (ref 0.57–1)
GLUCOSE SERPL-MCNC: 90 MG/DL (ref 65–99)
POTASSIUM SERPL-SCNC: 4.1 MMOL/L (ref 3.5–5.2)
SODIUM SERPL-SCNC: 144 MMOL/L (ref 134–144)

## 2021-02-10 NOTE — PROGRESS NOTES
Established Patient        Chief Complaint:   Chief Complaint   Patient presents with   • Follow-up     patient requesting COVID antibodies        Salinas Monroy is a 71 y.o. female    History of Present Illness:   Answers for HPI/ROS submitted by the patient on 2/6/2021   What is the primary reason for your visit?: Other  Please describe your symptoms.: Follow up...  Have you had these symptoms before?: Yes  How long have you been having these symptoms?: Greater than 2 weeks    Here today for scheduled follow-up visit of her hypothyroidism, bipolar disorder, chronic diastolic heart failure and GERD.    Patient states she feels as well now as she has in numerous years.  She denies any SI/HI.  Reports very good response thus far to current mood stabilizer treatment regimen.  Denies any problems with her current medication regimen.  No chest pain, shortness of breath or dyspnea on exertion.  She denies any fever, chills or night sweats.  Maintains an active lifestyle, balanced dietary intake.  Maintains good urine output without hematuria.  No reports of aspiration/dysphagia, or any BRB/BTS.    Subjective     The following portions of the patient's history were reviewed and updated as appropriate: allergies, current medications, past family history, past medical history, past social history, past surgical history and problem list.    No Known Allergies    Review of Systems  Constitutional: Negative for fever. Negative for chills, diaphoresis, fatigue and unexpected weight change.   HENT: No dysphagia; no changes to vision/hearing/smell/taste; no epistaxis  Eyes: Negative for redness and visual disturbance.   Respiratory: negative for shortness of breath. Negative for chest pain . Negative for cough and chest tightness.   Cardiovascular: Negative for chest pain and palpitations.   Gastrointestinal: As per above.  Endocrine: Negative for cold intolerance and heat intolerance.   Genitourinary: Negative for  "difficulty urinating, dysuria and frequency.   Musculoskeletal: Chronic arthralgias, back pain and myalgias.   Skin: Negative for color change, rash and wound.   Neurological: Negative for syncope, weakness and headaches.  Chronic tremors.  Hematological: Negative for adenopathy. Does not bruise/bleed easily.   Psychiatric/Behavioral: Negative for confusion. The patient is not nervous/anxious at present.    Objective     Physical Exam   Vital Signs: /80   Pulse 82   Temp 97.1 °F (36.2 °C)   Ht 160 cm (63\")   Wt 105 kg (232 lb)   LMP  (LMP Unknown)   SpO2 98%   BMI 41.10 kg/m²     General Appearance: alert, oriented x 3, no acute distress.  Skin: warm and dry.   HEENT: Atraumatic.  pupils round and reactive to light and accommodation, oral mucosa pink and moist.  Nares patent without epistaxis.  External auditory canals are patent tympanic membranes intact.  Neck: supple, no JVD, trachea midline.  No thyromegaly  Lungs: CTA, unlabored breathing effort.  Heart: RRR, normal S1 and S2, no S3, no rub.  Abdomen: soft, non-tender, no palpable bladder, present bowel sounds to auscultation ×4.  No guarding or rigidity.  Extremities: no clubbing, cyanosis or edema.  Good range of motion actively and passively.  Symmetric muscle strength and development  Neuro: normal speech and mental status.  Cranial nerves II through XII intact.  No anosmia. DTR 2+; proprioception intact.  Significantly improved tremulousness    Assessment and Plan      Assessment:   Diagnoses and all orders for this visit:    1. Chronic diastolic heart failure (CMS/HCC) (Primary)  -     Basic Metabolic Panel    2. Hypothyroidism, adult  -     Basic Metabolic Panel    3. Gastroesophageal reflux disease without esophagitis    4. Bipolar disorder, current episode mixed, moderate (CMS/HCC)        Plan:  Patient would like to have Covid antibody screening.    Surveillance BMP today.    Demonstrates no findings of acute volume overload, without " findings of acute exacerbation of her chronic diastolic heart failure.    Mood quite stable.  Continue cognitive behavioral therapy as well as formal behavioral health follow-up.    Continue current thyroid dosing.  Surveillance labs in 3 months.  Dose adjustment if needed.  Discussion Summary:    Discussed plan of care in detail with pt today; pt verb understanding and agrees.    I spent 30 minutes caring for Salinas on this date of service. This time includes time spent by me in the following activities:preparing for the visit, reviewing tests, performing a medically appropriate examination and/or evaluation , counseling and educating the patient/family/caregiver, ordering medications, tests, or procedures, documenting information in the medical record and care coordination    I have reviewed and updated all copied forward information, as appropriate.  I attest to the accuracy and relevance of any unchanged information.    Follow up:  Return in about 3 months (around 5/8/2021) for Recheck.     There are no Patient Instructions on file for this visit.    Jm Marie,   02/10/21  11:05 EST          Please note that portions of this note may have been completed with a voice recognition program. Efforts were made to edit the dictations, but occasionally words are mistranscribed.

## 2021-03-03 DIAGNOSIS — E03.9 HYPOTHYROIDISM, ADULT: ICD-10-CM

## 2021-03-03 RX ORDER — LEVOTHYROXINE SODIUM 75 UG/1
TABLET ORAL
Qty: 90 TABLET | Refills: 0 | Status: SHIPPED | OUTPATIENT
Start: 2021-03-03 | End: 2021-05-27

## 2021-03-05 DIAGNOSIS — M1A.0710 CHRONIC IDIOPATHIC GOUT INVOLVING TOE OF RIGHT FOOT WITHOUT TOPHUS: ICD-10-CM

## 2021-03-08 RX ORDER — ALLOPURINOL 100 MG/1
200 TABLET ORAL DAILY
Qty: 180 TABLET | Refills: 3 | Status: SHIPPED | OUTPATIENT
Start: 2021-03-08 | End: 2021-12-13

## 2021-04-05 DIAGNOSIS — E03.9 HYPOTHYROIDISM, ADULT: ICD-10-CM

## 2021-04-05 DIAGNOSIS — I50.32 CHRONIC DIASTOLIC HEART FAILURE (HCC): ICD-10-CM

## 2021-04-05 RX ORDER — FUROSEMIDE 20 MG/1
20 TABLET ORAL DAILY
Qty: 90 TABLET | Refills: 2 | Status: SHIPPED | OUTPATIENT
Start: 2021-04-05 | End: 2021-12-28 | Stop reason: SDUPTHER

## 2021-04-05 RX ORDER — LIOTHYRONINE SODIUM 25 UG/1
75 TABLET ORAL DAILY
Qty: 270 TABLET | Refills: 0 | Status: SHIPPED | OUTPATIENT
Start: 2021-04-05 | End: 2021-07-01

## 2021-04-05 NOTE — TELEPHONE ENCOUNTER
Caller: Salinas Monroy    Relationship: Self    Best call back number:  [phone]    Medication needed:   Requested Prescriptions     Pending Prescriptions Disp Refills   • furosemide (LASIX) 20 MG tablet 90 tablet 2     Sig: Take 1 tablet by mouth Daily.    [id number]     When do you need the refill by: WITHIN WEEK    What additional details did the patient provide when requesting the medication: PLEASE CALL IN FOR 90 DAYS    Does the patient have less than a 3 day supply:  [] Yes  [x] No    What is the patient's preferred pharmacy:      [facility] [place]

## 2021-04-22 ENCOUNTER — TELEPHONE (OUTPATIENT)
Dept: FAMILY MEDICINE CLINIC | Facility: CLINIC | Age: 71
End: 2021-04-22

## 2021-04-22 DIAGNOSIS — E03.9 HYPOTHYROIDISM, ADULT: Primary | ICD-10-CM

## 2021-04-22 NOTE — TELEPHONE ENCOUNTER
Caller: YUSRARONNY     Relationship:     Best call back number: [phone]     What orders are you requesting (i.e. 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        
Patient notified.  
Yes, orders can be standing and is entered as such.  
no

## 2021-04-28 ENCOUNTER — LAB (OUTPATIENT)
Dept: LAB | Facility: HOSPITAL | Age: 71
End: 2021-04-28

## 2021-04-28 DIAGNOSIS — E03.9 HYPOTHYROIDISM, ADULT: ICD-10-CM

## 2021-04-28 LAB
T4 FREE SERPL-MCNC: 0.73 NG/DL (ref 0.93–1.7)
TSH SERPL DL<=0.05 MIU/L-ACNC: <0.005 UIU/ML (ref 0.27–4.2)

## 2021-04-28 PROCEDURE — 84482 T3 REVERSE: CPT

## 2021-04-28 PROCEDURE — 36415 COLL VENOUS BLD VENIPUNCTURE: CPT

## 2021-04-28 PROCEDURE — 84443 ASSAY THYROID STIM HORMONE: CPT

## 2021-04-28 PROCEDURE — 84481 FREE ASSAY (FT-3): CPT

## 2021-04-28 PROCEDURE — 84439 ASSAY OF FREE THYROXINE: CPT

## 2021-04-29 LAB — T3FREE SERPL-MCNC: 3.83 PG/ML (ref 2–4.4)

## 2021-05-04 LAB — T3REVERSE SERPL-MCNC: 9.2 NG/DL (ref 9.2–24.1)

## 2021-05-06 DIAGNOSIS — F31.11 BIPOLAR AFFECTIVE DISORDER, CURRENTLY MANIC, MILD (HCC): ICD-10-CM

## 2021-05-07 DIAGNOSIS — F31.11 BIPOLAR AFFECTIVE DISORDER, CURRENTLY MANIC, MILD: ICD-10-CM

## 2021-05-07 RX ORDER — ESCITALOPRAM OXALATE 10 MG/1
10 TABLET ORAL DAILY
Qty: 90 TABLET | Refills: 1 | Status: CANCELLED | OUTPATIENT
Start: 2021-05-07

## 2021-05-10 ENCOUNTER — OFFICE VISIT (OUTPATIENT)
Dept: FAMILY MEDICINE CLINIC | Facility: CLINIC | Age: 71
End: 2021-05-10

## 2021-05-10 VITALS
WEIGHT: 237 LBS | HEIGHT: 63 IN | DIASTOLIC BLOOD PRESSURE: 80 MMHG | BODY MASS INDEX: 41.99 KG/M2 | SYSTOLIC BLOOD PRESSURE: 130 MMHG | TEMPERATURE: 97.7 F

## 2021-05-10 DIAGNOSIS — J30.9 CHRONIC ALLERGIC RHINITIS: Primary | ICD-10-CM

## 2021-05-10 DIAGNOSIS — I50.32 CHRONIC DIASTOLIC HEART FAILURE (HCC): ICD-10-CM

## 2021-05-10 DIAGNOSIS — H65.23 CHRONIC SEROUS OTITIS MEDIA OF BOTH EARS: ICD-10-CM

## 2021-05-10 DIAGNOSIS — G47.33 OSA ON CPAP: ICD-10-CM

## 2021-05-10 DIAGNOSIS — E03.9 HYPOTHYROIDISM, ADULT: ICD-10-CM

## 2021-05-10 DIAGNOSIS — H69.83 EUSTACHIAN TUBE DYSFUNCTION, BILATERAL: ICD-10-CM

## 2021-05-10 DIAGNOSIS — F31.62 BIPOLAR DISORDER, CURRENT EPISODE MIXED, MODERATE (HCC): ICD-10-CM

## 2021-05-10 DIAGNOSIS — Z99.89 OSA ON CPAP: ICD-10-CM

## 2021-05-10 PROCEDURE — 99214 OFFICE O/P EST MOD 30 MIN: CPT | Performed by: FAMILY MEDICINE

## 2021-05-11 RX ORDER — ESCITALOPRAM OXALATE 10 MG/1
10 TABLET ORAL DAILY
Qty: 90 TABLET | Refills: 2 | Status: SHIPPED | OUTPATIENT
Start: 2021-05-11 | End: 2022-02-24 | Stop reason: SDUPTHER

## 2021-05-27 DIAGNOSIS — E03.9 HYPOTHYROIDISM, ADULT: ICD-10-CM

## 2021-05-27 RX ORDER — LEVOTHYROXINE SODIUM 75 UG/1
TABLET ORAL
Qty: 90 TABLET | Refills: 0 | Status: SHIPPED | OUTPATIENT
Start: 2021-05-27 | End: 2021-08-25

## 2021-06-30 DIAGNOSIS — E03.9 HYPOTHYROIDISM, ADULT: ICD-10-CM

## 2021-07-01 RX ORDER — ACYCLOVIR 400 MG/1
TABLET ORAL
Qty: 21 TABLET | Refills: 0 | Status: SHIPPED | OUTPATIENT
Start: 2021-07-01 | End: 2021-08-25

## 2021-07-01 RX ORDER — LIOTHYRONINE SODIUM 25 UG/1
TABLET ORAL
Qty: 270 TABLET | Refills: 0 | Status: SHIPPED | OUTPATIENT
Start: 2021-07-01 | End: 2021-10-04

## 2021-08-25 DIAGNOSIS — E03.9 HYPOTHYROIDISM, ADULT: ICD-10-CM

## 2021-08-25 RX ORDER — LEVOTHYROXINE SODIUM 75 UG/1
TABLET ORAL
Qty: 90 TABLET | Refills: 1 | Status: SHIPPED | OUTPATIENT
Start: 2021-08-25 | End: 2022-04-04 | Stop reason: SDUPTHER

## 2021-08-25 RX ORDER — ACYCLOVIR 400 MG/1
TABLET ORAL
Qty: 21 TABLET | Refills: 0 | Status: SHIPPED | OUTPATIENT
Start: 2021-08-25 | End: 2021-09-14 | Stop reason: SDUPTHER

## 2021-09-14 ENCOUNTER — OFFICE VISIT (OUTPATIENT)
Dept: FAMILY MEDICINE CLINIC | Facility: CLINIC | Age: 71
End: 2021-09-14

## 2021-09-14 VITALS
WEIGHT: 237 LBS | BODY MASS INDEX: 41.99 KG/M2 | DIASTOLIC BLOOD PRESSURE: 80 MMHG | SYSTOLIC BLOOD PRESSURE: 130 MMHG | TEMPERATURE: 99.6 F | HEART RATE: 74 BPM | OXYGEN SATURATION: 95 % | HEIGHT: 63 IN

## 2021-09-14 DIAGNOSIS — K58.0 IRRITABLE BOWEL SYNDROME WITH DIARRHEA: ICD-10-CM

## 2021-09-14 DIAGNOSIS — E03.9 HYPOTHYROIDISM, ADULT: ICD-10-CM

## 2021-09-14 DIAGNOSIS — M1A.0710 CHRONIC IDIOPATHIC GOUT INVOLVING TOE OF RIGHT FOOT WITHOUT TOPHUS: ICD-10-CM

## 2021-09-14 DIAGNOSIS — Z00.00 MEDICARE ANNUAL WELLNESS VISIT, SUBSEQUENT: Primary | ICD-10-CM

## 2021-09-14 DIAGNOSIS — I50.32 CHRONIC DIASTOLIC HEART FAILURE (HCC): ICD-10-CM

## 2021-09-14 PROCEDURE — G0439 PPPS, SUBSEQ VISIT: HCPCS | Performed by: FAMILY MEDICINE

## 2021-09-14 PROCEDURE — 1170F FXNL STATUS ASSESSED: CPT | Performed by: FAMILY MEDICINE

## 2021-09-14 PROCEDURE — 1159F MED LIST DOCD IN RCRD: CPT | Performed by: FAMILY MEDICINE

## 2021-09-14 PROCEDURE — 99214 OFFICE O/P EST MOD 30 MIN: CPT | Performed by: FAMILY MEDICINE

## 2021-09-14 PROCEDURE — 1126F AMNT PAIN NOTED NONE PRSNT: CPT | Performed by: FAMILY MEDICINE

## 2021-09-14 RX ORDER — ACYCLOVIR 400 MG/1
400 TABLET ORAL 2 TIMES WEEKLY
Qty: 32 TABLET | Refills: 0 | Status: SHIPPED | OUTPATIENT
Start: 2021-09-16 | End: 2021-09-23 | Stop reason: SDUPTHER

## 2021-09-14 NOTE — PATIENT INSTRUCTIONS
High-Fiber Eating Plan  Fiber, also called dietary fiber, is a type of carbohydrate. It is found foods such as fruits, vegetables, whole grains, and beans. A high-fiber diet can have many health benefits. Your health care provider may recommend a high-fiber diet to help:  · Prevent constipation. Fiber can make your bowel movements more regular.  · Lower your cholesterol.  · Relieve the following conditions:  ? Inflammation of veins in the anus (hemorrhoids).  ? Inflammation of specific areas of the digestive tract (uncomplicated diverticulosis).  ? A problem of the large intestine, also called the colon, that sometimes causes pain and diarrhea (irritable bowel syndrome, or IBS).  · Prevent overeating as part of a weight-loss plan.  · Prevent heart disease, type 2 diabetes, and certain cancers.  What are tips for following this plan?  Reading food labels    · Check the nutrition facts label on food products for the amount of dietary fiber. Choose foods that have 5 grams of fiber or more per serving.  · The goals for recommended daily fiber intake include:  ? Men (age 50 or younger): 34-38 g.  ? Men (over age 50): 28-34 g.  ? Women (age 50 or younger): 25-28 g.  ? Women (over age 50): 22-25 g.  Your daily fiber goal is _____________ g.  Shopping  · Choose whole fruits and vegetables instead of processed forms, such as apple juice or applesauce.  · Choose a wide variety of high-fiber foods such as avocados, lentils, oats, and kidney beans.  · Read the nutrition facts label of the foods you choose. Be aware of foods with added fiber. These foods often have high sugar and sodium amounts per serving.  Cooking  · Use whole-grain flour for baking and cooking.  · Cook with brown rice instead of white rice.  Meal planning  · Start the day with a breakfast that is high in fiber, such as a cereal that contains 5 g of fiber or more per serving.  · Eat breads and cereals that are made with whole-grain flour instead of refined  flour or white flour.  · Eat brown rice, bulgur wheat, or millet instead of white rice.  · Use beans in place of meat in soups, salads, and pasta dishes.  · Be sure that half of the grains you eat each day are whole grains.  General information  · You can get the recommended daily intake of dietary fiber by:  ? Eating a variety of fruits, vegetables, grains, nuts, and beans.  ? Taking a fiber supplement if you are not able to take in enough fiber in your diet. It is better to get fiber through food than from a supplement.  · Gradually increase how much fiber you consume. If you increase your intake of dietary fiber too quickly, you may have bloating, cramping, or gas.  · Drink plenty of water to help you digest fiber.  · Choose high-fiber snacks, such as berries, raw vegetables, nuts, and popcorn.  What foods should I eat?  Fruits  Berries. Pears. Apples. Oranges. Avocado. Prunes and raisins. Dried figs.  Vegetables  Sweet potatoes. Spinach. Kale. Artichokes. Cabbage. Broccoli. Cauliflower. Green peas. Carrots. Squash.  Grains  Whole-grain breads. Multigrain cereal. Oats and oatmeal. Brown rice. Barley. Bulgur wheat. Millet. Quinoa. Bran muffins. Popcorn. Rye wafer crackers.  Meats and other proteins  Navy beans, kidney beans, and candelario beans. Soybeans. Split peas. Lentils. Nuts and seeds.  Dairy  Fiber-fortified yogurt.  Beverages  Fiber-fortified soy milk. Fiber-fortified orange juice.  Other foods  Fiber bars.  The items listed above may not be a complete list of recommended foods and beverages. Contact a dietitian for more information.  What foods should I avoid?  Fruits  Fruit juice. Cooked, strained fruit.  Vegetables  Fried potatoes. Canned vegetables. Well-cooked vegetables.  Grains  White bread. Pasta made with refined flour. White rice.  Meats and other proteins  Fatty cuts of meat. Fried chicken or fried fish.  Dairy  Milk. Yogurt. Cream cheese. Sour cream.  Fats and oils  [place].  Beverages  Soft  drinks.  Other foods  Cakes and pastries.  The items listed above may not be a complete list of foods and beverages to avoid. Talk with your dietitian about what choices are best for you.  Summary  · Fiber is a type of carbohydrate. It is found in foods such as fruits, vegetables, whole grains, and beans.  · A high-fiber diet has many benefits. It can help to prevent constipation, lower blood cholesterol, aid weight loss, and reduce your risk of heart disease, diabetes, and certain cancers.  · Increase your intake of fiber gradually. Increasing fiber too quickly may cause cramping, bloating, and gas. Drink plenty of water while you increase the amount of fiber you consume.  · The best sources of fiber include whole fruits and vegetables, whole grains, nuts, seeds, and beans.  This information is not intended to replace advice given to you by your health care provider. Make sure you discuss any questions you have with your health care provider.  Document Revised: 04/22/2021 Document Reviewed: 04/22/2021  [employer] Patient Education © 2021 [employer]      Advance Care Planning and Advance Directives     You make decisions on a daily basis - decisions about where you want to live, your career, your home, your life. Perhaps one of the most important decisions you face is your choice for future medical care. Take time to talk with your family and your healthcare team and start planning today.  Advance Care Planning is a process that can help you:  · Understand possible future healthcare decisions in light of your own experiences  · Reflect on those decision in light of your goals and values  · Discuss your decisions with those closest to you and the healthcare professionals that care for you  · Make a plan by creating a document that reflects your wishes    Surrogate Decision Maker  In the event of a medical emergency, which has left you unable to communicate or to make your own decisions, you would need someone to  make decisions for you.  It is important to discuss your preferences for medical treatment with this person while you are in good health.     Qualities of a surrogate decision maker:  • Willing to take on this role and responsibility  • Knows what you want for future medical care  • Willing to follow your wishes even if they don't agree with them  • Able to make difficult medical decisions under stressful circumstances    Advance Directives  These are legal documents you can create that will guide your healthcare team and decision maker(s) when needed. These documents can be stored in the electronic medical record.    · Living Will - a legal document to guide your care if you have a terminal condition or a serious illness and are unable to communicate. States vary by statute in document names/types, but most forms may include one or more of the following:        -  Directions regarding life-prolonging treatments        -  Directions regarding artificially provided nutrition/hydration        -  Choosing a healthcare decision maker        -  Direction regarding organ/tissue donation    · Durable Power of  for Healthcare - this document names an -in-fact to make medical decisions for you, but it may also allow this person to make personal and financial decisions for you. Please seek the advice of an  if you need this type of document.    **Advance Directives are not required and no one may discriminate against you if you do not sign one.    Medical Orders  Many states allow specific forms/orders signed by your physician to record your wishes for medical treatment in your current state of health. This form, signed in personal communication with your physician, addresses resuscitation and other medical interventions that you may or may not want.      For more information or to schedule a time with a [facility] Advance Care Planning Facilitator contact: [facility] or call  [phone] and someone will contact you directly.    Medicare Wellness  Personal Prevention Plan of Service     Date of Office Visit:    Encounter Provider:  Jm Marie DO  Place of Service:  [facility]  Patient Name: Salinas Monroy  :  1950    As part of the Medicare Wellness portion of your visit today, we are providing you with this personalized preventive plan of services (PPPS). This plan is based upon recommendations of the [place] Preventive Services Task Force (USPSTF) and the Advisory Committee on Immunization Practices (ACIP).    This lists the preventive care services that should be considered, and provides dates of when you are due. Items listed as completed are up-to-date and do not require any further intervention.    Health Maintenance   Topic Date Due   • TDAP/TD VACCINES (1 - Tdap) Never done   • ZOSTER VACCINE (1 of 2) Never done   • LUNG CANCER SCREENING  2021   • DXA SCAN  02/10/2021   • ANNUAL WELLNESS VISIT  2021   • INFLUENZA VACCINE  10/01/2021   • LIPID PANEL  2021   • MAMMOGRAM  2022   • COLORECTAL CANCER SCREENING  2028   • HEPATITIS C SCREENING  Completed   • COVID-19 Vaccine  Completed   • Pneumococcal Vaccine 65+  Completed       No orders of the defined types were placed in this encounter.      Return for Recheck, Med Change/New Meds.

## 2021-09-14 NOTE — PROGRESS NOTES
The ABCs of the Annual Wellness Visit  Subsequent Medicare Wellness Visit    Chief Complaint   Patient presents with   • Medicare Wellness-subsequent     f/u hypothyroidism; c/o diarrhea      Subjective    History of Present Illness:  Salinas Monroy is a 71 y.o. female who presents for a Subsequent Medicare Wellness Visit.    Patient also here in scheduled follow-up of her hypothyroidism, chronic diastolic congestive heart failure, chronic idiopathic gout of the right foot and problematic irritable bowel syndrome symptoms, predominantly diarrhea.  She denies any BRB/BTS.  She denies any orthopnea or swelling to her extremities worse in nature.  Continues to diurese well.  No fever, chills or night sweats reported.    She has not noticed any defined dietary triggers of her loose bowel function, but does report 4-5 episodes per week.    The following portions of the patient's history were reviewed and   updated as appropriate: allergies, current medications, past family history, past medical history, past social history, past surgical history and problem list.    Compared to one year ago, the patient feels her physical   health is better.    Compared to one year ago, the patient feels her mental   health is better.    Recent Hospitalizations:  This patient has had a [facility] admission record on file within the last 365 days.    Current Medical Providers:  Patient Care Team:  Jm Marie DO as PCP - General (Family Medicine)  Jm Marie DO as Consulting Physician (Family Medicine)    Outpatient Medications Prior to Visit   Medication Sig Dispense Refill   • allopurinol (ZYLOPRIM) 100 MG tablet Take 2 tablets by mouth Daily. 180 tablet 3   • B Complex Vitamins (VITAMIN B COMPLEX PO) Take 1 tablet by mouth Daily.     • B-12, Methylcobalamin, 1000 MCG sublingual tablet Every Other Day.     • Cholecalciferol (VITAMIN D3) 04258 UNITS tablet Take 7,500 Units by mouth Daily.     •  clotrimazole-betamethasone (LOTRISONE) 1-0.05 % cream Apply  topically to the appropriate area as directed 2 (Two) Times a Day. 45 g 3   • Coenzyme Q10 400 MG capsule Take  by mouth.     • Digestive Enzymes (DIGESTIVE ENZYME PO) Take 1 tablet by mouth Daily.     • escitalopram (LEXAPRO) 10 MG tablet Take 1 tablet by mouth Daily. 90 tablet 2   • Euthyrox 75 MCG tablet Take 1 tablet by mouth once daily 90 tablet 1   • famotidine (PEPCID) 20 MG tablet Take 1 tablet by mouth 2 (Two) Times a Day. 60 tablet 5   • ferrous gluconate (FERGON) 324 MG tablet Take 1 tablet by mouth Daily With Breakfast. 30 tablet 5   • furosemide (LASIX) 20 MG tablet Take 1 tablet by mouth Daily. 90 tablet 2   • GARLIC PO Take 1 tablet by mouth Daily.     • L-THEANINE PO Take  by mouth.     • liothyronine (CYTOMEL) 25 MCG tablet Take 3 tablets by mouth once daily 270 tablet 0   • MAGNESIUM PO Take 1,000 mg by mouth Every Night.     • Methylsulfonylmethane (MSM PO) Take 1 tablet by mouth Daily.     • MILK THISTLE PO Take 1 tablet by mouth Daily.     • Misc Natural Products (TART CHERRY ADVANCED PO) Take 2 capsules by mouth Daily.     • Omega-3 Fatty Acids (OMEGA-3 FISH OIL PO) Take 4,000 Units by mouth 3 (Three) Times a Day.     • Probiotic Product (PROBIOTIC PO) Take 1 tablet by mouth Daily.     • SELENIUM PO Take 1 tablet by mouth Daily.     • TAURINE PO Take  by mouth.     • TURMERIC PO Take 1 tablet by mouth 2 (two) times a day.     • VITAMIN A PO Take  by mouth.     • vitamin C (ASCORBIC ACID) 500 MG tablet Take 500 mg by mouth 3 (Three) Times a Day As Needed.     • vitamin E 400 UNIT capsule Take 400 Units by mouth Daily.     • VITAMIN K PO Take 100 mcg by mouth Daily.     • Zinc 30 MG tablet Take  by mouth Daily.     • acyclovir (ZOVIRAX) 400 MG tablet TAKE 1 TABLET BY MOUTH THREE TIMES DAILY **TAKE NO  MORE  THAN  5  DOSES  A  DAY** 21 tablet 0     No facility-administered medications prior to visit.       No opioid medication identified  "on active medication list. I have reviewed chart for other potential  high risk medication/s and harmful drug interactions in the elderly.          Aspirin is not on active medication list.  Aspirin use is not indicated based on review of current medical condition/s. Risk of harm outweighs potential benefits.  .    Patient Active Problem List   Diagnosis   • Hashimoto's thyroiditis   • Essential tremor   • Vitamin D deficiency   • Primary osteoarthritis involving multiple joints   • Chronic idiopathic gout involving toe of right foot without tophus   • Obsessive-compulsive disorder   • Postsurgical menopause   • Colon cancer screening   • Personal history of colonic polyps   • Left ventricular hypertrophy   • Chronic diastolic heart failure (CMS/HCC)   • Hypothyroidism, adult   • ISABEL on CPAP   • PD (Parkinson's disease) (CMS/HCC)   • Morbid obesity with BMI of 40.0-44.9, adult (CMS/HCC)   • Iron deficiency   • Bipolar disorder, current episode mixed, moderate (CMS/HCC)   • Reactive airway disease without complication   • Gastroesophageal reflux disease   • Burping   • Epigastric pain   • Irritable bowel syndrome with diarrhea     Advance Care Planning  Advance Directive is not on file.  ACP discussion was held with the patient during this visit. Patient does not have an advance directive, information provided.          Objective    Vitals:    09/14/21 1530   BP: 130/80   Pulse: 74   Temp: 99.6 °F (37.6 °C)   SpO2: 95%   Weight: 108 kg (237 lb)   Height: 160 cm (63\")   PainSc: 0-No pain     BMI Readings from Last 1 Encounters:   09/14/21 41.98 kg/m²   BMI is above normal parameters. Recommendations include: educational material, exercise counseling and nutrition counseling    Does the patient have evidence of cognitive impairment? No     Review of systems:  1. onstitutional: Negative for fever. Negative for chills, diaphoresis, fatigue and unexpected weight change.   2. HENT: No dysphagia; no changes to " vision/hearing/smell/taste; no epistaxis.  Otherwise, as per above.  3. Eyes: Negative for redness and visual disturbance.   4. Respiratory: negative for shortness of breath. Negative for chest pain . Negative for cough and chest tightness.   5. Cardiovascular: Negative for chest pain and palpitations.   6. Gastrointestinal: As per above.  7. Endocrine: Negative for cold intolerance and heat intolerance.   8. Genitourinary: Negative for difficulty urinating, dysuria and frequency.   9. Musculoskeletal: Chronic arthralgias, back pain and myalgias.   10. Skin: Negative for color change, rash and wound.   11. Neurological: Negative for syncope, weakness and headaches.  Chronic tremors.  12. Hematological: Negative for adenopathy. Does not bruise/bleed easily.   13. Psychiatric/Behavioral: Negative for confusion. The patient is not nervous/anxious at present.    Physical Exam    General Appearance: alert, oriented x 3, no acute distress.  Skin: warm and dry.   HEENT: Atraumatic.  pupils round and reactive to light and accommodation, oral mucosa pink and moist.  Nares patent without epistaxis.  External auditory canals are patent tympanic membranes intact.  Boggy/inflamed nasal turbinates with mild postnasal drainage, no pustules or exudate.  Serous effusion noted to bilateral tympanic membranes, however mobility is intact on Valsalva and insufflation.  Neck: supple, no JVD, trachea midline.  No thyromegaly  Lungs: CTA, unlabored breathing effort.  Heart: RRR, normal S1 and S2, no S3, no rub.  Abdomen: soft, non-tender, no palpable bladder, present bowel sounds to auscultation ×4.  No guarding or rigidity.  No CVA tenderness.  Extremities: no clubbing, cyanosis or edema.  Good range of motion actively and passively.  Symmetric muscle strength and development  Neuro: normal speech and mental status.  Cranial nerves II through XII intact.  No anosmia. DTR 2+; proprioception intact.  Significantly improved  tremulousness      HEALTH RISK ASSESSMENT    Smoking Status:  Social History     Tobacco Use   Smoking Status Former Smoker   • Packs/day: 1.00   • Years: 40.00   • Pack years: 40.00   • Types: Cigarettes   • Start date:    • Quit date:    • Years since quittin.7   Smokeless Tobacco Never Used     Alcohol Consumption:  Social History     Substance and Sexual Activity   Alcohol Use Not Currently     Fall Risk Screen:    CAROLINA Fall Risk Assessment was completed, and patient is at LOW risk for falls.Assessment completed on:2021    Depression Screening:  PHQ-2/PHQ-9 Depression Screening 2021   Little interest or pleasure in doing things 0   Feeling down, depressed, or hopeless 0   Trouble falling or staying asleep, or sleeping too much -   Feeling tired or having little energy -   Poor appetite or overeating -   Feeling bad about yourself - or that you are a failure or have let yourself or your family down -   Trouble concentrating on things, such as reading the newspaper or watching television -   Moving or speaking so slowly that other people could have noticed. Or the opposite - being so fidgety or restless that you have been moving around a lot more than usual -   Thoughts that you would be better off dead, or of hurting yourself in some way -   Total Score 0   If you checked off any problems, how difficult have these problems made it for you to do your work, take care of things at home, or get along with other people? -       Health Habits and Functional and Cognitive Screening:  Functional & Cognitive Status 2021   Do you have difficulty preparing food and eating? No   Do you have difficulty bathing yourself, getting dressed or grooming yourself? No   Do you have difficulty using the toilet? No   Do you have difficulty moving around from place to place? No   Do you have trouble with steps or getting out of a bed or a chair? No   Current Diet Well Balanced Diet        Current Diet  Comment -   Dental Exam Not up to date   Eye Exam Not up to date   Exercise (times per week) 2 times per week   Current Exercises Include Walking   Current Exercise Activities Include -   Do you need help using the phone?  No   Are you deaf or do you have serious difficulty hearing?  No   Do you need help with transportation? Yes   Do you need help shopping? No   Do you need help preparing meals?  No   Do you need help with housework?  No   Do you need help with laundry? No   Do you need help taking your medications? No   Do you need help managing money? No   Do you ever drive or ride in a car without wearing a seat belt? No   Have you felt unusual stress, anger or loneliness in the last month? No   Who do you live with? Alone   If you need help, do you have trouble finding someone available to you? Yes   Have you been bothered in the last four weeks by sexual problems? No   Do you have difficulty concentrating, remembering or making decisions? No       Age-appropriate Screening Schedule:  Refer to the list below for future screening recommendations based on patient's age, sex and/or medical conditions. Orders for these recommended tests are listed in the plan section. The patient has been provided with a written plan.    Health Maintenance   Topic Date Due   • TDAP/TD VACCINES (1 - Tdap) Never done   • ZOSTER VACCINE (1 of 2) Never done   • DXA SCAN  02/10/2021   • INFLUENZA VACCINE  10/01/2021   • LIPID PANEL  12/09/2021   • MAMMOGRAM  09/08/2022              Assessment/Plan   CMS Preventative Services Quick Reference  Risk Factors Identified During Encounter  Cardiovascular Disease  Inactivity/Sedentary  Obesity/Overweight   The above risks/problems have been discussed with the patient.  Follow up actions/plans if indicated are seen below in the Assessment/Plan Section.  Pertinent information has been shared with the patient in the After Visit Summary.    Diagnoses and all orders for this visit:    1. Medicare  annual wellness visit, subsequent (Primary)  -     CBC & Differential; Future  -     Comprehensive Metabolic Panel; Future    2. Hypothyroidism, adult  -     TSH; Future  -     T4, Free; Future  -     T3, free; Future  -     T3, Reverse; Future    3. Chronic idiopathic gout involving toe of right foot without tophus    4. Chronic diastolic heart failure (CMS/HCC)  -     Comprehensive Metabolic Panel; Future    5. Irritable bowel syndrome with diarrhea  -     TSH; Future  -     T4, Free; Future  -     CBC & Differential; Future  -     Comprehensive Metabolic Panel; Future    Other orders  -     acyclovir (ZOVIRAX) 400 MG tablet; Take 1 tablet by mouth 2 (Two) Times a Week. Take no more than 5 doses a day.  Dispense: 32 tablet; Refill: 0        Follow Up:   Return for Recheck, Med Change/New Meds.     An After Visit Summary and PPPS were made available to the patient.    Refilled acyclovir; changing to twice weekly dosing which has worked well for pt for an extended period of time.    Labs ordered, patient will have these done in the near future.    Planned addition of increased dosing of dietary fiber, dietary information provided.    Vital signs demonstrate hemodynamic stability.    I will plan to see patient back in the office in approximately 6 months, sooner if needed.      I spent 55 minutes caring for Salinas on this date of service; this includes 25 minutes spent in direct management of her Medicare wellness issues/concerns, as well as an additional 30 minutes in management of her irritable bowel syndrome with diarrhea, chronic diastolic-heart failure, hypothyroidism and gout. This time includes time spent by me in the following activities:preparing for the visit, performing a medically appropriate examination and/or evaluation , counseling and educating the patient/family/caregiver, ordering medications, tests, or procedures, documenting information in the medical record and care coordination

## 2021-09-22 ENCOUNTER — TELEPHONE (OUTPATIENT)
Dept: FAMILY MEDICINE CLINIC | Facility: CLINIC | Age: 71
End: 2021-09-22

## 2021-09-22 NOTE — TELEPHONE ENCOUNTER
Pharmacy Name: [facility] [address] [place] [address] - [address] [place] [address] [phone] [facility] [phone]      Pharmacy representative name: CLOTILDE     Pharmacy representative phone number: [phone]    What medication are you calling in regards to: acyclovir (ZOVIRAX) 400 MG tablet    What question does the pharmacy have: PHARMACY WOULD LIKE CLARIFICATION THE DIRECTIONS STATING TO TAKE 1 TABLET TWO TIMES A WEEK NO MORE THAN 5 TIMES A DAY. PHARMACY STATES THEY HAVE CONTACTED THE PRACTICE ONCE BEFORE FOR CLARIFICATION AND HAVE NOT HEARD BACK.    Who is the provider that prescribed the medication: AXEL DELGADILLO     Additional notes:

## 2021-09-23 RX ORDER — ACYCLOVIR 400 MG/1
400 TABLET ORAL 2 TIMES WEEKLY
Qty: 32 TABLET | Refills: 0 | Status: SHIPPED | OUTPATIENT
Start: 2021-09-23 | End: 2022-02-10 | Stop reason: SDUPTHER

## 2021-09-23 NOTE — TELEPHONE ENCOUNTER
I resent the prescription and remove the automated template that was attached to the previous prescription.    D

## 2021-10-02 DIAGNOSIS — E03.9 HYPOTHYROIDISM, ADULT: ICD-10-CM

## 2021-10-04 RX ORDER — LIOTHYRONINE SODIUM 25 UG/1
TABLET ORAL
Qty: 270 TABLET | Refills: 0 | Status: SHIPPED | OUTPATIENT
Start: 2021-10-04 | End: 2021-11-17 | Stop reason: SDUPTHER

## 2021-11-15 PROCEDURE — U0004 COV-19 TEST NON-CDC HGH THRU: HCPCS | Performed by: EMERGENCY MEDICINE

## 2021-11-17 DIAGNOSIS — E03.9 HYPOTHYROIDISM, ADULT: ICD-10-CM

## 2021-11-17 RX ORDER — LIOTHYRONINE SODIUM 25 UG/1
75 TABLET ORAL DAILY
Qty: 270 TABLET | Refills: 0 | Status: SHIPPED | OUTPATIENT
Start: 2021-11-17 | End: 2022-03-04

## 2021-11-17 NOTE — TELEPHONE ENCOUNTER
Caller: BARBARAMARIA ESTHERCLEMENTINE    Relationship: SELF    Best call back number:[phone]    Requested Prescriptions:   Requested Prescriptions     Pending Prescriptions Disp Refills   • liothyronine (CYTOMEL) 25 MCG tablet 270 tablet 0     Sig: Take 3 tablets by mouth Daily.        Pharmacy where request should be sent: [address] AT [facility] [phone] [facility] [phone] FX     Additional details provided by patient: PATIENT REQUESTING A 90 DAY SUPPLY. PATIENT STATES SHE WOULD LIKE TO GET THIS AT [facility] AS SHE KNOWS WHICH . PATIENT WOULD LIKE THIS ASAP     Does the patient have less than a 3 day supply:  [x] Yes  [] No    Lei Ascencio Rep   11/17/21 15:54 EST

## 2021-11-22 ENCOUNTER — TELEPHONE (OUTPATIENT)
Dept: FAMILY MEDICINE CLINIC | Facility: CLINIC | Age: 71
End: 2021-11-22

## 2021-11-22 ENCOUNTER — TELEMEDICINE (OUTPATIENT)
Dept: FAMILY MEDICINE CLINIC | Facility: CLINIC | Age: 71
End: 2021-11-22

## 2021-11-22 DIAGNOSIS — F51.04 PSYCHOPHYSIOLOGICAL INSOMNIA: ICD-10-CM

## 2021-11-22 DIAGNOSIS — R19.7 DIARRHEA, UNSPECIFIED TYPE: ICD-10-CM

## 2021-11-22 DIAGNOSIS — R06.02 SHORTNESS OF BREATH: Primary | ICD-10-CM

## 2021-11-22 DIAGNOSIS — R09.81 NASAL CONGESTION: ICD-10-CM

## 2021-11-22 DIAGNOSIS — R14.0 ABDOMINAL BLOATING: ICD-10-CM

## 2021-11-22 DIAGNOSIS — Z99.89 OSA ON CPAP: ICD-10-CM

## 2021-11-22 DIAGNOSIS — G47.33 OSA ON CPAP: ICD-10-CM

## 2021-11-22 DIAGNOSIS — K57.90 DIVERTICULOSIS: ICD-10-CM

## 2021-11-22 PROCEDURE — 99213 OFFICE O/P EST LOW 20 MIN: CPT | Performed by: NURSE PRACTITIONER

## 2021-11-22 RX ORDER — HYDROXYZINE HYDROCHLORIDE 10 MG/1
TABLET, FILM COATED ORAL
Qty: 30 TABLET | Refills: 0 | Status: SHIPPED | OUTPATIENT
Start: 2021-11-22 | End: 2021-12-13

## 2021-11-22 NOTE — TELEPHONE ENCOUNTER
Caller: PB PERALTA     Relationship: SISTER      Best call back number:  [phone]    Who are you requesting to speak with (clinical staff, provider,  specific staff member):  CLINICAL      What was the call regarding:  PATIENT IS HAVING SIGNS OF DEPRESSION AND MANIC   SHE IS HAVING A HARD TIME SLEEPING AND IS VERY EMOTIONAL      PB IS GOING TO TAKE AND GET HER LABS DONE   ALSO   PB DOES NOT WANT THE PATIENT TO KNOW THAT SHE HAS CALLED ABOUT THIS      PLEASE ADVISE IF INCREASING HER THYROID MEDICATION IS SOMETHING THAT SHOULD BE DISCUSSED      Do you require a callback: YES

## 2021-11-22 NOTE — PROGRESS NOTES
Subjective       You have chosen to receive care through a telehealth visit.  Do you consent to use a video/audio connection for your medical care today? Yes  Doximity was used to video visit    Chief Complaint:  uri    History of Present Illness:   No sleep, shaking, throat is sore and red. Feels like she is freezing. Nasal drainage with green color.   She went to urgent care on 11/15. Covid negative. She was given doxy. She has been taking it.   Took valium that she had from last year. Still cant sleep due to anxiety  EMT came to her house and o2 and BP was normal. That was a few days ago.   She has burning sensation in her throat when she lays.   Has ISABEL, needs to see sleep medicine again about her CPAP  Has diverticulosis, has recurring abdominal pain/bloating, would like to see GI      Review of Systems  Gen- No fevers, chills  CV- No chest pain, palpitations  GI- No N/V/D, abd pain  Neuro-No dizziness, headaches      I have reviewed and/or updated the patient's past medical, surgical, family, social history and problem list as appropriate.     Medications:    Current Outpatient Medications:   •  acyclovir (ZOVIRAX) 400 MG tablet, Take 1 tablet by mouth 2 (Two) Times a Week., Disp: 32 tablet, Rfl: 0  •  allopurinol (ZYLOPRIM) 100 MG tablet, Take 2 tablets by mouth Daily., Disp: 180 tablet, Rfl: 3  •  B Complex Vitamins (VITAMIN B COMPLEX PO), Take 1 tablet by mouth Daily., Disp: , Rfl:   •  B-12, Methylcobalamin, 1000 MCG sublingual tablet, Every Other Day., Disp: , Rfl:   •  Cholecalciferol (VITAMIN D3) 70664 UNITS tablet, Take 7,500 Units by mouth Daily., Disp: , Rfl:   •  clotrimazole-betamethasone (LOTRISONE) 1-0.05 % cream, Apply 1 application topically to the appropriate area as directed 2 (Two) Times a Day for 14 days., Disp: 28 g, Rfl: 0  •  Coenzyme Q10 400 MG capsule, Take  by mouth., Disp: , Rfl:   •  Digestive Enzymes (DIGESTIVE ENZYME PO), Take 1 tablet by mouth Daily., Disp: , Rfl:   •   doxycycline (MONODOX) 100 MG capsule, Take 1 capsule by mouth 2 (Two) Times a Day for 10 days., Disp: 20 capsule, Rfl: 0  •  escitalopram (LEXAPRO) 10 MG tablet, Take 1 tablet by mouth Daily., Disp: 90 tablet, Rfl: 2  •  Euthyrox 75 MCG tablet, Take 1 tablet by mouth once daily, Disp: 90 tablet, Rfl: 1  •  famotidine (PEPCID) 20 MG tablet, Take 1 tablet by mouth 2 (Two) Times a Day., Disp: 60 tablet, Rfl: 5  •  ferrous gluconate (FERGON) 324 MG tablet, Take 1 tablet by mouth Daily With Breakfast., Disp: 30 tablet, Rfl: 5  •  furosemide (LASIX) 20 MG tablet, Take 1 tablet by mouth Daily., Disp: 90 tablet, Rfl: 2  •  GARLIC PO, Take 1 tablet by mouth Daily., Disp: , Rfl:   •  hydrOXYzine (ATARAX) 10 MG tablet, Take 1-2 tablets by mouth 1-2 times per day as needed for anxiety or sleep, Disp: 30 tablet, Rfl: 0  •  L-THEANINE PO, Take  by mouth., Disp: , Rfl:   •  liothyronine (CYTOMEL) 25 MCG tablet, Take 3 tablets by mouth Daily., Disp: 270 tablet, Rfl: 0  •  MAGNESIUM PO, Take 1,000 mg by mouth Every Night., Disp: , Rfl:   •  Methylsulfonylmethane (MSM PO), Take 1 tablet by mouth Daily., Disp: , Rfl:   •  MILK THISTLE PO, Take 1 tablet by mouth Daily., Disp: , Rfl:   •  Misc Natural Products (TART CHERRY ADVANCED PO), Take 2 capsules by mouth Daily., Disp: , Rfl:   •  Omega-3 Fatty Acids (OMEGA-3 FISH OIL PO), Take 4,000 Units by mouth 3 (Three) Times a Day., Disp: , Rfl:   •  Probiotic Product (PROBIOTIC PO), Take 1 tablet by mouth Daily., Disp: , Rfl:   •  SELENIUM PO, Take 1 tablet by mouth Daily., Disp: , Rfl:   •  TAURINE PO, Take  by mouth., Disp: , Rfl:   •  TURMERIC PO, Take 1 tablet by mouth 2 (two) times a day., Disp: , Rfl:   •  VITAMIN A PO, Take  by mouth., Disp: , Rfl:   •  vitamin C (ASCORBIC ACID) 500 MG tablet, Take 500 mg by mouth 3 (Three) Times a Day As Needed., Disp: , Rfl:   •  vitamin E 400 UNIT capsule, Take 400 Units by mouth Daily., Disp: , Rfl:   •  VITAMIN K PO, Take 100 mcg by mouth  Daily., Disp: , Rfl:   •  Zinc 30 MG tablet, Take  by mouth Daily., Disp: , Rfl:     Allergies:  No Known Allergies    Objective     Vital Signs: There were no vitals filed for this visit.  There is no height or weight on file to calculate BMI.    Physical Exam:  Gen-no acute distress, video visit  Resp- normal WOB, on RA  Neuro-A&Ox3, face symmetrical, speech clear  Skin-no overt rashes noted  Psych-pressure speech, anxious       Assessment / Plan     Assessment/Plan:   Problem List Items Addressed This Visit        Sleep    ISABEL on CPAP    Relevant Orders    Ambulatory Referral to Sleep Medicine      Other Visit Diagnoses     Shortness of breath    -  Primary    Relevant Orders    Respiratory Panel PCR w/COVID-19(SARS-CoV-2) MICHAEL/ANKIT/BOBBY/PAD/COR/MAD/JHONATAN In-House, NP Swab in UTM/VTM, 3-4 HR TAT - Swab, Nasopharynx    Diarrhea, unspecified type        Relevant Orders    Ambulatory Referral to Gastroenterology    Diverticulosis        Relevant Orders    Ambulatory Referral to Gastroenterology    Abdominal bloating        Relevant Orders    Ambulatory Referral to Gastroenterology    Psychophysiological insomnia        Relevant Medications    hydrOXYzine (ATARAX) 10 MG tablet    Nasal congestion            -- repeat nasal PCR, continue doxy  -- get labs done that were ordered by Dr. Marie  -- referral as above  -- atarax for anxiety and sleep  -- maalox for what sounds like gerd/pill esophagitits    Follow up:  As needed    Total Time of Encounter 20 minutes    Electronically signed by VALENTINE Root   11/22/2021 16:12 EST      Please note that portions of this note may have been completed with a voice recognition program. Efforts were made to edit the dictations, but occasionally words are mistranscribed.

## 2021-11-23 ENCOUNTER — TELEPHONE (OUTPATIENT)
Dept: GASTROENTEROLOGY | Facility: CLINIC | Age: 71
End: 2021-11-23

## 2021-11-23 ENCOUNTER — LAB (OUTPATIENT)
Dept: LAB | Facility: HOSPITAL | Age: 71
End: 2021-11-23

## 2021-11-23 DIAGNOSIS — E03.9 HYPOTHYROIDISM, ADULT: ICD-10-CM

## 2021-11-23 DIAGNOSIS — R06.02 SHORTNESS OF BREATH: ICD-10-CM

## 2021-11-23 DIAGNOSIS — R30.0 DYSURIA: Primary | ICD-10-CM

## 2021-11-23 LAB
B PARAPERT DNA SPEC QL NAA+PROBE: NOT DETECTED
B PERT DNA SPEC QL NAA+PROBE: NOT DETECTED
BACTERIA UR QL AUTO: ABNORMAL /HPF
BILIRUB UR QL STRIP: NEGATIVE
C PNEUM DNA NPH QL NAA+NON-PROBE: NOT DETECTED
CLARITY UR: CLEAR
COLOR UR: YELLOW
FLUAV SUBTYP SPEC NAA+PROBE: NOT DETECTED
FLUBV RNA ISLT QL NAA+PROBE: NOT DETECTED
GLUCOSE UR STRIP-MCNC: NEGATIVE MG/DL
HADV DNA SPEC NAA+PROBE: NOT DETECTED
HCOV 229E RNA SPEC QL NAA+PROBE: NOT DETECTED
HCOV HKU1 RNA SPEC QL NAA+PROBE: NOT DETECTED
HCOV NL63 RNA SPEC QL NAA+PROBE: NOT DETECTED
HCOV OC43 RNA SPEC QL NAA+PROBE: NOT DETECTED
HGB UR QL STRIP.AUTO: NEGATIVE
HMPV RNA NPH QL NAA+NON-PROBE: NOT DETECTED
HPIV1 RNA ISLT QL NAA+PROBE: NOT DETECTED
HPIV2 RNA SPEC QL NAA+PROBE: NOT DETECTED
HPIV3 RNA NPH QL NAA+PROBE: NOT DETECTED
HPIV4 P GENE NPH QL NAA+PROBE: NOT DETECTED
HYALINE CASTS UR QL AUTO: ABNORMAL /LPF
KETONES UR QL STRIP: NEGATIVE
LEUKOCYTE ESTERASE UR QL STRIP.AUTO: ABNORMAL
M PNEUMO IGG SER IA-ACNC: NOT DETECTED
NITRITE UR QL STRIP: NEGATIVE
PH UR STRIP.AUTO: 5.5 [PH] (ref 5–8)
PROT UR QL STRIP: NEGATIVE
RBC # UR STRIP: ABNORMAL /HPF
REF LAB TEST METHOD: ABNORMAL
RHINOVIRUS RNA SPEC NAA+PROBE: NOT DETECTED
RSV RNA NPH QL NAA+NON-PROBE: NOT DETECTED
SARS-COV-2 RNA NPH QL NAA+NON-PROBE: NOT DETECTED
SP GR UR STRIP: 1.01 (ref 1–1.03)
SQUAMOUS #/AREA URNS HPF: ABNORMAL /HPF
T3FREE SERPL-MCNC: 3.89 PG/ML (ref 2–4.4)
T4 FREE SERPL-MCNC: 0.64 NG/DL (ref 0.93–1.7)
TSH SERPL DL<=0.05 MIU/L-ACNC: <0.005 UIU/ML (ref 0.27–4.2)
UROBILINOGEN UR QL STRIP: ABNORMAL
WBC # UR STRIP: ABNORMAL /HPF

## 2021-11-23 PROCEDURE — 84482 T3 REVERSE: CPT

## 2021-11-23 PROCEDURE — 0202U NFCT DS 22 TRGT SARS-COV-2: CPT

## 2021-11-23 PROCEDURE — 84443 ASSAY THYROID STIM HORMONE: CPT

## 2021-11-23 PROCEDURE — 80053 COMPREHEN METABOLIC PANEL: CPT | Performed by: FAMILY MEDICINE

## 2021-11-23 PROCEDURE — 81001 URINALYSIS AUTO W/SCOPE: CPT | Performed by: FAMILY MEDICINE

## 2021-11-23 PROCEDURE — 85025 COMPLETE CBC W/AUTO DIFF WBC: CPT | Performed by: FAMILY MEDICINE

## 2021-11-23 PROCEDURE — 84439 ASSAY OF FREE THYROXINE: CPT

## 2021-11-23 PROCEDURE — 84481 FREE ASSAY (FT-3): CPT

## 2021-11-23 NOTE — TELEPHONE ENCOUNTER
If the patient has any symptoms whatsoever, her appointment will need to be rescheduled even if her Covid test is negative.

## 2021-11-23 NOTE — TELEPHONE ENCOUNTER
Caller: Salinas Monroy    Relationship to patient: Self    Best call back number: [phone]    Date of positive COVID19 test: PATIENT TOOK A COVID TEST TODAY 11/23 AND IS CURRENTLY AWAITING RESULTS.     Date of possible COVID19 exposure: 11/9    COVID19 symptoms: COUGH, CHILLS, POSSIBLE FEVER BUT PATIENT STATED SHE DOES NOT HAVE A THERMOMETER, SHORTNESS OF AIR.     Additional information or concerns: PATIENT IS SCHEDULED FOR AN APPT WITH AMAURY POSEY ON 11/29. PT HAS YET TO RECEIVE COVID TEST RESULTS BUT SHOULD WITHIN 24-48 HOURS.

## 2021-11-29 DIAGNOSIS — B37.2 YEAST DERMATITIS: ICD-10-CM

## 2021-11-30 DIAGNOSIS — E03.9 HYPOTHYROIDISM, ADULT: ICD-10-CM

## 2021-11-30 RX ORDER — CLOTRIMAZOLE AND BETAMETHASONE DIPROPIONATE 10; .64 MG/G; MG/G
CREAM TOPICAL
Qty: 45 G | Refills: 0 | OUTPATIENT
Start: 2021-11-30

## 2021-12-01 LAB — T3REVERSE SERPL-MCNC: 9.1 NG/DL (ref 9.2–24.1)

## 2021-12-01 RX ORDER — LEVOTHYROXINE SODIUM 0.07 MG/1
75 TABLET ORAL DAILY
Qty: 90 TABLET | Refills: 1 | OUTPATIENT
Start: 2021-12-01

## 2021-12-01 NOTE — TELEPHONE ENCOUNTER
I called and informed patient that her messages have been forwarded to Dr. Marie and we would give her a call or message when he responds. Patient verbalized understanding

## 2021-12-03 ENCOUNTER — TELEPHONE (OUTPATIENT)
Dept: FAMILY MEDICINE CLINIC | Facility: CLINIC | Age: 71
End: 2021-12-03

## 2021-12-03 NOTE — TELEPHONE ENCOUNTER
Caller: Salinas Monroy    What is the best time to reach [phone] OR LEAVE MESSAGE     Who are you requesting to speak with (clinical staff, provider,  specific staff member): CLINICAL     What was the call regarding: PATIENT STATES FOR NOW SHE DOES NOT WANT TO DO THE BLOOD WORK  SHE THINKS SHE NEEDS TO HEAL AND GET PLENTY OF SLEEP     Do you require a callback:  YES PLEASE ADVISE

## 2021-12-03 NOTE — TELEPHONE ENCOUNTER
Caller: Salinas Monroy    Relationship: Self    Best call back number: [phone]    What orders are you requesting (i.e. lab or imaging): LABS    In what timeframe would the patient need to come in: 12/06/2021    Where will you receive your lab/imaging services: IN OFFICE    Additional notes: PATIENT IS FEELING TIRED AND HAS BODY ACHES. SHE WANTED TO GET LABS DONE TO CHECK HER LEVELS

## 2021-12-05 ENCOUNTER — HOSPITAL ENCOUNTER (EMERGENCY)
Facility: HOSPITAL | Age: 71
Discharge: HOME | End: 2021-12-05
Attending: EMERGENCY MEDICINE | Admitting: EMERGENCY MEDICINE

## 2021-12-05 VITALS
HEIGHT: 63 IN | HEART RATE: 83 BPM | WEIGHT: 230 LBS | TEMPERATURE: 98.3 F | SYSTOLIC BLOOD PRESSURE: 145 MMHG | RESPIRATION RATE: 20 BRPM | OXYGEN SATURATION: 95 % | DIASTOLIC BLOOD PRESSURE: 77 MMHG | BODY MASS INDEX: 40.75 KG/M2

## 2021-12-05 DIAGNOSIS — R53.83 FATIGUE, UNSPECIFIED TYPE: Primary | ICD-10-CM

## 2021-12-05 LAB
ALBUMIN SERPL-MCNC: 3.8 G/DL (ref 3.5–5.2)
ALBUMIN/GLOB SERPL: 1.5 G/DL
ALP SERPL-CCNC: 91 U/L (ref 39–117)
ALT SERPL W P-5'-P-CCNC: 20 U/L (ref 1–33)
ANION GAP SERPL CALCULATED.3IONS-SCNC: 9.1 MMOL/L (ref 5–15)
AST SERPL-CCNC: 27 U/L (ref 1–32)
BASOPHILS # BLD AUTO: 0.02 10*3/MM3 (ref 0–0.2)
BASOPHILS NFR BLD AUTO: 0.3 % (ref 0–1.5)
BILIRUB SERPL-MCNC: 0.2 MG/DL (ref 0–1.2)
BUN SERPL-MCNC: 32 MG/DL (ref 8–23)
BUN/CREAT SERPL: 36.8 (ref 7–25)
CALCIUM SPEC-SCNC: 9 MG/DL (ref 8.6–10.5)
CHLORIDE SERPL-SCNC: 111 MMOL/L (ref 98–107)
CO2 SERPL-SCNC: 26.9 MMOL/L (ref 22–29)
CREAT SERPL-MCNC: 0.87 MG/DL (ref 0.57–1)
DEPRECATED RDW RBC AUTO: 46.4 FL (ref 37–54)
EOSINOPHIL # BLD AUTO: 0.23 10*3/MM3 (ref 0–0.4)
EOSINOPHIL NFR BLD AUTO: 3.2 % (ref 0.3–6.2)
ERYTHROCYTE [DISTWIDTH] IN BLOOD BY AUTOMATED COUNT: 13.6 % (ref 12.3–15.4)
GFR SERPL CREATININE-BSD FRML MDRD: 64 ML/MIN/1.73
GLOBULIN UR ELPH-MCNC: 2.6 GM/DL
GLUCOSE SERPL-MCNC: 114 MG/DL (ref 65–99)
HCT VFR BLD AUTO: 37 % (ref 34–46.6)
HGB BLD-MCNC: 11.3 G/DL (ref 12–15.9)
IMM GRANULOCYTES # BLD AUTO: 0.03 10*3/MM3 (ref 0–0.05)
IMM GRANULOCYTES NFR BLD AUTO: 0.4 % (ref 0–0.5)
LYMPHOCYTES # BLD AUTO: 1.65 10*3/MM3 (ref 0.7–3.1)
LYMPHOCYTES NFR BLD AUTO: 23.1 % (ref 19.6–45.3)
MCH RBC QN AUTO: 28.5 PG (ref 26.6–33)
MCHC RBC AUTO-ENTMCNC: 30.5 G/DL (ref 31.5–35.7)
MCV RBC AUTO: 93.4 FL (ref 79–97)
MONOCYTES # BLD AUTO: 0.7 10*3/MM3 (ref 0.1–0.9)
MONOCYTES NFR BLD AUTO: 9.8 % (ref 5–12)
NEUTROPHILS NFR BLD AUTO: 4.52 10*3/MM3 (ref 1.7–7)
NEUTROPHILS NFR BLD AUTO: 63.2 % (ref 42.7–76)
NRBC BLD AUTO-RTO: 0 /100 WBC (ref 0–0.2)
PLATELET # BLD AUTO: 233 10*3/MM3 (ref 140–450)
PMV BLD AUTO: 9.5 FL (ref 6–12)
POTASSIUM SERPL-SCNC: 4.7 MMOL/L (ref 3.5–5.2)
PROT SERPL-MCNC: 6.4 G/DL (ref 6–8.5)
RBC # BLD AUTO: 3.96 10*6/MM3 (ref 3.77–5.28)
SODIUM SERPL-SCNC: 147 MMOL/L (ref 136–145)
WBC NRBC COR # BLD: 7.15 10*3/MM3 (ref 3.4–10.8)

## 2021-12-05 PROCEDURE — 99283 EMERGENCY DEPT VISIT LOW MDM: CPT

## 2021-12-05 PROCEDURE — 85025 COMPLETE CBC W/AUTO DIFF WBC: CPT | Performed by: EMERGENCY MEDICINE

## 2021-12-05 PROCEDURE — 25010000002 MORPHINE SULFATE (PF) 2 MG/ML SOLUTION: Performed by: EMERGENCY MEDICINE

## 2021-12-05 PROCEDURE — 96374 THER/PROPH/DIAG INJ IV PUSH: CPT

## 2021-12-05 PROCEDURE — 80053 COMPREHEN METABOLIC PANEL: CPT | Performed by: EMERGENCY MEDICINE

## 2021-12-05 RX ORDER — HYDROXYZINE HYDROCHLORIDE 25 MG/1
25 TABLET, FILM COATED ORAL EVERY 6 HOURS PRN
Qty: 30 TABLET | Refills: 0 | Status: SHIPPED | OUTPATIENT
Start: 2021-12-05 | End: 2021-12-15 | Stop reason: SDUPTHER

## 2021-12-05 RX ORDER — MORPHINE SULFATE 2 MG/ML
2 INJECTION, SOLUTION INTRAMUSCULAR; INTRAVENOUS ONCE
Status: COMPLETED | OUTPATIENT
Start: 2021-12-05 | End: 2021-12-05

## 2021-12-05 RX ORDER — SODIUM CHLORIDE 0.9 % (FLUSH) 0.9 %
10 SYRINGE (ML) INJECTION AS NEEDED
Status: DISCONTINUED | OUTPATIENT
Start: 2021-12-05 | End: 2021-12-05 | Stop reason: HOSPADM

## 2021-12-05 RX ADMIN — MORPHINE SULFATE 2 MG: 2 INJECTION, SOLUTION INTRAMUSCULAR; INTRAVENOUS at 04:44

## 2021-12-05 NOTE — ED PROVIDER NOTES
"Subjective   71-year-old female presents to the ED with multiple complaints.  Initial complaint is severe pain all over her entire body.  When asked how long it has been ongoing she states that has been going for many years.  Worse over the last 2 months.  She also states that she is not sleeping well.  She also states that she has generalized fatigue and decreased energy.  She denies fever.  Denies nausea vomiting diarrhea abdominal pain.  No cough or wheeze.  No other complaints at this time.          Review of Systems   Constitutional: Positive for chills and fatigue.   Musculoskeletal: Positive for arthralgias.   All other systems reviewed and are negative.      Past Medical History:   Diagnosis Date   • Anemia ?    under control   • Anxiety    • Arthritis    • Bloating    • Burping    • Chest pain 2020    was seen by Dr Pires and was released was just anxiety   • Colon polyp 06/05/2018   • Depression    • Diverticulosis    • Essential hypertension 7/30/2018   • GERD (gastroesophageal reflux disease)     no longer bothers me...   • Gout    • Hashimoto's disease    • Hernia 3-2012    repair surgery which failed in 2014   • Hypothyroid    • Impingement syndrome of right shoulder 5/8/2016   • Movement disorder Hand Tremors   • Obesity    • OCD (obsessive compulsive disorder)    • ISABEL on CPAP     asked to bring dos did not want to due to covid   • PONV (postoperative nausea and vomiting)     Pt states \"only one time\".   • Tremor     worse on left arm/hand   • Wears glasses     reading glasses only       No Known Allergies    Past Surgical History:   Procedure Laterality Date   • ABDOMINAL SURGERY     • APPENDECTOMY     • COLONOSCOPY  2012    failed due to hernia...   • COLONOSCOPY N/A 6/8/2017    Procedure: COLONOSCOPY with cold snare polypectomies, argon thermal ablation, ns injection, yanira ink injection;  Surgeon: Rafiq Huang MD;  Location: [facility] ENDOSCOPY;  Service:    • COLONOSCOPY N/A 6/5/2018    Procedure: " COLONOSCOPY WITH BIOPSIES;  Surgeon: Rafiq Huang MD;  Location: [facility] ENDOSCOPY;  Service: Gastroenterology   • ENDOSCOPY N/A 1/27/2021    Procedure: ESOPHAGOGASTRODUODENOSCOPY WITH BIOPSIES AND CLIPS;  Surgeon: Katie Shannon MD;  Location: [facility] ENDOSCOPY;  Service: Gastroenterology;  Laterality: N/A;   • GASTRIC BYPASS  1978   • HERNIA MESH REMOVAL  2014    BOWEL OBSTRUCTION DUE TO MESH   • HERNIA REPAIR  2012   • HERNIA REPAIR     • HYSTERECTOMY      complete   • JOINT REPLACEMENT  2009 & 2012    Left & Right Full Hip Replacements   • SMALL INTESTINE SURGERY  2014    DUE TO BOWEL OBSTRUCTION WITH SOME REMOVAL   • TOTAL ABDOMINAL HYSTERECTOMY WITH SALPINGO OOPHORECTOMY  1990    fibroids   • TUBAL ABDOMINAL LIGATION     • UPPER GASTROINTESTINAL ENDOSCOPY  2010       Family History   Problem Relation Age of Onset   • Obesity Mother    • Rheum arthritis Mother    • Thyroid disease Mother    • Hypertension Mother    • Stroke Mother         Most of her problems were caused by Rheumatoid Ar.   • Hyperlipidemia Mother    • Arthritis Mother         Rheumatoid Arthritis..Passed 2004   • Cancer Father         Passed 1997   • Kidney cancer Father    • Liver cancer Father    • No Known Problems Brother    • Diabetes Maternal Aunt    • Cancer Maternal Uncle    • Lung cancer Maternal Uncle    • Alcohol abuse Maternal Uncle    • No Known Problems Paternal Aunt    • Stroke Paternal Uncle    • Hypertension Paternal Uncle    • Hyperlipidemia Paternal Uncle    • Heart disease Paternal Uncle    • No Known Problems Brother    • Asthma Sister         under control   • Alcohol abuse Maternal Uncle    • Colon cancer Neg Hx    • Breast cancer Neg Hx    • Ovarian cancer Neg Hx        Social History     Socioeconomic History   • Marital status:    Tobacco Use   • Smoking status: Former Smoker     Packs/day: 1.00     Years: 40.00     Pack years: 40.00     Types: Cigarettes     Start date: 1967     Quit date: 2008      Years since quittin.9   • Smokeless tobacco: Never Used   Vaping Use   • Vaping Use: Never used   Substance and Sexual Activity   • Alcohol use: Not Currently   • Drug use: No   • Sexual activity: Defer           Objective   Physical Exam  Vitals and nursing note reviewed.   Constitutional:       General: She is not in acute distress.     Appearance: She is well-developed. She is not diaphoretic.   HENT:      Head: Normocephalic and atraumatic.      Nose: Nose normal.   Eyes:      Conjunctiva/sclera: Conjunctivae normal.   Cardiovascular:      Rate and Rhythm: Normal rate and regular rhythm.   Pulmonary:      Effort: Pulmonary effort is normal. No respiratory distress.      Breath sounds: Normal breath sounds.   Abdominal:      General: There is no distension.      Palpations: Abdomen is soft.      Tenderness: There is no abdominal tenderness. There is no guarding.   Musculoskeletal:         General: No deformity.   Neurological:      Mental Status: She is alert and oriented to person, place, and time.      Cranial Nerves: No cranial nerve deficit.         Procedures           ED Course                                                 MDM  Patient presented to the ED with nonspecific complaints.  She was complaining about things all the way from January of this year and the year before.  She had no specific complaints and essentially complained of diffuse body aches and fatigue.  She is tested negative for Covid twice in the last month.  She does not have a fever.  Suspect fibromyalgia versus other chronic pain syndrome.  No acute or urgent condition noted and patient is appropriate for discharge follow-up outpatient as needed.  Final diagnoses:   Fatigue, unspecified type       ED Disposition  ED Disposition     ED Disposition Condition Comment    Discharge Stable           Jm Marie, DO  [address] [place] [address] [phone]               Medication List      Changed    * hydrOXYzine 10 MG  tablet  Commonly known as: ATARAX  Take 1-2 tablets by mouth 1-2 times per day as needed for anxiety or sleep  What changed: Another medication with the same name was added. Make sure you understand how and when to take each.     * hydrOXYzine 25 MG tablet  Commonly known as: ATARAX  Take 1 tablet by mouth Every 6 (Six) Hours As Needed for Itching.  What changed: You were already taking a medication with the same name, and this prescription was added. Make sure you understand how and when to take each.         * This list has 2 medication(s) that are the same as other medications prescribed for you. Read the directions carefully, and ask your doctor or other care provider to review them with you.               Where to Get Your Medications      These medications were sent to [facility] [address] [place] [address] - [address] [facility] - [phone] [facility] [phone] FX  [address] [place] [address]    Phone: [phone]   · hydrOXYzine 25 MG tablet          Sukhi Medrano, DO  12/05/21 0632

## 2021-12-06 ENCOUNTER — HOSPITAL ENCOUNTER (EMERGENCY)
Facility: HOSPITAL | Age: 71
Discharge: HOME | End: 2021-12-07
Attending: EMERGENCY MEDICINE | Admitting: EMERGENCY MEDICINE

## 2021-12-06 DIAGNOSIS — R53.83 MALAISE AND FATIGUE: Primary | ICD-10-CM

## 2021-12-06 DIAGNOSIS — R52 PAIN: ICD-10-CM

## 2021-12-06 DIAGNOSIS — R05.9 COUGH: ICD-10-CM

## 2021-12-06 DIAGNOSIS — K14.9 TONGUE IRRITATION: ICD-10-CM

## 2021-12-06 DIAGNOSIS — R53.81 MALAISE AND FATIGUE: Primary | ICD-10-CM

## 2021-12-06 PROCEDURE — 99283 EMERGENCY DEPT VISIT LOW MDM: CPT

## 2021-12-06 NOTE — TELEPHONE ENCOUNTER
Patient called back & I made appt with Radha (no open slots with PCP). She said it was ok to cancel the phone messages.

## 2021-12-06 NOTE — TELEPHONE ENCOUNTER
Patient called back & I made appt with Radha (no open slots with pcp). She said it was ok to cancel the phone encounters.

## 2021-12-07 VITALS
BODY MASS INDEX: 40.74 KG/M2 | SYSTOLIC BLOOD PRESSURE: 154 MMHG | DIASTOLIC BLOOD PRESSURE: 82 MMHG | HEART RATE: 79 BPM | OXYGEN SATURATION: 96 % | RESPIRATION RATE: 18 BRPM | TEMPERATURE: 99.6 F | WEIGHT: 230 LBS

## 2021-12-07 LAB
B PARAPERT DNA SPEC QL NAA+PROBE: NOT DETECTED
B PERT DNA SPEC QL NAA+PROBE: NOT DETECTED
C PNEUM DNA NPH QL NAA+NON-PROBE: NOT DETECTED
FLUAV SUBTYP SPEC NAA+PROBE: NOT DETECTED
FLUBV RNA ISLT QL NAA+PROBE: NOT DETECTED
HADV DNA SPEC NAA+PROBE: NOT DETECTED
HCOV 229E RNA SPEC QL NAA+PROBE: NOT DETECTED
HCOV HKU1 RNA SPEC QL NAA+PROBE: NOT DETECTED
HCOV NL63 RNA SPEC QL NAA+PROBE: NOT DETECTED
HCOV OC43 RNA SPEC QL NAA+PROBE: NOT DETECTED
HMPV RNA NPH QL NAA+NON-PROBE: NOT DETECTED
HPIV1 RNA ISLT QL NAA+PROBE: NOT DETECTED
HPIV2 RNA SPEC QL NAA+PROBE: NOT DETECTED
HPIV3 RNA NPH QL NAA+PROBE: NOT DETECTED
HPIV4 P GENE NPH QL NAA+PROBE: NOT DETECTED
M PNEUMO IGG SER IA-ACNC: NOT DETECTED
RHINOVIRUS RNA SPEC NAA+PROBE: NOT DETECTED
RSV RNA NPH QL NAA+NON-PROBE: NOT DETECTED
S PYO AG THROAT QL: NEGATIVE
SARS-COV-2 RNA NPH QL NAA+NON-PROBE: NOT DETECTED

## 2021-12-07 PROCEDURE — 87880 STREP A ASSAY W/OPTIC: CPT | Performed by: PHYSICIAN ASSISTANT

## 2021-12-07 PROCEDURE — 0202U NFCT DS 22 TRGT SARS-COV-2: CPT | Performed by: PHYSICIAN ASSISTANT

## 2021-12-07 PROCEDURE — 87081 CULTURE SCREEN ONLY: CPT | Performed by: PHYSICIAN ASSISTANT

## 2021-12-07 RX ORDER — IBUPROFEN 800 MG/1
800 TABLET ORAL ONCE
Status: DISCONTINUED | OUTPATIENT
Start: 2021-12-07 | End: 2021-12-07 | Stop reason: HOSPADM

## 2021-12-07 RX ORDER — NAPROXEN 250 MG/1
500 TABLET ORAL 2 TIMES DAILY PRN
Qty: 14 TABLET | Refills: 0 | Status: SHIPPED | OUTPATIENT
Start: 2021-12-07 | End: 2021-12-08

## 2021-12-07 RX ORDER — ACETAMINOPHEN 500 MG
1000 TABLET ORAL ONCE
Status: DISCONTINUED | OUTPATIENT
Start: 2021-12-07 | End: 2021-12-07 | Stop reason: HOSPADM

## 2021-12-07 NOTE — ED PROVIDER NOTES
"Subjective   History of Present Illness   Patient is a 71-year-old female presenting to the ER with multiple complaints.  Patient was seen in the ER last night and had labs performed which were unremarkable.  Patient is a difficult historian as she jumps around a lot during interview.  She is complaining of symptoms that began several months ago.  Patient complains of fatigue, pain to her tongue, cough, \"pain everywhere\", lower extremity edema, low-grade fever of \"100 something\".  Patient is requesting morphine.  Patient states that she has an appointment with family medicine on Wednesday, 2 days from now.  She states that she has been tested for Covid and it was negative.    Review of Systems   Constitutional: Positive for fatigue.   HENT: Positive for sore throat.    Respiratory: Positive for cough.    Musculoskeletal: Positive for myalgias.   All other systems reviewed and are negative.      Past Medical History:   Diagnosis Date   • Anemia ?    under control   • Anxiety    • Arthritis    • Bloating    • Burping    • Chest pain 2020    was seen by Dr Pires and was released was just anxiety   • Colon polyp 06/05/2018   • Depression    • Diverticulosis    • Essential hypertension 7/30/2018   • GERD (gastroesophageal reflux disease)     no longer bothers me...   • Gout    • Hashimoto's disease    • Hernia 3-2012    repair surgery which failed in 2014   • Hypothyroid    • Impingement syndrome of right shoulder 5/8/2016   • Movement disorder Hand Tremors   • Obesity    • OCD (obsessive compulsive disorder)    • ISABEL on CPAP     asked to bring dos did not want to due to covid   • PONV (postoperative nausea and vomiting)     Pt states \"only one time\".   • Tremor     worse on left arm/hand   • Wears glasses     reading glasses only       No Known Allergies    Past Surgical History:   Procedure Laterality Date   • ABDOMINAL SURGERY     • APPENDECTOMY     • COLONOSCOPY  2012    failed due to hernia...   • COLONOSCOPY N/A " 6/8/2017    Procedure: COLONOSCOPY with cold snare polypectomies, argon thermal ablation, ns injection, yanira ink injection;  Surgeon: Rafiq Huang MD;  Location: [facility] ENDOSCOPY;  Service:    • COLONOSCOPY N/A 6/5/2018    Procedure: COLONOSCOPY WITH BIOPSIES;  Surgeon: Rafiq Huang MD;  Location: [facility] ENDOSCOPY;  Service: Gastroenterology   • ENDOSCOPY N/A 1/27/2021    Procedure: ESOPHAGOGASTRODUODENOSCOPY WITH BIOPSIES AND CLIPS;  Surgeon: Katie Shannon MD;  Location: [facility] ENDOSCOPY;  Service: Gastroenterology;  Laterality: N/A;   • GASTRIC BYPASS  1978   • HERNIA MESH REMOVAL  2014    BOWEL OBSTRUCTION DUE TO MESH   • HERNIA REPAIR  2012   • HERNIA REPAIR     • HYSTERECTOMY      complete   • JOINT REPLACEMENT  2009 & 2012    Left & Right Full Hip Replacements   • SMALL INTESTINE SURGERY  2014    DUE TO BOWEL OBSTRUCTION WITH SOME REMOVAL   • TOTAL ABDOMINAL HYSTERECTOMY WITH SALPINGO OOPHORECTOMY  1990    fibroids   • TUBAL ABDOMINAL LIGATION     • UPPER GASTROINTESTINAL ENDOSCOPY  2010       Family History   Problem Relation Age of Onset   • Obesity Mother    • Rheum arthritis Mother    • Thyroid disease Mother    • Hypertension Mother    • Stroke Mother         Most of her problems were caused by Rheumatoid Ar.   • Hyperlipidemia Mother    • Arthritis Mother         Rheumatoid Arthritis..Passed 2004   • Cancer Father         Passed 1997   • Kidney cancer Father    • Liver cancer Father    • No Known Problems Brother    • Diabetes Maternal Aunt    • Cancer Maternal Uncle    • Lung cancer Maternal Uncle    • Alcohol abuse Maternal Uncle    • No Known Problems Paternal Aunt    • Stroke Paternal Uncle    • Hypertension Paternal Uncle    • Hyperlipidemia Paternal Uncle    • Heart disease Paternal Uncle    • No Known Problems Brother    • Asthma Sister         under control   • Alcohol abuse Maternal Uncle    • Colon cancer Neg Hx    • Breast cancer Neg Hx    • Ovarian cancer Neg Hx        Social  History     Socioeconomic History   • Marital status:    Tobacco Use   • Smoking status: Former Smoker     Packs/day: 1.00     Years: 40.00     Pack years: 40.00     Types: Cigarettes     Start date:      Quit date:      Years since quittin.9   • Smokeless tobacco: Never Used   Vaping Use   • Vaping Use: Never used   Substance and Sexual Activity   • Alcohol use: Not Currently   • Drug use: No   • Sexual activity: Defer           Objective   Physical Exam  Vitals and nursing note reviewed.   Constitutional:       General: She is not in acute distress.     Appearance: She is obese. She is not toxic-appearing.   HENT:      Head: Normocephalic and atraumatic.      Right Ear: External ear normal.      Left Ear: External ear normal.      Nose: Nose normal.   Eyes:      Extraocular Movements: Extraocular movements intact.      Conjunctiva/sclera: Conjunctivae normal.   Cardiovascular:      Rate and Rhythm: Normal rate.      Heart sounds: Normal heart sounds.   Pulmonary:      Effort: Pulmonary effort is normal.      Breath sounds: Normal breath sounds.   Abdominal:      General: There is no distension.      Palpations: Abdomen is soft.      Tenderness: There is no abdominal tenderness.   Musculoskeletal:         General: Normal range of motion.      Cervical back: Normal range of motion and neck supple.   Skin:     General: Skin is warm and dry.      Comments: No pitting edema or obvious swelling to lower extremities   Neurological:      General: No focal deficit present.      Mental Status: She is alert and oriented to person, place, and time.   Psychiatric:         Mood and Affect: Mood normal.         Behavior: Behavior normal.         Procedures           ED Course  ED Course as of 21 0154   e Dec 07, 2021   0144 Strep A Ag: Negative [AP]      ED Course User Index  [AP] Karen Fink PA-C                                                 [facility]   Patient was evaluated in the ER for multiple  complaints.  Vitals are within normal limits.  Patient is afebrile.  Strep test was performed and was negative.  RVP was performed and patient was advised that she will get a call if anything is positive.  She has an appointment with her PCP on Wednesday, 2 days from now.  She was advised to take Tylenol as needed per directions on the package.  She was given a prescription for naproxen.  Precautions were given for return to the ER for any new or worsening symptoms.    Final diagnoses:   Malaise and fatigue   Cough   Pain   Tongue irritation       ED Disposition  ED Disposition     ED Disposition Condition Comment    Discharge Stable           Jm Marie, DO  [address] [place] [address] [phone]    Call   As needed    [facility]  [address] [facility] [address] [phone]  Go to   As needed, If symptoms worsen         Medication List      New Prescriptions    naproxen 250 MG tablet  Commonly known as: NAPROSYN  Take 2 tablets by mouth 2 (Two) Times a Day As Needed for Mild Pain , Fever or Headache.           Where to Get Your Medications      These medications were sent to [facility] [address] [place] [address] - [address] [facility] - [phone]  - [phone]   [address] [facility] [address]    Phone: [phone]   · naproxen 250 MG tablet          Karen Fink PA-C  12/07/21 0154

## 2021-12-08 ENCOUNTER — OFFICE VISIT (OUTPATIENT)
Dept: FAMILY MEDICINE CLINIC | Facility: CLINIC | Age: 71
End: 2021-12-08

## 2021-12-08 VITALS
DIASTOLIC BLOOD PRESSURE: 88 MMHG | SYSTOLIC BLOOD PRESSURE: 142 MMHG | HEART RATE: 87 BPM | BODY MASS INDEX: 44.12 KG/M2 | HEIGHT: 63 IN | OXYGEN SATURATION: 95 % | WEIGHT: 249 LBS

## 2021-12-08 DIAGNOSIS — D50.9 IRON DEFICIENCY ANEMIA, UNSPECIFIED IRON DEFICIENCY ANEMIA TYPE: ICD-10-CM

## 2021-12-08 DIAGNOSIS — R53.83 FATIGUE, UNSPECIFIED TYPE: Primary | ICD-10-CM

## 2021-12-08 DIAGNOSIS — D64.9 ANEMIA, UNSPECIFIED TYPE: ICD-10-CM

## 2021-12-08 DIAGNOSIS — M79.604 PAIN IN BOTH LOWER EXTREMITIES: ICD-10-CM

## 2021-12-08 DIAGNOSIS — M79.605 PAIN IN BOTH LOWER EXTREMITIES: ICD-10-CM

## 2021-12-08 DIAGNOSIS — E55.9 VITAMIN D DEFICIENCY: ICD-10-CM

## 2021-12-08 DIAGNOSIS — H53.8 BLURRED VISION: ICD-10-CM

## 2021-12-08 PROCEDURE — 99214 OFFICE O/P EST MOD 30 MIN: CPT

## 2021-12-08 NOTE — PROGRESS NOTES
"     Acute Office Visit      Patient Name: Salinas Monroy  : 1950   MRN: [id number]     Chief Complaint:    Chief Complaint   Patient presents with   • Fatigue     weakness   • Pain     \"Pain all over\"    • Foot Swelling     bilateral       History of Present Illness: Salinas Monroy is a 71 y.o. female who is here today for multiple complaints.  She is here today with a family member who is helping the patient stay focused during the exam. She notes worsening vision over the past couple months stating that it is becoming more blurred.  At some point she was informed that she had astigmatism, but is in need of an ophthalmology referral.  She notes bilateral lower extremity edema extending from her feet up to the proximal tibia x1 week.  She normally takes 20 mg of Lasix daily but took 60 mg yesterday.  She also took an extra dose of potassium as well.  She did notice some difference in her swelling but states that they are still not back to normal.  She is concerned about generalized fatigue that she is experiencing.  Reports being worked up for autoimmune diseases many years ago and with her joint pain, she is concerned if she has a positive VALENTÍN.  Would also like to trial some type of medication to help with her joint pain.    Subjective     Review of System: Review of Systems   Constitutional: Positive for fatigue. Negative for chills and fever.   Respiratory: Negative for cough and shortness of breath.    Cardiovascular: Positive for leg swelling. Negative for chest pain.   Musculoskeletal: Positive for arthralgias, gait problem, joint swelling and myalgias.   Neurological: Negative for weakness and numbness.      I have reviewed the ROS documented by my clinical staff, updated appropriately and I agree. VALENTINE Yanez    Past Medical History:   Past Medical History:   Diagnosis Date   • Anemia ?    under control   • Anxiety    • Arthritis    • Bloating    • Burping    • Chest pain " "2020    was seen by Dr Pires and was released was just anxiety   • Colon polyp 06/05/2018   • Depression    • Diverticulosis    • Essential hypertension 7/30/2018   • GERD (gastroesophageal reflux disease)     no longer bothers me...   • Gout    • Hashimoto's disease    • Hernia 3-2012    repair surgery which failed in 2014   • Hypothyroid    • Impingement syndrome of right shoulder 5/8/2016   • Movement disorder Hand Tremors   • Obesity    • OCD (obsessive compulsive disorder)    • ISABEL on CPAP     asked to bring dos did not want to due to covid   • PONV (postoperative nausea and vomiting)     Pt states \"only one time\".   • Tremor     worse on left arm/hand   • Wears glasses     reading glasses only       Past Surgical History:   Past Surgical History:   Procedure Laterality Date   • ABDOMINAL SURGERY     • APPENDECTOMY     • COLONOSCOPY  2012    failed due to hernia...   • COLONOSCOPY N/A 6/8/2017    Procedure: COLONOSCOPY with cold snare polypectomies, argon thermal ablation, ns injection, yanira ink injection;  Surgeon: Rafiq Huang MD;  Location: [facility] ENDOSCOPY;  Service:    • COLONOSCOPY N/A 6/5/2018    Procedure: COLONOSCOPY WITH BIOPSIES;  Surgeon: Rafiq Huang MD;  Location: [facility] ENDOSCOPY;  Service: Gastroenterology   • ENDOSCOPY N/A 1/27/2021    Procedure: ESOPHAGOGASTRODUODENOSCOPY WITH BIOPSIES AND CLIPS;  Surgeon: Katie Shannon MD;  Location: [facility] ENDOSCOPY;  Service: Gastroenterology;  Laterality: N/A;   • GASTRIC BYPASS  1978   • HERNIA MESH REMOVAL  2014    BOWEL OBSTRUCTION DUE TO MESH   • HERNIA REPAIR  2012   • HERNIA REPAIR     • HYSTERECTOMY      complete   • JOINT REPLACEMENT  2009 & 2012    Left & Right Full Hip Replacements   • SMALL INTESTINE SURGERY  2014    DUE TO BOWEL OBSTRUCTION WITH SOME REMOVAL   • TOTAL ABDOMINAL HYSTERECTOMY WITH SALPINGO OOPHORECTOMY  1990    fibroids   • TUBAL ABDOMINAL LIGATION     • UPPER GASTROINTESTINAL ENDOSCOPY  2010       Family History: "   Family History   Problem Relation Age of Onset   • Obesity Mother    • Rheum arthritis Mother    • Thyroid disease Mother    • Hypertension Mother    • Stroke Mother         Most of her problems were caused by Rheumatoid Ar.   • Hyperlipidemia Mother    • Arthritis Mother         Rheumatoid Arthritis..Passed    • Cancer Father         Passed    • Kidney cancer Father    • Liver cancer Father    • No Known Problems Brother    • Diabetes Maternal Aunt    • Cancer Maternal Uncle    • Lung cancer Maternal Uncle    • Alcohol abuse Maternal Uncle    • No Known Problems Paternal Aunt    • Stroke Paternal Uncle    • Hypertension Paternal Uncle    • Hyperlipidemia Paternal Uncle    • Heart disease Paternal Uncle    • No Known Problems Brother    • Asthma Sister         under control   • Alcohol abuse Maternal Uncle    • Colon cancer Neg Hx    • Breast cancer Neg Hx    • Ovarian cancer Neg Hx        Social History:   Social History     Socioeconomic History   • Marital status:    Tobacco Use   • Smoking status: Former Smoker     Packs/day: 1.00     Years: 40.00     Pack years: 40.00     Types: Cigarettes     Start date:      Quit date:      Years since quittin.9   • Smokeless tobacco: Never Used   Vaping Use   • Vaping Use: Never used   Substance and Sexual Activity   • Alcohol use: Not Currently   • Drug use: No   • Sexual activity: Defer       Medications:     Current Outpatient Medications:   •  acyclovir (ZOVIRAX) 400 MG tablet, Take 1 tablet by mouth 2 (Two) Times a Week., Disp: 32 tablet, Rfl: 0  •  allopurinol (ZYLOPRIM) 100 MG tablet, Take 2 tablets by mouth Daily., Disp: 180 tablet, Rfl: 3  •  B Complex Vitamins (VITAMIN B COMPLEX PO), Take 1 tablet by mouth Daily., Disp: , Rfl:   •  B-12, Methylcobalamin, 1000 MCG sublingual tablet, Every Other Day., Disp: , Rfl:   •  Cholecalciferol (VITAMIN D3) 13878 UNITS tablet, Take 7,500 Units by mouth Daily., Disp: , Rfl:   •  Coenzyme Q10 400  MG capsule, Take  by mouth., Disp: , Rfl:   •  diclofenac (VOLTAREN) 50 MG EC tablet, Take 1 tablet by mouth 2 (Two) Times a Day As Needed (Pain)., Disp: 30 tablet, Rfl: 0  •  Digestive Enzymes (DIGESTIVE ENZYME PO), Take 1 tablet by mouth Daily., Disp: , Rfl:   •  escitalopram (LEXAPRO) 10 MG tablet, Take 1 tablet by mouth Daily., Disp: 90 tablet, Rfl: 2  •  Euthyrox 75 MCG tablet, Take 1 tablet by mouth once daily, Disp: 90 tablet, Rfl: 1  •  famotidine (PEPCID) 20 MG tablet, Take 1 tablet by mouth 2 (Two) Times a Day., Disp: 60 tablet, Rfl: 5  •  ferrous gluconate (FERGON) 324 MG tablet, Take 1 tablet by mouth Daily With Breakfast., Disp: 30 tablet, Rfl: 5  •  furosemide (LASIX) 20 MG tablet, Take 1 tablet by mouth Daily., Disp: 90 tablet, Rfl: 2  •  GARLIC PO, Take 1 tablet by mouth Daily., Disp: , Rfl:   •  hydrOXYzine (ATARAX) 10 MG tablet, Take 1-2 tablets by mouth 1-2 times per day as needed for anxiety or sleep, Disp: 30 tablet, Rfl: 0  •  hydrOXYzine (ATARAX) 25 MG tablet, Take 1 tablet by mouth Every 6 (Six) Hours As Needed for Itching., Disp: 30 tablet, Rfl: 0  •  L-THEANINE PO, Take  by mouth., Disp: , Rfl:   •  liothyronine (CYTOMEL) 25 MCG tablet, Take 3 tablets by mouth Daily., Disp: 270 tablet, Rfl: 0  •  MAGNESIUM PO, Take 1,000 mg by mouth Every Night., Disp: , Rfl:   •  Methylsulfonylmethane (MSM PO), Take 1 tablet by mouth Daily., Disp: , Rfl:   •  MILK THISTLE PO, Take 1 tablet by mouth Daily., Disp: , Rfl:   •  Misc Natural Products (TART CHERRY ADVANCED PO), Take 2 capsules by mouth Daily., Disp: , Rfl:   •  Omega-3 Fatty Acids (OMEGA-3 FISH OIL PO), Take 4,000 Units by mouth 3 (Three) Times a Day., Disp: , Rfl:   •  Probiotic Product (PROBIOTIC PO), Take 1 tablet by mouth Daily., Disp: , Rfl:   •  SELENIUM PO, Take 1 tablet by mouth Daily., Disp: , Rfl:   •  TAURINE PO, Take  by mouth., Disp: , Rfl:   •  TURMERIC PO, Take 1 tablet by mouth 2 (two) times a day., Disp: , Rfl:   •  VITAMIN A  "PO, Take  by mouth., Disp: , Rfl:   •  vitamin C (ASCORBIC ACID) 500 MG tablet, Take 500 mg by mouth 3 (Three) Times a Day As Needed., Disp: , Rfl:   •  vitamin E 400 UNIT capsule, Take 400 Units by mouth Daily., Disp: , Rfl:   •  VITAMIN K PO, Take 100 mcg by mouth Daily., Disp: , Rfl:   •  Zinc 30 MG tablet, Take  by mouth Daily., Disp: , Rfl:     Allergies:   No Known Allergies    Objective     Physical Exam:   Vital Signs:   Vitals:    21 1517   BP: 142/88   Pulse: 87   SpO2: 95%   Weight: 113 kg (249 lb)   Height: 160 cm (63\")     Body mass index is 44.11 kg/m².     Physical Exam  Vitals and nursing note reviewed.   Constitutional:       Appearance: Normal appearance.   HENT:      Head: Normocephalic and atraumatic.      Right Ear: Tympanic membrane, ear canal and external ear normal.      Left Ear: Tympanic membrane, ear canal and external ear normal.      Nose: Nose normal.      Mouth/Throat:      Mouth: Mucous membranes are moist.      Pharynx: Oropharynx is clear.   Eyes:      Extraocular Movements: Extraocular movements intact.      Conjunctiva/sclera: Conjunctivae normal.      Pupils: Pupils are equal, round, and reactive to light.   Neck:      Vascular: No carotid bruit.   Cardiovascular:      Rate and Rhythm: Normal rate and regular rhythm.      Pulses: Normal pulses.      Heart sounds: Normal heart sounds.   Pulmonary:      Effort: Pulmonary effort is normal.      Breath sounds: Normal breath sounds.   Abdominal:      General: Abdomen is flat. Bowel sounds are normal. There is no distension.      Palpations: Abdomen is soft.      Tenderness: There is no abdominal tenderness.   Musculoskeletal:      Cervical back: Neck supple. No tenderness.      Right lower le+ Pitting Edema present.      Left lower le+ Pitting Edema present.   Lymphadenopathy:      Cervical: No cervical adenopathy.   Skin:     General: Skin is warm and dry.      Capillary Refill: Capillary refill takes less than 2 " seconds.   Neurological:      General: No focal deficit present.      Mental Status: She is alert and oriented to person, place, and time.   Psychiatric:         Mood and Affect: Mood is elated.         Speech: Speech is tangential.         Behavior: Behavior normal. Behavior is cooperative.         Assessment / Plan      Assessment/Plan:   Diagnoses and all orders for this visit:    1. Fatigue, unspecified type (Primary)  -     VALENTÍN With / DsDNA, RNP, Sjogrens A / B, Hodgson  -     Iron Profile  -     Ferritin  -     Vitamin B12  -     Folate  -     Vitamin D 25 Hydroxy    2. Vitamin D deficiency  -     Vitamin D 25 Hydroxy    3. Blurred vision  -     Ambulatory Referral to Ophthalmology    4. Pain in both lower extremities  -     diclofenac (VOLTAREN) 50 MG EC tablet; Take 1 tablet by mouth 2 (Two) Times a Day As Needed (Pain).  Dispense: 30 tablet; Refill: 0    5. Iron deficiency anemia, unspecified iron deficiency anemia type   -     Iron Profile    6. Anemia, unspecified type   -     Ferritin         Labs ordered as above, will follow up with results.  Will rule out causes of fatigue.  Referral given for ophthalmologist to evaluate patient's progressive blurred vision.  Will trial diclofenac to help with pain.  She states that she was seen at the emergency department earlier this week and was given naproxen.  Advised her to use the diclofenac instead as this is enteric-coated.  Advised patient to take Lasix 40 mg today and tomorrow as well as her potassium.  If she continues to have swelling to her ankles and feet after tomorrow's dose, she may repeat the Lasix 40 mg for Friday and Saturday for a total of 5 days.      Follow Up:   Return if symptoms worsen or fail to improve.    I spent approximately 30 minutes providing clinical care for this patient; including review of patient's chart and provider documentation, face to face time spent with patient in examination room (obtaining history, performing physical  exam, discussing diagnosis and management options), placing orders, and completing patient documentation.     VALENTINE Yanez  [facility] [place] BERNARDO Bowen

## 2021-12-09 LAB
25(OH)D3+25(OH)D2 SERPL-MCNC: 40.3 NG/ML (ref 30–100)
ANA SER QL: POSITIVE
BACTERIA SPEC AEROBE CULT: NO GROWTH
CENTROMERE B AB SER-ACNC: <0.2 AI (ref 0–0.9)
CHROMATIN AB SERPL-ACNC: 6.1 AI (ref 0–0.9)
DSDNA AB SER-ACNC: <1 IU/ML (ref 0–9)
ENA JO1 AB SER-ACNC: <0.2 AI (ref 0–0.9)
ENA RNP AB SER-ACNC: 2.9 AI (ref 0–0.9)
ENA SCL70 AB SER-ACNC: <0.2 AI (ref 0–0.9)
ENA SM AB SER-ACNC: <0.2 AI (ref 0–0.9)
ENA SS-A AB SER-ACNC: <0.2 AI (ref 0–0.9)
ENA SS-B AB SER-ACNC: <0.2 AI (ref 0–0.9)
FERRITIN SERPL-MCNC: 143 NG/ML (ref 15–150)
FOLATE SERPL-MCNC: >20 NG/ML
IRON SATN MFR SERPL: 12 % (ref 15–55)
IRON SERPL-MCNC: 35 UG/DL (ref 27–139)
Lab: ABNORMAL
TIBC SERPL-MCNC: 304 UG/DL (ref 250–450)
UIBC SERPL-MCNC: 269 UG/DL (ref 118–369)
VIT B12 SERPL-MCNC: 861 PG/ML (ref 232–1245)

## 2021-12-10 ENCOUNTER — TELEPHONE (OUTPATIENT)
Dept: FAMILY MEDICINE CLINIC | Facility: CLINIC | Age: 71
End: 2021-12-10

## 2021-12-10 DIAGNOSIS — R76.8 POSITIVE ANA (ANTINUCLEAR ANTIBODY): Primary | ICD-10-CM

## 2021-12-10 NOTE — TELEPHONE ENCOUNTER
Caller: Salinas Monroy    Relationship: Self    Best call back number: [phone]     What medications are you currently taking:   Current Outpatient Medications on File Prior to Visit   Medication Sig Dispense Refill   • acyclovir (ZOVIRAX) 400 MG tablet Take 1 tablet by mouth 2 (Two) Times a Week. 32 tablet 0   • allopurinol (ZYLOPRIM) 100 MG tablet Take 2 tablets by mouth Daily. 180 tablet 3   • B Complex Vitamins (VITAMIN B COMPLEX PO) Take 1 tablet by mouth Daily.     • B-12, Methylcobalamin, 1000 MCG sublingual tablet Every Other Day.     • Cholecalciferol (VITAMIN D3) 98861 UNITS tablet Take 7,500 Units by mouth Daily.     • Coenzyme Q10 400 MG capsule Take  by mouth.     • diclofenac (VOLTAREN) 50 MG EC tablet Take 1 tablet by mouth 2 (Two) Times a Day As Needed (Pain). 30 tablet 0   • Digestive Enzymes (DIGESTIVE ENZYME PO) Take 1 tablet by mouth Daily.     • escitalopram (LEXAPRO) 10 MG tablet Take 1 tablet by mouth Daily. 90 tablet 2   • Euthyrox 75 MCG tablet Take 1 tablet by mouth once daily 90 tablet 1   • famotidine (PEPCID) 20 MG tablet Take 1 tablet by mouth 2 (Two) Times a Day. 60 tablet 5   • ferrous gluconate (FERGON) 324 MG tablet Take 1 tablet by mouth Daily With Breakfast. 30 tablet 5   • furosemide (LASIX) 20 MG tablet Take 1 tablet by mouth Daily. 90 tablet 2   • GARLIC PO Take 1 tablet by mouth Daily.     • hydrOXYzine (ATARAX) 10 MG tablet Take 1-2 tablets by mouth 1-2 times per day as needed for anxiety or sleep 30 tablet 0   • hydrOXYzine (ATARAX) 25 MG tablet Take 1 tablet by mouth Every 6 (Six) Hours As Needed for Itching. 30 tablet 0   • L-THEANINE PO Take  by mouth.     • liothyronine (CYTOMEL) 25 MCG tablet Take 3 tablets by mouth Daily. 270 tablet 0   • MAGNESIUM PO Take 1,000 mg by mouth Every Night.     • Methylsulfonylmethane (MSM PO) Take 1 tablet by mouth Daily.     • MILK THISTLE PO Take 1 tablet by mouth Daily.     • Misc Natural Products (TART CHERRY  ADVANCED PO) Take 2 capsules by mouth Daily.     • Omega-3 Fatty Acids (OMEGA-3 FISH OIL PO) Take 4,000 Units by mouth 3 (Three) Times a Day.     • Probiotic Product (PROBIOTIC PO) Take 1 tablet by mouth Daily.     • SELENIUM PO Take 1 tablet by mouth Daily.     • TAURINE PO Take  by mouth.     • TURMERIC PO Take 1 tablet by mouth 2 (two) times a day.     • VITAMIN A PO Take  by mouth.     • vitamin C (ASCORBIC ACID) 500 MG tablet Take 500 mg by mouth 3 (Three) Times a Day As Needed.     • vitamin E 400 UNIT capsule Take 400 Units by mouth Daily.     • VITAMIN K PO Take 100 mcg by mouth Daily.     • Zinc 30 MG tablet Take  by mouth Daily.       No current facility-administered medications on file prior to visit.          When did you start taking these medications:     Which medication are you concerned about: diclofenac (VOLTAREN) 50 MG EC tablet    Who prescribed you this medication: SCOTT MONTALVO    What are your concerns: PATIENT STATES SHE SPOKE WITH THE PHARMACY AND THEY TOLD PATIENT THEY DO CARRY THE diclofenac (VOLTAREN) 50 MG EC tablet HOWEVER IF THIS IS NOT STRONG ENOUGH THEY ALSO CARRY 75 MG AS WELL. PHARMACY WOULD LIKE TO KNOW IF PRESCRIPTION SHOULD BE CHANGED TO 75 MG OR IF AN ALTERNATIVE PRESCRIPTION SHOULD BE PRESCRIBED. PATIENT STATES THE DIRECTIONS ON THE BOTTLE STATES TO TAKE 1 TABLET BY MOUTH TWICE A DAY HOWEVER PATIENT STATES SHE WAS ONLY PRESCRIBED 30 TABLETS WHICH WOULD LAST ONLY 15 DAYS. PATIENT STATES SHE DID TAKE 3 TABLETS 12/9/21 DUE TO PAIN.    How long have you had these concerns:

## 2021-12-12 DIAGNOSIS — M1A.0710 CHRONIC IDIOPATHIC GOUT INVOLVING TOE OF RIGHT FOOT WITHOUT TOPHUS: ICD-10-CM

## 2021-12-12 DIAGNOSIS — F51.04 PSYCHOPHYSIOLOGICAL INSOMNIA: ICD-10-CM

## 2021-12-13 RX ORDER — HYDROXYZINE HYDROCHLORIDE 10 MG/1
TABLET, FILM COATED ORAL
Qty: 30 TABLET | Refills: 0 | Status: SHIPPED | OUTPATIENT
Start: 2021-12-13 | End: 2021-12-15 | Stop reason: SDUPTHER

## 2021-12-13 RX ORDER — ALLOPURINOL 100 MG/1
TABLET ORAL
Qty: 180 TABLET | Refills: 0 | Status: SHIPPED | OUTPATIENT
Start: 2021-12-13 | End: 2022-06-27

## 2021-12-14 ENCOUNTER — TELEPHONE (OUTPATIENT)
Dept: FAMILY MEDICINE CLINIC | Facility: CLINIC | Age: 71
End: 2021-12-14

## 2021-12-14 NOTE — TELEPHONE ENCOUNTER
Caller: Salinas Monroy    Relationship: Self    Best call back number:[phone]    What was the call regarding:   PATIENT STATED THAT HER FEET AND ANKLES HAVE BEEN SWELLING AND SHE WOULD LIKE FOR HER LAB ORDERS TO BE PLACED TO CHECK HER POTASIUM LEVELS. PATIENT STATED THAT SHE WOULD LIKE A CALL BACK REGARDING QUESTION'S SHE HAS REGARDING HER MEDICATION.     PATIENT WOULD LIKE TO BE INFORMED OF THE STATUS OF HER REFERRAL TO THE RHEUMATOLOGIST     Do you require a callback: YES

## 2021-12-15 ENCOUNTER — OFFICE VISIT (OUTPATIENT)
Dept: FAMILY MEDICINE CLINIC | Facility: CLINIC | Age: 71
End: 2021-12-15

## 2021-12-15 VITALS
OXYGEN SATURATION: 98 % | HEIGHT: 63 IN | WEIGHT: 242.13 LBS | HEART RATE: 78 BPM | TEMPERATURE: 97.8 F | BODY MASS INDEX: 42.9 KG/M2 | DIASTOLIC BLOOD PRESSURE: 86 MMHG | SYSTOLIC BLOOD PRESSURE: 130 MMHG

## 2021-12-15 DIAGNOSIS — F31.62 BIPOLAR DISORDER, CURRENT EPISODE MIXED, MODERATE (HCC): ICD-10-CM

## 2021-12-15 DIAGNOSIS — F51.04 PSYCHOPHYSIOLOGICAL INSOMNIA: ICD-10-CM

## 2021-12-15 DIAGNOSIS — R39.9 URINARY SYMPTOM OR SIGN: ICD-10-CM

## 2021-12-15 DIAGNOSIS — E87.6 HYPOKALEMIA: ICD-10-CM

## 2021-12-15 DIAGNOSIS — R60.0 LOWER EXTREMITY EDEMA: Primary | ICD-10-CM

## 2021-12-15 DIAGNOSIS — M21.961 DEFORMITY OF RIGHT FOOT: ICD-10-CM

## 2021-12-15 DIAGNOSIS — M25.50 ARTHRALGIA, UNSPECIFIED JOINT: ICD-10-CM

## 2021-12-15 DIAGNOSIS — I11.0 HYPERTENSIVE HEART DISEASE WITH HEART FAILURE (HCC): ICD-10-CM

## 2021-12-15 PROCEDURE — 99213 OFFICE O/P EST LOW 20 MIN: CPT

## 2021-12-15 PROCEDURE — 81003 URINALYSIS AUTO W/O SCOPE: CPT

## 2021-12-15 RX ORDER — HYDROXYZINE HYDROCHLORIDE 10 MG/1
TABLET, FILM COATED ORAL
Qty: 30 TABLET | Refills: 0 | Status: SHIPPED | OUTPATIENT
Start: 2021-12-15 | End: 2022-02-24 | Stop reason: SDUPTHER

## 2021-12-15 RX ORDER — HYDROXYZINE HYDROCHLORIDE 25 MG/1
25 TABLET, FILM COATED ORAL EVERY 6 HOURS PRN
Qty: 30 TABLET | Refills: 3 | Status: SHIPPED | OUTPATIENT
Start: 2021-12-15 | End: 2022-08-04

## 2021-12-15 RX ORDER — POTASSIUM CHLORIDE 20 MEQ/1
20 TABLET, EXTENDED RELEASE ORAL 2 TIMES DAILY
Qty: 20 TABLET | Refills: 1 | Status: SHIPPED | OUTPATIENT
Start: 2021-12-15 | End: 2022-04-04

## 2021-12-15 RX ORDER — CELECOXIB 100 MG/1
100 CAPSULE ORAL 2 TIMES DAILY PRN
Qty: 30 CAPSULE | Refills: 0 | Status: SHIPPED | OUTPATIENT
Start: 2021-12-15 | End: 2022-01-05

## 2021-12-15 NOTE — TELEPHONE ENCOUNTER
Patient notified exchange yesterday 12/14/2021 at 1914 p.m. and was notified.  She complained of pain in feet and ankles in addition to them being extremely swollen.  She had also contacted [facility] office appears with same complaint and had previous potassium which was normal and she stated that she was concerned regarding her potassium and she has been taking over-the-counter potassium in addition to taking Lasix that she had been prescribed in the past.  I advised her to follow-up with PCP and make an appointment for further evaluation and do daily weights in addition to monitoring blood pressure and heart rate.  She denied any leg cramps but did report pain and states that she had been putting ice and trying to elevate.  I advised her to place pillow under her legs for more elevation in addition to may be soaking in Epson salt and watching her salt intake.  Attempted to place encounter but it stated patient had been dismissed from practice.  Had to addend previous encounter to enter note.

## 2021-12-15 NOTE — TELEPHONE ENCOUNTER
Rx Refill Note  Requested Prescriptions      No prescriptions requested or ordered in this encounter      Last office visit with prescribing clinician: 9/14/2021      Next office visit with prescribing clinician: 12/28/2021            Ale Salazar MA  12/15/21, 14:06 EST

## 2021-12-15 NOTE — PROGRESS NOTES
Acute Office Visit      Patient Name: Salinas Monroy  : 1950   MRN: [id number]     Chief Complaint:    Chief Complaint   Patient presents with   • Edema     Complaints of bilateral ankle swelling. patient states that she was just seen last week for the same issues.   • Urinary Tract Infection     Complaints of possible UTI       History of Present Illness: Salinas Monroy is a 71 y.o. female who is here today for persistent lower ankle swelling and possible UTI.  She notes some dysuria on urination.  Denies an increase in urination but reports that she is not had much water today.  She feels as if her ankles are more swollen than when she was seen last week.  She thinks that the right ankle is larger than the left.  She would also like to discuss something stronger to help with her joint pain.  And she is also in need of a refill on her hydroxyzine.    Subjective     Review of System: Review of Systems   Constitutional: Negative for chills, fatigue and fever.   HENT: Negative for congestion, ear pain, postnasal drip, rhinorrhea, sinus pressure, sinus pain and sore throat.    Respiratory: Negative for cough and shortness of breath.    Cardiovascular: Positive for leg swelling. Negative for chest pain.   Gastrointestinal: Negative for abdominal pain, diarrhea, rectal pain and vomiting.   Genitourinary: Positive for dysuria. Negative for decreased urine volume, flank pain, frequency, hematuria and urgency.   Musculoskeletal: Positive for arthralgias, back pain and myalgias.   Neurological: Negative for weakness and headaches.      I have reviewed the ROS documented by my clinical staff, updated appropriately and I agree. VALENTINE Yanez    Past Medical History:   Past Medical History:   Diagnosis Date   • Anemia ?    under control   • Anxiety    • Arthritis    • Bloating    • Burping    • Chest pain 2020    was seen by Dr Pires and was released was just anxiety   • Colon polyp  "06/05/2018   • Depression    • Diverticulosis    • Essential hypertension 7/30/2018   • GERD (gastroesophageal reflux disease)     no longer bothers me...   • Gout    • Hashimoto's disease    • Hernia 3-2012    repair surgery which failed in 2014   • Hypothyroid    • Impingement syndrome of right shoulder 5/8/2016   • Movement disorder Hand Tremors   • Obesity    • OCD (obsessive compulsive disorder)    • ISABEL on CPAP     asked to bring dos did not want to due to covid   • PONV (postoperative nausea and vomiting)     Pt states \"only one time\".   • Tremor     worse on left arm/hand   • Wears glasses     reading glasses only       Past Surgical History:   Past Surgical History:   Procedure Laterality Date   • ABDOMINAL SURGERY     • APPENDECTOMY     • COLONOSCOPY  2012    failed due to hernia...   • COLONOSCOPY N/A 6/8/2017    Procedure: COLONOSCOPY with cold snare polypectomies, argon thermal ablation, ns injection, yanira ink injection;  Surgeon: Rafiq Huang MD;  Location: [facility] ENDOSCOPY;  Service:    • COLONOSCOPY N/A 6/5/2018    Procedure: COLONOSCOPY WITH BIOPSIES;  Surgeon: Rafiq Huang MD;  Location: [facility] ENDOSCOPY;  Service: Gastroenterology   • ENDOSCOPY N/A 1/27/2021    Procedure: ESOPHAGOGASTRODUODENOSCOPY WITH BIOPSIES AND CLIPS;  Surgeon: Katie Shannon MD;  Location: [facility] ENDOSCOPY;  Service: Gastroenterology;  Laterality: N/A;   • GASTRIC BYPASS  1978   • HERNIA MESH REMOVAL  2014    BOWEL OBSTRUCTION DUE TO MESH   • HERNIA REPAIR  2012   • HERNIA REPAIR     • HYSTERECTOMY      complete   • JOINT REPLACEMENT  2009 & 2012    Left & Right Full Hip Replacements   • SMALL INTESTINE SURGERY  2014    DUE TO BOWEL OBSTRUCTION WITH SOME REMOVAL   • TOTAL ABDOMINAL HYSTERECTOMY WITH SALPINGO OOPHORECTOMY  1990    fibroids   • TUBAL ABDOMINAL LIGATION     • UPPER GASTROINTESTINAL ENDOSCOPY  2010       Family History:   Family History   Problem Relation Age of Onset   • Obesity Mother    • Rheum " arthritis Mother    • Thyroid disease Mother    • Hypertension Mother    • Stroke Mother         Most of her problems were caused by Rheumatoid Ar.   • Hyperlipidemia Mother    • Arthritis Mother         Rheumatoid Arthritis..Passed    • Cancer Father         Passed    • Kidney cancer Father    • Liver cancer Father    • No Known Problems Brother    • Diabetes Maternal Aunt    • Cancer Maternal Uncle    • Lung cancer Maternal Uncle    • Alcohol abuse Maternal Uncle    • No Known Problems Paternal Aunt    • Stroke Paternal Uncle    • Hypertension Paternal Uncle    • Hyperlipidemia Paternal Uncle    • Heart disease Paternal Uncle    • No Known Problems Brother    • Asthma Sister         under control   • Alcohol abuse Maternal Uncle    • Colon cancer Neg Hx    • Breast cancer Neg Hx    • Ovarian cancer Neg Hx        Social History:   Social History     Socioeconomic History   • Marital status:    Tobacco Use   • Smoking status: Former Smoker     Packs/day: 1.00     Years: 40.00     Pack years: 40.00     Types: Cigarettes     Start date:      Quit date:      Years since quittin.9   • Smokeless tobacco: Never Used   Vaping Use   • Vaping Use: Never used   Substance and Sexual Activity   • Alcohol use: Not Currently   • Drug use: No   • Sexual activity: Defer       Medications:     Current Outpatient Medications:   •  acyclovir (ZOVIRAX) 400 MG tablet, Take 1 tablet by mouth 2 (Two) Times a Week., Disp: 32 tablet, Rfl: 0  •  allopurinol (ZYLOPRIM) 100 MG tablet, Take 2 tablets by mouth once daily, Disp: 180 tablet, Rfl: 0  •  B Complex Vitamins (VITAMIN B COMPLEX PO), Take 1 tablet by mouth Daily., Disp: , Rfl:   •  B-12, Methylcobalamin, 1000 MCG sublingual tablet, Every Other Day., Disp: , Rfl:   •  Cholecalciferol (VITAMIN D3) 37449 UNITS tablet, Take 7,500 Units by mouth Daily., Disp: , Rfl:   •  Coenzyme Q10 400 MG capsule, Take  by mouth., Disp: , Rfl:   •  diclofenac (VOLTAREN) 50 MG  EC tablet, Take 1 tablet by mouth 2 (Two) Times a Day As Needed (Pain)., Disp: 30 tablet, Rfl: 0  •  Digestive Enzymes (DIGESTIVE ENZYME PO), Take 1 tablet by mouth Daily., Disp: , Rfl:   •  escitalopram (LEXAPRO) 10 MG tablet, Take 1 tablet by mouth Daily., Disp: 90 tablet, Rfl: 2  •  Euthyrox 75 MCG tablet, Take 1 tablet by mouth once daily, Disp: 90 tablet, Rfl: 1  •  famotidine (PEPCID) 20 MG tablet, Take 1 tablet by mouth 2 (Two) Times a Day., Disp: 60 tablet, Rfl: 5  •  ferrous gluconate (FERGON) 324 MG tablet, Take 1 tablet by mouth Daily With Breakfast., Disp: 30 tablet, Rfl: 5  •  furosemide (LASIX) 20 MG tablet, Take 1 tablet by mouth Daily., Disp: 90 tablet, Rfl: 2  •  GARLIC PO, Take 1 tablet by mouth Daily., Disp: , Rfl:   •  hydrOXYzine (ATARAX) 10 MG tablet, TAKE 1 TO 2 TABLETS BY MOUTH ONCE TO TWICE DAILY AS NEEDED FOR ANXIETY FOR SLEEP, Disp: 30 tablet, Rfl: 0  •  hydrOXYzine (ATARAX) 25 MG tablet, Take 1 tablet by mouth Every 6 (Six) Hours As Needed for Anxiety., Disp: 30 tablet, Rfl: 3  •  L-THEANINE PO, Take  by mouth., Disp: , Rfl:   •  liothyronine (CYTOMEL) 25 MCG tablet, Take 3 tablets by mouth Daily., Disp: 270 tablet, Rfl: 0  •  MAGNESIUM PO, Take 1,000 mg by mouth Every Night., Disp: , Rfl:   •  Methylsulfonylmethane (MSM PO), Take 1 tablet by mouth Daily., Disp: , Rfl:   •  MILK THISTLE PO, Take 1 tablet by mouth Daily., Disp: , Rfl:   •  Misc Natural Products (TART CHERRY ADVANCED PO), Take 2 capsules by mouth Daily., Disp: , Rfl:   •  Omega-3 Fatty Acids (OMEGA-3 FISH OIL PO), Take 4,000 Units by mouth 3 (Three) Times a Day., Disp: , Rfl:   •  Probiotic Product (PROBIOTIC PO), Take 1 tablet by mouth Daily., Disp: , Rfl:   •  SELENIUM PO, Take 1 tablet by mouth Daily., Disp: , Rfl:   •  TAURINE PO, Take  by mouth., Disp: , Rfl:   •  TURMERIC PO, Take 1 tablet by mouth 2 (two) times a day., Disp: , Rfl:   •  VITAMIN A PO, Take  by mouth., Disp: , Rfl:   •  vitamin C (ASCORBIC ACID) 500 MG  "tablet, Take 500 mg by mouth 3 (Three) Times a Day As Needed., Disp: , Rfl:   •  vitamin E 400 UNIT capsule, Take 400 Units by mouth Daily., Disp: , Rfl:   •  VITAMIN K PO, Take 100 mcg by mouth Daily., Disp: , Rfl:   •  Zinc 30 MG tablet, Take  by mouth Daily., Disp: , Rfl:   •  celecoxib (CeleBREX) 100 MG capsule, Take 1 capsule by mouth 2 (Two) Times a Day As Needed for Mild Pain  or Moderate Pain ., Disp: 30 capsule, Rfl: 0  •  potassium chloride (K-DUR,KLOR-CON) 20 MEQ CR tablet, Take 1 tablet by mouth 2 (Two) Times a Day., Disp: 20 tablet, Rfl: 1    Allergies:   No Known Allergies    Objective     Physical Exam:   Vital Signs:   Vitals:    12/15/21 1521   BP: 130/86   Pulse: 78   Temp: 97.8 °F (36.6 °C)   SpO2: 98%   Weight: 110 kg (242 lb 2 oz)   Height: 160 cm (62.99\")     Body mass index is 42.9 kg/m².     Physical Exam    Assessment / Plan      Assessment/Plan:   Diagnoses and all orders for this visit:    1. Lower extremity edema (Primary)  -     proBNP  -     CBC & Differential  -     Comprehensive Metabolic Panel    2. Urinary symptom or sign  -     POCT urinalysis dipstick, automated    3. Hypokalemia  -     potassium chloride (K-DUR,KLOR-CON) 20 MEQ CR tablet; Take 1 tablet by mouth 2 (Two) Times a Day.  Dispense: 20 tablet; Refill: 1    4. Arthralgia, unspecified joint  -     celecoxib (CeleBREX) 100 MG capsule; Take 1 capsule by mouth 2 (Two) Times a Day As Needed for Mild Pain  or Moderate Pain .  Dispense: 30 capsule; Refill: 0    5. Psychophysiological insomnia  -     hydrOXYzine (ATARAX) 10 MG tablet; TAKE 1 TO 2 TABLETS BY MOUTH ONCE TO TWICE DAILY AS NEEDED FOR ANXIETY FOR SLEEP  Dispense: 30 tablet; Refill: 0    6. Bipolar disorder, current episode mixed, moderate (HCC)  -     hydrOXYzine (ATARAX) 25 MG tablet; Take 1 tablet by mouth Every 6 (Six) Hours As Needed for Anxiety.  Dispense: 30 tablet; Refill: 3    7. Deformity of right foot  -     Ambulatory Referral to Podiatry    8. " Hypertensive heart disease with heart failure (HCC)   -     proBNP         1. Labs ordered as above, will follow up with results.  Will evaluate for potential causes of edema in legs.  Patient requested to have potassium level rechecked as she and her sister state that she had a decreased potassium in the past when she had excessive amounts of swelling in her legs.  2. She was given diclofenac last week which she reports was helpful but was having to take a double dose with each administration.  Advised her to withhold that medication at this time and will trial Celebrex.  3. She has 2 forms of hydroxyzine on hand, 10 mg and 25 mg.  She reports that she uses 10 mg during the day if needed and then the 25 mg dose at night if needed because it makes her more sleepy.  4. She would like a referral to a podiatrist in [place] for an abnormality to her right foot/great toe.  5. Urinalysis done in the office.  Showed trace ketones and protein.  Most likely due to dehydration and use of Lasix.      Follow Up:   Return if symptoms worsen or fail to improve.    I spent approximately 25 minutes providing clinical care for this patient; including review of patient's chart and provider documentation, face to face time spent with patient in examination room (obtaining history, performing physical exam, discussing diagnosis and management options), placing orders, and completing patient documentation.     VALENTINE Yanez  [facility] [place] BERNARDO Bowen

## 2021-12-16 LAB
ALBUMIN SERPL-MCNC: 4.3 G/DL (ref 3.7–4.7)
ALBUMIN/GLOB SERPL: 1.8 {RATIO} (ref 1.2–2.2)
ALP SERPL-CCNC: 96 IU/L (ref 44–121)
ALT SERPL-CCNC: 23 IU/L (ref 0–32)
AST SERPL-CCNC: 23 IU/L (ref 0–40)
BASOPHILS # BLD AUTO: 0 X10E3/UL (ref 0–0.2)
BASOPHILS NFR BLD AUTO: 0 %
BILIRUB SERPL-MCNC: 0.4 MG/DL (ref 0–1.2)
BUN SERPL-MCNC: 37 MG/DL (ref 8–27)
BUN/CREAT SERPL: 45 (ref 12–28)
CALCIUM SERPL-MCNC: 9.3 MG/DL (ref 8.7–10.3)
CHLORIDE SERPL-SCNC: 105 MMOL/L (ref 96–106)
CO2 SERPL-SCNC: 17 MMOL/L (ref 20–29)
CREAT SERPL-MCNC: 0.83 MG/DL (ref 0.57–1)
EOSINOPHIL # BLD AUTO: 0.1 X10E3/UL (ref 0–0.4)
EOSINOPHIL NFR BLD AUTO: 2 %
ERYTHROCYTE [DISTWIDTH] IN BLOOD BY AUTOMATED COUNT: 13.2 % (ref 11.7–15.4)
GLOBULIN SER CALC-MCNC: 2.4 G/DL (ref 1.5–4.5)
GLUCOSE SERPL-MCNC: 77 MG/DL (ref 65–99)
HCT VFR BLD AUTO: 38.5 % (ref 34–46.6)
HGB BLD-MCNC: 12.9 G/DL (ref 11.1–15.9)
IMM GRANULOCYTES # BLD AUTO: 0 X10E3/UL (ref 0–0.1)
IMM GRANULOCYTES NFR BLD AUTO: 0 %
LYMPHOCYTES # BLD AUTO: 2.3 X10E3/UL (ref 0.7–3.1)
LYMPHOCYTES NFR BLD AUTO: 25 %
MCH RBC QN AUTO: 28.9 PG (ref 26.6–33)
MCHC RBC AUTO-ENTMCNC: 33.5 G/DL (ref 31.5–35.7)
MCV RBC AUTO: 86 FL (ref 79–97)
MONOCYTES # BLD AUTO: 0.7 X10E3/UL (ref 0.1–0.9)
MONOCYTES NFR BLD AUTO: 8 %
NEUTROPHILS # BLD AUTO: 6 X10E3/UL (ref 1.4–7)
NEUTROPHILS NFR BLD AUTO: 65 %
NT-PROBNP SERPL-MCNC: 884 PG/ML (ref 0–301)
PLATELET # BLD AUTO: 322 X10E3/UL (ref 150–450)
POTASSIUM SERPL-SCNC: 4.1 MMOL/L (ref 3.5–5.2)
PROT SERPL-MCNC: 6.7 G/DL (ref 6–8.5)
RBC # BLD AUTO: 4.47 X10E6/UL (ref 3.77–5.28)
SODIUM SERPL-SCNC: 140 MMOL/L (ref 134–144)
WBC # BLD AUTO: 9.2 X10E3/UL (ref 3.4–10.8)

## 2021-12-16 NOTE — PROGRESS NOTES
CMP shows a normal potassium level of 4.1  Her BUN is elevated but this could be due to dehydration, especially since she reported a decrease water intake. Her creatinine and GFR are normal  BNP is elevated to 884, which could explain the edema, but she denies a history of heart failure. She had a similar level in 3/2020.  If the swelling is persisting despite elevation and using Lasix, could evaluate for venous insufficiency in both legs via ultrasound.

## 2021-12-21 ENCOUNTER — OFFICE VISIT (OUTPATIENT)
Dept: NEUROLOGY | Facility: CLINIC | Age: 71
End: 2021-12-21

## 2021-12-21 DIAGNOSIS — G47.33 OBSTRUCTIVE SLEEP APNEA: Primary | ICD-10-CM

## 2021-12-21 LAB
BILIRUB BLD-MCNC: NEGATIVE MG/DL
CLARITY, POC: CLEAR
COLOR UR: YELLOW
EXPIRATION DATE: ABNORMAL
GLUCOSE UR STRIP-MCNC: NEGATIVE MG/DL
KETONES UR QL: ABNORMAL
LEUKOCYTE EST, POC: NEGATIVE
Lab: ABNORMAL
NITRITE UR-MCNC: NEGATIVE MG/ML
PH UR: 6 [PH] (ref 5–8)
PROT UR STRIP-MCNC: NEGATIVE MG/DL
RBC # UR STRIP: NEGATIVE /UL
SP GR UR: 1.02 (ref 1–1.03)
UROBILINOGEN UR QL: NORMAL

## 2021-12-21 PROCEDURE — 99442 PR PHYS/QHP TELEPHONE EVALUATION 11-20 MIN: CPT | Performed by: NURSE PRACTITIONER

## 2021-12-21 NOTE — PATIENT INSTRUCTIONS
Sleep Apnea  Sleep apnea affects breathing during sleep. It causes breathing to stop for a short time or to become shallow. It can also increase the risk of:  · Heart attack.  · Stroke.  · Being very overweight (obese).  · Diabetes.  · Heart failure.  · Irregular heartbeat.  The goal of treatment is to help you breathe normally again.  What are the causes?  There are three kinds of sleep apnea:  · Obstructive sleep apnea. This is caused by a blocked or collapsed airway.  · Central sleep apnea. This happens when the brain does not send the right signals to the muscles that control breathing.  · Mixed sleep apnea. This is a combination of obstructive and central sleep apnea.  The most common cause of this condition is a collapsed or blocked airway. This can happen if:  · Your throat muscles are too relaxed.  · Your tongue and tonsils are too large.  · You are overweight.  · Your airway is too small.  What increases the risk?  · Being overweight.  · Smoking.  · Having a small airway.  · Being older.  · Being male.  · Drinking alcohol.  · Taking medicines to calm yourself (sedatives or tranquilizers).  · Having family members with the condition.  What are the signs or symptoms?  · Trouble staying asleep.  · Being sleepy or tired during the day.  · Getting angry a lot.  · Loud snoring.  · Headaches in the morning.  · Not being able to focus your mind (concentrate).  · Forgetting things.  · Less interest in sex.  · Mood swings.  · Personality changes.  · Feelings of sadness (depression).  · Waking up a lot during the night to pee (urinate).  · Dry mouth.  · Sore throat.  How is this diagnosed?  · Your medical history.  · A physical exam.  · A test that is done when you are sleeping (sleep study). The test is most often done in a sleep lab but may also be done at home.  How is this treated?    · Sleeping on your side.  · Using a medicine to get rid of mucus in your nose (decongestant).  · Avoiding the use of alcohol,  medicines to help you relax, or certain pain medicines (narcotics).  · Losing weight, if needed.  · Changing your diet.  · Not smoking.  · Using a machine to open your airway while you sleep, such as:  ? An oral appliance. This is a mouthpiece that shifts your lower jaw forward.  ? A CPAP device. This device blows air through a mask when you breathe out (exhale).  ? An EPAP device. This has valves that you put in each nostril.  ? A BPAP device. This device blows air through a mask when you breathe in (inhale) and breathe out.  · Having surgery if other treatments do not work.  It is important to get treatment for sleep apnea. Without treatment, it can lead to:  · High blood pressure.  · Coronary artery disease.  · In men, not being able to have an erection (impotence).  · Reduced thinking ability.  Follow these instructions at home:  Lifestyle  · Make changes that your doctor recommends.  · Eat a healthy diet.  · Lose weight if needed.  · Avoid alcohol, medicines to help you relax, and some pain medicines.  · Do not use any products that contain nicotine or tobacco, such as cigarettes, e-cigarettes, and chewing tobacco. If you need help quitting, ask your doctor.  General instructions  · Take over-the-counter and prescription medicines only as told by your doctor.  · If you were given a machine to use while you sleep, use it only as told by your doctor.  · If you are having surgery, make sure to tell your doctor you have sleep apnea. You may need to bring your device with you.  · Keep all follow-up visits as told by your doctor. This is important.  Contact a doctor if:  · The machine that you were given to use during sleep bothers you or does not seem to be working.  · You do not get better.  · You get worse.  Get help right away if:  · Your chest hurts.  · You have trouble breathing in enough air.  · You have an uncomfortable feeling in your back, arms, or stomach.  · You have trouble talking.  · One side of your  body feels weak.  · A part of your face is hanging down.  These symptoms may be an emergency. Do not wait to see if the symptoms will go away. Get medical help right away. Call your local emergency services (911 in the [place] Do not drive yourself to the hospital.  Summary  · This condition affects breathing during sleep.  · The most common cause is a collapsed or blocked airway.  · The goal of treatment is to help you breathe normally while you sleep.  This information is not intended to replace advice given to you by your health care provider. Make sure you discuss any questions you have with your health care provider.  Document Revised: 10/04/2019 Document Reviewed: 08/13/2019  [employer] Patient Education © 2021 [employer]

## 2021-12-21 NOTE — PROGRESS NOTES
New Sleep Patient Office Visit      Patient Name: Salinas Monroy  : 1950   MRN: [id number]     Referring Physician: No ref. provider found    Chief Complaint:    Chief Complaint   Patient presents with   • Follow-up     patient requested telephone visit. Patient states she was on cpap for 1 year. Back in January she states she started getting a runny nose, and wasnt sure if it was due to the machine.   • CPAP      Patient would like to restart therapy. Patient's last sleep study was in 2019. Scanned into media.        History of Present Illness: Salinas Monroy is a 71 y.o. female who presents for a telephone visit, due to lack of transportation, today to follow up with ISABEL and was last seen in 2019 by Mariella GRIJALVA, neurology and sleep provider.  She was on CPAP therapy but stopped that about a year ago because she thought her frequent cold symptoms were due to the CPAP machine.  She does say her cold symptoms stopped after she stopped the CPAP machine.  She is now wanting to get back on PAP therapy.  She denies excessive daytime sleepiness.  She has been having some difficulty sleeping since the beginning of November.  She does say she had a positive VALENTÍN recently and is going to be seeing a rheumatologist soon.  Additional risk factors- BMI 42, GERD, hypothyroidism, CHF, mood disorder, ISABEL.   *Home sleep study on 2019 showed severe ISABEL with an AHI of 30.1/hour.      Following taken from previous visit note:   Patient is a very pleasant 70-year-old female in clinic today to follow-up for sleep apnea and Parkinson's disease.  Patient sleep compliance is reviewed in clinic with patient her AHI is 4.6 she is using her CPAP 93% of the time for greater than 4 hours with a CPAP of 7 to 16 cm H2O.  In reviewing patient's daily AHI is much more time at 155.     Patient states that Sinemet is not helping with her tremor.  Patient has also stopped propanolol and is using natural  products for her ET.     Past hx: Pt is a very pleasant 69 yr old female in clinic to follow up ISABEL and PD. Pt sleep compliance reviewed in clinic with pt 100% >4 hours AHI 5.8 @ 5-15 cmH20 Pt continues to c/o fatigue but still more energy.      Pt with c/o bilat hand tremors with activity states not able to use primidone and propanolol makes her short of breath. Pt Left UE  + tremor @ rest. + balance issues pt unsure if PD worsening or if balance related to hip replacement.    Subjective      Review of Systems:   Review of Systems   Constitutional: Negative for chills, fatigue and fever.   HENT: Negative for facial swelling, hearing loss, sore throat, tinnitus and trouble swallowing.    Eyes: Negative for blurred vision, double vision, photophobia and visual disturbance.   Respiratory: Positive for apnea. Negative for cough, chest tightness and shortness of breath.    Cardiovascular: Negative for chest pain, palpitations and leg swelling.   Gastrointestinal: Negative for abdominal pain, nausea and vomiting.   Endocrine: Negative for cold intolerance and heat intolerance.   Musculoskeletal: Negative for gait problem, neck pain and neck stiffness.   Skin: Negative for color change and rash.   Allergic/Immunologic: Negative for environmental allergies and food allergies.   Neurological: Negative for dizziness, syncope, speech difficulty, weakness, light-headedness, numbness, headache and memory problem.   Psychiatric/Behavioral: Positive for sleep disturbance. Negative for behavioral problems and depressed mood. The patient is not nervous/anxious.        Past Medical History:   Past Medical History:   Diagnosis Date   • Anemia ?    under control   • Anxiety    • Arthritis    • Bloating    • Burping    • Chest pain 2020    was seen by Dr Pires and was released was just anxiety   • Colon polyp 06/05/2018   • Depression    • Diverticulosis    • Essential hypertension 7/30/2018   • GERD (gastroesophageal reflux disease)   "   no longer bothers me...   • Gout    • Hashimoto's disease    • Hernia 3-2012    repair surgery which failed in 2014   • Hypothyroid    • Impingement syndrome of right shoulder 5/8/2016   • Movement disorder Hand Tremors   • Obesity    • OCD (obsessive compulsive disorder)    • ISABEL on CPAP     asked to bring dos did not want to due to covid   • PONV (postoperative nausea and vomiting)     Pt states \"only one time\".   • Tremor     worse on left arm/hand   • Wears glasses     reading glasses only       Past Surgical History:   Past Surgical History:   Procedure Laterality Date   • ABDOMINAL SURGERY     • APPENDECTOMY     • COLONOSCOPY  2012    failed due to hernia...   • COLONOSCOPY N/A 6/8/2017    Procedure: COLONOSCOPY with cold snare polypectomies, argon thermal ablation, ns injection, yanira ink injection;  Surgeon: Rafiq Huang MD;  Location: [facility] ENDOSCOPY;  Service:    • COLONOSCOPY N/A 6/5/2018    Procedure: COLONOSCOPY WITH BIOPSIES;  Surgeon: Rafiq Huang MD;  Location: [facility] ENDOSCOPY;  Service: Gastroenterology   • ENDOSCOPY N/A 1/27/2021    Procedure: ESOPHAGOGASTRODUODENOSCOPY WITH BIOPSIES AND CLIPS;  Surgeon: Katie Shannon MD;  Location: [facility] ENDOSCOPY;  Service: Gastroenterology;  Laterality: N/A;   • GASTRIC BYPASS  1978   • HERNIA MESH REMOVAL  2014    BOWEL OBSTRUCTION DUE TO MESH   • HERNIA REPAIR  2012   • HERNIA REPAIR     • HYSTERECTOMY      complete   • JOINT REPLACEMENT  2009 & 2012    Left & Right Full Hip Replacements   • SMALL INTESTINE SURGERY  2014    DUE TO BOWEL OBSTRUCTION WITH SOME REMOVAL   • TOTAL ABDOMINAL HYSTERECTOMY WITH SALPINGO OOPHORECTOMY  1990    fibroids   • TUBAL ABDOMINAL LIGATION     • UPPER GASTROINTESTINAL ENDOSCOPY  2010       Family History:   Family History   Problem Relation Age of Onset   • Obesity Mother    • Rheum arthritis Mother    • Thyroid disease Mother    • Hypertension Mother    • Stroke Mother         Most of her problems were caused " by Rheumatoid Ar.   • Hyperlipidemia Mother    • Arthritis Mother         Rheumatoid Arthritis..Passed    • Cancer Father         Passed    • Kidney cancer Father    • Liver cancer Father    • No Known Problems Brother    • Diabetes Maternal Aunt    • Cancer Maternal Uncle    • Lung cancer Maternal Uncle    • Alcohol abuse Maternal Uncle    • No Known Problems Paternal Aunt    • Stroke Paternal Uncle    • Hypertension Paternal Uncle    • Hyperlipidemia Paternal Uncle    • Heart disease Paternal Uncle    • No Known Problems Brother    • Asthma Sister         under control   • Alcohol abuse Maternal Uncle    • Colon cancer Neg Hx    • Breast cancer Neg Hx    • Ovarian cancer Neg Hx        Social History:   Social History     Socioeconomic History   • Marital status:    Tobacco Use   • Smoking status: Former Smoker     Packs/day: 1.00     Years: 40.00     Pack years: 40.00     Types: Cigarettes     Start date:      Quit date:      Years since quittin.9   • Smokeless tobacco: Never Used   Vaping Use   • Vaping Use: Never used   Substance and Sexual Activity   • Alcohol use: Not Currently   • Drug use: No   • Sexual activity: Defer       Medications:     Current Outpatient Medications:   •  acyclovir (ZOVIRAX) 400 MG tablet, Take 1 tablet by mouth 2 (Two) Times a Week., Disp: 32 tablet, Rfl: 0  •  allopurinol (ZYLOPRIM) 100 MG tablet, Take 2 tablets by mouth once daily, Disp: 180 tablet, Rfl: 0  •  B Complex Vitamins (VITAMIN B COMPLEX PO), Take 1 tablet by mouth Daily., Disp: , Rfl:   •  B-12, Methylcobalamin, 1000 MCG sublingual tablet, Every Other Day., Disp: , Rfl:   •  celecoxib (CeleBREX) 100 MG capsule, Take 1 capsule by mouth 2 (Two) Times a Day As Needed for Mild Pain  or Moderate Pain ., Disp: 30 capsule, Rfl: 0  •  Cholecalciferol (VITAMIN D3) 07932 UNITS tablet, Take 7,500 Units by mouth Daily., Disp: , Rfl:   •  Coenzyme Q10 400 MG capsule, Take  by mouth., Disp: , Rfl:   •   diclofenac (VOLTAREN) 50 MG EC tablet, Take 1 tablet by mouth 2 (Two) Times a Day As Needed (Pain)., Disp: 30 tablet, Rfl: 0  •  Digestive Enzymes (DIGESTIVE ENZYME PO), Take 1 tablet by mouth Daily., Disp: , Rfl:   •  escitalopram (LEXAPRO) 10 MG tablet, Take 1 tablet by mouth Daily., Disp: 90 tablet, Rfl: 2  •  Euthyrox 75 MCG tablet, Take 1 tablet by mouth once daily, Disp: 90 tablet, Rfl: 1  •  famotidine (PEPCID) 20 MG tablet, Take 1 tablet by mouth 2 (Two) Times a Day., Disp: 60 tablet, Rfl: 5  •  ferrous gluconate (FERGON) 324 MG tablet, Take 1 tablet by mouth Daily With Breakfast., Disp: 30 tablet, Rfl: 5  •  furosemide (LASIX) 20 MG tablet, Take 1 tablet by mouth Daily., Disp: 90 tablet, Rfl: 2  •  GARLIC PO, Take 1 tablet by mouth Daily., Disp: , Rfl:   •  hydrOXYzine (ATARAX) 10 MG tablet, TAKE 1 TO 2 TABLETS BY MOUTH ONCE TO TWICE DAILY AS NEEDED FOR ANXIETY FOR SLEEP, Disp: 30 tablet, Rfl: 0  •  hydrOXYzine (ATARAX) 25 MG tablet, Take 1 tablet by mouth Every 6 (Six) Hours As Needed for Anxiety., Disp: 30 tablet, Rfl: 3  •  L-THEANINE PO, Take  by mouth., Disp: , Rfl:   •  liothyronine (CYTOMEL) 25 MCG tablet, Take 3 tablets by mouth Daily., Disp: 270 tablet, Rfl: 0  •  MAGNESIUM PO, Take 1,000 mg by mouth Every Night., Disp: , Rfl:   •  Methylsulfonylmethane (MSM PO), Take 1 tablet by mouth Daily., Disp: , Rfl:   •  MILK THISTLE PO, Take 1 tablet by mouth Daily., Disp: , Rfl:   •  Misc Natural Products (TART CHERRY ADVANCED PO), Take 2 capsules by mouth Daily., Disp: , Rfl:   •  Omega-3 Fatty Acids (OMEGA-3 FISH OIL PO), Take 4,000 Units by mouth 3 (Three) Times a Day., Disp: , Rfl:   •  potassium chloride (K-DUR,KLOR-CON) 20 MEQ CR tablet, Take 1 tablet by mouth 2 (Two) Times a Day., Disp: 20 tablet, Rfl: 1  •  Probiotic Product (PROBIOTIC PO), Take 1 tablet by mouth Daily., Disp: , Rfl:   •  SELENIUM PO, Take 1 tablet by mouth Daily., Disp: , Rfl:   •  TAURINE PO, Take  by mouth., Disp: , Rfl:   •   TURMERIC PO, Take 1 tablet by mouth 2 (two) times a day., Disp: , Rfl:   •  VITAMIN A PO, Take  by mouth., Disp: , Rfl:   •  vitamin C (ASCORBIC ACID) 500 MG tablet, Take 500 mg by mouth 3 (Three) Times a Day As Needed., Disp: , Rfl:   •  vitamin E 400 UNIT capsule, Take 400 Units by mouth Daily., Disp: , Rfl:   •  VITAMIN K PO, Take 100 mcg by mouth Daily., Disp: , Rfl:   •  Zinc 30 MG tablet, Take  by mouth Daily., Disp: , Rfl:     Allergies:   No Known Allergies    Objective     Physical Exam:  Vital Signs: There were no vitals filed for this visit.  BMI: There is no height or weight on file to calculate BMI.    Physical Exam  Neurological:      Mental Status: She is alert and oriented to person, place, and time.         Assessment / Plan      Assessment/Plan:   Diagnoses and all orders for this visit:    1. Obstructive sleep apnea (Primary)    2. BMI 40.0-44.9, adult (HCC)    *[facility]    *Patient does not drive.     *This was a telephone visit that lasted for 16 minutes and the patient was at home and I was in my office at [facility] during the visit.  Patient identity was verified x2.     Follow Up:   Return in about 4 months (around 4/21/2022) for F/U Obstructive Sleep Apnea.    I have advised the patient the need to continue the use of CPAP.  Gold standard for treatment of sleep apnea includes weight loss, use of cpap and avoidance of alcohol.  Untreated ISABEL may increase the risk for development of hypertension, stroke, myocardial infarction, diabetes, cardiovascular disease, work-related issues and driving accidents. I have counseled and advised the patient to avoid driving or operating heavy/dangerous equipment if feeling drowsy.     VALENTINE Mendieta, FNP-C  [facility]       Please note that portions of this note may have been completed with a voice recognition program. Efforts were made to edit  the dictations, but occasionally words are mistranscribed.

## 2021-12-28 ENCOUNTER — OFFICE VISIT (OUTPATIENT)
Dept: FAMILY MEDICINE CLINIC | Facility: CLINIC | Age: 71
End: 2021-12-28

## 2021-12-28 VITALS
TEMPERATURE: 97.6 F | RESPIRATION RATE: 15 BRPM | HEIGHT: 63 IN | BODY MASS INDEX: 43.05 KG/M2 | OXYGEN SATURATION: 94 % | WEIGHT: 243 LBS | HEART RATE: 92 BPM | SYSTOLIC BLOOD PRESSURE: 127 MMHG | DIASTOLIC BLOOD PRESSURE: 67 MMHG

## 2021-12-28 DIAGNOSIS — I87.2 VENOUS INSUFFICIENCY OF BOTH LOWER EXTREMITIES: ICD-10-CM

## 2021-12-28 DIAGNOSIS — E03.9 ACQUIRED HYPOTHYROIDISM: Primary | Chronic | ICD-10-CM

## 2021-12-28 DIAGNOSIS — M25.571 ARTHRALGIA OF BOTH FEET: ICD-10-CM

## 2021-12-28 DIAGNOSIS — M25.572 ARTHRALGIA OF BOTH FEET: ICD-10-CM

## 2021-12-28 DIAGNOSIS — I50.32 CHRONIC DIASTOLIC HEART FAILURE (HCC): ICD-10-CM

## 2021-12-28 DIAGNOSIS — F31.62 BIPOLAR DISORDER, CURRENT EPISODE MIXED, MODERATE (HCC): ICD-10-CM

## 2021-12-28 PROCEDURE — 99215 OFFICE O/P EST HI 40 MIN: CPT | Performed by: FAMILY MEDICINE

## 2021-12-28 RX ORDER — FUROSEMIDE 20 MG/1
20 TABLET ORAL 2 TIMES DAILY
Qty: 60 TABLET | Refills: 3 | Status: SHIPPED | OUTPATIENT
Start: 2021-12-28 | End: 2022-04-18

## 2021-12-28 RX ORDER — POTASSIUM CHLORIDE 750 MG/1
10 TABLET, FILM COATED, EXTENDED RELEASE ORAL 2 TIMES DAILY
Qty: 60 TABLET | Refills: 2 | Status: SHIPPED | OUTPATIENT
Start: 2021-12-28 | End: 2022-03-04

## 2021-12-28 NOTE — PROGRESS NOTES
Established Patient        Chief Complaint:   Chief Complaint   Patient presents with   • Follow-up        Salinas Monroy is a 71 y.o. female    History of Present Illness:     The patient has consented to being recorded using CLAUDIO.    The patient is in for scheduled follow-up visit of her acquired hypothyroidism, bipolar disorder, and chronic diastolic congestive heart failure.    The patient reports the swelling is not doing good. She states it is not as bad as it was but it is still bad. The patient is still taking Lasix. The patient was started on Lasix 20mg in 2020, she had a small amount of fluid around her heart. She reports going to the emergency room after not being able to breathe and some fluid was removed. The patient states she was dismissed from her cardiologist in the middle of 2021 since her heart was okay and stress test looked great. The patient wants to stay on the low dose of Lasix. She states last year she ended up in the emergency room, her potassium was low since she doubled up on her Lasix medication. She was instructed by Tanner to take the extra Lasix. The patient is unsure what she should be taking as far as the potassium and Lasix. She has been trying to keep her feet elevated in her recliner with a ice pack to help with the swelling. The patient has been using an old prescription on Voltaren gel that has helped with the pain, which she would like a prescription for.     Subjective     The following portions of the patient's history were reviewed and updated as appropriate: allergies, current medications, past family history, past medical history, past social history, past surgical history and problem list.    No Known Allergies    Review of Systems  Constitutional: Negative for fever. Negative for chills, diaphoresis, fatigue and unexpected weight change.   HENT: No dysphagia; no changes to vision/hearing/smell/taste; no epistaxis.  Otherwise, as per above.  Eyes: Negative  "for redness and visual disturbance.   Respiratory: negative for shortness of breath. Negative for chest pain . Negative for cough and chest tightness.   Cardiovascular: Negative for chest pain and palpitations.   Gastrointestinal: Denies BRB/BTS. Normal appetite.  Endocrine: Negative for cold intolerance and heat intolerance.   Genitourinary: Negative for difficulty urinating, dysuria and frequency.   Musculoskeletal: Chronic arthralgias, back pain and myalgias.   Skin: Dark, slightly erythematous, changes to bilateral lower extremities in areas of worsened edema.  Neurological: Negative for syncope, weakness and headaches.  Chronic tremors.  Hematological: Negative for adenopathy. Does not bruise/bleed easily.   Psychiatric/Behavioral: Negative for confusion. Patient is slightly anxious at this time.    Objective     Physical Exam   Vital Signs: /67   Pulse 92   Temp 97.6 °F (36.4 °C)   Resp 15   Ht 160 cm (62.99\")   Wt 110 kg (243 lb)   LMP  (LMP Unknown)   SpO2 94%   BMI 43.06 kg/m²     General Appearance: alert, oriented x 3, no acute distress.  Skin: warm and dry. Stasis dermatitis noted bilateral lower extremities, slightly erythematous. More pronounced to the bilateral feet.  HEENT: Atraumatic.  pupils round and reactive to light and accommodation, oral mucosa pink and moist.  Nares patent without epistaxis.  External auditory canals are patent tympanic membranes intact.  Boggy/inflamed nasal turbinates with mild postnasal drainage, no pustules or exudate.  Serous effusion noted to bilateral tympanic membranes, however mobility is intact on Valsalva and insufflation.  Neck: supple, no JVD, trachea midline.  No thyromegaly  Lungs: CTA, unlabored breathing effort.  Heart: RRR, normal S1 and S2, no S3, no rub. DP/PT pulses 1+.  Abdomen: soft, non-tender, no palpable bladder, present bowel sounds to auscultation ×4.  No guarding or rigidity.  Extremities: no clubbing, cyanosis. 1+ pitting edema that " extends to the mid tibias bilaterally. Lateral deviation of the right great toe, predominantly involving the metatarsophalangeal joint. Slight valgus deformity noted to bilateral knees. Quadriceps mechanism intact. Patient ambulates with assistance of Rollator.  Neuro: normal speech and mental status.  Cranial nerves II through XII intact.  No anosmia. DTR 2+; proprioception intact.  Significantly improved tremulousness.    Advance Care Planning   ACP discussion was held with the patient during this visit. Patient does not have an advance directive, information provided.    Assessment and Plan      Assessment:   Diagnoses and all orders for this visit:    1. Acquired hypothyroidism (Primary)  -     TSH; Future  -     T4, Free; Future  -     T3, free; Future  -     T3, Reverse; Future    2. Bipolar disorder, current episode mixed, moderate (HCC)    3. Chronic diastolic heart failure (HCC)  -     furosemide (LASIX) 20 MG tablet; Take 1 tablet by mouth 2 (Two) Times a Day.  Dispense: 60 tablet; Refill: 3  -     potassium chloride 10 MEQ CR tablet; Take 1 tablet by mouth 2 (Two) Times a Day.  Dispense: 60 tablet; Refill: 2  -     Basic Metabolic Panel; Future    4. Arthralgia of both feet  -     Diclofenac Sodium (Voltaren) 1 % gel gel; Apply 2 g topically to the appropriate area as directed 2 (Two) Times a Day. Apply to feet  Dispense: 150 g; Refill: 1    5. Venous insufficiency of both lower extremities  -     furosemide (LASIX) 20 MG tablet; Take 1 tablet by mouth 2 (Two) Times a Day.  Dispense: 60 tablet; Refill: 3  -     potassium chloride 10 MEQ CR tablet; Take 1 tablet by mouth 2 (Two) Times a Day.  Dispense: 60 tablet; Refill: 2  -     Basic Metabolic Panel; Future        Plan:  Keep scheduled appt with rheumatology planned for January.    Continue lasix, but inc to bid dosing; K supplementation bid while on Lasix.    Surveillance labs at f/u appt. and additionally in 4 to 6 weeks.    Pt wishes to try voltaren  gel for bilateral feet.    To see Dr. Iverson, foot/ankle surgery, next month; has long standing difficulty with bunion to R foot.    I have recommended establishment and comanagement with behavioral health concerning her bipolar disorder and OCD. Patient feels that her OCD symptoms are somewhat beneficial to her at present. I have left open the possibility of referral at follow-up visit. Slight flight of ideas during course of visit today.    Discussion Summary:    Discussed plan of care in detail with pt today; pt verb understanding and agrees.    I spent 40 minutes caring for Salinas on this date of service. This time includes time spent by me in the following activities:preparing for the visit, performing a medically appropriate examination and/or evaluation , counseling and educating the patient/family/caregiver, ordering medications, tests, or procedures, documenting information in the medical record and care coordination    I have reviewed and updated all copied forward information, as appropriate.  I attest to the accuracy and relevance of any unchanged information.    Follow up:  Return in about 2 months (around 2/28/2022) for Recheck, Med Change/New Meds.     There are no Patient Instructions on file for this visit.    Jm Marie DO  12/28/21  18:02 EST    Transcribed from ambient dictation for Jm Marie DO by Emeka Jeff  12/28/21   17:46 EST    Patient verbalized consent to the visit recording.  I have personally performed the services described in this document as transcribed by the above individual, and it is both accurate and complete.  Jm Marie DO  12/28/2021  18:03 EST          Please note that portions of this note may have been completed with a voice recognition program. Efforts were made to edit the dictations, but occasionally words are mistranscribed.

## 2021-12-30 ENCOUNTER — TELEPHONE (OUTPATIENT)
Dept: FAMILY MEDICINE CLINIC | Facility: CLINIC | Age: 71
End: 2021-12-30

## 2021-12-30 NOTE — TELEPHONE ENCOUNTER
Caller: Salinas Monroy    Relationship: Self    Best call back number: [phone]    Who are you requesting to speak with (clinical staff, provider,  specific staff member): CLINICAL STAFF    What was the call regarding: PATIENT STATED SHE WAS EXPECTING PAIN MEDICATION TO BE CALLED IN BUT THE PHARMACY HAS NOT RECEIVED ANY PRESCRIPTIONS FOR HER.    Do you require a callback: YES      [facility] [address] [place] [address] - [address] [facility] [phone] [facility] [phone] FX

## 2022-01-05 DIAGNOSIS — M25.50 ARTHRALGIA, UNSPECIFIED JOINT: ICD-10-CM

## 2022-01-05 RX ORDER — CELECOXIB 100 MG/1
CAPSULE ORAL
Qty: 30 CAPSULE | Refills: 0 | Status: SHIPPED | OUTPATIENT
Start: 2022-01-05 | End: 2022-01-24 | Stop reason: SDUPTHER

## 2022-01-13 ENCOUNTER — TELEPHONE (OUTPATIENT)
Dept: FAMILY MEDICINE CLINIC | Facility: CLINIC | Age: 72
End: 2022-01-13

## 2022-01-13 NOTE — TELEPHONE ENCOUNTER
Caller: Salinas Monroy Madelyn    Relationship: Self    Best call back number:[phone]    What is the medical concern/diagnosis:     What specialty or service is being requested:RHEUMATOLOGY AND OPTHALMOLOGY    What is the provider, practice or medical service name:     What is the office location:     What is the office phone number:     Any additional details:  SHE SAID SHE HAS AN APPOINTMENT FOR OPHTHALMOLOGY BUT NOT SURE NAME OF PROVIDER. SHE  DOES NOT KNOW IF SHE WAS GIVEN A REFERRAL FOR THE RHEMATOLOGIST    ALSO THE FOLLOWING OTHER ISSUES    1. LETTER FROM DR DELGADILLO  REGARDING A DISABILITY CLEARANCE TO TAKE CLASSES.      2. ORDERS FOR MEDICARE TO PAY FOR A MASSAGER AND/OR BRACES TO HELP LYMPHODEMA    SHE DID NOT GET THE NAME OF THE COMPANY THAT CALLED REGARDING THIS  [phone]

## 2022-01-24 DIAGNOSIS — M25.50 ARTHRALGIA, UNSPECIFIED JOINT: ICD-10-CM

## 2022-01-24 RX ORDER — CELECOXIB 100 MG/1
100 CAPSULE ORAL DAILY
Qty: 30 CAPSULE | Refills: 0 | Status: SHIPPED | OUTPATIENT
Start: 2022-01-24 | End: 2022-02-10 | Stop reason: SDUPTHER

## 2022-01-24 NOTE — TELEPHONE ENCOUNTER
Caller: Salinas Monroy    Relationship: Self    Best call back number:[phone]  Requested Prescriptions:   Requested Prescriptions     Pending Prescriptions Disp Refills   • celecoxib (CeleBREX) 100 MG capsule 30 capsule 0    PLEASE INCREASE DOSAGE OR A STRONGER PAIN MEDICATION    Pharmacy where request should be sent:      [facility] [address] [phone] [facility] [phone] FX        Additional details provided by patient: PATIENT ALSO REQUESTING AN UPDATE ON THE REFERRAL FOR A RHEUMATOLOGIST    Does the patient have less than a 3 day supply:  [x] Yes  [] No    Lei Laguerre Rep   01/24/22 15:59 EST

## 2022-02-10 ENCOUNTER — TELEPHONE (OUTPATIENT)
Dept: FAMILY MEDICINE CLINIC | Facility: CLINIC | Age: 72
End: 2022-02-10

## 2022-02-10 DIAGNOSIS — M79.10 MYALGIA: ICD-10-CM

## 2022-02-10 DIAGNOSIS — M25.50 ARTHRALGIA, UNSPECIFIED JOINT: ICD-10-CM

## 2022-02-10 DIAGNOSIS — R76.8 POSITIVE ANA (ANTINUCLEAR ANTIBODY): Primary | ICD-10-CM

## 2022-02-10 DIAGNOSIS — M25.50 POLYARTHRALGIA: ICD-10-CM

## 2022-02-10 RX ORDER — ACYCLOVIR 400 MG/1
400 TABLET ORAL 2 TIMES WEEKLY
Qty: 32 TABLET | Refills: 0 | Status: SHIPPED | OUTPATIENT
Start: 2022-02-10 | End: 2022-02-16 | Stop reason: SDUPTHER

## 2022-02-10 RX ORDER — CELECOXIB 100 MG/1
100 CAPSULE ORAL DAILY
Qty: 30 CAPSULE | Refills: 0 | Status: SHIPPED | OUTPATIENT
Start: 2022-02-10 | End: 2022-02-16 | Stop reason: SDUPTHER

## 2022-02-10 NOTE — TELEPHONE ENCOUNTER
Caller: Salinas Monroy    Relationship: Self    Best call back number: [phone]    Requested Prescriptions:   Requested Prescriptions     Pending Prescriptions Disp Refills   • celecoxib (CeleBREX) 100 MG capsule 30 capsule 0     Sig: Take 1 capsule by mouth Daily.   • acyclovir (ZOVIRAX) 400 MG tablet 32 tablet 0     Sig: Take 1 tablet by mouth 2 (Two) Times a Week.        Pharmacy where request should be sent: [facility] [address] [phone] [facility] [phone] FX     Additional details provided by patient: PATIENT STATES SHE WOULD LIKE THE CELECOXIB INCREASED OR STRONGER MEDICATION. PATIENT ALSO REQUESTING A PRESCRIPTION FOR ACYCLOVIR FOR DAILY DUE TO STRESS    Does the patient have less than a 3 day supply:  [x] Yes  [] No    Lei Ascencio Rep   02/10/22 08:42 EST

## 2022-02-10 NOTE — TELEPHONE ENCOUNTER
Caller: Salinas Monroy    Relationship: Self    Best call back number: [phone]    What is the medical concern/diagnosis: FOR SYOMPTOMS     What specialty or service is being requested: RHEUMATOLOGIST    What is the provider, practice or medical service name: DR TRUE PHAM    What is the office location: [address] [place] [address]     What is the office phone number: [phone]    Any additional details: DR MOORE SAID SHE CAN GET ME IN BY END OF MARCH.  DR OSEGUERA CAN'T GET IN TIL END OF MAY.  PLEASE SEND REFERRAL TO DR MOORE.

## 2022-02-16 ENCOUNTER — TELEPHONE (OUTPATIENT)
Dept: FAMILY MEDICINE CLINIC | Facility: CLINIC | Age: 72
End: 2022-02-16

## 2022-02-16 DIAGNOSIS — M25.50 ARTHRALGIA, UNSPECIFIED JOINT: ICD-10-CM

## 2022-02-16 RX ORDER — ACYCLOVIR 400 MG/1
400 TABLET ORAL 2 TIMES WEEKLY
Qty: 32 TABLET | Refills: 0 | Status: SHIPPED | OUTPATIENT
Start: 2022-02-17 | End: 2022-04-04 | Stop reason: SDUPTHER

## 2022-02-16 RX ORDER — CELECOXIB 100 MG/1
100 CAPSULE ORAL DAILY
Qty: 30 CAPSULE | Refills: 0 | Status: SHIPPED | OUTPATIENT
Start: 2022-02-16 | End: 2022-08-04

## 2022-02-16 NOTE — TELEPHONE ENCOUNTER
Caller: ElianaSalinas Madelyn    Relationship: Self    Best call back number: [phone]        Requested Prescriptions:    Requested Prescriptions     Pending Prescriptions Disp Refills   • acyclovir (ZOVIRAX) 400 MG tablet 32 tablet 0     Sig: Take 1 tablet by mouth 2 (Two) Times a Week.    celecoxib (CeleBREX) 100 MG capsule PATIENT IS REQUESTING ANOTHER MEDICATION TO HELP WITH THE  PAIN OR IF YOU THINK THE CELEBREX IS FINE   PATIENT IS WORKING ON GETTING INTO THE RHEUMATOLOGIST       Pharmacy where request should be sent: [facility]     Additional details provided by patient:  PATIENT IS REQUESTING A REFILL BECAUSE SHE IS WANTING TO TAKE THE ACYCLOVIR DAILY     Does the patient have less than a 3 day supply:  [x] Yes  [] No

## 2022-02-16 NOTE — TELEPHONE ENCOUNTER
Caller: Salinas Monroy    Relationship: Self    Best call back number:  [phone]     Who is your current provider: DR AXEL DELGADILLO      Who would you like your new provider to be: SCOTT MONTALVO     What are your reasons for transferring care: NOTHING GETTING DONE, NO CALL BACKS ABOUT REFERRALS AND PRESCRIPTIONS REFILLS     Additional notes: PLEASE CALL

## 2022-02-17 ENCOUNTER — TELEPHONE (OUTPATIENT)
Dept: FAMILY MEDICINE CLINIC | Facility: CLINIC | Age: 72
End: 2022-02-17

## 2022-02-17 NOTE — TELEPHONE ENCOUNTER
Patients sister called to let you know that Salinas is manic and has been for several weeks.  She hasn't been taking anything for her OCD.  She wants to get in with Rheumatology in [place].  She is scheduled with someone local in May but feels that she needs to be seen sooner.    Patients sister is asking if you will order labs.  She wants you to call patient to tell her that you would like for her to have labs prior to her follow up appointment.  She wants you to tell her how important it is that she take her medications as prescribed.  She has asked that you do not let the patient know that she contacted you..

## 2022-02-17 NOTE — TELEPHONE ENCOUNTER
I don't mind to manage her care for the next 3 weeks, but I am resigning. My last day in the clinic is 3/11/22. So I don't know if it would be best to switch her over to Crystal Garcia, or Dr. Duong.

## 2022-02-17 NOTE — TELEPHONE ENCOUNTER
As a side note to add to Nathalie's message - I spoke with patient yesterday and she is already scheduled with Dr Christian for a rheumatology consult at [facility]. I discussed this with her twice. She has been insisting that no one is calling her about her referrals even though I have called her several times and talked to her about all of her referrals. I also followed up today on some referrals that had previously been scheduled for her with Dr Brooks for blurred vision and a podiatry referral with Dr Devlis Iverson.  Both offices stated that patient rescheduled her appointments several times and then canceled her most recent appointments stating she did not want to reschedule them again.

## 2022-02-18 NOTE — TELEPHONE ENCOUNTER
"Patient called me again today asking about her referrals and her appointment with Dr hCristian stating that no one had called her to talk to her. I tried to explain to her that I had spoken with her several times over the last few days. She then became upset and said that she has not spoken to anyone in our office in over a week and that I was scaring her by telling her that I had talked to her.  I told her that I was sorry and did not mean to scare her by telling her that. I verified her telephone number with her and she said that was her number, but that someone else must have access to her cell phone line and pretended to be her and talked to me instead of her.  She said that she had not spoken to me at all and that she could look on her phone and tell if someone had called her from our office. At this point, I did not want to further agitate her.  I told her that I would be glad to provide her with her appointment information for Dr Christian again (which I did) and made sure that she said she was writing it down. I also discussed with her that she had missed her appointments with Podiatry and Ophthalmology and she said that she had called and rescheduled both of those appointments (even though I just spoke with both offices in the last two days who indicated that she had no current appointments on file).  She then asked me to reschedule her appointment with Dr Marie in March to April 4th so that she could see him after seeing Rheumatology.  I made her an appointment on April 4th and reminded her to be sure to remove the old appointment date/time from her calendar and she said yelled, \"I know. I wasn't born yesterday\". She then asked me to transfer her to someone who could help her with her medications because no one has done anything about her medications either. I transferred her to clinical staff to discuss her meds.  "

## 2022-02-18 NOTE — TELEPHONE ENCOUNTER
Patient called me to discuss referrals and asked if someone could call her about her medications.  She states that she has called several times and no one has called her back.

## 2022-02-18 NOTE — TELEPHONE ENCOUNTER
Pt called to discuss referrals and I advised her that Tanner was leaving in March. Pt said that she would just continue care with Dr Marie since Tanner is leaving.

## 2022-02-18 NOTE — TELEPHONE ENCOUNTER
Attempted to contact patient regarding her medications. There was no answer, and went straight to voice mail on the [phone] number that is listed.    I was unable to leave a message due to her voice mail box not being set up.

## 2022-02-24 DIAGNOSIS — F31.11 BIPOLAR AFFECTIVE DISORDER, CURRENTLY MANIC, MILD: ICD-10-CM

## 2022-02-24 DIAGNOSIS — F51.04 PSYCHOPHYSIOLOGICAL INSOMNIA: ICD-10-CM

## 2022-02-24 RX ORDER — HYDROXYZINE HYDROCHLORIDE 10 MG/1
TABLET, FILM COATED ORAL
Qty: 30 TABLET | Refills: 0 | Status: SHIPPED | OUTPATIENT
Start: 2022-02-24 | End: 2022-03-25

## 2022-02-24 RX ORDER — ESCITALOPRAM OXALATE 10 MG/1
10 TABLET ORAL DAILY
Qty: 30 TABLET | Refills: 0 | Status: SHIPPED | OUTPATIENT
Start: 2022-02-24 | End: 2022-03-25

## 2022-02-24 NOTE — TELEPHONE ENCOUNTER
Caller: Salinas Monroy    Relationship: Self    Best call back number: [phone]     Requested Prescriptions:   Requested Prescriptions     Pending Prescriptions Disp Refills   • hydrOXYzine (ATARAX) 10 MG tablet 30 tablet 0     Sig: TAKE 1 TO 2 TABLETS BY MOUTH ONCE TO TWICE DAILY AS NEEDED FOR ANXIETY FOR SLEEP   • escitalopram (LEXAPRO) 10 MG tablet 90 tablet 2     Sig: Take 1 tablet by mouth Daily.        Pharmacy where request should be sent: [facility] [address] [phone] [facility] [phone]      Additional details provided by patient: PATIENT HAS A FEW DAYS LEFT    Does the patient have less than a 3 day supply:  [x] Yes  [] No    Lei Fleming Rep   02/24/22 15:01 EST

## 2022-03-04 DIAGNOSIS — I87.2 VENOUS INSUFFICIENCY OF BOTH LOWER EXTREMITIES: ICD-10-CM

## 2022-03-04 DIAGNOSIS — I50.32 CHRONIC DIASTOLIC HEART FAILURE: ICD-10-CM

## 2022-03-04 DIAGNOSIS — E03.9 HYPOTHYROIDISM, ADULT: ICD-10-CM

## 2022-03-04 RX ORDER — LIOTHYRONINE SODIUM 25 UG/1
TABLET ORAL
Qty: 90 TABLET | Refills: 1 | Status: SHIPPED | OUTPATIENT
Start: 2022-03-04 | End: 2022-05-09

## 2022-03-04 RX ORDER — POTASSIUM CHLORIDE 750 MG/1
TABLET, FILM COATED, EXTENDED RELEASE ORAL
Qty: 60 TABLET | Refills: 1 | Status: SHIPPED | OUTPATIENT
Start: 2022-03-04 | End: 2022-05-16

## 2022-03-05 DIAGNOSIS — M25.571 ARTHRALGIA OF BOTH FEET: ICD-10-CM

## 2022-03-05 DIAGNOSIS — M25.572 ARTHRALGIA OF BOTH FEET: ICD-10-CM

## 2022-03-24 ENCOUNTER — LAB (OUTPATIENT)
Dept: LAB | Facility: HOSPITAL | Age: 72
End: 2022-03-24

## 2022-03-24 ENCOUNTER — TRANSCRIBE ORDERS (OUTPATIENT)
Dept: LAB | Facility: HOSPITAL | Age: 72
End: 2022-03-24

## 2022-03-24 DIAGNOSIS — M47.9 SPONDYLARTHROSIS: ICD-10-CM

## 2022-03-24 DIAGNOSIS — G47.33 OBSTRUCTIVE SLEEP APNEA (ADULT) (PEDIATRIC): ICD-10-CM

## 2022-03-24 DIAGNOSIS — M62.81 MUSCLE WEAKNESS (GENERALIZED): ICD-10-CM

## 2022-03-24 DIAGNOSIS — F51.04 PSYCHOPHYSIOLOGICAL INSOMNIA: ICD-10-CM

## 2022-03-24 DIAGNOSIS — R53.83 TIREDNESS: ICD-10-CM

## 2022-03-24 DIAGNOSIS — G47.33 OBSTRUCTIVE SLEEP APNEA (ADULT) (PEDIATRIC): Primary | ICD-10-CM

## 2022-03-24 DIAGNOSIS — F31.11 BIPOLAR AFFECTIVE DISORDER, CURRENTLY MANIC, MILD: ICD-10-CM

## 2022-03-24 LAB
25(OH)D3 SERPL-MCNC: 69.1 NG/ML (ref 30–100)
BASOPHILS # BLD AUTO: 0.03 10*3/MM3 (ref 0–0.2)
BASOPHILS NFR BLD AUTO: 0.4 % (ref 0–1.5)
DEPRECATED RDW RBC AUTO: 38.4 FL (ref 37–54)
EOSINOPHIL # BLD AUTO: 0.15 10*3/MM3 (ref 0–0.4)
EOSINOPHIL NFR BLD AUTO: 1.8 % (ref 0.3–6.2)
ERYTHROCYTE [DISTWIDTH] IN BLOOD BY AUTOMATED COUNT: 12.4 % (ref 12.3–15.4)
ERYTHROCYTE [SEDIMENTATION RATE] IN BLOOD: 25 MM/HR (ref 0–30)
FOLATE SERPL-MCNC: >20 NG/ML (ref 4.78–24.2)
HCT VFR BLD AUTO: 40.3 % (ref 34–46.6)
HGB BLD-MCNC: 13.4 G/DL (ref 12–15.9)
IMM GRANULOCYTES # BLD AUTO: 0.03 10*3/MM3 (ref 0–0.05)
IMM GRANULOCYTES NFR BLD AUTO: 0.4 % (ref 0–0.5)
LYMPHOCYTES # BLD AUTO: 2.13 10*3/MM3 (ref 0.7–3.1)
LYMPHOCYTES NFR BLD AUTO: 25.4 % (ref 19.6–45.3)
MCH RBC QN AUTO: 28.9 PG (ref 26.6–33)
MCHC RBC AUTO-ENTMCNC: 33.3 G/DL (ref 31.5–35.7)
MCV RBC AUTO: 87 FL (ref 79–97)
MONOCYTES # BLD AUTO: 0.59 10*3/MM3 (ref 0.1–0.9)
MONOCYTES NFR BLD AUTO: 7 % (ref 5–12)
NEUTROPHILS NFR BLD AUTO: 5.46 10*3/MM3 (ref 1.7–7)
NEUTROPHILS NFR BLD AUTO: 65 % (ref 42.7–76)
NRBC BLD AUTO-RTO: 0 /100 WBC (ref 0–0.2)
PLATELET # BLD AUTO: 284 10*3/MM3 (ref 140–450)
PMV BLD AUTO: 10.6 FL (ref 6–12)
PTH-INTACT SERPL-MCNC: 68.9 PG/ML (ref 15–65)
RBC # BLD AUTO: 4.63 10*6/MM3 (ref 3.77–5.28)
VIT B12 BLD-MCNC: 1082 PG/ML (ref 211–946)
WBC NRBC COR # BLD: 8.39 10*3/MM3 (ref 3.4–10.8)

## 2022-03-24 PROCEDURE — 82746 ASSAY OF FOLIC ACID SERUM: CPT

## 2022-03-24 PROCEDURE — 84550 ASSAY OF BLOOD/URIC ACID: CPT

## 2022-03-24 PROCEDURE — 85652 RBC SED RATE AUTOMATED: CPT

## 2022-03-24 PROCEDURE — 82085 ASSAY OF ALDOLASE: CPT

## 2022-03-24 PROCEDURE — 83970 ASSAY OF PARATHORMONE: CPT

## 2022-03-24 PROCEDURE — 84439 ASSAY OF FREE THYROXINE: CPT

## 2022-03-24 PROCEDURE — 82607 VITAMIN B-12: CPT

## 2022-03-24 PROCEDURE — 86200 CCP ANTIBODY: CPT

## 2022-03-24 PROCEDURE — 84482 T3 REVERSE: CPT | Performed by: FAMILY MEDICINE

## 2022-03-24 PROCEDURE — 86790 VIRUS ANTIBODY NOS: CPT

## 2022-03-24 PROCEDURE — 82550 ASSAY OF CK (CPK): CPT

## 2022-03-24 PROCEDURE — 36415 COLL VENOUS BLD VENIPUNCTURE: CPT

## 2022-03-24 PROCEDURE — 80074 ACUTE HEPATITIS PANEL: CPT

## 2022-03-24 PROCEDURE — 86140 C-REACTIVE PROTEIN: CPT

## 2022-03-24 PROCEDURE — 80053 COMPREHEN METABOLIC PANEL: CPT

## 2022-03-24 PROCEDURE — 82784 ASSAY IGA/IGD/IGG/IGM EACH: CPT

## 2022-03-24 PROCEDURE — 84443 ASSAY THYROID STIM HORMONE: CPT

## 2022-03-24 PROCEDURE — 86038 ANTINUCLEAR ANTIBODIES: CPT

## 2022-03-24 PROCEDURE — 86225 DNA ANTIBODY NATIVE: CPT

## 2022-03-24 PROCEDURE — 86431 RHEUMATOID FACTOR QUANT: CPT

## 2022-03-24 PROCEDURE — 85025 COMPLETE CBC W/AUTO DIFF WBC: CPT

## 2022-03-24 PROCEDURE — 82306 VITAMIN D 25 HYDROXY: CPT

## 2022-03-24 NOTE — TELEPHONE ENCOUNTER
Caller: [facility] [address] [facility] [phone] [facility] [phone] [place]    Relationship: Pharmacy    Best call back number: [phone]    Requested Prescriptions:   Requested Prescriptions     Pending Prescriptions Disp Refills   • escitalopram (LEXAPRO) 10 MG tablet 30 tablet 0     Sig: Take 1 tablet by mouth Daily.   • hydrOXYzine (ATARAX) 10 MG tablet 30 tablet 0     Sig: TAKE 1 TO 2 TABLETS BY MOUTH ONCE TO TWICE DAILY AS NEEDED FOR ANXIETY FOR SLEEP      THYROID MEDICATION- PATIENT STATES TO CONFIRM THE DOSAGE AS IT IS DIFFERENT AT [facility]      Pharmacy where request should be sent: [facility] [phone] [facility] [phone] FX     Additional details provided by patient: PLEASE CALL PHARMACY TO CONFIRM MEDICATION  STATES PATIENT REQUESTING THE 75 MG-ASKING FOR GENERIC VERSION    Does the patient have less than a 3 day supply:  [x] Yes  [] No    Lei Lawrence Rep   03/24/22 12:11 EDT

## 2022-03-25 LAB
ALBUMIN SERPL-MCNC: 4 G/DL (ref 3.5–5.2)
ALBUMIN/GLOB SERPL: 1.3 G/DL
ALDOLASE SERPL-CCNC: 3.9 U/L (ref 3.3–10.3)
ALP SERPL-CCNC: 92 U/L (ref 39–117)
ALT SERPL W P-5'-P-CCNC: 20 U/L (ref 1–33)
ANA SER QL: POSITIVE
ANION GAP SERPL CALCULATED.3IONS-SCNC: 15.6 MMOL/L (ref 5–15)
AST SERPL-CCNC: 22 U/L (ref 1–32)
BILIRUB SERPL-MCNC: 0.3 MG/DL (ref 0–1.2)
BUN SERPL-MCNC: 30 MG/DL (ref 8–23)
BUN/CREAT SERPL: 34.5 (ref 7–25)
CALCIUM SPEC-SCNC: 9.5 MG/DL (ref 8.6–10.5)
CHLORIDE SERPL-SCNC: 108 MMOL/L (ref 98–107)
CHROMATIN AB SERPL-ACNC: <10 IU/ML (ref 0–14)
CK SERPL-CCNC: 109 U/L (ref 20–180)
CO2 SERPL-SCNC: 20.4 MMOL/L (ref 22–29)
CREAT SERPL-MCNC: 0.87 MG/DL (ref 0.57–1)
CRP SERPL-MCNC: 0.82 MG/DL (ref 0–0.5)
DSDNA AB SER-ACNC: <1 IU/ML (ref 0–9)
EGFRCR SERPLBLD CKD-EPI 2021: 70.5 ML/MIN/1.73
GLOBULIN UR ELPH-MCNC: 3 GM/DL
GLUCOSE SERPL-MCNC: 91 MG/DL (ref 65–99)
HAV IGM SERPL QL IA: NORMAL
HBV CORE IGM SERPL QL IA: NORMAL
HBV SURFACE AG SERPL QL IA: NORMAL
HCV AB SER DONR QL: NORMAL
IGG1 SER-MCNC: 949 MG/DL (ref 700–1600)
Lab: NORMAL
POTASSIUM SERPL-SCNC: 4.4 MMOL/L (ref 3.5–5.2)
PROT SERPL-MCNC: 7 G/DL (ref 6–8.5)
SODIUM SERPL-SCNC: 144 MMOL/L (ref 136–145)
T4 FREE SERPL-MCNC: 0.85 NG/DL (ref 0.93–1.7)
TSH SERPL DL<=0.05 MIU/L-ACNC: <0.005 UIU/ML (ref 0.27–4.2)
URATE SERPL-MCNC: 5.7 MG/DL (ref 2.4–5.7)

## 2022-03-25 RX ORDER — ESCITALOPRAM OXALATE 10 MG/1
10 TABLET ORAL DAILY
Qty: 30 TABLET | Refills: 0 | OUTPATIENT
Start: 2022-03-25

## 2022-03-25 RX ORDER — HYDROXYZINE HYDROCHLORIDE 10 MG/1
TABLET, FILM COATED ORAL
Qty: 30 TABLET | Refills: 0 | OUTPATIENT
Start: 2022-03-25

## 2022-03-25 RX ORDER — ESCITALOPRAM OXALATE 10 MG/1
TABLET ORAL
Qty: 30 TABLET | Refills: 0 | Status: SHIPPED | OUTPATIENT
Start: 2022-03-25 | End: 2022-04-22

## 2022-03-25 RX ORDER — HYDROXYZINE HYDROCHLORIDE 10 MG/1
TABLET, FILM COATED ORAL
Qty: 30 TABLET | Refills: 0 | Status: SHIPPED | OUTPATIENT
Start: 2022-03-25 | End: 2022-04-22

## 2022-03-29 LAB — CCP IGA+IGG SERPL IA-ACNC: 63 UNITS (ref 0–19)

## 2022-04-01 LAB
HERPES VIRUS 7 IGG AB [TITER] IN SERUM BY IMMUNOFLUORESCENCE: NORMAL {TITER}
HERPES VIRUS 7 IGM AB [TITER] IN SERUM BY IMMUNOFLUORESCENCE: NORMAL {TITER}

## 2022-04-04 ENCOUNTER — OFFICE VISIT (OUTPATIENT)
Dept: FAMILY MEDICINE CLINIC | Facility: CLINIC | Age: 72
End: 2022-04-04

## 2022-04-04 VITALS
BODY MASS INDEX: 38.8 KG/M2 | DIASTOLIC BLOOD PRESSURE: 70 MMHG | HEART RATE: 94 BPM | HEIGHT: 63 IN | TEMPERATURE: 98.2 F | SYSTOLIC BLOOD PRESSURE: 118 MMHG | WEIGHT: 219 LBS | OXYGEN SATURATION: 98 %

## 2022-04-04 DIAGNOSIS — M1A.0710 CHRONIC IDIOPATHIC GOUT INVOLVING TOE OF RIGHT FOOT WITHOUT TOPHUS: ICD-10-CM

## 2022-04-04 DIAGNOSIS — M15.9 PRIMARY OSTEOARTHRITIS INVOLVING MULTIPLE JOINTS: ICD-10-CM

## 2022-04-04 DIAGNOSIS — F31.62 BIPOLAR DISORDER, CURRENT EPISODE MIXED, MODERATE: ICD-10-CM

## 2022-04-04 DIAGNOSIS — I87.2 VENOUS INSUFFICIENCY OF BOTH LOWER EXTREMITIES: ICD-10-CM

## 2022-04-04 DIAGNOSIS — I50.32 CHRONIC DIASTOLIC HEART FAILURE: ICD-10-CM

## 2022-04-04 DIAGNOSIS — E03.9 ACQUIRED HYPOTHYROIDISM: Primary | Chronic | ICD-10-CM

## 2022-04-04 PROCEDURE — 99215 OFFICE O/P EST HI 40 MIN: CPT | Performed by: FAMILY MEDICINE

## 2022-04-04 RX ORDER — ACYCLOVIR 400 MG/1
400 TABLET ORAL DAILY
Qty: 30 TABLET | Refills: 0 | Status: SHIPPED | OUTPATIENT
Start: 2022-04-04 | End: 2022-11-14

## 2022-04-04 RX ORDER — LEVOTHYROXINE SODIUM 0.07 MG/1
75 TABLET ORAL DAILY
Qty: 90 TABLET | Refills: 0 | Status: SHIPPED | OUTPATIENT
Start: 2022-04-04 | End: 2022-06-27

## 2022-04-04 NOTE — PROGRESS NOTES
"    Established Patient        Chief Complaint:   Chief Complaint   Patient presents with   • Hypothyroidism     Med refills        Salinas Monroy is a 72 y.o. female is in today for scheduled follow-up visit of her acquired hypothyroidism and chronic gouty arthritis of the right foot. The patient is accompanied by an unknown female.    History of Present Illness:   The patient The patient reports she has had a lot of emotional problems. She states she has been \"off\", has to figure out how to get back on. The unknown female notes the patient was manic for 5 months. She adds the patients OCD was out of control but it is much better now. The patient denies she has seen behavioral health. She states she does not know anything about behavioral health. The patient reports she saw rheumatologist Dr. Binta Christian in [place]. She states she did a ton of physical therapy orders. The patient notes Dr. Christian did several x-rays over the last several months and 3 COVID test because they did not know what was wrong with her. She states she ended up on the floor for hours trying to get up because her legs would not work, if she ended up on the floor. The patient reports she is a lot stronger now than she was. She states notes she had an x-ray done and was told that she had degenerative disc disease and some scoliosis of the lumbar spine. The patient reports she accidentally took her medications the day she had labs so her free T3 may be affected. She adds she had different blood test done but not a rheumatoid factor. The patient notes she informed the provider a previous VALENTÍN ordered by Dr. Hart came back with two real high antibodies that pointed towards Lupus. She states the provider redid the VALENTÍN by itself which came back positive. The patient reports she has lost quite a bit of weight.    The patient states she has been off of her CPAP for a while. She reports 01/2021 she was having runny nose and colds. " "The patient notes it was suggested it might be due to the humidification. She went back and forth with the company , nothing was getting done so she said forget it. The patient reports in 12/2021 it was under recall. She states she has an appointment in a couple of weeks with the sleep specialist. The patient believes she will be placed back on CPAP.         Subjective     The following portions of the patient's history were reviewed and updated as appropriate: allergies, current medications, past family history, past medical history, past social history, past surgical history and problem list.    No Known Allergies    Review of Systems  1. Constitutional: Negative for fever. Negative for chills, diaphoresis, fatigue and unexpected weight change.   2. HENT: No dysphagia; no changes to vision/hearing/smell/taste; no epistaxis.  Otherwise, as per above.  3. Eyes: Negative for redness and visual disturbance.   4. Respiratory: negative for shortness of breath. Negative for chest pain . Negative for cough and chest tightness.   5. Cardiovascular: Negative for chest pain and palpitations.   6. Gastrointestinal: Denies BRB/BTS. Normal appetite.  7. Endocrine: Negative for cold intolerance and heat intolerance.   8. Genitourinary: Negative for difficulty urinating, dysuria and frequency.   9. Musculoskeletal: Chronic arthralgias, back pain and myalgias.   10. Skin: Dark, slightly erythematous, changes to bilateral lower extremities in areas of worsened edema.  11. Neurological: Negative for syncope, weakness and headaches.  Chronic tremors.  12. Hematological: Negative for adenopathy. Does not bruise/bleed easily.   13. Psychiatric/Behavioral: Negative for confusion. Patient is slightly anxious at this time.    Objective     Physical Exam   Vital Signs: /70   Pulse 94   Temp 98.2 °F (36.8 °C)   Ht 160 cm (63\")   Wt 99.3 kg (219 lb)   LMP  (LMP Unknown)   SpO2 98%   BMI 38.79 kg/m²     General Appearance: alert, " oriented x 3, no acute distress.  Skin: warm and dry. Stasis dermatitis noted bilateral lower extremities, slightly erythematous. More pronounced to the bilateral feet.  HEENT: Atraumatic.  pupils round and reactive to light and accommodation, oral mucosa pink and moist.  Nares patent without epistaxis.  External auditory canals are patent tympanic membranes intact.  Boggy/inflamed nasal turbinates with mild postnasal drainage, no pustules or exudate.  Serous effusion noted to bilateral tympanic membranes, however mobility is intact on Valsalva and insufflation.  Neck: supple, no JVD, trachea midline.  No thyromegaly  Lungs: CTA, unlabored breathing effort.  Heart: RRR, normal S1 and S2, no S3, no rub. DP/PT pulses 1+.  Abdomen: soft, non-tender, no palpable bladder, present bowel sounds to auscultation ×4.  No guarding or rigidity.  Extremities: no clubbing, cyanosis. 1+ pitting edema that extends to the mid tibias bilaterally. Lateral deviation of the right great toe, predominantly involving the metatarsophalangeal joint. Slight valgus deformity noted to bilateral knees. Quadriceps mechanism intact. Patient ambulates with assistance of Rollator.  Neuro: normal speech and mental status.  Cranial nerves II through XII intact.  No anosmia. DTR 2+; proprioception intact.  Significantly improved tremulousness.    Advance Care Planning   ACP discussion was held with the patient during this visit. Patient does not have an advance directive, information provided.    Assessment and Plan      Assessment:   Diagnoses and all orders for this visit:    1. Acquired hypothyroidism (Primary)  -     levothyroxine (Euthyrox) 75 MCG tablet; Take 1 tablet by mouth Daily.  Dispense: 90 tablet; Refill: 0    2. Bipolar disorder, current episode mixed, moderate (HCC)    3. Chronic idiopathic gout involving toe of right foot without tophus    4. Primary osteoarthritis involving multiple joints    5. Venous insufficiency of both lower  extremities    6. Chronic diastolic heart failure (HCC)    Other orders  -     acyclovir (ZOVIRAX) 400 MG tablet; Take 1 tablet by mouth Daily.  Dispense: 30 tablet; Refill: 0        Plan:  F/U in 3M; will repeat surveillance labs at that time.    VSS, appears HD asymptomatic.    Will place referral to Behavioral Health provider in [place]; may benefit from counseling also; pt prefers [place] location.    Referral to ambulatory  to aid in community based resources for pt.    Refill sent for levothyroxine today.    Discussion Summary:    Discussed plan of care in detail with pt today; pt verb understanding and agrees.    I spent 40 minutes caring for Salinas on this date of service. This time includes time spent by me in the following activities:preparing for the visit, performing a medically appropriate examination and/or evaluation , counseling and educating the patient/family/caregiver, ordering medications, tests, or procedures, referring and communicating with other health care professionals , documenting information in the medical record and care coordination    I have reviewed and updated all copied forward information, as appropriate.  I attest to the accuracy and relevance of any unchanged information.    Follow up:  Return in about 3 months (around 7/4/2022).     There are no Patient Instructions on file for this visit.    Transcribed from ambient dictation for Jm Marie DO by Emeka Jeff.  04/04/22   15:56 EDT    Patient verbalized consent to the visit recording.  I have personally performed the services described in this document as transcribed by the above individual, and it is both accurate and complete.  Jm Marie DO  4/6/2022  08:46 EDT              Please note that portions of this note may have been completed with a voice recognition program. Efforts were made to edit the dictations, but occasionally words are mistranscribed.

## 2022-04-09 ENCOUNTER — REFERRAL TRIAGE (OUTPATIENT)
Dept: CASE MANAGEMENT | Facility: OTHER | Age: 72
End: 2022-04-09

## 2022-04-13 ENCOUNTER — PATIENT OUTREACH (OUTPATIENT)
Dept: CASE MANAGEMENT | Facility: OTHER | Age: 72
End: 2022-04-13

## 2022-04-13 NOTE — OUTREACH NOTE
Adult Patient Profile  Questions/Answers    Flowsheet Row Most Recent Value   Importance of Change 7   Confidence to Make Change 7   Readiness to Change 3   People in Home alone   Current Living Arrangements apartment        Adult Wellness Assessment  Questions/Answers    Flowsheet Row Most Recent Value   Help with Reading Health-Related Information occasionally   Problems Learning about Medical Condition occasionally   Confidence in Filling Out Forms by Self occasionally        S  Social Work Assessment  Questions/Answers    Flowsheet Row Most Recent Value   Advance Care Planning Reviewed no concerns identified   Decision Making Considerations patient/family ability to make health care decisions   People in Home alone   Current Living Arrangements apartment   Duration at Residence over a year   Primary Care Provided by self   Provides Primary Care For no one   Quality of Family Relationships involved  [Pt's sister is involved]   Employment Status disabled   Source of Income disability  [Pt's Disabiltiy checks are $1447/mo]   Who Manages Finances if Patient Unable --  [Pt 's sister helps manage her finances]   Plan --  [Pt reported coming down from her manic phase and is now in her depresion phase. Denies any suicidal thoughts. ]   Plan Comments --  [An SDOH was completed and no other resources were requested at this time. ]   Final Note --  [Since provider has also sent a referral for Behavioral Health to follow up on this pt, SW will close her part on case]        SDOH updated and reviewed with the patient during this program:  Financial Resource Strain: Medium Risk   • Difficulty of Paying Living Expenses: Somewhat hard      Food Insecurity: No Food Insecurity   • Worried About Running Out of Food in the Last Year: Never true   • Ran Out of Food in the Last Year: Never true      Transportation Needs: No Transportation Needs   • Lack of Transportation (Medical): No   • Lack of Transportation (Non-Medical): No       Housing Stability: Low Risk    • Unable to Pay for Housing in the Last Year: No   • Number of Places Lived in the Last Year: 1   • Unstable Housing in the Last Year: No     MICHELLE CORREA  Ambulatory Case Management    4/13/2022, 16:38 EDT

## 2022-04-16 DIAGNOSIS — I50.32 CHRONIC DIASTOLIC HEART FAILURE: ICD-10-CM

## 2022-04-16 DIAGNOSIS — I87.2 VENOUS INSUFFICIENCY OF BOTH LOWER EXTREMITIES: ICD-10-CM

## 2022-04-18 ENCOUNTER — TELEPHONE (OUTPATIENT)
Dept: FAMILY MEDICINE CLINIC | Facility: CLINIC | Age: 72
End: 2022-04-18

## 2022-04-18 RX ORDER — FUROSEMIDE 20 MG/1
TABLET ORAL
Qty: 60 TABLET | Refills: 2 | Status: SHIPPED | OUTPATIENT
Start: 2022-04-18 | End: 2022-07-20

## 2022-04-18 NOTE — TELEPHONE ENCOUNTER
Per scheduling [facility], pt declined to schedule Behavioral Health appointment when they contacted her to schedule. She said she did not need an appointment.  They offered her the office telephone number in case she wanted to call later to schedule - pt declined to take the information. I have closed referral as patient refused.

## 2022-04-22 DIAGNOSIS — F51.04 PSYCHOPHYSIOLOGICAL INSOMNIA: ICD-10-CM

## 2022-04-22 DIAGNOSIS — F31.11 BIPOLAR AFFECTIVE DISORDER, CURRENTLY MANIC, MILD: ICD-10-CM

## 2022-04-22 RX ORDER — HYDROXYZINE HYDROCHLORIDE 10 MG/1
TABLET, FILM COATED ORAL
Qty: 30 TABLET | Refills: 0 | Status: SHIPPED | OUTPATIENT
Start: 2022-04-22 | End: 2022-06-27

## 2022-04-22 RX ORDER — ESCITALOPRAM OXALATE 10 MG/1
TABLET ORAL
Qty: 30 TABLET | Refills: 0 | Status: SHIPPED | OUTPATIENT
Start: 2022-04-22 | End: 2022-06-27

## 2022-05-06 ENCOUNTER — TELEPHONE (OUTPATIENT)
Dept: FAMILY MEDICINE CLINIC | Facility: CLINIC | Age: 72
End: 2022-05-06

## 2022-05-06 NOTE — TELEPHONE ENCOUNTER
[facility]       Best call back number:[phone]    What form or medical record are you requesting: DOCTORS NOTES    Who is requesting this form or medical record from you: [facility]    How would you like to receive the form or medical records (pick-up, mail, fax): FAX  If fax, what is the fax number: [phone]  If mail, what is the address:   If pick-up, provide patient with address and location details    Timeframe paperwork needed: ASAP    Additional notes:PATIENT HAS APPOINTMENT AT  ON 05/11/22

## 2022-05-07 DIAGNOSIS — E03.9 HYPOTHYROIDISM, ADULT: ICD-10-CM

## 2022-05-09 RX ORDER — LIOTHYRONINE SODIUM 25 UG/1
TABLET ORAL
Qty: 90 TABLET | Refills: 1 | Status: SHIPPED | OUTPATIENT
Start: 2022-05-09 | End: 2022-07-20

## 2022-05-15 DIAGNOSIS — I87.2 VENOUS INSUFFICIENCY OF BOTH LOWER EXTREMITIES: ICD-10-CM

## 2022-05-15 DIAGNOSIS — I50.32 CHRONIC DIASTOLIC HEART FAILURE: ICD-10-CM

## 2022-05-16 RX ORDER — POTASSIUM CHLORIDE 750 MG/1
TABLET, FILM COATED, EXTENDED RELEASE ORAL
Qty: 60 TABLET | Refills: 1 | Status: SHIPPED | OUTPATIENT
Start: 2022-05-16 | End: 2022-07-20

## 2022-05-19 DIAGNOSIS — F31.11 BIPOLAR AFFECTIVE DISORDER, CURRENTLY MANIC, MILD: ICD-10-CM

## 2022-05-19 DIAGNOSIS — K21.9 GASTROESOPHAGEAL REFLUX DISEASE, UNSPECIFIED WHETHER ESOPHAGITIS PRESENT: Chronic | ICD-10-CM

## 2022-05-19 RX ORDER — FAMOTIDINE 20 MG/1
20 TABLET, FILM COATED ORAL 2 TIMES DAILY
Qty: 60 TABLET | Refills: 5 | Status: SHIPPED | OUTPATIENT
Start: 2022-05-19 | End: 2022-11-14

## 2022-05-19 NOTE — TELEPHONE ENCOUNTER
Caller: Salinas Monroy    Relationship: Self    Best call back number: [phone]    Requested Prescriptions:   Requested Prescriptions     Pending Prescriptions Disp Refills   • famotidine (PEPCID) 20 MG tablet 60 tablet 5     Sig: Take 1 tablet by mouth 2 (Two) Times a Day.          Pharmacy where request should be sent: [facility] [address] [phone] [facility] [phone] FX     Additional details provided by patient: PATIENT ADVISED HER GI USUALLY PRESCRIBES THIS FOR HER, BUT UNABLE TO GET TO THAT  THAT OFFICE    Does the patient have less than a 3 day supply:  [x] Yes  [] No    Lei Muse Rep   05/19/22 10:59 EDT

## 2022-05-19 NOTE — TELEPHONE ENCOUNTER
Rx Refill Note  Requested Prescriptions     Pending Prescriptions Disp Refills   • famotidine (PEPCID) 20 MG tablet 60 tablet 5     Sig: Take 1 tablet by mouth 2 (Two) Times a Day.      Last office visit with prescribing clinician: 4/4/2022      Next office visit with prescribing clinician: 7/11/2022            Shahrzad Crow MA  05/19/22, 11:04 EDT

## 2022-06-02 ENCOUNTER — PATIENT OUTREACH (OUTPATIENT)
Dept: CASE MANAGEMENT | Facility: OTHER | Age: 72
End: 2022-06-02

## 2022-06-02 NOTE — OUTREACH NOTE
Social Work Assessment  Questions/Answers    Flowsheet Row Most Recent Value   Referral Source patient   Reason for Consult community resources   Preferred Language English   People in Home alone   Current Living Arrangements apartment   Primary Care Provided by self   Provides Primary Care For no one   Family Caregiver if Needed sibling(s)   Quality of Family Relationships supportive   Employment Status disabled   Source of Income social security   Application for Public Assistance applied   Medications independent   Meal Preparation independent   Housekeeping independent   Laundry independent   Shopping independent        SDOH updated and reviewed with the patient during this program:  Financial Resource Strain: Medium Risk   • Difficulty of Paying Living Expenses: Somewhat hard      Food Insecurity: No Food Insecurity   • Worried About Running Out of Food in the Last Year: Never true   • Ran Out of Food in the Last Year: Never true      Transportation Needs: No Transportation Needs   • Lack of Transportation (Medical): No   • Lack of Transportation (Non-Medical): No      Housing Stability: Low Risk    • Unable to Pay for Housing in the Last Year: No   • Number of Places Lived in the Last Year: 1   • Unstable Housing in the Last Year: No     Patient Outreach    SW returned call to pt. Pt reports that she is living in apartment by herself. She had some difficulty with finances due to hugo recently, but her sister is now assisting her with managing her finances. She is having trouble affording rent and other basics.  She has a meeting regarding section 8 Thursday and had questions about the section 8 program. SW discussed questions. SW will follow up with pt after her section 8 meeting.   OLIMPIA WAITE -   Ambulatory Case Management    6/2/2022, 13:36 EDT

## 2022-06-08 NOTE — DISCHARGE INSTRUCTIONS
Low fat diet.  Avoid NSAIDs-aspirin for 7 days.  Tylenol is okay.  Follow-up:  Office 4 weeks.    Watch for bleeding abdominal pain fever or chills.  If so to seek medical attention or come to [facility] emergency room.   [No Acute Distress] : no acute distress [Well Nourished] : well nourished [Well Developed] : well developed [Well-Appearing] : well-appearing [Normal Voice/Communication] : normal voice/communication [No Respiratory Distress] : no respiratory distress  [No Accessory Muscle Use] : no accessory muscle use [Clear to Auscultation] : lungs were clear to auscultation bilaterally [Normal Rate] : normal rate  [Regular Rhythm] : with a regular rhythm [Normal S1, S2] : normal S1 and S2 [Coordination Grossly Intact] : coordination grossly intact [Normal Mood] : the mood was normal

## 2022-06-10 ENCOUNTER — PATIENT OUTREACH (OUTPATIENT)
Dept: CASE MANAGEMENT | Facility: OTHER | Age: 72
End: 2022-06-10

## 2022-06-10 NOTE — OUTREACH NOTE
Patient Outreach    SW contacted pt to discuss housing concerns. Pt reports that she was approved by [employer]. She is disappointed that they are only able to supplement $186 a month. She is also disappointed because her current apartment is unable to accept section 8. She does have another meeting with [employer] today and her sister is coming with her. Discussed other options, including moving to [facility] and/or moving to [place] to be closer to her sister. SW will continue to follow and assist as needed.    OLIMPIA WAITE -   Ambulatory Case Management    6/10/2022, 11:22 EDT

## 2022-06-15 ENCOUNTER — TELEPHONE (OUTPATIENT)
Dept: FAMILY MEDICINE CLINIC | Facility: CLINIC | Age: 72
End: 2022-06-15

## 2022-06-15 NOTE — TELEPHONE ENCOUNTER
Caller: Salinas Monroy    Relationship: Self    Best call back number:[phone]    What medication are you requesting: ANTIBIOTIC    What are your current symptoms: STRONG ODOR IN VAGINAL AREA,GROIN AND RIGHT THIGH PAIN, UPPER BACK PAIN; SLIGHTLY BETTER, DIFFICULTY WALKING AROUND.    How long have you been experiencing symptoms:  SEVERAL DAYS    Have you had these symptoms before:    [] Yes  [] No    Have you been treated for these symptoms before:   [] Yes  [] No    If a prescription is needed, what is your preferred pharmacy and phone number: [facility] [address] [phone] [facility] [phone]      Additional notes:    PATIENT STATED SHE HAS TRIED TYLENOL AND A PAIN PILL SHE HAD AVAILABLE AND NOT HELPING WITH  HER SYMPTOMS.     PATIENT STATED SHE DOES NOT HAVE TRANSPORTATION TO MAKE AN APPOINTMENT.

## 2022-06-16 NOTE — TELEPHONE ENCOUNTER
Called patient informed her she needs to be seen ,she stated she will schedule apt or go to urgent care if issue persist I informed patient if symptoms persist to be sure and be seen.

## 2022-06-17 ENCOUNTER — TELEPHONE (OUTPATIENT)
Dept: FAMILY MEDICINE CLINIC | Facility: CLINIC | Age: 72
End: 2022-06-17

## 2022-06-17 NOTE — TELEPHONE ENCOUNTER
acyclovir (ZOVIRAX) 400 MG tablet.  PATIENT UNDER THE IMPRESSION SHE SHOULD TAKE ONE DAILY.  DIRECTIONS STATE ONE TWICE A WEEK.     PLEASE CALL OR RESEND.

## 2022-06-17 NOTE — TELEPHONE ENCOUNTER
Spoke with Andrew at pharmacy and gave verbal order for zovirax  to be taken once daily with 2 refills.

## 2022-06-26 DIAGNOSIS — E03.9 ACQUIRED HYPOTHYROIDISM: Chronic | ICD-10-CM

## 2022-06-27 DIAGNOSIS — F31.11 BIPOLAR AFFECTIVE DISORDER, CURRENTLY MANIC, MILD: ICD-10-CM

## 2022-06-27 DIAGNOSIS — M1A.0710 CHRONIC IDIOPATHIC GOUT INVOLVING TOE OF RIGHT FOOT WITHOUT TOPHUS: ICD-10-CM

## 2022-06-27 DIAGNOSIS — F51.04 PSYCHOPHYSIOLOGICAL INSOMNIA: ICD-10-CM

## 2022-06-27 RX ORDER — ESCITALOPRAM OXALATE 10 MG/1
TABLET ORAL
Qty: 30 TABLET | Refills: 0 | Status: SHIPPED | OUTPATIENT
Start: 2022-06-27 | End: 2022-07-22

## 2022-06-27 RX ORDER — ALLOPURINOL 100 MG/1
TABLET ORAL
Qty: 180 TABLET | Refills: 0 | Status: SHIPPED | OUTPATIENT
Start: 2022-06-27 | End: 2022-09-19

## 2022-06-27 RX ORDER — LEVOTHYROXINE SODIUM 0.07 MG/1
TABLET ORAL
Qty: 90 TABLET | Refills: 0 | Status: SHIPPED | OUTPATIENT
Start: 2022-06-27 | End: 2022-09-19

## 2022-06-27 RX ORDER — HYDROXYZINE HYDROCHLORIDE 10 MG/1
TABLET, FILM COATED ORAL
Qty: 30 TABLET | Refills: 0 | Status: SHIPPED | OUTPATIENT
Start: 2022-06-27 | End: 2022-07-22

## 2022-07-04 ENCOUNTER — LAB (OUTPATIENT)
Dept: LAB | Facility: HOSPITAL | Age: 72
End: 2022-07-04

## 2022-07-04 PROCEDURE — 80048 BASIC METABOLIC PNL TOTAL CA: CPT | Performed by: FAMILY MEDICINE

## 2022-07-04 PROCEDURE — 84439 ASSAY OF FREE THYROXINE: CPT | Performed by: FAMILY MEDICINE

## 2022-07-04 PROCEDURE — 84443 ASSAY THYROID STIM HORMONE: CPT | Performed by: FAMILY MEDICINE

## 2022-07-11 ENCOUNTER — OFFICE VISIT (OUTPATIENT)
Dept: FAMILY MEDICINE CLINIC | Facility: CLINIC | Age: 72
End: 2022-07-11

## 2022-07-11 VITALS
SYSTOLIC BLOOD PRESSURE: 128 MMHG | HEIGHT: 63 IN | OXYGEN SATURATION: 93 % | RESPIRATION RATE: 15 BRPM | WEIGHT: 225.2 LBS | BODY MASS INDEX: 39.9 KG/M2 | HEART RATE: 92 BPM | DIASTOLIC BLOOD PRESSURE: 72 MMHG | TEMPERATURE: 97 F

## 2022-07-11 DIAGNOSIS — M1A.0710 CHRONIC IDIOPATHIC GOUT INVOLVING TOE OF RIGHT FOOT WITHOUT TOPHUS: ICD-10-CM

## 2022-07-11 DIAGNOSIS — R39.9 URINARY SYMPTOM OR SIGN: Primary | ICD-10-CM

## 2022-07-11 DIAGNOSIS — H26.8 OTHER CATARACT OF BOTH EYES: ICD-10-CM

## 2022-07-11 DIAGNOSIS — F31.62 BIPOLAR DISORDER, CURRENT EPISODE MIXED, MODERATE: ICD-10-CM

## 2022-07-11 DIAGNOSIS — I50.32 CHRONIC DIASTOLIC HEART FAILURE: ICD-10-CM

## 2022-07-11 DIAGNOSIS — E03.9 ACQUIRED HYPOTHYROIDISM: Chronic | ICD-10-CM

## 2022-07-11 DIAGNOSIS — I87.2 VENOUS INSUFFICIENCY OF BOTH LOWER EXTREMITIES: ICD-10-CM

## 2022-07-11 DIAGNOSIS — M15.9 PRIMARY OSTEOARTHRITIS INVOLVING MULTIPLE JOINTS: ICD-10-CM

## 2022-07-11 PROBLEM — H26.9 CATARACTA: Status: ACTIVE | Noted: 2022-07-11

## 2022-07-11 LAB
BILIRUB BLD-MCNC: NEGATIVE MG/DL
CLARITY, POC: CLEAR
COLOR UR: YELLOW
EXPIRATION DATE: ABNORMAL
GLUCOSE UR STRIP-MCNC: NEGATIVE MG/DL
KETONES UR QL: NEGATIVE
LEUKOCYTE EST, POC: ABNORMAL
Lab: ABNORMAL
NITRITE UR-MCNC: NEGATIVE MG/ML
PH UR: 6 [PH] (ref 5–8)
PROT UR STRIP-MCNC: NEGATIVE MG/DL
RBC # UR STRIP: NEGATIVE /UL
SP GR UR: 1.01 (ref 1–1.03)
UROBILINOGEN UR QL: NORMAL

## 2022-07-11 PROCEDURE — 81003 URINALYSIS AUTO W/O SCOPE: CPT | Performed by: FAMILY MEDICINE

## 2022-07-11 PROCEDURE — 99215 OFFICE O/P EST HI 40 MIN: CPT | Performed by: FAMILY MEDICINE

## 2022-07-11 RX ORDER — VALPROIC ACID 250 MG/1
250 CAPSULE, LIQUID FILLED ORAL 2 TIMES DAILY
Qty: 60 CAPSULE | Refills: 1 | Status: SHIPPED | OUTPATIENT
Start: 2022-07-11 | End: 2022-08-04

## 2022-07-11 NOTE — PROGRESS NOTES
Established Patient        Chief Complaint:   Chief Complaint   Patient presents with   • Hypothyroidism        Salinas Monroy is a 72 y.o. female    History of Present Illness:   Answers for HPI/ROS submitted by the patient on 7/6/2022  Please describe your symptoms.: Thyroid....check on heart for previous swelling, redness and pain in ankles and feet???  Have you had these symptoms before?: Yes  How long have you been having these symptoms?: Greater than 2 weeks  Please list any medications you are currently taking for this condition.: thyroid meds and lasix and potassium...  Please describe any probable cause for these symptoms. : ???  What is the primary reason for your visit?: Other    Here today for scheduled follow-up visit of her hypothyroidism, chronic diastolic congestive heart failure, venous insufficiency of the lower extremities, bipolar disorder, primary osteoarthritis involving multiple joints and chronic idiopathic gout    Patient states she continues to have gravity dependent edema of the lower extremities, although denies any orthopnea.  She maintains good urine output, denies hematuria.  She has had some increased frequency of urination recently, denies fever, chills or night sweats.  She denies any BRB/BTS.  She does have a planned cataract surgery.    She denies any SI/HI, currently sleeping well.    She is accompanied today on the visit by her sister.    Subjective     The following portions of the patient's history were reviewed and updated as appropriate: allergies, current medications, past family history, past medical history, past social history, past surgical history and problem list.    No Known Allergies    Review of Systems  1. Constitutional: Negative for fever. Negative for chills, diaphoresis, fatigue and unexpected weight change.   2. HENT: No dysphagia; no changes to vision/hearing/smell/taste; no epistaxis.  Otherwise, as per above.  3. Eyes: Negative for redness  "and visual disturbance.   4. Respiratory: negative for shortness of breath. Negative for chest pain . Negative for cough and chest tightness.   5. Cardiovascular: Negative for chest pain and palpitations.   6. Gastrointestinal: As per above.  7. Endocrine: Negative for cold intolerance and heat intolerance.   8. Genitourinary: Negative for difficulty urinating, dysuria and frequency.   9. Musculoskeletal: Chronic arthralgias, back pain and myalgias.   10. Skin: Negative for color change, rash and wound.   11. Neurological: Negative for syncope, weakness and headaches.  Chronic tremors.  12. Hematological: Negative for adenopathy. Does not bruise/bleed easily.   13. Psychiatric/Behavioral: Negative for confusion. The patient is not nervous/anxious at present.    Objective     Physical Exam   Vital Signs: /72   Pulse 92   Temp 97 °F (36.1 °C)   Resp 15   Ht 160 cm (63\")   Wt 102 kg (225 lb 3.2 oz)   LMP  (LMP Unknown)   SpO2 93%   BMI 39.89 kg/m²     General Appearance: alert, oriented x 3, no acute distress.  Skin: warm and dry.   HEENT: Atraumatic.  pupils round and reactive to light and accommodation, oral mucosa pink and moist.  Nares patent without epistaxis.  External auditory canals are patent tympanic membranes intact.  Boggy/inflamed nasal turbinates with mild postnasal drainage, no pustules or exudate.  Serous effusion noted to bilateral tympanic membranes, however mobility is intact on Valsalva and insufflation.  Neck: supple, no JVD, trachea midline.  No thyromegaly  Lungs: CTA, unlabored breathing effort.  Heart: RRR, normal S1 and S2, no S3, no rub.  Abdomen: soft, non-tender, no palpable bladder, present bowel sounds to auscultation ×4.  No guarding or rigidity.  Extremities: no clubbing, cyanosis.  Good range of motion actively and passively.  Symmetric muscle strength and development.  Gravity dependent edema noted to bilateral lower extremities, extending to the mid tibias bilaterally.  " Slightly pitting in nature.  Ponce/Sparks sign negative.  Independently ambulatory.  Neuro: normal speech and mental status.  Cranial nerves II through XII intact.  No anosmia. DTR 2+; proprioception intact.  Significantly improved tremulousness    Assessment and Plan      Assessment:   Diagnoses and all orders for this visit:    1. Urinary symptom or sign (Primary)  -     POC Urinalysis Dipstick, Automated    2. Acquired hypothyroidism  -     T3, Reverse  -     T3, free  -     TSH; Standing  -     T4, Free; Standing  -     T3, free; Standing  -     T3, Reverse; Standing    3. Other cataract of both eyes    4. Chronic diastolic heart failure (HCC)    5. Venous insufficiency of both lower extremities    6. Bipolar disorder, current episode mixed, moderate (HCC)  -     valproic acid (Depakene) 250 MG capsule; Take 1 capsule by mouth 2 (Two) Times a Day.  Dispense: 60 capsule; Refill: 1    7. Primary osteoarthritis involving multiple joints    8. Chronic idiopathic gout involving toe of right foot without tophus        Plan:  Repeat needed components of thyroid function studies.  Continue current supplementation.  Patient states she may wait until her next scheduled thyroid evaluation in 3 months to have these additional labs completed.    Discussed need for reduction in sodium/salt/caffeine intake; improve sleep habits as able; inc formal CV exercise program with goal of vigorous activity most, if not all, days of the week; goal of 250 min of sustained HR CV exercise total per week.    Daily wt eval; continue diuretic.    Pt wants to be referred to a local rheumatologist, but later in the fall.    Discussed need for stress/anxiety reducing techniques such as prayer/meditation/breathing and counting exercises and avoidance of stress producing environments/situations; will follow clinically.    Addition of depakote to aid in mood stabilization; will follow clinically.    Discussion Summary:    Discussed plan of care  in detail with pt today; pt verb understanding and agrees.    I spent 40 minutes caring for Salinas on this date of service. This time includes time spent by me in the following activities:preparing for the visit, performing a medically appropriate examination and/or evaluation , counseling and educating the patient/family/caregiver, ordering medications, tests, or procedures, documenting information in the medical record and care coordination    I have reviewed and updated all copied forward information, as appropriate.  I attest to the accuracy and relevance of any unchanged information.    Follow up:  No follow-ups on file.     There are no Patient Instructions on file for this visit.    Jm Marie,   07/14/22  15:57 EDT          Please note that portions of this note may have been completed with a voice recognition program. Efforts were made to edit the dictations, but occasionally words are mistranscribed.

## 2022-07-15 ENCOUNTER — TELEPHONE (OUTPATIENT)
Dept: FAMILY MEDICINE CLINIC | Facility: CLINIC | Age: 72
End: 2022-07-15

## 2022-07-15 NOTE — TELEPHONE ENCOUNTER
Caller: Salinas Monroy    Relationship: Self    Best call back number: [phone]    What is the best time to reach you: AFTERNOON EVENING    Who are you requesting to speak with (clinical staff, provider,  specific staff member): CLINICAL/DR    What was the call regarding: PATIENT SENT A MESSAGE TO DR DELGADILLO, JUST WANTED TO REMIND HIM THAT SHE IS WAITING ON HIS RESPONSE/OPINION    Do you require a callback: YES OR MESSAGE

## 2022-07-20 DIAGNOSIS — I50.32 CHRONIC DIASTOLIC HEART FAILURE: ICD-10-CM

## 2022-07-20 DIAGNOSIS — I87.2 VENOUS INSUFFICIENCY OF BOTH LOWER EXTREMITIES: ICD-10-CM

## 2022-07-20 DIAGNOSIS — E03.9 HYPOTHYROIDISM, ADULT: ICD-10-CM

## 2022-07-20 RX ORDER — FUROSEMIDE 20 MG/1
TABLET ORAL
Qty: 60 TABLET | Refills: 2 | Status: SHIPPED | OUTPATIENT
Start: 2022-07-20 | End: 2022-10-10

## 2022-07-20 RX ORDER — LIOTHYRONINE SODIUM 25 UG/1
TABLET ORAL
Qty: 90 TABLET | Refills: 1 | Status: SHIPPED | OUTPATIENT
Start: 2022-07-20 | End: 2022-09-12

## 2022-07-20 RX ORDER — POTASSIUM CHLORIDE 750 MG/1
TABLET, FILM COATED, EXTENDED RELEASE ORAL
Qty: 60 TABLET | Refills: 1 | Status: SHIPPED | OUTPATIENT
Start: 2022-07-20 | End: 2022-09-12

## 2022-07-22 DIAGNOSIS — F31.11 BIPOLAR AFFECTIVE DISORDER, CURRENTLY MANIC, MILD: ICD-10-CM

## 2022-07-22 DIAGNOSIS — F51.04 PSYCHOPHYSIOLOGICAL INSOMNIA: ICD-10-CM

## 2022-07-22 RX ORDER — ESCITALOPRAM OXALATE 10 MG/1
TABLET ORAL
Qty: 30 TABLET | Refills: 0 | Status: SHIPPED | OUTPATIENT
Start: 2022-07-22 | End: 2022-08-19

## 2022-07-22 RX ORDER — HYDROXYZINE HYDROCHLORIDE 10 MG/1
TABLET, FILM COATED ORAL
Qty: 30 TABLET | Refills: 0 | Status: SHIPPED | OUTPATIENT
Start: 2022-07-22 | End: 2022-11-14

## 2022-08-02 ENCOUNTER — PATIENT OUTREACH (OUTPATIENT)
Dept: CASE MANAGEMENT | Facility: OTHER | Age: 72
End: 2022-08-02

## 2022-08-02 NOTE — OUTREACH NOTE
Patient Outreach    SW contacted pt to inquire about needs. Pt states that she was able to stay in her apartment and use her [address] voucher. Pt reports that things are much better for her now. SW will close referral as no new needs were reported. SW encouraged pt to call if any needs arise in the future.

## 2022-08-04 NOTE — PRE-PROCEDURE INSTRUCTIONS
PAT phone history completed with pt for upcoming procedure on  8/8/22 with Dr. Brooks.    PAT PASS GIVEN/REVIEWED WITH PT.  VERBALIZED UNDERSTANDING OF THE FOLLOWING:  DO NOT EAT, DRINK, SMOKE, USE SMOKELESS TOBACCO OR CHEW GUM AFTER MIDNIGHT THE NIGHT BEFORE SURGERY.  THIS ALSO INCLUDES HARD CANDIES AND MINTS.    DO NOT SHAVE THE AREA TO BE OPERATED ON AT LEAST 48 HOURS PRIOR TO THE PROCEDURE.  DO NOT WEAR MAKE UP OR NAIL POLISH.  DO NOT LEAVE IN ANY PIERCING OR WEAR JEWELRY THE DAY OF SURGERY.      DO NOT USE ADHESIVES IF YOU WEAR DENTURES.    DO NOT WEAR EYE CONTACTS; BRING IN YOUR GLASSES.    ONLY TAKE MEDICATION THE MORNING OF YOUR PROCEDURE IF INSTRUCTED BY YOUR SURGEON WITH ENOUGH WATER TO SWALLOW THE MEDICATION.  IF YOUR SURGEON DID NOT SPECIFY WHICH MEDICATIONS TO TAKE, YOU WILL NEED TO CALL THEIR OFFICE FOR FURTHER INSTRUCTIONS AND DO AS THEY INSTRUCT.    LEAVE ANYTHING YOU CONSIDER VALUABLE AT HOME.    YOU WILL NEED TO ARRANGE FOR SOMEONE TO DRIVE YOU HOME AFTER SURGERY.  IT IS RECOMMENDED THAT YOU DO NOT DRIVE, WORK, DRINK ALCOHOL OR MAKE MAJOR DECISIONS FOR AT LEAST 24 HOURS AFTER YOUR PROCEDURE IS COMPLETE.      THE DAY OF YOUR PROCEDURE, BRING IN THE FOLLOWING IF APPLICABLE:   PICTURE ID AND INSURANCE/MEDICARE OR MEDICAID CARDS/ANY CO-PAY THAT MAY BE DUE   COPY OF ADVANCED DIRECTIVE/LIVING WILL/POWER OR    CPAP/BIPAP/INHALERS   SKIN PREP SHEET   YOUR PREADMISSION TESTING PASS (IF NOT A PHONE HISTORY)           COVID self-quarantine instructions reviewed with the pt.  Verbalized understanding.

## 2022-08-08 ENCOUNTER — ANESTHESIA (OUTPATIENT)
Dept: PERIOP | Facility: HOSPITAL | Age: 72
End: 2022-08-08

## 2022-08-08 ENCOUNTER — ANESTHESIA EVENT (OUTPATIENT)
Dept: PERIOP | Facility: HOSPITAL | Age: 72
End: 2022-08-08

## 2022-08-08 ENCOUNTER — HOSPITAL ENCOUNTER (OUTPATIENT)
Facility: HOSPITAL | Age: 72
Setting detail: HOSPITAL OUTPATIENT SURGERY
Discharge: HOME | End: 2022-08-08
Attending: OPHTHALMOLOGY | Admitting: OPHTHALMOLOGY

## 2022-08-08 VITALS
WEIGHT: 215 LBS | RESPIRATION RATE: 20 BRPM | HEART RATE: 78 BPM | OXYGEN SATURATION: 96 % | TEMPERATURE: 96.7 F | BODY MASS INDEX: 38.09 KG/M2 | HEIGHT: 63 IN | DIASTOLIC BLOOD PRESSURE: 58 MMHG | SYSTOLIC BLOOD PRESSURE: 116 MMHG

## 2022-08-08 PROCEDURE — 25010000002 MIDAZOLAM PER 1MG: Performed by: NURSE ANESTHETIST, CERTIFIED REGISTERED

## 2022-08-08 PROCEDURE — 25010000002 EPINEPHRINE PER 0.1 MG: Performed by: OPHTHALMOLOGY

## 2022-08-08 PROCEDURE — V2632 POST CHMBR INTRAOCULAR LENS: HCPCS | Performed by: OPHTHALMOLOGY

## 2022-08-08 DEVICE — LENS MONOFOCAL IQ CC60WF210: Type: IMPLANTABLE DEVICE | Site: EYE | Status: FUNCTIONAL

## 2022-08-08 RX ORDER — KETAMINE HCL IN NACL, ISO-OSM 100MG/10ML
SYRINGE (ML) INJECTION AS NEEDED
Status: DISCONTINUED | OUTPATIENT
Start: 2022-08-08 | End: 2022-08-08 | Stop reason: SURG

## 2022-08-08 RX ORDER — PHENYLEPHRINE HCL 2.5 %
1 DROPS OPHTHALMIC (EYE)
Status: COMPLETED | OUTPATIENT
Start: 2022-08-08 | End: 2022-08-08

## 2022-08-08 RX ORDER — SODIUM CHLORIDE, SODIUM LACTATE, POTASSIUM CHLORIDE, CALCIUM CHLORIDE 600; 310; 30; 20 MG/100ML; MG/100ML; MG/100ML; MG/100ML
1000 INJECTION, SOLUTION INTRAVENOUS CONTINUOUS
Status: DISCONTINUED | OUTPATIENT
Start: 2022-08-08 | End: 2022-08-08 | Stop reason: HOSPADM

## 2022-08-08 RX ORDER — TETRACAINE HYDROCHLORIDE 5 MG/ML
SOLUTION OPHTHALMIC AS NEEDED
Status: DISCONTINUED | OUTPATIENT
Start: 2022-08-08 | End: 2022-08-08 | Stop reason: HOSPADM

## 2022-08-08 RX ORDER — TETRACAINE HYDROCHLORIDE 5 MG/ML
1 SOLUTION OPHTHALMIC SEE ADMIN INSTRUCTIONS
Status: DISCONTINUED | OUTPATIENT
Start: 2022-08-08 | End: 2022-08-08 | Stop reason: HOSPADM

## 2022-08-08 RX ORDER — DIFLUPREDNATE OPHTHALMIC 0.5 MG/ML
1 EMULSION OPHTHALMIC SEE ADMIN INSTRUCTIONS
Status: DISCONTINUED | OUTPATIENT
Start: 2022-08-08 | End: 2022-08-08 | Stop reason: HOSPADM

## 2022-08-08 RX ORDER — CYCLOPENTOLATE HYDROCHLORIDE 20 MG/ML
1 SOLUTION/ DROPS OPHTHALMIC
Status: COMPLETED | OUTPATIENT
Start: 2022-08-08 | End: 2022-08-08

## 2022-08-08 RX ORDER — DIFLUPREDNATE OPHTHALMIC 0.5 MG/ML
EMULSION OPHTHALMIC AS NEEDED
Status: DISCONTINUED | OUTPATIENT
Start: 2022-08-08 | End: 2022-08-08 | Stop reason: HOSPADM

## 2022-08-08 RX ORDER — POLYMYXIN B SULFATE AND TRIMETHOPRIM 1; 10000 MG/ML; [USP'U]/ML
SOLUTION OPHTHALMIC AS NEEDED
Status: DISCONTINUED | OUTPATIENT
Start: 2022-08-08 | End: 2022-08-08 | Stop reason: HOSPADM

## 2022-08-08 RX ORDER — POLYMYXIN B SULFATE AND TRIMETHOPRIM 1; 10000 MG/ML; [USP'U]/ML
1 SOLUTION OPHTHALMIC SEE ADMIN INSTRUCTIONS
Status: DISCONTINUED | OUTPATIENT
Start: 2022-08-08 | End: 2022-08-08 | Stop reason: HOSPADM

## 2022-08-08 RX ORDER — SODIUM CHLORIDE 0.9 % (FLUSH) 0.9 %
10 SYRINGE (ML) INJECTION AS NEEDED
Status: DISCONTINUED | OUTPATIENT
Start: 2022-08-08 | End: 2022-08-08 | Stop reason: HOSPADM

## 2022-08-08 RX ORDER — MIDAZOLAM HYDROCHLORIDE 2 MG/2ML
INJECTION, SOLUTION INTRAMUSCULAR; INTRAVENOUS AS NEEDED
Status: DISCONTINUED | OUTPATIENT
Start: 2022-08-08 | End: 2022-08-08 | Stop reason: SURG

## 2022-08-08 RX ORDER — BALANCED SALT SOLUTION 6.4; .75; .48; .3; 3.9; 1.7 MG/ML; MG/ML; MG/ML; MG/ML; MG/ML; MG/ML
SOLUTION OPHTHALMIC AS NEEDED
Status: DISCONTINUED | OUTPATIENT
Start: 2022-08-08 | End: 2022-08-08 | Stop reason: HOSPADM

## 2022-08-08 RX ADMIN — CYCLOPENTOLATE HYDROCHLORIDE 1 DROP: 20 SOLUTION/ DROPS OPHTHALMIC at 09:04

## 2022-08-08 RX ADMIN — PHENYLEPHRINE HYDROCHLORIDE 1 DROP: 25 SOLUTION/ DROPS OPHTHALMIC at 09:04

## 2022-08-08 RX ADMIN — TETRACAINE HYDROCHLORIDE 1 DROP: 5 SOLUTION OPHTHALMIC at 08:50

## 2022-08-08 RX ADMIN — SODIUM CHLORIDE, POTASSIUM CHLORIDE, SODIUM LACTATE AND CALCIUM CHLORIDE 1000 ML: 600; 310; 30; 20 INJECTION, SOLUTION INTRAVENOUS at 08:52

## 2022-08-08 RX ADMIN — MIDAZOLAM HYDROCHLORIDE 1 MG: 1 INJECTION, SOLUTION INTRAMUSCULAR; INTRAVENOUS at 10:40

## 2022-08-08 RX ADMIN — PHENYLEPHRINE HYDROCHLORIDE 1 DROP: 25 SOLUTION/ DROPS OPHTHALMIC at 08:54

## 2022-08-08 RX ADMIN — CYCLOPENTOLATE HYDROCHLORIDE 1 DROP: 20 SOLUTION/ DROPS OPHTHALMIC at 08:59

## 2022-08-08 RX ADMIN — CYCLOPENTOLATE HYDROCHLORIDE 1 DROP: 20 SOLUTION/ DROPS OPHTHALMIC at 08:54

## 2022-08-08 RX ADMIN — MIDAZOLAM HYDROCHLORIDE 1 MG: 1 INJECTION, SOLUTION INTRAMUSCULAR; INTRAVENOUS at 10:35

## 2022-08-08 RX ADMIN — PHENYLEPHRINE HYDROCHLORIDE 1 DROP: 25 SOLUTION/ DROPS OPHTHALMIC at 08:59

## 2022-08-08 RX ADMIN — TETRACAINE HYDROCHLORIDE 1 DROP: 5 SOLUTION OPHTHALMIC at 08:52

## 2022-08-08 RX ADMIN — Medication 10 MG: at 10:35

## 2022-08-08 NOTE — OP NOTE
PROCEDURE NOTE      Date of Procedure: 8/8/2022  Patient Name:  Salinas Monroy  MRN: [id number]  YOB: 1950      PREOPERATIVE DIAGNOSIS:  Visually significant combined form of senile cataract of the Right eye interfering with activities of daily living.    INDICATIONS FOR THE PROCEDURE:  Visually significant combined form of senile cataract of the Right eye interfering with activities of daily living.    POSTOPERATIVE DIAGNOSIS:  Visually significant combined form of senile cataract of the Right eye interfering with activities of daily living. Hard lens.    SURGEON:  Eunice Brooks M.D.    NAME OF PROCEDURE:  Right cataract extraction with posterior chamber intraocular lens implant. Lens used: +   Implant Name Type Inv. Item Serial No.  Lot No. LRB No. Used Action   LENS MONOFOCAL IQ [id number] - [id number] - [id number] Implant LENS MONOFOCAL IQ [id number] KENDAL  Right 1 Implanted     CDE: 16.24    ANESTHESIA:  Topical with MAC.    COMPLICATIONS:  None.    ESTIMATED BLOOD LOSS:  Less than 1cc.    DETAILS OF THE PROCEDURE:  After receiving appropriate informed consent and confirming and marking the eye to be operated upon, the patient was wheeled into the operating room. After confirming adequate anesthesia, the patient was prepped and draped in a standard sterile ophthalmic fashion. A lid speculum was then placed in the eye.  A 0.8 mm metal blade was then used to make two limbal paracenteses and the anterior chamber was reformed with Viscoat.  A 2.4 mm metal keratome was then used to make a supeerior clear cornea tunneled incision.  A cystotome was used to puncture the anterior capsule and initiate a capsular flap.  Utrata forceps were used to complete a continuous curvilinear capsulorrhexis.  BSS on a Ruelas hydrodissection cannula was then used to hydrodissect and hydrodelineate the nucleus.  The nucleus was then phacoemulsified using a divide and conquer technique.   CDE was 16.24%.  Remaining cortex was removed with bimanual I/A.  Posterior capsular polish was performed.  The capsular bag was then reformed with Provisc and a   Implant Name Type Inv. Item Serial No.  Lot No. LRB No. Used Action   LENS MONOFOCAL IQ [id number] - [id number] - [id number] Implant LENS MONOFOCAL IQ [id number] KENDAL  Right 1 Implanted     lens implant, was then placed in the bag without event.  The Provisc was then removed with bimanual irrigation and aspiration and the anterior chamber reformed with BSS.  The wounds were hydrated as necessary to maintain a water tight anterior chamber. Intracameral Moxifloxacin 1mg/0.1ml was administered and 5% povidone iodine solution was placed on the ocular surface. At the conclusion of the procedure, the lid speculum was removed and the patient undraped.  A drop of Polytrim and Durezol .05% and shield were placed over the eye.  The patient left the room in good condition having tolerated the procedure well.    Eunice Brooks MD   The patient is a 58y Female complaining of neck pain.

## 2022-08-08 NOTE — ANESTHESIA POSTPROCEDURE EVALUATION
Patient: Salinas Monroy    Procedure Summary     Date: 08/08/22 Room / Location: [facility] OR 1 /  [facility]    Anesthesia Start: 1033 Anesthesia Stop: 1100    Procedure: CATARACT PHACO EXTRACTION WITH INTRAOCULAR LENS IMPLANT RIGHT (Right Eye) Diagnosis:       Combined forms of age-related cataract of right eye      (Combined forms of age-related cataract of right eye [H25.811])    Surgeons: Eunice Brooks MD Provider: Bill Adair CRNA    Anesthesia Type: MAC ASA Status: 3          Anesthesia Type: MAC    Vitals  No vitals data found for the desired time range.          Post Anesthesia Care and Evaluation    Patient location during evaluation: PHASE II  Patient participation: complete - patient participated  Level of consciousness: awake and alert  Pain score: 0  Pain management: adequate    Airway patency: patent  Anesthetic complications: No anesthetic complications  PONV Status: none  Cardiovascular status: acceptable  Respiratory status: acceptable  Hydration status: acceptable  No anesthesia care post op

## 2022-08-08 NOTE — ANESTHESIA PREPROCEDURE EVALUATION
Anesthesia Evaluation     Patient summary reviewed and Nursing notes reviewed   history of anesthetic complications: PONV  NPO Solid Status: > 8 hours  NPO Liquid Status: > 2 hours           Airway   Mallampati: I  TM distance: >3 FB  Neck ROM: full  Possible difficult intubation  Dental - normal exam     Pulmonary - normal exam   (+) a smoker Former, sleep apnea on CPAP,   Cardiovascular - normal exam    ECG reviewed  Rhythm: regular  Rate: normal    (+) hypertension, CHF ,       Neuro/Psych  (+) tremors, psychiatric history Anxiety, Depression and Bipolar,    GI/Hepatic/Renal/Endo    (+) obesity, morbid obesity, GERD,  thyroid problem hypothyroidism    Musculoskeletal     Abdominal  - normal exam    Abdomen: soft.  Bowel sounds: normal.   Substance History - negative use     OB/GYN negative ob/gyn ROS         Other   arthritis,      ROS/Med Hx Other: Echo 02/2020:    1.  Normal left ventricular size and systolic function, LVEF 55-60%.  2.  Mild concentric LVH.  3.  Indeterminate LV diastolic filling pattern.  4.  Normal right ventricular size and systolic function.  5.  Normal left and right atrial size.  6.  Trace MR and TR.    Sress test 3/2020:    1. Normal Lexiscan MPS with no evidence of inducible ischemia.   2. Low normal LVEF, 54%.                               Anesthesia Plan    ASA 3     MAC     (Pt told that intravenous sedation will be used as the primary anesthetic. Every effort will be made to make sure the patient is comfortable.     The patient was told that they may experience recall for the procedure. Risks and benefits discussed including risk of aspiration and dental damage. All patient questions answered. Pt verbalized understanding and agrees to plan of care.)  intravenous induction     Anesthetic plan, risks, benefits, and alternatives have been provided, discussed and informed consent has been obtained with: patient.    Plan discussed with CRNA.

## 2022-08-08 NOTE — DISCHARGE INSTRUCTIONS
[facility] [address] [place] [address] [place] [address]  PH: [phone]  FAX: [phone]      Post - Operative Instructions for Dr. Brooks's Cataract Patients      1.  Use your drops as prescribed, wait 5 minutes between each drop.  Eye drops are to be used as instructed during the daytime only. Do not get up in the night to use eye drops.  2.  Securely tape the protective shield over the operated eye at bedtime for the FIRST week only.  3.  Take your regular non-eye medications and continue any eye medications for the  non-operative eye.  4.  For the first week, avoid bending or stooping and lifting anything heavier than 5 pounds.  Also, avoid any blow to the head or eye.  Avoid getting dirty water in the eye.  Avoid sleeping on the side of the operated eye or face.  5.  No driving until you are told to by your physician.  6.  If significant eye pain is not relieved by medication, or you have increased redness, swelling, pain or loss of vision or any symptoms you are unsure of call Dr. Brooks's office at [phone].      You should expect:    Slight Discomfort  Burning When Using Eye Drops  Blurred Vision and Dilated Pupil  Mild Redness  You Will Not Be Able To Read  Your Glasses May Not Work      PHYSICIAN TO CHOOSE THE APPROPRIATE MEDICATIONS TO BE SENT HOME WITH PATIENT    __x__   Difluprednate (Durezol) .05% ophthalmic emulsion    ____   Vigamox(moxifloxacin) 0.5% ophthalmic solution    __x__   Polymyxin B/Trimethoprim solution    ____   Bacitracin Zinc and polymyxin B sulfate ophthalmic ointment    Start the following eyedrops today as soon as you are home:     Durezol 0.05% instill one drop every 2 hours  Polytrim instill one drop every 4 hours    Wait 5 mins between drops. Eye drops are to be used as instructed during the daytime only. Do not get up in the night to use eye drops.    You may only remove the eye shield to administer eye medications (place shield back  over eye after each medication administration); otherwise keep shield on at all times until office visit tomorrow. To assist you in voiding:  Drink plenty of fluids  Listen to running water while attempting to void.    If you are unable to urinate and you have an uncomfortable urge to void or it has been   6 hours since you were discharged, return to the Emergency Room Please follow all post op instructions and follow up appointment time from your physician's office included in your discharge packet.  .  Rest today  No pushing,pulling,tugging,heavy lifting, or strenuous activity   No major decision making,driving,or drinking alcoholic beverages for 24 hours due to the sedation you received  Always use good hand hygiene/washing technique  No driving on pain medication.

## 2022-08-19 DIAGNOSIS — F31.11 BIPOLAR AFFECTIVE DISORDER, CURRENTLY MANIC, MILD: ICD-10-CM

## 2022-08-19 RX ORDER — ESCITALOPRAM OXALATE 10 MG/1
TABLET ORAL
Qty: 30 TABLET | Refills: 0 | Status: SHIPPED | OUTPATIENT
Start: 2022-08-19 | End: 2022-09-19

## 2022-08-19 NOTE — TELEPHONE ENCOUNTER
Rx Refill Note  Requested Prescriptions     Pending Prescriptions Disp Refills   • escitalopram (LEXAPRO) 10 MG tablet [Pharmacy Med Name: escitalopram 10 mg tablet] 30 tablet 0     Sig: TAKE ONE TABLET BY MOUTH EVERY DAY      Last office visit with prescribing clinician: 7/11/2022      Next office visit with prescribing clinician: 11/14/2022            Bobbi Mejia MA  08/19/22, 16:44 EDT

## 2022-08-22 ENCOUNTER — HOSPITAL ENCOUNTER (OUTPATIENT)
Facility: HOSPITAL | Age: 72
Setting detail: HOSPITAL OUTPATIENT SURGERY
Discharge: HOME | End: 2022-08-22
Attending: OPHTHALMOLOGY | Admitting: OPHTHALMOLOGY

## 2022-08-22 ENCOUNTER — ANESTHESIA (OUTPATIENT)
Dept: PERIOP | Facility: HOSPITAL | Age: 72
End: 2022-08-22

## 2022-08-22 ENCOUNTER — ANESTHESIA EVENT (OUTPATIENT)
Dept: PERIOP | Facility: HOSPITAL | Age: 72
End: 2022-08-22

## 2022-08-22 VITALS
DIASTOLIC BLOOD PRESSURE: 79 MMHG | OXYGEN SATURATION: 95 % | RESPIRATION RATE: 17 BRPM | TEMPERATURE: 97.3 F | SYSTOLIC BLOOD PRESSURE: 139 MMHG | HEART RATE: 72 BPM

## 2022-08-22 PROCEDURE — 25010000002 EPINEPHRINE PER 0.1 MG: Performed by: OPHTHALMOLOGY

## 2022-08-22 PROCEDURE — 25010000002 FENTANYL CITRATE (PF) 50 MCG/ML SOLUTION: Performed by: NURSE ANESTHETIST, CERTIFIED REGISTERED

## 2022-08-22 PROCEDURE — V2632 POST CHMBR INTRAOCULAR LENS: HCPCS | Performed by: OPHTHALMOLOGY

## 2022-08-22 PROCEDURE — 25010000002 MIDAZOLAM PER 1MG: Performed by: NURSE ANESTHETIST, CERTIFIED REGISTERED

## 2022-08-22 DEVICE — LENS MONOFOCAL IQ CC60WF210: Type: IMPLANTABLE DEVICE | Site: POSTERIOR CHAMBER | Status: FUNCTIONAL

## 2022-08-22 RX ORDER — POLYMYXIN B SULFATE AND TRIMETHOPRIM 1; 10000 MG/ML; [USP'U]/ML
1 SOLUTION OPHTHALMIC SEE ADMIN INSTRUCTIONS
Status: DISCONTINUED | OUTPATIENT
Start: 2022-08-22 | End: 2022-08-22 | Stop reason: HOSPADM

## 2022-08-22 RX ORDER — BALANCED SALT SOLUTION 6.4; .75; .48; .3; 3.9; 1.7 MG/ML; MG/ML; MG/ML; MG/ML; MG/ML; MG/ML
SOLUTION OPHTHALMIC AS NEEDED
Status: DISCONTINUED | OUTPATIENT
Start: 2022-08-22 | End: 2022-08-22 | Stop reason: HOSPADM

## 2022-08-22 RX ORDER — SODIUM CHLORIDE, SODIUM LACTATE, POTASSIUM CHLORIDE, CALCIUM CHLORIDE 600; 310; 30; 20 MG/100ML; MG/100ML; MG/100ML; MG/100ML
1000 INJECTION, SOLUTION INTRAVENOUS CONTINUOUS
Status: DISCONTINUED | OUTPATIENT
Start: 2022-08-22 | End: 2022-08-22 | Stop reason: HOSPADM

## 2022-08-22 RX ORDER — DIFLUPREDNATE OPHTHALMIC 0.5 MG/ML
EMULSION OPHTHALMIC AS NEEDED
Status: DISCONTINUED | OUTPATIENT
Start: 2022-08-22 | End: 2022-08-22 | Stop reason: HOSPADM

## 2022-08-22 RX ORDER — TETRACAINE HYDROCHLORIDE 5 MG/ML
SOLUTION OPHTHALMIC AS NEEDED
Status: DISCONTINUED | OUTPATIENT
Start: 2022-08-22 | End: 2022-08-22 | Stop reason: HOSPADM

## 2022-08-22 RX ORDER — MIDAZOLAM HYDROCHLORIDE 2 MG/2ML
INJECTION, SOLUTION INTRAMUSCULAR; INTRAVENOUS AS NEEDED
Status: DISCONTINUED | OUTPATIENT
Start: 2022-08-22 | End: 2022-08-22 | Stop reason: SURG

## 2022-08-22 RX ORDER — TETRACAINE HYDROCHLORIDE 5 MG/ML
1 SOLUTION OPHTHALMIC SEE ADMIN INSTRUCTIONS
Status: COMPLETED | OUTPATIENT
Start: 2022-08-22 | End: 2022-08-22

## 2022-08-22 RX ORDER — DIFLUPREDNATE OPHTHALMIC 0.5 MG/ML
1 EMULSION OPHTHALMIC SEE ADMIN INSTRUCTIONS
Status: DISCONTINUED | OUTPATIENT
Start: 2022-08-22 | End: 2022-08-22 | Stop reason: HOSPADM

## 2022-08-22 RX ORDER — CYCLOPENTOLATE HYDROCHLORIDE 20 MG/ML
1 SOLUTION/ DROPS OPHTHALMIC
Status: COMPLETED | OUTPATIENT
Start: 2022-08-22 | End: 2022-08-22

## 2022-08-22 RX ORDER — PHENYLEPHRINE HCL 2.5 %
1 DROPS OPHTHALMIC (EYE)
Status: COMPLETED | OUTPATIENT
Start: 2022-08-22 | End: 2022-08-22

## 2022-08-22 RX ORDER — KETAMINE HYDROCHLORIDE 50 MG/ML
INJECTION, SOLUTION, CONCENTRATE INTRAMUSCULAR; INTRAVENOUS AS NEEDED
Status: DISCONTINUED | OUTPATIENT
Start: 2022-08-22 | End: 2022-08-22 | Stop reason: SURG

## 2022-08-22 RX ORDER — FENTANYL CITRATE 50 UG/ML
INJECTION, SOLUTION INTRAMUSCULAR; INTRAVENOUS AS NEEDED
Status: DISCONTINUED | OUTPATIENT
Start: 2022-08-22 | End: 2022-08-22 | Stop reason: SURG

## 2022-08-22 RX ADMIN — PHENYLEPHRINE HYDROCHLORIDE 1 DROP: 25 SOLUTION/ DROPS OPHTHALMIC at 11:44

## 2022-08-22 RX ADMIN — FENTANYL CITRATE 50 MCG: 50 INJECTION INTRAMUSCULAR; INTRAVENOUS at 14:03

## 2022-08-22 RX ADMIN — SODIUM CHLORIDE, POTASSIUM CHLORIDE, SODIUM LACTATE AND CALCIUM CHLORIDE 1000 ML: 600; 310; 30; 20 INJECTION, SOLUTION INTRAVENOUS at 11:59

## 2022-08-22 RX ADMIN — CYCLOPENTOLATE HYDROCHLORIDE 1 DROP: 20 SOLUTION/ DROPS OPHTHALMIC at 11:44

## 2022-08-22 RX ADMIN — FENTANYL CITRATE 50 MCG: 50 INJECTION INTRAMUSCULAR; INTRAVENOUS at 13:59

## 2022-08-22 RX ADMIN — PHENYLEPHRINE HYDROCHLORIDE 1 DROP: 25 SOLUTION/ DROPS OPHTHALMIC at 11:39

## 2022-08-22 RX ADMIN — CYCLOPENTOLATE HYDROCHLORIDE 1 DROP: 20 SOLUTION/ DROPS OPHTHALMIC at 11:34

## 2022-08-22 RX ADMIN — PHENYLEPHRINE HYDROCHLORIDE 1 DROP: 25 SOLUTION/ DROPS OPHTHALMIC at 11:34

## 2022-08-22 RX ADMIN — TETRACAINE HYDROCHLORIDE 1 DROP: 5 SOLUTION OPHTHALMIC at 11:30

## 2022-08-22 RX ADMIN — TETRACAINE HYDROCHLORIDE 1 DROP: 5 SOLUTION OPHTHALMIC at 11:31

## 2022-08-22 RX ADMIN — KETAMINE HYDROCHLORIDE 6 MG: 50 INJECTION, SOLUTION INTRAMUSCULAR; INTRAVENOUS at 13:42

## 2022-08-22 RX ADMIN — CYCLOPENTOLATE HYDROCHLORIDE 1 DROP: 20 SOLUTION/ DROPS OPHTHALMIC at 11:39

## 2022-08-22 RX ADMIN — TETRACAINE HYDROCHLORIDE 1 DROP: 5 SOLUTION OPHTHALMIC at 11:32

## 2022-08-22 RX ADMIN — MIDAZOLAM HYDROCHLORIDE 2 MG: 1 INJECTION, SOLUTION INTRAMUSCULAR; INTRAVENOUS at 13:42

## 2022-08-22 RX ADMIN — KETAMINE HYDROCHLORIDE 4 MG: 50 INJECTION, SOLUTION INTRAMUSCULAR; INTRAVENOUS at 13:52

## 2022-08-22 NOTE — ANESTHESIA POSTPROCEDURE EVALUATION
Patient: Salinas Monroy    Procedure Summary     Date: 08/22/22 Room / Location: [facility] OR 1 /  [facility]    Anesthesia Start: 1340 Anesthesia Stop: 1409    Procedure: CATARACT PHACO EXTRACTION WITH INTRAOCULAR LENS IMPLANT LEFT (Left Eye) Diagnosis:       Combined forms of age-related cataract of left eye      (Combined forms of age-related cataract of left eye [H25.812])    Surgeons: Eunice Brooks MD Provider: Raymond Ye CRNA    Anesthesia Type: MAC ASA Status: 3          Anesthesia Type: MAC    Vitals  No vitals data found for the desired time range.          Post Anesthesia Care and Evaluation    Patient location during evaluation: PHASE II  Patient participation: complete - patient participated  Level of consciousness: awake and alert  Pain score: 0  Pain management: satisfactory to patient    Airway patency: patent  Anesthetic complications: No anesthetic complications  PONV Status: none  Cardiovascular status: acceptable and stable  Respiratory status: acceptable  Hydration status: acceptable    Comments: Vitals signs as noted in nursing documentation as per protocol.

## 2022-08-22 NOTE — ANESTHESIA PREPROCEDURE EVALUATION
Anesthesia Evaluation     Patient summary reviewed and Nursing notes reviewed   history of anesthetic complications: PONV  NPO Solid Status: > 8 hours  NPO Liquid Status: > 2 hours           Airway   Mallampati: II  TM distance: >3 FB  Neck ROM: full  Possible difficult intubation  Dental - normal exam     Pulmonary - normal exam   (+) a smoker Former, sleep apnea on CPAP,   Cardiovascular - normal exam  Exercise tolerance: good (4-7 METS)    ECG reviewed  Rhythm: regular  Rate: normal    (+) hypertension, CHF ,       Neuro/Psych  (+) tremors, psychiatric history Anxiety, Depression and Bipolar,    GI/Hepatic/Renal/Endo    (+) obesity, morbid obesity, GERD,  thyroid problem hypothyroidism    Musculoskeletal     Abdominal   (+) obese,    Substance History - negative use     OB/GYN negative ob/gyn ROS         Other   arthritis,      ROS/Med Hx Other: Echo 02/2020:    1.  Normal left ventricular size and systolic function, LVEF 55-60%.  2.  Mild concentric LVH.  3.  Indeterminate LV diastolic filling pattern.  4.  Normal right ventricular size and systolic function.  5.  Normal left and right atrial size.  6.  Trace MR and TR.    Sress test 3/2020:    1. Normal Lexiscan MPS with no evidence of inducible ischemia.   2. Low normal LVEF, 54%.                               Anesthesia Plan    ASA 3     MAC     (Pt told that intravenous sedation will be used as the primary anesthetic. Every effort will be made to make sure the patient is comfortable.     The patient was told that they may experience recall for the procedure. Risks and benefits discussed including risk of aspiration and dental damage. All patient questions answered. Pt verbalized understanding and agrees to plan of care.)  intravenous induction     Anesthetic plan, risks, benefits, and alternatives have been provided, discussed and informed consent has been obtained with: patient.  Pre-procedure education provided

## 2022-08-22 NOTE — DISCHARGE INSTRUCTIONS
[facility] [address] [place] [address]  [place] [address]  PH: [phone]  FAX: [phone]      Post - Operative Instructions for Dr. Brooks's Cataract Patients      1.  Use your drops as prescribed, wait 5 minutes between each drop.  Eye drops are to be used as instructed during the daytime only. Do not get up in the night to use eye drops.  2.  Securely tape the protective shield over the operated eye at bedtime for the FIRST week only.  3.  Take your regular non-eye medications and continue any eye medications for the  non-operative eye.  4.  For the first week, avoid bending or stooping and lifting anything heavier than 5 pounds.  Also, avoid any blow to the head or eye.  Avoid getting dirty water in the eye.  Avoid sleeping on the side of the operated eye or face.  5.  No driving until you are told to by your physician.  6.  If significant eye pain is not relieved by medication, or you have increased redness, swelling, pain or loss of vision or any symptoms you are unsure of call Dr. Brooks's office at [phone].      You should expect:    Slight Discomfort  Burning When Using Eye Drops  Blurred Vision and Dilated Pupil  Mild Redness  You Will Not Be Able To Read  Your Glasses May Not Work      PHYSICIAN TO CHOOSE THE APPROPRIATE MEDICATIONS TO BE SENT HOME WITH PATIENT    __x__   Difluprednate (Durezol) .05% ophthalmic emulsion    ____   Vigamox(moxifloxacin) 0.5% ophthalmic solution    ___x_   Polymyxin B/Trimethoprim solution    ____   Bacitracin Zinc and polymyxin B sulfate ophthalmic ointment    Start the following eyedrops today as soon as you are home:     Durezol 0.05% instill one drop every 2 hours  Polytrim instill one drop every 4 hours    Wait 5 mins between drops. Eye drops are to be used as instructed during the daytime only. Do not get up in the night to use eye drops.    You may only remove the eye shield to administer eye medications (place shield back  over eye after each medication administration); otherwise keep shield on at all times until office visit tomorrow.     Please follow all post op instructions and follow up appointment time from your physician's office included in your discharge packet.  .  No pushing, pulling, tugging,  heavy lifting, or strenuous activity.  No major decision making, driving, or drinking alcoholic beverages for 24 hours. ( due to the medications you have  received)  Always use good hand hygiene/washing techniques.  NO driving while taking pain medications.    * if you have an incision:  Check your incision area every day for signs of infection.   Check for:  * more redness, swelling, or pain  *more fluid or blood  *warmth  *pus or bad smell     To assist you in voiding:  Drink plenty of fluids  Listen to running water while attempting to void.    If you are unable to urinate and you have an uncomfortable urge to void or it has been   6 hours since you were discharged, return to the Emergency Room

## 2022-08-22 NOTE — OP NOTE
PROCEDURE NOTE      Date of Procedure: 8/22/2022  Patient Name:  Salinas Monroy  MRN: [id number]  YOB: 1950      PREOPERATIVE DIAGNOSIS:  Visually significant combined form of senile cataract of the Left eye interfering with activities of daily living.    INDICATIONS FOR THE PROCEDURE:  Visually significant combined form of senile cataract of the Left eye interfering with activities of daily living.    POSTOPERATIVE DIAGNOSIS:  Visually significant combined form of senile cataract of the Left eye interfering with activities of daily living. Hard lens.    SURGEON:  Eunice Brooks M.D.    NAME OF PROCEDURE:  Left cataract extraction with posterior chamber intraocular lens implant. Lens used: +   Implant Name Type Inv. Item Serial No.  Lot No. LRB No. Used Action   LENS MONOFOCAL IQ [id number] - [id number] - [id number] Implant LENS MONOFOCAL IQ [id number] KENDAL  Left 1 Implanted     CDE: 18.50    ANESTHESIA:  Topical with MAC.    COMPLICATIONS:  None.    ESTIMATED BLOOD LOSS:  Less than 1cc.    DETAILS OF THE PROCEDURE:  After receiving appropriate informed consent and confirming and marking the eye to be operated upon, the patient was wheeled into the operating room. After confirming adequate anesthesia, the patient was prepped and draped in a standard sterile ophthalmic fashion. A lid speculum was then placed in the eye.  A 0.8 mm metal blade was then used to make two limbal paracenteses and the anterior chamber was reformed with Viscoat.  A 2.4 mm metal keratome was then used to make a superotemporal clear cornea tunneled incision.  A cystotome was used to puncture the anterior capsule and initiate a capsular flap.  Utrata forceps were used to complete a continuous curvilinear capsulorrhexis.  BSS on a Ruelas hydrodissection cannula was then used to hydrodissect and hydrodelineate the nucleus.  The nucleus was then phacoemulsified using a divide and conquer technique.   CDE was 18.50%.  Remaining cortex was removed with bimanual I/A.  Posterior capsular polish was performed.  The capsular bag was then reformed with Provisc and a   Implant Name Type Inv. Item Serial No.  Lot No. LRB No. Used Action   LENS MONOFOCAL IQ [id number] - [id number] - [id number] Implant LENS MONOFOCAL IQ [id number] KENDAL  Left 1 Implanted     lens implant, was then placed in the bag without event.  The Provisc was then removed with bimanual irrigation and aspiration and the anterior chamber reformed with BSS.  The wounds were hydrated as necessary to maintain a water tight anterior chamber. Intracameral Moxifloxacin 1mg/0.1ml was administered and 5% povidone iodine solution was placed on the ocular surface. At the conclusion of the procedure, the lid speculum was removed and the patient undraped.  A drop of Polytrim and Durezol .05% and shield were placed over the eye.  The patient left the room in good condition having tolerated the procedure well.    Eunice Brooks MD

## 2022-09-11 DIAGNOSIS — I87.2 VENOUS INSUFFICIENCY OF BOTH LOWER EXTREMITIES: ICD-10-CM

## 2022-09-11 DIAGNOSIS — I50.32 CHRONIC DIASTOLIC HEART FAILURE: ICD-10-CM

## 2022-09-11 DIAGNOSIS — E03.9 HYPOTHYROIDISM, ADULT: ICD-10-CM

## 2022-09-12 RX ORDER — LIOTHYRONINE SODIUM 25 UG/1
TABLET ORAL
Qty: 90 TABLET | Refills: 1 | Status: SHIPPED | OUTPATIENT
Start: 2022-09-12 | End: 2022-11-14

## 2022-09-12 RX ORDER — POTASSIUM CHLORIDE 750 MG/1
TABLET, FILM COATED, EXTENDED RELEASE ORAL
Qty: 60 TABLET | Refills: 1 | Status: SHIPPED | OUTPATIENT
Start: 2022-09-12 | End: 2022-11-14

## 2022-09-18 DIAGNOSIS — M1A.0710 CHRONIC IDIOPATHIC GOUT INVOLVING TOE OF RIGHT FOOT WITHOUT TOPHUS: ICD-10-CM

## 2022-09-18 DIAGNOSIS — F31.11 BIPOLAR AFFECTIVE DISORDER, CURRENTLY MANIC, MILD: ICD-10-CM

## 2022-09-18 DIAGNOSIS — E03.9 ACQUIRED HYPOTHYROIDISM: Chronic | ICD-10-CM

## 2022-09-19 RX ORDER — LEVOTHYROXINE SODIUM 0.07 MG/1
TABLET ORAL
Qty: 90 TABLET | Refills: 0 | Status: SHIPPED | OUTPATIENT
Start: 2022-09-19 | End: 2022-12-20

## 2022-09-19 RX ORDER — ESCITALOPRAM OXALATE 10 MG/1
TABLET ORAL
Qty: 30 TABLET | Refills: 0 | Status: SHIPPED | OUTPATIENT
Start: 2022-09-19 | End: 2022-10-21

## 2022-09-19 RX ORDER — ALLOPURINOL 100 MG/1
TABLET ORAL
Qty: 180 TABLET | Refills: 0 | Status: SHIPPED | OUTPATIENT
Start: 2022-09-19 | End: 2022-12-20

## 2022-10-09 DIAGNOSIS — I50.32 CHRONIC DIASTOLIC HEART FAILURE: ICD-10-CM

## 2022-10-09 DIAGNOSIS — I87.2 VENOUS INSUFFICIENCY OF BOTH LOWER EXTREMITIES: ICD-10-CM

## 2022-10-10 RX ORDER — FUROSEMIDE 20 MG/1
TABLET ORAL
Qty: 60 TABLET | Refills: 2 | Status: SHIPPED | OUTPATIENT
Start: 2022-10-10 | End: 2023-01-12

## 2022-10-21 DIAGNOSIS — F31.11 BIPOLAR AFFECTIVE DISORDER, CURRENTLY MANIC, MILD: ICD-10-CM

## 2022-10-21 RX ORDER — ESCITALOPRAM OXALATE 10 MG/1
TABLET ORAL
Qty: 30 TABLET | Refills: 0 | Status: SHIPPED | OUTPATIENT
Start: 2022-10-21 | End: 2022-11-18

## 2022-11-02 ENCOUNTER — LAB (OUTPATIENT)
Dept: LAB | Facility: HOSPITAL | Age: 72
End: 2022-11-02

## 2022-11-02 DIAGNOSIS — E03.9 ACQUIRED HYPOTHYROIDISM: Chronic | ICD-10-CM

## 2022-11-02 PROCEDURE — 84443 ASSAY THYROID STIM HORMONE: CPT

## 2022-11-02 PROCEDURE — 84481 FREE ASSAY (FT-3): CPT | Performed by: FAMILY MEDICINE

## 2022-11-02 PROCEDURE — 36415 COLL VENOUS BLD VENIPUNCTURE: CPT | Performed by: FAMILY MEDICINE

## 2022-11-02 PROCEDURE — 84482 T3 REVERSE: CPT | Performed by: FAMILY MEDICINE

## 2022-11-02 PROCEDURE — 84439 ASSAY OF FREE THYROXINE: CPT

## 2022-11-03 LAB
T3FREE SERPL-MCNC: 6.4 PG/ML (ref 2–4.4)
T4 FREE SERPL-MCNC: 0.62 NG/DL (ref 0.93–1.7)
TSH SERPL DL<=0.05 MIU/L-ACNC: <0.005 UIU/ML (ref 0.27–4.2)

## 2022-11-06 LAB — T3REVERSE SERPL-MCNC: 7.5 NG/DL (ref 9.2–24.1)

## 2022-11-13 DIAGNOSIS — K21.9 GASTROESOPHAGEAL REFLUX DISEASE, UNSPECIFIED WHETHER ESOPHAGITIS PRESENT: Chronic | ICD-10-CM

## 2022-11-13 DIAGNOSIS — I50.32 CHRONIC DIASTOLIC HEART FAILURE: ICD-10-CM

## 2022-11-13 DIAGNOSIS — I87.2 VENOUS INSUFFICIENCY OF BOTH LOWER EXTREMITIES: ICD-10-CM

## 2022-11-13 DIAGNOSIS — E03.9 HYPOTHYROIDISM, ADULT: ICD-10-CM

## 2022-11-14 ENCOUNTER — OFFICE VISIT (OUTPATIENT)
Dept: FAMILY MEDICINE CLINIC | Facility: CLINIC | Age: 72
End: 2022-11-14

## 2022-11-14 VITALS
HEIGHT: 63 IN | RESPIRATION RATE: 15 BRPM | SYSTOLIC BLOOD PRESSURE: 122 MMHG | TEMPERATURE: 97.6 F | DIASTOLIC BLOOD PRESSURE: 76 MMHG | BODY MASS INDEX: 38.27 KG/M2 | WEIGHT: 216 LBS | HEART RATE: 102 BPM | OXYGEN SATURATION: 97 %

## 2022-11-14 DIAGNOSIS — Z23 NEED FOR IMMUNIZATION AGAINST INFLUENZA: ICD-10-CM

## 2022-11-14 DIAGNOSIS — F31.62 BIPOLAR DISORDER, CURRENT EPISODE MIXED, MODERATE: ICD-10-CM

## 2022-11-14 DIAGNOSIS — E03.9 ACQUIRED HYPOTHYROIDISM: Chronic | ICD-10-CM

## 2022-11-14 DIAGNOSIS — L91.8 INFLAMED SKIN TAG: ICD-10-CM

## 2022-11-14 DIAGNOSIS — M15.9 PRIMARY OSTEOARTHRITIS INVOLVING MULTIPLE JOINTS: Primary | ICD-10-CM

## 2022-11-14 DIAGNOSIS — I87.2 VENOUS INSUFFICIENCY OF BOTH LOWER EXTREMITIES: ICD-10-CM

## 2022-11-14 PROCEDURE — 11200 RMVL SKIN TAGS UP TO&INC 15: CPT | Performed by: FAMILY MEDICINE

## 2022-11-14 PROCEDURE — G0008 ADMIN INFLUENZA VIRUS VAC: HCPCS | Performed by: FAMILY MEDICINE

## 2022-11-14 PROCEDURE — 90662 IIV NO PRSV INCREASED AG IM: CPT | Performed by: FAMILY MEDICINE

## 2022-11-14 PROCEDURE — 99214 OFFICE O/P EST MOD 30 MIN: CPT | Performed by: FAMILY MEDICINE

## 2022-11-14 RX ORDER — LIOTHYRONINE SODIUM 25 UG/1
TABLET ORAL
Qty: 90 TABLET | Refills: 1 | Status: SHIPPED | OUTPATIENT
Start: 2022-11-14 | End: 2023-01-12

## 2022-11-14 RX ORDER — FAMOTIDINE 20 MG/1
TABLET, FILM COATED ORAL
Qty: 60 TABLET | Refills: 5 | Status: SHIPPED | OUTPATIENT
Start: 2022-11-14

## 2022-11-14 RX ORDER — POTASSIUM CHLORIDE 750 MG/1
TABLET, FILM COATED, EXTENDED RELEASE ORAL
Qty: 60 TABLET | Refills: 1 | Status: SHIPPED | OUTPATIENT
Start: 2022-11-14 | End: 2023-02-13

## 2022-11-14 RX ORDER — LIOTHYRONINE SODIUM 25 UG/1
75 TABLET ORAL DAILY
COMMUNITY
Start: 2022-05-09 | End: 2023-02-24

## 2022-11-14 RX ORDER — ACYCLOVIR 400 MG/1
TABLET ORAL
Qty: 30 TABLET | Refills: 2 | Status: SHIPPED | OUTPATIENT
Start: 2022-11-14

## 2022-11-14 NOTE — PROGRESS NOTES
Established Patient        Chief Complaint:   Chief Complaint   Patient presents with   • Hypothyroidism   • Follow-up   • mole removal        Salinas Monroy is a 72 y.o. female    History of Present Illness:   Answers for HPI/ROS submitted by the patient on 11/7/2022  Please describe your symptoms.: Regular Check UP  Have you had these symptoms before?: No  How long have you been having these symptoms?: Greater than 2 weeks  Please list any medications you are currently taking for this condition.: none...  Please describe any probable cause for these symptoms. : none...  What is the primary reason for your visit?: Other    Inflamed/irritated skin tag to left neckline; present for numerous years, gets caught on clothing, painful and bleeds at times.    Pt was intolerant to valproic acid; discontinued the medication, and has been taking vitamin supplement OTC, naturarelief with selenium.    Here today in scheduled follow-up visit of her hypothyroidism, osteoarthritis of multiple joints, venous insufficiency of the lower extremities, bipolar disorder.    She denies any fever, chills or night sweats.  Reports good mood stability at this time.  Denies SI/HI.  Maintains good urine output without hematuria.  Denies any BRB/BTS.    Subjective     The following portions of the patient's history were reviewed and updated as appropriate: allergies, current medications, past family history, past medical history, past social history, past surgical history and problem list.    No Known Allergies    Review of Systems  1. Constitutional: Negative for fever. Negative for chills, diaphoresis, fatigue and unexpected weight change.   2. HENT: No dysphagia; no changes to vision/hearing/smell/taste; no epistaxis.  Otherwise, as per above.  3. Eyes: Negative for redness and visual disturbance.   4. Respiratory: negative for shortness of breath. Negative for chest pain . Negative for cough and chest tightness.  "  5. Cardiovascular: Negative for chest pain and palpitations.   6. Gastrointestinal: As per above.  7. Endocrine: Negative for cold intolerance and heat intolerance.   8. Genitourinary: Negative for difficulty urinating, dysuria and frequency.   9. Musculoskeletal: Chronic arthralgias, back pain and myalgias.   10. Skin: Inflamed skin tag to left neck as per above.  11. Neurological: Negative for syncope, weakness and headaches.  Chronic tremors.  12. Hematological: Negative for adenopathy. Does not bruise/bleed easily.   13. Psychiatric/Behavioral: Negative for confusion. The patient is not nervous/anxious at present.    Objective     Physical Exam   Vital Signs: /76   Pulse 102   Temp 97.6 °F (36.4 °C)   Resp 15   Ht 160 cm (63\")   Wt 98 kg (216 lb)   LMP  (LMP Unknown)   SpO2 97%   BMI 38.26 kg/m²     General Appearance: alert, oriented x 3, no acute distress.  Skin: warm and dry.  Large pigmented skin tag to the left neck, locally surrounded with erythema and signs of friction trauma.  Skin tag is approximately 6 mm at its base.  HEENT: Atraumatic.  pupils round and reactive to light and accommodation, oral mucosa pink and moist.  Nares patent without epistaxis.  External auditory canals are patent tympanic membranes intact.  Boggy/inflamed nasal turbinates with mild postnasal drainage, no pustules or exudate.  Serous effusion noted to bilateral tympanic membranes, however mobility is intact on Valsalva and insufflation.  Neck: supple, no JVD, trachea midline.  No thyromegaly  Lungs: CTA, unlabored breathing effort.  Heart: RRR, normal S1 and S2, no S3, no rub.  Abdomen: soft, non-tender, no palpable bladder, present bowel sounds to auscultation ×4.  No guarding or rigidity.  Extremities: no clubbing, cyanosis.  Good range of motion actively and passively.  Symmetric muscle strength and development.  Gravity dependent edema noted to bilateral lower extremities, extending to the mid tibias " bilaterally.  Slightly pitting in nature.  Ponce/Sparks sign negative.  Independently ambulatory.  Neuro: normal speech and mental status.  Cranial nerves II through XII intact.  No anosmia. DTR 2+; proprioception intact.  Significantly improved tremulousness.    Skin Tag Removal    Date/Time: 11/14/2022 6:15 PM  Performed by: Jm Marie DO  Authorized by: Jm Marie DO   Preparation: Patient was prepped and draped in the usual sterile fashion.  Local anesthesia used: yes  Anesthesia: local infiltration    Anesthesia:  Local anesthesia used: yes  Local Anesthetic: lidocaine 1% with epinephrine  Anesthetic total: 0.5 mL    Sedation:  Patient sedated: no    Patient tolerance: patient tolerated the procedure well with no immediate complications  Comments: Informed consent obtained from patient.  Area prepped in usual sterile fashion after anesthetic utilizing 1% lidocaine with epinephrine.    #10 scalpel blade was used with forceps to tangentially excise the skin tag at its base.  Local pressure for hemostasis followed by silver nitrate cauterization.  Bacitracin ointment applied to the surface, covered by bandage.  Wound care discussed in detail with patient, she verbalized understanding.            Advance Care Planning   ACP discussion was held with the patient during this visit. Patient does not have an advance directive, information provided.       Assessment and Plan      Assessment:   Diagnoses and all orders for this visit:    1. Primary osteoarthritis involving multiple joints (Primary)    2. Acquired hypothyroidism    3. Venous insufficiency of both lower extremities    4. Bipolar disorder, current episode mixed, moderate (HCC)    5. Inflamed skin tag  -     Skin Tag Removal    6. Need for immunization against influenza  -     Fluzone High-Dose 65+yrs ([id number])        Plan:  Excised skin tag today; tolerated with complication.    Continue thyroid supplementation.  Periodic thyroid function  studies with dose adjustment if indicated.    Flu vaccine today    VSS, appears HD asymptomatic.    Patient will return to clinic in approximately 4 weeks for Medicare wellness visit.    Discussion Summary:    Discussed plan of care in detail with pt today; pt verb understanding and agrees.    I spent 40 minutes caring for Salinas on this date of service; 30 minutes of this time was spent in management of her osteoarthritis of multiple joints, hypothyroidism, venous insufficiency of the lower extremities and bipolar disorder; an additional 10 minutes were spent in performing skin tag excision. This time includes time spent by me in the following activities:preparing for the visit, reviewing tests, performing a medically appropriate examination and/or evaluation , counseling and educating the patient/family/caregiver, ordering medications, tests, or procedures, documenting information in the medical record and care coordination    I have reviewed and updated all copied forward information, as appropriate.  I attest to the accuracy and relevance of any unchanged information.    Follow up:  Return in about 4 weeks (around 12/12/2022) for medicare wellness, subsequent.     There are no Patient Instructions on file for this visit.    Jm Marie,   11/14/22  18:20 EST          Please note that portions of this note may have been completed with a voice recognition program. Efforts were made to edit the dictations, but occasionally words are mistranscribed.

## 2022-11-18 DIAGNOSIS — F31.11 BIPOLAR AFFECTIVE DISORDER, CURRENTLY MANIC, MILD: ICD-10-CM

## 2022-11-18 RX ORDER — ESCITALOPRAM OXALATE 10 MG/1
TABLET ORAL
Qty: 30 TABLET | Refills: 0 | Status: SHIPPED | OUTPATIENT
Start: 2022-11-18 | End: 2022-12-20

## 2022-12-20 DIAGNOSIS — F31.11 BIPOLAR AFFECTIVE DISORDER, CURRENTLY MANIC, MILD: ICD-10-CM

## 2022-12-20 DIAGNOSIS — E03.9 ACQUIRED HYPOTHYROIDISM: Chronic | ICD-10-CM

## 2022-12-20 DIAGNOSIS — M1A.0710 CHRONIC IDIOPATHIC GOUT INVOLVING TOE OF RIGHT FOOT WITHOUT TOPHUS: ICD-10-CM

## 2022-12-20 RX ORDER — ALLOPURINOL 100 MG/1
TABLET ORAL
Qty: 180 TABLET | Refills: 0 | Status: SHIPPED | OUTPATIENT
Start: 2022-12-20 | End: 2023-03-17

## 2022-12-20 RX ORDER — LEVOTHYROXINE SODIUM 0.07 MG/1
TABLET ORAL
Qty: 90 TABLET | Refills: 0 | Status: SHIPPED | OUTPATIENT
Start: 2022-12-20 | End: 2023-03-17

## 2022-12-20 RX ORDER — ESCITALOPRAM OXALATE 10 MG/1
TABLET ORAL
Qty: 30 TABLET | Refills: 0 | Status: SHIPPED | OUTPATIENT
Start: 2022-12-20 | End: 2023-01-23 | Stop reason: SDUPTHER

## 2023-01-12 DIAGNOSIS — E03.9 HYPOTHYROIDISM, ADULT: ICD-10-CM

## 2023-01-12 DIAGNOSIS — I50.32 CHRONIC DIASTOLIC HEART FAILURE: ICD-10-CM

## 2023-01-12 DIAGNOSIS — I87.2 VENOUS INSUFFICIENCY OF BOTH LOWER EXTREMITIES: ICD-10-CM

## 2023-01-12 RX ORDER — LIOTHYRONINE SODIUM 25 UG/1
TABLET ORAL
Qty: 90 TABLET | Refills: 1 | Status: SHIPPED | OUTPATIENT
Start: 2023-01-12 | End: 2023-03-13

## 2023-01-12 RX ORDER — FUROSEMIDE 20 MG/1
TABLET ORAL
Qty: 60 TABLET | Refills: 2 | Status: SHIPPED | OUTPATIENT
Start: 2023-01-12

## 2023-01-23 DIAGNOSIS — F31.11 BIPOLAR AFFECTIVE DISORDER, CURRENTLY MANIC, MILD: ICD-10-CM

## 2023-01-23 RX ORDER — ESCITALOPRAM OXALATE 10 MG/1
10 TABLET ORAL DAILY
Qty: 90 TABLET | Refills: 1 | Status: SHIPPED | OUTPATIENT
Start: 2023-01-23

## 2023-01-23 NOTE — TELEPHONE ENCOUNTER
Rx Refill Note  Requested Prescriptions     Pending Prescriptions Disp Refills   • escitalopram (LEXAPRO) 10 MG tablet 30 tablet 0     Sig: Take 1 tablet by mouth Daily.      Last office visit with prescribing clinician: 11/14/2022   Last telemedicine visit with prescribing clinician: 2/14/2023   Next office visit with prescribing clinician: 2/14/2023                         Would you like a call back once the refill request has been completed: [] Yes [] No    If the office needs to give you a call back, can they leave a voicemail: [] Yes [] No    Ale Salazar MA  01/23/23, 17:38 EST

## 2023-02-02 NOTE — TELEPHONE ENCOUNTER
Per Dr. Marie, patient is to wait until upcoming appointment on 2/18/2020.      Mindy has been notified.   Mucosal Advancement Flap Text: Given the location of the defect, shape of the defect and the proximity to free margins a mucosal advancement flap was deemed most appropriate. Incisions were made with a 15 blade scalpel in the appropriate fashion along the cutaneous vermillion border and the mucosal lip. The remaining actinically damaged mucosal tissue was excised.  The mucosal advancement flap was then elevated to the gingival sulcus with care taken to preserve the neurovascular structures and advanced into the primary defect. Care was taken to ensure that precise realignment of the vermilion border was achieved.

## 2023-02-12 DIAGNOSIS — I50.32 CHRONIC DIASTOLIC HEART FAILURE: ICD-10-CM

## 2023-02-12 DIAGNOSIS — I87.2 VENOUS INSUFFICIENCY OF BOTH LOWER EXTREMITIES: ICD-10-CM

## 2023-02-13 RX ORDER — POTASSIUM CHLORIDE 750 MG/1
TABLET, FILM COATED, EXTENDED RELEASE ORAL
Qty: 60 TABLET | Refills: 1 | Status: SHIPPED | OUTPATIENT
Start: 2023-02-13

## 2023-02-18 ENCOUNTER — APPOINTMENT (OUTPATIENT)
Dept: GENERAL RADIOLOGY | Facility: HOSPITAL | Age: 73
End: 2023-02-18
Payer: MEDICARE

## 2023-02-18 ENCOUNTER — HOSPITAL ENCOUNTER (EMERGENCY)
Facility: HOSPITAL | Age: 73
Discharge: HOME | End: 2023-02-19
Attending: EMERGENCY MEDICINE | Admitting: EMERGENCY MEDICINE
Payer: MEDICARE

## 2023-02-18 ENCOUNTER — APPOINTMENT (OUTPATIENT)
Dept: CT IMAGING | Facility: HOSPITAL | Age: 73
End: 2023-02-18
Payer: MEDICARE

## 2023-02-18 DIAGNOSIS — I48.91 NEW ONSET ATRIAL FIBRILLATION: Primary | ICD-10-CM

## 2023-02-18 LAB
ALBUMIN SERPL-MCNC: 3.6 G/DL (ref 3.5–5.2)
ALBUMIN/GLOB SERPL: 1 G/DL
ALP SERPL-CCNC: 97 U/L (ref 39–117)
ALT SERPL W P-5'-P-CCNC: 14 U/L (ref 1–33)
ANION GAP SERPL CALCULATED.3IONS-SCNC: 11.6 MMOL/L (ref 5–15)
AST SERPL-CCNC: 19 U/L (ref 1–32)
BASOPHILS # BLD AUTO: 0.04 10*3/MM3 (ref 0–0.2)
BASOPHILS NFR BLD AUTO: 0.3 % (ref 0–1.5)
BILIRUB SERPL-MCNC: 0.3 MG/DL (ref 0–1.2)
BUN SERPL-MCNC: 45 MG/DL (ref 8–23)
BUN/CREAT SERPL: 55.6 (ref 7–25)
CALCIUM SPEC-SCNC: 9.1 MG/DL (ref 8.6–10.5)
CHLORIDE SERPL-SCNC: 101 MMOL/L (ref 98–107)
CO2 SERPL-SCNC: 23.4 MMOL/L (ref 22–29)
CREAT SERPL-MCNC: 0.81 MG/DL (ref 0.57–1)
D DIMER PPP FEU-MCNC: 5.04 MCGFEU/ML (ref 0–0.74)
DEPRECATED RDW RBC AUTO: 42.6 FL (ref 37–54)
EGFRCR SERPLBLD CKD-EPI 2021: 76.3 ML/MIN/1.73
EOSINOPHIL # BLD AUTO: 0.06 10*3/MM3 (ref 0–0.4)
EOSINOPHIL NFR BLD AUTO: 0.5 % (ref 0.3–6.2)
ERYTHROCYTE [DISTWIDTH] IN BLOOD BY AUTOMATED COUNT: 12.6 % (ref 12.3–15.4)
GEN 5 2HR TROPONIN T REFLEX: 15 NG/L
GLOBULIN UR ELPH-MCNC: 3.6 GM/DL
GLUCOSE SERPL-MCNC: 117 MG/DL (ref 65–99)
HCT VFR BLD AUTO: 40.9 % (ref 34–46.6)
HGB BLD-MCNC: 13.1 G/DL (ref 12–15.9)
HOLD SPECIMEN: NORMAL
HOLD SPECIMEN: NORMAL
IMM GRANULOCYTES # BLD AUTO: 0.04 10*3/MM3 (ref 0–0.05)
IMM GRANULOCYTES NFR BLD AUTO: 0.3 % (ref 0–0.5)
LYMPHOCYTES # BLD AUTO: 1.31 10*3/MM3 (ref 0.7–3.1)
LYMPHOCYTES NFR BLD AUTO: 11.1 % (ref 19.6–45.3)
MCH RBC QN AUTO: 29.4 PG (ref 26.6–33)
MCHC RBC AUTO-ENTMCNC: 32 G/DL (ref 31.5–35.7)
MCV RBC AUTO: 91.9 FL (ref 79–97)
MONOCYTES # BLD AUTO: 0.71 10*3/MM3 (ref 0.1–0.9)
MONOCYTES NFR BLD AUTO: 6 % (ref 5–12)
NEUTROPHILS NFR BLD AUTO: 81.8 % (ref 42.7–76)
NEUTROPHILS NFR BLD AUTO: 9.61 10*3/MM3 (ref 1.7–7)
NRBC BLD AUTO-RTO: 0 /100 WBC (ref 0–0.2)
PLATELET # BLD AUTO: 314 10*3/MM3 (ref 140–450)
PMV BLD AUTO: 9.3 FL (ref 6–12)
POTASSIUM SERPL-SCNC: 4.4 MMOL/L (ref 3.5–5.2)
PROT SERPL-MCNC: 7.2 G/DL (ref 6–8.5)
RBC # BLD AUTO: 4.45 10*6/MM3 (ref 3.77–5.28)
SODIUM SERPL-SCNC: 136 MMOL/L (ref 136–145)
TROPONIN T DELTA: -2 NG/L
TROPONIN T SERPL HS-MCNC: 17 NG/L
WBC NRBC COR # BLD: 11.77 10*3/MM3 (ref 3.4–10.8)
WHOLE BLOOD HOLD COAG: NORMAL
WHOLE BLOOD HOLD SPECIMEN: NORMAL

## 2023-02-18 PROCEDURE — 25010000002 MORPHINE PER 10 MG: Performed by: EMERGENCY MEDICINE

## 2023-02-18 PROCEDURE — 36415 COLL VENOUS BLD VENIPUNCTURE: CPT

## 2023-02-18 PROCEDURE — 25010000002 IOPAMIDOL 61 % SOLUTION: Performed by: EMERGENCY MEDICINE

## 2023-02-18 PROCEDURE — 71275 CT ANGIOGRAPHY CHEST: CPT

## 2023-02-18 PROCEDURE — 85379 FIBRIN DEGRADATION QUANT: CPT | Performed by: PHYSICIAN ASSISTANT

## 2023-02-18 PROCEDURE — 93005 ELECTROCARDIOGRAM TRACING: CPT | Performed by: EMERGENCY MEDICINE

## 2023-02-18 PROCEDURE — 85025 COMPLETE CBC W/AUTO DIFF WBC: CPT | Performed by: EMERGENCY MEDICINE

## 2023-02-18 PROCEDURE — 96374 THER/PROPH/DIAG INJ IV PUSH: CPT

## 2023-02-18 PROCEDURE — 25010000002 ONDANSETRON PER 1 MG: Performed by: EMERGENCY MEDICINE

## 2023-02-18 PROCEDURE — 99284 EMERGENCY DEPT VISIT MOD MDM: CPT

## 2023-02-18 PROCEDURE — 96375 TX/PRO/DX INJ NEW DRUG ADDON: CPT

## 2023-02-18 PROCEDURE — 80053 COMPREHEN METABOLIC PANEL: CPT | Performed by: EMERGENCY MEDICINE

## 2023-02-18 PROCEDURE — 84484 ASSAY OF TROPONIN QUANT: CPT | Performed by: EMERGENCY MEDICINE

## 2023-02-18 PROCEDURE — 71045 X-RAY EXAM CHEST 1 VIEW: CPT

## 2023-02-18 RX ORDER — ONDANSETRON 2 MG/ML
4 INJECTION INTRAMUSCULAR; INTRAVENOUS ONCE
Status: COMPLETED | OUTPATIENT
Start: 2023-02-18 | End: 2023-02-18

## 2023-02-18 RX ORDER — SODIUM CHLORIDE 0.9 % (FLUSH) 0.9 %
10 SYRINGE (ML) INJECTION AS NEEDED
Status: DISCONTINUED | OUTPATIENT
Start: 2023-02-18 | End: 2023-02-19 | Stop reason: HOSPADM

## 2023-02-18 RX ORDER — MORPHINE SULFATE 2 MG/ML
2 INJECTION, SOLUTION INTRAMUSCULAR; INTRAVENOUS ONCE
Status: COMPLETED | OUTPATIENT
Start: 2023-02-18 | End: 2023-02-18

## 2023-02-18 RX ORDER — METOPROLOL TARTRATE 5 MG/5ML
5 INJECTION INTRAVENOUS ONCE
Status: COMPLETED | OUTPATIENT
Start: 2023-02-18 | End: 2023-02-18

## 2023-02-18 RX ORDER — METOPROLOL SUCCINATE 25 MG/1
25 TABLET, EXTENDED RELEASE ORAL ONCE
Status: COMPLETED | OUTPATIENT
Start: 2023-02-18 | End: 2023-02-18

## 2023-02-18 RX ADMIN — MORPHINE SULFATE 2 MG: 2 INJECTION, SOLUTION INTRAMUSCULAR; INTRAVENOUS at 20:29

## 2023-02-18 RX ADMIN — METOPROLOL SUCCINATE 25 MG: 25 TABLET, EXTENDED RELEASE ORAL at 21:21

## 2023-02-18 RX ADMIN — ONDANSETRON HYDROCHLORIDE 4 MG: 2 INJECTION, SOLUTION INTRAMUSCULAR; INTRAVENOUS at 20:30

## 2023-02-18 RX ADMIN — MORPHINE SULFATE 4 MG: 4 INJECTION, SOLUTION INTRAMUSCULAR; INTRAVENOUS at 21:23

## 2023-02-18 RX ADMIN — IOPAMIDOL 99 ML: 612 INJECTION, SOLUTION INTRAVENOUS at 23:07

## 2023-02-18 RX ADMIN — APIXABAN 5 MG: 2.5 TABLET, FILM COATED ORAL at 21:21

## 2023-02-18 RX ADMIN — METOPROLOL TARTRATE 5 MG: 1 INJECTION, SOLUTION INTRAVENOUS at 21:22

## 2023-02-19 VITALS
TEMPERATURE: 99 F | BODY MASS INDEX: 37.21 KG/M2 | OXYGEN SATURATION: 91 % | HEART RATE: 80 BPM | DIASTOLIC BLOOD PRESSURE: 73 MMHG | HEIGHT: 63 IN | SYSTOLIC BLOOD PRESSURE: 107 MMHG | WEIGHT: 210 LBS | RESPIRATION RATE: 16 BRPM

## 2023-02-19 PROCEDURE — 25010000002 HYDROMORPHONE 1 MG/ML SOLUTION: Performed by: EMERGENCY MEDICINE

## 2023-02-19 PROCEDURE — 25010000002 KETOROLAC TROMETHAMINE PER 15 MG: Performed by: PHYSICIAN ASSISTANT

## 2023-02-19 PROCEDURE — 96375 TX/PRO/DX INJ NEW DRUG ADDON: CPT

## 2023-02-19 RX ORDER — METOPROLOL SUCCINATE 25 MG/1
25 TABLET, EXTENDED RELEASE ORAL DAILY
Qty: 90 TABLET | Refills: 0 | Status: SHIPPED | OUTPATIENT
Start: 2023-02-19 | End: 2023-02-24

## 2023-02-19 RX ORDER — METOPROLOL SUCCINATE 25 MG/1
25 TABLET, EXTENDED RELEASE ORAL ONCE
Status: COMPLETED | OUTPATIENT
Start: 2023-02-19 | End: 2023-02-19

## 2023-02-19 RX ORDER — METOPROLOL SUCCINATE 25 MG/1
25 TABLET, EXTENDED RELEASE ORAL DAILY
Qty: 90 TABLET | Refills: 0 | Status: SHIPPED | OUTPATIENT
Start: 2023-02-19 | End: 2023-02-19 | Stop reason: SDUPTHER

## 2023-02-19 RX ORDER — KETOROLAC TROMETHAMINE 10 MG/1
10 TABLET, FILM COATED ORAL EVERY 6 HOURS PRN
Status: DISCONTINUED | OUTPATIENT
Start: 2023-02-19 | End: 2023-02-19 | Stop reason: HOSPADM

## 2023-02-19 RX ORDER — KETOROLAC TROMETHAMINE 10 MG/1
10 TABLET, FILM COATED ORAL EVERY 6 HOURS PRN
Qty: 12 TABLET | Refills: 0 | Status: SHIPPED | OUTPATIENT
Start: 2023-02-19

## 2023-02-19 RX ORDER — KETOROLAC TROMETHAMINE 30 MG/ML
15 INJECTION, SOLUTION INTRAMUSCULAR; INTRAVENOUS ONCE
Status: COMPLETED | OUTPATIENT
Start: 2023-02-19 | End: 2023-02-19

## 2023-02-19 RX ADMIN — KETOROLAC TROMETHAMINE 15 MG: 30 INJECTION, SOLUTION INTRAMUSCULAR; INTRAVENOUS at 00:24

## 2023-02-19 RX ADMIN — METOPROLOL SUCCINATE 25 MG: 25 TABLET, EXTENDED RELEASE ORAL at 03:53

## 2023-02-19 RX ADMIN — APIXABAN 10 MG: 2.5 TABLET, FILM COATED ORAL at 03:52

## 2023-02-19 RX ADMIN — HYDROMORPHONE HYDROCHLORIDE 0.5 MG: 1 INJECTION, SOLUTION INTRAMUSCULAR; INTRAVENOUS; SUBCUTANEOUS at 00:11

## 2023-02-19 RX ADMIN — KETOROLAC TROMETHAMINE 10 MG: 10 TABLET, FILM COATED ORAL at 03:53

## 2023-02-19 NOTE — ED PROVIDER NOTES
Subjective  History of Present Illness:    Chief Complaint: Chest pain  History of Present Illness: 73-year-old female presents with chest pain, she said chest pain intermittent for several days but is gotten worse today and she has episodes of sharp stabbing pain on the left side of her chest.  Onset: Several days, worse today  Duration: Several days  Exacerbating / Alleviating factors: Unknown, the pain is intermittent and sharp in nature  Associated symptoms: Stabbing sharp pains in the left rib area under her breast      Nurses Notes reviewed and agree, including vitals, allergies, social history and prior medical history.     REVIEW OF SYSTEMS: All systems reviewed and not pertinent unless noted.    Review of Systems   Cardiovascular: Positive for chest pain.   All other systems reviewed and are negative.      Past Medical History:   Diagnosis Date   • Anemia ?    under control   • Anxiety    • Arthritis    • Bloating    • Burping    • Chest pain 2020    was seen by Dr Pires and was released was just anxiety   • CHF (congestive heart failure) (Formerly [facility])    • Colon polyp 06/05/2018   • COVID-19 vaccine series completed    • Depression    • Diverticulosis    • GERD (gastroesophageal reflux disease)     no longer bothers me...   • Gout    • Hashimoto's disease    • Hernia 03/2012    repair surgery which failed in 2014   • Hypothyroid    • Impingement syndrome of right shoulder 05/08/2016   • Obesity    • OCD (obsessive compulsive disorder)    • Sleep apnea    • Tremor     worse on left arm/hand   • Wears glasses     reading glasses only       Allergies:    Patient has no known allergies.      Past Surgical History:   Procedure Laterality Date   • APPENDECTOMY     • BARIATRIC SURGERY  1979   • CATARACT EXTRACTION W/ INTRAOCULAR LENS IMPLANT Right 08/08/2022    Procedure: CATARACT PHACO EXTRACTION WITH INTRAOCULAR LENS IMPLANT RIGHT;  Surgeon: Eunice Brooks MD;  Location: [facility];  Service: Ophthalmology;  Laterality:  Right;   • CATARACT EXTRACTION W/ INTRAOCULAR LENS IMPLANT Left 08/22/2022    Procedure: CATARACT PHACO EXTRACTION WITH INTRAOCULAR LENS IMPLANT LEFT;  Surgeon: Eunice Brooks MD;  Location: [facility] OR;  Service: Ophthalmology;  Laterality: Left;   • COLONOSCOPY  2012    failed due to hernia...   • COLONOSCOPY N/A 06/08/2017    Procedure: COLONOSCOPY with cold snare polypectomies, argon thermal ablation, ns injection, yanira ink injection;  Surgeon: Rafiq Huang MD;  Location: [facility] ENDOSCOPY;  Service:    • COLONOSCOPY N/A 06/05/2018    Procedure: COLONOSCOPY WITH BIOPSIES;  Surgeon: Rafiq Huang MD;  Location: [facility] ENDOSCOPY;  Service: Gastroenterology   • ENDOSCOPY N/A 01/27/2021    Procedure: ESOPHAGOGASTRODUODENOSCOPY WITH BIOPSIES AND CLIPS;  Surgeon: Katie Shannon MD;  Location: [facility] ENDOSCOPY;  Service: Gastroenterology;  Laterality: N/A;   • GASTRIC BYPASS  1978   • HERNIA MESH REMOVAL  2014    BOWEL OBSTRUCTION DUE TO MESH   • HERNIA REPAIR  2012   • HYSTERECTOMY      complete   • JOINT REPLACEMENT  2009 & 2012    Left & Right Full Hip Replacements   • SMALL INTESTINE SURGERY  2014    emergency bowel obstruction from hernia failure   • TOTAL ABDOMINAL HYSTERECTOMY WITH SALPINGO OOPHORECTOMY  1990    fibroids   • TUBAL ABDOMINAL LIGATION     • UPPER GASTROINTESTINAL ENDOSCOPY  2010         Social History     Socioeconomic History   • Marital status:    Tobacco Use   • Smoking status: Former     Packs/day: 1.00     Years: 40.00     Pack years: 40.00     Types: Cigarettes     Start date: 1/1/1967     Quit date: 1/1/2008     Years since quitting: 15.1     Passive exposure: Past   • Smokeless tobacco: Never   Vaping Use   • Vaping Use: Never used   Substance and Sexual Activity   • Alcohol use: Not Currently     Comment: very rarely   • Drug use: No   • Sexual activity: Not Currently     Partners: Male     Birth control/protection: None         Family History   Problem Relation Age of  "Onset   • Obesity Mother    • Rheum arthritis Mother    • Thyroid disease Mother    • Hypertension Mother    • Stroke Mother         Most of her problems were caused by Rheumatoid Ar.   • Hyperlipidemia Mother    • Arthritis Mother         Rheumatoid Arthritis..Passed 2004   • Cancer Father         Passed 1997   • Kidney cancer Father    • Liver cancer Father    • No Known Problems Brother    • Diabetes Maternal Aunt    • Cancer Maternal Uncle    • Lung cancer Maternal Uncle    • Alcohol abuse Maternal Uncle    • No Known Problems Paternal Aunt    • Stroke Paternal Uncle    • Hypertension Paternal Uncle    • Hyperlipidemia Paternal Uncle    • Heart disease Paternal Uncle    • No Known Problems Brother    • Asthma Sister         under control   • Alcohol abuse Maternal Uncle    • Colon cancer Neg Hx    • Breast cancer Neg Hx    • Ovarian cancer Neg Hx        Objective  Physical Exam:  /73   Pulse 80   Temp 99 °F (37.2 °C) (Oral)   Resp 16   Ht 160 cm (63\")   Wt 95.3 kg (210 lb)   LMP  (LMP Unknown)   SpO2 91%   BMI 37.20 kg/m²      Physical Exam  Vitals and nursing note reviewed.   Constitutional:       Appearance: She is well-developed.   Cardiovascular:      Rate and Rhythm: Normal rate and regular rhythm.   Pulmonary:      Effort: Pulmonary effort is normal.      Breath sounds: Normal breath sounds.   Abdominal:      General: Bowel sounds are normal.      Palpations: Abdomen is soft.   Musculoskeletal:         General: Normal range of motion.      Cervical back: Normal range of motion and neck supple.   Skin:     General: Skin is warm and dry.   Neurological:      Mental Status: She is alert and oriented to person, place, and time.      Deep Tendon Reflexes: Reflexes are normal and symmetric.           Procedures    ED Course:    ED Course as of 02/19/23 1303   Sat Feb 18, 2023 2007 EKG interpreted by me reveals atrial fibrillation with a rate of 101 bpm.  There is low QRS voltage in chest leads.  " No obvious acute ischemic findings.  This is an abnormal appearing EKG. [TB]   Sun Feb 19, 2023 0029 Discussed the case with Dr. Valenzuela, she recommended trying an NSAID if no relief of the pain recontact her. [CS]      ED Course User Index  [CS] Amanuel Escobar Jr., PA-C  [TB] Yanni Wyman MD       Lab Results (last 24 hours)     Procedure Component Value Units Date/Time    CBC & Differential [[id number]]  (Abnormal) Collected: 02/18/23 1957    Specimen: Blood Updated: 02/18/23 2006    Narrative:      The following orders were created for panel order CBC & Differential.  Procedure                               Abnormality         Status                     ---------                               -----------         ------                     CBC Auto Differential[[id number]]        Abnormal            Final result                 Please view results for these tests on the individual orders.    Comprehensive Metabolic Panel [[id number]]  (Abnormal) Collected: 02/18/23 1957    Specimen: Blood Updated: 02/18/23 2029     Glucose 117 mg/dL      BUN 45 mg/dL      Creatinine 0.81 mg/dL      Sodium 136 mmol/L      Potassium 4.4 mmol/L      Chloride 101 mmol/L      CO2 23.4 mmol/L      Calcium 9.1 mg/dL      Total Protein 7.2 g/dL      Albumin 3.6 g/dL      ALT (SGPT) 14 U/L      AST (SGOT) 19 U/L      Alkaline Phosphatase 97 U/L      Total Bilirubin 0.3 mg/dL      Globulin 3.6 gm/dL      A/G Ratio 1.0 g/dL      BUN/Creatinine Ratio 55.6     Anion Gap 11.6 mmol/L      eGFR 76.3 mL/min/1.73     Narrative:      GFR Normal >60  Chronic Kidney Disease <60  Kidney Failure <15    The GFR formula is only valid for adults with stable renal function between ages 18 and 70.    High Sensitivity Troponin T [[id number]]  (Abnormal) Collected: 02/18/23 1957    Specimen: Blood Updated: 02/18/23 2031     HS Troponin T 17 ng/L     Narrative:      High Sensitive Troponin T Reference Range:  <10.0 ng/L- Negative Female for  AMI  <15.0 ng/L- Negative Male for AMI  >=10 - Abnormal Female indicating possible myocardial injury.  >=15 - Abnormal Male indicating possible myocardial injury.   Clinicians would have to utilize clinical acumen, EKG, Troponin, and serial changes to determine if it is an Acute Myocardial Infarction or myocardial injury due to an underlying chronic condition.         CBC Auto Differential [[id number]]  (Abnormal) Collected: 02/18/23 1957    Specimen: Blood Updated: 02/18/23 2006     WBC 11.77 10*3/mm3      RBC 4.45 10*6/mm3      Hemoglobin 13.1 g/dL      Hematocrit 40.9 %      MCV 91.9 fL      MCH 29.4 pg      MCHC 32.0 g/dL      RDW 12.6 %      RDW-SD 42.6 fl      MPV 9.3 fL      Platelets 314 10*3/mm3      Neutrophil % 81.8 %      Lymphocyte % 11.1 %      Monocyte % 6.0 %      Eosinophil % 0.5 %      Basophil % 0.3 %      Immature Grans % 0.3 %      Neutrophils, Absolute 9.61 10*3/mm3      Lymphocytes, Absolute 1.31 10*3/mm3      Monocytes, Absolute 0.71 10*3/mm3      Eosinophils, Absolute 0.06 10*3/mm3      Basophils, Absolute 0.04 10*3/mm3      Immature Grans, Absolute 0.04 10*3/mm3      nRBC 0.0 /100 WBC     D-dimer, Quantitative [[id number]]  (Abnormal) Collected: 02/18/23 1957    Specimen: Blood Updated: 02/18/23 2213     D-Dimer, Quantitative 5.04 MCGFEU/mL     Narrative:      According to the assay 's published package insert, a normal (<0.50 MCGFEU/mL) D-dimer result in conjunction with a non-high clinical probability assessment, excludes deep vein thrombosis (DVT) and pulmonary embolism (PE) with high sensitivity.    D-dimer values increase with age and this can make VTE exclusion of an older population difficult. To address this, the [employer], based on best available evidence and recent guidelines, recommends that clinicians use age-adjusted D-dimer thresholds in patients greater than 50 years of age with: a) a low probability of PE who do not meet all Pulmonary Embolism  "Rule Out Criteria, or b) in those with intermediate probability of PE.   The formula for an age-adjusted D-dimer cut-off is \"age/100\".  For example, a 60 year old patient would have an age-adjusted cut-off of 0.60 MCGFEU/mL and an 80 year old 0.80 MCGFEU/mL.    High Sensitivity Troponin T 2Hr [[id number]]  (Abnormal) Collected: 02/18/23 2217    Specimen: Blood Updated: 02/18/23 2240     HS Troponin T 15 ng/L      Troponin T Delta -2 ng/L     Narrative:      High Sensitive Troponin T Reference Range:  <10.0 ng/L- Negative Female for AMI  <15.0 ng/L- Negative Male for AMI  >=10 - Abnormal Female indicating possible myocardial injury.  >=15 - Abnormal Male indicating possible myocardial injury.   Clinicians would have to utilize clinical acumen, EKG, Troponin, and serial changes to determine if it is an Acute Myocardial Infarction or myocardial injury due to an underlying chronic condition.                XR Chest 1 View    Result Date: 2/19/2023  PROCEDURE: XR CHEST 1 VW-  HISTORY: Chest Pain Triage Protocol   COMPARISON: May 6, 2020.  FINDINGS:  The heart size is normal. The mediastinum is normal. There are left lung base opacities consistent with atelectasis or pneumonia. There is no pneumothorax. The bony thorax in intact.      Impression: Left lung base atelectasis or pneumonia.  This report was signed and finalized on 2/19/2023 7:44 AM by Amanuel Camarena MD.    CT Angiogram Chest Pulmonary Embolism    Result Date: 2/19/2023  FINAL REPORT TECHNIQUE: Multiple axial CT images were obtained through the chest following IV contrast using a CTA/PE protocol.  3D/MIP reconstruction images were also performed. This study was performed with techniques to keep radiation doses as low as reasonably achievable (ALARA). Individualized dose reduction techniques using automated exposure control or adjustment of mA and/or kV according to the patient's size were employed. CLINICAL HISTORY: afib cp FINDINGS: PAs and aorta: No " pulmonary embolus.  Thoracic aorta is unremarkable.   There is coronary artery disease. Heart/mediastinum: No evidence for right heart strain.  No pericardial effusion.    There is cardiomegaly.  Lungs: There is left lower lobe atelectasis.  Lymph nodes: No pathologically enlarged thoracic lymph nodes.  Pleura: There is a small left pleural effusion.  No pneumothorax.  Chest Wall: No chest wall contusion.  Bones: No acute fracture.  Upper abdomen: There is a large ventral abdominal hernia.  No acute findings in the upper abdomen.     Impression: No pulmonary embolus.   Left pleural effusion with atelectasis. Authenticated and Electronically Signed by Wenceslao Sullivan MD on 02/19/2023 12:04:51 AM         Medical Decision Making  73-year-old female presents with palpitations and chest pain Differential would include acute MI, chest wall pain, reflux disease, panic disorder anxiety, pericarditis, pneumonia, heart failure, PE, acute dissection.  Patient has been stable, heart rate has been between 80 and 100, she is remained on the monitor, reviewed and summarized with medical records.  She was having intermittent sharp chest pains, she received pain medication IV, several doses as well as an anti-inflammatory.  Labs were performed, evaluated the results and discussed with the patient myself.  A D-dimer was drawn that was elevated, she had a CT scan PE protocol that showed a small pleural effusion, but no PE, discussed results with the patient and family at the bedside.  Discussed all results with my supervising physician Dr. Wyman, who also evaluate the patient at the bedside.  Due to receiving pain medication she did have a drop in her oxygen saturation and continue to be monitored and weaned off oxygen.  Initially a discussion occurred about admission, discussed this with supervising physician and the hospitalist due to the intermittent sharp pains, but this has improved.  Her risk assessment showed a CHADS2 score of  3, and she was therefore started on anticoagulation as well as beta-blockers for rate control.  At this time the plan is for her to be discharged home on Eliquis and metoprolol, follow-up with cardiology, and return if symptoms worsen.    New onset atrial fibrillation (HCC): acute illness or injury  Amount and/or Complexity of Data Reviewed  Labs: ordered.  Radiology: ordered and independent interpretation performed.  ECG/medicine tests: ordered.      Risk  Prescription drug management.            Final diagnoses:   New onset atrial fibrillation (HCC)        Amanuel Escobar Jr., PA-C  02/19/23 1008

## 2023-02-20 ENCOUNTER — TELEPHONE (OUTPATIENT)
Dept: CARDIOLOGY | Facility: CLINIC | Age: 73
End: 2023-02-20
Payer: MEDICARE

## 2023-02-20 NOTE — TELEPHONE ENCOUNTER
Caller: Salinas Monroy    Relationship: Self    Best call back number: [phone]    What is the best time to reach you: ANY    Who are you requesting to speak with (clinical staff, provider,  specific staff member):       What was the call regarding: APPOINTMENT FOR ER VISIT FOR CHEST PAIN.    Do you require a callback: YES

## 2023-02-24 ENCOUNTER — OFFICE VISIT (OUTPATIENT)
Dept: CARDIOLOGY | Facility: CLINIC | Age: 73
End: 2023-02-24
Payer: MEDICARE

## 2023-02-24 ENCOUNTER — LAB (OUTPATIENT)
Dept: LAB | Facility: HOSPITAL | Age: 73
End: 2023-02-24
Payer: MEDICARE

## 2023-02-24 VITALS
WEIGHT: 218 LBS | DIASTOLIC BLOOD PRESSURE: 82 MMHG | HEART RATE: 89 BPM | SYSTOLIC BLOOD PRESSURE: 130 MMHG | OXYGEN SATURATION: 96 % | BODY MASS INDEX: 38.62 KG/M2

## 2023-02-24 DIAGNOSIS — I48.91 ATRIAL FIBRILLATION, NEW ONSET: Primary | ICD-10-CM

## 2023-02-24 DIAGNOSIS — E03.9 ACQUIRED HYPOTHYROIDISM: Chronic | ICD-10-CM

## 2023-02-24 DIAGNOSIS — I50.32 CHRONIC DIASTOLIC CONGESTIVE HEART FAILURE: ICD-10-CM

## 2023-02-24 DIAGNOSIS — I48.91 ATRIAL FIBRILLATION, NEW ONSET: ICD-10-CM

## 2023-02-24 DIAGNOSIS — J90 PLEURAL EFFUSION: ICD-10-CM

## 2023-02-24 DIAGNOSIS — R07.9 CHEST PAIN, UNSPECIFIED TYPE: ICD-10-CM

## 2023-02-24 DIAGNOSIS — G47.33 OSA (OBSTRUCTIVE SLEEP APNEA): ICD-10-CM

## 2023-02-24 LAB
ANION GAP SERPL CALCULATED.3IONS-SCNC: 12.6 MMOL/L (ref 5–15)
BUN SERPL-MCNC: 52 MG/DL (ref 8–23)
BUN/CREAT SERPL: 54.7 (ref 7–25)
CALCIUM SPEC-SCNC: 9.3 MG/DL (ref 8.6–10.5)
CHLORIDE SERPL-SCNC: 105 MMOL/L (ref 98–107)
CO2 SERPL-SCNC: 21.4 MMOL/L (ref 22–29)
CREAT SERPL-MCNC: 0.95 MG/DL (ref 0.57–1)
EGFRCR SERPLBLD CKD-EPI 2021: 63 ML/MIN/1.73
GLUCOSE SERPL-MCNC: 101 MG/DL (ref 65–99)
NT-PROBNP SERPL-MCNC: 1026 PG/ML (ref 0–900)
POTASSIUM SERPL-SCNC: 4.3 MMOL/L (ref 3.5–5.2)
SODIUM SERPL-SCNC: 139 MMOL/L (ref 136–145)
T3FREE SERPL-MCNC: 3.02 PG/ML (ref 2–4.4)
T4 FREE SERPL-MCNC: 0.48 NG/DL (ref 0.93–1.7)
TSH SERPL DL<=0.05 MIU/L-ACNC: 0.01 UIU/ML (ref 0.27–4.2)

## 2023-02-24 PROCEDURE — 83880 ASSAY OF NATRIURETIC PEPTIDE: CPT

## 2023-02-24 PROCEDURE — 36415 COLL VENOUS BLD VENIPUNCTURE: CPT

## 2023-02-24 PROCEDURE — 99214 OFFICE O/P EST MOD 30 MIN: CPT | Performed by: NURSE PRACTITIONER

## 2023-02-24 PROCEDURE — 84439 ASSAY OF FREE THYROXINE: CPT

## 2023-02-24 PROCEDURE — 84482 T3 REVERSE: CPT

## 2023-02-24 PROCEDURE — 84481 FREE ASSAY (FT-3): CPT

## 2023-02-24 PROCEDURE — 84443 ASSAY THYROID STIM HORMONE: CPT

## 2023-02-24 PROCEDURE — 93000 ELECTROCARDIOGRAM COMPLETE: CPT | Performed by: NURSE PRACTITIONER

## 2023-02-24 PROCEDURE — 80048 BASIC METABOLIC PNL TOTAL CA: CPT

## 2023-02-24 RX ORDER — METOPROLOL SUCCINATE 25 MG/1
12.5 TABLET, EXTENDED RELEASE ORAL DAILY
Qty: 90 TABLET | Refills: 0
Start: 2023-02-24 | End: 2023-05-25

## 2023-03-02 LAB — T3REVERSE SERPL-MCNC: 8 NG/DL (ref 9.2–24.1)

## 2023-03-12 ENCOUNTER — TELEPHONE (OUTPATIENT)
Dept: CARDIOLOGY | Facility: CLINIC | Age: 73
End: 2023-03-12
Payer: MEDICARE

## 2023-03-12 DIAGNOSIS — E03.9 HYPOTHYROIDISM, ADULT: ICD-10-CM

## 2023-03-12 NOTE — TELEPHONE ENCOUNTER
"----- Message from Mesha Rodriguez MA sent at 3/10/2023  1:26 PM EST -----  Regarding: FW: Results  Contact: [phone]    ----- Message -----  From: Salinas Monroy \"Del\"  Sent: 3/10/2023   1:23 PM EST  To: Antonia Auguste [facility]  Subject: Results                                          Thank You So Much for the message! Questioning whether that means that I could  stop taking the 1/2 dose of the Beta Blocker?  I am continuing with my COQ10 @ 400 per day and have begun my Garlic again  after neglecting that for the last couple of years.....(I had taken it for over 20 years before)....  Thank you for answering as I am trying to learn as much as I can about AFib.....  Have a Blessed Week-end!!    "

## 2023-03-12 NOTE — TELEPHONE ENCOUNTER
I can only advise her of the guidelines which, as we discussed in the office, involve beta blocker therapy for rate control for atrial fibrillation.  I would advise against stopping metoprolol.  If she continues to decline DOAC for CVA prophylaxis, that is her decision.  We spoke at length in the office about s/sx of stroke.    I cannot attest to the benefits of garlic and COQ10.  Please have her refer questions about supplements to her primary pharmacist.      No problem regarding the questions.

## 2023-03-13 RX ORDER — LIOTHYRONINE SODIUM 25 UG/1
TABLET ORAL
Qty: 90 TABLET | Refills: 1 | Status: SHIPPED | OUTPATIENT
Start: 2023-03-13

## 2023-03-17 DIAGNOSIS — E03.9 ACQUIRED HYPOTHYROIDISM: Chronic | ICD-10-CM

## 2023-03-17 DIAGNOSIS — M1A.0710 CHRONIC IDIOPATHIC GOUT INVOLVING TOE OF RIGHT FOOT WITHOUT TOPHUS: ICD-10-CM

## 2023-03-17 RX ORDER — LEVOTHYROXINE SODIUM 0.07 MG/1
TABLET ORAL
Qty: 90 TABLET | Refills: 0 | Status: SHIPPED | OUTPATIENT
Start: 2023-03-17

## 2023-03-17 RX ORDER — ALLOPURINOL 100 MG/1
TABLET ORAL
Qty: 180 TABLET | Refills: 0 | Status: SHIPPED | OUTPATIENT
Start: 2023-03-17

## 2023-03-22 ENCOUNTER — APPOINTMENT (OUTPATIENT)
Dept: CT IMAGING | Facility: HOSPITAL | Age: 73
End: 2023-03-22
Payer: MEDICARE

## 2023-03-22 ENCOUNTER — HOSPITAL ENCOUNTER (EMERGENCY)
Facility: HOSPITAL | Age: 73
Discharge: HOME | End: 2023-03-22
Attending: EMERGENCY MEDICINE | Admitting: EMERGENCY MEDICINE
Payer: MEDICARE

## 2023-03-22 ENCOUNTER — APPOINTMENT (OUTPATIENT)
Dept: GENERAL RADIOLOGY | Facility: HOSPITAL | Age: 73
End: 2023-03-22
Payer: MEDICARE

## 2023-03-22 VITALS
SYSTOLIC BLOOD PRESSURE: 92 MMHG | BODY MASS INDEX: 38.62 KG/M2 | DIASTOLIC BLOOD PRESSURE: 47 MMHG | TEMPERATURE: 98.2 F | WEIGHT: 218 LBS | RESPIRATION RATE: 14 BRPM | HEART RATE: 74 BPM | OXYGEN SATURATION: 93 % | HEIGHT: 63 IN

## 2023-03-22 DIAGNOSIS — R07.9 CHEST PAIN, UNSPECIFIED TYPE: Primary | ICD-10-CM

## 2023-03-22 DIAGNOSIS — M54.9 BACK PAIN, UNSPECIFIED BACK LOCATION, UNSPECIFIED BACK PAIN LATERALITY, UNSPECIFIED CHRONICITY: ICD-10-CM

## 2023-03-22 LAB
ALBUMIN SERPL-MCNC: 3.6 G/DL (ref 3.5–5.2)
ALBUMIN/GLOB SERPL: 1.2 G/DL
ALP SERPL-CCNC: 93 U/L (ref 39–117)
ALT SERPL W P-5'-P-CCNC: 16 U/L (ref 1–33)
ANION GAP SERPL CALCULATED.3IONS-SCNC: 10.9 MMOL/L (ref 5–15)
AST SERPL-CCNC: 17 U/L (ref 1–32)
BASOPHILS # BLD AUTO: 0.02 10*3/MM3 (ref 0–0.2)
BASOPHILS NFR BLD AUTO: 0.2 % (ref 0–1.5)
BILIRUB SERPL-MCNC: 0.4 MG/DL (ref 0–1.2)
BUN SERPL-MCNC: 42 MG/DL (ref 8–23)
BUN/CREAT SERPL: 38.9 (ref 7–25)
CALCIUM SPEC-SCNC: 8.9 MG/DL (ref 8.6–10.5)
CHLORIDE SERPL-SCNC: 102 MMOL/L (ref 98–107)
CO2 SERPL-SCNC: 23.1 MMOL/L (ref 22–29)
CREAT SERPL-MCNC: 1.08 MG/DL (ref 0.57–1)
DEPRECATED RDW RBC AUTO: 42.4 FL (ref 37–54)
EGFRCR SERPLBLD CKD-EPI 2021: 54 ML/MIN/1.73
EOSINOPHIL # BLD AUTO: 0.07 10*3/MM3 (ref 0–0.4)
EOSINOPHIL NFR BLD AUTO: 0.7 % (ref 0.3–6.2)
ERYTHROCYTE [DISTWIDTH] IN BLOOD BY AUTOMATED COUNT: 12.9 % (ref 12.3–15.4)
GEN 5 2HR TROPONIN T REFLEX: 21 NG/L
GLOBULIN UR ELPH-MCNC: 3 GM/DL
GLUCOSE SERPL-MCNC: 122 MG/DL (ref 65–99)
HCT VFR BLD AUTO: 40.3 % (ref 34–46.6)
HGB BLD-MCNC: 13.1 G/DL (ref 12–15.9)
HOLD SPECIMEN: NORMAL
HOLD SPECIMEN: NORMAL
IMM GRANULOCYTES # BLD AUTO: 0.05 10*3/MM3 (ref 0–0.05)
IMM GRANULOCYTES NFR BLD AUTO: 0.5 % (ref 0–0.5)
LIPASE SERPL-CCNC: 34 U/L (ref 13–60)
LYMPHOCYTES # BLD AUTO: 1.29 10*3/MM3 (ref 0.7–3.1)
LYMPHOCYTES NFR BLD AUTO: 13.2 % (ref 19.6–45.3)
MAGNESIUM SERPL-MCNC: 2.6 MG/DL (ref 1.6–2.4)
MCH RBC QN AUTO: 29 PG (ref 26.6–33)
MCHC RBC AUTO-ENTMCNC: 32.5 G/DL (ref 31.5–35.7)
MCV RBC AUTO: 89.2 FL (ref 79–97)
MONOCYTES # BLD AUTO: 1.15 10*3/MM3 (ref 0.1–0.9)
MONOCYTES NFR BLD AUTO: 11.8 % (ref 5–12)
NEUTROPHILS NFR BLD AUTO: 7.16 10*3/MM3 (ref 1.7–7)
NEUTROPHILS NFR BLD AUTO: 73.6 % (ref 42.7–76)
NRBC BLD AUTO-RTO: 0 /100 WBC (ref 0–0.2)
NT-PROBNP SERPL-MCNC: 2868 PG/ML (ref 0–900)
PLATELET # BLD AUTO: 171 10*3/MM3 (ref 140–450)
PMV BLD AUTO: 10 FL (ref 6–12)
POTASSIUM SERPL-SCNC: 4.8 MMOL/L (ref 3.5–5.2)
PROT SERPL-MCNC: 6.6 G/DL (ref 6–8.5)
RBC # BLD AUTO: 4.52 10*6/MM3 (ref 3.77–5.28)
SODIUM SERPL-SCNC: 136 MMOL/L (ref 136–145)
TROPONIN T DELTA: 1 NG/L
TROPONIN T SERPL HS-MCNC: 20 NG/L
WBC NRBC COR # BLD: 9.74 10*3/MM3 (ref 3.4–10.8)
WHOLE BLOOD HOLD COAG: NORMAL
WHOLE BLOOD HOLD SPECIMEN: NORMAL

## 2023-03-22 PROCEDURE — 83880 ASSAY OF NATRIURETIC PEPTIDE: CPT | Performed by: EMERGENCY MEDICINE

## 2023-03-22 PROCEDURE — 72128 CT CHEST SPINE W/O DYE: CPT

## 2023-03-22 PROCEDURE — 99284 EMERGENCY DEPT VISIT MOD MDM: CPT

## 2023-03-22 PROCEDURE — 36415 COLL VENOUS BLD VENIPUNCTURE: CPT

## 2023-03-22 PROCEDURE — 93005 ELECTROCARDIOGRAM TRACING: CPT | Performed by: EMERGENCY MEDICINE

## 2023-03-22 PROCEDURE — 85025 COMPLETE CBC W/AUTO DIFF WBC: CPT | Performed by: EMERGENCY MEDICINE

## 2023-03-22 PROCEDURE — 96375 TX/PRO/DX INJ NEW DRUG ADDON: CPT

## 2023-03-22 PROCEDURE — 25010000002 HYDROMORPHONE 1 MG/ML SOLUTION: Performed by: EMERGENCY MEDICINE

## 2023-03-22 PROCEDURE — 25510000001 IOPAMIDOL 61 % SOLUTION: Performed by: EMERGENCY MEDICINE

## 2023-03-22 PROCEDURE — 25010000002 KETOROLAC TROMETHAMINE PER 15 MG: Performed by: EMERGENCY MEDICINE

## 2023-03-22 PROCEDURE — 83690 ASSAY OF LIPASE: CPT | Performed by: EMERGENCY MEDICINE

## 2023-03-22 PROCEDURE — 83735 ASSAY OF MAGNESIUM: CPT | Performed by: EMERGENCY MEDICINE

## 2023-03-22 PROCEDURE — 80053 COMPREHEN METABOLIC PANEL: CPT | Performed by: EMERGENCY MEDICINE

## 2023-03-22 PROCEDURE — 25010000002 ONDANSETRON PER 1 MG: Performed by: EMERGENCY MEDICINE

## 2023-03-22 PROCEDURE — 71275 CT ANGIOGRAPHY CHEST: CPT

## 2023-03-22 PROCEDURE — 96374 THER/PROPH/DIAG INJ IV PUSH: CPT

## 2023-03-22 PROCEDURE — 71045 X-RAY EXAM CHEST 1 VIEW: CPT

## 2023-03-22 PROCEDURE — 84484 ASSAY OF TROPONIN QUANT: CPT | Performed by: EMERGENCY MEDICINE

## 2023-03-22 RX ORDER — ONDANSETRON 2 MG/ML
4 INJECTION INTRAMUSCULAR; INTRAVENOUS ONCE
Status: COMPLETED | OUTPATIENT
Start: 2023-03-22 | End: 2023-03-22

## 2023-03-22 RX ORDER — OXYCODONE HYDROCHLORIDE AND ACETAMINOPHEN 5; 325 MG/1; MG/1
1 TABLET ORAL EVERY 4 HOURS PRN
Qty: 10 TABLET | Refills: 0 | Status: SHIPPED | OUTPATIENT
Start: 2023-03-22

## 2023-03-22 RX ORDER — SUCRALFATE 1 G/1
1 TABLET ORAL 4 TIMES DAILY
Qty: 40 TABLET | Refills: 0 | Status: SHIPPED | OUTPATIENT
Start: 2023-03-22

## 2023-03-22 RX ORDER — ASPIRIN 325 MG
325 TABLET ORAL ONCE
Status: DISCONTINUED | OUTPATIENT
Start: 2023-03-22 | End: 2023-03-22

## 2023-03-22 RX ORDER — FAMOTIDINE 10 MG/ML
20 INJECTION, SOLUTION INTRAVENOUS ONCE
Status: COMPLETED | OUTPATIENT
Start: 2023-03-22 | End: 2023-03-22

## 2023-03-22 RX ORDER — SODIUM CHLORIDE 0.9 % (FLUSH) 0.9 %
10 SYRINGE (ML) INJECTION AS NEEDED
Status: DISCONTINUED | OUTPATIENT
Start: 2023-03-22 | End: 2023-03-22 | Stop reason: HOSPADM

## 2023-03-22 RX ORDER — KETOROLAC TROMETHAMINE 30 MG/ML
10 INJECTION, SOLUTION INTRAMUSCULAR; INTRAVENOUS ONCE
Status: COMPLETED | OUTPATIENT
Start: 2023-03-22 | End: 2023-03-22

## 2023-03-22 RX ADMIN — HYDROMORPHONE HYDROCHLORIDE 1 MG: 1 INJECTION, SOLUTION INTRAMUSCULAR; INTRAVENOUS; SUBCUTANEOUS at 12:00

## 2023-03-22 RX ADMIN — KETOROLAC TROMETHAMINE 10 MG: 30 INJECTION, SOLUTION INTRAMUSCULAR; INTRAVENOUS at 10:57

## 2023-03-22 RX ADMIN — FAMOTIDINE 20 MG: 10 INJECTION INTRAVENOUS at 12:00

## 2023-03-22 RX ADMIN — ONDANSETRON 4 MG: 2 INJECTION INTRAMUSCULAR; INTRAVENOUS at 12:00

## 2023-03-22 RX ADMIN — IOPAMIDOL 100 ML: 612 INJECTION, SOLUTION INTRAVENOUS at 12:02

## 2023-03-22 NOTE — ED PROVIDER NOTES
TRIAGE CHIEF COMPLAINT:     Nursing and triage notes reviewed    Chief Complaint   Patient presents with   • Shortness of Breath   • Cough   • Chest Pain      HPI: Salinas Monroy is a 73 y.o. female who presents to the emergency department complaining of back pain, chest pain, shortness of breath.  Patient states she has had a sharp pain in the middle of her back that has been present constantly for the past 3 to 4 days.  Starting yesterday she started having a pain in the center of her chest that was similar.  Some pain with breathing.  She was recently in the emergency department where she was diagnosed with atrial fibrillation and placed on medication.  She has followed up with cardiology.  She states this pain is similar to what she presented with previously.  She denies having fevers.  She denies abdominal pain.  No nausea or vomiting.  Pain is worse with movement.    REVIEW OF SYSTEMS: All other systems reviewed and are negative     PAST MEDICAL HISTORY:   Past Medical History:   Diagnosis Date   • Anemia ?    under control   • Anxiety    • Arthritis    • Bloating    • Burping    • Chest pain 2020    was seen by Dr Pires and was released was just anxiety   • CHF (congestive heart failure) ([facility])    • Colon polyp 06/05/2018   • COVID-19 vaccine series completed    • Depression    • Diverticulosis    • GERD (gastroesophageal reflux disease)     no longer bothers me...   • Gout    • Hashimoto's disease    • Hernia 03/2012    repair surgery which failed in 2014   • Hypothyroid    • Impingement syndrome of right shoulder 05/08/2016   • Obesity    • OCD (obsessive compulsive disorder)    • Sleep apnea    • Tremor     worse on left arm/hand   • Wears glasses     reading glasses only        FAMILY HISTORY:   Family History   Problem Relation Age of Onset   • Obesity Mother    • Rheum arthritis Mother    • Thyroid disease Mother    • Hypertension Mother    • Stroke Mother         Most of her problems were  caused by Rheumatoid Ar.   • Hyperlipidemia Mother    • Arthritis Mother         Rheumatoid Arthritis..Passed 2004   • Cancer Father         Passed 1997   • Kidney cancer Father    • Liver cancer Father    • No Known Problems Brother    • Diabetes Maternal Aunt    • Cancer Maternal Uncle    • Lung cancer Maternal Uncle    • Alcohol abuse Maternal Uncle    • No Known Problems Paternal Aunt    • Stroke Paternal Uncle    • Hypertension Paternal Uncle    • Hyperlipidemia Paternal Uncle    • Heart disease Paternal Uncle    • No Known Problems Brother    • Asthma Sister         under control   • Alcohol abuse Maternal Uncle    • Colon cancer Neg Hx    • Breast cancer Neg Hx    • Ovarian cancer Neg Hx         SOCIAL HISTORY:   Social History     Socioeconomic History   • Marital status:    Tobacco Use   • Smoking status: Former     Packs/day: 1.00     Years: 40.00     Pack years: 40.00     Types: Cigarettes     Start date: 1/1/1967     Quit date: 1/1/2008     Years since quitting: 15.2     Passive exposure: Past   • Smokeless tobacco: Never   Vaping Use   • Vaping Use: Never used   Substance and Sexual Activity   • Alcohol use: Not Currently     Comment: very rarely   • Drug use: No   • Sexual activity: Not Currently     Partners: Male     Birth control/protection: None        SURGICAL HISTORY:   Past Surgical History:   Procedure Laterality Date   • APPENDECTOMY     • BARIATRIC SURGERY  1979   • CATARACT EXTRACTION W/ INTRAOCULAR LENS IMPLANT Right 08/08/2022    Procedure: CATARACT PHACO EXTRACTION WITH INTRAOCULAR LENS IMPLANT RIGHT;  Surgeon: Eunice Brooks MD;  Location: [facility];  Service: Ophthalmology;  Laterality: Right;   • CATARACT EXTRACTION W/ INTRAOCULAR LENS IMPLANT Left 08/22/2022    Procedure: CATARACT PHACO EXTRACTION WITH INTRAOCULAR LENS IMPLANT LEFT;  Surgeon: Eunice Brooks MD;  Location: [facility];  Service: Ophthalmology;  Laterality: Left;   • COLONOSCOPY  2012    failed due to hernia...    • COLONOSCOPY N/A 06/08/2017    Procedure: COLONOSCOPY with cold snare polypectomies, argon thermal ablation, ns injection, yanira ink injection;  Surgeon: Rafiq Huang MD;  Location: [facility] ENDOSCOPY;  Service:    • COLONOSCOPY N/A 06/05/2018    Procedure: COLONOSCOPY WITH BIOPSIES;  Surgeon: Rafiq Huang MD;  Location: [facility] ENDOSCOPY;  Service: Gastroenterology   • ENDOSCOPY N/A 01/27/2021    Procedure: ESOPHAGOGASTRODUODENOSCOPY WITH BIOPSIES AND CLIPS;  Surgeon: Katie Shannon MD;  Location: [facility] ENDOSCOPY;  Service: Gastroenterology;  Laterality: N/A;   • GASTRIC BYPASS  1978   • HERNIA MESH REMOVAL  2014    BOWEL OBSTRUCTION DUE TO MESH   • HERNIA REPAIR  2012   • HYSTERECTOMY      complete   • JOINT REPLACEMENT  2009 & 2012    Left & Right Full Hip Replacements   • SMALL INTESTINE SURGERY  2014    emergency bowel obstruction from hernia failure   • TOTAL ABDOMINAL HYSTERECTOMY WITH SALPINGO OOPHORECTOMY  1990    fibroids   • TUBAL ABDOMINAL LIGATION     • UPPER GASTROINTESTINAL ENDOSCOPY  2010        CURRENT MEDICATIONS:      Medication List      ASK your doctor about these medications    acyclovir 400 MG tablet  Commonly known as: ZOVIRAX  TAKE ONE TABLET BY MOUTH EVERY DAY     allopurinol 100 MG tablet  Commonly known as: ZYLOPRIM  TAKE TWO TABLETS BY MOUTH EVERY DAY     ASHWAGANDHA PO     B-12 (Methylcobalamin) 1000 MCG sublingual tablet     Coenzyme Q10 400 MG capsule     Diclofenac Sodium 1 % gel gel  Commonly known as: VOLTAREN  Apply 2 gm of gel topically to the appropriate area as directed twice a day. (to feet)     DIGESTIVE ENZYME PO     escitalopram 10 MG tablet  Commonly known as: LEXAPRO  Take 1 tablet by mouth Daily.     famotidine 20 MG tablet  Commonly known as: PEPCID  TAKE ONE TABLET BY MOUTH TWICE DAILY     ferrous gluconate 324 MG tablet  Commonly known as: FERGON  Take 1 tablet by mouth Daily With Breakfast.     furosemide 20 MG tablet  Commonly known as: LASIX  TAKE ONE  TABLET BY MOUTH TWICE DAILY     GARLIC PO     ketorolac 10 MG tablet  Commonly known as: TORADOL  Take 1 tablet by mouth Every 6 (Six) Hours As Needed for Moderate Pain.     levothyroxine 75 MCG tablet  Commonly known as: SYNTHROID, LEVOTHROID  TAKE ONE TABLET BY MOUTH EVERY DAY     liothyronine 25 MCG tablet  Commonly known as: CYTOMEL  TAKE THREE TABLETS BY MOUTH EVERY DAY     MAGNESIUM PO     metoprolol succinate XL 25 MG 24 hr tablet  Commonly known as: TOPROL-XL  Take 0.5 tablets by mouth Daily for 90 days.     MILK THISTLE PO     MSM PO     OMEGA-3 FISH OIL PO     potassium chloride 10 MEQ CR tablet  TAKE ONE TABLET BY MOUTH TWICE DAILY     PROBIOTIC PO     SELENIUM PO     TART CHERRY ADVANCED PO     TURMERIC PO     VITAMIN A PO     VITAMIN B COMPLEX PO     vitamin C 500 MG tablet  Commonly known as: ASCORBIC ACID     Vitamin D3 250 MCG (95429 UT) tablet     vitamin E 400 UNIT capsule     VITAMIN K PO     Zinc 30 MG tablet             ALLERGIES: Patient has no known allergies.     PHYSICAL EXAM:   VITAL SIGNS:   Vitals:    03/22/23 0950   BP: 136/73   Pulse: 95   Resp: 14   Temp: 98.1 °F (36.7 °C)   SpO2: 96%      CONSTITUTIONAL: Awake, oriented, appears nontoxic   HENT: Atraumatic, normocephalic, oral mucosa pink and moist, airway patent. Nares patent without drainage. External ears normal.   EYES: Conjunctivae clear   NECK: Trachea midline   CARDIOVASCULAR: Irregularly irregular rhythm, No murmurs, rubs, gallops   PULMONARY/CHEST: Clear to auscultation, no rhonchi, wheezes, or rales. Symmetrical breath sounds. Nontender.   ABDOMINAL: Nondistended, soft, nontender - no rebound or guarding   NEUROLOGIC: Nonfocal, moving all four extremities, no gross sensory or motor deficits.   EXTREMITIES: No clubbing, cyanosis, or edema   SKIN: Warm, Dry, No erythema, No rash     ED COURSE / MEDICAL DECISION MAKING:   Salinas Monroy is a 73 y.o. female who presents to the emergency department for evaluation of  chest and back discomfort.  Patient is nondistressed on arrival in the emergency department.  Vital signs are stable on arrival.  Patient does exhibit some pain with movement especially in the back but no focal tenderness to palpation.    Differential diagnosis includes ACS, pleurisy, pulmonary embolism, musculoskeletal pain among other etiologies.    A chest x-ray, EKG, CBC, cardiac enzymes, CMP was ordered for further evaluation of the patient's presentation.    Diagnostic information from other sources: Family, chart review    Interventions: Toradol, IV fluids, cardiac monitoring    EKG interpreted by me reveals atrial fibrillation with a rate of 96 bpm.  There is low QRS voltage.  There are few nonspecific findings but no acute ischemic changes.  This is an abnormal appearing EKG.    Narrative: Patient presents with chest and back discomfort.  The patient is nondistressed on arrival in the emergency department.  There is a nonischemic appearing EKG.  Chest x-ray per my interpretation reveals improvement in previous infiltrate and pleural effusion, radiologist agrees with this assessment.  I did perform my own independent review of the x-ray.  CT scan of the chest per radiology interpretation reveals improvement in pleural effusion.  CT scan of the thoracic spine reveals degenerative changes but no acute abnormality.  Patient has troponins of 20 and 21 which per protocol is stable.  BNP is higher than previous at 2800.  Other labs are largely unremarkable.  Patient has a calculated heart score of 4, this does place her in the moderate risk category.  She does however have a stress test scheduled for next Wednesday, approximately 1 week from now.  She is currently being followed by cardiologist.  Based on her description of symptoms I have a low suspicion that her pain is cardiac in nature.  Her back pain and believe is likely secondary to her degenerative changes in her back.  Discussed all of these findings with  patient who expressed understanding and will continue follow-up with cardiology as expected.    Re-evaluation: Patient is resting comfortably.    Plan for disposition is discharged with outpatient cardiology follow-up    DECISION TO DISCHARGE/ADMIT: see ED care timeline     FINAL IMPRESSION:   1 --chest pain  2 --back pain  3 --     Electronically signed by: Yanni Wyman MD, 3/22/2023 10:44 [email or web address]       Yanni Wyman MD  03/22/23 9585

## 2023-03-28 ENCOUNTER — PATIENT OUTREACH (OUTPATIENT)
Dept: CASE MANAGEMENT | Facility: OTHER | Age: 73
End: 2023-03-28
Payer: MEDICARE

## 2023-03-28 NOTE — OUTREACH NOTE
"AMBULATORY CASE MANAGEMENT NOTE    Name and Relationship of Patient/Support Person: Salinas Monroy \"Del\" - Self     Patient Outreach    Patient in ED 3/22/23 for chest pain, SOA. Pt has hx HF. Per chart, CP likely not cardiac in nature but related to back pain. Pt reports splenomegaly noted per CT; pt unsure if this could be related to a recent infection and has no hx liver problems that she is aware of. Pt has an apt for stress test tomorrow and an appt with PCP on 4/11/23. ACM encouraged pt to monitor for new/worsening symptoms and to discuss with provider at her appointment. ACM will follow up with pt after apt to assess for any new questions or concerns related to this. Pt managing HF but has noted recent weight gains recently. Education provided, Care plan created, next outreach scheduled.    Adult Patient Profile  Questions/Answers    Flowsheet Row Most Recent Value   Symptoms/Conditions Managed at Home cardiovascular   Barriers to Managing Health none   Cardiovascular Symptoms/Conditions chest pain, hypertension, heart failure   Cardiovascular Management Strategies adequate rest, diet modification, fluid modification, medication therapy, routine screening   Identifying Health Goals keep illness under control   Taking Medications Not Prescribed no   Missed Doses of Prescribed Medications During Past Week no   Taken Prescribed Medications at Different Time or Schedule During Past Week no   Taken More or Less Medication Than Prescribed no   Barriers to Taking Medication as Prescribed none          SDOH updated and reviewed with the patient during this program:  Financial Resource Strain: Medium Risk   • Difficulty of Paying Living Expenses: Somewhat hard      Food Insecurity: No Food Insecurity   • Worried About Running Out of Food in the Last Year: Never true   • Ran Out of Food in the Last Year: Never true      Transportation Needs: No Transportation Needs   • Lack of Transportation (Medical): No "   • Lack of Transportation (Non-Medical): No       Education Documentation  Weight Monitoring, taught by Mima Elise, RN at 3/28/2023 12:11 PM.  Learner: Patient  Readiness: Acceptance  Method: Explanation, Teach Back  Response: Verbalizes Understanding    Provider Follow-Up, taught by Mima Elise, RN at 3/28/2023 12:11 PM.  Learner: Patient  Readiness: Acceptance  Method: Explanation, Teach Back  Response: Verbalizes Understanding    Hypertension, taught by Mima Elise, RN at 3/28/2023 12:11 PM.  Learner: Patient  Readiness: Acceptance  Method: Explanation, Teach Back  Response: Verbalizes Understanding    Signs/Symptoms, taught by Mima Elise, RN at 3/28/2023 12:11 PM.  Learner: Patient  Readiness: Acceptance  Method: Explanation, Teach Back  Response: Verbalizes Understanding          Mima BURK  Ambulatory Case Management    3/28/2023, 12:12 EDT

## 2023-03-29 ENCOUNTER — HOSPITAL ENCOUNTER (OUTPATIENT)
Dept: NUCLEAR MEDICINE | Facility: HOSPITAL | Age: 73
Discharge: HOME | End: 2023-03-29
Payer: MEDICARE

## 2023-03-29 DIAGNOSIS — I48.91 ATRIAL FIBRILLATION, NEW ONSET: ICD-10-CM

## 2023-03-29 DIAGNOSIS — R07.9 CHEST PAIN, UNSPECIFIED TYPE: ICD-10-CM

## 2023-03-29 PROCEDURE — 0 TECHNETIUM SESTAMIBI: Performed by: NURSE PRACTITIONER

## 2023-03-29 PROCEDURE — 93017 CV STRESS TEST TRACING ONLY: CPT

## 2023-03-29 PROCEDURE — A9500 TC99M SESTAMIBI: HCPCS | Performed by: NURSE PRACTITIONER

## 2023-03-29 PROCEDURE — 25010000002 REGADENOSON 0.4 MG/5ML SOLUTION: Performed by: NURSE PRACTITIONER

## 2023-03-29 PROCEDURE — 78452 HT MUSCLE IMAGE SPECT MULT: CPT

## 2023-03-29 PROCEDURE — 93018 CV STRESS TEST I&R ONLY: CPT | Performed by: INTERNAL MEDICINE

## 2023-03-29 PROCEDURE — 78452 HT MUSCLE IMAGE SPECT MULT: CPT | Performed by: INTERNAL MEDICINE

## 2023-03-29 RX ADMIN — TECHNETIUM TC 99M SESTAMIBI 1 DOSE: 1 INJECTION INTRAVENOUS at 07:26

## 2023-03-29 RX ADMIN — TECHNETIUM TC 99M SESTAMIBI 1 DOSE: 1 INJECTION INTRAVENOUS at 10:00

## 2023-03-29 RX ADMIN — REGADENOSON 0.4 MG: 0.08 INJECTION, SOLUTION INTRAVENOUS at 10:00

## 2023-04-02 ENCOUNTER — APPOINTMENT (OUTPATIENT)
Dept: GENERAL RADIOLOGY | Facility: HOSPITAL | Age: 73
End: 2023-04-02
Payer: MEDICARE

## 2023-04-02 ENCOUNTER — APPOINTMENT (OUTPATIENT)
Dept: CT IMAGING | Facility: HOSPITAL | Age: 73
End: 2023-04-02
Payer: MEDICARE

## 2023-04-02 ENCOUNTER — HOSPITAL ENCOUNTER (EMERGENCY)
Facility: HOSPITAL | Age: 73
Discharge: HOME | End: 2023-04-02
Attending: EMERGENCY MEDICINE | Admitting: EMERGENCY MEDICINE
Payer: MEDICARE

## 2023-04-02 VITALS
OXYGEN SATURATION: 92 % | HEIGHT: 63 IN | TEMPERATURE: 99.2 F | RESPIRATION RATE: 18 BRPM | SYSTOLIC BLOOD PRESSURE: 136 MMHG | BODY MASS INDEX: 37.21 KG/M2 | DIASTOLIC BLOOD PRESSURE: 98 MMHG | HEART RATE: 108 BPM | WEIGHT: 210 LBS

## 2023-04-02 DIAGNOSIS — R10.9 ABDOMINAL PAIN, UNSPECIFIED ABDOMINAL LOCATION: ICD-10-CM

## 2023-04-02 DIAGNOSIS — R07.9 CHEST PAIN, UNSPECIFIED TYPE: ICD-10-CM

## 2023-04-02 DIAGNOSIS — I48.91 ATRIAL FIBRILLATION, UNSPECIFIED TYPE: Primary | ICD-10-CM

## 2023-04-02 DIAGNOSIS — J22 LOWER RESPIRATORY INFECTION: ICD-10-CM

## 2023-04-02 LAB
ALBUMIN SERPL-MCNC: 3.6 G/DL (ref 3.5–5.2)
ALBUMIN/GLOB SERPL: 1.1 G/DL
ALP SERPL-CCNC: 95 U/L (ref 39–117)
ALT SERPL W P-5'-P-CCNC: 19 U/L (ref 1–33)
ANION GAP SERPL CALCULATED.3IONS-SCNC: 13.9 MMOL/L (ref 5–15)
AST SERPL-CCNC: 18 U/L (ref 1–32)
BASOPHILS # BLD AUTO: 0.06 10*3/MM3 (ref 0–0.2)
BASOPHILS NFR BLD AUTO: 0.3 % (ref 0–1.5)
BILIRUB SERPL-MCNC: 0.3 MG/DL (ref 0–1.2)
BUN SERPL-MCNC: 43 MG/DL (ref 8–23)
BUN/CREAT SERPL: 51.2 (ref 7–25)
CALCIUM SPEC-SCNC: 9.3 MG/DL (ref 8.6–10.5)
CHLORIDE SERPL-SCNC: 102 MMOL/L (ref 98–107)
CO2 SERPL-SCNC: 20.1 MMOL/L (ref 22–29)
CREAT SERPL-MCNC: 0.84 MG/DL (ref 0.57–1)
DEPRECATED RDW RBC AUTO: 43.1 FL (ref 37–54)
EGFRCR SERPLBLD CKD-EPI 2021: 73 ML/MIN/1.73
EOSINOPHIL # BLD AUTO: 0.11 10*3/MM3 (ref 0–0.4)
EOSINOPHIL NFR BLD AUTO: 0.5 % (ref 0.3–6.2)
ERYTHROCYTE [DISTWIDTH] IN BLOOD BY AUTOMATED COUNT: 13 % (ref 12.3–15.4)
GEN 5 2HR TROPONIN T REFLEX: 19 NG/L
GLOBULIN UR ELPH-MCNC: 3.4 GM/DL
GLUCOSE SERPL-MCNC: 130 MG/DL (ref 65–99)
HCT VFR BLD AUTO: 40.9 % (ref 34–46.6)
HGB BLD-MCNC: 13 G/DL (ref 12–15.9)
HOLD SPECIMEN: NORMAL
HOLD SPECIMEN: NORMAL
IMM GRANULOCYTES # BLD AUTO: 0.15 10*3/MM3 (ref 0–0.05)
IMM GRANULOCYTES NFR BLD AUTO: 0.7 % (ref 0–0.5)
LIPASE SERPL-CCNC: 64 U/L (ref 13–60)
LYMPHOCYTES # BLD AUTO: 2.44 10*3/MM3 (ref 0.7–3.1)
LYMPHOCYTES NFR BLD AUTO: 11.3 % (ref 19.6–45.3)
MCH RBC QN AUTO: 28.9 PG (ref 26.6–33)
MCHC RBC AUTO-ENTMCNC: 31.8 G/DL (ref 31.5–35.7)
MCV RBC AUTO: 90.9 FL (ref 79–97)
MONOCYTES # BLD AUTO: 1.29 10*3/MM3 (ref 0.1–0.9)
MONOCYTES NFR BLD AUTO: 6 % (ref 5–12)
NEUTROPHILS NFR BLD AUTO: 17.62 10*3/MM3 (ref 1.7–7)
NEUTROPHILS NFR BLD AUTO: 81.2 % (ref 42.7–76)
NRBC BLD AUTO-RTO: 0 /100 WBC (ref 0–0.2)
PLATELET # BLD AUTO: 481 10*3/MM3 (ref 140–450)
PMV BLD AUTO: 8.8 FL (ref 6–12)
POTASSIUM SERPL-SCNC: 4.4 MMOL/L (ref 3.5–5.2)
PROT SERPL-MCNC: 7 G/DL (ref 6–8.5)
RBC # BLD AUTO: 4.5 10*6/MM3 (ref 3.77–5.28)
SODIUM SERPL-SCNC: 136 MMOL/L (ref 136–145)
TROPONIN T DELTA: -3 NG/L
TROPONIN T SERPL HS-MCNC: 22 NG/L
WBC NRBC COR # BLD: 21.67 10*3/MM3 (ref 3.4–10.8)
WHOLE BLOOD HOLD COAG: NORMAL
WHOLE BLOOD HOLD SPECIMEN: NORMAL

## 2023-04-02 PROCEDURE — 74176 CT ABD & PELVIS W/O CONTRAST: CPT

## 2023-04-02 PROCEDURE — 36415 COLL VENOUS BLD VENIPUNCTURE: CPT

## 2023-04-02 PROCEDURE — 84484 ASSAY OF TROPONIN QUANT: CPT | Performed by: EMERGENCY MEDICINE

## 2023-04-02 PROCEDURE — 99284 EMERGENCY DEPT VISIT MOD MDM: CPT

## 2023-04-02 PROCEDURE — 85025 COMPLETE CBC W/AUTO DIFF WBC: CPT | Performed by: EMERGENCY MEDICINE

## 2023-04-02 PROCEDURE — 93005 ELECTROCARDIOGRAM TRACING: CPT | Performed by: EMERGENCY MEDICINE

## 2023-04-02 PROCEDURE — 83690 ASSAY OF LIPASE: CPT | Performed by: EMERGENCY MEDICINE

## 2023-04-02 PROCEDURE — 71045 X-RAY EXAM CHEST 1 VIEW: CPT

## 2023-04-02 PROCEDURE — 80053 COMPREHEN METABOLIC PANEL: CPT | Performed by: EMERGENCY MEDICINE

## 2023-04-02 RX ORDER — DOXYCYCLINE 100 MG/1
100 CAPSULE ORAL ONCE
Status: COMPLETED | OUTPATIENT
Start: 2023-04-02 | End: 2023-04-02

## 2023-04-02 RX ORDER — SODIUM CHLORIDE 0.9 % (FLUSH) 0.9 %
10 SYRINGE (ML) INJECTION AS NEEDED
Status: DISCONTINUED | OUTPATIENT
Start: 2023-04-02 | End: 2023-04-02 | Stop reason: HOSPADM

## 2023-04-02 RX ORDER — HYDROCODONE BITARTRATE AND ACETAMINOPHEN 5; 325 MG/1; MG/1
1 TABLET ORAL ONCE
Status: COMPLETED | OUTPATIENT
Start: 2023-04-02 | End: 2023-04-02

## 2023-04-02 RX ORDER — DOXYCYCLINE 100 MG/1
100 CAPSULE ORAL 2 TIMES DAILY
Qty: 14 CAPSULE | Refills: 0 | Status: SHIPPED | OUTPATIENT
Start: 2023-04-02

## 2023-04-02 RX ADMIN — ALUMINUM HYDROXIDE, MAGNESIUM HYDROXIDE, AND DIMETHICONE: 400; 400; 40 SUSPENSION ORAL at 02:46

## 2023-04-02 RX ADMIN — HYDROCODONE BITARTRATE AND ACETAMINOPHEN 1 TABLET: 5; 325 TABLET ORAL at 04:08

## 2023-04-02 RX ADMIN — DOXYCYCLINE 100 MG: 100 CAPSULE ORAL at 04:08

## 2023-04-02 RX ADMIN — METOPROLOL TARTRATE 12.5 MG: 25 TABLET, FILM COATED ORAL at 00:59

## 2023-04-02 NOTE — ED PROVIDER NOTES
Subjective  History of Present Illness:    Chief Complaint: Chest pain abdominal pain  History of Present Illness: 23-year-old female presents for same, has had recent evals through emergency department and work-up by by cardiology service for same, known A-fib, patient not currently on anticoagulation.  Associated cough.    Nurses Notes reviewed and agree, including vitals, allergies, social history and prior medical history.     REVIEW OF SYSTEMS: All systems reviewed and not pertinent unless noted.  Review of Systems      Positive for: Chest pain abdominal pain, persistent cough    Negative for: Fever hemoptysis syncope palpitations edema    Past Medical History:   Diagnosis Date   • Anemia ?    under control   • Anxiety    • Arthritis    • Bloating    • Burping    • Chest pain 2020    was seen by Dr Pires and was released was just anxiety   • CHF (congestive heart failure)    • Colon polyp 06/05/2018   • COVID-19 vaccine series completed    • Depression    • Diverticulosis    • GERD (gastroesophageal reflux disease)     no longer bothers me...   • Gout    • Hashimoto's disease    • Hernia 03/2012    repair surgery which failed in 2014   • Hypothyroid    • Impingement syndrome of right shoulder 05/08/2016   • Obesity    • OCD (obsessive compulsive disorder)    • Sleep apnea    • Tremor     worse on left arm/hand   • Wears glasses     reading glasses only       Allergies:    Patient has no known allergies.      Past Surgical History:   Procedure Laterality Date   • APPENDECTOMY     • BARIATRIC SURGERY  1979   • CATARACT EXTRACTION W/ INTRAOCULAR LENS IMPLANT Right 08/08/2022    Procedure: CATARACT PHACO EXTRACTION WITH INTRAOCULAR LENS IMPLANT RIGHT;  Surgeon: Eunice Brooks MD;  Location: [facility];  Service: Ophthalmology;  Laterality: Right;   • CATARACT EXTRACTION W/ INTRAOCULAR LENS IMPLANT Left 08/22/2022    Procedure: CATARACT PHACO EXTRACTION WITH INTRAOCULAR LENS IMPLANT LEFT;  Surgeon: Eunice Brooks MD;   Location: [facility] OR;  Service: Ophthalmology;  Laterality: Left;   • COLONOSCOPY  2012    failed due to hernia...   • COLONOSCOPY N/A 06/08/2017    Procedure: COLONOSCOPY with cold snare polypectomies, argon thermal ablation, ns injection, yanira ink injection;  Surgeon: Rafiq Huang MD;  Location: [facility] ENDOSCOPY;  Service:    • COLONOSCOPY N/A 06/05/2018    Procedure: COLONOSCOPY WITH BIOPSIES;  Surgeon: Rafiq Huang MD;  Location: [facility] ENDOSCOPY;  Service: Gastroenterology   • ENDOSCOPY N/A 01/27/2021    Procedure: ESOPHAGOGASTRODUODENOSCOPY WITH BIOPSIES AND CLIPS;  Surgeon: Katie Shannon MD;  Location: [facility] ENDOSCOPY;  Service: Gastroenterology;  Laterality: N/A;   • GASTRIC BYPASS  1978   • HERNIA MESH REMOVAL  2014    BOWEL OBSTRUCTION DUE TO MESH   • HERNIA REPAIR  2012   • HYSTERECTOMY      complete   • JOINT REPLACEMENT  2009 & 2012    Left & Right Full Hip Replacements   • SMALL INTESTINE SURGERY  2014    emergency bowel obstruction from hernia failure   • TOTAL ABDOMINAL HYSTERECTOMY WITH SALPINGO OOPHORECTOMY  1990    fibroids   • TUBAL ABDOMINAL LIGATION     • UPPER GASTROINTESTINAL ENDOSCOPY  2010         Social History     Socioeconomic History   • Marital status:    Tobacco Use   • Smoking status: Former     Packs/day: 1.00     Years: 40.00     Pack years: 40.00     Types: Cigarettes     Start date: 1/1/1967     Quit date: 1/1/2008     Years since quitting: 15.2     Passive exposure: Past   • Smokeless tobacco: Never   Vaping Use   • Vaping Use: Never used   Substance and Sexual Activity   • Alcohol use: Not Currently     Comment: very rarely   • Drug use: No   • Sexual activity: Not Currently     Partners: Male     Birth control/protection: None         Family History   Problem Relation Age of Onset   • Obesity Mother    • Rheum arthritis Mother    • Thyroid disease Mother    • Hypertension Mother    • Stroke Mother         Most of her problems were caused by Rheumatoid Ar.  "  • Hyperlipidemia Mother    • Arthritis Mother         Rheumatoid Arthritis..Passed 2004   • Cancer Father         Passed 1997   • Kidney cancer Father    • Liver cancer Father    • No Known Problems Brother    • Diabetes Maternal Aunt    • Cancer Maternal Uncle    • Lung cancer Maternal Uncle    • Alcohol abuse Maternal Uncle    • No Known Problems Paternal Aunt    • Stroke Paternal Uncle    • Hypertension Paternal Uncle    • Hyperlipidemia Paternal Uncle    • Heart disease Paternal Uncle    • No Known Problems Brother    • Asthma Sister         under control   • Alcohol abuse Maternal Uncle    • Colon cancer Neg Hx    • Breast cancer Neg Hx    • Ovarian cancer Neg Hx        Objective  Physical Exam:  /98   Pulse 108   Temp 99.2 °F (37.3 °C) (Oral)   Resp 18   Ht 160 cm (63\")   Wt 95.3 kg (210 lb)   LMP  (LMP Unknown)   SpO2 92%   BMI 37.20 kg/m²      Physical Exam    CONSTITUTIONAL: Well developed, nontoxic 73-year-old female,  in no acute distress.  VITAL SIGNS: per nursing, reviewed and noted  SKIN: exposed skin with no rashes, ulcerations or petechiae  EYES: Grossly EOMI, no icterus  ENT: Normal voice.  Moist mucous membrane   RESPIRATORY:  No increased work of breathing. No retractions.  Chest clear to auscultation bilaterally  CARDIOVASCULAR: Tachycardia with irregular rhythm, no murmurs.  Good Peripheral pulses. Good cap refill to extremities.   GI: Abdomen soft, mild diffuse tenderness without rebound tenderness or guarding  MUSCULOSKELETAL: Age-appropriate bulk and tone, moves all 4 extremities  NEUROLOGIC: Alert, oriented x 3. No gross deficits. GCS 15.   PSYCH: appropriate affect.  : no bladder tenderness or distention, no CVA tenderness    Procedures    ED Course:      Lab Results (last 24 hours)     Procedure Component Value Units Date/Time    CBC & Differential [[id number]]  (Abnormal) Collected: 04/02/23 0034    Specimen: Blood Updated: 04/02/23 0041    Narrative:      The following " orders were created for panel order CBC & Differential.  Procedure                               Abnormality         Status                     ---------                               -----------         ------                     CBC Auto Differential[[id number]]        Abnormal            Final result                 Please view results for these tests on the individual orders.    Comprehensive Metabolic Panel [[id number]]  (Abnormal) Collected: 04/02/23 0034    Specimen: Blood Updated: 04/02/23 0102     Glucose 130 mg/dL      BUN 43 mg/dL      Creatinine 0.84 mg/dL      Sodium 136 mmol/L      Potassium 4.4 mmol/L      Chloride 102 mmol/L      CO2 20.1 mmol/L      Calcium 9.3 mg/dL      Total Protein 7.0 g/dL      Albumin 3.6 g/dL      ALT (SGPT) 19 U/L      AST (SGOT) 18 U/L      Alkaline Phosphatase 95 U/L      Total Bilirubin 0.3 mg/dL      Globulin 3.4 gm/dL      A/G Ratio 1.1 g/dL      BUN/Creatinine Ratio 51.2     Anion Gap 13.9 mmol/L      eGFR 73.0 mL/min/1.73     Narrative:      GFR Normal >60  Chronic Kidney Disease <60  Kidney Failure <15    The GFR formula is only valid for adults with stable renal function between ages 18 and 70.    High Sensitivity Troponin T [[id number]]  (Abnormal) Collected: 04/02/23 0034    Specimen: Blood Updated: 04/02/23 0104     HS Troponin T 22 ng/L     Narrative:      High Sensitive Troponin T Reference Range:  <10.0 ng/L- Negative Female for AMI  <15.0 ng/L- Negative Male for AMI  >=10 - Abnormal Female indicating possible myocardial injury.  >=15 - Abnormal Male indicating possible myocardial injury.   Clinicians would have to utilize clinical acumen, EKG, Troponin, and serial changes to determine if it is an Acute Myocardial Infarction or myocardial injury due to an underlying chronic condition.         CBC Auto Differential [[id number]]  (Abnormal) Collected: 04/02/23 0034    Specimen: Blood Updated: 04/02/23 0041     WBC 21.67 10*3/mm3      RBC 4.50 10*6/mm3       Hemoglobin 13.0 g/dL      Hematocrit 40.9 %      MCV 90.9 fL      MCH 28.9 pg      MCHC 31.8 g/dL      RDW 13.0 %      RDW-SD 43.1 fl      MPV 8.8 fL      Platelets 481 10*3/mm3      Neutrophil % 81.2 %      Lymphocyte % 11.3 %      Monocyte % 6.0 %      Eosinophil % 0.5 %      Basophil % 0.3 %      Immature Grans % 0.7 %      Neutrophils, Absolute 17.62 10*3/mm3      Lymphocytes, Absolute 2.44 10*3/mm3      Monocytes, Absolute 1.29 10*3/mm3      Eosinophils, Absolute 0.11 10*3/mm3      Basophils, Absolute 0.06 10*3/mm3      Immature Grans, Absolute 0.15 10*3/mm3      nRBC 0.0 /100 WBC     Lipase [[id number]]  (Abnormal) Collected: 04/02/23 0034    Specimen: Blood Updated: 04/02/23 0109     Lipase 64 U/L     High Sensitivity Troponin T 2Hr [[id number]]  (Abnormal) Collected: 04/02/23 0242    Specimen: Blood Updated: 04/02/23 0309     HS Troponin T 19 ng/L      Troponin T Delta -3 ng/L     Narrative:      High Sensitive Troponin T Reference Range:  <10.0 ng/L- Negative Female for AMI  <15.0 ng/L- Negative Male for AMI  >=10 - Abnormal Female indicating possible myocardial injury.  >=15 - Abnormal Male indicating possible myocardial injury.   Clinicians would have to utilize clinical acumen, EKG, Troponin, and serial changes to determine if it is an Acute Myocardial Infarction or myocardial injury due to an underlying chronic condition.                CT Abdomen Pelvis Without Contrast    Result Date: 4/2/2023  FINAL REPORT TECHNIQUE: null CLINICAL HISTORY: abdominal bloating, epigastric pain, history splenomegaly COMPARISON: null FINDINGS: CT of the abdomen and pelvis without intravenous contrast. Comparison 11/15/2018. Findings: Small hepatic granuloma. The gallbladder is distended. No definite gallstones or significant pericholecystic inflammatory change. No hydronephrosis. Mild splenomegaly. No abdominal aortic aneurysm. Bilateral pleural effusions left-greater-than-right. Small pericardial effusion could be  complex. Suspect gastric bypass. Definite diverticulitis. Moderate stool in the colon. Appendix not identified. Mildly increased gas and fluid in the bowel. No definite obstruction at this time. Severe diastasis recti and thinning of the abdominal wall. There is a ventral hernia in the right abdomen. Hip prostheses limit evaluation of the pelvis.     Impression: Impression: Nonspecific gallbladder distention. Bilateral pleural effusions. Small pericardial effusion could be complex. Additional findings as above. Authenticated and Electronically Signed by Walt Martinez MD on 04/02/2023 01:53:00 AM         [facility]    ED Course as of 04/02/23 0517   Sun Apr 02, 2023   0040 EKG interpreted by me reveals A-fib with RVR rate-16.  Low voltage EKG nonspecific T wave changes.  No ischemic changes [PF]      ED Course User Index  [PF] Gold Oconnor,        Medical Decision Making:    Initial impression of presenting illness: 73-year-old female with history of A-fib not on anticoagulation presents with intermittent chest pain all day, associated abdominal pain and dry cough    DDX: includes but is not limited to: Viral syndrome pneumonia, less likely ACS, A-fib with RVR         Patient arrives tachycardic afebrile oxygen saturations 92% room air with vitals interpreted by myself.     Pertinent features from physical exam: Mild diffuse tenderness.  Tachycardic.    Initial diagnostic plan: Rate control with metoprolol, chest pain rule out, CT abdomen pelvis    Results from initial plan were reviewed and interpreted by me revealing CT reveals bilateral pleural effusions small pericardial effusion.  Otherwise no acute findings.  None actionable labs, no delta troponin change.    Diagnostic information from other sources: Old records reviewed    Interventions / Re-evaluation: Patient declined prescription for anticoagulation given her A-fib.  Heart rate improved after additional dose of her home beta-blocker    Results/clinical  rationale were discussed with patient    Consultations/Discussion of results with other physicians: None    Disposition plan: Patient was discharged in home stable condition.  Counseled on supportive care, outpatient follow-up. Return precaution discussed.  Patient/family was understanding and agreeable with plan  Heart score 4  -----      Final diagnoses:   Atrial fibrillation, unspecified type   Abdominal pain, unspecified abdominal location   Chest pain, unspecified type   Lower respiratory infection        Gold Oconnor,   04/02/23 0518

## 2023-04-03 LAB
BH CV REST NUCLEAR ISOTOPE DOSE: 10 MCI
BH CV STRESS COMMENTS STAGE 1: NORMAL
BH CV STRESS DOSE REGADENOSON STAGE 1: 0.4
BH CV STRESS DURATION MIN STAGE 1: 0
BH CV STRESS DURATION SEC STAGE 1: 10
BH CV STRESS NUCLEAR ISOTOPE DOSE: 32.2 MCI
BH CV STRESS PROTOCOL 1: NORMAL
BH CV STRESS RECOVERY BP: NORMAL MMHG
BH CV STRESS RECOVERY HR: 104 BPM
BH CV STRESS STAGE 1: 1
LV EF NUC BP: 71 %
MAXIMAL PREDICTED HEART RATE: 146 BPM
PERCENT MAX PREDICTED HR: 78.77 %
STRESS BASELINE BP: NORMAL MMHG
STRESS BASELINE HR: 100 BPM
STRESS PERCENT HR: 93 %
STRESS POST PEAK BP: NORMAL MMHG
STRESS POST PEAK HR: 115 BPM
STRESS TARGET HR: 124 BPM

## 2023-04-09 DIAGNOSIS — I87.2 VENOUS INSUFFICIENCY OF BOTH LOWER EXTREMITIES: ICD-10-CM

## 2023-04-09 DIAGNOSIS — I50.32 CHRONIC DIASTOLIC HEART FAILURE: ICD-10-CM

## 2023-04-10 RX ORDER — POTASSIUM CHLORIDE 750 MG/1
TABLET, FILM COATED, EXTENDED RELEASE ORAL
Qty: 60 TABLET | Refills: 1 | Status: SHIPPED | OUTPATIENT
Start: 2023-04-10

## 2023-04-10 RX ORDER — FUROSEMIDE 20 MG/1
TABLET ORAL
Qty: 60 TABLET | Refills: 2 | Status: SHIPPED | OUTPATIENT
Start: 2023-04-10

## 2023-04-11 ENCOUNTER — OFFICE VISIT (OUTPATIENT)
Dept: FAMILY MEDICINE CLINIC | Facility: CLINIC | Age: 73
End: 2023-04-11
Payer: MEDICARE

## 2023-04-11 VITALS
SYSTOLIC BLOOD PRESSURE: 110 MMHG | HEART RATE: 87 BPM | OXYGEN SATURATION: 97 % | RESPIRATION RATE: 16 BRPM | HEIGHT: 63 IN | BODY MASS INDEX: 38.09 KG/M2 | DIASTOLIC BLOOD PRESSURE: 70 MMHG | WEIGHT: 215 LBS | TEMPERATURE: 97.2 F

## 2023-04-11 DIAGNOSIS — I48.91 ATRIAL FIBRILLATION, NEW ONSET: ICD-10-CM

## 2023-04-11 DIAGNOSIS — M62.08 DIASTASIS RECTI: ICD-10-CM

## 2023-04-11 DIAGNOSIS — Z99.89 OSA ON CPAP: ICD-10-CM

## 2023-04-11 DIAGNOSIS — E03.9 ACQUIRED HYPOTHYROIDISM: Chronic | ICD-10-CM

## 2023-04-11 DIAGNOSIS — K43.9 VENTRAL HERNIA WITHOUT OBSTRUCTION OR GANGRENE: ICD-10-CM

## 2023-04-11 DIAGNOSIS — G47.33 OSA ON CPAP: ICD-10-CM

## 2023-04-11 DIAGNOSIS — I50.32 CHRONIC DIASTOLIC HEART FAILURE: Primary | ICD-10-CM

## 2023-04-11 RX ORDER — AMOXICILLIN AND CLAVULANATE POTASSIUM 875; 125 MG/1; MG/1
1 TABLET, FILM COATED ORAL 2 TIMES DAILY
Qty: 20 TABLET | Refills: 0 | Status: SHIPPED | OUTPATIENT
Start: 2023-04-11 | End: 2023-04-21

## 2023-04-11 RX ORDER — METOPROLOL SUCCINATE 25 MG/1
12.5 TABLET, EXTENDED RELEASE ORAL DAILY
Qty: 90 TABLET | Refills: 0 | Status: SHIPPED | OUTPATIENT
Start: 2023-04-11 | End: 2023-07-10

## 2023-04-11 NOTE — PROGRESS NOTES
Established Patient        Chief Complaint:   Chief Complaint   Patient presents with   • Follow-up     3 month labs        Salinas Monroy is a 73 y.o. female    History of Present Illness:   Answers for HPI/ROS submitted by the patient on 4/9/2023  Please describe your symptoms.: updates....  Have you had these symptoms before?: Yes  How long have you been having these symptoms?: Greater than 2 weeks  What is the primary reason for your visit?: Other      I have reviewed labs/imaging/records from this ER visit/hospitalization, including ER staff and admitting/attending physicians H/P's and progress notes to establish a comprehensive understanding of this patient's clinical hospital course, as well as to establish a transition of care appropriately.    New onset atrial fibrillation, denies chest pain, syncope, palpitations or vertigo at present.  Denies fever, chills or night sweats.  Currently on pressure support for known diagnosis of obstructive sleep apnea.  Does report malaise/fatigue in the morning, as well as excessive daytime drowsiness.  Denies any significant changes to her dietary intake, no excessive caffeine intake.  She does report good hydration habits.  Denies BRB/BTS.    Subjective     The following portions of the patient's history were reviewed and updated as appropriate: allergies, current medications, past family history, past medical history, past social history, past surgical history and problem list.    No Known Allergies    Review of Systems  1. Constitutional: Negative for fever. Negative for chills, diaphoresis, fatigue and unexpected weight change.   2. HENT: No dysphagia; no changes to vision/hearing/smell/taste; no epistaxis.  Otherwise, as per above.  3. Eyes: Negative for redness and visual disturbance.   4. Respiratory: negative for shortness of breath. Negative for chest pain . Negative for cough and chest tightness.   5. Cardiovascular: Negative for chest pain and  "palpitations.   6. Gastrointestinal: As per above.  7. Endocrine: Negative for cold intolerance and heat intolerance.   8. Genitourinary: Negative for difficulty urinating, dysuria and frequency.   9. Musculoskeletal: Chronic arthralgias, back pain and myalgias.   10. Skin: Inflamed skin tag to left neck as per above.  11. Neurological: Negative for syncope, weakness and headaches.  Chronic tremors.  12. Hematological: Negative for adenopathy. Does not bruise/bleed easily.   13. Psychiatric/Behavioral: Negative for confusion. The patient is not nervous/anxious at present.    Objective     Physical Exam   Vital Signs: /70   Pulse 87   Temp 97.2 °F (36.2 °C)   Resp 16   Ht 160 cm (63\")   Wt 97.5 kg (215 lb)   LMP  (LMP Unknown)   SpO2 97%   BMI 38.09 kg/m²     General Appearance: alert, oriented x 3, no acute distress.  Skin: warm and dry.  Large pigmented skin tag to the left neck, locally surrounded with erythema and signs of friction trauma.  Skin tag is approximately 6 mm at its base.  HEENT: Atraumatic.  pupils round and reactive to light and accommodation, oral mucosa pink and moist.  Nares patent without epistaxis.  External auditory canals are patent tympanic membranes intact.  Boggy/inflamed nasal turbinates with mild postnasal drainage, no pustules or exudate.  Serous effusion noted to bilateral tympanic membranes, however mobility is intact on Valsalva and insufflation.  Neck: supple, no JVD, trachea midline.  No thyromegaly  Lungs: CTA, unlabored breathing effort.  Heart: RRR, normal S1 and S2, no S3, no rub.  Abdomen: soft, non-tender, no palpable bladder, present bowel sounds to auscultation ×4.  No guarding or rigidity.  Diastases recti noted to the abdomen.  Extremities: no clubbing, cyanosis.  Good range of motion actively and passively.  Symmetric muscle strength and development.  Gravity dependent edema noted to bilateral lower extremities, extending to the mid tibias bilaterally.  " Slightly pitting in nature.  Ponce/Sparks sign negative.  Independently ambulatory.  Neuro: normal speech and mental status.  Cranial nerves II through XII intact.  No anosmia. DTR 2+; proprioception intact.  Significantly improved tremulousness.        Assessment and Plan      Assessment:   Diagnoses and all orders for this visit:    1. Chronic diastolic heart failure (Primary)    2. Atrial fibrillation, new onset  -     metoprolol succinate XL (TOPROL-XL) 25 MG 24 hr tablet; Take 0.5 tablets by mouth Daily for 90 days.  Dispense: 90 tablet; Refill: 0  -     TSH  -     T4, Free    3. ISABEL on CPAP  -     Ambulatory Referral to Sleep Medicine    4. Acquired hypothyroidism    5. Diastasis recti    6. Ventral hernia without obstruction or gangrene    Other orders  -     amoxicillin-clavulanate (Augmentin) 875-125 MG per tablet; Take 1 tablet by mouth 2 (Two) Times a Day for 10 days.  Dispense: 20 tablet; Refill: 0        Plan:  Referral to sleep medicine urgently d/t need for NIPPV.    Continue metoprolol; keep scheduled cristian with cardiology.    Suspect multiple contributors to A-fib; untreated ISABEL, thyroid disorder; she remains off consistently defiant to endocrinology referral despite my recommendation that this is of significant importance/need.    Pt defers formal anticoagulation at this time.  Most recent echocardiogram completed approx 6 weeks ago demonstrates preserved LVEF, no findings of pericardial effusion.    Most recent ER visit prompted CT abd/pelvis; results demonstrated noticeable pericardial effusion, although unable to fully eval if complex or not.    Discussion Summary:    Discussed plan of care in detail with pt today; pt verb understanding and agrees.    I spent 40 minutes caring for Salinas on this date of service. This time includes time spent by me in the following activities:preparing for the visit, performing a medically appropriate examination and/or evaluation , counseling and educating the  patient/family/caregiver, ordering medications, tests, or procedures, documenting information in the medical record and care coordination    I have reviewed and updated all copied forward information, as appropriate.  I attest to the accuracy and relevance of any unchanged information.      Follow up:  Return in about 4 weeks (around 5/9/2023) for Recheck, Med Change/New Meds.     There are no Patient Instructions on file for this visit.    Jm Marie DO  04/22/23  07:45 EDT

## 2023-04-12 LAB
T4 FREE SERPL-MCNC: 0.62 NG/DL (ref 0.82–1.77)
TSH SERPL DL<=0.005 MIU/L-ACNC: <0.005 UIU/ML (ref 0.45–4.5)

## 2023-04-21 DIAGNOSIS — M1A.0710 CHRONIC IDIOPATHIC GOUT INVOLVING TOE OF RIGHT FOOT WITHOUT TOPHUS: ICD-10-CM

## 2023-04-21 RX ORDER — ALLOPURINOL 100 MG/1
TABLET ORAL
Qty: 180 TABLET | Refills: 0 | Status: SHIPPED | OUTPATIENT
Start: 2023-04-21

## 2023-04-27 ENCOUNTER — OFFICE VISIT (OUTPATIENT)
Dept: PULMONOLOGY | Facility: CLINIC | Age: 73
End: 2023-04-27
Payer: MEDICARE

## 2023-04-27 VITALS
SYSTOLIC BLOOD PRESSURE: 124 MMHG | HEIGHT: 63 IN | OXYGEN SATURATION: 95 % | RESPIRATION RATE: 18 BRPM | HEART RATE: 99 BPM | BODY MASS INDEX: 39.05 KG/M2 | DIASTOLIC BLOOD PRESSURE: 73 MMHG | WEIGHT: 220.4 LBS

## 2023-04-27 DIAGNOSIS — G47.33 OBSTRUCTIVE SLEEP APNEA: Primary | ICD-10-CM

## 2023-04-27 DIAGNOSIS — G47.19 EXCESSIVE DAYTIME SLEEPINESS: ICD-10-CM

## 2023-04-27 DIAGNOSIS — E66.9 OBESITY (BMI 30-39.9): ICD-10-CM

## 2023-04-27 DIAGNOSIS — R06.83 SNORING: ICD-10-CM

## 2023-04-27 NOTE — PROGRESS NOTES
"  Chief Complaint   Patient presents with   • Sleeping Problem   • Follow-up       Subjective   Salinas Monroy is a 73 y.o. female.     History of Present Illness   Patient comes in today for consultation because of sleep apnea.  The patient says that  she was diagnosed with sleep apnea 4 years ago.  It appears that she has been on a PAP device since then.    she feels that the PAP device did not function properly and she quit using the device in December 2020. she says that her symptoms of fatigue & daytime sleepiness have resurfaced.    Patient reports improvement with the use of the PAP device.    Although she does not feel that all her symptoms from before the diagnosis being established have returned, she is noticing renewed tendency to feel sleepy while watching TV, reading a book & driving long distances.     Patient is under management for hypertension & atrial fibrillation.    The following portions of the patient's history were reviewed and updated as appropriate: allergies, current medications, past family history, past medical history, past social history and past surgical history.    Review of Systems   HENT: Negative for sinus pressure, sneezing and sore throat.    Respiratory: Negative for cough, chest tightness, shortness of breath and wheezing.    Cardiovascular: Positive for palpitations. Negative for leg swelling.   Psychiatric/Behavioral: Negative for sleep disturbance.       Objective   Visit Vitals  /73   Pulse 99   Resp 18   Ht 160 cm (62.99\")   Wt 100 kg (220 lb 6.4 oz)   LMP  (LMP Unknown)   SpO2 95%   BMI 39.05 kg/m²       Physical Exam  Vitals reviewed.   Constitutional:       Appearance: She is well-developed.   HENT:      Head: Atraumatic.      Mouth/Throat:      Comments: Oropharynx was crowded.  Neck:      Comments: Increased neck circumference noted.  Musculoskeletal:      Comments: Gait was slow.   Neurological:      Mental Status: She is alert and oriented to person, " place, and time.         Assessment & Plan   Diagnoses and all orders for this visit:    1. Obstructive sleep apnea (Primary)  -     Home Sleep Study; Future    2. Snoring  -     Home Sleep Study; Future    3. Excessive daytime sleepiness  -     Home Sleep Study; Future    4. Obesity (BMI 30-39.9)  -     Home Sleep Study; Future           Return in about 23 weeks (around 10/5/2023) for SleepONLY/Kassie.    DISCUSSION (if any):  Laboratory workup was also reviewed which showed   Lab Results   Component Value Date    CO2 20.1 (L) 04/02/2023     Laboratory workup also showed   Lab Results   Component Value Date    HGB 13.0 04/02/2023    HGB 13.1 03/22/2023    HGB 13.1 02/18/2023   ,   Lab Results   Component Value Date    HCT 40.9 04/02/2023    HCT 40.3 03/22/2023    HCT 40.9 02/18/2023   ,   Lab Results   Component Value Date    TSH <0.005 (L) 04/11/2023    TSH 0.010 (L) 02/24/2023    TSH <0.005 (L) 11/02/2022     I reviewed the patient's primary care provider's last office note that mentions new onset atrial fibrillation.  It also listed obstructive sleep apnea.    ===========================  ===========================    I was able to review her last sleep study, from May 2019.  It showed that the patient has significant sleep apnea and her AHI was 30/hour.     The pathophysiology of sleep apnea was discussed, with the patient.     We will encourage her to schedule the sleep study soon.     The patient was made aware of the limitation of the home sleep study, whereby it may underestimate the true AHI and also carries a low sensitivity.  I have informed her that even if the home sleep study is negative, we may suggest an in lab sleep study to completely and definitively rule out/in sleep apnea.  The patient has understood.  This was communicated to the patient, in case home study is to be requested.    The patient is agreeable to try CPAP/BiPAP, if needed.     Patient was educated on good sleep hygiene measures and  voiced understanding of the same.     Patient was given reading material regarding sleep apnea    Patient was counseled regarding weight loss.     Dictated utilizing Dragon dictation.    This document was electronically signed by Ambrose Fonseca MD on 04/27/23 at 09:49 EDT

## 2023-04-28 ENCOUNTER — PATIENT ROUNDING (BHMG ONLY) (OUTPATIENT)
Dept: PULMONOLOGY | Facility: CLINIC | Age: 73
End: 2023-04-28
Payer: MEDICARE

## 2023-05-09 ENCOUNTER — OFFICE VISIT (OUTPATIENT)
Dept: CARDIOLOGY | Facility: CLINIC | Age: 73
End: 2023-05-09
Payer: MEDICARE

## 2023-05-09 VITALS
HEIGHT: 63 IN | HEART RATE: 104 BPM | WEIGHT: 221 LBS | RESPIRATION RATE: 18 BRPM | SYSTOLIC BLOOD PRESSURE: 122 MMHG | BODY MASS INDEX: 39.16 KG/M2 | DIASTOLIC BLOOD PRESSURE: 78 MMHG | OXYGEN SATURATION: 94 %

## 2023-05-09 DIAGNOSIS — I50.32 CHRONIC DIASTOLIC HEART FAILURE: ICD-10-CM

## 2023-05-09 DIAGNOSIS — I48.91 ATRIAL FIBRILLATION, NEW ONSET: Primary | ICD-10-CM

## 2023-05-09 DIAGNOSIS — E66.01 SEVERE OBESITY (BMI 35.0-39.9) WITH COMORBIDITY: ICD-10-CM

## 2023-05-09 RX ORDER — METOPROLOL SUCCINATE 25 MG/1
25 TABLET, EXTENDED RELEASE ORAL DAILY
Qty: 90 TABLET | Refills: 3 | Status: SHIPPED | OUTPATIENT
Start: 2023-05-09 | End: 2023-08-07

## 2023-05-09 NOTE — PROGRESS NOTES
[facility] Cardiology Office Follow Up Note    Salinas Monroy  [id number]  2023    Primary Care Provider: Jm Marie DO    Chief Complaint: Routine follow-up    History of Present Illness:   Mrs. Salinas Monroy is a 73 y.o. female who presents to the Cardiology Clinic for evaluation of atrial fibrillation.  The patient has a past medical history significant for hypertension, diastolic heart failure, hypothyroidism in the setting of prior Hashimoto's thyroiditis, Parkinson's disease, osteoarthritis, obesity, and ISABEL (but no longer uses CPAP).    She has a past cardiac history significant for atrial fibrillation.  At the time of her last appointment anticoagulation for CVA prophylaxis was discussed, however the patient declined.  She was started on metoprolol for rate control strategy.  Today, the patient denies any symptoms related to her atrial fibrillation.  No significant palpitations.  No exertional dyspnea or exertional anginal symptoms.  She has been tolerating 12.5 of metoprolol daily without difficulty.  She reports her heart rate on home monitoring is generally under 100 bpm.  No other new complaints today.        Past Cardiac Testin. Last Coronary Angio: None  2. Prior Stress Testing: MPS   1.    Normal pharmacologic MPS with no evidence of inducible ischemia.   2. Hyperdynamic LV systolic function, LVEF 71%.   3. Last Echo:  3/23   1.  Normal left ventricular size and systolic function, LVEF 60-65%.   2.  Mild concentric LVH.   3.  Normal LV diastolic filling pattern.   4.  Normal right ventricular size and systolic function.   5.  Moderately increased left atrial volume index.   6.  Trace AI and MR.   7.  Mild tricuspid regurgitation, RVSP 32 mmHg.      Review of Systems:   Review of Systems   Constitutional: Negative for activity change, appetite change, chills, diaphoresis, fatigue, fever, unexpected weight gain and unexpected weight  loss.   Eyes: Negative for blurred vision and double vision.   Respiratory: Negative for cough, chest tightness, shortness of breath and wheezing.    Cardiovascular: Negative for chest pain, palpitations and leg swelling.   Gastrointestinal: Negative for abdominal pain, anal bleeding, blood in stool and GERD.   Endocrine: Negative for cold intolerance and heat intolerance.   Genitourinary: Negative for hematuria.   Neurological: Negative for dizziness, syncope, weakness and light-headedness.   Hematological: Does not bruise/bleed easily.   Psychiatric/Behavioral: Negative for depressed mood and stress. The patient is not nervous/anxious.        I have reviewed and/or updated the patient's past medical, past surgical, family, social history, problem list and allergies as appropriate.     Medications:     Current Outpatient Medications:   •  acyclovir (ZOVIRAX) 400 MG tablet, TAKE ONE TABLET BY MOUTH EVERY DAY, Disp: 30 tablet, Rfl: 2  •  allopurinol (ZYLOPRIM) 100 MG tablet, TAKE TWO TABLETS BY MOUTH EVERY DAY, Disp: 180 tablet, Rfl: 0  •  ASHWAGANDHA PO, Take  by mouth., Disp: , Rfl:   •  B Complex Vitamins (VITAMIN B COMPLEX PO), Take 1 tablet by mouth Daily., Disp: , Rfl:   •  B-12, Methylcobalamin, 1000 MCG sublingual tablet, Every Other Day., Disp: , Rfl:   •  Cholecalciferol (VITAMIN D3) 51689 UNITS tablet, Take 7,500 Units by mouth Daily., Disp: , Rfl:   •  Coenzyme Q10 400 MG capsule, Take 1 capsule by mouth Daily., Disp: , Rfl:   •  Diclofenac Sodium (VOLTAREN) 1 % gel gel, Apply 2 gm of gel topically to the appropriate area as directed twice a day. (to feet), Disp: 150 g, Rfl: 0  •  Digestive Enzymes (DIGESTIVE ENZYME PO), Take 1 tablet by mouth Daily., Disp: , Rfl:   •  escitalopram (LEXAPRO) 10 MG tablet, Take 1 tablet by mouth Daily., Disp: 90 tablet, Rfl: 1  •  famotidine (PEPCID) 20 MG tablet, TAKE ONE TABLET BY MOUTH TWICE DAILY, Disp: 60 tablet, Rfl: 5  •  ferrous gluconate (FERGON) 324 MG tablet,  Take 1 tablet by mouth Daily With Breakfast., Disp: 30 tablet, Rfl: 5  •  furosemide (LASIX) 20 MG tablet, TAKE ONE TABLET BY MOUTH TWICE DAILY, Disp: 60 tablet, Rfl: 2  •  GARLIC PO, Take 1 tablet by mouth Daily., Disp: , Rfl:   •  levothyroxine (SYNTHROID, LEVOTHROID) 75 MCG tablet, TAKE ONE TABLET BY MOUTH EVERY DAY, Disp: 90 tablet, Rfl: 0  •  liothyronine (CYTOMEL) 25 MCG tablet, TAKE THREE TABLETS BY MOUTH EVERY DAY, Disp: 90 tablet, Rfl: 1  •  MAGNESIUM PO, Take 1,000 mg by mouth Every Night., Disp: , Rfl:   •  Methylsulfonylmethane (MSM PO), Take 1 tablet by mouth Daily., Disp: , Rfl:   •  metoprolol succinate XL (TOPROL-XL) 25 MG 24 hr tablet, Take 1 tablet by mouth Daily for 90 days., Disp: 90 tablet, Rfl: 3  •  MILK THISTLE PO, Take 1 tablet by mouth Daily., Disp: , Rfl:   •  Misc Natural Products (TART CHERRY ADVANCED PO), Take 2 capsules by mouth Daily., Disp: , Rfl:   •  Omega-3 Fatty Acids (OMEGA-3 FISH OIL PO), Take 4,000 Units by mouth 3 (Three) Times a Day., Disp: , Rfl:   •  potassium chloride 10 MEQ CR tablet, TAKE ONE TABLET BY MOUTH TWICE DAILY, Disp: 60 tablet, Rfl: 1  •  Probiotic Product (PROBIOTIC PO), Take 1 tablet by mouth Daily., Disp: , Rfl:   •  SELENIUM PO, Take 1 tablet by mouth Daily., Disp: , Rfl:   •  sucralfate (CARAFATE) 1 g tablet, Take 1 tablet by mouth 4 (Four) Times a Day., Disp: 40 tablet, Rfl: 0  •  TURMERIC PO, Take 1 tablet by mouth 2 (two) times a day., Disp: , Rfl:   •  VITAMIN A PO, Take  by mouth., Disp: , Rfl:   •  vitamin C (ASCORBIC ACID) 500 MG tablet, Take 1 tablet by mouth 3 (Three) Times a Day As Needed., Disp: , Rfl:   •  vitamin E 400 UNIT capsule, Take 1 capsule by mouth Daily., Disp: , Rfl:   •  VITAMIN K PO, Take 100 mcg by mouth Daily., Disp: , Rfl:   •  Zinc 30 MG tablet, Take  by mouth Daily., Disp: , Rfl:   •  apixaban (ELIQUIS) 5 MG tablet tablet, Take 1 tablet by mouth 2 (Two) Times a Day., Disp: 60 tablet, Rfl: 3  •  doxycycline (MONODOX) 100 MG  "capsule, Take 1 capsule by mouth 2 (Two) Times a Day., Disp: 14 capsule, Rfl: 0  •  ketorolac (TORADOL) 10 MG tablet, Take 1 tablet by mouth Every 6 (Six) Hours As Needed for Moderate Pain., Disp: 12 tablet, Rfl: 0  •  oxyCODONE-acetaminophen (PERCOCET) 5-325 MG per tablet, Take 1 tablet by mouth Every 4 (Four) Hours As Needed for Severe Pain., Disp: 10 tablet, Rfl: 0    Physical Exam:  Vital Signs:   Vitals:    05/09/23 0914   BP: 122/78   BP Location: Left arm   Patient Position: Sitting   Pulse: 104   Resp: 18   SpO2: 94%   Weight: 100 kg (221 lb)   Height: 160 cm (63\")       Physical Exam  Constitutional:       General: She is not in acute distress.     Appearance: She is well-developed. She is obese. She is not diaphoretic.   HENT:      Head: Normocephalic and atraumatic.   Eyes:      General: No scleral icterus.     Pupils: Pupils are equal, round, and reactive to light.   Neck:      Trachea: No tracheal deviation.   Cardiovascular:      Rate and Rhythm: Normal rate and regular rhythm.      Heart sounds: Normal heart sounds. No murmur heard.    No friction rub. No gallop.      Comments: Normal JVD.  Pulmonary:      Effort: Pulmonary effort is normal. No respiratory distress.      Breath sounds: Normal breath sounds. No stridor. No wheezing or rales.   Chest:      Chest wall: No tenderness.   Abdominal:      General: Bowel sounds are normal. There is no distension.      Palpations: Abdomen is soft.      Tenderness: There is no abdominal tenderness. There is no guarding or rebound.   Musculoskeletal:         General: No swelling. Normal range of motion.      Cervical back: Neck supple. No tenderness.   Lymphadenopathy:      Cervical: No cervical adenopathy.   Skin:     General: Skin is warm and dry.      Findings: No erythema.   Neurological:      General: No focal deficit present.      Mental Status: She is alert and oriented to person, place, and time.   Psychiatric:         Mood and Affect: Mood normal.       "   Behavior: Behavior normal.         Results Review:   I reviewed the patient's new clinical results.  I personally viewed and interpreted the patient's EKG/Telemetry data      ECG 12 Lead    Date/Time: 5/9/2023 4:56 PM  Performed by: Jose Antonio Pires MD  Authorized by: Jose Antonio Pires MD   Comparison: compared with previous ECG   Similar to previous ECG  Rhythm: atrial fibrillation  Rate: tachycardic  QRS axis: normal  Other findings: low voltage    Clinical impression: abnormal EKG            Assessment / Plan:     1.  Persistent atrial fibrillation  --Atrial fibrillation appears to be persistent  -- Remains asymptomatic  -- ECG today shows mild RVR  -- Will uptitrate metoprolol to 25 mg daily for additional rate control  -- The patient is now agreeable for anticoagulation for CVA prophylaxis, will start Eliquis  -- Given frequent falls, the patient is considering referral for Watchman device  -- Will proceed with outpatient cardiac monitor to further assess rate control  -- Follow-up in 1 month, sooner if required    2.  Chronic diastolic heart failure  -- Appears euvolemic today  -- Continue current dose of Lasix    3.  Obesity, BMI 39  -- Weight loss advised      Follow Up:   Return in about 4 weeks (around 6/6/2023).      Thank you for allowing me to participate in the care of your patient. Please to not hesitate to contact me with additional questions or concerns.     JEWELL Pires MD  Interventional Cardiology   05/09/2023  09:23 EDT

## 2023-05-14 DIAGNOSIS — K21.9 GASTROESOPHAGEAL REFLUX DISEASE, UNSPECIFIED WHETHER ESOPHAGITIS PRESENT: Chronic | ICD-10-CM

## 2023-05-14 DIAGNOSIS — E03.9 HYPOTHYROIDISM, ADULT: ICD-10-CM

## 2023-05-15 ENCOUNTER — TELEPHONE (OUTPATIENT)
Dept: CARDIOLOGY | Facility: CLINIC | Age: 73
End: 2023-05-15

## 2023-05-15 NOTE — TELEPHONE ENCOUNTER
"  Caller: Salinas Monroy \"Del\"    Relationship: Self    Best call back number: [phone]    What is the best time to reach you: ANYTIME     Who are you requesting to speak with (clinical staff, provider,  specific staff member): ANYONE     What was the call regarding: PATIENT NEEDING TO DISCUSS ASSISTANCE WITH PAYING FOR HER ELIQUIS. REPORTS SHE ONLY HAS 2 WEEKS LEFT OF SAMPLES.     Do you require a callback: YES   "

## 2023-05-15 NOTE — TELEPHONE ENCOUNTER
Spoke with patient. Informed her that I have completed and faxed her patient assistance paperwork to [employer]. Informed her that we should have a response, probably by the end of the week.

## 2023-05-16 RX ORDER — LIOTHYRONINE SODIUM 25 UG/1
TABLET ORAL
Qty: 90 TABLET | Refills: 1 | Status: SHIPPED | OUTPATIENT
Start: 2023-05-16

## 2023-05-16 RX ORDER — ACYCLOVIR 400 MG/1
TABLET ORAL
Qty: 30 TABLET | Refills: 2 | Status: SHIPPED | OUTPATIENT
Start: 2023-05-16

## 2023-05-16 RX ORDER — FAMOTIDINE 20 MG/1
TABLET, FILM COATED ORAL
Qty: 60 TABLET | Refills: 5 | Status: SHIPPED | OUTPATIENT
Start: 2023-05-16

## 2023-05-24 ENCOUNTER — HOSPITAL ENCOUNTER (OUTPATIENT)
Dept: SLEEP MEDICINE | Facility: HOSPITAL | Age: 73
End: 2023-05-24
Payer: MEDICARE

## 2023-05-24 DIAGNOSIS — G47.33 OBSTRUCTIVE SLEEP APNEA: ICD-10-CM

## 2023-05-24 DIAGNOSIS — R06.83 SNORING: ICD-10-CM

## 2023-05-24 DIAGNOSIS — E66.9 OBESITY (BMI 30-39.9): ICD-10-CM

## 2023-05-24 DIAGNOSIS — G47.19 EXCESSIVE DAYTIME SLEEPINESS: ICD-10-CM

## 2023-05-24 PROCEDURE — 95806 SLEEP STUDY UNATT&RESP EFFT: CPT

## 2023-05-31 ENCOUNTER — TELEPHONE (OUTPATIENT)
Dept: CARDIOLOGY | Facility: CLINIC | Age: 73
End: 2023-05-31

## 2023-05-31 NOTE — TELEPHONE ENCOUNTER
"  Caller: Salinas Monroy \"Del\"    Relationship: Self    Best call back number: [phone]    What is the best time to reach you: ANY    Who are you requesting to speak with (clinical staff, provider,  specific staff member): CLINICAL    What was the call regarding: PATIENT IS CALLING TO SEE IF THERE WAS AN UPDATE ON WHETHER OR NOT THE ELQUIS  CAN SEND PT ELIQUIS  IN THE MAIL AS PATIENT CANT AFFORD THE ELIQUIS THROUGH INSURANCE. PT IS WANTING TO KNOW IF THERE ARE ANY AVAILABLE SAMPLES AS PATIENT IS ALMOST OUT, IF NOT PT STATES SHE CAN GO BACK ON THE GARLIC SUPP AND BABY ASPIRIN.     Is it okay if the provider responds through rumrt: YES        "

## 2023-06-01 ENCOUNTER — TELEPHONE (OUTPATIENT)
Dept: CARDIOLOGY | Facility: CLINIC | Age: 73
End: 2023-06-01

## 2023-06-01 NOTE — TELEPHONE ENCOUNTER
"Caller: Salinas Monroy \"Del\"    Relationship: Self    Best call back number: [phone]    What is the best time to reach you: ANYTIME    What was the call regarding: PT WAS TOLD BY OFFICE IN ORDER RECIEVE BARON NG SHE HAD TO SPEND A CERTAIN AMOUNT ON HER MEDICATIONS AT HER PHARMACY. SHE CALLED THE PHARMACY TO SEE IF THIS HAS BEEN COMPLETED YET THIS YEAR AND THEY SHE HAS NOT SPENT THE AMOUNT NEEDED TO QUALIFY. SHE WANTS TO MAKE THE OFFICE AWARE OF THIS BUT ALSO THANK THEM FOR TRYING TO HELP HER.     Is it okay if the provider responds through V[employer]: THAT IS FINE   "

## 2023-06-01 NOTE — TELEPHONE ENCOUNTER
Called and spoke to Adan Esquivel Pt Asst. States patient has to have spent a documented $165.91 out of pocket before she can be approved. I also called the patient, information was given and understood. Patient states she will contact her pharmacy for the information and will bring these documents with her to her upcoming appointment.   aching

## 2023-06-07 ENCOUNTER — OFFICE VISIT (OUTPATIENT)
Dept: CARDIOLOGY | Facility: CLINIC | Age: 73
End: 2023-06-07
Payer: MEDICARE

## 2023-06-07 VITALS
HEART RATE: 78 BPM | TEMPERATURE: 97.9 F | OXYGEN SATURATION: 95 % | BODY MASS INDEX: 39.83 KG/M2 | SYSTOLIC BLOOD PRESSURE: 124 MMHG | WEIGHT: 224.8 LBS | HEIGHT: 63 IN | DIASTOLIC BLOOD PRESSURE: 76 MMHG

## 2023-06-07 DIAGNOSIS — I48.91 ATRIAL FIBRILLATION, NEW ONSET: Primary | ICD-10-CM

## 2023-06-07 DIAGNOSIS — I50.32 CHRONIC DIASTOLIC HEART FAILURE: ICD-10-CM

## 2023-06-07 DIAGNOSIS — E66.01 SEVERE OBESITY (BMI 35.0-39.9) WITH COMORBIDITY: ICD-10-CM

## 2023-06-07 DIAGNOSIS — I48.11 LONGSTANDING PERSISTENT ATRIAL FIBRILLATION: ICD-10-CM

## 2023-06-07 NOTE — PROGRESS NOTES
[facility] Cardiology Office Follow Up Note    Salinas Monroy  [id number]  2023    Primary Care Provider: Jm Marie DO    Chief Complaint: Routine follow-up    History of Present Illness:   Mrs. Salinas Monroy is a 73 y.o. female who presents to the Cardiology Clinic for routine follow-up.  The patient has a past medical history significant for hypertension, diastolic heart failure, hypothyroidism in the setting of prior Hashimoto's thyroiditis, Parkinson's disease, osteoarthritis, obesity, and ISABEL (but no longer uses CPAP).    She has a past cardiac history significant for cyst and atrial fibrillation.  She presents today for routine follow-up.  Since her last appointment, the patient reports she has done well.  She has not had any significant episodes of palpitations.  She continues to tolerate Eliquis without significant bleeding or bruising.  No chest pain or chest discomfort.  She does express interest today in the Watchman device in order to discontinue Eliquis.  No other specific complaints today.     Past Cardiac Testin. Last Coronary Angio: None  2. Prior Stress Testing: MPS      Normal pharmacologic MPS with no evidence of inducible ischemia.   Hyperdynamic LV systolic function, LVEF 71%.   3. Last Echo:  3/23              1.  Normal left ventricular size and systolic function, LVEF 60-65%.              2.  Mild concentric LVH.              3.  Normal LV diastolic filling pattern.              4.  Normal right ventricular size and systolic function.              5.  Moderately increased left atrial volume index.              6.  Trace AI and MR.              7.  Mild tricuspid regurgitation, RVSP 32 mmHg.  4.  Holter monitor: 14-day    1.  100% AF burden, average heart rate 83 bpm, max 146 bpm    Review of Systems:   Review of Systems   Constitutional:  Positive for fatigue. Negative for activity change, appetite change, chills,  diaphoresis, fever, unexpected weight gain and unexpected weight loss.   HENT:  Positive for tinnitus.    Eyes:  Negative for blurred vision and double vision.   Respiratory:  Negative for cough, chest tightness, shortness of breath and wheezing.    Cardiovascular:  Negative for chest pain, palpitations and leg swelling.   Gastrointestinal:  Negative for abdominal pain, anal bleeding, blood in stool and GERD.   Endocrine: Negative for cold intolerance and heat intolerance.   Genitourinary:  Negative for hematuria.   Neurological:  Negative for dizziness, syncope, weakness and light-headedness.   Hematological:  Does not bruise/bleed easily.   Psychiatric/Behavioral:  Negative for depressed mood and stress. The patient is not nervous/anxious.      I have reviewed and/or updated the patient's past medical, past surgical, family, social history, problem list and allergies as appropriate.     Medications:     Current Outpatient Medications:     acyclovir (ZOVIRAX) 400 MG tablet, TAKE ONE TABLET BY MOUTH EVERY DAY, Disp: 30 tablet, Rfl: 2    allopurinol (ZYLOPRIM) 100 MG tablet, TAKE TWO TABLETS BY MOUTH EVERY DAY, Disp: 180 tablet, Rfl: 0    apixaban (ELIQUIS) 5 MG tablet tablet, Take 1 tablet by mouth 2 (Two) Times a Day., Disp: 60 tablet, Rfl: 3    ASHWAGANDHA PO, Take  by mouth., Disp: , Rfl:     B Complex Vitamins (VITAMIN B COMPLEX PO), Take 1 tablet by mouth Daily., Disp: , Rfl:     B-12, Methylcobalamin, 1000 MCG sublingual tablet, Every Other Day., Disp: , Rfl:     Cholecalciferol (VITAMIN D3) 52488 UNITS tablet, Take 7,500 Units by mouth Daily., Disp: , Rfl:     Coenzyme Q10 400 MG capsule, Take 1 capsule by mouth Daily., Disp: , Rfl:     Diclofenac Sodium (VOLTAREN) 1 % gel gel, Apply 2 gm of gel topically to the appropriate area as directed twice a day. (to feet), Disp: 150 g, Rfl: 0    Digestive Enzymes (DIGESTIVE ENZYME PO), Take 1 tablet by mouth Daily., Disp: , Rfl:     escitalopram (LEXAPRO) 10 MG  tablet, Take 1 tablet by mouth Daily., Disp: 90 tablet, Rfl: 1    famotidine (PEPCID) 20 MG tablet, TAKE ONE TABLET BY MOUTH TWICE DAILY, Disp: 60 tablet, Rfl: 5    ferrous gluconate (FERGON) 324 MG tablet, Take 1 tablet by mouth Daily With Breakfast., Disp: 30 tablet, Rfl: 5    furosemide (LASIX) 20 MG tablet, TAKE ONE TABLET BY MOUTH TWICE DAILY, Disp: 60 tablet, Rfl: 2    levothyroxine (SYNTHROID, LEVOTHROID) 75 MCG tablet, TAKE ONE TABLET BY MOUTH EVERY DAY, Disp: 90 tablet, Rfl: 0    liothyronine (CYTOMEL) 25 MCG tablet, TAKE THREE TABLETS BY MOUTH EVERY DAY, Disp: 90 tablet, Rfl: 1    MAGNESIUM PO, Take 1,000 mg by mouth Every Night., Disp: , Rfl:     Methylsulfonylmethane (MSM PO), Take 1 tablet by mouth Daily., Disp: , Rfl:     metoprolol succinate XL (TOPROL-XL) 25 MG 24 hr tablet, Take 1 tablet by mouth Daily for 90 days., Disp: 90 tablet, Rfl: 3    MILK THISTLE PO, Take 1 tablet by mouth Daily., Disp: , Rfl:     Misc Natural Products (TART CHERRY ADVANCED PO), Take 2 capsules by mouth Daily., Disp: , Rfl:     Omega-3 Fatty Acids (OMEGA-3 FISH OIL PO), Take 4,000 Units by mouth 3 (Three) Times a Day., Disp: , Rfl:     potassium chloride 10 MEQ CR tablet, TAKE ONE TABLET BY MOUTH TWICE DAILY, Disp: 60 tablet, Rfl: 1    Probiotic Product (PROBIOTIC PO), Take 1 tablet by mouth Daily., Disp: , Rfl:     SELENIUM PO, Take 1 tablet by mouth Daily., Disp: , Rfl:     sucralfate (CARAFATE) 1 g tablet, Take 1 tablet by mouth 4 (Four) Times a Day., Disp: 40 tablet, Rfl: 0    TURMERIC PO, Take 1 tablet by mouth 2 (two) times a day., Disp: , Rfl:     VITAMIN A PO, Take  by mouth., Disp: , Rfl:     vitamin C (ASCORBIC ACID) 500 MG tablet, Take 1 tablet by mouth 3 (Three) Times a Day As Needed., Disp: , Rfl:     vitamin E 400 UNIT capsule, Take 1 capsule by mouth Daily., Disp: , Rfl:     VITAMIN K PO, Take 100 mcg by mouth Daily., Disp: , Rfl:     Zinc 30 MG tablet, Take  by mouth Daily., Disp: , Rfl:     doxycycline  "(MONODOX) 100 MG capsule, Take 1 capsule by mouth 2 (Two) Times a Day., Disp: 14 capsule, Rfl: 0    GARLIC PO, Take 1 tablet by mouth Daily. (Patient not taking: Reported on 6/7/2023), Disp: , Rfl:     ketorolac (TORADOL) 10 MG tablet, Take 1 tablet by mouth Every 6 (Six) Hours As Needed for Moderate Pain., Disp: 12 tablet, Rfl: 0    oxyCODONE-acetaminophen (PERCOCET) 5-325 MG per tablet, Take 1 tablet by mouth Every 4 (Four) Hours As Needed for Severe Pain., Disp: 10 tablet, Rfl: 0    Physical Exam:  Vital Signs:   Vitals:    06/07/23 1448   BP: 124/76   Pulse: 78   Temp: 97.9 °F (36.6 °C)   SpO2: 95%   Weight: 102 kg (224 lb 12.8 oz)   Height: 160 cm (63\")       Physical Exam  Constitutional:       General: She is not in acute distress.     Appearance: She is well-developed. She is obese. She is not diaphoretic.   HENT:      Head: Normocephalic and atraumatic.   Eyes:      General: No scleral icterus.     Pupils: Pupils are equal, round, and reactive to light.   Neck:      Trachea: No tracheal deviation.   Cardiovascular:      Rate and Rhythm: Normal rate and regular rhythm.      Heart sounds: Normal heart sounds. No murmur heard.    No friction rub. No gallop.      Comments: Normal JVD.  Pulmonary:      Effort: Pulmonary effort is normal. No respiratory distress.      Breath sounds: Normal breath sounds. No stridor. No wheezing or rales.   Chest:      Chest wall: No tenderness.   Abdominal:      General: Bowel sounds are normal. There is no distension.      Palpations: Abdomen is soft.      Tenderness: There is no abdominal tenderness. There is no guarding or rebound.   Musculoskeletal:         General: Normal range of motion.      Cervical back: Neck supple. No tenderness.   Lymphadenopathy:      Cervical: No cervical adenopathy.   Skin:     General: Skin is warm and dry.      Findings: No erythema.   Neurological:      Mental Status: She is alert and oriented to person, place, and time.   Psychiatric:         " Behavior: Behavior normal.       Results Review:   I reviewed the patient's new clinical results.        Assessment / Plan:     1.  Persistent atrial fibrillation  -- Recent outpatient cardiac monitoring showed 100% AF burden, average heart rate 83 bpm, max 146 bpm  --Appears to largely be asymptomatic, no palpitations  --Will continue metoprolol for ventricular rate control strategy  --Continue Eliquis for CVA prophylaxis  --As the patient is interested in Watchman device to discontinue Eliquis, will refer to electrophysiology     2.  Chronic diastolic heart failure  -- Darlene euvolemic and well compensated today  -- Continue current dose of Lasix     3.  Obesity, BMI 39  -- Weight loss through diet and exercise advised    Follow Up:   Return in about 3 months (around 9/7/2023).      Thank you for allowing me to participate in the care of your patient. Please to not hesitate to contact me with additional questions or concerns.     JEWELL Pires MD  Interventional Cardiology   06/07/2023  14:36 EDT

## 2023-06-16 DIAGNOSIS — I50.32 CHRONIC DIASTOLIC HEART FAILURE: ICD-10-CM

## 2023-06-16 DIAGNOSIS — I87.2 VENOUS INSUFFICIENCY OF BOTH LOWER EXTREMITIES: ICD-10-CM

## 2023-06-16 RX ORDER — POTASSIUM CHLORIDE 750 MG/1
TABLET, FILM COATED, EXTENDED RELEASE ORAL
Qty: 60 TABLET | Refills: 1 | Status: SHIPPED | OUTPATIENT
Start: 2023-06-16

## 2023-06-20 ENCOUNTER — TELEPHONE (OUTPATIENT)
Dept: PULMONOLOGY | Facility: CLINIC | Age: 73
End: 2023-06-20

## 2023-06-20 NOTE — TELEPHONE ENCOUNTER
"  Caller: Salinas Monroy \"Del\"    Relationship: Self    Best call back number: [phone]    What is the best time to reach you: ANYTIME    Who are you requesting to speak with (clinical staff, provider,  specific staff member): DAGOBERTO    Do you know the name of the person who called: DAGOBERTO    What was the call regarding: TEST RESTLTS    Is it okay if the provider responds through MyChart: YES. CAN ALSO LEAVE VM    "
detailed exam

## 2023-06-21 NOTE — TELEPHONE ENCOUNTER
"Caller: Salinas Monroy \"Del\"    Relationship: Self    Best call back number: [phone]    What is the best time to reach you: AFTERNOON    Who are you requesting to speak with (clinical staff, provider,  specific staff member): VALENTINE GROSS    What was the call regarding: PT REPORTS THAT SHE IS ABLE TO PAY A CERTAIN AMOUNT OUT OF POCKET TO THE INSURANCE AND SHE WOULD BE ABLE TO QULIFY FOR FREE ELIQUIS. SHE IS WONDERING WHAT THAT FIGURE WAS AND HOW IT WAS FIGURED OUT [employer]. SO SHE WILL KNOW WHEN SHE WILL QUALITY FOR THAT.     Is it okay if the provider responds through [employer]: YES   "

## 2023-06-22 NOTE — TELEPHONE ENCOUNTER
The patient needs to spend around $$166.00 out of pocket to be approved as documented in previous encounter. Attempted to call patient, phone number is not currently working.

## 2023-06-26 PROBLEM — Z79.01 CHRONIC ANTICOAGULATION: Chronic | Status: ACTIVE | Noted: 2023-06-26

## 2023-08-13 DIAGNOSIS — I87.2 VENOUS INSUFFICIENCY OF BOTH LOWER EXTREMITIES: ICD-10-CM

## 2023-08-13 DIAGNOSIS — I50.32 CHRONIC DIASTOLIC HEART FAILURE: ICD-10-CM

## 2023-08-14 RX ORDER — POTASSIUM CHLORIDE 750 MG/1
TABLET, FILM COATED, EXTENDED RELEASE ORAL
Qty: 60 TABLET | Refills: 1 | Status: SHIPPED | OUTPATIENT
Start: 2023-08-14

## 2023-08-23 ENCOUNTER — OFFICE VISIT (OUTPATIENT)
Dept: CARDIOLOGY | Facility: CLINIC | Age: 73
End: 2023-08-23
Payer: MEDICARE

## 2023-08-23 VITALS
WEIGHT: 225 LBS | DIASTOLIC BLOOD PRESSURE: 74 MMHG | HEART RATE: 88 BPM | OXYGEN SATURATION: 96 % | HEIGHT: 63 IN | SYSTOLIC BLOOD PRESSURE: 112 MMHG | BODY MASS INDEX: 39.87 KG/M2

## 2023-08-23 DIAGNOSIS — E06.3 HASHIMOTO'S THYROIDITIS: ICD-10-CM

## 2023-08-23 DIAGNOSIS — I48.11 LONGSTANDING PERSISTENT ATRIAL FIBRILLATION: Primary | ICD-10-CM

## 2023-08-23 PROCEDURE — 99204 OFFICE O/P NEW MOD 45 MIN: CPT | Performed by: INTERNAL MEDICINE

## 2023-08-23 PROCEDURE — 1159F MED LIST DOCD IN RCRD: CPT | Performed by: INTERNAL MEDICINE

## 2023-08-23 PROCEDURE — 1160F RVW MEDS BY RX/DR IN RCRD: CPT | Performed by: INTERNAL MEDICINE

## 2023-08-23 PROCEDURE — 93000 ELECTROCARDIOGRAM COMPLETE: CPT | Performed by: INTERNAL MEDICINE

## 2023-08-23 NOTE — PROGRESS NOTES
Electrophysiology Clinic Consult     Salinas Monroy  1950  [unfilled]  [unfilled]    08/23/23    DATE OF ADMISSION: (Not on file)  [facility] CARDIOLOGY    Jm Marie, DO  [address] [place] [id number]  Referring Provider: Jose Antonio Pires MD     Chief Complaint   Patient presents with    Atrial Fibrillation     Problem List:  Longstanding Persistent Atrial Fibrillation   CHADSvasc = 3 (HTN, female, Age >65)  Echocardiogram 3/2023: EF 60 to 65%, trace AI, trace MR, mild TR, RVSP 32 mmHg, LA 5.1 cm  Pharmacologic MPS 4/2023: No evidence of inducible ischemia, EF 71%  14 Day Holter Monitor 5/2023: 100% atrial fibrillation, average HR 83 bpm, max 146 bpm  Hypertension  Hypothyroidism/Hashimoto's thyroiditis- 6/2023 labwork shows low TSH, low T4  Essential tremors  Falls/weakness of lower extremities   Osteoarthritis  Obesity  Obstructive sleep apnea - on CPAP x 1 month     History of Present Illness:   Salinas Monroy is a 73-year-old female with above-stated past medical history who presents today in consultation, referred by Jose Antonio Pires MD for evaluation of atrial fibrillation and consideration for Watchman device.  She was diagnosed with atrial fibrillation 2/2023 when she presented to the ED with chest pain. She was diagnosed with pleurisy and treated with medication, which has resolved. Atrial fibrillation was felt to be an incidental finding, and it was of unknown duration. She reports having COVID sometime a few months prior to her diagnosis. After her ER visit, she wore an outpatient cardiac monitor 5/2023 showing 100% atrial fibrillation burden with average heart rate of 83 bpm.  She notes being fatigued, which has been ongoing for several years, but in hindsight, she thinks she may be more fatigued now. She has been on Eliquis for anticoagulation for stroke prophylaxis. She is on Metoprolol for rate control.  The patient has been interested in Watchman  device in order to discontinue Eliquis.  She has a [id number] of 3. No bleeding issues, no need for blood transfusion. She has fallen in the past, last time was a few months ago. She states her legs are very weak, despite PT. When she does fall, she cannot get back up. This is a concern for her and her family members with her being on blood thinner.   ISABEL - just started CPAP  HTN: well controlled.   Thyroid: under control  Tobacco: quit 2008  ETOH: none  Caffeine: 2 cups per day.         No Known Allergies     Cannot display prior to admission medications because the patient has not been admitted in this contact.              Current Outpatient Medications:     acyclovir (ZOVIRAX) 400 MG tablet, TAKE ONE TABLET BY MOUTH EVERY DAY, Disp: 30 tablet, Rfl: 2    allopurinol (ZYLOPRIM) 100 MG tablet, TAKE TWO TABLETS BY MOUTH EVERY DAY, Disp: 180 tablet, Rfl: 0    apixaban (ELIQUIS) 5 MG tablet tablet, Take 1 tablet by mouth 2 (Two) Times a Day., Disp: 60 tablet, Rfl: 3    ASHWAGANDHA PO, Take  by mouth., Disp: , Rfl:     B Complex Vitamins (VITAMIN B COMPLEX PO), Take 1 tablet by mouth Daily., Disp: , Rfl:     B-12, Methylcobalamin, 1000 MCG sublingual tablet, Every Other Day., Disp: , Rfl:     buPROPion SR (Wellbutrin SR) 100 MG 12 hr tablet, Take 1 tablet by mouth Every Morning., Disp: 90 tablet, Rfl: 1    Cholecalciferol (VITAMIN D3) 56050 UNITS tablet, Take 7,500 Units by mouth Daily., Disp: , Rfl:     Coenzyme Q10 400 MG capsule, Take 1 capsule by mouth Daily., Disp: , Rfl:     Diclofenac Sodium (VOLTAREN) 1 % gel gel, Apply 2 gm of gel topically to the appropriate area as directed twice a day. (to feet), Disp: 150 g, Rfl: 0    Digestive Enzymes (DIGESTIVE ENZYME PO), Take 1 tablet by mouth Daily., Disp: , Rfl:     escitalopram (LEXAPRO) 10 MG tablet, Take 1 tablet by mouth Every Night., Disp: 90 tablet, Rfl: 1    famotidine (PEPCID) 20 MG tablet, TAKE ONE TABLET BY MOUTH TWICE DAILY, Disp: 60 tablet, Rfl: 5     ferrous gluconate (FERGON) 324 MG tablet, Take 1 tablet by mouth Daily With Breakfast., Disp: 30 tablet, Rfl: 5    furosemide (LASIX) 20 MG tablet, TAKE ONE TABLET BY MOUTH TWICE DAILY, Disp: 60 tablet, Rfl: 2    levothyroxine (SYNTHROID, LEVOTHROID) 75 MCG tablet, TAKE ONE TABLET BY MOUTH EVERY DAY, Disp: 90 tablet, Rfl: 0    liothyronine (CYTOMEL) 25 MCG tablet, TAKE THREE TABLETS BY MOUTH EVERY DAY, Disp: 90 tablet, Rfl: 1    MAGNESIUM PO, Take 1,000 mg by mouth Every Night., Disp: , Rfl:     Methylsulfonylmethane (MSM PO), Take 1 tablet by mouth Daily., Disp: , Rfl:     MILK THISTLE PO, Take 1 tablet by mouth Daily., Disp: , Rfl:     Misc Natural Products (TART CHERRY ADVANCED PO), Take 2 capsules by mouth Daily., Disp: , Rfl:     Omega-3 Fatty Acids (OMEGA-3 FISH OIL PO), Take 4,000 Units by mouth 3 (Three) Times a Day., Disp: , Rfl:     potassium chloride 10 MEQ CR tablet, TAKE ONE TABLET BY MOUTH TWICE DAILY, Disp: 60 tablet, Rfl: 1    Probiotic Product (PROBIOTIC PO), Take 1 tablet by mouth Daily., Disp: , Rfl:     SELENIUM PO, Take 1 tablet by mouth Daily., Disp: , Rfl:     sucralfate (CARAFATE) 1 g tablet, Take 1 tablet by mouth 4 (Four) Times a Day., Disp: 40 tablet, Rfl: 0    TURMERIC PO, Take 1 tablet by mouth 2 (two) times a day., Disp: , Rfl:     VITAMIN A PO, Take  by mouth., Disp: , Rfl:     vitamin C (ASCORBIC ACID) 500 MG tablet, Take 1 tablet by mouth 3 (Three) Times a Day As Needed., Disp: , Rfl:     vitamin E 400 UNIT capsule, Take 1 capsule by mouth Daily., Disp: , Rfl:     VITAMIN K PO, Take 100 mcg by mouth Daily., Disp: , Rfl:     Zinc 30 MG tablet, Take  by mouth Daily., Disp: , Rfl:     metoprolol succinate XL (TOPROL-XL) 25 MG 24 hr tablet, Take 1 tablet by mouth Daily for 90 days., Disp: 90 tablet, Rfl: 3    Social History     Socioeconomic History    Marital status:    Tobacco Use    Smoking status: Former     Packs/day: 1.00     Years: 40.00     Pack years: 40.00     Types:  Cigarettes     Start date: 1/1/1967     Quit date: 1/1/2008     Years since quitting: 15.6     Passive exposure: Past    Smokeless tobacco: Never   Vaping Use    Vaping Use: Never used   Substance and Sexual Activity    Alcohol use: Not Currently     Comment: very rarely    Drug use: No    Sexual activity: Not Currently     Partners: Male     Birth control/protection: None       Family History   Problem Relation Age of Onset    Obesity Mother     Rheum arthritis Mother     Thyroid disease Mother     Hypertension Mother         Passed 2004    Stroke Mother         Most of her problems were caused by Rheumatoid Ar.    Hyperlipidemia Mother     Arthritis Mother         Rheumatoid Arthritis..Passed 2004    Heart disease Mother         CHF    Cancer Father         Passed 1997    Kidney cancer Father     Liver cancer Father     No Known Problems Brother     Diabetes Maternal Aunt     Cancer Maternal Uncle     Lung cancer Maternal Uncle     Alcohol abuse Maternal Uncle     No Known Problems Paternal Aunt     Stroke Paternal Uncle     Hypertension Paternal Uncle     Hyperlipidemia Paternal Uncle     Heart disease Paternal Uncle     No Known Problems Brother     Asthma Sister         under control    Alcohol abuse Maternal Uncle     Colon cancer Neg Hx     Breast cancer Neg Hx     Ovarian cancer Neg Hx        REVIEW OF SYSTEMS:   CONST:  No weight loss, fever, chills, + weakness + fatigue.   HEENT:  No visual loss, blurred vision, double vision, yellow sclerae.                   No hearing loss, congestion, sore throat.   SKIN:      No rashes, urticaria, ulcers, sores.     RESP:     No shortness of breath, hemoptysis, cough, sputum.   GI:           No anorexia, nausea, vomiting, diarrhea. No abdominal pain, melena.   :         No burning on urination, hematuria or increased frequency.  ENDO:    No diaphoresis, cold or heat intolerance. No polyuria or polydipsia.   NEURO:  No headache, dizziness, syncope, paralysis, ataxia,  "or parasthesias.                  No change in bowel or bladder control. No history of CVA/TIA  MUSC:    No muscle, back pain, joint pain or stiffness.   HEME:    No anemia, bleeding, bruising. No history of DVT/PE.  PSYCH:  No history of depression, anxiety    Vitals:    08/23/23 1348   BP: 112/74   BP Location: Right arm   Patient Position: Sitting   Pulse: 88   SpO2: 96%   Weight: 102 kg (225 lb)   Height: 160 cm (63\")                 Physical Exam:  GEN: Well nourished, well-developed, no acute distress  HEENT: Normocephalic, atraumatic, PERRLA, moist mucous membranes  NECK: Supple, NO JVD, no thyromegaly, no lymphadenopathy  CARD: S1S2, irr, irr, no murmur, gallop, rub  LUNGS: Clear to auscultation, normal respiratory effort  ABDOMEN: Soft, nontender, normal bowel sounds  EXTREMITIES: No gross deformities, no clubbing, cyanosis, or edema  SKIN: Warm, dry  NEURO: No focal deficits, alert and oriented x 3  PSYCHIATRIC: Normal affect and mood      I personally viewed and interpreted the patient's EKG/Telemetry/lab data    Lab Results   Component Value Date    GLUCOSE 106 (H) 06/26/2023    CALCIUM 9.8 06/26/2023     06/26/2023    K 4.6 06/26/2023    CO2 22.9 06/26/2023     06/26/2023    BUN 42 (H) 06/26/2023    CREATININE 0.78 06/26/2023    EGFRIFAFRI 81 12/15/2021    EGFRIFNONA 71 12/15/2021    BCR 53.8 (H) 06/26/2023    ANIONGAP 13.9 04/02/2023     Lab Results   Component Value Date    WBC 9.41 06/26/2023    HGB 15.2 06/26/2023    HCT 45.6 06/26/2023    MCV 87.9 06/26/2023     06/26/2023     No results found for: INR, PROTIME  Lab Results   Component Value Date    TSH <0.005 (L) 06/26/2023    W1EOJBS 80.2 10/24/2019    L9UKBAZ 4.50 (L) 10/06/2016    THYROIDAB 104 (H) 04/18/2019             ECG 12 Lead    Date/Time: 8/23/2023 2:39 PM  Performed by: Guanako Thompson MD  Authorized by: Guanako Thompson MD   Comparison: compared with previous ECG from 5/9/2023  Similar to previous ECG  Rhythm: " atrial fibrillation  BPM: 84          ICD-10-CM ICD-9-CM   1. Longstanding persistent atrial fibrillation  I48.11 427.31   2. Hashimoto's thyroiditis  E06.3 245.2       Assessment and Plan:   Longstanding Persistent Atrial Fibrillation:   - diagnosed 2/2023, of unknown duration, but likely developed late 2022 or early 2023 per patient's history  - symptoms of fatigue, low energy. We did explain that her thyroid dysfunction (reviewed labwork form 6/2023 showed low tsh and t4) may cause difficulty in maintaining NSR. She needs to see an endocrinologist.   - She would like to try to attempt to return to NSR. Will start her on Tikosyn with +/- AAD.   - CHADSVasc = 3 on Eliquis. She has weakness of her lower extremities despite participating in physical therapies and has had several falls which puts her at high risk for life threatening bleeding events while taking a blood thinner. She is not a good candidate for long term anticoagulation but can tolerate short term anticoagulation. She is a good candidate for Watchman Device. The risks, benefits, and alternatives of the procedure have been reviewed and the patient wishes to proceed.        2. Thyroid Dysfunction:  - history of thyroiditis  - lab work reviewed from 6/2023 shows low TSH and low T4  - on levothyroxine and Cytomel  - would recommend seeing an endocrinologist- we can try to coordinate this while she is admitted for Tikosyn.       Scribed for Guanako Thompson MD by Domonique Francis PA-C. 8/23/2023  14:50 EDT    I, Guanako Thompson MD, personally performed the services described in this documentation as scribed by the above named individual in my presence, and it is both accurate and complete.  8/23/2023  15:04 EDT

## 2023-09-11 ENCOUNTER — HOSPITAL ENCOUNTER (INPATIENT)
Facility: HOSPITAL | Age: 73
LOS: 2 days | Discharge: HOME | DRG: 310 | End: 2023-09-13
Attending: INTERNAL MEDICINE | Admitting: INTERNAL MEDICINE
Payer: MEDICARE

## 2023-09-11 DIAGNOSIS — I48.19 PERSISTENT ATRIAL FIBRILLATION: Primary | ICD-10-CM

## 2023-09-11 LAB
ANION GAP SERPL CALCULATED.3IONS-SCNC: 10 MMOL/L (ref 5–15)
APTT PPP: 29.4 SECONDS (ref 22–39)
BILIRUB UR QL STRIP: NEGATIVE
BUN SERPL-MCNC: 48 MG/DL (ref 8–23)
BUN/CREAT SERPL: 61.5 (ref 7–25)
CA-I SERPL ISE-MCNC: 1.3 MMOL/L (ref 1.12–1.32)
CALCIUM SPEC-SCNC: 9.1 MG/DL (ref 8.6–10.5)
CHLORIDE SERPL-SCNC: 108 MMOL/L (ref 98–107)
CLARITY UR: CLEAR
CO2 SERPL-SCNC: 25 MMOL/L (ref 22–29)
COLOR UR: YELLOW
CREAT SERPL-MCNC: 0.78 MG/DL (ref 0.57–1)
EGFRCR SERPLBLD CKD-EPI 2021: 79.8 ML/MIN/1.73
GLUCOSE SERPL-MCNC: 99 MG/DL (ref 65–99)
GLUCOSE UR STRIP-MCNC: NEGATIVE MG/DL
HGB UR QL STRIP.AUTO: NEGATIVE
INR PPP: 1.12 (ref 0.89–1.12)
KETONES UR QL STRIP: NEGATIVE
LEUKOCYTE ESTERASE UR QL STRIP.AUTO: NEGATIVE
MAGNESIUM SERPL-MCNC: 2.5 MG/DL (ref 1.6–2.4)
NITRITE UR QL STRIP: NEGATIVE
PH UR STRIP.AUTO: <=5 [PH] (ref 5–8)
POTASSIUM SERPL-SCNC: 4.4 MMOL/L (ref 3.5–5.2)
PROT UR QL STRIP: NEGATIVE
PROTHROMBIN TIME: 14.6 SECONDS (ref 12.2–14.5)
QT INTERVAL: 230 MS
QTC INTERVAL: 251 MS
SODIUM SERPL-SCNC: 143 MMOL/L (ref 136–145)
SP GR UR STRIP: 1.01 (ref 1–1.03)
UROBILINOGEN UR QL STRIP: NORMAL

## 2023-09-11 PROCEDURE — 93005 ELECTROCARDIOGRAM TRACING: CPT

## 2023-09-11 PROCEDURE — 93005 ELECTROCARDIOGRAM TRACING: CPT | Performed by: INTERNAL MEDICINE

## 2023-09-11 PROCEDURE — 85610 PROTHROMBIN TIME: CPT

## 2023-09-11 PROCEDURE — 80048 BASIC METABOLIC PNL TOTAL CA: CPT

## 2023-09-11 PROCEDURE — 82330 ASSAY OF CALCIUM: CPT

## 2023-09-11 PROCEDURE — 99222 1ST HOSP IP/OBS MODERATE 55: CPT

## 2023-09-11 PROCEDURE — 85730 THROMBOPLASTIN TIME PARTIAL: CPT

## 2023-09-11 PROCEDURE — 81003 URINALYSIS AUTO W/O SCOPE: CPT

## 2023-09-11 PROCEDURE — 83735 ASSAY OF MAGNESIUM: CPT

## 2023-09-11 RX ORDER — ALLOPURINOL 100 MG/1
200 TABLET ORAL DAILY
Status: DISCONTINUED | OUTPATIENT
Start: 2023-09-12 | End: 2023-09-13 | Stop reason: HOSPADM

## 2023-09-11 RX ORDER — NITROGLYCERIN 0.4 MG/1
0.4 TABLET SUBLINGUAL
Status: DISCONTINUED | OUTPATIENT
Start: 2023-09-11 | End: 2023-09-13 | Stop reason: HOSPADM

## 2023-09-11 RX ORDER — DOFETILIDE 0.5 MG/1
500 CAPSULE ORAL EVERY 12 HOURS
Status: DISCONTINUED | OUTPATIENT
Start: 2023-09-11 | End: 2023-09-12

## 2023-09-11 RX ORDER — FERROUS SULFATE 325(65) MG
325 TABLET ORAL
Status: DISCONTINUED | OUTPATIENT
Start: 2023-09-12 | End: 2023-09-13 | Stop reason: HOSPADM

## 2023-09-11 RX ORDER — FUROSEMIDE 20 MG/1
20 TABLET ORAL 2 TIMES DAILY
Status: DISCONTINUED | OUTPATIENT
Start: 2023-09-11 | End: 2023-09-13 | Stop reason: HOSPADM

## 2023-09-11 RX ORDER — LEVOTHYROXINE SODIUM 0.07 MG/1
75 TABLET ORAL
Status: DISCONTINUED | OUTPATIENT
Start: 2023-09-12 | End: 2023-09-13 | Stop reason: HOSPADM

## 2023-09-11 RX ORDER — NYSTATIN 100000 [USP'U]/G
POWDER TOPICAL EVERY 12 HOURS SCHEDULED
Status: DISCONTINUED | OUTPATIENT
Start: 2023-09-11 | End: 2023-09-13 | Stop reason: HOSPADM

## 2023-09-11 RX ORDER — FAMOTIDINE 20 MG/1
20 TABLET, FILM COATED ORAL 2 TIMES DAILY
Status: DISCONTINUED | OUTPATIENT
Start: 2023-09-11 | End: 2023-09-13 | Stop reason: HOSPADM

## 2023-09-11 RX ORDER — POTASSIUM CHLORIDE 750 MG/1
10 CAPSULE, EXTENDED RELEASE ORAL 2 TIMES DAILY WITH MEALS
Status: DISCONTINUED | OUTPATIENT
Start: 2023-09-11 | End: 2023-09-13 | Stop reason: HOSPADM

## 2023-09-11 RX ORDER — BUPROPION HYDROCHLORIDE 100 MG/1
100 TABLET, EXTENDED RELEASE ORAL EVERY MORNING
Status: DISCONTINUED | OUTPATIENT
Start: 2023-09-12 | End: 2023-09-13 | Stop reason: HOSPADM

## 2023-09-11 RX ORDER — LIOTHYRONINE SODIUM 25 UG/1
25 TABLET ORAL 3 TIMES DAILY
Status: DISCONTINUED | OUTPATIENT
Start: 2023-09-11 | End: 2023-09-13 | Stop reason: HOSPADM

## 2023-09-11 RX ORDER — LIOTHYRONINE SODIUM 25 UG/1
75 TABLET ORAL DAILY
Status: DISCONTINUED | OUTPATIENT
Start: 2023-09-12 | End: 2023-09-11

## 2023-09-11 RX ORDER — ESCITALOPRAM OXALATE 10 MG/1
10 TABLET ORAL NIGHTLY
Status: DISCONTINUED | OUTPATIENT
Start: 2023-09-11 | End: 2023-09-13 | Stop reason: HOSPADM

## 2023-09-11 RX ADMIN — APIXABAN 5 MG: 5 TABLET, FILM COATED ORAL at 21:36

## 2023-09-11 RX ADMIN — DOFETILIDE 500 MCG: 0.5 CAPSULE ORAL at 19:26

## 2023-09-11 RX ADMIN — POTASSIUM CHLORIDE 10 MEQ: 750 CAPSULE, EXTENDED RELEASE ORAL at 17:41

## 2023-09-11 RX ADMIN — FUROSEMIDE 20 MG: 20 TABLET ORAL at 21:36

## 2023-09-11 RX ADMIN — FAMOTIDINE 20 MG: 20 TABLET, FILM COATED ORAL at 21:36

## 2023-09-11 RX ADMIN — LIOTHYRONINE SODIUM 25 MCG: 25 TABLET ORAL at 17:41

## 2023-09-11 RX ADMIN — LIOTHYRONINE SODIUM 25 MCG: 25 TABLET ORAL at 21:37

## 2023-09-11 RX ADMIN — ESCITALOPRAM OXALATE 10 MG: 10 TABLET ORAL at 21:36

## 2023-09-11 NOTE — PROGRESS NOTES
Pharmacy Consult - Tikosyn Initiation    Salinas Monroy is a 73 y.o. female admitted for Tikosyn initiation and monitoring.    Height:   160 cm   Weight:   102 kg    Evaluation of Drug-Drug Interactions     Previous antiarrhythmic medications must be discontinued prior to admission       - Amiodarone must be discontinued >3 weeks prior to Tikosyn start       - Sotalol and class I antiarrhythmics (Dysopyramide, Flecainide, Mexiletine, Propafenone) must be discontinued >48 hours prior to Tikosyn start         - Previous antiarrhythmic therapy: Flecanide  (last dose 9/7 per RN)     Current drug-drug interactions noted with admission medications and Tikosyn    The following medications are contraindicated with Dofetilide and will be/have been discontinued:  - Fluoroquinolones (Ciprofloxacin, Levofloxacin, Moxifloxacin, Norfloxacin)  - Azole antifungals (Itraconazole, Ketoconazole, Posaconazole)  - Hydrochlorothiazide and any combination products containing Hydrochlorothiazide   - Trimethoprim and Trimethoprim-Sulfamethoxazole   - Verapamil  - Cimetidine  - Ziprasidone   - Dolutegravir  - Sauinavir  - Thioridazine   - Fingolimod  - Megestrol  - Pimozide  - Prochloperazine  - Lamotrigine  - Ibutilide  - Procainamide  - Macrolide antibiotics (Erythromycin, Clarithromycin)  - Quetiapine  - Citalopram    The following medications are recognized to prolong QT interval, however the medication may continue during initial Dofetilide dosing:  - Loop diuretics (Bumetanide, Furosemide, Torsemide)  - Antipsychotics (Haloperidol,  Risperidone, Asenapine)   - Antidepressants (Amitriptyline, Amoxapine, Clomipramine, Desipramine, Doxepin, Imipramine, Maprotiline, Mirtazapine, Nortriptyline, Protriptyline, Triipramine)  - Diltiazem  - Ondansetron  - Ivabradine  - Eribulin  - Paliperidone  - Phenothiazines (Chlorpromazine, Fluphenazine, Mesoridazine, Perphenazine, Promazine, Trifluoperazine)    The following medications may  increase Dofetilide serum concentrations and can be co-administered:  - Azole antifungals (Fluconazole, Voriconazole)  - SSRI's ( Escitalopram, Fluvoxamine, Fluoxetine, Paroxetine, Sertraline)  - Protease Inhibitors (Amprenavir, Indinavir, Nelfinavir, Ritonavir)  - Diltiazem   - Metformin, Glyburide  - Amiloride  - Cannabinoids  - Nefazodone  - Triamterene  - Quinine    Laboratory              Pharmacy will order electrolyte replacement per protocols if indicated (Mag < 2.0, K < 4.0) based on laboratory values on admission      - Magnesium replacement protocol ordered: Yes     - Potassium replacement protocol ordered: Yes    Estimated CrCl =  101.9 mL/min  (Calculated with Cockroft-Gault equation using actual body weight and serum creatinine for calculation--can use laboratory values from previous 24 hours)    Initial QTc and EKG Monitoring    QTc = 251     If QTc is:     < 440 msec: okay to proceed with initiation      > 440 msec: must contact MD or physician extender (VALENTINE/PA-C)             - Provider contacted: N/A             - Okay to proceed: Yes                   If QTc  < 500 msec and a pre-existing ventricular conduction defect exists), must contact MD or physician extender (APRN/PA-C)    Patient has a functioning atrial pacemaker - No     Cardiology aware, will proceed    Initial Tikosyn dose based on Creatinine Clearance:    CrCl > 60 mL/min - Dofetilide 500 mcg PO Q12H  CrCl 40-60 mL/min - Dofetilide 250 mcg PO Q12H  CrCl 20-39 mL/min - Dofetilide 125 mcg PO Q12H  CrCl < 20 mL/min - Do not start medication, contact MD    (Will dose medication 10 hour intervals until administration times are between 7 and 9).    Assessment/Plan:    Patient admitted for Tikosyn initiation per cardiology. Will initiate Tikosyn 500 mcg PO every 12 hours.  Monitor electrolytes and drug-drug interactions.  Pharmacy will continue to follow.

## 2023-09-11 NOTE — H&P
Cardiology H&P     Salinas Monroy  1950  [phone]  There is no work phone number on file.    09/11/23    DATE OF ADMISSION: 9/11/2023  [facility]    Jm Marie, DO  [address] [place] [id number]  Referring Provider: Guanako Thompson MD     CC: Persistent afib    Problem List:  Longstanding Persistent Atrial Fibrillation   CHADSvasc = 3 (HTN, female, Age >65)  Echocardiogram 3/2023: EF 60 to 65%, trace AI, trace MR, mild TR, RVSP 32 mmHg, LA 5.1 cm  Pharmacologic MPS 4/2023: No evidence of inducible ischemia, EF 71%  14 Day Holter Monitor 5/2023: 100% atrial fibrillation, average HR 83 bpm, max 146 bpm  Hypertension  Hypothyroidism/Hashimoto's thyroiditis- 6/2023 labwork shows low TSH, low T4  Essential tremors  Falls/weakness of lower extremities   Osteoarthritis  Obesity  Obstructive sleep apnea - on CPAP x 1 month     History of Present Illness:   Salinas Monroy is a 73 year old female with above PMHx who presents for scheduled [facility] admission.  She was first diagnosed with atrial fibrillation February 2023 when she presented to the ER with chest pain.  She reports having COVID a few months prior to her diagnosis.  She wore a Holter monitor in May that showed 100% atrial fibrillation burden with controlled heart rates of 83 bpm.  When in A-fib she has symptoms of fatigue and shortness of breath with activity.  She has been on Eliquis with no missed doses since diagnosis of A-fib.      No Known Allergies    Prior to Admission Medications       Prescriptions Last Dose Informant Patient Reported? Taking?    acyclovir (ZOVIRAX) 400 MG tablet Past Week  No Yes    TAKE ONE TABLET BY MOUTH EVERY DAY    Patient taking differently:  1 tablet. One tablet three times a week    allopurinol (ZYLOPRIM) 100 MG tablet 9/11/2023  No Yes    TAKE TWO TABLETS BY MOUTH EVERY DAY    apixaban (ELIQUIS) 5 MG tablet tablet 9/11/2023  No Yes    Take 1 tablet by mouth 2 (Two) Times a  Day.    ASHWAGANDHA PO 9/11/2023 Self Yes Yes    Take 1 capsule by mouth Daily.    B Complex Vitamins (VITAMIN B COMPLEX PO) 9/10/2023 Self Yes Yes    Take 1 tablet by mouth Daily.    B-12, Methylcobalamin, 1000 MCG sublingual tablet 9/10/2023  Yes Yes    Every Other Day.    buPROPion SR (Wellbutrin SR) 100 MG 12 hr tablet 9/11/2023  No Yes    Take 1 tablet by mouth Every Morning.    Cholecalciferol (VITAMIN D3) 20444 UNITS tablet 9/10/2023  Yes Yes    Take 7,500 Units by mouth Daily.    Coenzyme Q10 200 MG capsule 9/11/2023 Self Yes Yes    Take 1 capsule by mouth 2 (Two) Times a Day.    Diclofenac Sodium (VOLTAREN) 1 % gel gel Past Month  No Yes    Apply 2 gm of gel topically to the appropriate area as directed twice a day. (to feet)    Digestive Enzymes (DIGESTIVE ENZYME PO) 9/10/2023 Self Yes Yes    Take 1 tablet by mouth Daily.    escitalopram (LEXAPRO) 10 MG tablet 9/10/2023  No Yes    Take 1 tablet by mouth Every Night.    famotidine (PEPCID) 20 MG tablet 9/11/2023  No Yes    TAKE ONE TABLET BY MOUTH TWICE DAILY    ferrous gluconate (FERGON) 324 MG tablet 9/10/2023 Self No Yes    Take 1 tablet by mouth Daily With Breakfast.    Patient taking differently:  Take 1 tablet by mouth Every Night.    furosemide (LASIX) 20 MG tablet 9/11/2023  No Yes    TAKE ONE TABLET BY MOUTH TWICE DAILY    levothyroxine (SYNTHROID, LEVOTHROID) 75 MCG tablet 9/10/2023  No Yes    TAKE ONE TABLET BY MOUTH EVERY DAY    liothyronine (CYTOMEL) 25 MCG tablet 9/11/2023  No Yes    TAKE THREE TABLETS BY MOUTH EVERY DAY    MAGNESIUM PO 9/10/2023 Self Yes Yes    Take 1,000 mg by mouth Every Night.    Methylsulfonylmethane (MSM PO) 9/10/2023 Self Yes Yes    Take 1 tablet by mouth Daily.    MILK THISTLE PO 9/10/2023 Self Yes Yes    Take 1 tablet by mouth Daily.    Omega-3 Fatty Acids (OMEGA-3 FISH OIL PO) 9/10/2023 Self Yes Yes    Take 1,000 Units by mouth 3 (Three) Times a Day.    potassium chloride 10 MEQ CR tablet 9/11/2023  No Yes    TAKE  ONE TABLET BY MOUTH TWICE DAILY    Probiotic Product (PROBIOTIC PO) Past Week Self Yes Yes    Take 1 tablet by mouth Daily.    TURMERIC PO 9/10/2023 Self Yes Yes    Take 1 tablet by mouth 2 (two) times a day.    VITAMIN A PO 9/10/2023 Self Yes Yes    Take 1 capsule by mouth Daily.    vitamin C (ASCORBIC ACID) 500 MG tablet 9/10/2023 Self Yes Yes    Take 1 tablet by mouth 3 (Three) Times a Day As Needed.    vitamin E 400 UNIT capsule 9/10/2023 Self Yes Yes    Take 1 capsule by mouth Daily.    VITAMIN K PO 9/10/2023 Self Yes Yes    Take 100 mcg by mouth Daily.    Zinc 30 MG tablet 9/10/2023 Self Yes Yes    Take  by mouth Daily.    metoprolol succinate XL (TOPROL-XL) 25 MG 24 hr tablet  Self No No    Take 1 tablet by mouth Daily for 90 days.    Misc Natural Products (TART CHERRY ADVANCED PO)  Self Yes No    Take 2 capsules by mouth Daily.    sucralfate (CARAFATE) 1 g tablet   No No    Take 1 tablet by mouth 4 (Four) Times a Day.              Current Facility-Administered Medications:     [START ON 9/12/2023] allopurinol (ZYLOPRIM) tablet 200 mg, 200 mg, Oral, Daily, Faby Macias, VALENTINE    apixaban (ELIQUIS) tablet 5 mg, 5 mg, Oral, BID, Faby Macias, APRN    [START ON 9/12/2023] buPROPion SR (WELLBUTRIN SR) 12 hr tablet 100 mg, 100 mg, Oral, QAM, Faby Macias, APRN    Calcium Replacement - Follow Nurse / BPA Driven Protocol, , Does not apply, PRN, Faby Macias, APRN    escitalopram (LEXAPRO) tablet 10 mg, 10 mg, Oral, Nightly, Faby Macias, APRN    famotidine (PEPCID) tablet 20 mg, 20 mg, Oral, BID, Faby Macias, APRN    [START ON 9/12/2023] ferrous sulfate tablet 325 mg, 325 mg, Oral, Daily With Breakfast, Faby Macias, VALENTINE    furosemide (LASIX) tablet 20 mg, 20 mg, Oral, BID, Faby Macias APRN    [START ON 9/12/2023] levothyroxine (SYNTHROID, LEVOTHROID) tablet 75 mcg, 75 mcg, Oral, Q AM, Faby Macias APRN    liothyronine  (CYTOMEL) tablet 25 mcg, 25 mcg, Oral, TID, Faby Macias APRN    Magnesium Cardiology Dose Replacement - Follow Nurse / BPA Driven Protocol, , Does not apply, PRN, Faby Macias APRN    nitroglycerin (NITROSTAT) SL tablet 0.4 mg, 0.4 mg, Sublingual, Q5 Min PRN, Faby Macias APRN    nystatin (MYCOSTATIN) powder, , Topical, Q12H, Faby Macias APRN    Pharmacy Consult - Pharmacy to dose, , Does not apply, Continuous PRN, Faby Macias APRN    Phosphorus Replacement - Follow Nurse / BPA Driven Protocol, , Does not apply, PRN, Faby Macias APRN    potassium chloride (MICRO-K) CR capsule 10 mEq, 10 mEq, Oral, BID With Meals, Faby Macias APRN    Potassium Replacement - Follow Nurse / BPA Driven Protocol, , Does not apply, PRN, Faby Macias APRN    Social History     Socioeconomic History    Marital status:    Tobacco Use    Smoking status: Former     Packs/day: 1.00     Years: 40.00     Pack years: 40.00     Types: Cigarettes     Start date: 1/1/1967     Quit date: 1/1/2008     Years since quitting: 15.7     Passive exposure: Past    Smokeless tobacco: Never   Vaping Use    Vaping Use: Never used   Substance and Sexual Activity    Alcohol use: Not Currently     Comment: very rarely    Drug use: No    Sexual activity: Not Currently     Partners: Male     Birth control/protection: None       Family History   Problem Relation Age of Onset    Obesity Mother     Rheum arthritis Mother     Thyroid disease Mother     Hypertension Mother         Passed 2004    Stroke Mother         Most of her problems were caused by Rheumatoid Ar.    Hyperlipidemia Mother     Arthritis Mother         Rheumatoid Arthritis..Passed 2004    Heart disease Mother         CHF    Cancer Father         Passed 1997    Kidney cancer Father     Liver cancer Father     No Known Problems Brother     Diabetes Maternal Aunt     Cancer Maternal Uncle     Lung cancer  Maternal Uncle     Alcohol abuse Maternal Uncle     No Known Problems Paternal Aunt     Stroke Paternal Uncle     Hypertension Paternal Uncle     Hyperlipidemia Paternal Uncle     Heart disease Paternal Uncle     No Known Problems Brother     Asthma Sister         under control    Alcohol abuse Maternal Uncle     Colon cancer Neg Hx     Breast cancer Neg Hx     Ovarian cancer Neg Hx        REVIEW OF SYSTEMS:   CONSTITUTIONAL:         No weight loss, fever, chills, +weakness + fatigue.   HEENT:                            No visual loss, blurred vision, double vision, yellow sclerae.                                             No hearing loss, congestion, sore throat.   SKIN:                                No rashes, urticaria, ulcers, sores.     RESPIRATORY:               + shortness of breath, -hemoptysis, cough, sputum.   GI:                                     No anorexia, nausea, vomiting, diarrhea. No abdominal pain, melena.   :                                   No burning on urination, hematuria or increased frequency.  ENDOCRINE:                   No diaphoresis, cold or heat intolerance. No polyuria or polydipsia.   NEURO:                            No headache, dizziness, syncope, paralysis, ataxia, or parasthesias.                                            No change in bowel or bladder control. No history of CVA/TIA  MUSCULOSKELETAL:    No muscle, back pain, joint pain or stiffness.   HEMATOLOGY:               No anemia, bleeding, bruising. No history of DVT/PE.  PSYCH:                            No history of depression, anxiety    Vitals:    09/11/23 1300 09/11/23 1500   BP: 128/65 125/69   BP Location: Left arm Left arm   Patient Position: Lying Lying   Pulse: 72 94   Resp: 20 18   Temp: 98.3 °F (36.8 °C) 98.5 °F (36.9 °C)   TempSrc: Oral Oral   SpO2: 94% 90%         Vital Sign Min/Max for last 24 hours  Temp  Min: 98.3 °F (36.8 °C)  Max: 98.5 °F (36.9 °C)   BP  Min: 125/69  Max: 128/65   Pulse  Min:  72  Max: 94   Resp  Min: 18  Max: 20   SpO2  Min: 90 %  Max: 94 %   No data recorded    No intake or output data in the 24 hours ending 09/11/23 1600          Physical Exam:  GEN: Well nourished, Well- developed  No acute distress  HEENT: Normocephalic, Atraumatic, moist mucous membranes  NECK: supple, NO JVD, no thyromegaly, no lymphadenopathy  CARDIAC: S1S2  IRR, IRR, no murmur  LUNGS: Clear to ausculation, normal respiratory effort  ABDOMEN: Soft, nontender, normal bowel sounds  EXTREMITIES:No gross deformities,  No clubbing, cyanosis, or edema  SKIN: Warm, dry  NEURO: No focal deficits  PSYCHIATRIC: Normal affect and mood      I personally viewed and interpreted the patient's EKG/Telemetry/lab data    Data:                                        No intake or output data in the 24 hours ending 09/11/23 1600    BMP pending    Telemetry: afib, HR 72-94 bpm  EKG: afib, HR 72 bpm,  ms        Assessment and Plan:   Longstanding Persistent Atrial Fibrillation:   - diagnosed 2/2023, of unknown duration, but likely developed late 2022 or early 2023 per patient's history  - symptoms of fatigue, low energy. We did explain that her thyroid dysfunction (reviewed labwork form 6/2023 showed low tsh and t4) may cause difficulty in maintaining NSR. She needs to see an endocrinologist.   -She reports she has only been taking 1/2 of her Metoprolol XL 25mg at home due to fatigue.   - plan for Tikosyn initiation today. QTC acceptable. Pending labwork, pharmacy to dose Tikosyn. Continue to monitor QTC.     2. Anticoagulation  - CHADSVasc = 3 on Eliquis. Fall risk and risk for injury due to weakness in BLE. Plan for future Watchman device.         3. Thyroid Dysfunction:  - history of thyroiditis  - lab work reviewed from 6/2023 shows low TSH and low T4  - on levothyroxine and Cytomel  - currently followed by her PCP, would recommend seeing an endocrinologist- we can try to coordinate this while she is admitted for  Tikosyn.    Complaining of urinary frequency and burning for 1 week, will check U/A    Electronically signed by VALENTINE Cabrales, 09/11/23, 4:01 PM EDT.

## 2023-09-11 NOTE — PLAN OF CARE
Goal Outcome Evaluation:  Plan of Care Reviewed With: patient        Progress: no change  Outcome Evaluation: A & O x 4, AFib, RA, c/o dysuria and urinary frequency - urine sent to lab, U/A negative, up with standby assist, /okay per cardiology to start Nilo Mcgregor

## 2023-09-11 NOTE — PAYOR COMM NOTE
"Ref # [id number]  Pilo Bush \"Del\" (73 y.o. Female)       Date of Birth   1950    Social Security Number       Address   [address] [place] [address]   [address] [place], [place] [id number]    Home Phone   [phone]    MRN   [id number]       Sikhism   Taoist    Marital Status                               Admission Date   9/11/23    Admission Type   Urgent    Admitting Provider   Guanako Thompson MD    Attending Provider   Guanako Thompson MD    Department, Room/Bed   [facility] 3E, S334/1       Discharge Date       Discharge Disposition       Discharge Destination                                 Attending Provider: Guanako Thompson MD    Allergies: No Known Allergies    Isolation: None   Infection: None   Code Status: CPR    Ht: 160 cm (63\")   Wt: 102 kg (225 lb)    Admission Cmt: None   Principal Problem: Persistent atrial fibrillation [I48.19]                   Active Insurance as of 9/11/2023       Primary Coverage       Payor Plan Insurance Group Employer/Plan Group    ANTH MEDICARE REPLACEMENT [facility] MEDICARE ADVANTAGE [id number]       Payor Plan Address Payor Plan Phone Number Payor Plan Fax Number Effective Dates    [address] [phone]  4/1/2022 - None Entered    [facility] [id number]         Subscriber Name Subscriber Birth Date Member ID       PILO BUSH 1950 [id number]                     Emergency Contacts        (Rel.) Home Phone Work Phone Mobile Phone    PB PERALTA (Sister) [phone] -- [phone]                 History & Physical        Faby Macias, APRN at 09/11/23 1527          Cardiology H&P     Pilo Bush  1950  [phone]  There is no work phone number on file.    09/11/23    DATE OF ADMISSION: 9/11/2023  [facility] 3E    Jm Marie DO  [address] [place] [place] [address]  Referring Provider: Guanako Thompson MD     CC: Persistent afib    Problem " List:  Longstanding Persistent Atrial Fibrillation   CHADSvasc = 3 (HTN, female, Age >65)  Echocardiogram 3/2023: EF 60 to 65%, trace AI, trace MR, mild TR, RVSP 32 mmHg, LA 5.1 cm  Pharmacologic MPS 4/2023: No evidence of inducible ischemia, EF 71%  14 Day Holter Monitor 5/2023: 100% atrial fibrillation, average HR 83 bpm, max 146 bpm  Hypertension  Hypothyroidism/Hashimoto's thyroiditis- 6/2023 labwork shows low TSH, low T4  Essential tremors  Falls/weakness of lower extremities   Osteoarthritis  Obesity  Obstructive sleep apnea - on CPAP x 1 month     History of Present Illness:   Salinas Monroy is a 73 year old female with above PMHx who presents for scheduled [facility] admission.  She was first diagnosed with atrial fibrillation February 2023 when she presented to the ER with chest pain.  She reports having COVID a few months prior to her diagnosis.  She wore a Holter monitor in May that showed 100% atrial fibrillation burden with controlled heart rates of 83 bpm.  When in A-fib she has symptoms of fatigue and shortness of breath with activity.  She has been on Eliquis with no missed doses since diagnosis of A-fib.      No Known Allergies    Prior to Admission Medications       Prescriptions Last Dose Informant Patient Reported? Taking?    acyclovir (ZOVIRAX) 400 MG tablet Past Week  No Yes    TAKE ONE TABLET BY MOUTH EVERY DAY    Patient taking differently:  1 tablet. One tablet three times a week    allopurinol (ZYLOPRIM) 100 MG tablet 9/11/2023  No Yes    TAKE TWO TABLETS BY MOUTH EVERY DAY    apixaban (ELIQUIS) 5 MG tablet tablet 9/11/2023  No Yes    Take 1 tablet by mouth 2 (Two) Times a Day.    ASHWAGANDHA PO 9/11/2023 Self Yes Yes    Take 1 capsule by mouth Daily.    B Complex Vitamins (VITAMIN B COMPLEX PO) 9/10/2023 Self Yes Yes    Take 1 tablet by mouth Daily.    B-12, Methylcobalamin, 1000 MCG sublingual tablet 9/10/2023  Yes Yes    Every Other Day.    buPROPion SR (Wellbutrin SR) 100 MG 12 hr  tablet 9/11/2023  No Yes    Take 1 tablet by mouth Every Morning.    Cholecalciferol (VITAMIN D3) 04889 UNITS tablet 9/10/2023  Yes Yes    Take 7,500 Units by mouth Daily.    Coenzyme Q10 200 MG capsule 9/11/2023 Self Yes Yes    Take 1 capsule by mouth 2 (Two) Times a Day.    Diclofenac Sodium (VOLTAREN) 1 % gel gel Past Month  No Yes    Apply 2 gm of gel topically to the appropriate area as directed twice a day. (to feet)    Digestive Enzymes (DIGESTIVE ENZYME PO) 9/10/2023 Self Yes Yes    Take 1 tablet by mouth Daily.    escitalopram (LEXAPRO) 10 MG tablet 9/10/2023  No Yes    Take 1 tablet by mouth Every Night.    famotidine (PEPCID) 20 MG tablet 9/11/2023  No Yes    TAKE ONE TABLET BY MOUTH TWICE DAILY    ferrous gluconate (FERGON) 324 MG tablet 9/10/2023 Self No Yes    Take 1 tablet by mouth Daily With Breakfast.    Patient taking differently:  Take 1 tablet by mouth Every Night.    furosemide (LASIX) 20 MG tablet 9/11/2023  No Yes    TAKE ONE TABLET BY MOUTH TWICE DAILY    levothyroxine (SYNTHROID, LEVOTHROID) 75 MCG tablet 9/10/2023  No Yes    TAKE ONE TABLET BY MOUTH EVERY DAY    liothyronine (CYTOMEL) 25 MCG tablet 9/11/2023  No Yes    TAKE THREE TABLETS BY MOUTH EVERY DAY    MAGNESIUM PO 9/10/2023 Self Yes Yes    Take 1,000 mg by mouth Every Night.    Methylsulfonylmethane (MSM PO) 9/10/2023 Self Yes Yes    Take 1 tablet by mouth Daily.    MILK THISTLE PO 9/10/2023 Self Yes Yes    Take 1 tablet by mouth Daily.    Omega-3 Fatty Acids (OMEGA-3 FISH OIL PO) 9/10/2023 Self Yes Yes    Take 1,000 Units by mouth 3 (Three) Times a Day.    potassium chloride 10 MEQ CR tablet 9/11/2023  No Yes    TAKE ONE TABLET BY MOUTH TWICE DAILY    Probiotic Product (PROBIOTIC PO) Past Week Self Yes Yes    Take 1 tablet by mouth Daily.    TURMERIC PO 9/10/2023 Self Yes Yes    Take 1 tablet by mouth 2 (two) times a day.    VITAMIN A PO 9/10/2023 Self Yes Yes    Take 1 capsule by mouth Daily.    vitamin C (ASCORBIC ACID) 500 MG  tablet 9/10/2023 Self Yes Yes    Take 1 tablet by mouth 3 (Three) Times a Day As Needed.    vitamin E 400 UNIT capsule 9/10/2023 Self Yes Yes    Take 1 capsule by mouth Daily.    VITAMIN K PO 9/10/2023 Self Yes Yes    Take 100 mcg by mouth Daily.    Zinc 30 MG tablet 9/10/2023 Self Yes Yes    Take  by mouth Daily.    metoprolol succinate XL (TOPROL-XL) 25 MG 24 hr tablet  Self No No    Take 1 tablet by mouth Daily for 90 days.    Misc Natural Products (TART CHERRY ADVANCED PO)  Self Yes No    Take 2 capsules by mouth Daily.    sucralfate (CARAFATE) 1 g tablet   No No    Take 1 tablet by mouth 4 (Four) Times a Day.              Current Facility-Administered Medications:     [START ON 9/12/2023] allopurinol (ZYLOPRIM) tablet 200 mg, 200 mg, Oral, Daily, Faby Macias APRN    apixaban (ELIQUIS) tablet 5 mg, 5 mg, Oral, BID, Faby Macias APRN    [START ON 9/12/2023] buPROPion SR (WELLBUTRIN SR) 12 hr tablet 100 mg, 100 mg, Oral, QAM, Faby Macias APRN    Calcium Replacement - Follow Nurse / BPA Driven Protocol, , Does not apply, PRN, Faby Macias APRN    escitalopram (LEXAPRO) tablet 10 mg, 10 mg, Oral, Nightly, Faby Macias APRN    famotidine (PEPCID) tablet 20 mg, 20 mg, Oral, BID, Faby Macias APRN    [START ON 9/12/2023] ferrous sulfate tablet 325 mg, 325 mg, Oral, Daily With Breakfast, Faby Macias APRN    furosemide (LASIX) tablet 20 mg, 20 mg, Oral, BID, Faby Macias APRN    [START ON 9/12/2023] levothyroxine (SYNTHROID, LEVOTHROID) tablet 75 mcg, 75 mcg, Oral, Q AM, Faby Macias APRN    liothyronine (CYTOMEL) tablet 25 mcg, 25 mcg, Oral, TID, Faby Macias APRN    Magnesium Cardiology Dose Replacement - Follow Nurse / BPA Driven Protocol, , Does not apply, Colleen JASON Lauren Alexandra, VALENTINE    nitroglycerin (NITROSTAT) SL tablet 0.4 mg, 0.4 mg, Sublingual, Q5 Min Colleen JASON Lauren Alexandra, APRN     nystatin (MYCOSTATIN) powder, , Topical, Q12H, Faby Macias APRN    Pharmacy Consult - Pharmacy to dose, , Does not apply, Continuous PRN, Faby Macias APRN    Phosphorus Replacement - Follow Nurse / BPA Driven Protocol, , Does not apply, PRN, Faby Macias APRN    potassium chloride (MICRO-K) CR capsule 10 mEq, 10 mEq, Oral, BID With Meals, Faby Macias APRN    Potassium Replacement - Follow Nurse / BPA Driven Protocol, , Does not apply, PRN, Faby Macias APRN    Social History     Socioeconomic History    Marital status:    Tobacco Use    Smoking status: Former     Packs/day: 1.00     Years: 40.00     Pack years: 40.00     Types: Cigarettes     Start date: 1/1/1967     Quit date: 1/1/2008     Years since quitting: 15.7     Passive exposure: Past    Smokeless tobacco: Never   Vaping Use    Vaping Use: Never used   Substance and Sexual Activity    Alcohol use: Not Currently     Comment: very rarely    Drug use: No    Sexual activity: Not Currently     Partners: Male     Birth control/protection: None       Family History   Problem Relation Age of Onset    Obesity Mother     Rheum arthritis Mother     Thyroid disease Mother     Hypertension Mother         Passed 2004    Stroke Mother         Most of her problems were caused by Rheumatoid Ar.    Hyperlipidemia Mother     Arthritis Mother         Rheumatoid Arthritis..Passed 2004    Heart disease Mother         CHF    Cancer Father         Passed 1997    Kidney cancer Father     Liver cancer Father     No Known Problems Brother     Diabetes Maternal Aunt     Cancer Maternal Uncle     Lung cancer Maternal Uncle     Alcohol abuse Maternal Uncle     No Known Problems Paternal Aunt     Stroke Paternal Uncle     Hypertension Paternal Uncle     Hyperlipidemia Paternal Uncle     Heart disease Paternal Uncle     No Known Problems Brother     Asthma Sister         under control    Alcohol abuse Maternal Uncle      Colon cancer Neg Hx     Breast cancer Neg Hx     Ovarian cancer Neg Hx        REVIEW OF SYSTEMS:   CONSTITUTIONAL:         No weight loss, fever, chills, +weakness + fatigue.   HEENT:                            No visual loss, blurred vision, double vision, yellow sclerae.                                             No hearing loss, congestion, sore throat.   SKIN:                                No rashes, urticaria, ulcers, sores.     RESPIRATORY:               + shortness of breath, -hemoptysis, cough, sputum.   GI:                                     No anorexia, nausea, vomiting, diarrhea. No abdominal pain, melena.   :                                   No burning on urination, hematuria or increased frequency.  ENDOCRINE:                   No diaphoresis, cold or heat intolerance. No polyuria or polydipsia.   NEURO:                            No headache, dizziness, syncope, paralysis, ataxia, or parasthesias.                                            No change in bowel or bladder control. No history of CVA/TIA  MUSCULOSKELETAL:    No muscle, back pain, joint pain or stiffness.   HEMATOLOGY:               No anemia, bleeding, bruising. No history of DVT/PE.  PSYCH:                            No history of depression, anxiety    Vitals:    09/11/23 1300 09/11/23 1500   BP: 128/65 125/69   BP Location: Left arm Left arm   Patient Position: Lying Lying   Pulse: 72 94   Resp: 20 18   Temp: 98.3 °F (36.8 °C) 98.5 °F (36.9 °C)   TempSrc: Oral Oral   SpO2: 94% 90%         Vital Sign Min/Max for last 24 hours  Temp  Min: 98.3 °F (36.8 °C)  Max: 98.5 °F (36.9 °C)   BP  Min: 125/69  Max: 128/65   Pulse  Min: 72  Max: 94   Resp  Min: 18  Max: 20   SpO2  Min: 90 %  Max: 94 %   No data recorded    No intake or output data in the 24 hours ending 09/11/23 1600          Physical Exam:  GEN: Well nourished, Well- developed  No acute distress  HEENT: Normocephalic, Atraumatic, moist mucous membranes  NECK: supple, NO JVD, no  thyromegaly, no lymphadenopathy  CARDIAC: S1S2  IRR, IRR, no murmur  LUNGS: Clear to ausculation, normal respiratory effort  ABDOMEN: Soft, nontender, normal bowel sounds  EXTREMITIES:No gross deformities,  No clubbing, cyanosis, or edema  SKIN: Warm, dry  NEURO: No focal deficits  PSYCHIATRIC: Normal affect and mood      I personally viewed and interpreted the patient's EKG/Telemetry/lab data    Data:                                        No intake or output data in the 24 hours ending 09/11/23 1600    BMP pending    Telemetry: afib, HR 72-94 bpm  EKG: afib, HR 72 bpm,  ms        Assessment and Plan:   Longstanding Persistent Atrial Fibrillation:   - diagnosed 2/2023, of unknown duration, but likely developed late 2022 or early 2023 per patient's history  - symptoms of fatigue, low energy. We did explain that her thyroid dysfunction (reviewed labwork form 6/2023 showed low tsh and t4) may cause difficulty in maintaining NSR. She needs to see an endocrinologist.   -She reports she has only been taking 1/2 of her Metoprolol XL 25mg at home due to fatigue.   - plan for Tikosyn initiation today. QTC acceptable. Pending labwork, pharmacy to dose Tikosyn. Continue to monitor QTC.     2. Anticoagulation  - CHADSVasc = 3 on Eliquis. Fall risk and risk for injury due to weakness in BLE. Plan for future Watchman device.         3. Thyroid Dysfunction:  - history of thyroiditis  - lab work reviewed from 6/2023 shows low TSH and low T4  - on levothyroxine and Cytomel  - currently followed by her PCP, would recommend seeing an endocrinologist- we can try to coordinate this while she is admitted for Tikosyn.    Complaining of urinary frequency and burning for 1 week, will check U/A    Electronically signed by VALENTINE Cabrales, 09/11/23, 4:01 PM EDT.                   Electronically signed by Faby Macias APRN at 09/11/23 5438

## 2023-09-12 LAB
QT INTERVAL: 406 MS
QT INTERVAL: 412 MS
QT INTERVAL: 450 MS
QTC INTERVAL: 459 MS
QTC INTERVAL: 466 MS
QTC INTERVAL: 482 MS

## 2023-09-12 PROCEDURE — 93010 ELECTROCARDIOGRAM REPORT: CPT | Performed by: INTERNAL MEDICINE

## 2023-09-12 PROCEDURE — 93005 ELECTROCARDIOGRAM TRACING: CPT | Performed by: INTERNAL MEDICINE

## 2023-09-12 PROCEDURE — 97161 PT EVAL LOW COMPLEX 20 MIN: CPT

## 2023-09-12 PROCEDURE — 93005 ELECTROCARDIOGRAM TRACING: CPT | Performed by: PHYSICIAN ASSISTANT

## 2023-09-12 PROCEDURE — 97116 GAIT TRAINING THERAPY: CPT

## 2023-09-12 RX ORDER — SIMETHICONE 80 MG
80 TABLET,CHEWABLE ORAL 4 TIMES DAILY PRN
Status: DISCONTINUED | OUTPATIENT
Start: 2023-09-12 | End: 2023-09-13 | Stop reason: HOSPADM

## 2023-09-12 RX ORDER — DOFETILIDE 0.25 MG/1
250 CAPSULE ORAL EVERY 12 HOURS
Status: DISCONTINUED | OUTPATIENT
Start: 2023-09-12 | End: 2023-09-13 | Stop reason: HOSPADM

## 2023-09-12 RX ORDER — CHOLECALCIFEROL (VITAMIN D3) 125 MCG
10 CAPSULE ORAL NIGHTLY PRN
Status: DISCONTINUED | OUTPATIENT
Start: 2023-09-12 | End: 2023-09-13 | Stop reason: HOSPADM

## 2023-09-12 RX ADMIN — APIXABAN 5 MG: 5 TABLET, FILM COATED ORAL at 08:26

## 2023-09-12 RX ADMIN — POTASSIUM CHLORIDE 10 MEQ: 750 CAPSULE, EXTENDED RELEASE ORAL at 17:00

## 2023-09-12 RX ADMIN — DOFETILIDE 250 MCG: 0.25 CAPSULE ORAL at 20:47

## 2023-09-12 RX ADMIN — ESCITALOPRAM OXALATE 10 MG: 10 TABLET ORAL at 20:48

## 2023-09-12 RX ADMIN — LEVOTHYROXINE SODIUM 75 MCG: 0.07 TABLET ORAL at 06:31

## 2023-09-12 RX ADMIN — Medication 10 MG: at 03:25

## 2023-09-12 RX ADMIN — NYSTATIN 1 APPLICATION: 100000 POWDER TOPICAL at 08:27

## 2023-09-12 RX ADMIN — ALLOPURINOL 200 MG: 100 TABLET ORAL at 08:26

## 2023-09-12 RX ADMIN — SIMETHICONE 80 MG: 80 TABLET, CHEWABLE ORAL at 17:00

## 2023-09-12 RX ADMIN — LIOTHYRONINE SODIUM 25 MCG: 25 TABLET ORAL at 17:00

## 2023-09-12 RX ADMIN — NYSTATIN: 100000 POWDER TOPICAL at 01:00

## 2023-09-12 RX ADMIN — LIOTHYRONINE SODIUM 25 MCG: 25 TABLET ORAL at 20:51

## 2023-09-12 RX ADMIN — FAMOTIDINE 20 MG: 20 TABLET, FILM COATED ORAL at 08:26

## 2023-09-12 RX ADMIN — FUROSEMIDE 20 MG: 20 TABLET ORAL at 20:48

## 2023-09-12 RX ADMIN — BUPROPION HYDROCHLORIDE 100 MG: 100 TABLET, EXTENDED RELEASE ORAL at 06:31

## 2023-09-12 RX ADMIN — DOFETILIDE 500 MCG: 0.5 CAPSULE ORAL at 06:31

## 2023-09-12 RX ADMIN — NYSTATIN: 100000 POWDER TOPICAL at 20:51

## 2023-09-12 RX ADMIN — POTASSIUM CHLORIDE 10 MEQ: 750 CAPSULE, EXTENDED RELEASE ORAL at 08:26

## 2023-09-12 RX ADMIN — FUROSEMIDE 20 MG: 20 TABLET ORAL at 08:26

## 2023-09-12 RX ADMIN — APIXABAN 5 MG: 5 TABLET, FILM COATED ORAL at 20:47

## 2023-09-12 RX ADMIN — LIOTHYRONINE SODIUM 25 MCG: 25 TABLET ORAL at 08:26

## 2023-09-12 RX ADMIN — FAMOTIDINE 20 MG: 20 TABLET, FILM COATED ORAL at 20:48

## 2023-09-12 RX ADMIN — FERROUS SULFATE TAB 325 MG (65 MG ELEMENTAL FE) 325 MG: 325 (65 FE) TAB at 08:26

## 2023-09-12 RX ADMIN — Medication 10 MG: at 20:47

## 2023-09-12 NOTE — CASE MANAGEMENT/SOCIAL WORK
Discharge Planning Assessment  [facility]     Patient Name: Salinas Monroy  MRN: [id number]  Today's Date: 9/12/2023    Admit Date: 9/11/2023    Plan: Home   Discharge Needs Assessment       Row Name 09/12/23 0837       Living Environment    People in Home alone    Current Living Arrangements home    Primary Care Provided by self    Provides Primary Care For no one    Family Caregiver if Needed sibling(s)    Quality of Family Relationships unable to assess    Able to Return to Prior Arrangements yes       Resource/Environmental Concerns    Resource/Environmental Concerns none       Transition Planning    Patient/Family Anticipates Transition to home    Patient/Family Anticipated Services at Transition     Transportation Anticipated family or friend will provide       Discharge Needs Assessment    Readmission Within the Last 30 Days no previous admission in last 30 days    Equipment Currently Used at Home cpap    Concerns to be Addressed discharge planning    Anticipated Changes Related to Illness none    Equipment Needed After Discharge none                   Discharge Plan       Row Name 09/12/23 0838       Plan    Plan Home    Plan Comments Spoke with patient in room to initiate discharge planning.  She lives alone in [facility].  She is independent with ADL's.  She has a CPAP at home and is not current with home health.  Her PCP is Jm Marie.  She does not have an advanced directive.  Verified that she has [place] [employer] Replacement.  Ms. Monroy has RX coverage and has her scripts filled at [facility].  Her plan is to return home at discharge.  She will get Tikosyn from [facility].  No PA required per CoverMyMeds ([id number]).  No other needs noted.  CM will continue to follow.    Final Discharge Disposition Code 01 - home or self-care                  Continued Care and Services - Admitted Since 9/11/2023    Coordination has not been started for this encounter.        Expected Discharge Date and Time       Expected Discharge Date Expected Discharge Time    Sep 13, 2023            Demographic Summary       Row Name 09/12/23 0837       General Information    Admission Type inpatient    Arrived From home    Referral Source admission list    Reason for Consult discharge planning    Preferred Language English                   Functional Status       Row Name 09/12/23 0837       Functional Status    Usual Activity Tolerance moderate    Current Activity Tolerance moderate       Functional Status, IADL    Medications independent    Meal Preparation independent    Housekeeping independent    Laundry independent    Shopping independent                   Psychosocial    No documentation.                  Abuse/Neglect    No documentation.                  Legal    No documentation.                  Substance Abuse    No documentation.                  Patient Forms    No documentation.                     Maryjane Bose RN

## 2023-09-12 NOTE — THERAPY EVALUATION
Patient Name: Salinas Monroy  : 1950    MRN: [id number]                              Today's Date: 2023       Admit Date: 2023    Visit Dx: No diagnosis found.  Patient Active Problem List   Diagnosis    Hashimoto's thyroiditis    Essential tremor    Vitamin D deficiency    Primary osteoarthritis involving multiple joints    Chronic idiopathic gout involving toe of right foot without tophus    Obsessive-compulsive disorder    Postsurgical menopause    Colon cancer screening    Personal history of colonic polyps    Left ventricular hypertrophy    Chronic diastolic heart failure    Acquired hypothyroidism    ISABEL on CPAP    PD (Parkinson's disease)    Severe obesity (BMI 35.0-39.9) with comorbidity    Iron deficiency    Bipolar disorder, current episode mixed, moderate    Reactive airway disease without complication    Gastroesophageal reflux disease    Burping    Epigastric pain    Irritable bowel syndrome with diarrhea    Arthralgia of both feet    Venous insufficiency of both lower extremities    Cataracta    Longstanding persistent atrial fibrillation    Ventral hernia without obstruction or gangrene    Diastasis recti    Chronic anticoagulation    Persistent atrial fibrillation     Past Medical History:   Diagnosis Date    Anemia ?    under control    Anxiety     Arthritis     Atrial fibrillation     Bloating     Burping     Chest pain     was seen by Dr Pires and was released was just anxiety    CHF (congestive heart failure)     Colon polyp 2018    COVID-19 vaccine series completed     Depression     Diverticulosis     GERD (gastroesophageal reflux disease)     no longer bothers me...    Gout     Hashimoto's disease     Hernia 2012    repair surgery which failed in     Hypothyroid     Impingement syndrome of right shoulder 2016    Obesity     OCD (obsessive compulsive disorder)     Sleep apnea     Tremor     worse on left arm/hand    Wears glasses     reading  glasses only     Past Surgical History:   Procedure Laterality Date    APPENDECTOMY      BARIATRIC SURGERY  1979    CATARACT EXTRACTION W/ INTRAOCULAR LENS IMPLANT Right 08/08/2022    Procedure: CATARACT PHACO EXTRACTION WITH INTRAOCULAR LENS IMPLANT RIGHT;  Surgeon: Eunice Brooks MD;  Location: [facility] OR;  Service: Ophthalmology;  Laterality: Right;    CATARACT EXTRACTION W/ INTRAOCULAR LENS IMPLANT Left 08/22/2022    Procedure: CATARACT PHACO EXTRACTION WITH INTRAOCULAR LENS IMPLANT LEFT;  Surgeon: Eunice Brooks MD;  Location: [facility] OR;  Service: Ophthalmology;  Laterality: Left;    COLONOSCOPY  2012    failed due to hernia...    COLONOSCOPY N/A 06/08/2017    Procedure: COLONOSCOPY with cold snare polypectomies, argon thermal ablation, ns injection, yanira ink injection;  Surgeon: Rafiq Huang MD;  Location: [facility] ENDOSCOPY;  Service:     COLONOSCOPY N/A 06/05/2018    Procedure: COLONOSCOPY WITH BIOPSIES;  Surgeon: Rafqi Huang MD;  Location: [facility] ENDOSCOPY;  Service: Gastroenterology    ENDOSCOPY N/A 01/27/2021    Procedure: ESOPHAGOGASTRODUODENOSCOPY WITH BIOPSIES AND CLIPS;  Surgeon: Katie Shannon MD;  Location: [facility] ENDOSCOPY;  Service: Gastroenterology;  Laterality: N/A;    GASTRIC BYPASS  1978    HERNIA MESH REMOVAL  2014    BOWEL OBSTRUCTION DUE TO MESH    HERNIA REPAIR  2012    HYSTERECTOMY      complete    JOINT REPLACEMENT  2009 & 2012    Left & Right Full Hip Replacements    SMALL INTESTINE SURGERY  2014    emergency bowel obstruction from hernia failure    TOTAL ABDOMINAL HYSTERECTOMY WITH SALPINGO OOPHORECTOMY  1990    fibroids    TUBAL ABDOMINAL LIGATION      UPPER GASTROINTESTINAL ENDOSCOPY  2010      General Information       Row Name 09/12/23 1038          Physical Therapy Time and Intention    Document Type evaluation (P)   -WB     Mode of Treatment individual therapy;physical therapy (P)   -WB       Row Name 09/12/23 1038          General Information    Patient Profile  Reviewed yes (P)   -WB     Prior Level of Function independent:;all household mobility;transfer;bed mobility;ADL's;cleaning;using stairs;bathing (P)   -WB     Existing Precautions/Restrictions fall;cardiac (P)   -WB     Barriers to Rehab previous functional deficit (P)   -WB       Row Name 09/12/23 1038          Living Environment    People in Home alone (P)   -WB       Row Name 09/12/23 1038          Home Main Entrance    Number of Stairs, Main Entrance other (see comments) (P)   14, second floor of apartment complex  -WB     Stair Railings, Main Entrance railings safe and in good condition;railing on right side (ascending) (P)   -WB       Row Name 09/12/23 1038          Stairs Within Home, Primary    Number of Stairs, Within Home, Primary none (P)   -WB       Row Name 09/12/23 1038          Cognition    Orientation Status (Cognition) oriented x 3 (P)   -WB       Row Name 09/12/23 1038          Safety Issues, Functional Mobility    Safety Issues Affecting Function (Mobility) safety precautions follow-through/compliance;insight into deficits/self-awareness;safety precaution awareness (P)   -WB     Impairments Affecting Function (Mobility) balance;endurance/activity tolerance;shortness of breath (P)   -WB               User Key  (r) = Recorded By, (t) = Taken By, (c) = Cosigned By      Initials Name Provider Type    WB Chavez Sanford, PT Student PT Student                   Mobility       Row Name 09/12/23 1041          Bed Mobility    Bed Mobility supine-sit (P)   -WB     Supine-Sit Bow (Bed Mobility) modified independence (P)   -WB     Assistive Device (Bed Mobility) bed rails;head of bed elevated (P)   -WB     Comment, (Bed Mobility) Pt required bed rail and HOB elevated to move to sitting. (P)   -WB       Row Name 09/12/23 1041          Bed-Chair Transfer    Assistive Device (Bed-Chair Transfers) -- (P)   No AD  -WB       Row Name 09/12/23 1041          Sit-Stand Transfer    Sit-Stand Bow  (Transfers) supervision;1 person assist (P)   -WB     Assistive Device (Sit-Stand Transfers) other (see comments) (P)   No AD  -WB       Row Name 09/12/23 1041          Gait/Stairs (Locomotion)    [place] Level (Gait) contact guard;1 person assist (P)   -WB     Assistive Device (Gait) other (see comments) (P)   No AD  -WB     Distance in Feet (Gait) 250 (P)   -WB     Deviations/Abnormal Patterns (Gait) bilateral deviations;gait speed decreased;berkley decreased (P)   -WB     Bilateral Gait Deviations heel strike decreased (P)   -WB     Comment, (Gait/Stairs) Pt ambulated 250' with CGA and VC's for posture correction. Pt required four rest breaks and would remain standing but had to lean against the wall and catch her breath. SpO2 recorded at 97%, . Further mobility limited by fatigue. (P)   -WB               User Key  (r) = Recorded By, (t) = Taken By, (c) = Cosigned By      Initials Name Provider Type    WB Chavez Sanford, PT Student PT Student                   Obj/Interventions       Row Name 09/12/23 1116          Range of Motion Comprehensive    General Range of Motion bilateral lower extremity ROM WFL (P)   -WB       Row Name 09/12/23 1116          Strength Comprehensive (MMT)    General Manual Muscle Testing (MMT) Assessment lower extremity strength deficits identified;upper extremity strength deficits identified (P)   -WB     Comment, General Manual Muscle Testing (MMT) Assessment BLE grossly 4+/5, BUE grossly 4/5 except abduction 3+/5 (P)   -WB       Row Name 09/12/23 1116          Balance    Balance Assessment sitting dynamic balance;sitting static balance;sit to stand dynamic balance;standing static balance;standing dynamic balance (P)   -WB     Static Sitting Balance independent (P)   -WB     Dynamic Sitting Balance supervision (P)   -WB     Position, Sitting Balance unsupported;sitting in chair;sitting edge of bed (P)   -WB     Sit to Stand Dynamic Balance supervision (P)   -WB     Static  Standing Balance supervision (P)   -WB     Dynamic Standing Balance contact guard;1-person assist;verbal cues (P)   -WB     Position/Device Used, Standing Balance unsupported (P)   -WB     Balance Interventions sitting;standing;sit to stand;static;dynamic;weight shifting activity;occupation based/functional task (P)   -WB     Comment, Balance No LOB (P)   -WB       Row Name 09/12/23 1116          Sensory Assessment (Somatosensory)    Sensory Assessment (Somatosensory) LE sensation intact (P)   -WB               User Key  (r) = Recorded By, (t) = Taken By, (c) = Cosigned By      Initials Name Provider Type    WB Chavez Sanford, PT Student PT Student                   Goals/Plan       Row Name 09/12/23 1124          Bed Mobility Goal 1 (PT)    Activity/Assistive Device (Bed Mobility Goal 1, PT) supine to sit;sit to supine (P)   -WB     [place] Level/Cues Needed (Bed Mobility Goal 1, PT) independent (P)   -WB     Time Frame (Bed Mobility Goal 1, PT) long term goal (LTG);10 days (P)   -WB       Row Name 09/12/23 1124          Transfer Goal 1 (PT)    Activity/Assistive Device (Transfer Goal 1, PT) sit-to-stand/stand-to-sit (P)   -WB     [place] Level/Cues Needed (Transfer Goal 1, PT) modified independence (P)   -WB     Time Frame (Transfer Goal 1, PT) long term goal (LTG);10 days (P)   -WB       Row Name 09/12/23 1124          Gait Training Goal 1 (PT)    Activity/Assistive Device (Gait Training Goal 1, PT) gait (walking locomotion);improve balance and speed;increase endurance/gait distance (P)   -WB     [place] Level (Gait Training Goal 1, PT) standby assist (P)   -WB     Distance (Gait Training Goal 1, PT) 500 (P)   -WB     Time Frame (Gait Training Goal 1, PT) long term goal (LTG);10 days (P)   -WB       Row Name 09/12/23 1124          Stairs Goal 1 (PT)    Activity/Assistive Device (Stairs Goal 1, PT) ascending stairs;descending stairs;using handrail, right (P)   -WB     [place] Level/Cues  Needed (Stairs Goal 1, PT) standby assist (P)   -WB     Number of Stairs (Stairs Goal 1, PT) 14 (P)   -WB     Time Frame (Stairs Goal 1, PT) long term goal (LTG);10 days (P)   -WB       Row Name 09/12/23 1124          Patient Education Goal (PT)    Activity (Patient Education Goal, PT) HEP (P)   -WB     [place]/Cues/Accuracy (Memory Goal 2, PT) independent (P)   -WB     Time Frame (Patient Education Goal, PT) long term goal (LTG);10 days (P)   -WB       Row Name 09/12/23 1124          Therapy Assessment/Plan (PT)    Planned Therapy Interventions (PT) balance training;bed mobility training;gait training;home exercise program;neuromuscular re-education;patient/family education;postural re-education;ROM (range of motion);stair training;strengthening;stretching;transfer training (P)   -WB               User Key  (r) = Recorded By, (t) = Taken By, (c) = Cosigned By      Initials Name Provider Type    WB Chavez Sanford, PT Student PT Student                   Clinical Impression       Row Name 09/12/23 1120          Pain    Pretreatment Pain Rating 0/10 - no pain (P)   -WB     Posttreatment Pain Rating 0/10 - no pain (P)   -WB     Pain Intervention(s) Repositioned;Ambulation/increased activity (P)   -WB       Row Name 09/12/23 1120          Plan of Care Review    Plan of Care Reviewed With patient (P)   -WB     Progress no change (P)   -WB     Outcome Evaluation PT eval complete. Pt demonstrates impaired gait, balance, and strength and is below functional baseline. Pt was CGA in ambulation but required four standing rest breaks to ambulate 250' due to fatigue. Skilled IPPT is warrented, PT rec IRF at d/c. (P)   -WB       Row Name 09/12/23 1120          Therapy Assessment/Plan (PT)    Rehab Potential (PT) good, to achieve stated therapy goals (P)   -WB     Criteria for Skilled Interventions Met (PT) yes;meets criteria;skilled treatment is necessary (P)   -WB     Therapy Frequency (PT) daily (P)   -WB       Row Name  09/12/23 1120          Vital Signs    Pre Systolic BP Rehab 113 (P)   -WB     Pre Treatment Diastolic BP 72 (P)   -WB     Pretreatment Heart Rate (beats/min) 93 (P)   -WB     Intratreatment Heart Rate (beats/min) 108 (P)   -WB     Posttreatment Heart Rate (beats/min) 76 (P)   -WB     Pre SpO2 (%) 98 (P)   -WB     O2 Delivery Pre Treatment room air (P)   -WB     O2 Delivery Intra Treatment room air (P)   -WB     Post SpO2 (%) 97 (P)   -WB     O2 Delivery Post Treatment room air (P)   -WB     Pre Patient Position Supine (P)   -WB     Intra Patient Position Standing (P)   -WB     Post Patient Position Sitting (P)   -WB       Row Name 09/12/23 1120          Positioning and Restraints    Pre-Treatment Position in bed (P)   -WB     Post Treatment Position chair (P)   -WB     In Chair notified nsg;reclined;sitting;call light within reach;encouraged to call for assist;exit alarm on;waffle cushion;legs elevated (P)   -WB               User Key  (r) = Recorded By, (t) = Taken By, (c) = Cosigned By      Initials Name Provider Type    WB Chavez Sanford, PT Student PT Student                   Outcome Measures       Row Name 09/12/23 1125 09/12/23 0801       How much help from another person do you currently need...    Turning from your back to your side while in flat bed without using bedrails? 4 (P)   -WB 4  -JM    Moving from lying on back to sitting on the side of a flat bed without bedrails? 4 (P)   -WB 4  -JM    Moving to and from a bed to a chair (including a wheelchair)? 4 (P)   -WB 4  -JM    Standing up from a chair using your arms (e.g., wheelchair, bedside chair)? 4 (P)   -WB 4  -JM    Climbing 3-5 steps with a railing? 3 (P)   -WB 3  -JM    To walk in hospital room? 4 (P)   -WB 4  -JM    AM-PAC 6 Clicks Score (PT) 23 (P)   -WB 23  -JM    Highest level of mobility 7 --> Walked 25 feet or more (P)   -WB 7 --> Walked 25 feet or more  -      Row Name 09/12/23 1125          Functional Assessment    Outcome Measure  Options AM-PAC 6 Clicks Basic Mobility (PT) (P)   -WB               User Key  (r) = Recorded By, (t) = Taken By, (c) = Cosigned By      Initials Name Provider Type    Malathi Darling, RN Registered Nurse    WB Chavez Sanford, PT Student PT Student                                 Physical Therapy Education       Title: PT OT SLP Therapies (Done)       Topic: Physical Therapy (Done)       Point: Mobility training (Done)       Learning Progress Summary             Patient Acceptance, E,TB, VU by WB at 9/12/2023 1126                         Point: Home exercise program (Done)       Learning Progress Summary             Patient Acceptance, E,TB, VU by WB at 9/12/2023 1126                         Point: Body mechanics (Done)       Learning Progress Summary             Patient Acceptance, E,TB, VU by WB at 9/12/2023 1126                         Point: Precautions (Done)       Learning Progress Summary             Patient Acceptance, E,TB, VU by WB at 9/12/2023 1126                                         User Key       Initials Effective Dates Name Provider Type Discipline     07/20/23 -  Chavez Sanford, PT Student PT Student PT                  PT Recommendation and Plan  Planned Therapy Interventions (PT): (P) balance training, bed mobility training, gait training, home exercise program, neuromuscular re-education, patient/family education, postural re-education, ROM (range of motion), stair training, strengthening, stretching, transfer training  Plan of Care Reviewed With: (P) patient  Progress: (P) no change  Outcome Evaluation: (P) PT eval complete. Pt demonstrates impaired gait, balance, and strength and is below functional baseline. Pt was CGA in ambulation but required four standing rest breaks to ambulate 250' due to fatigue. Skilled IPPT is warrented, PT rec IRF at d/c.     Time Calculation:   PT Evaluation Complexity  History, PT Evaluation Complexity: (P) 1-2 personal factors and/or  comorbidities  Examination of Body Systems (PT Eval Complexity): (P) 1-2 elements  Clinical Presentation (PT Evaluation Complexity): (P) evolving  Clinical Decision Making (PT Evaluation Complexity): (P) low complexity  Overall Complexity (PT Evaluation Complexity): (P) low complexity     PT Charges       Row Name 09/12/23 1126             Time Calculation    Start Time 1012 (P)   -WB      PT Received On 09/12/23 (P)   -WB      PT Goal Re-Cert Due Date 09/22/23 (P)   -WB         Timed Charges    33084 - Gait Training Minutes  15 (P)   -WB         Untimed Charges    PT Eval/Re-eval Minutes 33 (P)   -WB         Total Minutes    Timed Charges Total Minutes 15 (P)   -WB      Untimed Charges Total Minutes 33 (P)   -WB       Total Minutes 48 (P)   -WB                User Key  (r) = Recorded By, (t) = Taken By, (c) = Cosigned By      Initials Name Provider Type    WB Chavez Sanford, PT Student PT Student                  Therapy Charges for Today       Code Description Service Date Service Provider Modifiers Qty    76284670720 HC GAIT TRAINING EA 15 MIN 9/12/2023 Chavez Sanford, PT Student GP 1    [id number]  PT EVAL LOW COMPLEXITY 3 9/12/2023 Chavez Sanford, PT Student GP 1            PT G-Codes  Outcome Measure Options: (P) AM-PAC 6 Clicks Basic Mobility (PT)  AM-PAC 6 Clicks Score (PT): (P) 23  PT Discharge Summary  Anticipated Discharge Disposition (PT): (P) inpatient rehabilitation facility    Chavez Sanford, PT Student  9/12/2023

## 2023-09-12 NOTE — PLAN OF CARE
Goal Outcome Evaluation:  Plan of Care Reviewed With: (P) patient        Progress: (P) no change  Outcome Evaluation: (P) PT eval complete. Pt demonstrates impaired gait, balance, and strength and is below functional baseline. Pt was CGA in ambulation but required four standing rest breaks to ambulate 250' due to fatigue. Skilled IPPT is warrented, PT rec IRF at d/c.      Anticipated Discharge Disposition (PT): (P) inpatient rehabilitation facility

## 2023-09-12 NOTE — PLAN OF CARE
Goal Outcome Evaluation:  Plan of Care Reviewed With: patient        Progress: no change  Outcome Evaluation: A & O x 4, AFib - rate controlled, RA, last QTc 419, c/o abdominal cramping/flatulence which improved with Simethicone, up with standby, Nilo 19, Falls score 9, to be NPO after midnight for cardioversion/consent signed and in chart

## 2023-09-12 NOTE — PROGRESS NOTES
Pharmacy Consult - Tikosyn Initiation    Salinas Monroy is a 73 y.o. female admitted for Tikosyn initiation and monitoring.    Height:   160 cm   Weight:   102 kg    Evaluation of Drug-Drug Interactions     Previous antiarrhythmic medications must be discontinued prior to admission       - Amiodarone must be discontinued >3 weeks prior to Tikosyn start       - Sotalol and class I antiarrhythmics (Dysopyramide, Flecainide, Mexiletine, Propafenone) must be discontinued >48 hours prior to Tikosyn start         - Previous antiarrhythmic therapy: Flecanide  (last dose 9/7 per RN)     Current drug-drug interactions noted with admission medications and Tikosyn    The following medications are contraindicated with Dofetilide and will be/have been discontinued:  - Fluoroquinolones (Ciprofloxacin, Levofloxacin, Moxifloxacin, Norfloxacin)  - Azole antifungals (Itraconazole, Ketoconazole, Posaconazole)  - Hydrochlorothiazide and any combination products containing Hydrochlorothiazide   - Trimethoprim and Trimethoprim-Sulfamethoxazole   - Verapamil  - Cimetidine  - Ziprasidone   - Dolutegravir  - Sauinavir  - Thioridazine   - Fingolimod  - Megestrol  - Pimozide  - Prochloperazine  - Lamotrigine  - Ibutilide  - Procainamide  - Macrolide antibiotics (Erythromycin, Clarithromycin)  - Quetiapine  - Citalopram    The following medications are recognized to prolong QT interval, however the medication may continue during initial Dofetilide dosing:  - Loop diuretics (Bumetanide, Furosemide, Torsemide)  - Antipsychotics (Haloperidol,  Risperidone, Asenapine)   - Antidepressants (Amitriptyline, Amoxapine, Clomipramine, Desipramine, Doxepin, Imipramine, Maprotiline, Mirtazapine, Nortriptyline, Protriptyline, Triipramine)  - Diltiazem  - Ondansetron  - Ivabradine  - Eribulin  - Paliperidone  - Phenothiazines (Chlorpromazine, Fluphenazine, Mesoridazine, Perphenazine, Promazine, Trifluoperazine)    The following medications may  increase Dofetilide serum concentrations and can be co-administered:  - Azole antifungals (Fluconazole, Voriconazole)  - SSRI's ( Escitalopram, Fluvoxamine, Fluoxetine, Paroxetine, Sertraline)  - Protease Inhibitors (Amprenavir, Indinavir, Nelfinavir, Ritonavir)  - Diltiazem   - Metformin, Glyburide  - Amiloride  - Cannabinoids  - Nefazodone  - Triamterene  - Quinine    Laboratory    Results from last 7 days   Lab Units 09/11/23  1632   SODIUM mmol/L 143   POTASSIUM mmol/L 4.4   CHLORIDE mmol/L 108*   CO2 mmol/L 25.0   BUN mg/dL 48*   CREATININE mg/dL 0.78   GLUCOSE mg/dL 99   CALCIUM mg/dL 9.1     Results from last 7 days   Lab Units 09/11/23  1632   MAGNESIUM mg/dL 2.5*       Pharmacy will order electrolyte replacement per protocols if indicated (Mag < 2.0, K < 4.0) based on laboratory values on admission      - Magnesium replacement protocol ordered: Yes     - Potassium replacement protocol ordered: Yes    Estimated CrCl =  101.9 mL/min  (Calculated with Cockroft-Gault equation using actual body weight and serum creatinine for calculation--can use laboratory values from previous 24 hours)    Initial QTc and EKG Monitoring    QTc = 470    If QTc is:     < 440 msec: okay to proceed with initiation      > 440 msec: must contact MD or physician extender (VALENTINE/PA-C)             - Provider contacted: N/A             - Okay to proceed: Yes                   If QTc  < 500 msec and a pre-existing ventricular conduction defect exists), must contact MD or physician extender (APRREHANA/PA-C)    Patient has a functioning atrial pacemaker - No     Cardiology aware, will proceed    Initial Tikosyn dose based on Creatinine Clearance:    CrCl > 60 mL/min - Dofetilide 500 mcg PO Q12H  CrCl 40-60 mL/min - Dofetilide 250 mcg PO Q12H  CrCl 20-39 mL/min - Dofetilide 125 mcg PO Q12H  CrCl < 20 mL/min - Do not start medication, contact MD    (Will dose medication 10 hour intervals until administration times are between 7 and  9).    Assessment/Plan:    Patient admitted for Tikosyn initiation per cardiology. Qtc increased from 400 msec to 470 msec. Cards decreased dose to Tikosyn 250 mcg PO every 12 hours.  Monitor electrolytes and drug-drug interactions.  Pharmacy will continue to follow.    Ninoska Lewis, PharmD  Pharmacy Resident  9/12/2023  08:26 EDT

## 2023-09-12 NOTE — PROGRESS NOTES
"[facility] at [facility]  Progress Note     LOS: 1 day   Patient Care Team:  Jm Marie DO as PCP - General (Family Medicine)    Chief Complaint:  Atrial Fibrillation    Subjective     Patient remains in AFIB with controlled rates. Stomach feels mildly \"crampy\". Had a small amount of diarrhea last evening. U/A ordered for urinary frequency which was negative.           Review of Systems:   Pertinent positives in HPI, all others reviewed and negative.      Objective       Current Facility-Administered Medications:     allopurinol (ZYLOPRIM) tablet 200 mg, 200 mg, Oral, Daily, Macias, Faby Waltersndra, APRN    apixaban (ELIQUIS) tablet 5 mg, 5 mg, Oral, BID, Macias, Faby Kathi, APRN, 5 mg at 09/11/23 2136    buPROPion SR (WELLBUTRIN SR) 12 hr tablet 100 mg, 100 mg, Oral, QAM, Colleen, Faby Waltersndra, APRN, 100 mg at 09/12/23 0631    Calcium Replacement - Follow Nurse / BPA Driven Protocol, , Does not apply, PRN, Faby Maciasra, APRN    dofetilide (TIKOSYN) capsule 500 mcg, 500 mcg, Oral, Q12H, Abu Taha, Leatha, RPH, 500 mcg at 09/12/23 0631    escitalopram (LEXAPRO) tablet 10 mg, 10 mg, Oral, Nightly, MaciasFabyra, APRN, 10 mg at 09/11/23 2136    famotidine (PEPCID) tablet 20 mg, 20 mg, Oral, BID, MaciasFaby, APRN, 20 mg at 09/11/23 2136    ferrous sulfate tablet 325 mg, 325 mg, Oral, Daily With Breakfast, MaciasFabyndra, APRN    furosemide (LASIX) tablet 20 mg, 20 mg, Oral, BID, MaciasFabyra, APRN, 20 mg at 09/11/23 2136    levothyroxine (SYNTHROID, LEVOTHROID) tablet 75 mcg, 75 mcg, Oral, Q AM, Macias, Faby Novakra, APRN, 75 mcg at 09/12/23 0631    liothyronine (CYTOMEL) tablet 25 mcg, 25 mcg, Oral, TID, Faby Macias APRN, 25 mcg at 09/11/23 2137    Magnesium Cardiology Dose Replacement - Follow Nurse / BPA Driven Protocol, , Does not apply, PRColleen KIMBALL Lauren Alexandra, APRN    melatonin tablet 10 mg, 10 mg, Oral, Nightly " PRN, Chavez Soto PA, 10 mg at 09/12/23 0325    nitroglycerin (NITROSTAT) SL tablet 0.4 mg, 0.4 mg, Sublingual, Q5 Min PRN, Faby Macias APRN    nystatin (MYCOSTATIN) powder, , Topical, Q12H, Faby Macias APRN, Given at 09/12/23 0100    Pharmacy Consult - Pharmacy to dose, , Does not apply, Continuous PRN, Faby Macias APRN    Phosphorus Replacement - Follow Nurse / BPA Driven Protocol, , Does not apply, PRN, Faby Macias APRN    potassium chloride (MICRO-K) CR capsule 10 mEq, 10 mEq, Oral, BID With Meals, Faby Macias APRN, 10 mEq at 09/11/23 1741    Potassium Replacement - Follow Nurse / BPA Driven Protocol, , Does not apply, PRN, Faby Macias APRN      Vital Sign Min/Max for last 24 hours  Temp  Min: 98 °F (36.7 °C)  Max: 98.5 °F (36.9 °C)   BP  Min: 113/72  Max: 128/65   Pulse  Min: 69  Max: 122   Resp  Min: 18  Max: 20   SpO2  Min: 90 %  Max: 96 %   No data recorded   No data recorded         Physical Exam:     General Appearance:    Alert, cooperative, in no acute distress   Lungs:     Clear to auscultation,respirations regular, even and                  unlabored    Heart:   Irr, irr,  normal S1 and S2, no            murmur, no gallop, no rub, no click   Chest Wall:    No abnormalities observed   Abdomen:     Normal bowel sounds, no masses, no organomegaly, soft        non-tender, non-distended, no guarding, no rebound                tenderness   Extremities:   Moves all extremities well, no edema, no cyanosis, no             redness   Pulses:   Pulses palpable and equal bilaterally   Skin:   No bleeding, bruising or rash        Results Review:       Results from last 7 days   Lab Units 09/11/23  1632   SODIUM mmol/L 143   POTASSIUM mmol/L 4.4   CHLORIDE mmol/L 108*   CO2 mmol/L 25.0   BUN mg/dL 48*   CREATININE mg/dL 0.78   GLUCOSE mg/dL 99          Results from last 7 days   Lab Units 09/11/23  1632   PROTIME Seconds 14.6*   INR  1.12    APTT seconds 29.4         Magnesium: 2.5   Ionized Calcium:1.3       Intake/Output Summary (Last 24 hours) at 9/12/2023 0804  Last data filed at 9/12/2023 0400  Gross per 24 hour   Intake 480 ml   Output 600 ml   Net -120 ml       I personally viewed and interpreted the patient's EKG/Telemetry data      EKG: Atrial Fibrillation 69 bpm QTC: 470 ms     Telemetry: atrial fibrillation  bpm      Present on Admission:   Persistent atrial fibrillation    Assessment & Plan   1) Longstanding Persistent Atrial Fibrillation: Admitted for Tikosyn 9/11/2023. Remains in AFIB. QTC is has increased from 400 ms prior to dosing to 470 ms today. Will decrease dose to 250 mcg BID.. Watch for further side effects of diarrhea. Discussed with patient that there is a side effect of diarrhea, but she is willing to monitor for now as her episode last night may not be related to Tikosyn.  -Will Make pt NPO and plan for cardioversion if still in AF.   2) Anticoagulation: Continue Eliquis, CHADsvasc = 3. Fall risk and risk for injury due to weakness in BLE. Plan for future Watchman device   3) Thyroid Dysfunction:   -- history of thyroiditis  - lab work reviewed from 6/2023 shows low TSH and low T4  - on levothyroxine and Cytomel  - currently followed by her PCP, recommended seeing an endocrinologist, and even placing a consult this admission, but patient defers.   4) Urinary Frequency:  - U/A with culture:  negative      Plan for disposition: NPO after MN for ECV tomorrow. EKG in AM. Anticipate D/C home tomorrow afternoon.     Electronically signed by ANDREI Garduno, 09/12/23, 7:16 AM EDT.    I have read the above note and agree

## 2023-09-13 ENCOUNTER — TELEPHONE (OUTPATIENT)
Dept: CARDIOLOGY | Facility: CLINIC | Age: 73
End: 2023-09-13
Payer: MEDICARE

## 2023-09-13 ENCOUNTER — APPOINTMENT (OUTPATIENT)
Dept: CARDIOLOGY | Facility: HOSPITAL | Age: 73
DRG: 310 | End: 2023-09-13
Payer: MEDICARE

## 2023-09-13 ENCOUNTER — READMISSION MANAGEMENT (OUTPATIENT)
Dept: CALL CENTER | Facility: HOSPITAL | Age: 73
End: 2023-09-13
Payer: MEDICARE

## 2023-09-13 VITALS
HEART RATE: 63 BPM | OXYGEN SATURATION: 93 % | RESPIRATION RATE: 17 BRPM | DIASTOLIC BLOOD PRESSURE: 56 MMHG | SYSTOLIC BLOOD PRESSURE: 125 MMHG | TEMPERATURE: 98 F

## 2023-09-13 DIAGNOSIS — E03.9 HYPOTHYROIDISM, ADULT: ICD-10-CM

## 2023-09-13 LAB
QT INTERVAL: 416 MS
QT INTERVAL: 458 MS
QTC INTERVAL: 464 MS
QTC INTERVAL: 472 MS

## 2023-09-13 PROCEDURE — 93005 ELECTROCARDIOGRAM TRACING: CPT | Performed by: INTERNAL MEDICINE

## 2023-09-13 PROCEDURE — 25010000002 MIDAZOLAM PER 1 MG: Performed by: INTERNAL MEDICINE

## 2023-09-13 PROCEDURE — 93005 ELECTROCARDIOGRAM TRACING: CPT

## 2023-09-13 PROCEDURE — 99232 SBSQ HOSP IP/OBS MODERATE 35: CPT | Performed by: INTERNAL MEDICINE

## 2023-09-13 PROCEDURE — 92960 CARDIOVERSION ELECTRIC EXT: CPT | Performed by: INTERNAL MEDICINE

## 2023-09-13 PROCEDURE — 93010 ELECTROCARDIOGRAM REPORT: CPT | Performed by: INTERNAL MEDICINE

## 2023-09-13 PROCEDURE — 5A2204Z RESTORATION OF CARDIAC RHYTHM, SINGLE: ICD-10-PCS | Performed by: INTERNAL MEDICINE

## 2023-09-13 PROCEDURE — 93005 ELECTROCARDIOGRAM TRACING: CPT | Performed by: PHYSICIAN ASSISTANT

## 2023-09-13 RX ORDER — DOFETILIDE 0.25 MG/1
250 CAPSULE ORAL EVERY 12 HOURS
Qty: 60 CAPSULE | Refills: 6 | Status: SHIPPED | OUTPATIENT
Start: 2023-09-13

## 2023-09-13 RX ORDER — FLUMAZENIL 0.1 MG/ML
0.5 INJECTION INTRAVENOUS ONCE AS NEEDED
Status: DISCONTINUED | OUTPATIENT
Start: 2023-09-13 | End: 2023-09-13 | Stop reason: HOSPADM

## 2023-09-13 RX ORDER — FENTANYL CITRATE 50 UG/ML
50-100 INJECTION, SOLUTION INTRAMUSCULAR; INTRAVENOUS ONCE AS NEEDED
Status: DISCONTINUED | OUTPATIENT
Start: 2023-09-13 | End: 2023-09-13 | Stop reason: HOSPADM

## 2023-09-13 RX ORDER — MIDAZOLAM HYDROCHLORIDE 1 MG/ML
2-8 INJECTION INTRAMUSCULAR; INTRAVENOUS ONCE AS NEEDED
Status: DISCONTINUED | OUTPATIENT
Start: 2023-09-13 | End: 2023-09-13 | Stop reason: HOSPADM

## 2023-09-13 RX ORDER — LIOTHYRONINE SODIUM 25 UG/1
TABLET ORAL
Qty: 90 TABLET | Refills: 1 | OUTPATIENT
Start: 2023-09-13

## 2023-09-13 RX ORDER — MIDAZOLAM HYDROCHLORIDE 1 MG/ML
INJECTION INTRAMUSCULAR; INTRAVENOUS
Status: COMPLETED | OUTPATIENT
Start: 2023-09-13 | End: 2023-09-13

## 2023-09-13 RX ORDER — NALOXONE HCL 0.4 MG/ML
0.4 VIAL (ML) INJECTION ONCE AS NEEDED
Status: DISCONTINUED | OUTPATIENT
Start: 2023-09-13 | End: 2023-09-13 | Stop reason: HOSPADM

## 2023-09-13 RX ADMIN — NYSTATIN: 100000 POWDER TOPICAL at 09:12

## 2023-09-13 RX ADMIN — BUPROPION HYDROCHLORIDE 100 MG: 100 TABLET, EXTENDED RELEASE ORAL at 06:34

## 2023-09-13 RX ADMIN — LEVOTHYROXINE SODIUM 75 MCG: 0.07 TABLET ORAL at 06:33

## 2023-09-13 RX ADMIN — FAMOTIDINE 20 MG: 20 TABLET, FILM COATED ORAL at 09:12

## 2023-09-13 RX ADMIN — FUROSEMIDE 20 MG: 20 TABLET ORAL at 09:12

## 2023-09-13 RX ADMIN — DOFETILIDE 250 MCG: 0.25 CAPSULE ORAL at 09:11

## 2023-09-13 RX ADMIN — LIOTHYRONINE SODIUM 25 MCG: 25 TABLET ORAL at 09:12

## 2023-09-13 RX ADMIN — MIDAZOLAM HYDROCHLORIDE 2 MG: 1 INJECTION, SOLUTION INTRAMUSCULAR; INTRAVENOUS at 13:47

## 2023-09-13 RX ADMIN — ALLOPURINOL 200 MG: 100 TABLET ORAL at 09:11

## 2023-09-13 RX ADMIN — POTASSIUM CHLORIDE 10 MEQ: 750 CAPSULE, EXTENDED RELEASE ORAL at 09:12

## 2023-09-13 RX ADMIN — FERROUS SULFATE TAB 325 MG (65 MG ELEMENTAL FE) 325 MG: 325 (65 FE) TAB at 09:12

## 2023-09-13 RX ADMIN — APIXABAN 5 MG: 5 TABLET, FILM COATED ORAL at 09:12

## 2023-09-13 NOTE — PROGRESS NOTES
[facility] at [facility]  Progress Note     LOS: 2 days   Patient Care Team:  Jm Marie DO as PCP - General (Family Medicine)    Chief Complaint:  Atrial Fibrillation    Subjective     Patient doing well today.  No change overnight.  Loose stools better today.  Feels good in sinus rhythm.        Review of Systems:   Pertinent positives in HPI, all others reviewed and negative.      Objective       Current Facility-Administered Medications:     allopurinol (ZYLOPRIM) tablet 200 mg, 200 mg, Oral, Daily, Macias, Faby Waltersndra, APRN, 200 mg at 09/12/23 0826    apixaban (ELIQUIS) tablet 5 mg, 5 mg, Oral, BID, Macias, Faby Kathi, APRN, 5 mg at 09/12/23 2047    buPROPion SR (WELLBUTRIN SR) 12 hr tablet 100 mg, 100 mg, Oral, QAM, Macias, Faby Waltersndra, APRN, 100 mg at 09/13/23 0634    Calcium Replacement - Follow Nurse / BPA Driven Protocol, , Does not apply, PRN, Faby Maciasra, APRN    dofetilide (TIKOSYN) capsule 250 mcg, 250 mcg, Oral, Q12H, Domonique Francis PA, 250 mcg at 09/12/23 2047    escitalopram (LEXAPRO) tablet 10 mg, 10 mg, Oral, Nightly, MaciasFabyndra, APRN, 10 mg at 09/12/23 2048    famotidine (PEPCID) tablet 20 mg, 20 mg, Oral, BID, MaciasFabyndra, APRN, 20 mg at 09/12/23 2048    ferrous sulfate tablet 325 mg, 325 mg, Oral, Daily With Breakfast, MaciasFabyndra, APRN, 325 mg at 09/12/23 0826    furosemide (LASIX) tablet 20 mg, 20 mg, Oral, BID, MaciasFabyndra, APRN, 20 mg at 09/12/23 2048    levothyroxine (SYNTHROID, LEVOTHROID) tablet 75 mcg, 75 mcg, Oral, Q AM, Macias, Faby Waltersndra, APRN, 75 mcg at 09/13/23 0633    liothyronine (CYTOMEL) tablet 25 mcg, 25 mcg, Oral, TID, Macias, Faby Kathi, APRN, 25 mcg at 09/12/23 2051    Magnesium Cardiology Dose Replacement - Follow Nurse / BPA Driven Protocol, , Does not apply, PRN, Faby Macias APRN    melatonin tablet 10 mg, 10 mg, Oral, Nightly PRN, Chavez Soto PA,  10 mg at 09/12/23 2047    nitroglycerin (NITROSTAT) SL tablet 0.4 mg, 0.4 mg, Sublingual, Q5 Min PRN, Faby Macias APRN    nystatin (MYCOSTATIN) powder, , Topical, Q12H, Faby Macias APRN, Given at 09/12/23 2051    Pharmacy Consult - Pharmacy to dose, , Does not apply, Continuous PRN, Faby Macias APRN    Phosphorus Replacement - Follow Nurse / BPA Driven Protocol, , Does not apply, PRN, Faby Macias APRN    potassium chloride (MICRO-K) CR capsule 10 mEq, 10 mEq, Oral, BID With Meals, Faby Macias APRN, 10 mEq at 09/12/23 1700    Potassium Replacement - Follow Nurse / BPA Driven Protocol, , Does not apply, PRN, Faby Macias APRN    simethicone (MYLICON) chewable tablet 80 mg, 80 mg, Oral, 4x Daily PRN, Guanako Thompson MD, 80 mg at 09/12/23 1700      Vital Sign Min/Max for last 24 hours  Temp  Min: 97.8 °F (36.6 °C)  Max: 98.6 °F (37 °C)   BP  Min: 115/99  Max: 133/96   Pulse  Min: 67  Max: 100   Resp  Min: 18  Max: 19   SpO2  Min: 95 %  Max: 98 %   No data recorded   No data recorded         Physical Exam:     General Appearance:    Alert, cooperative, in no acute distress   Lungs:   Clear to auscultation bilaterally respiratory effort fair    Heart:  Regular rate rhythm normal S1-S2 there is an S4.   Chest Wall:    No abnormalities observed   Abdomen:     Normal bowel sounds, benign examination.   Extremities:   Moves all extremities well, no edema, no cyanosis, no             redness   Pulses:   Pulses palpable and equal bilaterally   Skin:   No bleeding, bruising or rash        Results Review:       Results from last 7 days   Lab Units 09/11/23  1632   SODIUM mmol/L 143   POTASSIUM mmol/L 4.4   CHLORIDE mmol/L 108*   CO2 mmol/L 25.0   BUN mg/dL 48*   CREATININE mg/dL 0.78   GLUCOSE mg/dL 99          Results from last 7 days   Lab Units 09/11/23  1632   PROTIME Seconds 14.6*   INR  1.12   APTT seconds 29.4         Magnesium: 2.5   Ionized  Calcium:1.3       Intake/Output Summary (Last 24 hours) at 9/13/2023 0753  Last data filed at 9/12/2023 2000  Gross per 24 hour   Intake 240 ml   Output --   Net 240 ml       I personally viewed and interpreted the patient's EKG/Telemetry data      EKG: Atrial Fibrillation 75 bpm QTC: 425 ms     Telemetry: atrial fibrillation  bpm      Present on Admission:   Persistent atrial fibrillation    Assessment & Plan   1) Longstanding Persistent Atrial Fibrillation: Admitted for Tikosyn 9/11/2023. Remains in AFIB. QTC is stable on 250 mcg BID. Diarrhea improved.  Underwent successful external cardioversion this afternoon.  Now normal sinus rhythm.  QTc stable.    2) Anticoagulation: Continue Eliquis, CHADsvasc = 3. Fall risk and risk for injury due to weakness in BLE. Plan for future Watchman device     3) Thyroid Dysfunction:   -- history of thyroiditis  - lab work reviewed from 6/2023 shows low TSH and low T4  - on levothyroxine and Cytomel  - currently followed by her PCP, recommended seeing an endocrinologist, and even placing a consult this admission, but patient defers.   4) Urinary Frequency:  - U/A with culture:  negative    Twelve-lead EKG at 3:00 today shows normal sinus rhythm heart rate 60 bpm.  QTc measured at 460 ms.  Okay for discharge today.  For repeat EKG tomorrow as an outpatient.    Electronically signed by ANDREI Garduno, 09/13/23, 7:40 AM EDT.    I, Guanako Thompson MD, personally performed the services described in this documentation as scribed by the above named individual in my presence, and it is both accurate and complete.  9/13/2023  14:48 EDT

## 2023-09-13 NOTE — PROGRESS NOTES
Pharmacy Consult - Tikosyn Initiation    Salinas Monroy is a 73 y.o. female admitted for Tikosyn initiation and monitoring.    Height:   160 cm   Weight:   102 kg    Evaluation of Drug-Drug Interactions     Previous antiarrhythmic medications must be discontinued prior to admission       - Amiodarone must be discontinued >3 weeks prior to Tikosyn start       - Sotalol and class I antiarrhythmics (Dysopyramide, Flecainide, Mexiletine, Propafenone) must be discontinued >48 hours prior to Tikosyn start         - Previous antiarrhythmic therapy: Flecanide  (last dose 9/7 per RN)     Current drug-drug interactions noted with admission medications and Tikosyn    The following medications are contraindicated with Dofetilide and will be/have been discontinued:  - Fluoroquinolones (Ciprofloxacin, Levofloxacin, Moxifloxacin, Norfloxacin)  - Azole antifungals (Itraconazole, Ketoconazole, Posaconazole)  - Hydrochlorothiazide and any combination products containing Hydrochlorothiazide   - Trimethoprim and Trimethoprim-Sulfamethoxazole   - Verapamil  - Cimetidine  - Ziprasidone   - Dolutegravir  - Sauinavir  - Thioridazine   - Fingolimod  - Megestrol  - Pimozide  - Prochloperazine  - Lamotrigine  - Ibutilide  - Procainamide  - Macrolide antibiotics (Erythromycin, Clarithromycin)  - Quetiapine  - Citalopram    The following medications are recognized to prolong QT interval, however the medication may continue during initial Dofetilide dosing:  - Loop diuretics (Bumetanide, Furosemide, Torsemide)  - Antipsychotics (Haloperidol,  Risperidone, Asenapine)   - Antidepressants (Amitriptyline, Amoxapine, Clomipramine, Desipramine, Doxepin, Imipramine, Maprotiline, Mirtazapine, Nortriptyline, Protriptyline, Triipramine)  - Diltiazem  - Ondansetron  - Ivabradine  - Eribulin  - Paliperidone  - Phenothiazines (Chlorpromazine, Fluphenazine, Mesoridazine, Perphenazine, Promazine, Trifluoperazine)    The following medications may  increase Dofetilide serum concentrations and can be co-administered:  - Azole antifungals (Fluconazole, Voriconazole)  - SSRI's ( Escitalopram, Fluvoxamine, Fluoxetine, Paroxetine, Sertraline)  - Protease Inhibitors (Amprenavir, Indinavir, Nelfinavir, Ritonavir)  - Diltiazem   - Metformin, Glyburide  - Amiloride  - Cannabinoids  - Nefazodone  - Triamterene  - Quinine    Laboratory    Results from last 7 days   Lab Units 09/11/23  1632   SODIUM mmol/L 143   POTASSIUM mmol/L 4.4   CHLORIDE mmol/L 108*   CO2 mmol/L 25.0   BUN mg/dL 48*   CREATININE mg/dL 0.78   GLUCOSE mg/dL 99   CALCIUM mg/dL 9.1     Results from last 7 days   Lab Units 09/11/23  1632   MAGNESIUM mg/dL 2.5*       Pharmacy will order electrolyte replacement per protocols if indicated (Mag < 2.0, K < 4.0) based on laboratory values on admission      - Magnesium replacement protocol ordered: Yes     - Potassium replacement protocol ordered: Yes    Estimated CrCl =  101.9 mL/min  (Calculated with Cockroft-Gault equation using actual body weight and serum creatinine for calculation--can use laboratory values from previous 24 hours)    Initial QTc and EKG Monitoring    QTc = 458    If QTc is:     < 440 msec: okay to proceed with initiation      > 440 msec: must contact MD or physician extender (VALENTINE/PA-C)             - Provider contacted: Domonique Francis             - Okay to proceed: Yes                   If QTc  < 500 msec and a pre-existing ventricular conduction defect exists), must contact MD or physician extender (APRN/PA-C)    Patient has a functioning atrial pacemaker - No     Cardiology aware, will proceed    Initial Tikosyn dose based on Creatinine Clearance:    CrCl > 60 mL/min - Dofetilide 500 mcg PO Q12H  CrCl 40-60 mL/min - Dofetilide 250 mcg PO Q12H  CrCl 20-39 mL/min - Dofetilide 125 mcg PO Q12H  CrCl < 20 mL/min - Do not start medication, contact MD    (Will dose medication 10 hour intervals until administration times are between 7 and  9).    Assessment/Plan:    Patient admitted for Tikosyn initiation per cardiology. Qtc decreased from 470 msec to 458 msec. Cards decreased dose to Tikosyn 250 mcg PO every 12 hours.  Monitor electrolytes and drug-drug interactions.  Pharmacy will continue to follow.    Ninoska Lewis, PharmD  Pharmacy Resident  9/13/2023  12:04 EDT           Normal for race

## 2023-09-13 NOTE — OUTREACH NOTE
Prep Survey      Flowsheet Row Responses   [facility] facility patient discharged from? [place]   Is LACE score < 7 ? No   Eligibility [facility]   Date of Admission 09/11/23   Date of Discharge 09/13/23   Discharge Disposition Home or Self Care   Discharge diagnosis persistent A-fib, admitted for tikosyn   Does the patient have one of the following disease processes/diagnoses(primary or secondary)? Other   Does the patient have Home health ordered? No   Is there a DME ordered? No   Prep survey completed? Yes            Delfina BOOGIE - Registered Nurse

## 2023-09-13 NOTE — CASE MANAGEMENT/SOCIAL WORK
Case Management Discharge Note      Final Note: Patient's plan is still to return home at discharge.  Spoke with her pharmacy and they do not have Tikosyn in stock, but can order it.  Patient will need to obtain first fill at [facility].  Asked PA to send script by 1530.  No other needs noted.         Selected Continued Care - Admitted Since 9/11/2023       Destination    No services have been selected for the patient.                Durable Medical Equipment    No services have been selected for the patient.                Dialysis/Infusion    No services have been selected for the patient.                Home Medical Care    No services have been selected for the patient.                Therapy    No services have been selected for the patient.                Community Resources    No services have been selected for the patient.                Community & DME    No services have been selected for the patient.                         Final Discharge Disposition Code: 01 - home or self-care

## 2023-09-13 NOTE — TELEPHONE ENCOUNTER
Caller: [id number]    Relationship to patient: Provider    Best call back number: [phone]    New or established patient?  [] New  [x] Established    Date of discharge: 09.13.23    Facility discharged from:  [facility]    Diagnosis/Symptoms: PERSISTENT ATRIAL FIBRILLATION     Length of stay (If applicable):     Specialty Only: Did you see a [facility] provider?    [x] Yes  [] No  If so, who? AUDIE - NEEDS 3 MONTH FU

## 2023-09-13 NOTE — PROCEDURES
Diagnosis:  Atrial fibrillation     PROCEDURE PERFORMED: External electrical cardioversion.    Anesthesia: Sedation with:  1. 2 mg Versed  2. 20 mg Brevital    Estimated Blood Loss: Less than 10 mL     Specimens: None    I supervised and directed an independent trained observer with the assistance of monitoring the patient's level of consciousness and physiological status throughout the procedure.  Intraoperative service time of 5 minutes    PROCEDURE IN DETAIL: The patient was brought into the CV[facility] in a fasting  state. The patient was given moderate sedation during which she received 200  joules of external electrical cardioversion that converted atrial  fibrillation to normal sinus heart rhythm.  She has been anticoagulated with Eliquis    IMPRESSION: Successful conversion of atrial fibrillation to normal sinus  heart rhythm.

## 2023-09-14 ENCOUNTER — TRANSITIONAL CARE MANAGEMENT TELEPHONE ENCOUNTER (OUTPATIENT)
Dept: CALL CENTER | Facility: HOSPITAL | Age: 73
End: 2023-09-14
Payer: MEDICARE

## 2023-09-14 LAB
QT INTERVAL: 402 MS
QTC INTERVAL: 491 MS

## 2023-09-14 NOTE — OUTREACH NOTE
Call Center TCM Note      Flowsheet Row Responses   [facility] patient discharged from? [place]   Does the patient have one of the following disease processes/diagnoses(primary or secondary)? Other   TCM attempt successful? Yes   Call start time 1545   Call end time 1551   Discharge diagnosis persistent A-fib, admitted for tikosyn   Person spoke with today (if not patient) and relationship pt   Meds reviewed with patient/caregiver? Yes   Is the patient having any side effects they believe may be caused by any medication additions or changes? No   Does the patient have all medications ordered at discharge? Yes   Is the patient taking all medications as directed (includes completed medication regime)? Yes   Comments Cardiology 9/15/23 at 1:30 PM,  Cardiology ([facility] office) 12/13/23   Does the patient have an appointment with their PCP within 7-14 days of discharge? Yes  [9/18/2023 at 1:00 PM]   Psychosocial issues? No   Did the patient receive a copy of their discharge instructions? Yes   Nursing interventions Reviewed instructions with patient   What is the patient's perception of their health status since discharge? Improving   Is the patient/caregiver able to teach back signs and symptoms related to disease process for when to call PCP? Yes   Is the patient/caregiver able to teach back signs and symptoms related to disease process for when to call 911? Yes   Is the patient/caregiver able to teach back the hierarchy of who to call/visit for symptoms/problems? PCP, Specialist, Home health nurse, Urgent Care, ED, 911 Yes   If the patient is a current smoker, are they able to teach back resources for cessation? Not a smoker   TCM call completed? Yes   Wrap up additional comments Pt states she is doing better, and denies any irregular heartbeats. Reviewed AVS/meds with pt. Pt verified Cardiologist/PCP fu appts.   Call end time 1551   Would this patient benefit from a Referral to [facility] Social Work? No   Is  the patient interested in additional calls from an ambulatory ? No            Sue Cruz RN    9/14/2023, 15:53 EDT

## 2023-09-15 ENCOUNTER — CLINICAL SUPPORT (OUTPATIENT)
Dept: CARDIOLOGY | Facility: CLINIC | Age: 73
End: 2023-09-15
Payer: MEDICARE

## 2023-09-15 LAB
QT INTERVAL: 400 MS
QT INTERVAL: 456 MS
QTC INTERVAL: 458 MS
QTC INTERVAL: 509 MS

## 2023-09-18 ENCOUNTER — OFFICE VISIT (OUTPATIENT)
Dept: FAMILY MEDICINE CLINIC | Facility: CLINIC | Age: 73
End: 2023-09-18
Payer: MEDICARE

## 2023-09-18 VITALS
TEMPERATURE: 98 F | HEIGHT: 63 IN | DIASTOLIC BLOOD PRESSURE: 78 MMHG | SYSTOLIC BLOOD PRESSURE: 126 MMHG | RESPIRATION RATE: 16 BRPM | HEART RATE: 78 BPM | WEIGHT: 230 LBS | BODY MASS INDEX: 40.75 KG/M2 | OXYGEN SATURATION: 99 %

## 2023-09-18 DIAGNOSIS — E06.3 HASHIMOTO'S THYROIDITIS: ICD-10-CM

## 2023-09-18 DIAGNOSIS — E66.01 MORBID OBESITY WITH BODY MASS INDEX (BMI) OF 40.0 TO 44.9 IN ADULT: ICD-10-CM

## 2023-09-18 DIAGNOSIS — Z09 HOSPITAL DISCHARGE FOLLOW-UP: ICD-10-CM

## 2023-09-18 DIAGNOSIS — I48.11 LONGSTANDING PERSISTENT ATRIAL FIBRILLATION: Primary | ICD-10-CM

## 2023-09-18 NOTE — PROGRESS NOTES
Transitional Care Follow Up Visit  Subjective     Salinas Monroy is a 73 y.o. female who presents for a transitional care management visit.    Within 48 business hours after discharge our office contacted her via telephone to coordinate her care and needs.      I reviewed and discussed the details of that call along with the discharge summary, hospital problems, inpatient lab results, inpatient diagnostic studies, and consultation reports with Salinas.     Current outpatient and discharge medications have been reconciled for the patient.  Reviewed by: Cameron Nunez MD          9/13/2023     6:31 PM   Date of TCM Phone Call   [facility]   Date of Admission 9/11/2023   Date of Discharge 9/13/2023   Discharge Disposition Home or Self Care     Risk for Readmission (LACE) Score: 11 (9/13/2023  6:00 AM)      History of Present Illness   Course During Hospital Stay:    Patient presented for scheduled [facility] admission.  She was first diagnosed with atrial fibrillation February 2023 when she presented to the ER with chest pain.  She reports having COVID a few months prior to her diagnosis.  She wore a Holter monitor in May that showed 100% atrial fibrillation burden with controlled heart rates of 83 bpm.  When in A-fib she has symptoms of fatigue and shortness of breath with activity.  She has been on Eliquis with no missed doses since diagnosis of A-fib.     She underwent successful external cardioversion 9/13/23 and remained in NSR with stable QTc. D/t CHADSVASC score of 3 she was continued on Eliquis although per chart review future Watchman device procedure is planned. There was some concern per chart review regarding patient's thyroid labs, but she reports that she has hx of low T4 and that her T3 and rT3 are typically used in conjunction with her symptoms to monitor her thyroid dysfunction. Her Hashimotos is managed by her PCP.     Patient reports that since cardioversion she is feeling much better  and is no longer fatigued. She has questions regarding restarting some of her OTC supplements.     The following portions of the patient's history were reviewed and updated as appropriate: allergies, current medications, past family history, past medical history, past social history, past surgical history, and problem list.    Review of Systems   All other systems reviewed and are negative.    Objective   Physical Exam  Vitals reviewed.   Constitutional:       General: She is not in acute distress.     Appearance: Normal appearance. She is not ill-appearing.   HENT:      Head: Normocephalic.   Eyes:      Extraocular Movements: Extraocular movements intact.   Cardiovascular:      Rate and Rhythm: Normal rate and regular rhythm.      Heart sounds: Normal heart sounds. No murmur heard.  Pulmonary:      Effort: Pulmonary effort is normal. No respiratory distress.      Breath sounds: Normal breath sounds. No stridor. No wheezing, rhonchi or rales.   Musculoskeletal:      Cervical back: Normal range of motion.      Right lower leg: No edema.      Left lower leg: No edema.   Skin:     General: Skin is warm and dry.   Neurological:      Mental Status: She is alert and oriented to person, place, and time.   Psychiatric:         Mood and Affect: Mood normal.         Behavior: Behavior normal.       Assessment & Plan   Diagnoses and all orders for this visit:    1. Longstanding persistent atrial fibrillation (Primary)    2. Hashimoto's thyroiditis    3. Hospital discharge follow-up    4. Morbid obesity with body mass index (BMI) of 40.0 to 44.9 in adult  Assessment & Plan:  Patient's (Body mass index is 40.74 kg/m².) indicates that they are morbidly/severely obese (BMI > 40 or > 35 with obesity - related health condition) with health conditions that include osteoarthritis . Weight is unchanged. BMI  is above average; BMI management plan is completed. We discussed portion control and increasing exercise.         Patient doing  much better s/p cardioversion  Continue cardiac medications from discharge  CHADSVASC score = 3, continue Eliquis. Per chart review patient to have Watchman procedure scheduled in the future  Thryoiditis managed per Dr. Marie. Patient is asymptomatic at this time and is taking her thyroid medication as prescribed  Patient may restart ashwagandha, milk thistle, and st. Johns wort - no known interactions with tikosyn  Follow up with Dr. Marie as previously scheduled

## 2023-09-21 LAB
QT INTERVAL: 230 MS
QT INTERVAL: 402 MS
QT INTERVAL: 406 MS
QT INTERVAL: 412 MS
QT INTERVAL: 458 MS
QTC INTERVAL: 251 MS
QTC INTERVAL: 459 MS
QTC INTERVAL: 466 MS
QTC INTERVAL: 472 MS
QTC INTERVAL: 491 MS

## 2023-09-22 NOTE — ASSESSMENT & PLAN NOTE
Patient's (Body mass index is 40.74 kg/m².) indicates that they are morbidly/severely obese (BMI > 40 or > 35 with obesity - related health condition) with health conditions that include osteoarthritis . Weight is unchanged. BMI  is above average; BMI management plan is completed. We discussed portion control and increasing exercise.

## 2023-09-25 ENCOUNTER — OFFICE VISIT (OUTPATIENT)
Dept: PULMONOLOGY | Facility: CLINIC | Age: 73
End: 2023-09-25
Payer: MEDICARE

## 2023-09-25 VITALS
DIASTOLIC BLOOD PRESSURE: 70 MMHG | HEIGHT: 63 IN | OXYGEN SATURATION: 96 % | HEART RATE: 69 BPM | SYSTOLIC BLOOD PRESSURE: 122 MMHG | RESPIRATION RATE: 16 BRPM | BODY MASS INDEX: 40.22 KG/M2 | WEIGHT: 227 LBS

## 2023-09-25 DIAGNOSIS — M1A.0710 CHRONIC IDIOPATHIC GOUT INVOLVING TOE OF RIGHT FOOT WITHOUT TOPHUS: ICD-10-CM

## 2023-09-25 DIAGNOSIS — E66.01 MORBID OBESITY, UNSPECIFIED OBESITY TYPE: ICD-10-CM

## 2023-09-25 DIAGNOSIS — E03.9 ACQUIRED HYPOTHYROIDISM: Chronic | ICD-10-CM

## 2023-09-25 DIAGNOSIS — G47.33 OSA (OBSTRUCTIVE SLEEP APNEA): Primary | ICD-10-CM

## 2023-09-25 DIAGNOSIS — E03.9 HYPOTHYROIDISM, ADULT: ICD-10-CM

## 2023-09-25 NOTE — PROGRESS NOTES
"  Chief Complaint   Patient presents with    Sleeping Problem    Follow-up         Subjective   Salinas Monroy is a 73 y.o. female.   Patient was evaluated today for follow up of Sleep apnea.     Patient doesn't report any issues with the PAP device. The patient describes no significant issues with her mask either.     Patient says that the compliance with the use of the equipment is good.     Patient says that her symptoms of fatigue & daytime sleepiness have been helped greatly with the use of PAP, as prescribed.     she had cardioversion at [facility] and feels \"much better\".     The following portions of the patient's history were reviewed and updated as appropriate: allergies, current medications, past family history, past medical history, past social history, and past surgical history.    Review of Systems   HENT:  Negative for sinus pressure, sneezing and sore throat.    Respiratory:  Negative for cough, chest tightness, shortness of breath and wheezing.      Objective   Visit Vitals  /70   Pulse 69   Resp 16   Ht 160 cm (63\") Comment: pt reported   Wt 103 kg (227 lb)   LMP  (LMP Unknown)   SpO2 96%   BMI 40.21 kg/m²         BMI Readings from Last 3 Encounters:   09/25/23 40.21 kg/m²   09/18/23 40.74 kg/m²   08/23/23 39.86 kg/m²       Physical Exam  Vitals reviewed.   Constitutional:       Appearance: She is well-developed.   HENT:      Head: Atraumatic.      Mouth/Throat:      Comments: Oropharynx was crowded.  Neck:      Comments: Increased neck circumference noted.  Cardiovascular:      Rate and Rhythm: Normal rate and regular rhythm.   Pulmonary:      Effort: Pulmonary effort is normal.      Breath sounds: Normal breath sounds.   Musculoskeletal:      Comments: Gait was normal.   Neurological:      Mental Status: She is alert and oriented to person, place, and time.         Assessment & Plan   Diagnoses and all orders for this visit:    1. ISABEL (obstructive sleep apnea) (Primary)    2. Morbid " obesity, unspecified obesity type           Return in about 8 months (around 5/25/2024) for Cancel all other appts, [facility]/Kassie.    DISCUSSION (if any):  Sleep study performed in June 2023  AHI was 18 / hour.     Latest PAP device provided in July 2023.  DME company: [employer]    Current PAP settings: 6-16  Current mask type: Nasal pillows/cushions.    Compliance data was obtained from the her device/DME company.    Continue PAP device on a regular basis, at least 4 hours or more per night.    Sleep hygiene measures were discussed    Weight loss advised.    I spent a total of 22 minutes face to face with this patient. Part of this time was spent in counseling patient about the pathophysiology of underlying disease process, reviewing any available sleep studies, need to use devices (as applicable) on a regular basis and lifestyle modifications that may positively impact patient's health.  Time also includes documentation in electronic health records        Dictated utilizing Dragon dictation.    This document was electronically signed by Ambrose Fonseca MD on 09/25/23 at 14:05 EDT

## 2023-09-26 RX ORDER — LIOTHYRONINE SODIUM 25 UG/1
TABLET ORAL
Qty: 90 TABLET | Refills: 0 | Status: SHIPPED | OUTPATIENT
Start: 2023-09-26

## 2023-09-26 RX ORDER — LEVOTHYROXINE SODIUM 0.07 MG/1
TABLET ORAL
Qty: 90 TABLET | Refills: 0 | Status: SHIPPED | OUTPATIENT
Start: 2023-09-26

## 2023-09-26 RX ORDER — ALLOPURINOL 100 MG/1
TABLET ORAL
Qty: 180 TABLET | Refills: 0 | Status: SHIPPED | OUTPATIENT
Start: 2023-09-26

## 2023-09-27 NOTE — PROGRESS NOTES
[facility] Cardiology Office Follow Up Note    Salinas Monroy  [id number]  10/10/2023    Primary Care Provider: Jm Marie DO   Referring Provider: No ref. provider found    Chief Complaint: Hospital follow-up after tikosyn initiation  History of Present Illness:   Mr. Salinas Monroy is a 73 y.o. female who presents to the Cardiology Clinic for follow up of atrial fibrillation.  The patient was recently put [facility] for initiation of Tikosyn.  She presents today for follow-up.  The patient reports feeling well from a cardiac standpoint.  She specifically denies chest pain and dyspnea.  She denies palpitations, dizziness, syncope.  She denies orthopnea, PND, and lower extremity edema.  She continues to take Eliquis without significant bruising or bleeding which she has had to seek medical care.  She offers no other complaints at this time.    Past Cardiac Testin. Last Coronary Angio: None  2. Prior Stress Testing: MPS      Normal pharmacologic MPS with no evidence of inducible ischemia.   Hyperdynamic LV systolic function, LVEF 71%.   3. Last Echo:  3/23              1.  Normal left ventricular size and systolic function, LVEF 60-65%.              2.  Mild concentric LVH.              3.  Normal LV diastolic filling pattern.              4.  Normal right ventricular size and systolic function.              5.  Moderately increased left atrial volume index.              6.  Trace AI and MR.              7.  Mild tricuspid regurgitation, RVSP 32 mmHg.  4.  Holter monitor: 14-day               1.  100% AF burden, average heart rate 83 bpm, max 146 bpm    Review of Systems:   Review of Systems  As Noted in HPI.   I have reviewed and confirmed the accuracy of the ROS as documented by the MA/LPN/APURVA Hodgson RN    I have reviewed and/or updated the patient's past medical, past surgical, family, social history, problem list and allergies as appropriate.      Medications:     Current Outpatient Medications:     acyclovir (ZOVIRAX) 400 MG tablet, TAKE ONE TABLET BY MOUTH EVERY DAY (Patient taking differently: 1 tablet. One tablet three times a week), Disp: 30 tablet, Rfl: 2    allopurinol (ZYLOPRIM) 100 MG tablet, TAKE TWO TABLETS BY MOUTH EVERY DAY, Disp: 180 tablet, Rfl: 0    apixaban (ELIQUIS) 5 MG tablet tablet, Take 1 tablet by mouth 2 (Two) Times a Day., Disp: 60 tablet, Rfl: 3    ASHWAGANDHA PO, Take  by mouth., Disp: , Rfl:     B Complex Vitamins (VITAMIN B COMPLEX PO), Take 1 tablet by mouth Daily., Disp: , Rfl:     B-12, Methylcobalamin, 1000 MCG sublingual tablet, Every Other Day., Disp: , Rfl:     buPROPion SR (Wellbutrin SR) 100 MG 12 hr tablet, Take 1 tablet by mouth Every Morning., Disp: 90 tablet, Rfl: 1    Cholecalciferol (VITAMIN D3) 84239 UNITS tablet, Take 7,500 Units by mouth Daily., Disp: , Rfl:     Coenzyme Q10 200 MG capsule, Take 1 capsule by mouth 2 (Two) Times a Day., Disp: , Rfl:     Diclofenac Sodium (VOLTAREN) 1 % gel gel, Apply 2 gm of gel topically to the appropriate area as directed twice a day. (to feet), Disp: 150 g, Rfl: 0    Digestive Enzymes (DIGESTIVE ENZYME PO), Take 1 tablet by mouth Daily., Disp: , Rfl:     dofetilide (TIKOSYN) 250 MCG capsule, Take 1 capsule by mouth Every 12 (Twelve) Hours., Disp: 60 capsule, Rfl: 6    escitalopram (LEXAPRO) 10 MG tablet, Take 1 tablet by mouth Every Night., Disp: 90 tablet, Rfl: 1    famotidine (PEPCID) 20 MG tablet, TAKE ONE TABLET BY MOUTH TWICE DAILY, Disp: 60 tablet, Rfl: 5    ferrous gluconate (FERGON) 324 MG tablet, Take 1 tablet by mouth Daily With Breakfast. (Patient taking differently: Take 1 tablet by mouth Every Night.), Disp: 30 tablet, Rfl: 5    furosemide (LASIX) 20 MG tablet, TAKE ONE TABLET BY MOUTH TWICE DAILY, Disp: 60 tablet, Rfl: 2    levothyroxine (SYNTHROID, LEVOTHROID) 75 MCG tablet, TAKE ONE TABLET BY MOUTH EVERY DAY, Disp: 90 tablet, Rfl: 0    liothyronine (CYTOMEL)  "25 MCG tablet, TAKE THREE TABLETS BY MOUTH EVERY DAY, Disp: 90 tablet, Rfl: 0    MAGNESIUM PO, Take 1,000 mg by mouth Every Night., Disp: , Rfl:     Methylsulfonylmethane (MSM PO), Take 1 tablet by mouth Daily., Disp: , Rfl:     Misc Natural Products (TART CHERRY ADVANCED PO), Take 2 capsules by mouth Daily., Disp: , Rfl:     Omega-3 Fatty Acids (OMEGA-3 FISH OIL PO), Take 1,000 Units by mouth 3 (Three) Times a Day., Disp: , Rfl:     potassium chloride 10 MEQ CR tablet, TAKE ONE TABLET BY MOUTH TWICE DAILY, Disp: 60 tablet, Rfl: 1    Probiotic Product (PROBIOTIC PO), Take 1 tablet by mouth Daily., Disp: , Rfl:     TURMERIC PO, Take 1 tablet by mouth 2 (two) times a day., Disp: , Rfl:     VITAMIN A PO, Take 1 capsule by mouth Daily., Disp: , Rfl:     vitamin C (ASCORBIC ACID) 500 MG tablet, Take 1 tablet by mouth 3 (Three) Times a Day As Needed., Disp: , Rfl:     vitamin E 400 UNIT capsule, Take 1 capsule by mouth Daily., Disp: , Rfl:     VITAMIN K PO, Take 100 mcg by mouth Daily., Disp: , Rfl:     Zinc 30 MG tablet, Take  by mouth Daily., Disp: , Rfl:     Physical Exam:  Vital Signs:   Vitals:    10/10/23 1543   BP: 112/62   BP Location: Left arm   Patient Position: Sitting   Cuff Size: Large Adult   Pulse: 81   Resp: 18   SpO2: 93%   Weight: 110 kg (242 lb)   Height: 160 cm (63\")     Body mass index is 42.87 kg/m².    Physical Exam  Vitals and nursing note reviewed.   Constitutional:       General: She is not in acute distress.     Appearance: She is morbidly obese.   HENT:      Head: Normocephalic and atraumatic.   Neck:      Trachea: Trachea normal.   Cardiovascular:      Rate and Rhythm: Normal rate and regular rhythm.      Pulses: Normal pulses.      Heart sounds: Normal heart sounds. No murmur heard.     No friction rub. No gallop.   Pulmonary:      Effort: Pulmonary effort is normal.      Breath sounds: Normal breath sounds.   Musculoskeletal:      Cervical back: Neck supple.      Right lower leg: No edema. "      Left lower leg: No edema.   Skin:     General: Skin is warm and dry.   Neurological:      Mental Status: She is alert and oriented to person, place, and time.   Psychiatric:         Mood and Affect: Mood normal.         Behavior: Behavior normal. Behavior is cooperative.         Thought Content: Thought content does not include suicidal ideation.         Results Review:   I reviewed the patient's new clinical results.      ECG 12 Lead    Date/Time: 10/10/2023 6:45 AM  Performed by: Yvonne Christian APRN    Authorized by: Yvonne Christian APRN  Comparison: compared with previous ECG from 9/15/2023  Rhythm: sinus rhythm  Ectopy: atrial premature contractions  Rate: normal  BPM: 80  QRS axis: normal    Clinical impression: abnormal EKG          Assessment / Plan:   Diagnoses and all orders for this visit:    1. Persistent atrial fibrillation (Primary)  ECG shows sinus rhythm with PACs  Continue Tikosyn as antiarrhythmic  Continue Eliquis for CVA prophylaxis    2. Chronic diastolic heart failure  NYHA functional class I  Euvolemic on exam      Preventative Cardiology:   Tobacco Cessation: N/A   Advance Care Planning: ACP discussion was declined by the patient. Patient does not have an advance directive, declines further assistance.     Follow Up:   Return in about 6 months (around 4/10/2024) for Follow-up with Dr. Pires.      Thank you for allowing me to participate in the care of your patient. Please do not hesitate to contact me with additional questions or concerns.     VALENTINE Dela Cruz

## 2023-10-02 ENCOUNTER — TELEPHONE (OUTPATIENT)
Dept: CARDIOLOGY | Facility: CLINIC | Age: 73
End: 2023-10-02
Payer: MEDICARE

## 2023-10-02 RX ORDER — DOFETILIDE 0.25 MG/1
250 CAPSULE ORAL EVERY 12 HOURS
Qty: 60 CAPSULE | Refills: 6 | Status: SHIPPED | OUTPATIENT
Start: 2023-10-02

## 2023-10-02 NOTE — TELEPHONE ENCOUNTER
"  Caller: Salinas Monroy \"Del\"    Relationship: Self    Best call back number: [phone] OR      What is the best time to reach you: ANYTIME     Who are you requesting to speak with (clinical staff, provider,  specific staff member): ANYONE     What was the call regarding: PATIENT WAS ADVISED BY  TO REACH OUT ONCE SHE WAS DUE FOR A REFILL AND GET CLARIFICATION ON WHETHER SHE IS TO STAY ON THE SAME DOSAGE OF TIKOSYN 250 MCG. IF SO WOULD LIKE REFILL SENT TO [facility].     "

## 2023-10-02 NOTE — TELEPHONE ENCOUNTER
I spoke with patient.  GFT had reviewed EKG 9/15/2023.  Patient to continue same dose.  Refills sent in to pharmacy via [employer], per patient request

## 2023-10-03 ENCOUNTER — READMISSION MANAGEMENT (OUTPATIENT)
Dept: CALL CENTER | Facility: HOSPITAL | Age: 73
End: 2023-10-03
Payer: MEDICARE

## 2023-10-03 NOTE — OUTREACH NOTE
Medical Week 3 Survey      Flowsheet Row Responses   [facility] patient discharged from? [place]   Does the patient have one of the following disease processes/diagnoses(primary or secondary)? Other   Week 3 attempt successful? Yes   Call start time 0931   Call end time 0931   Discharge diagnosis persistent A-fib, admitted for tikosyn   Meds reviewed with patient/caregiver? Yes   Is the patient having any side effects they believe may be caused by any medication additions or changes? No   Does the patient have all medications ordered at discharge? Yes   Is the patient taking all medications as directed (includes completed medication regime)? Yes   Does the patient have a primary care provider?  Yes   Does the patient have an appointment with their PCP within 7 days of discharge? Yes   Has the patient kept scheduled appointments due by today? Yes   Has home health visited the patient within 72 hours of discharge? N/A   Psychosocial issues? No   What is the patient's perception of their health status since discharge? Improving   Week 3 Call Completed? Yes   Graduated Yes   Call end time 0931            Ayanna BOOGIE - Registered Nurse

## 2023-10-10 ENCOUNTER — OFFICE VISIT (OUTPATIENT)
Dept: CARDIOLOGY | Facility: CLINIC | Age: 73
End: 2023-10-10
Payer: MEDICARE

## 2023-10-10 VITALS
SYSTOLIC BLOOD PRESSURE: 112 MMHG | BODY MASS INDEX: 42.88 KG/M2 | OXYGEN SATURATION: 93 % | DIASTOLIC BLOOD PRESSURE: 62 MMHG | HEART RATE: 81 BPM | HEIGHT: 63 IN | WEIGHT: 242 LBS | RESPIRATION RATE: 18 BRPM

## 2023-10-10 DIAGNOSIS — I48.19 PERSISTENT ATRIAL FIBRILLATION: Primary | ICD-10-CM

## 2023-10-10 DIAGNOSIS — I50.32 CHRONIC DIASTOLIC HEART FAILURE: ICD-10-CM

## 2023-10-10 PROCEDURE — 1159F MED LIST DOCD IN RCRD: CPT | Performed by: NURSE PRACTITIONER

## 2023-10-10 PROCEDURE — 1160F RVW MEDS BY RX/DR IN RCRD: CPT | Performed by: NURSE PRACTITIONER

## 2023-10-10 PROCEDURE — 93000 ELECTROCARDIOGRAM COMPLETE: CPT | Performed by: NURSE PRACTITIONER

## 2023-10-10 PROCEDURE — 99214 OFFICE O/P EST MOD 30 MIN: CPT | Performed by: NURSE PRACTITIONER

## 2023-10-12 ENCOUNTER — TELEPHONE (OUTPATIENT)
Dept: CARDIOLOGY | Facility: CLINIC | Age: 73
End: 2023-10-12
Payer: MEDICARE

## 2023-10-12 NOTE — TELEPHONE ENCOUNTER
"Caller: Salinas Monroy \"Del\"    Relationship: Self    Best call back number: [phone]    What is the best time to reach you: ANYTIME     Who are you requesting to speak with (clinical staff, provider,  specific staff member): NURSE     Do you know the name of the person who called: NURSE    What was the call regarding: PATIENT RECEIVED A CALL FROM DR. AUDIE ESTRELLA ABOUT THE PATIENT ASSISTANCE PROGRAM FOR HER TIKOSYN. PATIENT WENT ON WEBSITE AND HAS QUESTIONS ABOUT HOW TO DO THE FORMS. PATIENT WOULD LIKE TO KNOW IF SHE SHOULD PRINT THESE FORMS AND FILL OUT AND RETURN TO THE OFFICE OR IF SHE NEEDS TO EMAIL THEM OVER.     Is it okay if the provider responds through [employer] display technologiest: PREFERS A CALL     "

## 2023-10-18 DIAGNOSIS — I87.2 VENOUS INSUFFICIENCY OF BOTH LOWER EXTREMITIES: ICD-10-CM

## 2023-10-18 DIAGNOSIS — I50.32 CHRONIC DIASTOLIC HEART FAILURE: ICD-10-CM

## 2023-10-18 RX ORDER — POTASSIUM CHLORIDE 750 MG/1
TABLET, FILM COATED, EXTENDED RELEASE ORAL
Qty: 60 TABLET | Refills: 1 | Status: SHIPPED | OUTPATIENT
Start: 2023-10-18

## 2023-10-18 RX ORDER — FUROSEMIDE 20 MG/1
TABLET ORAL
Qty: 60 TABLET | Refills: 2 | Status: SHIPPED | OUTPATIENT
Start: 2023-10-18

## 2023-10-18 RX ORDER — ACYCLOVIR 400 MG/1
TABLET ORAL
Qty: 30 TABLET | Refills: 2 | Status: SHIPPED | OUTPATIENT
Start: 2023-10-18

## 2023-10-20 DIAGNOSIS — E03.9 ACQUIRED HYPOTHYROIDISM: Chronic | ICD-10-CM

## 2023-10-20 DIAGNOSIS — E03.9 HYPOTHYROIDISM, ADULT: ICD-10-CM

## 2023-10-23 RX ORDER — LIOTHYRONINE SODIUM 25 UG/1
TABLET ORAL
Qty: 90 TABLET | Refills: 0 | Status: SHIPPED | OUTPATIENT
Start: 2023-10-23

## 2023-10-23 RX ORDER — LEVOTHYROXINE SODIUM 0.07 MG/1
TABLET ORAL
Qty: 90 TABLET | Refills: 0 | Status: SHIPPED | OUTPATIENT
Start: 2023-10-23

## 2023-11-06 ENCOUNTER — OFFICE VISIT (OUTPATIENT)
Dept: FAMILY MEDICINE CLINIC | Facility: CLINIC | Age: 73
End: 2023-11-06
Payer: MEDICARE

## 2023-11-06 VITALS
OXYGEN SATURATION: 96 % | BODY MASS INDEX: 41.99 KG/M2 | HEART RATE: 75 BPM | TEMPERATURE: 98 F | DIASTOLIC BLOOD PRESSURE: 76 MMHG | WEIGHT: 237 LBS | HEIGHT: 63 IN | RESPIRATION RATE: 16 BRPM | SYSTOLIC BLOOD PRESSURE: 122 MMHG

## 2023-11-06 DIAGNOSIS — Z79.01 CHRONIC ANTICOAGULATION: Chronic | ICD-10-CM

## 2023-11-06 DIAGNOSIS — I50.32 CHRONIC DIASTOLIC HEART FAILURE: ICD-10-CM

## 2023-11-06 DIAGNOSIS — F31.62 BIPOLAR DISORDER, CURRENT EPISODE MIXED, MODERATE: ICD-10-CM

## 2023-11-06 DIAGNOSIS — E03.9 ACQUIRED HYPOTHYROIDISM: Chronic | ICD-10-CM

## 2023-11-06 DIAGNOSIS — I48.19 PERSISTENT ATRIAL FIBRILLATION: Primary | ICD-10-CM

## 2023-11-06 DIAGNOSIS — I87.2 VENOUS INSUFFICIENCY OF BOTH LOWER EXTREMITIES: ICD-10-CM

## 2023-11-06 DIAGNOSIS — M15.9 PRIMARY OSTEOARTHRITIS INVOLVING MULTIPLE JOINTS: ICD-10-CM

## 2023-11-06 DIAGNOSIS — Z23 NEED FOR INFLUENZA VACCINATION: ICD-10-CM

## 2023-11-06 NOTE — PROGRESS NOTES
Established Patient        Chief Complaint:   Chief Complaint   Patient presents with    Follow-up    Med Refill    Osteoarthritis    Weight Gain        Salinas Monroy is a 73 y.o. female    History of Present Illness:     Answers submitted by the patient for this visit:  Other (Submitted on 10/30/2023)  Please describe your symptoms.: Follow up, labs..  Have you had these symptoms before?: Yes  How long have you been having these symptoms?: Greater than 2 weeks  Primary Reason for Visit (Submitted on 10/30/2023)  What is the primary reason for your visit?: Other      S/P cardiac ablation for A-fib and started on tikosyn; continues on anticoagulation.    Denies chest pain, syncope, palpitations or vertigo.  Denies fever, chills or night sweats.  Maintains an active lifestyle, balanced dietary intake; reports good hydration habits.  Denies hematuria/dysuria.  Denies any BRB/BTS.  No new rashes.  Denies orthopnea, epistaxis or hemoptysis.  Subjective     The following portions of the patient's history were reviewed and updated as appropriate: allergies, current medications, past family history, past medical history, past social history, past surgical history and problem list.    No Known Allergies    Review of Systems  Constitutional: Negative for fever. Negative for chills, diaphoresis, fatigue and unexpected weight change.   HENT: No dysphagia; no changes to vision/hearing/smell/taste; no epistaxis.  Otherwise, as per above.  Eyes: Negative for redness and visual disturbance.   Respiratory: negative for shortness of breath. Negative for chest pain . Negative for cough and chest tightness.   Cardiovascular: Negative for chest pain and palpitations.   Gastrointestinal: As per above.  Endocrine: Negative for cold intolerance and heat intolerance.   Genitourinary: Negative for difficulty urinating, dysuria and frequency.   Musculoskeletal: Chronic arthralgias, back pain and myalgias.   Skin: Inflamed skin  "tag to left neck as per above.  Neurological: Negative for syncope, weakness and headaches.  Chronic tremors.  Hematological: Negative for adenopathy. Does not bruise/bleed easily.   Psychiatric/Behavioral: Negative for confusion. The patient is not nervous/anxious at present.    Objective     Physical Exam   Vital Signs: /76   Pulse 75   Temp 98 °F (36.7 °C)   Resp 16   Ht 160 cm (63\")   Wt 108 kg (237 lb)   LMP  (LMP Unknown)   SpO2 96%   BMI 41.98 kg/m²     General Appearance: alert, oriented x 3, no acute distress.  Skin: warm and dry.    HEENT: Atraumatic.  pupils round and reactive to light and accommodation, oral mucosa pink and moist.  Nares patent without epistaxis.  External auditory canals are patent tympanic membranes intact.  Boggy/inflamed nasal turbinates with mild postnasal drainage, no pustules or exudate.  Serous effusion noted to bilateral tympanic membranes, however mobility is intact on Valsalva and insufflation.  Neck: supple, no JVD, trachea midline.  No thyromegaly  Lungs: CTA, unlabored breathing effort.  Heart: RRR, normal S1 and S2, no S3, no rub.  Abdomen: soft, non-tender, no palpable bladder, present bowel sounds to auscultation ×4.  No guarding or rigidity.  Diastases recti noted to the abdomen.  Extremities: no clubbing, cyanosis.  Good range of motion actively and passively.  Symmetric muscle strength and development.  Gravity dependent edema noted to bilateral lower extremities, extending to the mid tibias bilaterally.  Slightly pitting in nature.  Ponce/Sparks sign negative.  Independently ambulatory.  Neuro: normal speech and mental status.  Cranial nerves II through XII intact.  No anosmia. DTR 2+; proprioception intact.  Significantly improved tremulousness.        Assessment and Plan      Assessment:   Diagnoses and all orders for this visit:    1. Persistent atrial fibrillation (Primary)    2. Chronic diastolic heart failure    3. Chronic anticoagulation    4. " Acquired hypothyroidism  -     TSH  -     T4, Free  -     T3, Reverse  -     T3, free    5. Primary osteoarthritis involving multiple joints    6. Venous insufficiency of both lower extremities    7. Bipolar disorder, current episode mixed, moderate    8. Need for influenza vaccination  -     Flu Vaccine High Dose PF 65YR+        Plan:  Referral to allergist if patient requests, she will give it further consideration and let us know if she requests.    Flu vaccine given today.    Keep scheduled f/u appt with EP/cardiology; pt is considering the Watchman procedure at a later time.    Thyroid labs today.    Discussion Summary:    Discussed plan of care in detail with pt today; pt verb understanding and agrees.    I spent 30 minutes caring for Salinas on this date of service. This time includes time spent by me in the following activities:preparing for the visit, performing a medically appropriate examination and/or evaluation , counseling and educating the patient/family/caregiver, ordering medications, tests, or procedures, documenting information in the medical record, and care coordination    I have reviewed and updated all copied forward information, as appropriate.  I attest to the accuracy and relevance of any unchanged information.    Follow up:  Return in about 3 months (around 2/6/2024) for Recheck.     There are no Patient Instructions on file for this visit.    Jm Marie,   11/06/23  13:37 EST

## 2023-11-12 LAB
T3FREE SERPL-MCNC: 3.1 PG/ML (ref 2–4.4)
T3REVERSE SERPL-MCNC: 6.4 NG/DL (ref 9.2–24.1)
T4 FREE SERPL-MCNC: 0.48 NG/DL (ref 0.93–1.7)
TSH SERPL DL<=0.005 MIU/L-ACNC: <0.005 UIU/ML (ref 0.27–4.2)

## 2023-11-15 DIAGNOSIS — K21.9 GASTROESOPHAGEAL REFLUX DISEASE, UNSPECIFIED WHETHER ESOPHAGITIS PRESENT: Chronic | ICD-10-CM

## 2023-11-15 RX ORDER — FAMOTIDINE 20 MG/1
TABLET, FILM COATED ORAL
Qty: 60 TABLET | Refills: 5 | Status: SHIPPED | OUTPATIENT
Start: 2023-11-15

## 2023-11-17 ENCOUNTER — TELEPHONE (OUTPATIENT)
Dept: CARDIOLOGY | Facility: CLINIC | Age: 73
End: 2023-11-17
Payer: MEDICARE

## 2023-11-20 ENCOUNTER — DOCUMENTATION (OUTPATIENT)
Dept: CARDIOLOGY | Facility: HOSPITAL | Age: 73
End: 2023-11-20
Payer: MEDICARE

## 2023-11-20 DIAGNOSIS — E03.9 HYPOTHYROIDISM, ADULT: ICD-10-CM

## 2023-11-20 RX ORDER — LIOTHYRONINE SODIUM 25 UG/1
TABLET ORAL
Qty: 90 TABLET | Refills: 0 | Status: SHIPPED | OUTPATIENT
Start: 2023-11-20

## 2023-11-20 RX ORDER — ASPIRIN 81 MG/1
81 TABLET ORAL DAILY
COMMUNITY
Start: 2023-12-04

## 2023-11-20 NOTE — PROGRESS NOTES
PRE-WATCHMAN HISTORY  Salinas Monroy 1950   [address]   [place] [id number] [phone] (home)     Referring MD: Dr. Pires  Information obtained from: [x] Medical record review  [x] Patient report  Scheduled for: Watchman implant on 12/4/23 with Dr. Thompson  [facility] Visit: Dr. Pires    History & Risk Factors (prior to Watchman implant)  [id number] Risk Factors:  Congestive HF     [x] No   [] Yes  LV Dysfunction   [x] No   [] Yes  Hypertension      [] No   [x] Yes  Diabetes    [x] No   [] Yes  Stroke       [x] No   [] Yes  TIA       [x] No   [] Yes  Thromboembolism    [x] No   [] Yes  Vascular Disease   [x] No   [] Yes    HAS-BLED Risk Factors:  HTN (uncontrolled) [x] No  [] Yes  Abnormal Renal Fxn  [x] No  [] Yes  Abnormal Liver Fxn   [x] No  [] Yes  Stroke            [x] No  [] Yes  Bleeding event [x] No  [] Yes  Labile INR      [x] No  [] Yes  Alcohol  [x] No  [] Yes  Antiplatelets      [x] No  [] Yes  NSAIDS  [x] No  [] Yes    Additional Stroke & Bleeding Risk Factors:  Increased Fall Risk   [] No  [x] Yes- LE weakness/falls  Bleeding Event         [x] No  [] Yes    Rhythm History:  AFIBTYPE: persistent    Valvular AFib        [x] No  [] Yes  Attempt at AFib Termination:  [] No  [x] Yes       If Yes, pharmacologic CV    [] No  [x] Yes       If Yes, DC cardioversion  [] No  [x] Yes       If Yes, catheter ablation  [x] No  [] Yes        If Yes, surgical ablation  [x] No  [] Yes  Atrial Flutter    [x] No  [] Yes  NAVID Intervention    [x] No  [] Yes    Additional History & Risk Factors:  Cardiomyopathy   [x] No  [] Yes  Chronic Lung Disease  [x] No  [] Yes  Coronary Artery Disease  [x] No  [] Yes  Sleep Apnea    [x] No  [] Yes       If Yes, compliant with treatment [] No  [x] Yes  Epicardial Approach Considered [x] No  [] Yes    Diagnostic Studies:  Last Echo:  [x] TTE Date: 3/7/23                  EF: 60-65%             LA: 5.1cm             VHD: Trace AI and MR.           Pre-procedure  blood thinner medications:  Hold Eliquis morning or procedure. ASA to start day of procedure.       11/20/23 16:13 EST   LMPREWATCHMAN Revised 1.31.2017

## 2023-11-21 DIAGNOSIS — I48.19 PERSISTENT ATRIAL FIBRILLATION: Primary | ICD-10-CM

## 2023-11-21 DIAGNOSIS — R29.6 FALLS FREQUENTLY: ICD-10-CM

## 2023-11-22 PROBLEM — R29.6 FALLS FREQUENTLY: Status: ACTIVE | Noted: 2023-11-21

## 2023-11-27 ENCOUNTER — TELEPHONE (OUTPATIENT)
Dept: CARDIOLOGY | Facility: CLINIC | Age: 73
End: 2023-11-27
Payer: MEDICARE

## 2023-11-27 NOTE — TELEPHONE ENCOUNTER
"  Caller: Salinas Monroy \"Del\"    Relationship: Self    Best call back number: [phone]    What is the best time to reach you: ANY    Who are you requesting to speak with (clinical staff, provider,  specific staff member): CLINICAL    What was the call regarding: PATIENT HAS QUESTIONS ABOUT THE UPCOMING INSTRUCTIONS FOR LAB WORK-DATE AND TIME. SHE DOES NOT HAVE A VEHICLE AND NEEDS TO KNOW EXACTLY WHAT SHE IS TO DO AND WHY THE 11.30.2023 APPOINTMENT IS STILL SHOWING? PLEASE CALL ASAP TO DISCUSS    Is it okay if the provider responds through MyChart: NO         "

## 2023-11-28 NOTE — TELEPHONE ENCOUNTER
I called and spoke with the patient. Per Chacha, with procedure scheduling the patient can get her labs done the day of the procedure. Patient voices understanding.

## 2023-11-28 NOTE — TELEPHONE ENCOUNTER
"  Caller: Salinas Monroy \"Del\"    Relationship: Self    Best call back number: [phone]    What is the best time to reach you: ANYTIME    Who are you requesting to speak with (clinical staff, provider,  specific staff member): CLINICAL    Do you know the name of the person who called: PT IS CALLING BACK FROM YESTERDAY    What was the call regarding: PT WAS TOLD SHE COULD DO HER PRE-ADMISSION TESTING ON THE MORNING OF HER WATCHMEN PROCEDURE. THEN SHSHE WAS SCHEDULED FOR 11.30.23 TO COME IN TO GET THAT TESTING DONE. SHE LIVES IN [place] AND DOES NOT HAVE TRANSPORTATION. HER SISTER HAS SCHEDULED OFF WORK TO BE ABLE TO BRING HER TO HER PROCEDURE. SHE NEEDS SOMEONE TO CALL HER BACK AND LET HER KNOW THAT IT IS OK TO GET THE PRE-ADMISSION TEST DONE THE SAME MORNING. SHE WILL NOT BE ABLE TO BE THERE ON 11.30.23 PLEASE REACH OUT TO PT.             "

## 2023-11-29 ENCOUNTER — TELEPHONE (OUTPATIENT)
Dept: CARDIOLOGY | Facility: CLINIC | Age: 73
End: 2023-11-29
Payer: MEDICARE

## 2023-11-29 NOTE — TELEPHONE ENCOUNTER
I called and notified patient that her Tikosyn was ready to be picked up at the office. Patient states her sister will pick it up the day of her procedure.

## 2023-11-30 ENCOUNTER — PREP FOR SURGERY (OUTPATIENT)
Dept: OTHER | Facility: HOSPITAL | Age: 73
End: 2023-11-30
Payer: MEDICARE

## 2023-11-30 DIAGNOSIS — I48.19 PERSISTENT ATRIAL FIBRILLATION: Primary | ICD-10-CM

## 2023-11-30 RX ORDER — CEFAZOLIN SODIUM 2 G/100ML
2000 INJECTION, SOLUTION INTRAVENOUS ONCE
Status: CANCELLED | OUTPATIENT
Start: 2023-11-30 | End: 2023-11-30

## 2023-11-30 RX ORDER — ONDANSETRON 2 MG/ML
4 INJECTION INTRAMUSCULAR; INTRAVENOUS EVERY 6 HOURS PRN
Status: CANCELLED | OUTPATIENT
Start: 2023-11-30

## 2023-11-30 RX ORDER — SODIUM CHLORIDE 9 MG/ML
1 INJECTION, SOLUTION INTRAVENOUS CONTINUOUS
Status: CANCELLED | OUTPATIENT
Start: 2023-11-30 | End: 2023-11-30

## 2023-11-30 RX ORDER — ACETAMINOPHEN 325 MG/1
650 TABLET ORAL EVERY 4 HOURS PRN
Status: CANCELLED | OUTPATIENT
Start: 2023-11-30

## 2023-11-30 RX ORDER — SODIUM CHLORIDE 9 MG/ML
40 INJECTION, SOLUTION INTRAVENOUS AS NEEDED
Status: CANCELLED | OUTPATIENT
Start: 2023-11-30

## 2023-11-30 RX ORDER — NITROGLYCERIN 0.4 MG/1
0.4 TABLET SUBLINGUAL
Status: CANCELLED | OUTPATIENT
Start: 2023-11-30

## 2023-11-30 RX ORDER — SODIUM CHLORIDE 0.9 % (FLUSH) 0.9 %
1-10 SYRINGE (ML) INJECTION AS NEEDED
Status: CANCELLED | OUTPATIENT
Start: 2023-11-30

## 2023-11-30 RX ORDER — SODIUM CHLORIDE 0.9 % (FLUSH) 0.9 %
10 SYRINGE (ML) INJECTION EVERY 12 HOURS SCHEDULED
Status: CANCELLED | OUTPATIENT
Start: 2023-11-30

## 2023-12-04 ENCOUNTER — ANESTHESIA (OUTPATIENT)
Dept: CARDIOLOGY | Facility: HOSPITAL | Age: 73
DRG: 274 | End: 2023-12-04
Payer: MEDICARE

## 2023-12-04 ENCOUNTER — HOSPITAL ENCOUNTER (INPATIENT)
Facility: HOSPITAL | Age: 73
LOS: 1 days | Discharge: HOME | DRG: 274 | End: 2023-12-04
Attending: INTERNAL MEDICINE | Admitting: INTERNAL MEDICINE
Payer: MEDICARE

## 2023-12-04 ENCOUNTER — ANESTHESIA EVENT (OUTPATIENT)
Dept: CARDIOLOGY | Facility: HOSPITAL | Age: 73
DRG: 274 | End: 2023-12-04
Payer: MEDICARE

## 2023-12-04 ENCOUNTER — APPOINTMENT (OUTPATIENT)
Dept: CARDIOLOGY | Facility: HOSPITAL | Age: 73
DRG: 274 | End: 2023-12-04
Payer: MEDICARE

## 2023-12-04 VITALS
WEIGHT: 237 LBS | DIASTOLIC BLOOD PRESSURE: 76 MMHG | HEIGHT: 63 IN | SYSTOLIC BLOOD PRESSURE: 133 MMHG | OXYGEN SATURATION: 96 % | HEART RATE: 79 BPM | RESPIRATION RATE: 12 BRPM | TEMPERATURE: 98 F | BODY MASS INDEX: 41.99 KG/M2

## 2023-12-04 DIAGNOSIS — I48.19 PERSISTENT ATRIAL FIBRILLATION: ICD-10-CM

## 2023-12-04 DIAGNOSIS — I48.11 LONGSTANDING PERSISTENT ATRIAL FIBRILLATION: Primary | ICD-10-CM

## 2023-12-04 DIAGNOSIS — R29.6 FALLS FREQUENTLY: ICD-10-CM

## 2023-12-04 PROBLEM — I48.0 PAF (PAROXYSMAL ATRIAL FIBRILLATION): Status: ACTIVE | Noted: 2023-12-04

## 2023-12-04 LAB
ANION GAP SERPL CALCULATED.3IONS-SCNC: 11 MMOL/L (ref 5–15)
BH CV VAS BP RIGHT ARM: NORMAL MMHG
BUN BLDA-MCNC: 37 MG/DL (ref 8–26)
BUN SERPL-MCNC: 46 MG/DL (ref 8–23)
BUN/CREAT SERPL: 52.9 (ref 7–25)
CA-I BLDA-SCNC: 1.17 MMOL/L (ref 1.2–1.32)
CALCIUM SPEC-SCNC: 9.1 MG/DL (ref 8.6–10.5)
CHLORIDE BLDA-SCNC: 109 MMOL/L (ref 98–109)
CHLORIDE SERPL-SCNC: 110 MMOL/L (ref 98–107)
CO2 BLDA-SCNC: 23 MMOL/L (ref 24–29)
CO2 SERPL-SCNC: 23 MMOL/L (ref 22–29)
CREAT BLDA-MCNC: 0.9 MG/DL (ref 0.6–1.3)
CREAT SERPL-MCNC: 0.87 MG/DL (ref 0.57–1)
DEPRECATED RDW RBC AUTO: 41.9 FL (ref 37–54)
EGFRCR SERPLBLD CKD-EPI 2021: 67.2 ML/MIN/1.73
EGFRCR SERPLBLD CKD-EPI 2021: 70 ML/MIN/1.73
ERYTHROCYTE [DISTWIDTH] IN BLOOD BY AUTOMATED COUNT: 12.5 % (ref 12.3–15.4)
GLUCOSE BLDC GLUCOMTR-MCNC: 104 MG/DL (ref 70–130)
GLUCOSE SERPL-MCNC: 107 MG/DL (ref 65–99)
HBA1C MFR BLD: 5.4 % (ref 4.8–5.6)
HCT VFR BLD AUTO: 42.8 % (ref 34–46.6)
HCT VFR BLDA CALC: 38 % (ref 38–51)
HGB BLD-MCNC: 13.6 G/DL (ref 12–15.9)
HGB BLDA-MCNC: 12.9 G/DL (ref 12–17)
LV EF 2D ECHO EST: 55 %
MCH RBC QN AUTO: 29.1 PG (ref 26.6–33)
MCHC RBC AUTO-ENTMCNC: 31.8 G/DL (ref 31.5–35.7)
MCV RBC AUTO: 91.5 FL (ref 79–97)
PLATELET # BLD AUTO: 223 10*3/MM3 (ref 140–450)
PMV BLD AUTO: 8.9 FL (ref 6–12)
POTASSIUM BLDA-SCNC: 3.7 MMOL/L (ref 3.5–4.9)
POTASSIUM SERPL-SCNC: 4.7 MMOL/L (ref 3.5–5.2)
RBC # BLD AUTO: 4.68 10*6/MM3 (ref 3.77–5.28)
SODIUM BLD-SCNC: 144 MMOL/L (ref 138–146)
SODIUM SERPL-SCNC: 144 MMOL/L (ref 136–145)
WBC NRBC COR # BLD AUTO: 8.1 10*3/MM3 (ref 3.4–10.8)

## 2023-12-04 PROCEDURE — 33340 PERQ CLSR TCAT L ATR APNDGE: CPT | Performed by: INTERNAL MEDICINE

## 2023-12-04 PROCEDURE — C1889 IMPLANT/INSERT DEVICE, NOC: HCPCS

## 2023-12-04 PROCEDURE — 63710000001 PROMETHAZINE PER 12.5 MG: Performed by: PHYSICIAN ASSISTANT

## 2023-12-04 PROCEDURE — 85014 HEMATOCRIT: CPT

## 2023-12-04 PROCEDURE — 25010000002 PROTAMINE SULFATE PER 10 MG: Performed by: INTERNAL MEDICINE

## 2023-12-04 PROCEDURE — 02L73DK OCCLUSION OF LEFT ATRIAL APPENDAGE WITH INTRALUMINAL DEVICE, PERCUTANEOUS APPROACH: ICD-10-PCS | Performed by: INTERNAL MEDICINE

## 2023-12-04 PROCEDURE — 25010000002 HEPARIN (PORCINE) PER 1000 UNITS: Performed by: INTERNAL MEDICINE

## 2023-12-04 PROCEDURE — C1760 CLOSURE DEV, VASC: HCPCS | Performed by: INTERNAL MEDICINE

## 2023-12-04 PROCEDURE — C1894 INTRO/SHEATH, NON-LASER: HCPCS | Performed by: INTERNAL MEDICINE

## 2023-12-04 PROCEDURE — 80047 BASIC METABLC PNL IONIZED CA: CPT

## 2023-12-04 PROCEDURE — 25010000002 PROPOFOL 10 MG/ML EMULSION

## 2023-12-04 PROCEDURE — C1759 CATH, INTRA ECHOCARDIOGRAPHY: HCPCS | Performed by: INTERNAL MEDICINE

## 2023-12-04 PROCEDURE — 25010000002 DEXAMETHASONE PER 1 MG

## 2023-12-04 PROCEDURE — 93662 INTRACARDIAC ECG (ICE): CPT | Performed by: INTERNAL MEDICINE

## 2023-12-04 PROCEDURE — C1769 GUIDE WIRE: HCPCS | Performed by: INTERNAL MEDICINE

## 2023-12-04 PROCEDURE — 25010000002 ONDANSETRON PER 1 MG

## 2023-12-04 PROCEDURE — 25510000001 IOPAMIDOL PER 1 ML: Performed by: INTERNAL MEDICINE

## 2023-12-04 PROCEDURE — 93355 ECHO TRANSESOPHAGEAL (TEE): CPT

## 2023-12-04 PROCEDURE — 93355 ECHO TRANSESOPHAGEAL (TEE): CPT | Performed by: INTERNAL MEDICINE

## 2023-12-04 PROCEDURE — C1893 INTRO/SHEATH, FIXED,NON-PEEL: HCPCS | Performed by: INTERNAL MEDICINE

## 2023-12-04 PROCEDURE — 85027 COMPLETE CBC AUTOMATED: CPT

## 2023-12-04 PROCEDURE — 25810000003 SODIUM CHLORIDE 0.9 % SOLUTION

## 2023-12-04 PROCEDURE — 25010000002 CEFAZOLIN PER 500 MG

## 2023-12-04 PROCEDURE — C1889 IMPLANT/INSERT DEVICE, NOC: HCPCS | Performed by: INTERNAL MEDICINE

## 2023-12-04 PROCEDURE — 25010000002 SUGAMMADEX 200 MG/2ML SOLUTION

## 2023-12-04 PROCEDURE — 85347 COAGULATION TIME ACTIVATED: CPT

## 2023-12-04 PROCEDURE — 25810000003 SODIUM CHLORIDE 0.9 % SOLUTION: Performed by: INTERNAL MEDICINE

## 2023-12-04 PROCEDURE — 80048 BASIC METABOLIC PNL TOTAL CA: CPT

## 2023-12-04 PROCEDURE — B245ZZZ ULTRASONOGRAPHY OF LEFT HEART: ICD-10-PCS | Performed by: INTERNAL MEDICINE

## 2023-12-04 PROCEDURE — 83036 HEMOGLOBIN GLYCOSYLATED A1C: CPT

## 2023-12-04 DEVICE — LEFT ATRIAL APPENDAGE CLOSURE DEVICE WITH DELIVERY SYSTEM
Type: IMPLANTABLE DEVICE | Site: HEART | Status: FUNCTIONAL
Brand: WATCHMAN FLX™

## 2023-12-04 RX ORDER — DEXAMETHASONE SODIUM PHOSPHATE 4 MG/ML
INJECTION, SOLUTION INTRA-ARTICULAR; INTRALESIONAL; INTRAMUSCULAR; INTRAVENOUS; SOFT TISSUE AS NEEDED
Status: DISCONTINUED | OUTPATIENT
Start: 2023-12-04 | End: 2023-12-04 | Stop reason: SURG

## 2023-12-04 RX ORDER — SODIUM CHLORIDE 9 MG/ML
INJECTION, SOLUTION INTRAVENOUS
Status: COMPLETED | OUTPATIENT
Start: 2023-12-04 | End: 2023-12-04

## 2023-12-04 RX ORDER — SODIUM CHLORIDE 9 MG/ML
1 INJECTION, SOLUTION INTRAVENOUS CONTINUOUS
Status: DISCONTINUED | OUTPATIENT
Start: 2023-12-04 | End: 2023-12-04

## 2023-12-04 RX ORDER — NITROGLYCERIN 0.4 MG/1
0.4 TABLET SUBLINGUAL
Status: DISCONTINUED | OUTPATIENT
Start: 2023-12-04 | End: 2023-12-04 | Stop reason: HOSPADM

## 2023-12-04 RX ORDER — ONDANSETRON 2 MG/ML
4 INJECTION INTRAMUSCULAR; INTRAVENOUS EVERY 6 HOURS PRN
Status: DISCONTINUED | OUTPATIENT
Start: 2023-12-04 | End: 2023-12-04 | Stop reason: HOSPADM

## 2023-12-04 RX ORDER — ROCURONIUM BROMIDE 10 MG/ML
INJECTION, SOLUTION INTRAVENOUS AS NEEDED
Status: DISCONTINUED | OUTPATIENT
Start: 2023-12-04 | End: 2023-12-04 | Stop reason: SURG

## 2023-12-04 RX ORDER — SODIUM CHLORIDE 9 MG/ML
40 INJECTION, SOLUTION INTRAVENOUS AS NEEDED
Status: DISCONTINUED | OUTPATIENT
Start: 2023-12-04 | End: 2023-12-04 | Stop reason: HOSPADM

## 2023-12-04 RX ORDER — ONDANSETRON 2 MG/ML
INJECTION INTRAMUSCULAR; INTRAVENOUS AS NEEDED
Status: DISCONTINUED | OUTPATIENT
Start: 2023-12-04 | End: 2023-12-04 | Stop reason: SURG

## 2023-12-04 RX ORDER — ACETAMINOPHEN 325 MG/1
650 TABLET ORAL EVERY 4 HOURS PRN
Status: DISCONTINUED | OUTPATIENT
Start: 2023-12-04 | End: 2023-12-04 | Stop reason: HOSPADM

## 2023-12-04 RX ORDER — PROMETHAZINE HYDROCHLORIDE 12.5 MG/1
6.25 TABLET ORAL EVERY 6 HOURS PRN
Status: DISCONTINUED | OUTPATIENT
Start: 2023-12-04 | End: 2023-12-04 | Stop reason: HOSPADM

## 2023-12-04 RX ORDER — ACETAMINOPHEN 650 MG/1
650 SUPPOSITORY RECTAL EVERY 4 HOURS PRN
Status: DISCONTINUED | OUTPATIENT
Start: 2023-12-04 | End: 2023-12-04 | Stop reason: HOSPADM

## 2023-12-04 RX ORDER — SODIUM CHLORIDE 0.9 % (FLUSH) 0.9 %
10 SYRINGE (ML) INJECTION EVERY 12 HOURS SCHEDULED
Status: DISCONTINUED | OUTPATIENT
Start: 2023-12-04 | End: 2023-12-04 | Stop reason: HOSPADM

## 2023-12-04 RX ORDER — LIDOCAINE HYDROCHLORIDE 10 MG/ML
INJECTION, SOLUTION EPIDURAL; INFILTRATION; INTRACAUDAL; PERINEURAL AS NEEDED
Status: DISCONTINUED | OUTPATIENT
Start: 2023-12-04 | End: 2023-12-04 | Stop reason: SURG

## 2023-12-04 RX ORDER — PROPOFOL 10 MG/ML
VIAL (ML) INTRAVENOUS AS NEEDED
Status: DISCONTINUED | OUTPATIENT
Start: 2023-12-04 | End: 2023-12-04 | Stop reason: SURG

## 2023-12-04 RX ORDER — ONDANSETRON 2 MG/ML
4 INJECTION INTRAMUSCULAR; INTRAVENOUS EVERY 6 HOURS PRN
Status: DISCONTINUED | OUTPATIENT
Start: 2023-12-04 | End: 2023-12-04

## 2023-12-04 RX ORDER — HEPARIN SODIUM 1000 [USP'U]/ML
INJECTION, SOLUTION INTRAVENOUS; SUBCUTANEOUS
Status: DISCONTINUED | OUTPATIENT
Start: 2023-12-04 | End: 2023-12-04 | Stop reason: HOSPADM

## 2023-12-04 RX ORDER — LIDOCAINE HYDROCHLORIDE 5 MG/ML
INJECTION, SOLUTION INFILTRATION; PERINEURAL
Status: DISCONTINUED | OUTPATIENT
Start: 2023-12-04 | End: 2023-12-04 | Stop reason: HOSPADM

## 2023-12-04 RX ORDER — PROTAMINE SULFATE 10 MG/ML
INJECTION, SOLUTION INTRAVENOUS
Status: DISCONTINUED | OUTPATIENT
Start: 2023-12-04 | End: 2023-12-04 | Stop reason: HOSPADM

## 2023-12-04 RX ORDER — SODIUM CHLORIDE 9 MG/ML
INJECTION, SOLUTION INTRAVENOUS CONTINUOUS PRN
Status: DISCONTINUED | OUTPATIENT
Start: 2023-12-04 | End: 2023-12-04 | Stop reason: SURG

## 2023-12-04 RX ORDER — SODIUM CHLORIDE 0.9 % (FLUSH) 0.9 %
1-10 SYRINGE (ML) INJECTION AS NEEDED
Status: DISCONTINUED | OUTPATIENT
Start: 2023-12-04 | End: 2023-12-04 | Stop reason: HOSPADM

## 2023-12-04 RX ADMIN — ROCURONIUM BROMIDE 50 MG: 10 SOLUTION INTRAVENOUS at 11:46

## 2023-12-04 RX ADMIN — SUGAMMADEX 200 MG: 100 INJECTION, SOLUTION INTRAVENOUS at 12:36

## 2023-12-04 RX ADMIN — SODIUM CHLORIDE: 9 INJECTION, SOLUTION INTRAVENOUS at 11:40

## 2023-12-04 RX ADMIN — SODIUM CHLORIDE 2000 MG: 900 INJECTION INTRAVENOUS at 11:57

## 2023-12-04 RX ADMIN — ONDANSETRON 4 MG: 2 INJECTION INTRAMUSCULAR; INTRAVENOUS at 12:33

## 2023-12-04 RX ADMIN — PROMETHAZINE HYDROCHLORIDE 6.25 MG: 12.5 TABLET ORAL at 14:22

## 2023-12-04 RX ADMIN — ACETAMINOPHEN 650 MG: 325 TABLET ORAL at 14:02

## 2023-12-04 RX ADMIN — DEXAMETHASONE SODIUM PHOSPHATE 4 MG: 4 INJECTION, SOLUTION INTRAMUSCULAR; INTRAVENOUS at 11:50

## 2023-12-04 RX ADMIN — LIDOCAINE HYDROCHLORIDE 50 MG: 10 INJECTION, SOLUTION EPIDURAL; INFILTRATION; INTRACAUDAL; PERINEURAL at 11:46

## 2023-12-04 RX ADMIN — PROPOFOL 150 MG: 10 INJECTION, EMULSION INTRAVENOUS at 11:46

## 2023-12-04 NOTE — Clinical Note
Replaced previous sheath in the right femoral vein. 8fr rfv sheath exchanged for acqguide mini over wire

## 2023-12-04 NOTE — Clinical Note
Replaced previous sheath in the right femoral vein. Acqguide mini exchanged for watchman double curve over wire, sheath advanced across septum into la, wire removed

## 2023-12-04 NOTE — ANESTHESIA PREPROCEDURE EVALUATION
Anesthesia Evaluation     Patient summary reviewed and Nursing notes reviewed   no history of anesthetic complications:   NPO Solid Status: > 8 hours  NPO Liquid Status: > 2 hours           Airway   Mallampati: II  TM distance: >3 FB  Neck ROM: full  No difficulty expected  Dental - normal exam     Pulmonary - normal exam    breath sounds clear to auscultation  (+) ,sleep apnea on CPAP  Cardiovascular     ECG reviewed  PT is on anticoagulation therapy  Rhythm: irregular  Rate: normal    (+) dysrhythmias Atrial Fib    ROS comment: 3/23 Echo:  1.  Normal left ventricular size and systolic function, LVEF 60-65%.  2.  Mild concentric LVH.  3.  Normal LV diastolic filling pattern.  4.  Normal right ventricular size and systolic function.  5.  Moderately increased left atrial volume index.  6.  Trace AI and MR.  7.  Mild tricuspid regurgitation, RVSP 32 mmHg.      Neuro/Psych  GI/Hepatic/Renal/Endo    (+) morbid obesity, GERD, thyroid problem hypothyroidism    Musculoskeletal     Abdominal    Substance History      OB/GYN          Other   arthritis,                 Anesthesia Plan    ASA 3     general     intravenous induction     Anesthetic plan, risks, benefits, and alternatives have been provided, discussed and informed consent has been obtained with: patient.    Plan discussed with CRNA.    CODE STATUS:

## 2023-12-04 NOTE — Clinical Note
Trans-septal procedure in progress transseptal access obtained using acguide mini, wire advanced across septum into la

## 2023-12-04 NOTE — NURSING NOTE
LAAO Procedure Information   Salinas Rodriguezerling 1950   [email or web address] [address] [phone] (home)       NAVID Orifice Maximal Width: 19  mm  Cumulative Air Kerma:  26 mGy  Contrast Volume:  30 mL  Dose Area Product:  152.13 ?Gy-M2    Emiliana Collins RN

## 2023-12-04 NOTE — ANESTHESIA PROCEDURE NOTES
Airway  Urgency: elective    Date/Time: 12/4/2023 11:48 AM  Airway not difficult    General Information and Staff    Patient location during procedure: OR  CRNA/CAA: Darline Jameson CRNA    Indications and Patient Condition  Indications for airway management: airway protection    Preoxygenated: yes  MILS not maintained throughout  Mask difficulty assessment: 1 - vent by mask    Final Airway Details  Final airway type: endotracheal airway      Successful airway: ETT  Cuffed: yes   Successful intubation technique: direct laryngoscopy  Endotracheal tube insertion site: oral  Blade: Ordoñez  Blade size: 2  ETT size (mm): 7.0  Cormack-Lehane Classification: grade I - full view of glottis  Placement verified by: chest auscultation and capnometry   Measured from: lips  ETT/EBT  to lips (cm): 20  Number of attempts at approach: 1  Assessment: lips, teeth, and gum same as pre-op and atraumatic intubation    Additional Comments  Negative epigastric sounds, Breath sound equal bilaterally with symmetric chest rise and fall

## 2023-12-04 NOTE — Clinical Note
Replaced previous sheath in the right femoral vein. 8fr rfv sheath exchanged for watchman double curve sheath over wire

## 2023-12-04 NOTE — ANESTHESIA POSTPROCEDURE EVALUATION
Patient: Salinas Monroy    Procedure Summary       Date: 12/04/23 Room / Location: [facility] / [facility]    Anesthesia Start: 1140 Anesthesia Stop: 1248    Procedure: Atrial Appendage Occlusion Diagnosis:       Persistent atrial fibrillation      Falls frequently      (atrial fibrillation)    Providers: Guanako Thompson MD Provider: Niraj Alvarez MD    Anesthesia Type: general ASA Status: 3            Anesthesia Type: general    Vitals  Vitals Value Taken Time   /70 12/04/23 1249   Temp 97.2 °F (36.2 °C) 12/04/23 1249   Pulse 74 12/04/23 1249   Resp     SpO2             Post Anesthesia Care and Evaluation    Patient location during evaluation: bedside  Patient participation: complete - patient participated  Level of consciousness: awake and alert  Pain score: 0  Pain management: adequate    Airway patency: patent  Anesthetic complications: No anesthetic complications  PONV Status: none  Cardiovascular status: hemodynamically stable and acceptable  Respiratory status: nonlabored ventilation, acceptable and nasal cannula  Hydration status: acceptable

## 2023-12-04 NOTE — H&P
Cardiology H&P     Salinas Monroy  1950  [phone]  There is no work phone number on file.    12/04/23    DATE OF ADMISSION: 12/4/2023  [facility] J[facility] Dee, DO  [address]  / [place] [address]  Referring Provider: Guanako Thompson MD     CC: Atrial Fibrillation     Problem List:  Longstanding Persistent Atrial Fibrillation   CHADSvasc = 3 (HTN, female, Age >65)  Echocardiogram 3/2023: EF 60 to 65%, trace AI, trace MR, mild TR, RVSP 32 mmHg, LA 5.1 cm  Pharmacologic MPS 4/2023: No evidence of inducible ischemia, EF 71%  14 Day Holter Monitor 5/2023: 100% atrial fibrillation, average HR 83 bpm, max 146 bpm  Tikosyn Initiation and ECV to NSR 9/2023  Hypertension  Hypothyroidism/Hashimoto's thyroiditis- 6/2023 labwork shows low TSH, low T4  Essential tremors  Falls/weakness of lower extremities   Osteoarthritis  Obesity  Obstructive sleep apnea - on CPAP x 1 month     History of Present Illness:   Salinas Monroy is a 73-year-old female with above-stated past medical history presents today for Watchman device implantation.  She has a longstanding history of atrial fibrillation and recently was started on Tikosyn with external cardioversion to normal sinus rhythm in September 2023.  She has maintained sinus rhythm since that time and does feel better from a symptom standpoint.  She has a [id number] of 3 and has been on Eliquis however has a history of frequent falls and leg weakness despite participating in physical therapy.  She has fallen in the past as well.  She is felt to be high risk for long-term anticoagulation due to potential risk of life-threatening bleeding occurring after a fall.  She and her family are very concerned about this.  She presents today for watchman implantation as described above.  She has been on her Eliquis without missed doses.  Her last dose was last evening.  She denies any bleeding issues or strokelike symptoms.       No Known  Allergies    Prior to Admission Medications       Prescriptions Last Dose Informant Patient Reported? Taking?    acyclovir (ZOVIRAX) 400 MG tablet   No No    TAKE ONE TABLET BY MOUTH EVERY DAY    allopurinol (ZYLOPRIM) 100 MG tablet   No No    TAKE TWO TABLETS BY MOUTH EVERY DAY    apixaban (ELIQUIS) 5 MG tablet tablet   No No    Take 1 tablet by mouth 2 (Two) Times a Day.    ASHWAGANDHA PO   Yes No    Take  by mouth Daily.    aspirin 81 MG EC tablet   Yes No    Take 1 tablet by mouth Daily.    B Complex Vitamins (VITAMIN B COMPLEX PO)  Self Yes No    Take 1 tablet by mouth Daily.    B-12, Methylcobalamin, 1000 MCG sublingual tablet   Yes No    Every Other Day.    buPROPion SR (Wellbutrin SR) 100 MG 12 hr tablet   No No    Take 1 tablet by mouth Every Morning.    Cholecalciferol (VITAMIN D3) 81522 UNITS tablet   Yes No    Take 7,500 Units by mouth Daily.    Coenzyme Q10 200 MG capsule  Self Yes No    Take 1 capsule by mouth 2 (Two) Times a Day.    Diclofenac Sodium (VOLTAREN) 1 % gel gel   No No    Apply 2 gm of gel topically to the appropriate area as directed twice a day. (to feet)    Digestive Enzymes (DIGESTIVE ENZYME PO)  Self Yes No    Take 1 tablet by mouth Daily.    dofetilide (TIKOSYN) 250 MCG capsule   No No    Take 1 capsule by mouth Every 12 (Twelve) Hours.    escitalopram (LEXAPRO) 10 MG tablet   No No    Take 1 tablet by mouth Every Night.    famotidine (PEPCID) 20 MG tablet   No No    TAKE ONE TABLET BY MOUTH TWICE DAILY    ferrous gluconate (FERGON) 324 MG tablet  Self No No    Take 1 tablet by mouth Daily With Breakfast.    Patient taking differently:  Take 1 tablet by mouth Every Night.    furosemide (LASIX) 20 MG tablet   No No    TAKE ONE TABLET BY MOUTH TWICE DAILY    levothyroxine (SYNTHROID, LEVOTHROID) 75 MCG tablet   No No    TAKE ONE TABLET BY MOUTH EVERY DAY    liothyronine (CYTOMEL) 25 MCG tablet   No No    TAKE THREE TABLETS BY MOUTH EVERY DAY    MAGNESIUM PO  Self Yes No    Take 1,000  mg by mouth Every Night.    Methylsulfonylmethane (MSM PO)  Self Yes No    Take 1 tablet by mouth Daily.    Misc Natural Products (TART CHERRY ADVANCED PO)  Self Yes No    Take 2 capsules by mouth Daily.    Omega-3 Fatty Acids (OMEGA-3 FISH OIL PO)  Self Yes No    Take 1,000 Units by mouth 3 (Three) Times a Day.    potassium chloride 10 MEQ CR tablet   No No    TAKE ONE TABLET BY MOUTH TWICE DAILY    Probiotic Product (PROBIOTIC PO)  Self Yes No    Take 1 tablet by mouth Daily.    TURMERIC PO  Self Yes No    Take 1 tablet by mouth 2 (two) times a day.    VITAMIN A PO  Self Yes No    Take 1 capsule by mouth Daily.    vitamin C (ASCORBIC ACID) 500 MG tablet  Self Yes No    Take 1 tablet by mouth 3 (Three) Times a Day As Needed.    vitamin E 400 UNIT capsule  Self Yes No    Take 1 capsule by mouth Daily.    VITAMIN K PO  Self Yes No    Take 100 mcg by mouth Daily.    Zinc 30 MG tablet  Self Yes No    Take  by mouth Daily.              Current Facility-Administered Medications:     acetaminophen (TYLENOL) tablet 650 mg, 650 mg, Oral, Q4H PRN, Macias, Faby Kathi, APRN    ceFAZolin 2000 mg IVPB in 100 mL NS (MBP), 2,000 mg, Intravenous, Once, Macias, Faby Kathi, APRN    nitroglycerin (NITROSTAT) SL tablet 0.4 mg, 0.4 mg, Sublingual, Q5 Min PRN, Macias, Faby Kathi, APRN    ondansetron (ZOFRAN) injection 4 mg, 4 mg, Intravenous, Q6H PRN, Macias, Faby Kathi, APRN    sodium chloride 0.9 % flush 1-10 mL, 1-10 mL, Intravenous, PRN, Macias, Faby Kathi, APRN    sodium chloride 0.9 % flush 10 mL, 10 mL, Intravenous, Q12H, Macias, Faby Kathi, APRN    sodium chloride 0.9 % infusion 40 mL, 40 mL, Intravenous, PRN, Macias, Faby Kathi, APRN    sodium chloride 0.9 % infusion, 1 mL/kg/hr (Order-Specific), Intravenous, Continuous, Macias, Faby Kathi, APRN    Social History     Socioeconomic History    Marital status:    Tobacco Use    Smoking status: Former     Packs/day: 1.00     Years: 40.00      Additional pack years: 0.00     Total pack years: 40.00     Types: Cigarettes     Start date: 1/1/1967     Quit date: 1/1/2008     Years since quitting: 15.9     Passive exposure: Past    Smokeless tobacco: Never   Vaping Use    Vaping Use: Never used   Substance and Sexual Activity    Alcohol use: Not Currently     Comment: very rarely    Drug use: No    Sexual activity: Not Currently     Partners: Male     Birth control/protection: None       Family History   Problem Relation Age of Onset    Obesity Mother     Rheum arthritis Mother     Thyroid disease Mother     Hypertension Mother         Passed 2004    Stroke Mother         Most of her problems were caused by Rheumatoid Ar.    Hyperlipidemia Mother     Arthritis Mother         Rheumatoid Arthritis..Passed 2004    Heart disease Mother         CHF    Cancer Father         Passed 1997    Kidney cancer Father     Liver cancer Father     No Known Problems Brother     Diabetes Maternal Aunt     Cancer Maternal Uncle     Lung cancer Maternal Uncle     Alcohol abuse Maternal Uncle     No Known Problems Paternal Aunt     Stroke Paternal Uncle     Hypertension Paternal Uncle     Hyperlipidemia Paternal Uncle     Heart disease Paternal Uncle     No Known Problems Brother     Asthma Sister         under control    Alcohol abuse Maternal Uncle     Colon cancer Neg Hx     Breast cancer Neg Hx     Ovarian cancer Neg Hx        REVIEW OF SYSTEMS:   CONSTITUTIONAL:         No weight loss, fever, chills, weakness or fatigue.   HEENT:                            No visual loss, blurred vision, double vision, yellow sclerae.                                             No hearing loss, congestion, sore throat.   SKIN:                                No rashes, urticaria, ulcers, sores.     RESPIRATORY:               No shortness of breath, hemoptysis, cough, sputum.   GI:                                     No anorexia, nausea, vomiting, diarrhea. No abdominal pain, melena.   :                                    No burning on urination, hematuria or increased frequency.  ENDOCRINE:                   No diaphoresis, cold or heat intolerance. No polyuria or polydipsia.   NEURO:                            No headache, dizziness, syncope, paralysis, ataxia, or parasthesias.                                            No change in bowel or bladder control. No history of CVA/TIA  MUSCULOSKELETAL:    No muscle, back pain, joint pain or stiffness.   HEMATOLOGY:               No anemia, bleeding, bruising. No history of DVT/PE.  PSYCH:                            No history of depression, anxiety    There were no vitals filed for this visit.      Vital Sign Min/Max for last 24 hours  No data recorded   No data recorded   No data recorded   No data recorded   No data recorded   No data recorded    No intake or output data in the 24 hours ending 12/04/23 1122          Physical Exam:  GEN: Well nourished, Well- developed  No acute distress  HEENT: Normocephalic, Atraumatic, PERRLA, moist mucous membranes  NECK: supple, NO JVD, no thyromegaly, no lymphadenopathy  CARDIAC: S1S2  RRR no murmur, gallop, rub  LUNGS: Clear to ausculation, normal respiratory effort  ABDOMEN: Soft, nontender, normal bowel sounds  EXTREMITIES:No gross deformities,  No clubbing, cyanosis, or edema  SKIN: Warm, dry  NEURO: No focal deficits  PSYCHIATRIC: Normal affect and mood      I personally viewed and interpreted the patient's EKG/Telemetry/lab data    Data:                                        No intake or output data in the 24 hours ending 12/04/23 1122          Telemetry: NSR           Assessment and Plan:   Persistent atrial fibrillation:  -Initiation of Tikosyn with ECV to normal sinus rhythm 9/2023  -CHADSVasc = 3 on Eliquis. She has weakness of her lower extremities despite participating in physical therapies and has had several falls which puts her at high risk for life threatening bleeding events while taking a blood  thinner. She is not a good candidate for long term anticoagulation but can tolerate short term anticoagulation. She is a good candidate for Watchman Device. The risks, benefits, and alternatives of the procedure have been reviewed and the patient wishes to proceed.             Electronically signed by ANDREI Garduno, 12/04/23, 10:38 AM EST.    I have read the above note and agree

## 2023-12-05 ENCOUNTER — CALL CENTER PROGRAMS (OUTPATIENT)
Dept: CALL CENTER | Facility: HOSPITAL | Age: 73
End: 2023-12-05
Payer: MEDICARE

## 2023-12-05 LAB — ACT BLD: 147 SECONDS (ref 82–152)

## 2023-12-05 NOTE — OUTREACH NOTE
PCI/Device Survey      Flowsheet Row Responses   Facility patient discharged from? [place]   Procedure date 12/04/23   Procedure (if device, specify in description) Device   Device Description left atrial appendage occlusive device (Watchman flex).   Performing MD Dr. Guanako Thompson   Attempt successful? Yes   Call start time 1009   Call end time 1015   Is the patient taking prescribed medications: --  [Eliquis to resume 12/5/23 evening per pt report.]   Nursing intervention Reminded to continue to take prescribed medications, Nurse provided patient education   Does the patient have any of the following symptoms related to the cath/surgical site? --  [Right groin dressing in place, no issues noted per pt report]   Nursing intervention Patient education provided   Does the patient have an appointment scheduled with the cardiologist? Yes   If the patient is a current smoker, are they able to teach back resources for cessation? Not a smoker   Did the patient feel prepared to go home on the same day as the procedure? Yes   Is the patient satisfied with the same day discharge process? Yes   PCI/Device call completed Yes            Fatou BURK - Registered Nurse

## 2023-12-10 ENCOUNTER — APPOINTMENT (OUTPATIENT)
Dept: ULTRASOUND IMAGING | Facility: HOSPITAL | Age: 73
End: 2023-12-10
Payer: MEDICARE

## 2023-12-10 ENCOUNTER — APPOINTMENT (OUTPATIENT)
Dept: GENERAL RADIOLOGY | Facility: HOSPITAL | Age: 73
End: 2023-12-10
Payer: MEDICARE

## 2023-12-10 ENCOUNTER — HOSPITAL ENCOUNTER (EMERGENCY)
Facility: HOSPITAL | Age: 73
Discharge: HOME | End: 2023-12-10
Attending: EMERGENCY MEDICINE | Admitting: STUDENT IN AN ORGANIZED HEALTH CARE EDUCATION/TRAINING PROGRAM
Payer: MEDICARE

## 2023-12-10 VITALS
OXYGEN SATURATION: 99 % | SYSTOLIC BLOOD PRESSURE: 117 MMHG | BODY MASS INDEX: 41.99 KG/M2 | HEART RATE: 79 BPM | WEIGHT: 237 LBS | HEIGHT: 63 IN | TEMPERATURE: 97.3 F | DIASTOLIC BLOOD PRESSURE: 82 MMHG | RESPIRATION RATE: 19 BRPM

## 2023-12-10 DIAGNOSIS — T14.8XXA HEMATOMA: Primary | ICD-10-CM

## 2023-12-10 LAB
ALBUMIN SERPL-MCNC: 3.9 G/DL (ref 3.5–5.2)
ALBUMIN/GLOB SERPL: 1.1 G/DL
ALP SERPL-CCNC: 80 U/L (ref 39–117)
ALT SERPL W P-5'-P-CCNC: 18 U/L (ref 1–33)
ANION GAP SERPL CALCULATED.3IONS-SCNC: 12.3 MMOL/L (ref 5–15)
AST SERPL-CCNC: 21 U/L (ref 1–32)
BACTERIA UR QL AUTO: ABNORMAL /HPF
BASOPHILS # BLD AUTO: 0.04 10*3/MM3 (ref 0–0.2)
BASOPHILS NFR BLD AUTO: 0.4 % (ref 0–1.5)
BILIRUB SERPL-MCNC: 0.5 MG/DL (ref 0–1.2)
BILIRUB UR QL STRIP: NEGATIVE
BUN SERPL-MCNC: 48 MG/DL (ref 8–23)
BUN/CREAT SERPL: 59.3 (ref 7–25)
CALCIUM SPEC-SCNC: 9.3 MG/DL (ref 8.6–10.5)
CHLORIDE SERPL-SCNC: 104 MMOL/L (ref 98–107)
CLARITY UR: CLEAR
CO2 SERPL-SCNC: 23.7 MMOL/L (ref 22–29)
COLOR UR: YELLOW
CREAT SERPL-MCNC: 0.81 MG/DL (ref 0.57–1)
DEPRECATED RDW RBC AUTO: 42.5 FL (ref 37–54)
EGFRCR SERPLBLD CKD-EPI 2021: 76.3 ML/MIN/1.73
EOSINOPHIL # BLD AUTO: 0.47 10*3/MM3 (ref 0–0.4)
EOSINOPHIL NFR BLD AUTO: 4.9 % (ref 0.3–6.2)
ERYTHROCYTE [DISTWIDTH] IN BLOOD BY AUTOMATED COUNT: 12.8 % (ref 12.3–15.4)
GLOBULIN UR ELPH-MCNC: 3.5 GM/DL
GLUCOSE SERPL-MCNC: 106 MG/DL (ref 65–99)
GLUCOSE UR STRIP-MCNC: NEGATIVE MG/DL
HCT VFR BLD AUTO: 38.8 % (ref 34–46.6)
HGB BLD-MCNC: 12.6 G/DL (ref 12–15.9)
HGB UR QL STRIP.AUTO: NEGATIVE
HOLD SPECIMEN: NORMAL
HOLD SPECIMEN: NORMAL
HYALINE CASTS UR QL AUTO: ABNORMAL /LPF
IMM GRANULOCYTES # BLD AUTO: 0.05 10*3/MM3 (ref 0–0.05)
IMM GRANULOCYTES NFR BLD AUTO: 0.5 % (ref 0–0.5)
KETONES UR QL STRIP: NEGATIVE
LEUKOCYTE ESTERASE UR QL STRIP.AUTO: ABNORMAL
LYMPHOCYTES # BLD AUTO: 1.53 10*3/MM3 (ref 0.7–3.1)
LYMPHOCYTES NFR BLD AUTO: 15.9 % (ref 19.6–45.3)
MAGNESIUM SERPL-MCNC: 2.2 MG/DL (ref 1.6–2.4)
MCH RBC QN AUTO: 29.2 PG (ref 26.6–33)
MCHC RBC AUTO-ENTMCNC: 32.5 G/DL (ref 31.5–35.7)
MCV RBC AUTO: 90 FL (ref 79–97)
MONOCYTES # BLD AUTO: 0.8 10*3/MM3 (ref 0.1–0.9)
MONOCYTES NFR BLD AUTO: 8.3 % (ref 5–12)
NEUTROPHILS NFR BLD AUTO: 6.74 10*3/MM3 (ref 1.7–7)
NEUTROPHILS NFR BLD AUTO: 70 % (ref 42.7–76)
NITRITE UR QL STRIP: NEGATIVE
NRBC BLD AUTO-RTO: 0 /100 WBC (ref 0–0.2)
PH UR STRIP.AUTO: <=5 [PH] (ref 5–8)
PLATELET # BLD AUTO: 293 10*3/MM3 (ref 140–450)
PMV BLD AUTO: 8.8 FL (ref 6–12)
POTASSIUM SERPL-SCNC: 4.2 MMOL/L (ref 3.5–5.2)
PROT SERPL-MCNC: 7.4 G/DL (ref 6–8.5)
PROT UR QL STRIP: NEGATIVE
RBC # BLD AUTO: 4.31 10*6/MM3 (ref 3.77–5.28)
RBC # UR STRIP: ABNORMAL /HPF
REF LAB TEST METHOD: ABNORMAL
SODIUM SERPL-SCNC: 140 MMOL/L (ref 136–145)
SP GR UR STRIP: 1.01 (ref 1–1.03)
SQUAMOUS #/AREA URNS HPF: ABNORMAL /HPF
TROPONIN T SERPL HS-MCNC: 28 NG/L
UROBILINOGEN UR QL STRIP: ABNORMAL
WBC # UR STRIP: ABNORMAL /HPF
WBC NRBC COR # BLD AUTO: 9.63 10*3/MM3 (ref 3.4–10.8)
WHOLE BLOOD HOLD COAG: NORMAL
WHOLE BLOOD HOLD SPECIMEN: NORMAL

## 2023-12-10 PROCEDURE — 80053 COMPREHEN METABOLIC PANEL: CPT

## 2023-12-10 PROCEDURE — 99284 EMERGENCY DEPT VISIT MOD MDM: CPT

## 2023-12-10 PROCEDURE — 85025 COMPLETE CBC W/AUTO DIFF WBC: CPT

## 2023-12-10 PROCEDURE — 76882 US LMTD JT/FCL EVL NVASC XTR: CPT

## 2023-12-10 PROCEDURE — 83735 ASSAY OF MAGNESIUM: CPT

## 2023-12-10 PROCEDURE — 84484 ASSAY OF TROPONIN QUANT: CPT

## 2023-12-10 PROCEDURE — 81001 URINALYSIS AUTO W/SCOPE: CPT

## 2023-12-10 PROCEDURE — 71045 X-RAY EXAM CHEST 1 VIEW: CPT

## 2023-12-10 PROCEDURE — 93005 ELECTROCARDIOGRAM TRACING: CPT

## 2023-12-10 RX ORDER — SODIUM CHLORIDE 0.9 % (FLUSH) 0.9 %
10 SYRINGE (ML) INJECTION AS NEEDED
Status: DISCONTINUED | OUTPATIENT
Start: 2023-12-10 | End: 2023-12-10 | Stop reason: HOSPADM

## 2023-12-10 NOTE — Clinical Note
[facility] EMERGENCY DEPARTMENT  [address] [address]  Phone: [phone]    Salinas Monroy was seen and treated in our emergency department on 12/10/2023.  She may return to work on 12/14/2023.         Thank you for choosing [facility].    Aly Bailey MD

## 2023-12-10 NOTE — ED PROVIDER NOTES
HPI: Salinas Monroy is a 73 y.o. female who presents to the emergency department complaining of postoperative complication.  Patient states that 6 days ago she had Watchman procedure via a right femoral approach.  For the last day she has noticed a painful hard lump in her right groin.  She has also felt somewhat lightheaded since the procedure.  She denies any chest pain, shortness of breath or other complaints.  She is on Eliquis.      REVIEW OF SYSTEMS: All other systems reviewed and are negative     PAST MEDICAL HISTORY:   Past Medical History:   Diagnosis Date    Anemia ?    under control    Anxiety     Arthritis     Atrial fibrillation     Bloating     Burping     Chest pain 2020    was seen by Dr Pires and was released was just anxiety    CHF (congestive heart failure)     Colon polyp 06/05/2018    Coronary artery disease ??? Feb., 2023    COVID-19 vaccine series completed     Depression     Diverticulosis     GERD (gastroesophageal reflux disease)     no longer bothers me...    Gout     Hashimoto's disease     Hernia 03/2012    repair surgery which failed in 2014    Hypothyroid     Impingement syndrome of right shoulder 05/08/2016    Irritable bowel syndrome ??    Obesity     OCD (obsessive compulsive disorder)     Sleep apnea     Tremor     worse on left arm/hand    Wears glasses     reading glasses only        FAMILY HISTORY:   Family History   Problem Relation Age of Onset    Obesity Mother     Rheum arthritis Mother     Thyroid disease Mother     Hypertension Mother         Passed 2004    Stroke Mother         Most of her problems were caused by Rheumatoid Ar.    Hyperlipidemia Mother     Arthritis Mother         Rheumatoid Arthritis..Passed 2004    Heart disease Mother         CHF    Cancer Father         Passed 1997    Kidney cancer Father     Liver cancer Father     No Known Problems Brother     Diabetes Maternal Aunt     Cancer Maternal Uncle     Lung cancer Maternal Uncle     Alcohol  abuse Maternal Uncle     No Known Problems Paternal Aunt     Stroke Paternal Uncle     Hypertension Paternal Uncle     Hyperlipidemia Paternal Uncle     Heart disease Paternal Uncle     No Known Problems Brother     Asthma Sister         under control    Alcohol abuse Maternal Uncle     Colon cancer Neg Hx     Breast cancer Neg Hx     Ovarian cancer Neg Hx         SOCIAL HISTORY:   Social History     Socioeconomic History    Marital status:    Tobacco Use    Smoking status: Former     Packs/day: 1.00     Years: 40.00     Additional pack years: 0.00     Total pack years: 40.00     Types: Cigarettes     Start date: 1/1/1967     Quit date: 1/1/2008     Years since quitting: 15.9     Passive exposure: Past    Smokeless tobacco: Never   Vaping Use    Vaping Use: Never used   Substance and Sexual Activity    Alcohol use: Not Currently     Comment: very rarely    Drug use: No    Sexual activity: Not Currently     Partners: Male     Birth control/protection: None        SURGICAL HISTORY:   Past Surgical History:   Procedure Laterality Date    APPENDECTOMY      ATRIAL APPENDAGE EXCLUSION LEFT WITH TRANSESOPHAGEAL ECHOCARDIOGRAM N/A 12/4/2023    Procedure: Atrial Appendage Occlusion;  Surgeon: Guanako Thompson MD;  Location: [facility] INVASIVE LOCATION;  Service: Cardiology;  Laterality: N/A;    BARIATRIC SURGERY  1979    CARDIOVERSION  09/13/2022    CATARACT EXTRACTION W/ INTRAOCULAR LENS IMPLANT Right 08/08/2022    Procedure: CATARACT PHACO EXTRACTION WITH INTRAOCULAR LENS IMPLANT RIGHT;  Surgeon: Eunice Brooks MD;  Location: [facility];  Service: Ophthalmology;  Laterality: Right;    CATARACT EXTRACTION W/ INTRAOCULAR LENS IMPLANT Left 08/22/2022    Procedure: CATARACT PHACO EXTRACTION WITH INTRAOCULAR LENS IMPLANT LEFT;  Surgeon: Eunice Brooks MD;  Location: [facility] OR;  Service: Ophthalmology;  Laterality: Left;    COLONOSCOPY  2012    failed due to hernia...    COLONOSCOPY N/A 06/08/2017    Procedure:  COLONOSCOPY with cold snare polypectomies, argon thermal ablation, ns injection, yanira ink injection;  Surgeon: Rafiq Huang MD;  Location: [facility] ENDOSCOPY;  Service:     COLONOSCOPY N/A 06/05/2018    Procedure: COLONOSCOPY WITH BIOPSIES;  Surgeon: Rafiq Huang MD;  Location: [facility] ENDOSCOPY;  Service: Gastroenterology    ENDOSCOPY N/A 01/27/2021    Procedure: ESOPHAGOGASTRODUODENOSCOPY WITH BIOPSIES AND CLIPS;  Surgeon: Katie Shannon MD;  Location: [facility] ENDOSCOPY;  Service: Gastroenterology;  Laterality: N/A;    GASTRIC BYPASS  1978    HERNIA MESH REMOVAL  2014    BOWEL OBSTRUCTION DUE TO MESH    HERNIA REPAIR  2012    HYSTERECTOMY      complete    JOINT REPLACEMENT  2009 & 2012    Left & Right Full Hip Replacements    SMALL INTESTINE SURGERY  2014    emergency bowel obstruction from hernia failure    TOTAL ABDOMINAL HYSTERECTOMY WITH SALPINGO OOPHORECTOMY  1990    fibroids    TUBAL ABDOMINAL LIGATION      UPPER GASTROINTESTINAL ENDOSCOPY  2010        ALLERGIES: Patient has no known allergies.       PHYSICAL EXAM:   VITAL SIGNS:   Vitals:    12/10/23 1909   BP: 117/82   Pulse: 79   Resp:    Temp:    SpO2: 99%      CONSTITUTIONAL: Awake, well appearing, nontoxic   HENT: Atraumatic, normocephalic, oral mucosa moist, airway patent. Nares patent without drainage. External ears normal.   EYES: Conjunctivae clear, EOMI, PERRL   NECK: Trachea midline, nontender, supple   CARDIOVASCULAR: Normal heart rate, Normal rhythm.  Firm area to the right inguinal area/proximal thigh  PULMONARY/CHEST: Normal work of breathing. Clear to auscultation, no rhonchi, wheezes, or rales.  ABDOMINAL: Nondistended, soft, nontender, no rebound or guarding.  NEUROLOGIC: Nonfocal, moves all four extremities, no gross sensory or motor deficits.   EXTREMITIES: No clubbing, cyanosis, or edema   SKIN: Warm, Dry, contusion to the right proximal thigh      ED COURSE / MEDICAL DECISION MAKING:     Salinas Monroy is a 73 y.o.  female who presents to the emergency department for evaluation of postoperative complication.  Patient is well-appearing, she is in no acute distress.  Her vital signs are normal.  She is neurovascular intact.  Her exam is notable for some bruising, area of swelling to the right proximal thigh.  Will obtain ultrasound of this area, will also check basic labs.  Disposition pending.    Differential diagnosis includes postoperative complication, pseudoaneurysm, fistula, DVT, contusion among other etiologies.    EKG interpreted by me: Sinus rhythm, normal rate, some nonspecific ST/T wave changes throughout, this is an abnormal EKG    I took signout on this 73-year-old female who presented after Watchman procedure with pain to her groin, concern for pseudoaneurysm.  Signout was to follow-up ultrasound imaging.  Ultrasound negative for any vascular injury, patient with hematoma to the area.  Discussed with patient and encouraged her to follow-up with her surgeon as well as her primary care doctor to ensure resolution of symptoms.  Strict turn precaution for chest pain, shortness of breath, severe increase in swelling or pain    Final diagnoses:   Hematoma        Leo, Aly Ford MD  12/11/23 0400

## 2023-12-11 NOTE — DISCHARGE INSTRUCTIONS
You were evaluated for swelling to an area where you had a Watchman procedure performed.  We got an ultrasound of the area which showed no concern for any problems with your vasculature and found that you had a hematoma from the procedure.  You are now stable for discharge.  Would recommend continue to follow-up with your cardiologist and your primary care doctor to ensure that you improve appropriately after this procedure.  You are now stable for discharge.

## 2023-12-13 DIAGNOSIS — I87.2 VENOUS INSUFFICIENCY OF BOTH LOWER EXTREMITIES: ICD-10-CM

## 2023-12-13 DIAGNOSIS — I50.32 CHRONIC DIASTOLIC HEART FAILURE: ICD-10-CM

## 2023-12-13 RX ORDER — POTASSIUM CHLORIDE 750 MG/1
TABLET, FILM COATED, EXTENDED RELEASE ORAL
Qty: 60 TABLET | Refills: 1 | Status: SHIPPED | OUTPATIENT
Start: 2023-12-13

## 2023-12-17 DIAGNOSIS — F31.62 BIPOLAR DISORDER, CURRENT EPISODE MIXED, MODERATE: ICD-10-CM

## 2023-12-18 ENCOUNTER — TELEPHONE (OUTPATIENT)
Dept: CARDIOLOGY | Facility: CLINIC | Age: 73
End: 2023-12-18
Payer: MEDICARE

## 2023-12-18 RX ORDER — BUPROPION HYDROCHLORIDE 100 MG/1
100 TABLET, EXTENDED RELEASE ORAL EVERY MORNING
Qty: 90 TABLET | Refills: 1 | Status: SHIPPED | OUTPATIENT
Start: 2023-12-18

## 2023-12-18 NOTE — TELEPHONE ENCOUNTER
I called and spoke with the patient. She had a watchman procedure on 12-4-2023 with Dr. Thompson. She states she went to the ER on 12- due to swelling and bruising on her thigh. She states she also had a large lump on her thigh. An ultrasound was performed and patient wast told she had a hematoma and it would go away on its own.       Patient states she would like to know if she can stop taking her eliquis. The patient states she did not take her eliquis on Saturday or Sunday. I explained to the patient that she can not stop taking her eliquis. I educated her about the importance of her eliquis post watchman procedure. Patient voices understanding.         Patient would also like someone to reach out to her regarding her follow up JAYA appointment.

## 2023-12-18 NOTE — TELEPHONE ENCOUNTER
"    Caller: Salinas Monroy \"Del\"     Relationship:SELF    Best call back number: [phone]    What is your medical concern? PT WAS RECENTLY IN THE ER AND SHE WOULD LIKE TO SPEAK TO THE NURSE ABOUT HER VISIT AND SOME OF HER MEDICATIONS. PLEASE REACH OUT TO PT TO ADDRESS HER CONCERNS.      "

## 2023-12-19 NOTE — TELEPHONE ENCOUNTER
I called and spoke with the patient in regarding to get her schedule to see Dr. Thompson this week she states that the bruising is going away and having an appointment was not necessary

## 2023-12-20 DIAGNOSIS — E03.9 HYPOTHYROIDISM, ADULT: ICD-10-CM

## 2023-12-20 DIAGNOSIS — M1A.0710 CHRONIC IDIOPATHIC GOUT INVOLVING TOE OF RIGHT FOOT WITHOUT TOPHUS: ICD-10-CM

## 2023-12-20 RX ORDER — ALLOPURINOL 100 MG/1
TABLET ORAL
Qty: 180 TABLET | Refills: 0 | Status: SHIPPED | OUTPATIENT
Start: 2023-12-20

## 2023-12-20 RX ORDER — LIOTHYRONINE SODIUM 25 UG/1
TABLET ORAL
Qty: 90 TABLET | Refills: 0 | Status: SHIPPED | OUTPATIENT
Start: 2023-12-20

## 2024-01-11 DIAGNOSIS — F31.62 BIPOLAR DISORDER, CURRENT EPISODE MIXED, MODERATE: ICD-10-CM

## 2024-01-11 RX ORDER — ESCITALOPRAM OXALATE 10 MG/1
10 TABLET ORAL
Qty: 90 TABLET | Refills: 1 | Status: SHIPPED | OUTPATIENT
Start: 2024-01-11

## 2024-01-12 DIAGNOSIS — I50.32 CHRONIC DIASTOLIC HEART FAILURE: ICD-10-CM

## 2024-01-12 DIAGNOSIS — I87.2 VENOUS INSUFFICIENCY OF BOTH LOWER EXTREMITIES: ICD-10-CM

## 2024-01-12 RX ORDER — FUROSEMIDE 20 MG/1
TABLET ORAL
Qty: 60 TABLET | Refills: 2 | Status: SHIPPED | OUTPATIENT
Start: 2024-01-12

## 2024-01-18 ENCOUNTER — HOSPITAL ENCOUNTER (OUTPATIENT)
Dept: CARDIOLOGY | Facility: HOSPITAL | Age: 74
Discharge: HOME | End: 2024-01-18
Admitting: PHYSICIAN ASSISTANT
Payer: MEDICARE

## 2024-01-18 VITALS
OXYGEN SATURATION: 97 % | DIASTOLIC BLOOD PRESSURE: 80 MMHG | RESPIRATION RATE: 13 BRPM | HEART RATE: 86 BPM | SYSTOLIC BLOOD PRESSURE: 101 MMHG

## 2024-01-18 DIAGNOSIS — I48.11 LONGSTANDING PERSISTENT ATRIAL FIBRILLATION: ICD-10-CM

## 2024-01-18 DIAGNOSIS — I48.0 PAF (PAROXYSMAL ATRIAL FIBRILLATION): Primary | ICD-10-CM

## 2024-01-18 LAB — ASCENDING AORTA: 3.9 CM

## 2024-01-18 PROCEDURE — 93321 DOPPLER ECHO F-UP/LMTD STD: CPT

## 2024-01-18 PROCEDURE — 25010000002 MIDAZOLAM PER 1 MG: Performed by: HOSPITALIST

## 2024-01-18 PROCEDURE — 93325 DOPPLER ECHO COLOR FLOW MAPG: CPT

## 2024-01-18 PROCEDURE — 93312 ECHO TRANSESOPHAGEAL: CPT

## 2024-01-18 RX ORDER — CLOPIDOGREL BISULFATE 75 MG/1
75 TABLET ORAL DAILY
Qty: 90 TABLET | Refills: 1 | Status: SHIPPED | OUTPATIENT
Start: 2024-01-18

## 2024-01-18 RX ORDER — MIDAZOLAM HYDROCHLORIDE 1 MG/ML
INJECTION INTRAMUSCULAR; INTRAVENOUS
Status: COMPLETED | OUTPATIENT
Start: 2024-01-18 | End: 2024-01-18

## 2024-01-18 RX ORDER — ASPIRIN 81 MG/1
81 TABLET ORAL DAILY
Qty: 90 TABLET | Refills: 3 | Status: SHIPPED | OUTPATIENT
Start: 2024-01-18

## 2024-01-18 RX ADMIN — METHOHEXITAL SODIUM 40 MG: 500 INJECTION, POWDER, LYOPHILIZED, FOR SOLUTION INTRAMUSCULAR; INTRAVENOUS; RECTAL at 14:00

## 2024-01-18 RX ADMIN — MIDAZOLAM 2 MG: 1 INJECTION INTRAMUSCULAR; INTRAVENOUS at 14:02

## 2024-01-18 RX ADMIN — METHOHEXITAL SODIUM 20 MG: 500 INJECTION, POWDER, LYOPHILIZED, FOR SOLUTION INTRAMUSCULAR; INTRAVENOUS; RECTAL at 14:08

## 2024-01-18 NOTE — H&P
[facility] Cardiology   [address], [address]  [place], [place], [id number] [phone]  [facility]           HISTORY AND PHYSICAL NOTE    Patient Care Team:  Patient Care Team:  Jm Marie DO as PCP - General (Family Medicine)  Nasreen Nolan as  ([facility] Case Mgmt) (Former[facility])      Chief complaint: Persistent atrial fibrillation.         Subjective:     Cardiac focused problem list:  Longstanding Persistent Atrial Fibrillation   CHADSvasc = 3 (HTN, female, Age >65)  Echocardiogram 3/2023: EF 60 to 65%, trace AI, trace MR, mild TR, RVSP 32 mmHg, LA 5.1 cm  Pharmacologic MPS 4/2023: No evidence of inducible ischemia, EF 71%  14 Day Holter Monitor 5/2023: 100% atrial fibrillation, average HR 83 bpm, max 146 bpm  Tikosyn Initiation and ECV to NSR 9/2023  Status post left atrial appendage occlusion with 24 mm Watchman FLX device, 12/04/2023, Dr. Thompson  Hypertension  Hypothyroidism/Hashimoto's thyroiditis- 6/2023 labwork shows low TSH, low T4  Essential tremors  Falls/weakness of lower extremities   Osteoarthritis  Obesity  Obstructive sleep apnea - on CPAP x 1 month     HPI:      Salinas Monroy is a 74 y.o. female.  Patient with longstanding history of Afib.  Recently started on Tikosyn with ECV to NSR 9/2023.  Is anticoagulated with Eliquis but has had frequent falls making her a poor candidate for long term anticoagulation.  She underwent NAVID occlusion with Watchman device on 12/04/2023 per Dr. Thompson.  Has been compliant with Eliquis, no bleeding diatheses. Presents today for 45 day follow up JAYA.     Review of Systems:  As noted in the HPI    PFSH:  Patient Active Problem List   Diagnosis    Hashimoto's thyroiditis    Essential tremor    Vitamin D deficiency    Primary osteoarthritis involving multiple joints    Chronic idiopathic gout involving toe of right foot without tophus    Obsessive-compulsive disorder    Postsurgical menopause     Colon cancer screening    Personal history of colonic polyps    Left ventricular hypertrophy    Chronic diastolic heart failure    Acquired hypothyroidism    ISABEL on CPAP    PD (Parkinson's disease)    Morbid obesity with body mass index (BMI) of 40.0 to 44.9 in adult    Iron deficiency    Bipolar disorder, current episode mixed, moderate    Reactive airway disease without complication    Gastroesophageal reflux disease    Burping    Epigastric pain    Irritable bowel syndrome with diarrhea    Arthralgia of both feet    Venous insufficiency of both lower extremities    Cataracta    Longstanding persistent atrial fibrillation    Ventral hernia without obstruction or gangrene    Diastasis recti    Chronic anticoagulation    Persistent atrial fibrillation    Falls frequently    PAF (paroxysmal atrial fibrillation)       Current Outpatient Medications on File Prior to Encounter   Medication Sig Dispense Refill    acyclovir (ZOVIRAX) 400 MG tablet TAKE ONE TABLET BY MOUTH EVERY DAY 30 tablet 2    allopurinol (ZYLOPRIM) 100 MG tablet TAKE TWO TABLETS BY MOUTH EVERY  tablet 0    apixaban (ELIQUIS) 5 MG tablet tablet Take 1 tablet by mouth 2 (Two) Times a Day. Restart 12/5/2023 evening      ASHWAGANDHA PO Take  by mouth Daily.      B Complex Vitamins (VITAMIN B COMPLEX PO) Take 1 tablet by mouth Daily.      B-12, Methylcobalamin, 1000 MCG sublingual tablet Every Other Day.      buPROPion SR (WELLBUTRIN SR) 100 MG 12 hr tablet TAKE ONE TABLET BY MOUTH EVERY MORNING 90 tablet 1    Cholecalciferol (VITAMIN D3) 91180 UNITS tablet Take 7,500 Units by mouth Daily.      Coenzyme Q10 200 MG capsule Take 1 capsule by mouth 2 (Two) Times a Day.      Diclofenac Sodium (VOLTAREN) 1 % gel gel Apply 2 gm of gel topically to the appropriate area as directed twice a day. (to feet) 150 g 0    Digestive Enzymes (DIGESTIVE ENZYME PO) Take 1 tablet by mouth Daily.      dofetilide (TIKOSYN) 250 MCG capsule Take 1 capsule by mouth Every  12 (Twelve) Hours. 60 capsule 6    escitalopram (LEXAPRO) 10 MG tablet TAKE ONE TABLET BY MOUTH EVERY NIGHT 90 tablet 1    famotidine (PEPCID) 20 MG tablet TAKE ONE TABLET BY MOUTH TWICE DAILY 60 tablet 5    ferrous gluconate (FERGON) 324 MG tablet Take 1 tablet by mouth Daily With Breakfast. (Patient taking differently: Take 1 tablet by mouth Every Night.) 30 tablet 5    furosemide (LASIX) 20 MG tablet TAKE ONE TABLET BY MOUTH TWICE DAILY 60 tablet 2    levothyroxine (SYNTHROID, LEVOTHROID) 75 MCG tablet TAKE ONE TABLET BY MOUTH EVERY DAY 90 tablet 0    liothyronine (CYTOMEL) 25 MCG tablet TAKE THREE TABLETS BY MOUTH EVERY DAY 90 tablet 0    MAGNESIUM PO Take 1,000 mg by mouth Every Night.      Methylsulfonylmethane (MSM PO) Take 1 tablet by mouth Daily.      Misc Natural Products (TART CHERRY ADVANCED PO) Take 2 capsules by mouth Daily.      Omega-3 Fatty Acids (OMEGA-3 FISH OIL PO) Take 1,000 Units by mouth 3 (Three) Times a Day.      potassium chloride 10 MEQ CR tablet TAKE ONE TABLET BY MOUTH TWICE DAILY 60 tablet 1    Probiotic Product (PROBIOTIC PO) Take 1 tablet by mouth Daily.      TURMERIC PO Take 1 tablet by mouth 2 (two) times a day.      VITAMIN A PO Take 1 capsule by mouth Daily.      vitamin C (ASCORBIC ACID) 500 MG tablet Take 21 mg by mouth 3 (Three) Times a Day As Needed.      vitamin E 400 UNIT capsule Take 1 capsule by mouth Daily.      VITAMIN K PO Take 100 mcg by mouth Daily.      Zinc 30 MG tablet Take  by mouth Daily.       No current facility-administered medications on file prior to encounter.       Social History     Socioeconomic History    Marital status:    Tobacco Use    Smoking status: Former     Packs/day: 1.00     Years: 40.00     Additional pack years: 0.00     Total pack years: 40.00     Types: Cigarettes     Start date: 1967     Quit date: 2008     Years since quittin.0     Passive exposure: Past    Smokeless tobacco: Never   Vaping Use    Vaping Use: Never  used   Substance and Sexual Activity    Alcohol use: Not Currently     Comment: very rarely    Drug use: No    Sexual activity: Not Currently     Partners: Male     Birth control/protection: None            Objective:     Vital Sign Min/Max for last 24 hours  No data recorded   No data recorded   No data recorded   No data recorded   No data recorded   No data recorded    No intake or output data in the 24 hours ending 01/18/24 1316        There were no vitals filed for this visit.  CONSTITUTIONAL: No acute distress  RESPIRATORY: Normal effort. Clear to auscultation bilaterally without wheezing or rales  CARDIOVASCULAR: Regular rate and rhythm with normal S1 and S2. Without murmur.  PERIPHERAL VASCULAR: No carotid bruit bilaterally.  Normal radial pulse. There is no lower extremity edema bilaterally.    Labs and radiologic results:  Today's results were reviewed by myself.    Cardiac Data:    EKG 12/10/2023:  NSR with ectopic beats    Results for orders placed during the hospital encounter of 12/04/23    Intra-Operative Transesophageal Echocardiogram    Interpretation Summary    Left ventricular systolic function is normal. Estimated left ventricular EF = 55%    There is a 24 mm Watchman FLX left atrial appendage occlusive device present. The device appears well-seated. No flow is seen around the device. Compression appears appropriate.    Transesophageal images were challenging.      Tele:  NSR         Assessment and Plan:     Problem list:    * No active hospital problems. *      ASSESSMENT:  Persistent atrial fibrillation   CHADsVASc score of 3  Anticoagulated with Eliquis but poor candidate for long term anticoagulation secondary to frequent falls.   Tikosyn initiated along with ECV, 9/2023  Hypertension     PLAN:  Transesophageal echocardiogram 45 days post NAVID occlusion with 24 mm Watchman FLX device implant.   The risks, benefits, and alternatives of the procedure have been reviewed and the patient wishes to  proceed.   Further recommendations to follow procedure.     Electronically signed by VALENTINE Arango, 01/18/24, 1:16 PM EST.

## 2024-01-19 ENCOUNTER — PATIENT OUTREACH (OUTPATIENT)
Dept: CASE MANAGEMENT | Facility: OTHER | Age: 74
End: 2024-01-19
Payer: MEDICARE

## 2024-01-19 NOTE — OUTREACH NOTE
Social Work Assessment  Questions/Answers      Flowsheet Row Most Recent Value   Referral Source patient   Reason for Consult community resources   Preferred Language English   People in Home alone   Current Living Arrangements apartment   Potentially Unsafe Housing Conditions none   In the past 12 months has the electric, gas, oil, or water company threatened to shut off services in your home? Yes   Primary Care Provided by self   Provides Primary Care For no one   Family Caregiver if Needed none   Quality of Family Relationships supportive   Source of Income social security   Financial/Environmental Concerns --  [transportation barriers. mobility issues.]   Application for Public Assistance obtained public assistance pending number        SDOH updated and reviewed with the patient during this program:  --     Disabilities: At Risk (1/16/2024)    Disabilities     Concentrating, Remembering, or Making Decisions Difficulty: no     Doing Errands Independently Difficulty: yes      --     Employment: Not At Risk (1/16/2024)    Employment     Do you want help finding or keeping work or a job?: I do not need or want help      Financial Resource Strain: High Risk (1/16/2024)    Overall Financial Resource Strain (CARDIA)     Difficulty of Paying Living Expenses: Hard      --     Food Insecurity: Food Insecurity Present (1/16/2024)    Hunger Vital Sign     Worried About Running Out of Food in the Last Year: Sometimes true     Ran Out of Food in the Last Year: Never true      --     Health Literacy: Unknown (1/19/2024)    Education     Preferred Language: English      --     Housing Stability: Not At Risk (1/19/2024)    Housing Stability     Current Living Arrangements: apartment     Potentially Unsafe Housing Conditions: none      --     Interpersonal Safety: Not At Risk (12/10/2023)    Abuse Screen     Unsafe at Home or Work/School: no     Feels Threatened by Someone?: no     Does Anyone Keep You from Contacting Others or  Doint Things Outside the Home?: no     Physical Sign of Abuse Present: no      --     Physical Activity: Unknown (1/16/2024)    Exercise Vital Sign     Days of Exercise per Week: 0 days      --     Social Connections: Socially Isolated (1/16/2024)    Social Connection and Isolation Panel [NHANES]     Frequency of Communication with Friends and Family: More than three times a week     Frequency of Social Gatherings with Friends and Family: Once a week     Attends Christian Services: Never     Active Member of Clubs or Organizations: No     Attends Club or Organization Meetings: Never     Marital Status:       --     Stress: No Stress Concern Present (1/16/2024)    [place] citizen [place] of [employer]ational [employer] - Occupational Stress Questionnaire     Feeling of Stress : Not at all      --     Transportation Needs: Unmet Transportation Needs (1/16/2024)    PRAPARE - Transportation     Lack of Transportation (Medical): Yes     Lack of Transportation (Non-Medical): Yes      --     Utilities: At Risk (1/19/2024)    [facility] Utilities     Threatened with loss of utilities: Yes      Continuing Care   [employer] [facility] [employer] [facility] [employer] [facility] ON AGING & INDEPENDENT LIVING    [address] [address]    Phone: [phone]    Resource for: Social Connections   [facility] [place]    [address] [id number]    Phone: [phone]    Resource for: Food Insecurity   [facility] TRANSP[facility]ATION - [facility]    [address] [address]    Phone: [phone]    Resource for: Transportation Needs   Patient Outreach    BAM F/u with pt regarding her indicators on SDOH to receive information related to transportation, food, and in-home help resources. SW discussed sending her a list of food nuno and meal delivery providers, a list of transportation providers and discussed how her MA plan may have some of transportation benefits. Pt stated her MA plan does have some  transportation benefits for medical appointments. Additionally, SW discussed the in-home help programs and how she would send pt materials discussing how to apply for these with the [employer]. Pt expressed thanks for the outreach and is agreeable to receive materials. SW will send these out today.     Nasreen KIMBALL -   Ambulatory Case Management    1/19/2024, 13:16 EST

## 2024-01-22 DIAGNOSIS — E03.9 HYPOTHYROIDISM, ADULT: ICD-10-CM

## 2024-01-22 RX ORDER — LIOTHYRONINE SODIUM 25 UG/1
TABLET ORAL
Qty: 90 TABLET | Refills: 0 | Status: SHIPPED | OUTPATIENT
Start: 2024-01-22

## 2024-01-22 NOTE — TELEPHONE ENCOUNTER
"    Caller: Salinas Monroy \"Del\"    Relationship: Self    Best call back number: [phone]    Requested Prescriptions:   Requested Prescriptions     Pending Prescriptions Disp Refills    liothyronine (CYTOMEL) 25 MCG tablet [Pharmacy Med Name: liothyronine 25 mcg tablet] 90 tablet 0     Sig: TAKE THREE TABLETS BY MOUTH EVERY DAY        Pharmacy where request should be sent: [facility] [address] [phone] [facility] [phone]      Last office visit with prescribing clinician: 11/6/2023   Last telemedicine visit with prescribing clinician: Visit date not found   Next office visit with prescribing clinician: 2/6/2024     Does the patient have less than a 3 day supply:  [] Yes  [] No    Would you like a call back once the refill request has been completed: [] Yes [x] No    If the office needs to give you a call back, can they leave a voicemail: [] Yes [x] No    Lei Marsh Rep   01/22/24 12:56 EST           "

## 2024-02-05 ENCOUNTER — TELEPHONE (OUTPATIENT)
Dept: FAMILY MEDICINE CLINIC | Facility: CLINIC | Age: 74
End: 2024-02-05
Payer: MEDICARE

## 2024-02-05 NOTE — TELEPHONE ENCOUNTER
"Caller: Salinas Monroy \"Del\"    Relationship: Self    Best call back number:  [phone]   Who are you requesting to speak with (clinical staff, provider,  specific staff member):  CLINICAL     What was the call regarding:  PATIENT SAID SHE WAS REALLY COLD AND HAD FATIGUE AND STARTED SHAKING A LOT LAST NIGHT AND ONCE THIS MORNING     THIS HAPPENED 2 TIMES LAST WEEK BUT NOT WITH SEVERE SHAKING   SHE FEELS  LIKE HER WHOLE BODY IS SORE TODAY BECAUSE OF THE SHAKING    SHE HAD A WATCHMAN PUT IN 12-4-23 TO CONTROL HER AFIB     PLEASE CALL         "

## 2024-02-07 NOTE — TELEPHONE ENCOUNTER
"I CALLED PATIENT ABOUT GETTING A SOONER APPT. SHE SAID SHE WAS FEELING BETTER, SHE HAD GOUT MANY YEARS AGO, AND THINKS THE PAIN HAS \"WENT DOWN TO HER FEET, WHICH ARE PAINFUL\". SHE STATES THAT SHE WOULD KEEP THE APPT 3/2024 AND LET US KNOW IF SHE NEEDS US SOONER.   "

## 2024-02-08 ENCOUNTER — TELEPHONE (OUTPATIENT)
Dept: CARDIOLOGY | Facility: CLINIC | Age: 74
End: 2024-02-08
Payer: MEDICARE

## 2024-02-08 NOTE — TELEPHONE ENCOUNTER
Patient called stating she had her f/u JAYA and was given a holter monitor ordered by Dr. Carney after he did the JAYA. She states it was supposed to remain on 14 days but after 7 days the light on it was orange and she called the company and they were unable to help her get it working properly so she mailed it in after only 7 days of wearing it. She just wanted it noted as to why the data was not worn for the full 14 days.

## 2024-02-09 ENCOUNTER — TELEPHONE (OUTPATIENT)
Dept: CARDIOLOGY | Facility: CLINIC | Age: 74
End: 2024-02-09
Payer: MEDICARE

## 2024-02-09 NOTE — TELEPHONE ENCOUNTER
----- Message from Bruce Carney MD sent at 2/9/2024  3:59 PM EST -----  No significant arrhythmia on heart monitor.  No A-fib.  Just a few PACs.  Continue current plan and follow-up with EP.  ----- Message -----  From: Bruce Carney MD  Sent: 2/9/2024   3:54 PM EST  To: Bruce Carney MD

## 2024-02-11 DIAGNOSIS — I50.32 CHRONIC DIASTOLIC HEART FAILURE: ICD-10-CM

## 2024-02-11 DIAGNOSIS — I87.2 VENOUS INSUFFICIENCY OF BOTH LOWER EXTREMITIES: ICD-10-CM

## 2024-02-12 ENCOUNTER — TELEPHONE (OUTPATIENT)
Dept: FAMILY MEDICINE CLINIC | Facility: CLINIC | Age: 74
End: 2024-02-12
Payer: MEDICARE

## 2024-02-12 RX ORDER — POTASSIUM CHLORIDE 750 MG/1
TABLET, FILM COATED, EXTENDED RELEASE ORAL
Qty: 60 TABLET | Refills: 1 | Status: SHIPPED | OUTPATIENT
Start: 2024-02-12

## 2024-02-22 DIAGNOSIS — E03.9 HYPOTHYROIDISM, ADULT: ICD-10-CM

## 2024-02-22 RX ORDER — LIOTHYRONINE SODIUM 25 UG/1
TABLET ORAL
Qty: 90 TABLET | Refills: 0 | Status: SHIPPED | OUTPATIENT
Start: 2024-02-22

## 2024-02-29 ENCOUNTER — TELEPHONE (OUTPATIENT)
Dept: CARDIOLOGY | Facility: CLINIC | Age: 74
End: 2024-02-29
Payer: MEDICARE

## 2024-02-29 NOTE — TELEPHONE ENCOUNTER
"  Caller: Salinas Monroy \"Del\"    Relationship: Self    Best call back number: [phone]    What is the best time to reach you: AFTERNOON    Who are you requesting to speak with (clinical staff, provider,  specific staff member): ANY      What was the call regarding: PATIENT CALLED TO INQUIRE IF HER ORDER OF TIKOSYN MAY BE IN THE OFFICE FOR HER TO  ON HER 03-6-24 APPT. PLEASE CONTACT PATIENT AND ADVISE ON THIS ISSUE.    Is it okay if the provider responds through [employer]: PLEASE CALL        "

## 2024-03-01 NOTE — TELEPHONE ENCOUNTER
Called PT to let her know that we do no have the tikosyn at the office.  I advised pt that if its not here by her appt on 3/6/24 that we could make arrangements to have tikosyn sent to [facility] for her to .    PT was agreeable and verbalized understanding

## 2024-03-06 ENCOUNTER — OFFICE VISIT (OUTPATIENT)
Dept: CARDIOLOGY | Facility: CLINIC | Age: 74
End: 2024-03-06
Payer: MEDICARE

## 2024-03-06 ENCOUNTER — TELEPHONE (OUTPATIENT)
Dept: CARDIOLOGY | Facility: CLINIC | Age: 74
End: 2024-03-06
Payer: MEDICARE

## 2024-03-06 VITALS
WEIGHT: 237 LBS | BODY MASS INDEX: 41.99 KG/M2 | SYSTOLIC BLOOD PRESSURE: 144 MMHG | HEIGHT: 63 IN | OXYGEN SATURATION: 95 % | HEART RATE: 78 BPM | DIASTOLIC BLOOD PRESSURE: 100 MMHG

## 2024-03-06 DIAGNOSIS — I48.19 PERSISTENT ATRIAL FIBRILLATION: ICD-10-CM

## 2024-03-06 DIAGNOSIS — I50.32 CHRONIC DIASTOLIC HEART FAILURE: Primary | ICD-10-CM

## 2024-03-06 NOTE — TELEPHONE ENCOUNTER
I re-ordered her Tikosyn through the [employer] patient assistance program.    Patient's ID number is [id number].    The reference number is [id number].    Her Tikosyn should arrive to our office in 7-10 business days.    Patient notified and aware.

## 2024-03-06 NOTE — PROGRESS NOTES
[facility] at [facility]   OFFICE NOTE      Salinas Monroy  1950  PCP: Jm Marie DO    SUBJECTIVE:   Salinas Monroy is a 74 y.o. female seen for a follow up visit regarding the following:     CC:Afib     HPI:   Pleasant 74 year old female presents for follow up regarding afib, Watchamn device placement. She had a follow up JAYA revealing good placement. She has had no Afib, chest pain, dizziness, near syncope or syncope. She does have some fatigue she thinks she has some depression.     Cardiac PMH: (Old records have been reviewed and summarized below)  Longstanding Persistent Atrial Fibrillation   CHADSvasc = 3 (HTN, female, Age >65)  Echocardiogram 3/2023: EF 60 to 65%, trace AI, trace MR, mild TR, RVSP 32 mmHg, LA 5.1 cm  Pharmacologic MPS 4/2023: No evidence of inducible ischemia, EF 71%  14 Day Holter Monitor 5/2023: 100% atrial fibrillation, average HR 83 bpm, max 146 bpm  Tikosyn Initiation and ECV to NSR 9/2023  Status post left atrial appendage occlusion with 24 mm Watchman FLX device, 12/04/2023, Dr. Thompson  Hypertension  Hypothyroidism/Hashimoto's thyroiditis- 6/2023 labwork shows low TSH, low T4  Essential tremors  Falls/weakness of lower extremities   Osteoarthritis  Obesity  Obstructive sleep apnea - on CPAP x 1 month     Past Medical History, Past Surgical History, Family history, Social History, and Medications were all reviewed with the patient today and updated as necessary.       Current Outpatient Medications:     acyclovir (ZOVIRAX) 400 MG tablet, TAKE ONE TABLET BY MOUTH EVERY DAY, Disp: 30 tablet, Rfl: 2    allopurinol (ZYLOPRIM) 100 MG tablet, TAKE TWO TABLETS BY MOUTH EVERY DAY, Disp: 180 tablet, Rfl: 0    ASHWAGANDHA PO, Take  by mouth Daily., Disp: , Rfl:     aspirin 81 MG EC tablet, Take 1 tablet by mouth Daily., Disp: 90 tablet, Rfl: 3    B Complex Vitamins (VITAMIN B COMPLEX PO), Take 1 tablet by mouth Daily., Disp: , Rfl:      B-12, Methylcobalamin, 1000 MCG sublingual tablet, Every Other Day., Disp: , Rfl:     buPROPion SR (WELLBUTRIN SR) 100 MG 12 hr tablet, TAKE ONE TABLET BY MOUTH EVERY MORNING, Disp: 90 tablet, Rfl: 1    Cholecalciferol (VITAMIN D3) 54319 UNITS tablet, Take 7,500 Units by mouth Daily., Disp: , Rfl:     clopidogrel (PLAVIX) 75 MG tablet, Take 1 tablet by mouth Daily., Disp: 90 tablet, Rfl: 1    Coenzyme Q10 200 MG capsule, Take 1 capsule by mouth 2 (Two) Times a Day., Disp: , Rfl:     Diclofenac Sodium (VOLTAREN) 1 % gel gel, Apply 2 gm of gel topically to the appropriate area as directed twice a day. (to feet), Disp: 150 g, Rfl: 0    Digestive Enzymes (DIGESTIVE ENZYME PO), Take 1 tablet by mouth Daily., Disp: , Rfl:     dofetilide (TIKOSYN) 250 MCG capsule, Take 1 capsule by mouth Every 12 (Twelve) Hours., Disp: 60 capsule, Rfl: 6    escitalopram (LEXAPRO) 10 MG tablet, TAKE ONE TABLET BY MOUTH EVERY NIGHT, Disp: 90 tablet, Rfl: 1    famotidine (PEPCID) 20 MG tablet, TAKE ONE TABLET BY MOUTH TWICE DAILY, Disp: 60 tablet, Rfl: 5    ferrous gluconate (FERGON) 324 MG tablet, Take 1 tablet by mouth Daily With Breakfast. (Patient taking differently: Take 1 tablet by mouth Every Night.), Disp: 30 tablet, Rfl: 5    furosemide (LASIX) 20 MG tablet, TAKE ONE TABLET BY MOUTH TWICE DAILY, Disp: 60 tablet, Rfl: 2    levothyroxine (SYNTHROID, LEVOTHROID) 75 MCG tablet, TAKE ONE TABLET BY MOUTH EVERY DAY, Disp: 90 tablet, Rfl: 0    liothyronine (CYTOMEL) 25 MCG tablet, TAKE THREE TABLETS BY MOUTH EVERY DAY, Disp: 90 tablet, Rfl: 0    MAGNESIUM PO, Take 1,000 mg by mouth Every Night., Disp: , Rfl:     Methylsulfonylmethane (MSM PO), Take 1 tablet by mouth Daily., Disp: , Rfl:     Misc Natural Products (TART CHERRY ADVANCED PO), Take 2 capsules by mouth Daily., Disp: , Rfl:     Omega-3 Fatty Acids (OMEGA-3 FISH OIL PO), Take 1,000 Units by mouth 3 (Three) Times a Day., Disp: , Rfl:     potassium chloride 10 MEQ CR tablet, TAKE ONE  "TABLET BY MOUTH TWICE DAILY, Disp: 60 tablet, Rfl: 1    Probiotic Product (PROBIOTIC PO), Take 1 tablet by mouth Daily., Disp: , Rfl:     TURMERIC PO, Take 1 tablet by mouth 2 (two) times a day., Disp: , Rfl:     VITAMIN A PO, Take 1 capsule by mouth Daily., Disp: , Rfl:     vitamin C (ASCORBIC ACID) 500 MG tablet, Take 21 mg by mouth 3 (Three) Times a Day As Needed., Disp: , Rfl:     vitamin E 400 UNIT capsule, Take 1 capsule by mouth Daily., Disp: , Rfl:     VITAMIN K PO, Take 100 mcg by mouth Daily., Disp: , Rfl:     Zinc 30 MG tablet, Take  by mouth Daily., Disp: , Rfl:       No Known Allergies      PHYSICAL EXAM:    /100 (BP Location: Left arm, Patient Position: Sitting, Cuff Size: Adult)   Pulse 78   Ht 160 cm (63\")   Wt 108 kg (237 lb)   LMP  (LMP Unknown)   SpO2 95%   BMI 41.98 kg/m²        Wt Readings from Last 5 Encounters:   03/06/24 108 kg (237 lb)   12/10/23 108 kg (237 lb)   12/04/23 108 kg (237 lb)   11/06/23 108 kg (237 lb)   10/10/23 110 kg (242 lb)       BP Readings from Last 5 Encounters:   03/06/24 144/100   01/18/24 101/80   12/10/23 117/82   12/04/23 133/76   11/06/23 122/76       General appearance - Alert, well appearing, and in no distress   Mental status - Affect appropriate to mood.  Eyes - Sclerae anicteric,  ENMT - Hearing grossly normal bilaterally, Dental hygiene good.  Neck - Carotids upstroke normal bilaterally, no bruits, no JVD.  Resp - Clear to auscultation, no wheezes, rales or rhonchi, symmetric air entry.  Heart - Normal rate, regular rhythm, normal S1, S2, no murmurs, rubs, clicks or gallops.  GI - Soft, nontender, nondistended, no masses or organomegaly.  Neurological - Grossly intact - normal speech, no focal findings  Musculoskeletal - No joint tenderness, deformity or swelling, no muscular tenderness noted.  Extremities - Peripheral pulses normal, no pedal edema, no clubbing or cyanosis.  Skin - Normal coloration and turgor.  Psych -  oriented to person, place, " and time.    Medical problems and test results were reviewed with the patient today.     No results found for this or any previous visit (from the past 672 hour(s)).      EKG: (EKG has been independently visualized by me and summarized below)    ECG 12 Lead ED Triage Standing Order; Weak / Dizzy / AMS    Date/Time: 3/6/2024 3:06 PM  Performed by: Chavez Mckinley PA    Authorized by: Emergency, Triage Protocol, MD  Comparison: compared with previous ECG from 12/12/2023  Rhythm: sinus rhythm  Rate: normal  Conduction: conduction normal  ST Segments: ST segments normal  T Waves: T waves normal  QRS axis: normal  Other: no other findings    Clinical impression: normal ECG         ASSESSMENT   1. Afib: Tikosyn rx. QTC stable. Negative Holter for Afib 1/2024.     2. Anticoagulation: Watchman device, Follow up JAYA Normal EF , no violet-prosthetic leak present.  DAPT    3. Bp elevated today , Will check at home on a regular basis     4. ISABEL, CPAP    5. Obesity, BMI 41.     PLAN  Check bp at home, call if remains elevated  Follow up with Dr. Pires as scheulded  Stable cv course  Follow up as scheduled.       3/6/2024  15:15 EST  Electronically signed by ANDREI Cesar, 03/06/24, 3:09 PM EST.

## 2024-03-08 ENCOUNTER — OFFICE VISIT (OUTPATIENT)
Dept: FAMILY MEDICINE CLINIC | Facility: CLINIC | Age: 74
End: 2024-03-08
Payer: MEDICARE

## 2024-03-08 VITALS
WEIGHT: 240 LBS | OXYGEN SATURATION: 97 % | BODY MASS INDEX: 42.52 KG/M2 | DIASTOLIC BLOOD PRESSURE: 80 MMHG | TEMPERATURE: 98 F | SYSTOLIC BLOOD PRESSURE: 122 MMHG | HEIGHT: 63 IN | HEART RATE: 60 BPM | RESPIRATION RATE: 16 BRPM

## 2024-03-08 DIAGNOSIS — I87.2 VENOUS INSUFFICIENCY OF BOTH LOWER EXTREMITIES: ICD-10-CM

## 2024-03-08 DIAGNOSIS — G47.33 OSA ON CPAP: ICD-10-CM

## 2024-03-08 DIAGNOSIS — I50.32 CHRONIC DIASTOLIC HEART FAILURE: ICD-10-CM

## 2024-03-08 DIAGNOSIS — F31.62 BIPOLAR DISORDER, CURRENT EPISODE MIXED, MODERATE: ICD-10-CM

## 2024-03-08 DIAGNOSIS — I48.11 LONGSTANDING PERSISTENT ATRIAL FIBRILLATION: Primary | ICD-10-CM

## 2024-03-08 DIAGNOSIS — M1A.0710 CHRONIC IDIOPATHIC GOUT INVOLVING TOE OF RIGHT FOOT WITHOUT TOPHUS: ICD-10-CM

## 2024-03-08 DIAGNOSIS — E61.1 IRON DEFICIENCY: ICD-10-CM

## 2024-03-08 DIAGNOSIS — E03.9 ACQUIRED HYPOTHYROIDISM: Chronic | ICD-10-CM

## 2024-03-08 NOTE — PROGRESS NOTES
Established Patient        Chief Complaint:   Chief Complaint   Patient presents with    Labs Only     Pt is here and would like to have some labs done and also wants to talk about some things that have been going on.           Salinas Monroy is a 74 y.o. female    History of Present Illness:   Answers submitted by the patient for this visit:  Other (Submitted on 3/1/2024)  Please describe your symptoms.: Follow-up & labs  Have you had these symptoms before?: Yes  How long have you been having these symptoms?: Greater than 2 weeks  Please list any medications you are currently taking for this condition.: check My Chart  Primary Reason for Visit (Submitted on 3/1/2024)  What is the primary reason for your visit?: Other    Here today in follow-up of her atrial fibrillation, chronic diastolic heart failure, venous insufficiency of the lower extremities, hypothyroid disorder, bipolar disorder, obstructive sleep apnea on CPAP, iron deficiency and chronic idiopathic gout of the right foot.    Denies chest pain, syncope, palpitations or vertigo.  Denies fever, chills or night sweats.  Maintains an active lifestyle, balanced dietary intake; reports good hydration habits.  Denies hematuria/dysuria.  Denies any BRB/BTS.  No new rashes.  Denies orthopnea, epistaxis or hemoptysis.      Subjective     The following portions of the patient's history were reviewed and updated as appropriate: allergies, current medications, past family history, past medical history, past social history, past surgical history and problem list.    No Known Allergies    Review of Systems  Constitutional: Negative for fever. Negative for chills, diaphoresis, fatigue and unexpected weight change.   HENT: No dysphagia; no changes to vision/hearing/smell/taste; no epistaxis.  Otherwise, as per above.  Eyes: Negative for redness and visual disturbance.   Respiratory: negative for shortness of breath. Negative for chest pain . Negative for  "cough and chest tightness.   Cardiovascular: Negative for chest pain and palpitations.   Gastrointestinal: As per above.  Endocrine: Negative for cold intolerance and heat intolerance.   Genitourinary: Negative for difficulty urinating, dysuria and frequency.   Musculoskeletal: Chronic arthralgias, back pain and myalgias.   Skin: No new rashes or lesions.  Neurological: Negative for syncope, weakness and headaches.  Chronic tremors.  Hematological: Negative for adenopathy. Does not bruise/bleed easily.   Psychiatric/Behavioral: Negative for confusion. The patient is not nervous/anxious at present.    Objective     Physical Exam   Vital Signs: /80   Pulse 60   Temp 98 °F (36.7 °C)   Resp 16   Ht 160 cm (63\")   Wt 109 kg (240 lb)   LMP  (LMP Unknown)   SpO2 97%   BMI 42.51 kg/m²     General Appearance: alert, oriented x 3, no acute distress.  Skin: warm and dry.    HEENT: Atraumatic.  pupils round and reactive to light and accommodation, oral mucosa pink and moist.  Nares patent without epistaxis.  External auditory canals are patent tympanic membranes intact.  Boggy/inflamed nasal turbinates with mild postnasal drainage, no pustules or exudate.  Serous effusion noted to bilateral tympanic membranes, however mobility is intact on Valsalva and insufflation.  Neck: supple, no JVD, trachea midline.  No thyromegaly  Lungs: CTA, unlabored breathing effort.  Heart: RR, normal S1 and S2, no S3, no rub.  Abdomen: soft, non-tender, no palpable bladder, present bowel sounds to auscultation ×4.  No guarding or rigidity.  Diastases recti noted to the abdomen.  Extremities: no clubbing, cyanosis.  Good range of motion actively and passively.  Symmetric muscle strength and development.  Gravity dependent edema noted to bilateral lower extremities, extending to the mid tibias bilaterally.  Slightly pitting in nature.  Ponce/Sparks sign negative.  Independently ambulatory.  Neuro: normal speech and mental status.  " Cranial nerves II through XII intact.  No anosmia. DTR 2+; proprioception intact.  Significantly improved tremulousness.        Assessment and Plan      Assessment:   Diagnoses and all orders for this visit:    1. Longstanding persistent atrial fibrillation (Primary)    2. Chronic diastolic heart failure    3. Venous insufficiency of both lower extremities    4. Acquired hypothyroidism  -     TSH  -     T4, Free  -     T3, Reverse  -     T3, free    5. Bipolar disorder, current episode mixed, moderate  -     buPROPion SR (WELLBUTRIN SR) 150 MG 12 hr tablet; Take 1 tablet by mouth Every Morning.  Dispense: 180 tablet; Refill: 2    6. Chronic idiopathic gout involving toe of right foot without tophus  -     Uric acid  -     Basic Metabolic Panel  -     allopurinol (ZYLOPRIM) 100 MG tablet; Take 2 tablets by mouth Daily.  Dispense: 180 tablet; Refill: 2    7. ISABEL on CPAP    8. Iron deficiency  -     Ferritin  -     Iron Profile        Plan:  Inc dose of bupropion to 150 mg bid; will follow clinically.    Iron studies today.    Update thyroid labs.    Continue NIPPV in tx of ISABEL.    Continue allopurinol.    VSS, appears HD asymptomatic.  Frequent weight evaluation to be continued.  BP/HR well controlled; keep sched f/u appt with cardiology.  Discussed need for reduction in sodium/salt/caffeine intake; improve sleep habits as able; inc formal CV exercise program with goal of vigorous activity most, if not all, days of the week; goal of 150 min of sustained HR CV exercise total per week.        Discussion Summary:    Discussed plan of care in detail with pt today; pt verb understanding and agrees.    I spent 40 minutes caring for Salinas on this date of service. This time includes time spent by me in the following activities:preparing for the visit, performing a medically appropriate examination and/or evaluation , counseling and educating the patient/family/caregiver, ordering medications, tests, or procedures,  documenting information in the medical record, and care coordination    I have reviewed and updated all copied forward information, as appropriate.  I attest to the accuracy and relevance of any unchanged information.    Follow up:  Return in about 3 months (around 6/8/2024) for Recheck.     There are no Patient Instructions on file for this visit.    Jm Marie DO  03/14/24  22:52 EDT

## 2024-03-09 LAB
BUN SERPL-MCNC: 37 MG/DL (ref 8–27)
BUN/CREAT SERPL: 42 (ref 12–28)
CALCIUM SERPL-MCNC: 9.6 MG/DL (ref 8.7–10.3)
CHLORIDE SERPL-SCNC: 107 MMOL/L (ref 96–106)
CO2 SERPL-SCNC: 18 MMOL/L (ref 20–29)
CREAT SERPL-MCNC: 0.88 MG/DL (ref 0.57–1)
EGFRCR SERPLBLD CKD-EPI 2021: 68 ML/MIN/1.73
FERRITIN SERPL-MCNC: 144 NG/ML (ref 15–150)
GLUCOSE SERPL-MCNC: 99 MG/DL (ref 70–99)
IRON SATN MFR SERPL: 15 % (ref 15–55)
IRON SERPL-MCNC: 51 UG/DL (ref 27–139)
POTASSIUM SERPL-SCNC: 4.6 MMOL/L (ref 3.5–5.2)
SODIUM SERPL-SCNC: 143 MMOL/L (ref 134–144)
TIBC SERPL-MCNC: 331 UG/DL (ref 250–450)
UIBC SERPL-MCNC: 280 UG/DL (ref 118–369)
URATE SERPL-MCNC: 6.1 MG/DL (ref 3.1–7.9)

## 2024-03-12 DIAGNOSIS — E03.9 ACQUIRED HYPOTHYROIDISM: Chronic | ICD-10-CM

## 2024-03-12 RX ORDER — LEVOTHYROXINE SODIUM 0.07 MG/1
TABLET ORAL
Qty: 90 TABLET | Refills: 0 | Status: SHIPPED | OUTPATIENT
Start: 2024-03-12

## 2024-03-13 ENCOUNTER — TELEPHONE (OUTPATIENT)
Dept: CARDIOLOGY | Facility: CLINIC | Age: 74
End: 2024-03-13
Payer: MEDICARE

## 2024-03-13 ENCOUNTER — TELEPHONE (OUTPATIENT)
Dept: FAMILY MEDICINE CLINIC | Facility: CLINIC | Age: 74
End: 2024-03-13
Payer: MEDICARE

## 2024-03-13 LAB
T3FREE SERPL-MCNC: 2.3 PG/ML (ref 2–4.4)
T3REVERSE SERPL-MCNC: <5 NG/DL (ref 9.2–24.1)
T4 FREE SERPL-MCNC: 0.44 NG/DL (ref 0.82–1.77)
TSH SERPL DL<=0.005 MIU/L-ACNC: 0.01 UIU/ML (ref 0.45–4.5)

## 2024-03-13 NOTE — TELEPHONE ENCOUNTER
"  Caller: Salinas Monroy \"Del\"    Relationship: Self    Best call back number: [phone]    What is the best time to reach you: ANYTIME    Who are you requesting to speak with (clinical staff, provider,  specific staff member): CLINICAL    Do you know the name of the person who called: ANITA    What was the call regarding: PT SPOKE TO ANITA ON MONDAY. SHE WOULD LIKE TO SPEAK TO HER AGAIN WITH A FEW MORE QUESTIONS. PLEASE REACH OUT TO HER.           "

## 2024-03-13 NOTE — TELEPHONE ENCOUNTER
"  Caller: Salinas Monroy \"Del\"    Relationship: Self    Best call back number: [phone]     What was the call regarding:   PATIENT WOULD LIKE A CALL BACK TO DISCUSS HER MEDICATION'S AND RECENT LAB RESULTS     "

## 2024-03-13 NOTE — TELEPHONE ENCOUNTER
Patient would like her Tikosyn sent to our [place] office on Friday. I let her know that she will be able to pick it up between 9:00 am to 3:45 pm.

## 2024-03-14 RX ORDER — ALLOPURINOL 100 MG/1
200 TABLET ORAL DAILY
Qty: 180 TABLET | Refills: 2 | Status: SHIPPED | OUTPATIENT
Start: 2024-03-14

## 2024-03-14 RX ORDER — BUPROPION HYDROCHLORIDE 150 MG/1
150 TABLET, EXTENDED RELEASE ORAL EVERY MORNING
Qty: 180 TABLET | Refills: 2 | Status: SHIPPED | OUTPATIENT
Start: 2024-03-14

## 2024-03-14 NOTE — TELEPHONE ENCOUNTER
Spoke with Pt. She had questions about three medications Dr. Fuchs spoke with her about increasing these medications. The medications are   allopurinol (ZYLOPRIM) 100 MG tablet   buPROPion SR (WELLBUTRIN SR) 100 MG 12 hr tablet   Also thyroid meds. She states Dr. Fuchs had discussed this in their last meeting. I made pt aware the labs had not been reviewed and we will call her with the results from Dr. Fuchs as soon as he reviews them.

## 2024-03-14 NOTE — TELEPHONE ENCOUNTER
PT CALLED BACK ABOUT SPEAKING WITH DR DELGADILLO REGARDING THE INCREASE IN DOSAGES OF 3 OF HER MEDICATIONS. [phone]

## 2024-03-15 ENCOUNTER — TELEPHONE (OUTPATIENT)
Dept: FAMILY MEDICINE CLINIC | Facility: CLINIC | Age: 74
End: 2024-03-15

## 2024-03-15 NOTE — TELEPHONE ENCOUNTER
"Caller: Salinas Monroy \"Del\"    Relationship: Self    Best call back number: [phone]    What was the call regarding: PATIENT STATES TO THANK DR. DELGADILLO FOR INCREASING HER WELLBUTRIN AND PATIENT WOULD LIKE AN INCREASE IN HER CYMOTEL. PLEASE ADVISE    Is it okay if the provider responds through MyChart: NO      "

## 2024-03-18 RX ORDER — ACYCLOVIR 400 MG/1
TABLET ORAL
Qty: 30 TABLET | Refills: 2 | Status: SHIPPED | OUTPATIENT
Start: 2024-03-18

## 2024-03-20 NOTE — TELEPHONE ENCOUNTER
Patient called again and is concerned about her Free T3 and wonders if she can increase to 4x a day on the Cytomel.

## 2024-03-21 DIAGNOSIS — E03.9 HYPOTHYROIDISM, ADULT: ICD-10-CM

## 2024-03-21 NOTE — TELEPHONE ENCOUNTER
Rx Refill Note  Requested Prescriptions     Pending Prescriptions Disp Refills    liothyronine (CYTOMEL) 25 MCG tablet [Pharmacy Med Name: liothyronine 25 mcg tablet] 90 tablet 0     Sig: TAKE THREE TABLETS BY MOUTH EVERY DAY      Last office visit with prescribing clinician: 3/8/2024   Last telemedicine visit with prescribing clinician: Visit date not found   Next office visit with prescribing clinician: 6/10/2024     Pt has requested an increase on medication.     Ani Givens MA  03/21/24, 17:21 EDT

## 2024-03-22 RX ORDER — LIOTHYRONINE SODIUM 50 UG/1
100 TABLET ORAL DAILY
Qty: 60 TABLET | Refills: 3 | Status: SHIPPED | OUTPATIENT
Start: 2024-03-22

## 2024-03-22 NOTE — TELEPHONE ENCOUNTER
PT CALLED TO CHECK STATUS FOR INCREASE OF RX  Liothyronine.    PLEASE ADVISE.  CALL BACK:[phone]      [facility] - [place], [place] - [address] [facility] [phone] [facility] [phone] FX

## 2024-03-22 NOTE — TELEPHONE ENCOUNTER
"    Caller: Salinas Monroy \"Del\"    Relationship: Self    Best call back number: [phone]     Requested Prescriptions:   Requested Prescriptions     Pending Prescriptions Disp Refills    liothyronine (CYTOMEL) 25 MCG tablet [Pharmacy Med Name: liothyronine 25 mcg tablet] 90 tablet 0     Sig: TAKE THREE TABLETS BY MOUTH EVERY DAY        Pharmacy where request should be sent: [facility] [address] [phone] [facility] [phone]      Last office visit with prescribing clinician: 3/8/2024   Last telemedicine visit with prescribing clinician: Visit date not found   Next office visit with prescribing clinician: 6/10/2024     Additional details provided by patient: THE PATIENT ADVISED THAT AT HER APPOINTMENT WITH DR DELGADILLO ON 03/08/2024, THAT SHE THOUGHT THIS MEDICATION WAS BEING SENT WITH A DOSAGE INCREASE.     PLEASE CALL AND ADVISE.      Does the patient have less than a 3 day supply:  [x] Yes  [] No    Would you like a call back once the refill request has been completed: [] Yes [x] No    If the office needs to give you a call back, can they leave a voicemail: [] Yes [x] No    Lei Farah Rep   03/22/24 17:12 EDT   "

## 2024-04-09 ENCOUNTER — OFFICE VISIT (OUTPATIENT)
Dept: CARDIOLOGY | Facility: CLINIC | Age: 74
End: 2024-04-09
Payer: MEDICARE

## 2024-04-09 VITALS
DIASTOLIC BLOOD PRESSURE: 68 MMHG | WEIGHT: 236 LBS | OXYGEN SATURATION: 94 % | HEART RATE: 83 BPM | BODY MASS INDEX: 41.82 KG/M2 | HEIGHT: 63 IN | SYSTOLIC BLOOD PRESSURE: 122 MMHG

## 2024-04-09 DIAGNOSIS — I48.19 PERSISTENT ATRIAL FIBRILLATION: Primary | ICD-10-CM

## 2024-04-09 DIAGNOSIS — I50.32 CHRONIC DIASTOLIC HEART FAILURE: ICD-10-CM

## 2024-04-09 PROCEDURE — 99213 OFFICE O/P EST LOW 20 MIN: CPT | Performed by: INTERNAL MEDICINE

## 2024-04-09 PROCEDURE — 93000 ELECTROCARDIOGRAM COMPLETE: CPT | Performed by: INTERNAL MEDICINE

## 2024-04-09 NOTE — PROGRESS NOTES
[facility] Cardiology Office Follow Up Note    Salinas Monroy  [id number]  2024    Primary Care Provider: Jm Marie DO    Chief Complaint: Routine follow-up    History of Present Illness:   Mrs. Salinas Monroy is a 74 y.o. female who presents to the Cardiology Clinic for routine follow-up.  The patient has a past medical history significant for hypertension, diastolic heart failure, hypothyroidism in the setting of prior Hashimoto's thyroiditis, Parkinson's disease, osteoarthritis, obesity, and ISABEL (but no longer uses CPAP).   She has a past cardiac history significant for longstanding persistent atrial fibrillation.  Since her last appointment, the patient underwent cardioversion and initiation of Tikosyn, as well as Watchman device implantation.  Today, the patient reports she is doing well from an A-fib standpoint.  She has not had any episodes of palpitations.  With maintenance of sinus rhythm, she has remained euvolemic with daily Lasix.  She is currently tolerating Plavix, with plans to stop Plavix in approximately .  No other specific complaints today.    Past Cardiac Testin. Last Coronary Angio: None  2. Prior Stress Testing: MPS      Normal pharmacologic MPS with no evidence of inducible ischemia.   Hyperdynamic LV systolic function, LVEF 71%.   3. Last Echo:  3/23              1.  Normal left ventricular size and systolic function, LVEF 60-65%.              2.  Mild concentric LVH.              3.  Normal LV diastolic filling pattern.              4.  Normal right ventricular size and systolic function.              5.  Moderately increased left atrial volume index.              6.  Trace AI and MR.              7.  Mild tricuspid regurgitation, RVSP 32 mmHg.  4.  Holter monitor: 14-day               1.  100% AF burden, average heart rate 83 bpm, max 146 bpm    Review of Systems:   Review of Systems   Constitutional:  Negative  for activity change, appetite change, chills, diaphoresis, fatigue, fever, unexpected weight gain and unexpected weight loss.   Eyes:  Negative for blurred vision and double vision.   Respiratory:  Negative for cough, chest tightness, shortness of breath and wheezing.    Cardiovascular:  Negative for chest pain, palpitations and leg swelling.   Gastrointestinal:  Negative for abdominal pain, anal bleeding, blood in stool and GERD.   Endocrine: Negative for cold intolerance and heat intolerance.   Genitourinary:  Negative for hematuria.   Neurological:  Negative for dizziness, syncope, weakness and light-headedness.   Hematological:  Does not bruise/bleed easily.   Psychiatric/Behavioral:  Negative for depressed mood and stress. The patient is not nervous/anxious.        I have reviewed and/or updated the patient's past medical, past surgical, family, social history, problem list and allergies as appropriate.     Medications:     Current Outpatient Medications:     acyclovir (ZOVIRAX) 400 MG tablet, TAKE ONE TABLET BY MOUTH EVERY DAY, Disp: 30 tablet, Rfl: 2    allopurinol (ZYLOPRIM) 100 MG tablet, Take 2 tablets by mouth Daily., Disp: 180 tablet, Rfl: 2    ASHWAGANDHA PO, Take  by mouth Daily., Disp: , Rfl:     aspirin 81 MG EC tablet, Take 1 tablet by mouth Daily., Disp: 90 tablet, Rfl: 3    B Complex Vitamins (VITAMIN B COMPLEX PO), Take 1 tablet by mouth Daily., Disp: , Rfl:     B-12, Methylcobalamin, 1000 MCG sublingual tablet, Every Other Day., Disp: , Rfl:     buPROPion SR (WELLBUTRIN SR) 150 MG 12 hr tablet, Take 1 tablet by mouth Every Morning., Disp: 180 tablet, Rfl: 2    Cholecalciferol (VITAMIN D3) 49469 UNITS tablet, Take 7,500 Units by mouth Daily., Disp: , Rfl:     clopidogrel (PLAVIX) 75 MG tablet, Take 1 tablet by mouth Daily., Disp: 90 tablet, Rfl: 1    Coenzyme Q10 200 MG capsule, Take 1 capsule by mouth 2 (Two) Times a Day., Disp: , Rfl:     Diclofenac Sodium (VOLTAREN) 1 % gel gel, Apply 2 gm of  gel topically to the appropriate area as directed twice a day. (to feet), Disp: 150 g, Rfl: 0    Digestive Enzymes (DIGESTIVE ENZYME PO), Take 1 tablet by mouth Daily., Disp: , Rfl:     dofetilide (TIKOSYN) 250 MCG capsule, Take 1 capsule by mouth Every 12 (Twelve) Hours., Disp: 60 capsule, Rfl: 6    escitalopram (LEXAPRO) 10 MG tablet, TAKE ONE TABLET BY MOUTH EVERY NIGHT, Disp: 90 tablet, Rfl: 1    famotidine (PEPCID) 20 MG tablet, TAKE ONE TABLET BY MOUTH TWICE DAILY, Disp: 60 tablet, Rfl: 5    ferrous gluconate (FERGON) 324 MG tablet, Take 1 tablet by mouth Daily With Breakfast. (Patient taking differently: Take 1 tablet by mouth Every Night.), Disp: 30 tablet, Rfl: 5    furosemide (LASIX) 20 MG tablet, TAKE ONE TABLET BY MOUTH TWICE DAILY, Disp: 60 tablet, Rfl: 2    levothyroxine (SYNTHROID, LEVOTHROID) 75 MCG tablet, TAKE ONE TABLET BY MOUTH EVERY DAY, Disp: 90 tablet, Rfl: 0    liothyronine (CYTOMEL) 50 MCG tablet, Take 2 tablets by mouth Daily., Disp: 60 tablet, Rfl: 3    MAGNESIUM PO, Take 1,000 mg by mouth Every Night., Disp: , Rfl:     Methylsulfonylmethane (MSM PO), Take 1 tablet by mouth Daily., Disp: , Rfl:     Misc Natural Products (TART CHERRY ADVANCED PO), Take 2 capsules by mouth Daily., Disp: , Rfl:     Omega-3 Fatty Acids (OMEGA-3 FISH OIL PO), Take 1,000 Units by mouth 3 (Three) Times a Day., Disp: , Rfl:     potassium chloride 10 MEQ CR tablet, TAKE ONE TABLET BY MOUTH TWICE DAILY, Disp: 60 tablet, Rfl: 1    Probiotic Product (PROBIOTIC PO), Take 1 tablet by mouth Daily., Disp: , Rfl:     TURMERIC PO, Take 1 tablet by mouth 2 (two) times a day., Disp: , Rfl:     VITAMIN A PO, Take 1 capsule by mouth Daily., Disp: , Rfl:     vitamin C (ASCORBIC ACID) 500 MG tablet, Take 21 mg by mouth 3 (Three) Times a Day As Needed., Disp: , Rfl:     vitamin E 400 UNIT capsule, Take 1 capsule by mouth Daily., Disp: , Rfl:     VITAMIN K PO, Take 100 mcg by mouth Daily., Disp: , Rfl:     Zinc 30 MG tablet, Take   "by mouth Daily., Disp: , Rfl:     Physical Exam:  Vital Signs:   Vitals:    04/09/24 1348   BP: 122/68   BP Location: Left arm   Patient Position: Sitting   Pulse: 83   SpO2: 94%   Weight: 107 kg (236 lb)   Height: 160 cm (62.99\")       Physical Exam  Constitutional:       General: She is not in acute distress.     Appearance: She is well-developed. She is obese. She is not diaphoretic.   HENT:      Head: Normocephalic and atraumatic.   Eyes:      General: No scleral icterus.     Pupils: Pupils are equal, round, and reactive to light.   Neck:      Trachea: No tracheal deviation.   Cardiovascular:      Rate and Rhythm: Normal rate and regular rhythm.      Heart sounds: Normal heart sounds. No murmur heard.     No friction rub. No gallop.      Comments: Normal JVD.  Pulmonary:      Effort: Pulmonary effort is normal. No respiratory distress.      Breath sounds: Normal breath sounds. No stridor. No wheezing or rales.   Chest:      Chest wall: No tenderness.   Abdominal:      General: Bowel sounds are normal. There is no distension.      Palpations: Abdomen is soft.      Tenderness: There is no abdominal tenderness. There is no guarding or rebound.   Musculoskeletal:         General: No swelling. Normal range of motion.      Cervical back: Neck supple. No tenderness.   Lymphadenopathy:      Cervical: No cervical adenopathy.   Skin:     General: Skin is warm and dry.      Findings: No erythema.   Neurological:      General: No focal deficit present.      Mental Status: She is alert and oriented to person, place, and time.   Psychiatric:         Mood and Affect: Mood normal.         Behavior: Behavior normal.         Results Review:   I reviewed the patient's new clinical results.  I personally viewed and interpreted the patient's EKG/Telemetry data      ECG 12 Lead    Date/Time: 4/9/2024 2:17 PM  Performed by: Jose Antonio Pires MD    Authorized by: Jose Antonio Pires MD  Comparison: compared with previous ECG from " 3/6/2024  Similar to previous ECG  Rhythm: sinus rhythm  Ectopy: atrial premature contractions  Rate: normal  QRS axis: left    Clinical impression: abnormal EKG          Assessment / Plan:     1.  Longstanding persistent atrial fibrillation  -- Now status post cardioversion and Tikosyn initiation  --Remains in sinus rhythm, no recent episodes of palpitations  --Tikosyn currently being managed by electrophysiology  --Has Watchman device in place     2.  Chronic diastolic heart failure  -- Remains euvolemic and well compensated today  -- Continue current dose of Lasix     3.  Obesity, BMI 41  -- Weight loss through diet and exercise advised       Follow Up:   Return in about 6 months (around 10/9/2024).      Thank you for allowing me to participate in the care of your patient. Please to not hesitate to contact me with additional questions or concerns.     JEWELL Pires MD  Interventional Cardiology   04/09/2024  14:06 EDT

## 2024-04-10 ENCOUNTER — TELEPHONE (OUTPATIENT)
Dept: CARDIOLOGY | Facility: CLINIC | Age: 74
End: 2024-04-10

## 2024-04-13 DIAGNOSIS — I87.2 VENOUS INSUFFICIENCY OF BOTH LOWER EXTREMITIES: ICD-10-CM

## 2024-04-13 DIAGNOSIS — I50.32 CHRONIC DIASTOLIC HEART FAILURE: ICD-10-CM

## 2024-04-15 RX ORDER — FUROSEMIDE 20 MG/1
TABLET ORAL
Qty: 60 TABLET | Refills: 2 | Status: SHIPPED | OUTPATIENT
Start: 2024-04-15

## 2024-04-15 RX ORDER — POTASSIUM CHLORIDE 750 MG/1
TABLET, FILM COATED, EXTENDED RELEASE ORAL
Qty: 60 TABLET | Refills: 1 | Status: SHIPPED | OUTPATIENT
Start: 2024-04-15

## 2024-04-17 ENCOUNTER — APPOINTMENT (OUTPATIENT)
Dept: CT IMAGING | Facility: HOSPITAL | Age: 74
End: 2024-04-17
Payer: MEDICARE

## 2024-04-17 ENCOUNTER — HOSPITAL ENCOUNTER (EMERGENCY)
Facility: HOSPITAL | Age: 74
Discharge: HOME | End: 2024-04-17
Attending: EMERGENCY MEDICINE
Payer: MEDICARE

## 2024-04-17 ENCOUNTER — APPOINTMENT (OUTPATIENT)
Dept: GENERAL RADIOLOGY | Facility: HOSPITAL | Age: 74
End: 2024-04-17
Payer: MEDICARE

## 2024-04-17 VITALS
HEIGHT: 63 IN | RESPIRATION RATE: 20 BRPM | HEART RATE: 85 BPM | DIASTOLIC BLOOD PRESSURE: 69 MMHG | WEIGHT: 236 LBS | OXYGEN SATURATION: 91 % | TEMPERATURE: 98.2 F | SYSTOLIC BLOOD PRESSURE: 117 MMHG | BODY MASS INDEX: 41.82 KG/M2

## 2024-04-17 DIAGNOSIS — R07.89 CHEST WALL PAIN: Primary | ICD-10-CM

## 2024-04-17 LAB
ALBUMIN SERPL-MCNC: 3.9 G/DL (ref 3.5–5.2)
ALBUMIN/GLOB SERPL: 1.3 G/DL
ALP SERPL-CCNC: 77 U/L (ref 39–117)
ALT SERPL W P-5'-P-CCNC: 17 U/L (ref 1–33)
ANION GAP SERPL CALCULATED.3IONS-SCNC: 10.9 MMOL/L (ref 5–15)
AST SERPL-CCNC: 18 U/L (ref 1–32)
BASOPHILS # BLD AUTO: 0.03 10*3/MM3 (ref 0–0.2)
BASOPHILS NFR BLD AUTO: 0.3 % (ref 0–1.5)
BILIRUB SERPL-MCNC: 0.3 MG/DL (ref 0–1.2)
BUN SERPL-MCNC: 55 MG/DL (ref 8–23)
BUN/CREAT SERPL: 64.7 (ref 7–25)
CALCIUM SPEC-SCNC: 9.5 MG/DL (ref 8.6–10.5)
CHLORIDE SERPL-SCNC: 105 MMOL/L (ref 98–107)
CO2 SERPL-SCNC: 23.1 MMOL/L (ref 22–29)
CREAT SERPL-MCNC: 0.85 MG/DL (ref 0.57–1)
D DIMER PPP FEU-MCNC: 1.35 MCGFEU/ML (ref 0–0.75)
DEPRECATED RDW RBC AUTO: 44.8 FL (ref 37–54)
EGFRCR SERPLBLD CKD-EPI 2021: 71.5 ML/MIN/1.73
EOSINOPHIL # BLD AUTO: 0.23 10*3/MM3 (ref 0–0.4)
EOSINOPHIL NFR BLD AUTO: 2 % (ref 0.3–6.2)
ERYTHROCYTE [DISTWIDTH] IN BLOOD BY AUTOMATED COUNT: 13.9 % (ref 12.3–15.4)
GEN 5 2HR TROPONIN T REFLEX: 22 NG/L
GLOBULIN UR ELPH-MCNC: 3.1 GM/DL
GLUCOSE SERPL-MCNC: 108 MG/DL (ref 65–99)
HCT VFR BLD AUTO: 39.4 % (ref 34–46.6)
HGB BLD-MCNC: 13.1 G/DL (ref 12–15.9)
HOLD SPECIMEN: NORMAL
HOLD SPECIMEN: NORMAL
IMM GRANULOCYTES # BLD AUTO: 0.03 10*3/MM3 (ref 0–0.05)
IMM GRANULOCYTES NFR BLD AUTO: 0.3 % (ref 0–0.5)
LYMPHOCYTES # BLD AUTO: 1.56 10*3/MM3 (ref 0.7–3.1)
LYMPHOCYTES NFR BLD AUTO: 13.8 % (ref 19.6–45.3)
MCH RBC QN AUTO: 29.2 PG (ref 26.6–33)
MCHC RBC AUTO-ENTMCNC: 33.2 G/DL (ref 31.5–35.7)
MCV RBC AUTO: 87.8 FL (ref 79–97)
MONOCYTES # BLD AUTO: 1.11 10*3/MM3 (ref 0.1–0.9)
MONOCYTES NFR BLD AUTO: 9.8 % (ref 5–12)
NEUTROPHILS NFR BLD AUTO: 73.8 % (ref 42.7–76)
NEUTROPHILS NFR BLD AUTO: 8.35 10*3/MM3 (ref 1.7–7)
NRBC BLD AUTO-RTO: 0 /100 WBC (ref 0–0.2)
NT-PROBNP SERPL-MCNC: 560.3 PG/ML (ref 0–1800)
PLATELET # BLD AUTO: 306 10*3/MM3 (ref 140–450)
PMV BLD AUTO: 9 FL (ref 6–12)
POTASSIUM SERPL-SCNC: 4.3 MMOL/L (ref 3.5–5.2)
PROT SERPL-MCNC: 7 G/DL (ref 6–8.5)
RBC # BLD AUTO: 4.49 10*6/MM3 (ref 3.77–5.28)
SODIUM SERPL-SCNC: 139 MMOL/L (ref 136–145)
TROPONIN T DELTA: -2 NG/L
TROPONIN T SERPL HS-MCNC: 24 NG/L
WBC NRBC COR # BLD AUTO: 11.31 10*3/MM3 (ref 3.4–10.8)
WHOLE BLOOD HOLD COAG: NORMAL
WHOLE BLOOD HOLD SPECIMEN: NORMAL

## 2024-04-17 PROCEDURE — 93005 ELECTROCARDIOGRAM TRACING: CPT

## 2024-04-17 PROCEDURE — 85379 FIBRIN DEGRADATION QUANT: CPT | Performed by: EMERGENCY MEDICINE

## 2024-04-17 PROCEDURE — 36415 COLL VENOUS BLD VENIPUNCTURE: CPT

## 2024-04-17 PROCEDURE — 85025 COMPLETE CBC W/AUTO DIFF WBC: CPT

## 2024-04-17 PROCEDURE — 25510000001 IOPAMIDOL 61 % SOLUTION: Performed by: EMERGENCY MEDICINE

## 2024-04-17 PROCEDURE — 99285 EMERGENCY DEPT VISIT HI MDM: CPT

## 2024-04-17 PROCEDURE — 83880 ASSAY OF NATRIURETIC PEPTIDE: CPT | Performed by: EMERGENCY MEDICINE

## 2024-04-17 PROCEDURE — 71045 X-RAY EXAM CHEST 1 VIEW: CPT

## 2024-04-17 PROCEDURE — 74174 CTA ABD&PLVS W/CONTRAST: CPT

## 2024-04-17 PROCEDURE — 80053 COMPREHEN METABOLIC PANEL: CPT

## 2024-04-17 PROCEDURE — 71275 CT ANGIOGRAPHY CHEST: CPT

## 2024-04-17 PROCEDURE — 84484 ASSAY OF TROPONIN QUANT: CPT

## 2024-04-17 RX ORDER — SODIUM CHLORIDE 0.9 % (FLUSH) 0.9 %
10 SYRINGE (ML) INJECTION AS NEEDED
Status: DISCONTINUED | OUTPATIENT
Start: 2024-04-17 | End: 2024-04-17 | Stop reason: HOSPADM

## 2024-04-17 RX ORDER — ASPIRIN 325 MG
325 TABLET ORAL ONCE
Status: COMPLETED | OUTPATIENT
Start: 2024-04-17 | End: 2024-04-17

## 2024-04-17 RX ORDER — IBUPROFEN 800 MG/1
800 TABLET ORAL ONCE
Status: COMPLETED | OUTPATIENT
Start: 2024-04-17 | End: 2024-04-17

## 2024-04-17 RX ADMIN — IBUPROFEN 800 MG: 800 TABLET, FILM COATED ORAL at 19:23

## 2024-04-17 RX ADMIN — ASPIRIN 325 MG: 325 TABLET ORAL at 14:58

## 2024-04-17 RX ADMIN — IOPAMIDOL 100 ML: 612 INJECTION, SOLUTION INTRAVENOUS at 17:25

## 2024-04-17 NOTE — DISCHARGE INSTRUCTIONS
If you notice any concerning symptoms, please return to the ER immediately. These can include but are not limited to: worsening of you condition, fevers, chills, shortness of breath, vomiting, weakness of the extremities, changes in your mental status, numbness, pale extremities, or chest pain.     Take medications as prescribed, your pharmacist may have additional recommendations concerning these medications.    For pain use ibuprofen (Motrin) or acetaminophen (Tylenol), unless prescribed medications that also contain these medications.  You can take over the counter acetaminophen or ibuprofen, please follow the directions as dosages differ. Do not take ibuprofen if you have a history of peptic ulcers, kidney disease, bariatric surgery, or are currently pregnant.  Do not take Tylenol if you have a history of liver disease or alcohol use disorder.        THANK YOU!!! From [facility] Emergency Department    On behalf of the Emergency Department staff at [facility], I would like to thank you for giving us the opportunity to address your health care needs and concerns. We hope that during your visit, our service was delivered in a professional and caring manner. Please keep [facility] in mind as we walk with you down the path to your own personal wellness. Please expect follow-up phone calls concerning additional care and questions about your experience.      You have received additional information specific to your diagnosis in these discharge instructions, please read these fully.  Anytime you have been seen in the emergency department we recommend close follow up with your primary care provider or specialist, please follow these directions as indicated.      Please return if you have worsening shortness of breath, feeling or pass out lightheadedness or dizziness.

## 2024-04-17 NOTE — ED PROVIDER NOTES
[facility] EMERGENCY DEPARTMENT  Emergency Department Encounter  Emergency Medicine Physician Note     Pt Name:Salinas Monryo  MRN: [id number]  Birthdate 1950  Date of evaluation: 4/17/2024  PCP:  Jm Marie DO  Note Started: 2:28 PM EDT      CHIEF COMPLAINT       Chief Complaint   Patient presents with    Chest Pain       HISTORY OF PRESENT ILLNESS  (Location/Symptom, Timing/Onset, Context/Setting, Quality, Duration, Modifying Factors, Severity.)      Salinas Monroy is a 74 y.o. female who presents with chest pain, patient describes the chest pain starting on Saturday, describes it midsternal, radiating to the back.  States that today it moved into the left lower part of her chest towards her heart.  Patient denies any abdominal pain, change in urination or bowel habits.  Patient does describe generalized back pain with it.  Patient denies any lightheadedness or dizziness.    PAST MEDICAL / SURGICAL / SOCIAL / FAMILY HISTORY     Past Medical History:   Diagnosis Date    Anemia ?    under control    Anxiety     Arthritis     Atrial fibrillation     Bloating     Burping     Chest pain 2020    was seen by Dr Pires and was released was just anxiety    CHF (congestive heart failure)     Colon polyp 06/05/2018    Coronary artery disease ??? Feb., 2023    COVID-19 vaccine series completed     Depression     Diverticulosis     GERD (gastroesophageal reflux disease)     no longer bothers me...    Gout     Hashimoto's disease     Hernia 03/2012    repair surgery which failed in 2014    Hypothyroid     Impingement syndrome of right shoulder 05/08/2016    Irritable bowel syndrome ??    Obesity     OCD (obsessive compulsive disorder)     Sleep apnea     Tremor     worse on left arm/hand    Wears glasses     reading glasses only     No additional pertinent       Past Surgical History:   Procedure Laterality Date    APPENDECTOMY      ATRIAL APPENDAGE EXCLUSION LEFT WITH  TRANSESOPHAGEAL ECHOCARDIOGRAM N/A 12/4/2023    Procedure: Atrial Appendage Occlusion;  Surgeon: Guanako Thompson MD;  Location: [facility] EP INVASIVE LOCATION;  Service: Cardiology;  Laterality: N/A;    BARIATRIC SURGERY  1979    CARDIOVERSION  09/13/2022    CATARACT EXTRACTION W/ INTRAOCULAR LENS IMPLANT Right 08/08/2022    Procedure: CATARACT PHACO EXTRACTION WITH INTRAOCULAR LENS IMPLANT RIGHT;  Surgeon: Eunice Brooks MD;  Location: [facility] OR;  Service: Ophthalmology;  Laterality: Right;    CATARACT EXTRACTION W/ INTRAOCULAR LENS IMPLANT Left 08/22/2022    Procedure: CATARACT PHACO EXTRACTION WITH INTRAOCULAR LENS IMPLANT LEFT;  Surgeon: Eunice Brooks MD;  Location: [facility] OR;  Service: Ophthalmology;  Laterality: Left;    COLONOSCOPY  2012    failed due to hernia...    COLONOSCOPY N/A 06/08/2017    Procedure: COLONOSCOPY with cold snare polypectomies, argon thermal ablation, ns injection, yanira ink injection;  Surgeon: Rafiq Huang MD;  Location: [facility] ENDOSCOPY;  Service:     COLONOSCOPY N/A 06/05/2018    Procedure: COLONOSCOPY WITH BIOPSIES;  Surgeon: Rafiq Huang MD;  Location: [facility] ENDOSCOPY;  Service: Gastroenterology    ENDOSCOPY N/A 01/27/2021    Procedure: ESOPHAGOGASTRODUODENOSCOPY WITH BIOPSIES AND CLIPS;  Surgeon: Katie Shannon MD;  Location: [facility] ENDOSCOPY;  Service: Gastroenterology;  Laterality: N/A;    GASTRIC BYPASS  1978    HERNIA MESH REMOVAL  2014    BOWEL OBSTRUCTION DUE TO MESH    HERNIA REPAIR  2012    HYSTERECTOMY      complete    JOINT REPLACEMENT  2009 & 2012    Left & Right Full Hip Replacements    SMALL INTESTINE SURGERY  2014    emergency bowel obstruction from hernia failure    TOTAL ABDOMINAL HYSTERECTOMY WITH SALPINGO OOPHORECTOMY  1990    fibroids    TUBAL ABDOMINAL LIGATION      UPPER GASTROINTESTINAL ENDOSCOPY  2010     No additional pertinent       Social History     Socioeconomic History    Marital status:    Tobacco Use    Smoking status: Former      Current packs/day: 0.00     Average packs/day: 1 pack/day for 41.0 years (41.0 ttl pk-yrs)     Types: Cigarettes     Start date: 1967     Quit date: 2008     Years since quittin.3     Passive exposure: Past    Smokeless tobacco: Never   Vaping Use    Vaping status: Never Used   Substance and Sexual Activity    Alcohol use: Not Currently     Comment: very rarely    Drug use: No    Sexual activity: Not Currently     Partners: Male     Birth control/protection: None       Family History   Problem Relation Age of Onset    Obesity Mother     Rheum arthritis Mother     Thyroid disease Mother     Hypertension Mother         Passed     Stroke Mother         Most of her problems were caused by Rheumatoid Ar.    Hyperlipidemia Mother     Arthritis Mother         Rheumatoid Arthritis..Passed     Heart disease Mother         CHF    Cancer Father         Passed     Kidney cancer Father     Liver cancer Father     No Known Problems Brother     Diabetes Maternal Aunt     Cancer Maternal Uncle     Lung cancer Maternal Uncle     Alcohol abuse Maternal Uncle     No Known Problems Paternal Aunt     Stroke Paternal Uncle     Hypertension Paternal Uncle     Hyperlipidemia Paternal Uncle     Heart disease Paternal Uncle     No Known Problems Brother     Asthma Sister         under control    Alcohol abuse Maternal Uncle     Colon cancer Neg Hx     Breast cancer Neg Hx     Ovarian cancer Neg Hx        Allergies:  Patient has no known allergies.    Home Medications:  Prior to Admission medications    Medication Sig Start Date End Date Taking? Authorizing Provider   acyclovir (ZOVIRAX) 400 MG tablet TAKE ONE TABLET BY MOUTH EVERY DAY 3/18/24   Jm Marie DO   allopurinol (ZYLOPRIM) 100 MG tablet Take 2 tablets by mouth Daily. 3/14/24   Jm Marie,    ASHWAGANDHA PO Take  by mouth Daily.    Provider, MD Yeison   aspirin 81 MG EC tablet Take 1 tablet by mouth Daily. 24   Bruce Carney MD   B  Complex Vitamins (VITAMIN B COMPLEX PO) Take 1 tablet by mouth Daily.    Yeison Blount MD   B-12, Methylcobalamin, 1000 MCG sublingual tablet Every Other Day.    Yeison Blount MD   buPROPion SR (WELLBUTRIN SR) 150 MG 12 hr tablet Take 1 tablet by mouth Every Morning. 3/14/24   Jm Marie DO   Cholecalciferol (VITAMIN D3) 64084 UNITS tablet Take 7,500 Units by mouth Daily.    Yeison Blount MD   clopidogrel (PLAVIX) 75 MG tablet Take 1 tablet by mouth Daily. 1/18/24   Bruce Carney MD   Coenzyme Q10 200 MG capsule Take 1 capsule by mouth 2 (Two) Times a Day.    Yeison Blount MD   Diclofenac Sodium (VOLTAREN) 1 % gel gel Apply 2 gm of gel topically to the appropriate area as directed twice a day. (to feet) 3/7/22   Jm Marie DO   Digestive Enzymes (DIGESTIVE ENZYME PO) Take 1 tablet by mouth Daily.    Yeison Blount MD   dofetilide (TIKOSYN) 250 MCG capsule Take 1 capsule by mouth Every 12 (Twelve) Hours. 10/2/23   Guanako Thompson MD   escitalopram (LEXAPRO) 10 MG tablet TAKE ONE TABLET BY MOUTH EVERY NIGHT 1/11/24   Jm Marie DO   famotidine (PEPCID) 20 MG tablet TAKE ONE TABLET BY MOUTH TWICE DAILY 11/15/23   Jm Marie DO   ferrous gluconate (FERGON) 324 MG tablet Take 1 tablet by mouth Daily With Breakfast.  Patient taking differently: Take 1 tablet by mouth Every Night. 2/18/20   Jm Marie DO   furosemide (LASIX) 20 MG tablet TAKE ONE TABLET BY MOUTH TWICE DAILY 4/15/24   Jm Marie DO   levothyroxine (SYNTHROID, LEVOTHROID) 75 MCG tablet TAKE ONE TABLET BY MOUTH EVERY DAY 3/12/24   Jm Marie DO   liothyronine (CYTOMEL) 50 MCG tablet Take 2 tablets by mouth Daily. 3/22/24   Jm Marie DO   MAGNESIUM PO Take 1,000 mg by mouth Every Night.    Yeison Blount MD   Methylsulfonylmethane (MSM PO) Take 1 tablet by mouth Daily.    Yeison Blount MD   Misc Natural Products (TART CHERRY ADVANCED PO) Take 2  "capsules by mouth Daily.    Yeison Blount MD   Omega-3 Fatty Acids (OMEGA-3 FISH OIL PO) Take 1,000 Units by mouth 3 (Three) Times a Day.    Yeison Blount MD   potassium chloride 10 MEQ CR tablet TAKE ONE TABLET BY MOUTH TWICE DAILY 4/15/24   Jm Marie DO   Probiotic Product (PROBIOTIC PO) Take 1 tablet by mouth Daily.    Yeison Blount MD   TURMERIC PO Take 1 tablet by mouth 2 (two) times a day.    Yeison Blount MD   VITAMIN A PO Take 1 capsule by mouth Daily.    Yeison Blount MD   vitamin C (ASCORBIC ACID) 500 MG tablet Take 21 mg by mouth 3 (Three) Times a Day As Needed.    Yeison Blount MD   vitamin E 400 UNIT capsule Take 1 capsule by mouth Daily.    Yeison Blount MD   VITAMIN K PO Take 100 mcg by mouth Daily.    Yeison Blount MD   Zinc 30 MG tablet Take  by mouth Daily.    Yeison Blount MD         REVIEW OF SYSTEMS       Review of Systems   Constitutional:  Negative for diaphoresis and fever.   Respiratory:  Negative for chest tightness and shortness of breath.    Cardiovascular:  Positive for chest pain.   Gastrointestinal:  Negative for abdominal pain, nausea and vomiting.   Endocrine: Negative for polyuria.   Genitourinary:  Negative for difficulty urinating and frequency.   Musculoskeletal:  Positive for back pain.       PHYSICAL EXAM      INITIAL VITALS:   /69   Pulse 85   Temp 98.2 °F (36.8 °C) (Oral)   Resp 20   Ht 160 cm (62.99\")   Wt 107 kg (236 lb)   LMP  (LMP Unknown)   SpO2 91%   BMI 41.82 kg/m²     Physical Exam  Constitutional:       Appearance: Normal appearance.   HENT:      Head: Normocephalic and atraumatic.      Mouth/Throat:      Mouth: Mucous membranes are moist.      Pharynx: Oropharynx is clear.   Cardiovascular:      Rate and Rhythm: Normal rate and regular rhythm.      Pulses: Normal pulses.           Radial pulses are 2+ on the right side and 2+ on the left side.        Dorsalis pedis pulses " are 2+ on the right side and 2+ on the left side.   Pulmonary:      Effort: Pulmonary effort is normal.      Breath sounds: Normal breath sounds. No decreased breath sounds.   Abdominal:      General: Abdomen is flat. There is no distension.      Palpations: Abdomen is soft.      Tenderness: There is no abdominal tenderness. There is no guarding.   Musculoskeletal:         General: Normal range of motion.   Skin:     General: Skin is warm and dry.   Neurological:      General: No focal deficit present.      Mental Status: She is alert and oriented to person, place, and time.   Psychiatric:         Mood and Affect: Mood normal.         Behavior: Behavior normal.           DDX/DIAGNOSTIC RESULTS / EMERGENCY DEPARTMENT COURSE / MDM     Differential Diagnosis included but not limited: ACS, pulmonary embolism, aortic dissection, pneumonia, pleurisy, costochondritis    Diagnoses Considered but Do Not Suspect: N/A    Decision Rules/Scores utilized: Patient with heart score of 4    Tests considered but not ordered and why:  N/A     MIPS: N/A     Code Status Discussion:  Not Discussed    Additional Patient Education Provided: None     Medical Decision Making    Medical Decision Making  Patient presenting with chest pain that radiates into the back.  Concern for ACS, also concern for thoracic dissection.  Dimer was obtained and noted to be elevated, CT scan performed.  CT scan was noted to be negative for pulmonary embolism or dissection.  Patient had improvement of symptoms throughout stay in the emergency department.  Patient had heart score of 4.  Patient had downtrending troponin with negative delta.  Patient appeared clinically well, low concern for ACS.  Heart score 4 shared decision making was applied with patient.  Do feel patient is stable chest pain at this time, more of a pleurisy or costochondritis.  After further discussion do feel discharge is appropriate, patient was offered admission.  Patient declined  admission.  Patient discharged home in stable condition with strict return precautions worsening chest pain, lightheadedness, dizziness, abdominal pain, nausea vomiting or any other concerns.  Patient was agreeable with this and discharged home stable condition.    Problems Addressed:  Chest wall pain: complicated acute illness or injury    Amount and/or Complexity of Data Reviewed  Radiology: ordered.    Risk  Prescription drug management.        See ED COURSE for additional MDM statements    EKG    EKG Interpretation    Evaluated and interpreted by emergency department physician    Rhythm: Sinus arrhythmia  Rate: Normal  Axis: Left axis deviation  Ectopy: none  Conduction: Normal  ST Segments: Normal  T Waves: Inversions in lead III, flattening in V6  Q Waves: None    Clinical Impression: Normal Sinus Rhythm    Robert Funes, DO      All EKG's are interpreted by the Emergency Department Physician who either signs or Co-signs this chart in the absence of a cardiologist.    Additional Scores                   EMERGENCY DEPARTMENT COURSE:    ED Course as of 04/19/24 0622 Wed Apr 17, 2024   1623 Patient noted to have elevated D-dimer, concern for dissection as patient describes chest pain that radiates into the back.  Will get a CTA of the chest.  Lower concern for pulmonary embolism as patient describes minimal shortness of breath. [CR]   1644 Patient had more shortness of breath, currently started on oxygen by nursing staff. [CR]   1808 I personally evaluated the CTA, no obvious dissection noted.  No noted pulmonary embolisms.  Will be waiting for the radiological read. [CR]   1910 Patient noted to have ambulatory O2 stable to 9193%.  Do feel patient stable for discharge. [CR]   1922 Discussed patient with elevated heart score of 4, chest pain, age about admission for observation versus discharge home.  Do feel discharge home with close follow-up is an appropriate management this time as long as she has  it.  Through shared decision making discussed this with patient that I would feel more comfortable if she was admitted.  Patient requesting to go home, she states that if she feels better after ibuprofen she feels comfortable going home.  Will give a dose of ibuprofen here, see how she feels and reevaluate. [CR]      ED Course User Index  [CR] Robert Funes,        PROCEDURES:  None Performed   Procedures    DATA FOR LAB AND RADIOLOGY TESTS ORDERED BELOW ARE REVIEWED BY THE ED CLINICIAN:    RADIOLOGY: All x-rays, CT, MRI, and formal ultrasound images (except ED bedside ultrasound) are read by the radiologist, see reports below, unless otherwise noted in MDM or here.  Reports below are reviewed by myself.  CT Angiogram Abdomen Pelvis   Final Result   No acute abnormality.      Authenticated and Electronically Signed by Wilfredo Lam M.D. on   04/17/2024 06:52:17 PM      CT Angiogram Chest   Final Result   No pulmonary embolism.  No significant abnormality.      Authenticated and Electronically Signed by Wilfredo Lam M.D. on   04/17/2024 06:51:25 PM      XR Chest 1 View   Final Result   Unremarkable portable chest.                This report was signed and finalized on 4/17/2024 2:46 PM by Marty Porter MD.              LABS: Lab orders shown below, the results are reviewed by myself, and all abnormals are listed below.  Labs Reviewed   COMPREHENSIVE METABOLIC PANEL - Abnormal; Notable for the following components:       Result Value    Glucose 108 (*)     BUN 55 (*)     BUN/Creatinine Ratio 64.7 (*)     All other components within normal limits    Narrative:     GFR Normal >60  Chronic Kidney Disease <60  Kidney Failure <15    The GFR formula is only valid for adults with stable renal function between ages 18 and 70.   TROPONIN - Abnormal; Notable for the following components:    HS Troponin T 24 (*)     All other components within normal limits    Narrative:     High Sensitive Troponin T Reference  "Range:  <14.0 ng/L- Negative Female for AMI  <22.0 ng/L- Negative Male for AMI  >=14 - Abnormal Female indicating possible myocardial injury.  >=22 - Abnormal Male indicating possible myocardial injury.   Clinicians would have to utilize clinical acumen, EKG, Troponin, and serial changes to determine if it is an Acute Myocardial Infarction or myocardial injury due to an underlying chronic condition.        CBC WITH AUTO DIFFERENTIAL - Abnormal; Notable for the following components:    WBC 11.31 (*)     Lymphocyte % 13.8 (*)     Neutrophils, Absolute 8.35 (*)     Monocytes, Absolute 1.11 (*)     All other components within normal limits   D-DIMER, QUANTITATIVE - Abnormal; Notable for the following components:    D-Dimer, Quantitative 1.35 (*)     All other components within normal limits    Narrative:     According to the assay 's published package insert, a normal (<0.50 MCGFEU/mL) D-dimer result in conjunction with a non-high clinical probability assessment, excludes deep vein thrombosis (DVT) and pulmonary embolism (PE) with high sensitivity.    D-dimer values increase with age and this can make VTE exclusion of an older population difficult. To address this, the [employer], based on best available evidence and recent guidelines, recommends that clinicians use age-adjusted D-dimer thresholds in patients greater than 50 years of age with: a) a low probability of PE who do not meet all Pulmonary Embolism Rule Out Criteria, or b) in those with intermediate probability of PE.   The formula for an age-adjusted D-dimer cut-off is \"age/100\".  For example, a 60 year old patient would have an age-adjusted cut-off of 0.60 MCGFEU/mL and an 80 year old 0.80 MCGFEU/mL.   HIGH SENSITIVITIY TROPONIN T 2HR - Abnormal; Notable for the following components:    HS Troponin T 22 (*)     All other components within normal limits    Narrative:     High Sensitive Troponin T Reference Range:  <14.0 ng/L- " Negative Female for AMI  <22.0 ng/L- Negative Male for AMI  >=14 - Abnormal Female indicating possible myocardial injury.  >=22 - Abnormal Male indicating possible myocardial injury.   Clinicians would have to utilize clinical acumen, EKG, Troponin, and serial changes to determine if it is an Acute Myocardial Infarction or myocardial injury due to an underlying chronic condition.        BNP (IN-HOUSE) - Normal    Narrative:     This assay is used as an aid in the diagnosis of individuals suspected of having heart failure. It can be used as an aid in the diagnosis of acute decompensated heart failure (ADHF) in patients presenting with signs and symptoms of ADHF to the emergency department (ED). In addition, NT-proBNP of <300 pg/mL indicates ADHF is not likely.    Age Range Result Interpretation  NT-proBNP Concentration (pg/mL:      <50             Positive            >450                   Gray                 300-450                    Negative             <300    50-75           Positive            >900                  Gray                300-900                  Negative            <300      >75             Positive            >1800                  Gray                300-1800                  Negative            <300   RAINBOW DRAW    Narrative:     The following orders were created for panel order [place] Draw.  Procedure                               Abnormality         Status                     ---------                               -----------         ------                     Green Top (Gel)[[id number]]                                  Final result               Lavender Top[[id number]]                                     Final result               Gold Top - SST[[id number]]                                   Final result               Light Blue Top[[id number]]                                   Final result                 Please view results for these tests on the individual orders.   CBC AND DIFFERENTIAL     Narrative:     The following orders were created for panel order CBC & Differential.  Procedure                               Abnormality         Status                     ---------                               -----------         ------                     CBC Auto Differential[[id number]]        Abnormal            Final result                 Please view results for these tests on the individual orders.   GREEN TOP   LAVENDER TOP   GOLD TOP - SST   LIGHT BLUE TOP       Vitals Reviewed:    Vitals:    04/17/24 1701 04/17/24 1800 04/17/24 1831 04/17/24 1901   BP: 123/66 134/64 126/65 117/69   BP Location:       Patient Position:       Pulse: 85 94 84 85   Resp:       Temp:       TempSrc:       SpO2: 95% 94% 93% 91%   Weight:       Height:           MEDICATIONS GIVEN TO PATIENT THIS ENCOUNTER:  Medications   aspirin tablet 325 mg (325 mg Oral Given 4/17/24 1458)   iopamidol (ISOVUE-300) 61 % injection 100 mL (100 mL Intravenous Given 4/17/24 1725)   ibuprofen (ADVIL,MOTRIN) tablet 800 mg (800 mg Oral Given 4/17/24 1923)       CONSULTS:  None    CRITICAL CARE:  There was significant risk of life threatening deterioration of patient's condition requiring my direct management. Critical care time 0 minutes, excluding any documented procedures.    FINAL IMPRESSION      1. Chest wall pain          DISPOSITION / PLAN     ED Disposition       ED Disposition   Discharge    Condition   Stable    Comment   --               PATIENT REFERRED TO:  Jm Marie,   [address] [place] [address] [phone]    Schedule an appointment as soon as possible for a visit in 3 days        DISCHARGE MEDICATIONS:     Medication List        CHANGE how you take these medications      ferrous gluconate 324 MG tablet  Commonly known as: FERGON  Take 1 tablet by mouth Daily With Breakfast.  What changed: when to take this            CONTINUE taking these medications      acyclovir 400 MG tablet  Commonly known as: ZOVIRAX  TAKE ONE  TABLET BY MOUTH EVERY DAY     allopurinol 100 MG tablet  Commonly known as: ZYLOPRIM  Take 2 tablets by mouth Daily.     ASHWAGANDHA PO     aspirin 81 MG EC tablet  Take 1 tablet by mouth Daily.     B-12 (Methylcobalamin) 1000 MCG sublingual tablet     buPROPion  MG 12 hr tablet  Commonly known as: WELLBUTRIN SR  Take 1 tablet by mouth Every Morning.     clopidogrel 75 MG tablet  Commonly known as: PLAVIX  Take 1 tablet by mouth Daily.     Coenzyme Q10 200 MG capsule     Diclofenac Sodium 1 % gel gel  Commonly known as: VOLTAREN  Apply 2 gm of gel topically to the appropriate area as directed twice a day. (to feet)     DIGESTIVE ENZYME PO     dofetilide 250 MCG capsule  Commonly known as: TIKOSYN  Take 1 capsule by mouth Every 12 (Twelve) Hours.     escitalopram 10 MG tablet  Commonly known as: LEXAPRO  TAKE ONE TABLET BY MOUTH EVERY NIGHT     famotidine 20 MG tablet  Commonly known as: PEPCID  TAKE ONE TABLET BY MOUTH TWICE DAILY     furosemide 20 MG tablet  Commonly known as: LASIX  TAKE ONE TABLET BY MOUTH TWICE DAILY     levothyroxine 75 MCG tablet  Commonly known as: SYNTHROID, LEVOTHROID  TAKE ONE TABLET BY MOUTH EVERY DAY     liothyronine 50 MCG tablet  Commonly known as: CYTOMEL  Take 2 tablets by mouth Daily.     MAGNESIUM PO     MSM PO     OMEGA-3 FISH OIL PO     potassium chloride 10 MEQ CR tablet  TAKE ONE TABLET BY MOUTH TWICE DAILY     PROBIOTIC PO     TART PASTRANA ADVANCED PO     TURMERIC PO     VITAMIN A PO     VITAMIN B COMPLEX PO     vitamin C 500 MG tablet  Commonly known as: ASCORBIC ACID     Vitamin D3 250 MCG (21742 UT) tablet  Generic drug: Cholecalciferol     vitamin E 400 UNIT capsule     VITAMIN K PO     Zinc 30 MG tablet              Electronically signed by Robert Funes DO, 04/17/24, 2:28 PM EDT.    Emergency Medicine Physician  [facility]  (Please note that portions of thisnote were completed with a voice recognition program.  Efforts were made to edit  the dictations but occasionally words are mis-transcribed.)      Robert Funes, DO  04/19/24 0624

## 2024-05-29 ENCOUNTER — TELEPHONE (OUTPATIENT)
Dept: CARDIOLOGY | Facility: CLINIC | Age: 74
End: 2024-05-29
Payer: MEDICARE

## 2024-05-29 NOTE — TELEPHONE ENCOUNTER
"    Caller: Salinas Monroy \"Del\"    Relationship: Self    Best call back number: [phone]    Which medication are you concerned about: TIKOSYN 250 MCG CAPSULE    Who prescribed you this medication: DR. BRONSON    When did you start taking this medication: 5 MONTHS    What are your concerns: PATIENT IS OPENING HER LAST BOTTLE, SHE WAS TOLD TO CALL AND LET THE OFFICE KNOW. PLEASE REACH OUT TO THE PATIENT.             "

## 2024-05-30 NOTE — TELEPHONE ENCOUNTER
I called and re-ordered Tikosyn through [employer] patient assistance program.     The patient ID: [id number]  The order # is :  [id number]                I tried to call the patient to let her know but she did not answer. I left a message for her to call us back at the office.

## 2024-06-03 NOTE — TELEPHONE ENCOUNTER
Pt notified that Tikosyn was received in office. Will  at [facility] on Wednesday.

## 2024-06-07 ENCOUNTER — TELEMEDICINE (OUTPATIENT)
Dept: CARDIOLOGY | Facility: HOSPITAL | Age: 74
End: 2024-06-07
Payer: MEDICARE

## 2024-06-07 DIAGNOSIS — I48.19 PERSISTENT ATRIAL FIBRILLATION: ICD-10-CM

## 2024-06-07 DIAGNOSIS — Z95.818 PRESENCE OF WATCHMAN LEFT ATRIAL APPENDAGE CLOSURE DEVICE: Primary | ICD-10-CM

## 2024-06-07 NOTE — PROGRESS NOTES
Mode of visit: Video  Location of patient: Home   Do you consent to the use of telemedicine in your medical care today? Yes      Chief Complaint  Watchman follow-up    [facility]  [facility]  Telemedicine note    This was a video enabled telemedicine encounter.    Subjective    History of Present Illness {CC  Problem List  Visit  Diagnosis   Encounters  Notes  Medications  Labs  Result Review Imaging  Media :23}     Salinas Monroy, 74 y.o. female with persistent AF s/p Watchman, HTN, hypothyroidism presents to [facility] telemedicine visit for Watchman follow-up    6 Month Watchman Follow-up Note    Patient presents today for follow-up s/p watchman placement by Dr. Thompson on 12/4/23.  Patient has completed follow-up JAYA that revealed a well-positioned watchman device with no significant violet-prosthetic leak present.  Patient has maintained DAPT as instructed.          Objective     Vital Signs:   There were no vitals filed for this visit.  There is no height or weight on file to calculate BMI.  Physical Exam  Constitutional:       Appearance: Normal appearance.   HENT:      Head: Normocephalic.   Pulmonary:      Effort: Pulmonary effort is normal. No respiratory distress.   Neurological:      Mental Status: She is alert.   Psychiatric:         Mood and Affect: Mood normal.         Behavior: Behavior normal.         Thought Content: Thought content normal.          Data Reviewed:{ Labs  Result Review  Imaging  Med Tab  Media :23}     EP/CRM Study (12/04/2023 12:41)   Adult Transesophageal Echo (JAYA) W/ Cont if Necessary Per Protocol (01/18/2024 15:08)     Assessment and Plan {CC Problem List  Visit Diagnosis  ROS  Review (Popup)  Health Maintenance  Quality  BestPractice  Medications  SmartSets  SnapShot Encounters  Media :23}     This visit has been scheduled as a video visit.     1. Presence of Watchman left atrial appendage  closure device/Persistent atrial fibrillation  -s/p watchman placement by Dr. Thompson on 12/4/23.    -follow-up JAYA that revealed a well-positioned watchman device with no significant violet-prosthetic leak present.    -[id number]: 3  -Discontinue clopidogrel, maintain ASA as monotherapy   -1 year JAYA ordered per watchman protocol.  Patient receptive of plan, appreciative of call.         Follow Up {Instructions Charge Capture  Follow-up Communications :23}   Return if symptoms worsen or fail to improve.        Patient was given instructions and counseling regarding her condition or for health maintenance advice. Please see specific information pulled into the AVS if appropriate.  Patient was instructed to call the [facility] Center with any questions, concerns, or worsening symptoms.    *Please note that portions of this note were completed with a voice recognition program. Efforts were made to edit the dictations, but occasionally words are mistranscribed.

## 2024-06-17 DIAGNOSIS — I87.2 VENOUS INSUFFICIENCY OF BOTH LOWER EXTREMITIES: ICD-10-CM

## 2024-06-17 DIAGNOSIS — E03.9 ACQUIRED HYPOTHYROIDISM: Chronic | ICD-10-CM

## 2024-06-17 DIAGNOSIS — I50.32 CHRONIC DIASTOLIC HEART FAILURE: ICD-10-CM

## 2024-06-17 RX ORDER — LEVOTHYROXINE SODIUM 0.07 MG/1
TABLET ORAL
Qty: 90 TABLET | Refills: 1 | Status: SHIPPED | OUTPATIENT
Start: 2024-06-17

## 2024-06-17 RX ORDER — POTASSIUM CHLORIDE 750 MG/1
TABLET, FILM COATED, EXTENDED RELEASE ORAL
Qty: 60 TABLET | Refills: 1 | Status: SHIPPED | OUTPATIENT
Start: 2024-06-17

## 2024-07-10 DIAGNOSIS — I87.2 VENOUS INSUFFICIENCY OF BOTH LOWER EXTREMITIES: ICD-10-CM

## 2024-07-10 DIAGNOSIS — E03.9 HYPOTHYROIDISM, ADULT: ICD-10-CM

## 2024-07-10 DIAGNOSIS — I50.32 CHRONIC DIASTOLIC HEART FAILURE: ICD-10-CM

## 2024-07-10 RX ORDER — FUROSEMIDE 20 MG/1
TABLET ORAL
Qty: 60 TABLET | Refills: 2 | Status: SHIPPED | OUTPATIENT
Start: 2024-07-10

## 2024-07-10 RX ORDER — LIOTHYRONINE SODIUM 50 UG/1
100 TABLET ORAL DAILY
Qty: 60 TABLET | Refills: 3 | Status: SHIPPED | OUTPATIENT
Start: 2024-07-10

## 2024-07-11 DIAGNOSIS — F31.62 BIPOLAR DISORDER, CURRENT EPISODE MIXED, MODERATE: ICD-10-CM

## 2024-07-11 RX ORDER — ESCITALOPRAM OXALATE 10 MG/1
10 TABLET ORAL
Qty: 90 TABLET | Refills: 1 | Status: SHIPPED | OUTPATIENT
Start: 2024-07-11

## 2024-07-18 ENCOUNTER — OFFICE VISIT (OUTPATIENT)
Dept: FAMILY MEDICINE CLINIC | Facility: CLINIC | Age: 74
End: 2024-07-18
Payer: MEDICARE

## 2024-07-18 VITALS
HEART RATE: 84 BPM | SYSTOLIC BLOOD PRESSURE: 118 MMHG | OXYGEN SATURATION: 95 % | HEIGHT: 63 IN | TEMPERATURE: 98 F | WEIGHT: 221.4 LBS | RESPIRATION RATE: 16 BRPM | BODY MASS INDEX: 39.23 KG/M2 | DIASTOLIC BLOOD PRESSURE: 68 MMHG

## 2024-07-18 DIAGNOSIS — I87.2 VENOUS INSUFFICIENCY OF BOTH LOWER EXTREMITIES: ICD-10-CM

## 2024-07-18 DIAGNOSIS — E03.9 ACQUIRED HYPOTHYROIDISM: Chronic | ICD-10-CM

## 2024-07-18 DIAGNOSIS — I50.32 CHRONIC DIASTOLIC HEART FAILURE: ICD-10-CM

## 2024-07-18 DIAGNOSIS — I48.11 LONGSTANDING PERSISTENT ATRIAL FIBRILLATION: Primary | ICD-10-CM

## 2024-07-18 DIAGNOSIS — Z95.818 PRESENCE OF WATCHMAN LEFT ATRIAL APPENDAGE CLOSURE DEVICE: ICD-10-CM

## 2024-07-18 DIAGNOSIS — G25.0 ESSENTIAL TREMOR: ICD-10-CM

## 2024-07-18 DIAGNOSIS — M15.9 PRIMARY OSTEOARTHRITIS INVOLVING MULTIPLE JOINTS: ICD-10-CM

## 2024-07-18 PROBLEM — Z79.01 CHRONIC ANTICOAGULATION: Chronic | Status: RESOLVED | Noted: 2023-06-26 | Resolved: 2024-07-18

## 2024-07-18 PROCEDURE — 99214 OFFICE O/P EST MOD 30 MIN: CPT | Performed by: FAMILY MEDICINE

## 2024-07-18 PROCEDURE — 1125F AMNT PAIN NOTED PAIN PRSNT: CPT | Performed by: FAMILY MEDICINE

## 2024-07-18 NOTE — PROGRESS NOTES
Established Patient        Chief Complaint:   Chief Complaint   Patient presents with    Atrial Fibrillation     3 mo f/u Pt is here to follow up and possibly get lab work done.         Salinas Monroy is a 74 y.o. female    History of Present Illness:   Answers submitted by the patient for this visit:  Other (Submitted on 7/12/2024)  Please describe your symptoms.: follow up and labs  Have you had these symptoms before?: Yes  How long have you been having these symptoms?: Greater than 2 weeks  Primary Reason for Visit (Submitted on 7/12/2024)  What is the primary reason for your visit?: Other    Has stopped anticoagulation; presence of Watchman device.  Continues to be followed by cardiology.    Here today in scheduled follow-up visit of her atrial fibrillation, hypothyroidism, venous insufficiency of the lower extremities, diastolic CHF, essential tremor, osteoarthritis of multiple joints.    Patient states she feels overall improved, mood has been stable.    Denies chest pain, syncope, palpitations or vertigo.  Denies fever, chills or night sweats.  Maintains an active lifestyle, balanced dietary intake; reports good hydration habits.  Denies hematuria/dysuria.  Denies any BRB/BTS.  No new rashes.  Denies orthopnea, epistaxis or hemoptysis.      Subjective     The following portions of the patient's history were reviewed and updated as appropriate: allergies, current medications, past family history, past medical history, past social history, past surgical history and problem list.    No Known Allergies    Review of Systems  Constitutional: Negative for fever. Negative for chills, diaphoresis, fatigue and unexpected weight change.   HENT: No dysphagia; no changes to vision/hearing/smell/taste; no epistaxis.  Otherwise, as per above.  Eyes: Negative for redness and visual disturbance.   Respiratory: negative for shortness of breath. Negative for chest pain . Negative for cough and chest tightness.  "  Cardiovascular: Negative for chest pain and palpitations.   Gastrointestinal: As per above.  Endocrine: Negative for cold intolerance and heat intolerance.   Genitourinary: Negative for difficulty urinating, dysuria and frequency.   Musculoskeletal: Chronic arthralgias, back pain and myalgias.   Skin: No new rashes or lesions.  Neurological: Negative for syncope, weakness and headaches.  Chronic tremors.  Hematological: Negative for adenopathy. Does not bruise/bleed easily.   Psychiatric/Behavioral: Negative for confusion. The patient is not nervous/anxious at present.    Objective     Physical Exam   Vital Signs: /68   Pulse 84   Temp 98 °F (36.7 °C) (Temporal)   Resp 16   Ht 160 cm (62.99\")   Wt 100 kg (221 lb 6.4 oz)   LMP  (LMP Unknown)   SpO2 95%   BMI 39.23 kg/m²     General Appearance: alert, oriented x 3, no acute distress.  Skin: warm and dry.    HEENT: Atraumatic.  pupils round and reactive to light and accommodation, oral mucosa pink and moist.  Nares patent without epistaxis.  External auditory canals are patent tympanic membranes intact.  Boggy/inflamed nasal turbinates with mild postnasal drainage, no pustules or exudate.  Serous effusion noted to bilateral tympanic membranes, however mobility is intact on Valsalva and insufflation.  Neck: supple, no JVD, trachea midline.  No thyromegaly  Lungs: CTA, unlabored breathing effort.  Heart: RR, normal S1 and S2, no S3, no rub.  Abdomen: soft, non-tender, no palpable bladder, present bowel sounds to auscultation ×4.  No guarding or rigidity.  Diastases recti noted to the abdomen.  Extremities: no clubbing, cyanosis.  Good range of motion actively and passively.  Symmetric muscle strength and development.  Gravity dependent edema noted to bilateral lower extremities, extending to the mid tibias bilaterally.  Slightly pitting in nature.  Ponce/Sparks sign negative.  Independently ambulatory.  Neuro: normal speech and mental status.  Cranial " nerves II through XII intact.  No anosmia. DTR 2+; proprioception intact.  Significantly improved tremulousness.        Assessment and Plan      Assessment:   Diagnoses and all orders for this visit:    1. Longstanding persistent atrial fibrillation (Primary)  -     Comprehensive Metabolic Panel  -     CBC & Differential    2. Acquired hypothyroidism  -     TSH  -     T4, Free  -     T3, Reverse  -     T3, free    3. Venous insufficiency of both lower extremities  -     Comprehensive Metabolic Panel    4. Chronic diastolic heart failure  -     Comprehensive Metabolic Panel    5. Essential tremor  -     Comprehensive Metabolic Panel  -     CBC & Differential    6. Primary osteoarthritis involving multiple joints    7. Presence of Watchman left atrial appendage closure device        Plan:  Keep scheduled follow-up appointment with cardiology.  Heart rate and blood pressure are at goal today, demonstrates no findings of unstable angina.  No findings of overt volume overload additionally.  Demonstrates appropriate weight loss with effort.  Patient is very proud of her weight loss results thus far.    Continue low-sodium/salt dietary intake, as well as frequent weight evaluations.    Thyroid function studies today, dose adjustment if indicated.    Surveillance CBC/CMP today.        Discussion Summary:    Discussed plan of care in detail with pt today; pt verb understanding and agrees.    I spent 30 minutes caring for Salinas on this date of service. This time includes time spent by me in the following activities:preparing for the visit, obtaining and/or reviewing a separately obtained history, performing a medically appropriate examination and/or evaluation , counseling and educating the patient/family/caregiver, ordering medications, tests, or procedures, documenting information in the medical record, and care coordination    I confirm accuracy of unchanged data/findings which have been carried forward from previous  visit, as well as I have updated appropriately those that have changed.    Follow up:  No follow-ups on file.     There are no Patient Instructions on file for this visit.    Jm Marie DO  07/18/24  13:30 EDT

## 2024-07-22 LAB
ALBUMIN SERPL-MCNC: 4.1 G/DL (ref 3.5–5.2)
ALBUMIN/GLOB SERPL: 1.5 G/DL
ALP SERPL-CCNC: 109 U/L (ref 39–117)
ALT SERPL-CCNC: 25 U/L (ref 1–33)
AST SERPL-CCNC: 22 U/L (ref 1–32)
BASOPHILS # BLD AUTO: 0.03 10*3/MM3 (ref 0–0.2)
BASOPHILS NFR BLD AUTO: 0.3 % (ref 0–1.5)
BILIRUB SERPL-MCNC: 0.2 MG/DL (ref 0–1.2)
BUN SERPL-MCNC: 61 MG/DL (ref 8–23)
BUN/CREAT SERPL: 82.4 (ref 7–25)
CALCIUM SERPL-MCNC: 9.7 MG/DL (ref 8.6–10.5)
CHLORIDE SERPL-SCNC: 109 MMOL/L (ref 98–107)
CO2 SERPL-SCNC: 20.2 MMOL/L (ref 22–29)
CREAT SERPL-MCNC: 0.74 MG/DL (ref 0.57–1)
EGFRCR SERPLBLD CKD-EPI 2021: 84.5 ML/MIN/1.73
EOSINOPHIL # BLD AUTO: 0.17 10*3/MM3 (ref 0–0.4)
EOSINOPHIL NFR BLD AUTO: 1.9 % (ref 0.3–6.2)
ERYTHROCYTE [DISTWIDTH] IN BLOOD BY AUTOMATED COUNT: 13.3 % (ref 12.3–15.4)
GLOBULIN SER CALC-MCNC: 2.8 GM/DL
GLUCOSE SERPL-MCNC: 106 MG/DL (ref 65–99)
HCT VFR BLD AUTO: 45.9 % (ref 34–46.6)
HGB BLD-MCNC: 14.7 G/DL (ref 12–15.9)
IMM GRANULOCYTES # BLD AUTO: 0.04 10*3/MM3 (ref 0–0.05)
IMM GRANULOCYTES NFR BLD AUTO: 0.5 % (ref 0–0.5)
LYMPHOCYTES # BLD AUTO: 1.79 10*3/MM3 (ref 0.7–3.1)
LYMPHOCYTES NFR BLD AUTO: 20.4 % (ref 19.6–45.3)
MCH RBC QN AUTO: 28.3 PG (ref 26.6–33)
MCHC RBC AUTO-ENTMCNC: 32 G/DL (ref 31.5–35.7)
MCV RBC AUTO: 88.3 FL (ref 79–97)
MONOCYTES # BLD AUTO: 0.64 10*3/MM3 (ref 0.1–0.9)
MONOCYTES NFR BLD AUTO: 7.3 % (ref 5–12)
NEUTROPHILS # BLD AUTO: 6.11 10*3/MM3 (ref 1.7–7)
NEUTROPHILS NFR BLD AUTO: 69.6 % (ref 42.7–76)
NRBC BLD AUTO-RTO: 0 /100 WBC (ref 0–0.2)
PLATELET # BLD AUTO: 275 10*3/MM3 (ref 140–450)
POTASSIUM SERPL-SCNC: 4.3 MMOL/L (ref 3.5–5.2)
PROT SERPL-MCNC: 6.9 G/DL (ref 6–8.5)
RBC # BLD AUTO: 5.2 10*6/MM3 (ref 3.77–5.28)
SODIUM SERPL-SCNC: 141 MMOL/L (ref 136–145)
T3FREE SERPL-MCNC: 3.5 PG/ML (ref 2–4.4)
T3REVERSE SERPL-MCNC: 11 NG/DL (ref 9.2–24.1)
T4 FREE SERPL-MCNC: 0.67 NG/DL (ref 0.92–1.68)
TSH SERPL DL<=0.005 MIU/L-ACNC: <0.005 UIU/ML (ref 0.27–4.2)
WBC # BLD AUTO: 8.78 10*3/MM3 (ref 3.4–10.8)

## 2024-08-07 DIAGNOSIS — I50.32 CHRONIC DIASTOLIC HEART FAILURE: ICD-10-CM

## 2024-08-07 DIAGNOSIS — I87.2 VENOUS INSUFFICIENCY OF BOTH LOWER EXTREMITIES: ICD-10-CM

## 2024-08-07 RX ORDER — ACYCLOVIR 400 MG/1
TABLET ORAL
Qty: 30 TABLET | Refills: 2 | Status: SHIPPED | OUTPATIENT
Start: 2024-08-07

## 2024-08-07 RX ORDER — POTASSIUM CHLORIDE 750 MG/1
TABLET, FILM COATED, EXTENDED RELEASE ORAL
Qty: 60 TABLET | Refills: 1 | Status: SHIPPED | OUTPATIENT
Start: 2024-08-07

## 2024-09-13 ENCOUNTER — TELEPHONE (OUTPATIENT)
Dept: CARDIOLOGY | Facility: CLINIC | Age: 74
End: 2024-09-13
Payer: MEDICARE

## 2024-09-13 NOTE — TELEPHONE ENCOUNTER
9-13-24: LMOM ASKING PT TO RETURN CALL TO R/S YEAR FU/DR ASHER IS OUT OF OFFICE ON ORIGINAL DATE OF FU 10-7-24/EE

## 2024-10-11 DIAGNOSIS — K21.9 GASTROESOPHAGEAL REFLUX DISEASE, UNSPECIFIED WHETHER ESOPHAGITIS PRESENT: Chronic | ICD-10-CM

## 2024-10-11 DIAGNOSIS — I50.32 CHRONIC DIASTOLIC HEART FAILURE: ICD-10-CM

## 2024-10-11 DIAGNOSIS — I87.2 VENOUS INSUFFICIENCY OF BOTH LOWER EXTREMITIES: ICD-10-CM

## 2024-10-11 RX ORDER — FAMOTIDINE 20 MG/1
TABLET, FILM COATED ORAL
Qty: 60 TABLET | Refills: 5 | Status: SHIPPED | OUTPATIENT
Start: 2024-10-11

## 2024-10-11 RX ORDER — FUROSEMIDE 20 MG
TABLET ORAL
Qty: 60 TABLET | Refills: 2 | Status: SHIPPED | OUTPATIENT
Start: 2024-10-11

## 2024-10-21 ENCOUNTER — OFFICE VISIT (OUTPATIENT)
Dept: FAMILY MEDICINE CLINIC | Facility: CLINIC | Age: 74
End: 2024-10-21
Payer: MEDICARE

## 2024-10-21 VITALS
HEIGHT: 63 IN | HEART RATE: 85 BPM | WEIGHT: 218 LBS | OXYGEN SATURATION: 95 % | DIASTOLIC BLOOD PRESSURE: 80 MMHG | BODY MASS INDEX: 38.62 KG/M2 | SYSTOLIC BLOOD PRESSURE: 118 MMHG

## 2024-10-21 DIAGNOSIS — I50.32 CHRONIC DIASTOLIC HEART FAILURE: ICD-10-CM

## 2024-10-21 DIAGNOSIS — I48.11 LONGSTANDING PERSISTENT ATRIAL FIBRILLATION: ICD-10-CM

## 2024-10-21 DIAGNOSIS — Z13.820 ENCOUNTER FOR SCREENING FOR OSTEOPOROSIS: ICD-10-CM

## 2024-10-21 DIAGNOSIS — Z12.31 ENCOUNTER FOR SCREENING MAMMOGRAM FOR BREAST CANCER: ICD-10-CM

## 2024-10-21 DIAGNOSIS — I87.2 VENOUS INSUFFICIENCY OF BOTH LOWER EXTREMITIES: ICD-10-CM

## 2024-10-21 DIAGNOSIS — M15.0 PRIMARY OSTEOARTHRITIS INVOLVING MULTIPLE JOINTS: ICD-10-CM

## 2024-10-21 DIAGNOSIS — Z78.0 POSTMENOPAUSAL: ICD-10-CM

## 2024-10-21 DIAGNOSIS — Z23 NEED FOR TDAP VACCINATION: ICD-10-CM

## 2024-10-21 DIAGNOSIS — Z12.11 SCREEN FOR COLON CANCER: ICD-10-CM

## 2024-10-21 DIAGNOSIS — E03.9 ACQUIRED HYPOTHYROIDISM: Chronic | ICD-10-CM

## 2024-10-21 DIAGNOSIS — Z23 NEED FOR INFLUENZA VACCINATION: ICD-10-CM

## 2024-10-21 DIAGNOSIS — Z00.00 MEDICARE ANNUAL WELLNESS VISIT, SUBSEQUENT: Primary | ICD-10-CM

## 2024-10-21 PROCEDURE — G0008 ADMIN INFLUENZA VIRUS VAC: HCPCS | Performed by: FAMILY MEDICINE

## 2024-10-21 PROCEDURE — 90662 IIV NO PRSV INCREASED AG IM: CPT | Performed by: FAMILY MEDICINE

## 2024-10-21 PROCEDURE — 90715 TDAP VACCINE 7 YRS/> IM: CPT | Performed by: FAMILY MEDICINE

## 2024-10-21 PROCEDURE — 90471 IMMUNIZATION ADMIN: CPT | Performed by: FAMILY MEDICINE

## 2024-10-21 PROCEDURE — 1126F AMNT PAIN NOTED NONE PRSNT: CPT | Performed by: FAMILY MEDICINE

## 2024-10-21 PROCEDURE — G0439 PPPS, SUBSEQ VISIT: HCPCS | Performed by: FAMILY MEDICINE

## 2024-10-21 RX ORDER — POTASSIUM CHLORIDE 750 MG/1
TABLET, EXTENDED RELEASE ORAL
Qty: 60 TABLET | Refills: 1 | Status: SHIPPED | OUTPATIENT
Start: 2024-10-21 | End: 2024-10-21 | Stop reason: SDUPTHER

## 2024-10-21 RX ORDER — POTASSIUM CHLORIDE 750 MG/1
10 TABLET, EXTENDED RELEASE ORAL 2 TIMES DAILY
Qty: 180 TABLET | Refills: 2 | Status: SHIPPED | OUTPATIENT
Start: 2024-10-21

## 2024-10-21 NOTE — PATIENT INSTRUCTIONS
Advance Care Planning and Advance Directives     You make decisions on a daily basis - decisions about where you want to live, your career, your home, your life. Perhaps one of the most important decisions you face is your choice for future medical care. Take time to talk with your family and your healthcare team and start planning today.  Advance Care Planning is a process that can help you:  Understand possible future healthcare decisions in light of your own experiences  Reflect on those decision in light of your goals and values  Discuss your decisions with those closest to you and the healthcare professionals that care for you  Make a plan by creating a document that reflects your wishes    Surrogate Decision Maker  In the event of a medical emergency, which has left you unable to communicate or to make your own decisions, you would need someone to make decisions for you.  It is important to discuss your preferences for medical treatment with this person while you are in good health.     Qualities of a surrogate decision maker:  Willing to take on this role and responsibility  Knows what you want for future medical care  Willing to follow your wishes even if they don't agree with them  Able to make difficult medical decisions under stressful circumstances    Advance Directives  These are legal documents you can create that will guide your healthcare team and decision maker(s) when needed. These documents can be stored in the electronic medical record.    Living Will - a legal document to guide your care if you have a terminal condition or a serious illness and are unable to communicate. States vary by statute in document names/types, but most forms may include one or more of the following:        -  Directions regarding life-prolonging treatments        -  Directions regarding artificially provided nutrition/hydration        -  Choosing a healthcare decision maker        -  Direction regarding organ/tissue  donation    Durable Power of  for Healthcare - this document names an -in-fact to make medical decisions for you, but it may also allow this person to make personal and financial decisions for you. Please seek the advice of an  if you need this type of document.    **Advance Directives are not required and no one may discriminate against you if you do not sign one.    Medical Orders  Many states allow specific forms/orders signed by your physician to record your wishes for medical treatment in your current state of health. This form, signed in personal communication with your physician, addresses resuscitation and other medical interventions that you may or may not want.      For more information or to schedule a time with a [facility] Advance Care Planning Facilitator contact: [facility] or call [phone] and someone will contact you directly.    Medicare Wellness  Personal Prevention Plan of Service     Date of Office Visit:    Encounter Provider:  Jm Marie DO  Place of Service:  [facility]  Patient Name: Salinas Monroy  :  1950    As part of the Medicare Wellness portion of your visit today, we are providing you with this personalized preventive plan of services (PPPS). This plan is based upon recommendations of the [place] Preventive Services Task Force (USPSTF) and the Advisory Committee on Immunization Practices (ACIP).    This lists the preventive care services that should be considered, and provides dates of when you are due. Items listed as completed are up-to-date and do not require any further intervention.    Health Maintenance   Topic Date Due   • ZOSTER VACCINE (1 of 2) Never done   • DXA SCAN  02/10/2021   • MAMMOGRAM  2022   • ANNUAL WELLNESS VISIT  2024   • LIPID PANEL  2024   • COVID-19 Vaccine (3 - 2023-24 season) 2024   • BMI FOLLOWUP  2024   • RSV Vaccine -  Adults (1 - 1-dose 75+ series) 03/08/2025 (Originally 1/18/2024)   • COLORECTAL CANCER SCREENING  06/05/2028   • TDAP/TD VACCINES (2 - Td or Tdap) 10/21/2034   • HEPATITIS C SCREENING  Completed   • INFLUENZA VACCINE  Completed   • Pneumococcal Vaccine 65+  Completed   • LUNG CANCER SCREENING  Discontinued       Orders Placed This Encounter   Procedures   • DEXA Bone Density, Axial (Hospital)     Standing Status:   Future     Standing Expiration Date:   10/21/2025     Order Specific Question:   Is patient taking or have taken long term Glucocorticoid (steroids)?     Answer:   No     Order Specific Question:   Does the patient have rheumatoid arthritis?     Answer:   No     Order Specific Question:   Does the patient have secondary osteoporosis?     Answer:   No     Order Specific Question:   Reason for Exam:     Answer:   postmenopausal/screening osteoporosis     Order Specific Question:   Release to patient     Answer:   Routine Release [[id number]]   • Mammo Screening, Bilateral with CAD (Annually)     Use HCPCS/CPT Code 33300     Standing Status:   Future     Standing Expiration Date:   10/21/2025     Order Specific Question:   Reason for Exam:     Answer:   screen for breast cancer     Order Specific Question:   Release to patient     Answer:   Routine Release [[id number]]   • Tdap Vaccine => 8yo IM (BOOSTRIX/ADACEL)   • Fluzone High-Dose 65+yrs ([id number])       No follow-ups on file.

## 2024-10-21 NOTE — PROGRESS NOTES
Subjective   The ABCs of the Annual Wellness Visit  Medicare Wellness Visit      Salinas Monroy is a 74 y.o. patient who presents for a Medicare Wellness Visit.    The following portions of the patient's history were reviewed and   updated as appropriate: allergies, current medications, past family history, past medical history, past social history, past surgical history, and problem list.    Compared to one year ago, the patient's physical   health is the same.  Compared to one year ago, the patient's mental   health is the same.    Recent Hospitalizations:  This patient has had a [facility] admission record on file within the last 365 days.  Current Medical Providers:  Patient Care Team:  Jm Marie DO as PCP - General (Family Medicine)    Outpatient Medications Prior to Visit   Medication Sig Dispense Refill    acyclovir (ZOVIRAX) 400 MG tablet TAKE ONE TABLET BY MOUTH EVERY DAY 30 tablet 2    allopurinol (ZYLOPRIM) 100 MG tablet Take 2 tablets by mouth Daily. 180 tablet 2    ASHWAGANDHA PO Take  by mouth Daily.      aspirin 81 MG EC tablet Take 1 tablet by mouth Daily. 90 tablet 3    B Complex Vitamins (VITAMIN B COMPLEX PO) Take 1 tablet by mouth Daily.      B-12, Methylcobalamin, 1000 MCG sublingual tablet Every Other Day.      buPROPion SR (WELLBUTRIN SR) 150 MG 12 hr tablet Take 1 tablet by mouth Every Morning. 180 tablet 2    Cholecalciferol (VITAMIN D3) 50206 UNITS tablet Take 7,500 Units by mouth Daily.      Coenzyme Q10 200 MG capsule Take 1 capsule by mouth 2 (Two) Times a Day.      Digestive Enzymes (DIGESTIVE ENZYME PO) Take 1 tablet by mouth Daily.      dofetilide (TIKOSYN) 250 MCG capsule Take 1 capsule by mouth Every 12 (Twelve) Hours. 60 capsule 6    escitalopram (LEXAPRO) 10 MG tablet TAKE ONE TABLET BY MOUTH EVERY NIGHT 90 tablet 1    famotidine (PEPCID) 20 MG tablet TAKE ONE TABLET BY MOUTH TWICE DAILY 60 tablet 5    ferrous gluconate (FERGON) 324 MG tablet Take 1 tablet  by mouth Daily With Breakfast. (Patient taking differently: Take 1 tablet by mouth Every Night.) 30 tablet 5    furosemide (LASIX) 20 MG tablet TAKE ONE TABLET BY MOUTH TWICE DAILY 60 tablet 2    levothyroxine (SYNTHROID, LEVOTHROID) 75 MCG tablet TAKE ONE TABLET BY MOUTH EVERY DAY 90 tablet 1    liothyronine (CYTOMEL) 50 MCG tablet TAKE TWO TABLETS BY MOUTH DAILY 60 tablet 3    MAGNESIUM PO Take 1,000 mg by mouth Every Night.      Methylsulfonylmethane (MSM PO) Take 1 tablet by mouth Daily.      Misc Natural Products (TART CHERRY ADVANCED PO) Take 2 capsules by mouth Daily.      Omega-3 Fatty Acids (OMEGA-3 FISH OIL PO) Take 1,000 Units by mouth 3 (Three) Times a Day.      Probiotic Product (PROBIOTIC PO) Take 1 tablet by mouth Daily.      TURMERIC PO Take 1 tablet by mouth 2 (two) times a day.      VITAMIN A PO Take 1 capsule by mouth Daily.      vitamin C (ASCORBIC ACID) 500 MG tablet Take 21 mg by mouth 3 (Three) Times a Day As Needed.      vitamin E 400 UNIT capsule Take 1 capsule by mouth Daily.      VITAMIN K PO Take 100 mcg by mouth Daily.      Zinc 30 MG tablet Take  by mouth Daily.      potassium chloride 10 MEQ CR tablet TAKE ONE TABLET BY MOUTH TWICE DAILY 60 tablet 1    Diclofenac Sodium (VOLTAREN) 1 % gel gel Apply 2 gm of gel topically to the appropriate area as directed twice a day. (to feet) 150 g 0     No facility-administered medications prior to visit.     No opioid medication identified on active medication list. I have reviewed chart for other potential  high risk medication/s and harmful drug interactions in the elderly.      Aspirin is on active medication list. Aspirin use is indicated based on review of current medical condition/s. Pros and cons of this therapy have been discussed today. Benefits of this medication outweigh potential harm.  Patient has been encouraged to continue taking this medication.  .      Patient Active Problem List   Diagnosis    Hashimoto's thyroiditis    Essential  tremor    Vitamin D deficiency    Primary osteoarthritis involving multiple joints    Chronic idiopathic gout involving toe of right foot without tophus    Obsessive-compulsive disorder    Postsurgical menopause    Colon cancer screening    Personal history of colonic polyps    Left ventricular hypertrophy    Chronic diastolic heart failure    Acquired hypothyroidism    ISABEL on CPAP    PD (Parkinson's disease)    Morbid obesity with body mass index (BMI) of 40.0 to 44.9 in adult    Iron deficiency    Bipolar disorder, current episode mixed, moderate    Reactive airway disease without complication    Gastroesophageal reflux disease    Burping    Epigastric pain    Irritable bowel syndrome with diarrhea    Arthralgia of both feet    Venous insufficiency of both lower extremities    Cataracta    Longstanding persistent atrial fibrillation    Ventral hernia without obstruction or gangrene    Diastasis recti    Falls frequently    PAF (paroxysmal atrial fibrillation)    Presence of Watchman left atrial appendage closure device     Advance Care Planning Advance Directive is not on file.  ACP discussion was held with the patient during this visit. Patient does not have an advance directive, information provided.      Review of Systems  Constitutional: Negative for fever. Negative for chills, diaphoresis, fatigue and unexpected weight change.   HENT: No dysphagia; no changes to vision/hearing/smell/taste; no epistaxis.  Otherwise, as per above.  Eyes: Negative for redness and visual disturbance.   Respiratory: negative for shortness of breath. Negative for chest pain . Negative for cough and chest tightness.   Cardiovascular: Negative for chest pain and palpitations.   Gastrointestinal: As per above.  Endocrine: Negative for cold intolerance and heat intolerance.   Genitourinary: Negative for difficulty urinating, dysuria and frequency.   Musculoskeletal: Chronic arthralgias, back pain and myalgias.   Skin: No new rashes or  "lesions.  Neurological: Negative for syncope, weakness and headaches.  Chronic tremors.  Hematological: Negative for adenopathy. Does not bruise/bleed easily.   Psychiatric/Behavioral: Negative for confusion. The patient is not nervous/anxious at present.      Objective   Vitals:    10/21/24 1309   BP: 118/80   Pulse: 85   SpO2: 95%   Weight: 98.9 kg (218 lb)   Height: 160 cm (63\")   PainSc: 0-No pain     General Appearance: alert, oriented x 3, no acute distress.  Skin: warm and dry.    HEENT: Atraumatic.  pupils round and reactive to light and accommodation, oral mucosa pink and moist.  Nares patent without epistaxis.  External auditory canals are patent tympanic membranes intact.  Boggy/inflamed nasal turbinates with mild postnasal drainage, no pustules or exudate.  Serous effusion noted to bilateral tympanic membranes, however mobility is intact on Valsalva and insufflation.  Neck: supple, no JVD, trachea midline.  No thyromegaly  Lungs: CTA, unlabored breathing effort.  Heart: RR, normal S1 and S2, no S3, no rub.  Abdomen: soft, non-tender, no palpable bladder, present bowel sounds to auscultation ×4.  No guarding or rigidity.  Diastases recti noted to the abdomen.  Extremities: no clubbing, cyanosis.  Good range of motion actively and passively.  Symmetric muscle strength and development.  Gravity dependent edema noted to bilateral lower extremities, extending to the mid tibias bilaterally.  Slightly pitting in nature.  Ponce/Sparks sign negative.  Independently ambulatory.  Neuro: normal speech and mental status.  Cranial nerves II through XII intact.  No anosmia. DTR 2+; proprioception intact.  Significantly improved tremulousness.    Estimated body mass index is 38.62 kg/m² as calculated from the following:    Height as of this encounter: 160 cm (63\").    Weight as of this encounter: 98.9 kg (218 lb).    Class 2 Severe Obesity (BMI >=35 and <=39.9). Obesity-related health conditions include the following: " hypertension, GERD, osteoarthritis, and lower extremity venous stasis disease. Obesity is improving with lifestyle modifications. BMI is is above average; BMI management plan is completed. We discussed portion control and increasing exercise.       Does the patient have evidence of cognitive impairment? No                                                                                               Health  Risk Assessment    Smoking Status:  Social History     Tobacco Use   Smoking Status Former    Current packs/day: 0.00    Average packs/day: 1 pack/day for 41.0 years (41.0 ttl pk-yrs)    Types: Cigarettes    Start date: 1967    Quit date: 2008    Years since quittin.8    Passive exposure: Past   Smokeless Tobacco Never     Alcohol Consumption:  Social History     Substance and Sexual Activity   Alcohol Use Not Currently    Comment: very rarely       Fall Risk Screen  STEADI Fall Risk Assessment was completed, and patient is at LOW risk for falls.Assessment completed on:10/21/2024    Depression Screening:      10/21/2024     1:12 PM   PHQ-2/PHQ-9 Depression Screening   Little interest or pleasure in doing things Not at all   Feeling down, depressed, or hopeless Not at all     Health Habits and Functional and Cognitive Screening:      10/21/2024     1:10 PM   Functional & Cognitive Status   Do you have difficulty preparing food and eating? No   Do you have difficulty bathing yourself, getting dressed or grooming yourself? No   Do you have difficulty using the toilet? No   Do you have difficulty moving around from place to place? No   Do you have trouble with steps or getting out of a bed or a chair? Yes   Current Diet Well Balanced Diet   Dental Exam Up to date   Eye Exam Up to date   Exercise (times per week) 0 times per week   Current Exercises Include No Regular Exercise   Do you need help using the phone?  No   Are you deaf or do you have serious difficulty hearing?  No   Do you need help to go to  places out of walking distance? No   Do you need help shopping? No   Do you need help preparing meals?  No   Do you need help with housework?  No   Do you need help with laundry? No   Do you need help taking your medications? No   Do you need help managing money? No   Do you ever drive or ride in a car without wearing a seat belt? No   Have you felt unusual stress, anger or loneliness in the last month? No   Who do you live with? Alone   If you need help, do you have trouble finding someone available to you? No   Have you been bothered in the last four weeks by sexual problems? No   Do you have difficulty concentrating, remembering or making decisions? No           Age-appropriate Screening Schedule:  Refer to the list below for future screening recommendations based on patient's age, sex and/or medical conditions. Orders for these recommended tests are listed in the plan section. The patient has been provided with a written plan.    Health Maintenance List  Health Maintenance   Topic Date Due    ZOSTER VACCINE (1 of 2) Never done    DXA SCAN  02/10/2021    MAMMOGRAM  09/08/2022    ANNUAL WELLNESS VISIT  06/26/2024    LIPID PANEL  06/26/2024    COVID-19 Vaccine (3 - 2023-24 season) 09/01/2024    BMI FOLLOWUP  09/18/2024    RSV Vaccine - Adults (1 - 1-dose 75+ series) 03/08/2025 (Originally 1/18/2024)    COLORECTAL CANCER SCREENING  06/05/2028    TDAP/TD VACCINES (2 - Td or Tdap) 10/21/2034    HEPATITIS C SCREENING  Completed    INFLUENZA VACCINE  Completed    Pneumococcal Vaccine 65+  Completed    LUNG CANCER SCREENING  Discontinued                                                                                                                                                CMS Preventative Services Quick Reference  Risk Factors Identified During Encounter  None Identified    The above risks/problems have been discussed with the patient.  Pertinent information has been shared with the patient in the After Visit  Summary.  An After Visit Summary and PPPS were made available to the patient.    Follow Up:  Next Medicare Wellness visit to be scheduled in 1 year.     Assessment & Plan  Medicare annual wellness visit, subsequent    Need for influenza vaccination    Postmenopausal    Need for Tdap vaccination    Encounter for screening for osteoporosis    Encounter for screening mammogram for breast cancer    Screen for colon cancer    Chronic diastolic heart failure    Venous insufficiency of both lower extremities    Acquired hypothyroidism    Primary osteoarthritis involving multiple joints    Longstanding persistent atrial fibrillation      Orders Placed This Encounter   Procedures    DEXA Bone Density, Axial (Hospital)    Mammo Screening, Bilateral with CAD (Annually)    Mammo Screening Digital Tomosynthesis Bilateral With CAD    Tdap Vaccine => 6yo IM (BOOSTRIX/ADACEL)    Fluzone High-Dose 65+yrs ([id number])    Comprehensive Metabolic Panel    TSH    T4, Free    T3, free    T3, Reverse    Amb Referral for Screening Colonoscopy    CBC w AUTO Differential     New Medications Ordered This Visit   Medications    potassium chloride 10 MEQ CR tablet     Sig: Take 1 tablet by mouth 2 (Two) Times a Day.     Dispense:  180 tablet     Refill:  2          Follow Up:  No follow-ups on file.    I have discussed age/gender specific preventative healthcare issues in detail with patient today.  I have answered all of the questions.        I confirm accuracy of unchanged data/findings which have been carried forward from previous visit, as well as I have updated appropriately those that have changed.

## 2024-10-24 LAB
ALBUMIN SERPL-MCNC: 4 G/DL (ref 3.5–5.2)
ALBUMIN/GLOB SERPL: 1.5 G/DL
ALP SERPL-CCNC: 116 U/L (ref 39–117)
ALT SERPL-CCNC: 24 U/L (ref 1–33)
AST SERPL-CCNC: 22 U/L (ref 1–32)
BASOPHILS # BLD AUTO: 0.02 10*3/MM3 (ref 0–0.2)
BASOPHILS NFR BLD AUTO: 0.2 % (ref 0–1.5)
BILIRUB SERPL-MCNC: 0.3 MG/DL (ref 0–1.2)
BUN SERPL-MCNC: 44 MG/DL (ref 8–23)
BUN/CREAT SERPL: 57.1 (ref 7–25)
CALCIUM SERPL-MCNC: 9.8 MG/DL (ref 8.6–10.5)
CHLORIDE SERPL-SCNC: 106 MMOL/L (ref 98–107)
CO2 SERPL-SCNC: 21.2 MMOL/L (ref 22–29)
CREAT SERPL-MCNC: 0.77 MG/DL (ref 0.57–1)
EGFRCR SERPLBLD CKD-EPI 2021: 80.6 ML/MIN/1.73
EOSINOPHIL # BLD AUTO: 0.12 10*3/MM3 (ref 0–0.4)
EOSINOPHIL NFR BLD AUTO: 1.1 % (ref 0.3–6.2)
ERYTHROCYTE [DISTWIDTH] IN BLOOD BY AUTOMATED COUNT: 12.8 % (ref 12.3–15.4)
GLOBULIN SER CALC-MCNC: 2.6 GM/DL
GLUCOSE SERPL-MCNC: 98 MG/DL (ref 65–99)
HCT VFR BLD AUTO: 43.1 % (ref 34–46.6)
HGB BLD-MCNC: 14.2 G/DL (ref 12–15.9)
IMM GRANULOCYTES # BLD AUTO: 0.05 10*3/MM3 (ref 0–0.05)
IMM GRANULOCYTES NFR BLD AUTO: 0.5 % (ref 0–0.5)
LYMPHOCYTES # BLD AUTO: 2.08 10*3/MM3 (ref 0.7–3.1)
LYMPHOCYTES NFR BLD AUTO: 19.8 % (ref 19.6–45.3)
MCH RBC QN AUTO: 29 PG (ref 26.6–33)
MCHC RBC AUTO-ENTMCNC: 32.9 G/DL (ref 31.5–35.7)
MCV RBC AUTO: 88.1 FL (ref 79–97)
MONOCYTES # BLD AUTO: 0.82 10*3/MM3 (ref 0.1–0.9)
MONOCYTES NFR BLD AUTO: 7.8 % (ref 5–12)
NEUTROPHILS # BLD AUTO: 7.4 10*3/MM3 (ref 1.7–7)
NEUTROPHILS NFR BLD AUTO: 70.6 % (ref 42.7–76)
NRBC BLD AUTO-RTO: 0 /100 WBC (ref 0–0.2)
PLATELET # BLD AUTO: 267 10*3/MM3 (ref 140–450)
POTASSIUM SERPL-SCNC: 4.3 MMOL/L (ref 3.5–5.2)
PROT SERPL-MCNC: 6.6 G/DL (ref 6–8.5)
RBC # BLD AUTO: 4.89 10*6/MM3 (ref 3.77–5.28)
SODIUM SERPL-SCNC: 139 MMOL/L (ref 136–145)
T3FREE SERPL-MCNC: 3 PG/ML (ref 2–4.4)
T3REVERSE SERPL-MCNC: 9.5 NG/DL (ref 9.2–24.1)
T4 FREE SERPL-MCNC: 0.61 NG/DL (ref 0.92–1.68)
TSH SERPL DL<=0.005 MIU/L-ACNC: <0.005 UIU/ML (ref 0.27–4.2)
WBC # BLD AUTO: 10.49 10*3/MM3 (ref 3.4–10.8)

## 2024-10-25 ENCOUNTER — TELEPHONE (OUTPATIENT)
Dept: CARDIOLOGY | Facility: CLINIC | Age: 74
End: 2024-10-25
Payer: MEDICARE

## 2024-10-25 NOTE — TELEPHONE ENCOUNTER
"Caller: Salinas Monroy \"Del\"    Relationship: Self    Best call back number: [phone]     What is the best time to reach you: ANYTIME     Who are you requesting to speak with (clinical staff, provider,  specific staff member): ANITA BRONSON NURSE     What was the call regarding: PATIENT GETS MEDICATION FROM THE COMPANY AND WAS TOLD TO CALL ANITA TO INFORM HER WHEN SHE WAS STARTING ON HER THIRD BOTTLE. PATIENT WOULD LIKE FOR THIS MEDICATION TO GO AHEAD AND BE ORDERED.     Is it okay if the provider responds through MyChart: PREFERS A CALL     "

## 2024-10-25 NOTE — TELEPHONE ENCOUNTER
I re-ordered her Tikosyn through the [employer] patient assistance program.     Patient's ID number is [id number].     Her Tikosyn should arrive to our office in 7-10 business days.     Patient notified and aware.

## 2024-10-30 ENCOUNTER — OFFICE VISIT (OUTPATIENT)
Dept: PULMONOLOGY | Facility: CLINIC | Age: 74
End: 2024-10-30
Payer: MEDICARE

## 2024-10-30 VITALS
DIASTOLIC BLOOD PRESSURE: 72 MMHG | BODY MASS INDEX: 38.62 KG/M2 | OXYGEN SATURATION: 94 % | SYSTOLIC BLOOD PRESSURE: 124 MMHG | WEIGHT: 218 LBS | HEIGHT: 63 IN | HEART RATE: 88 BPM | RESPIRATION RATE: 14 BRPM

## 2024-10-30 DIAGNOSIS — E66.9 OBESITY (BMI 30-39.9): ICD-10-CM

## 2024-10-30 DIAGNOSIS — G47.33 OSA (OBSTRUCTIVE SLEEP APNEA): Primary | ICD-10-CM

## 2024-10-30 PROCEDURE — 99212 OFFICE O/P EST SF 10 MIN: CPT | Performed by: NURSE PRACTITIONER

## 2024-10-30 NOTE — PROGRESS NOTES
"Chief Complaint   Patient presents with    Sleeping Problem    Follow-up         Subjective   Salinas Monroy is a 74 y.o. female.   Patient comes back today for follow up of Obstructive Sleep apnea.     Patient says that she is compliant with her device and using it regularly.    Patient's symptoms of sleep disturbance and daytime sleepiness have been helped greatly with the use of PAP device, as prescribed.  She feels rested most days upon awakening.     She uses nasal pillows and her mask was changed and she has some issues using the new mask.       The following portions of the patient's history were reviewed and updated as appropriate: allergies, current medications, past family history, past medical history, past social history, and past surgical history.      Review of Systems   HENT:  Negative for sinus pressure, sneezing and sore throat.    Respiratory:  Negative for cough, chest tightness, shortness of breath and wheezing.        Objective   Visit Vitals  /72   Pulse 88   Resp 14   Ht 160 cm (63\") Comment: pt reported   Wt 98.9 kg (218 lb)   LMP  (LMP Unknown)   SpO2 94%   BMI 38.62 kg/m²       Physical Exam  Vitals reviewed.   Constitutional:       Appearance: She is well-developed.   HENT:      Head: Atraumatic.      Mouth/Throat:      Mouth: Mucous membranes are moist.      Comments: Crowded oropharynx.   Eyes:      Extraocular Movements: Extraocular movements intact.   Cardiovascular:      Rate and Rhythm: Normal rate and regular rhythm.   Abdominal:      Comments: Obese abdomen.    Skin:     General: Skin is warm.   Neurological:      Mental Status: She is alert and oriented to person, place, and time.           Assessment & Plan   Diagnoses and all orders for this visit:    1. ISABEL (obstructive sleep apnea) (Primary)  -     PAP Therapy    2. Obesity (BMI 30-39.9)           Return in about 13 months (around 11/30/2025) for Recheck, For Me, Sleep ONLY.    DISCUSSION (if any):  Sleep study " performed in June 2023  AHI was 18 / hour.      Latest PAP device provided in July 2023.  DME company: [employer]     Current PAP settings: 6-16  Current mask type: Nasal pillows/cushions.    Continue treatment with AutoPAP at a pressure of 6-16, with a nasal mask.    Patient's compliance data was reviewed and the compliance is 100%.    Humidification setup, hose and mask care discussed.    Weight loss advised.    Use every night for at least 4 hours stressed.       Dictated utilizing Dragon dictation.    This document was electronically signed by VALENTINE Phelps October 30, 2024  13:57 EDT

## 2024-11-08 NOTE — PROGRESS NOTES
[facility] Cardiology Office Follow Up Note    Salinas Monroy  [id number]  10/09/2024    Primary Care Provider: Jm Marie DO   Referring Provider: No ref. provider found    Chief Complaint: Routine follow-up.    History of Present Illness:   Mrs. Salinas Monroy is a 74 y.o. female who presents to the Cardiology Clinic for routine follow-up.  The patient has a past medical history of persistent atrial fibrillation (, s/p Watchman device), HFpEF, thyroid disease, and obesity.  She presents today for routines follow-up.  The patient reports feeling well today.  She specifically denies chest pain and dyspnea.  She denies palpitations, dizziness, and syncope.  She denies orthopnea and lower extremity edema.  She continues to take her medications as prescribed without perceived side effects.  She offers no other complaint or concerns at this time.     Past Cardiac Testin. Last Coronary Angio: None  2. Prior Stress Testing: MPS      Normal pharmacologic MPS with no evidence of inducible ischemia.   Hyperdynamic LV systolic function, LVEF 71%.   3. Last Echo: 2024    Left ventricular systolic function is normal. Left ventricular ejection fraction appears to be 56 - 60%.    The left atrial cavity is dilated.    There is a well positioned 24 mm Watchman FLX left atrial appendage occlusion device. There is no violet-prosthetic leak present.    Mild mitral valve regurgitation is present.    Borderline dilation of the ascending aorta is present.  4.  Holter monitor: 2024   No significant arrhythmia overall. 4% PAC burden, very brief SVT runs. Patient triggered events associated with isolated ectopy. Of note, the monitor had some issues at day 7. The patient called the device company and they were not able to help her get it to return to normal function. At that point the patient returned the device.     Review of Systems:   Review of Systems  As Noted  in HPI.   I have reviewed and confirmed the accuracy of the ROS as documented by the MA/LPN/RN Valentina Hodgson RN    I have reviewed and/or updated the patient's past medical, past surgical, family, social history, problem list and allergies as appropriate.     Medications:     Current Outpatient Medications:     acyclovir (ZOVIRAX) 400 MG tablet, TAKE ONE TABLET BY MOUTH EVERY DAY, Disp: 30 tablet, Rfl: 2    allopurinol (ZYLOPRIM) 100 MG tablet, Take 2 tablets by mouth Daily., Disp: 180 tablet, Rfl: 2    ASHWAGANDHA PO, Take  by mouth Daily., Disp: , Rfl:     aspirin 81 MG EC tablet, Take 1 tablet by mouth Daily., Disp: 90 tablet, Rfl: 3    B Complex Vitamins (VITAMIN B COMPLEX PO), Take 1 tablet by mouth Daily., Disp: , Rfl:     B-12, Methylcobalamin, 1000 MCG sublingual tablet, Every Other Day., Disp: , Rfl:     buPROPion SR (WELLBUTRIN SR) 150 MG 12 hr tablet, Take 1 tablet by mouth Every Morning., Disp: 180 tablet, Rfl: 2    Cholecalciferol (VITAMIN D3) 32483 UNITS tablet, Take 7,500 Units by mouth Daily., Disp: , Rfl:     Coenzyme Q10 200 MG capsule, Take 1 capsule by mouth 2 (Two) Times a Day., Disp: , Rfl:     Digestive Enzymes (DIGESTIVE ENZYME PO), Take 1 tablet by mouth Daily., Disp: , Rfl:     dofetilide (TIKOSYN) 250 MCG capsule, Take 1 capsule by mouth Every 12 (Twelve) Hours., Disp: 60 capsule, Rfl: 6    escitalopram (LEXAPRO) 10 MG tablet, TAKE ONE TABLET BY MOUTH EVERY NIGHT, Disp: 90 tablet, Rfl: 1    famotidine (PEPCID) 20 MG tablet, TAKE ONE TABLET BY MOUTH TWICE DAILY, Disp: 60 tablet, Rfl: 5    ferrous gluconate (FERGON) 324 MG tablet, Take 1 tablet by mouth Daily With Breakfast. (Patient taking differently: Take 1 tablet by mouth Every Night.), Disp: 30 tablet, Rfl: 5    furosemide (LASIX) 20 MG tablet, TAKE ONE TABLET BY MOUTH TWICE DAILY, Disp: 60 tablet, Rfl: 2    levothyroxine (SYNTHROID, LEVOTHROID) 75 MCG tablet, TAKE ONE TABLET BY MOUTH EVERY DAY, Disp: 90 tablet, Rfl: 1     "liothyronine (CYTOMEL) 50 MCG tablet, TAKE TWO TABLETS BY MOUTH DAILY, Disp: 60 tablet, Rfl: 3    MAGNESIUM PO, Take 1,000 mg by mouth Every Night., Disp: , Rfl:     Methylsulfonylmethane (MSM PO), Take 1 tablet by mouth Daily., Disp: , Rfl:     Misc Natural Products (TART CHERRY ADVANCED PO), Take 2 capsules by mouth Daily., Disp: , Rfl:     Omega-3 Fatty Acids (OMEGA-3 FISH OIL PO), Take 1,000 Units by mouth 3 (Three) Times a Day., Disp: , Rfl:     potassium chloride 10 MEQ CR tablet, Take 1 tablet by mouth 2 (Two) Times a Day., Disp: 180 tablet, Rfl: 2    Probiotic Product (PROBIOTIC PO), Take 1 tablet by mouth Daily., Disp: , Rfl:     TURMERIC PO, Take 1 tablet by mouth 2 (two) times a day., Disp: , Rfl:     VITAMIN A PO, Take 1 capsule by mouth Daily., Disp: , Rfl:     vitamin C (ASCORBIC ACID) 500 MG tablet, Take 21 mg by mouth 3 (Three) Times a Day As Needed., Disp: , Rfl:     vitamin E 400 UNIT capsule, Take 1 capsule by mouth Daily., Disp: , Rfl:     VITAMIN K PO, Take 100 mcg by mouth Daily., Disp: , Rfl:     Zinc 30 MG tablet, Take  by mouth Daily., Disp: , Rfl:     Physical Exam:  Vital Signs:   Vitals:    11/12/24 1408   BP: 126/74   BP Location: Left arm   Patient Position: Sitting   Cuff Size: Adult   Pulse: 88   SpO2: 97%   Weight: 100 kg (220 lb 6.4 oz)   Height: 160 cm (63\")     Body mass index is 39.04 kg/m².    Physical Exam  Vitals and nursing note reviewed.   Constitutional:       General: She is not in acute distress.     Appearance: She is obese.   HENT:      Head: Normocephalic and atraumatic.   Neck:      Trachea: Trachea normal.   Cardiovascular:      Rate and Rhythm: Normal rate and regular rhythm.      Pulses: Normal pulses.      Heart sounds: Normal heart sounds. No murmur heard.     No friction rub. No gallop.   Pulmonary:      Effort: Pulmonary effort is normal.      Breath sounds: Normal breath sounds.   Musculoskeletal:      Cervical back: Neck supple.      Right lower leg: No " edema.      Left lower leg: No edema.   Skin:     General: Skin is warm and dry.   Neurological:      Mental Status: She is alert and oriented to person, place, and time.   Psychiatric:         Mood and Affect: Mood normal.         Behavior: Behavior normal. Behavior is cooperative.         Thought Content: Thought content does not include suicidal ideation.         Results Review:   I reviewed the patient's new clinical results.      ECG 12 Lead    Date/Time: 11/12/2024 9:59 AM  Performed by: Yvonne Christian APRN    Authorized by: Yvonne Christian APRN  Rhythm: sinus rhythm  Ectopy: atrial premature contractions  Rate: normal  BPM: 80  QRS axis: left    Clinical impression: abnormal EKG          Assessment / Plan:     Persistent atrial fibrillation  Asymptomatic  12/23, s/p Watchman Device  Continue Tikosyn per electrophysiology  Patient is maintained on daily aspirin per electrophysiology    2. HFpEF  Euvolemic on exam      Preventative Cardiology:   Tobacco Cessation: N/A   Advance Care Planning: ACP discussion was declined by the patient. Patient does not have an advance directive, declines further assistance.     Follow Up:   Return in about 6 months (around 5/12/2025) for Follow-up with Dr. Pires.      Thank you for allowing me to participate in the care of your patient. Please do not hesitate to contact me with additional questions or concerns.     VALENTINE Dela Cruz

## 2024-11-12 ENCOUNTER — TELEPHONE (OUTPATIENT)
Dept: FAMILY MEDICINE CLINIC | Facility: CLINIC | Age: 74
End: 2024-11-12

## 2024-11-12 ENCOUNTER — OFFICE VISIT (OUTPATIENT)
Dept: CARDIOLOGY | Facility: CLINIC | Age: 74
End: 2024-11-12
Payer: MEDICARE

## 2024-11-12 VITALS
DIASTOLIC BLOOD PRESSURE: 74 MMHG | OXYGEN SATURATION: 97 % | HEART RATE: 88 BPM | WEIGHT: 220.4 LBS | SYSTOLIC BLOOD PRESSURE: 126 MMHG | BODY MASS INDEX: 39.05 KG/M2 | HEIGHT: 63 IN

## 2024-11-12 DIAGNOSIS — I48.0 PAF (PAROXYSMAL ATRIAL FIBRILLATION): Primary | ICD-10-CM

## 2024-11-12 DIAGNOSIS — I50.32 CHRONIC DIASTOLIC HEART FAILURE: ICD-10-CM

## 2024-11-12 PROCEDURE — 93000 ELECTROCARDIOGRAM COMPLETE: CPT | Performed by: NURSE PRACTITIONER

## 2024-11-12 PROCEDURE — 99214 OFFICE O/P EST MOD 30 MIN: CPT | Performed by: NURSE PRACTITIONER

## 2024-11-12 PROCEDURE — 1159F MED LIST DOCD IN RCRD: CPT | Performed by: NURSE PRACTITIONER

## 2024-11-12 PROCEDURE — 1160F RVW MEDS BY RX/DR IN RCRD: CPT | Performed by: NURSE PRACTITIONER

## 2024-11-12 NOTE — TELEPHONE ENCOUNTER
"    Caller: Salinas Monroy \"Del\"     Relationship: [unfilled]     Best call back number: [phone]    What is your medical concern? PT STATED THAT SHE NOW HAS A VEHICLE AND WILL LIKE TO KNOW HOW TO GO ABOUT GETTING A HANDICAP STICKER.  "

## 2024-11-13 DIAGNOSIS — E03.9 HYPOTHYROIDISM, ADULT: ICD-10-CM

## 2024-11-13 RX ORDER — LIOTHYRONINE SODIUM 50 UG/1
100 TABLET ORAL DAILY
Qty: 60 TABLET | Refills: 3 | Status: SHIPPED | OUTPATIENT
Start: 2024-11-13

## 2024-11-15 ENCOUNTER — TELEPHONE (OUTPATIENT)
Dept: CARDIOLOGY | Facility: CLINIC | Age: 74
End: 2024-11-15
Payer: MEDICARE

## 2024-11-15 NOTE — TELEPHONE ENCOUNTER
"    Caller: Dale Monroysummer Eatonye \"Del\"    Relationship: Self    Best call back number:[phone]     Requested Prescriptions:   Requested Prescriptions     Pending Prescriptions Disp Refills    dofetilide (TIKOSYN) 250 MCG capsule 60 capsule 6     Sig: Take 1 capsule by mouth Every 12 (Twelve) Hours.        Pharmacy where request should be sent: PT USUALLY PICKS THIS UP AT THE [facility]. (3 MONTHS SUPPLY)    Last office visit with prescribing clinician: 8/23/2023   Last telemedicine visit with prescribing clinician: Visit date not found   Next office visit with prescribing clinician: Visit date not found     Does the patient have less than a 3 day supply:  [] Yes  [x] No    Would you like a call back once the refill request has been completed: [x] Yes [] No    If the office needs to give you a call back, can they leave a voicemail: [x] Yes [] No    Rosa M May, RegDavided Rep   11/15/24 14:00 EST         "

## 2024-11-18 RX ORDER — DOFETILIDE 0.25 MG/1
250 CAPSULE ORAL EVERY 12 HOURS
Qty: 60 CAPSULE | Refills: 0 | Status: SHIPPED | OUTPATIENT
Start: 2024-11-18

## 2024-11-18 NOTE — TELEPHONE ENCOUNTER
Patient's Tikosyn was ordered on 10/25 through the [employer] patient assistance program. I called to get the status and they stated that the medication is on backorder and that the provider could complete a change request form and either increase or decrease the dose. I advised the representative that this is a medication that requires hospitalization to alter and that our office should have been notified of the status of the medication to prevent any missed doses for the patient.     I contacted the patient's local pharmacy and they are able to get the medication through their supplier at this time. Spoke to the patient and sent in a one month supply to the local pharmacy.     Placed medicine change request on Dr. Thompson's desk for patient to receive tikosyn 125mcg tablets (take 2 tablets by mouth twice daily)

## 2024-12-10 DIAGNOSIS — M1A.0710 CHRONIC IDIOPATHIC GOUT INVOLVING TOE OF RIGHT FOOT WITHOUT TOPHUS: ICD-10-CM

## 2024-12-10 DIAGNOSIS — E03.9 ACQUIRED HYPOTHYROIDISM: Chronic | ICD-10-CM

## 2024-12-11 ENCOUNTER — TELEPHONE (OUTPATIENT)
Dept: CARDIOLOGY | Facility: CLINIC | Age: 74
End: 2024-12-11
Payer: MEDICARE

## 2024-12-11 DIAGNOSIS — I87.2 VENOUS INSUFFICIENCY OF BOTH LOWER EXTREMITIES: ICD-10-CM

## 2024-12-11 DIAGNOSIS — I50.32 CHRONIC DIASTOLIC HEART FAILURE: ICD-10-CM

## 2024-12-11 RX ORDER — POTASSIUM CHLORIDE 750 MG/1
10 TABLET, EXTENDED RELEASE ORAL 2 TIMES DAILY
Qty: 60 TABLET | Refills: 1 | Status: SHIPPED | OUTPATIENT
Start: 2024-12-11

## 2024-12-11 NOTE — TELEPHONE ENCOUNTER
I tried to call the patient to let her know that her Tikosyn is here at the office for her to , but she did not answer. I left a message for her to call me back at the office.        *It is in the EP nurse's office.

## 2024-12-12 ENCOUNTER — HOSPITAL ENCOUNTER (OUTPATIENT)
Dept: CARDIOLOGY | Facility: HOSPITAL | Age: 74
Discharge: HOME | End: 2024-12-12
Admitting: INTERNAL MEDICINE
Payer: MEDICARE

## 2024-12-12 VITALS
OXYGEN SATURATION: 92 % | BODY MASS INDEX: 39.06 KG/M2 | HEART RATE: 67 BPM | SYSTOLIC BLOOD PRESSURE: 127 MMHG | DIASTOLIC BLOOD PRESSURE: 70 MMHG | WEIGHT: 220.46 LBS | HEIGHT: 63 IN | RESPIRATION RATE: 16 BRPM

## 2024-12-12 DIAGNOSIS — I48.19 PERSISTENT ATRIAL FIBRILLATION: ICD-10-CM

## 2024-12-12 DIAGNOSIS — Z95.818 PRESENCE OF WATCHMAN LEFT ATRIAL APPENDAGE CLOSURE DEVICE: ICD-10-CM

## 2024-12-12 LAB — BH CV VAS BP LEFT ARM: NORMAL MMHG

## 2024-12-12 PROCEDURE — 93320 DOPPLER ECHO COMPLETE: CPT

## 2024-12-12 PROCEDURE — 99152 MOD SED SAME PHYS/QHP 5/>YRS: CPT

## 2024-12-12 PROCEDURE — 76376 3D RENDER W/INTRP POSTPROCES: CPT

## 2024-12-12 PROCEDURE — 93312 ECHO TRANSESOPHAGEAL: CPT

## 2024-12-12 PROCEDURE — 25010000002 MIDAZOLAM PER 1 MG: Performed by: INTERNAL MEDICINE

## 2024-12-12 PROCEDURE — 99153 MOD SED SAME PHYS/QHP EA: CPT

## 2024-12-12 PROCEDURE — 93321 DOPPLER ECHO F-UP/LMTD STD: CPT

## 2024-12-12 PROCEDURE — 93325 DOPPLER ECHO COLOR FLOW MAPG: CPT

## 2024-12-12 PROCEDURE — 25010000002 FENTANYL CITRATE (PF) 50 MCG/ML SOLUTION: Performed by: INTERNAL MEDICINE

## 2024-12-12 RX ORDER — LEVOTHYROXINE SODIUM 75 UG/1
TABLET ORAL
Qty: 90 TABLET | Refills: 1 | Status: SHIPPED | OUTPATIENT
Start: 2024-12-12

## 2024-12-12 RX ORDER — FENTANYL CITRATE 50 UG/ML
INJECTION, SOLUTION INTRAMUSCULAR; INTRAVENOUS
Status: COMPLETED | OUTPATIENT
Start: 2024-12-12 | End: 2024-12-12

## 2024-12-12 RX ORDER — ETOMIDATE 2 MG/ML
INJECTION INTRAVENOUS
Status: COMPLETED | OUTPATIENT
Start: 2024-12-12 | End: 2024-12-12

## 2024-12-12 RX ORDER — MIDAZOLAM HYDROCHLORIDE 1 MG/ML
INJECTION, SOLUTION INTRAMUSCULAR; INTRAVENOUS
Status: COMPLETED | OUTPATIENT
Start: 2024-12-12 | End: 2024-12-12

## 2024-12-12 RX ORDER — ALLOPURINOL 100 MG/1
200 TABLET ORAL DAILY
Qty: 180 TABLET | Refills: 2 | Status: SHIPPED | OUTPATIENT
Start: 2024-12-12

## 2024-12-12 RX ADMIN — FENTANYL CITRATE 25 MCG: 50 INJECTION, SOLUTION INTRAMUSCULAR; INTRAVENOUS at 14:09

## 2024-12-12 RX ADMIN — ETOMIDATE 4 MG: 20 INJECTION, SOLUTION INTRAVENOUS at 14:21

## 2024-12-12 RX ADMIN — FENTANYL CITRATE 25 MCG: 50 INJECTION, SOLUTION INTRAMUSCULAR; INTRAVENOUS at 14:00

## 2024-12-12 RX ADMIN — FENTANYL CITRATE 25 MCG: 50 INJECTION, SOLUTION INTRAMUSCULAR; INTRAVENOUS at 14:03

## 2024-12-12 RX ADMIN — MIDAZOLAM 1 MG: 1 INJECTION INTRAMUSCULAR; INTRAVENOUS at 14:09

## 2024-12-12 RX ADMIN — FENTANYL CITRATE 25 MCG: 50 INJECTION, SOLUTION INTRAMUSCULAR; INTRAVENOUS at 14:06

## 2024-12-12 RX ADMIN — FENTANYL CITRATE 25 MCG: 50 INJECTION, SOLUTION INTRAMUSCULAR; INTRAVENOUS at 13:56

## 2024-12-12 RX ADMIN — MIDAZOLAM 1 MG: 1 INJECTION INTRAMUSCULAR; INTRAVENOUS at 13:53

## 2024-12-12 RX ADMIN — MIDAZOLAM 1 MG: 1 INJECTION INTRAMUSCULAR; INTRAVENOUS at 14:03

## 2024-12-12 RX ADMIN — MIDAZOLAM 1 MG: 1 INJECTION INTRAMUSCULAR; INTRAVENOUS at 14:06

## 2024-12-12 RX ADMIN — MIDAZOLAM 1 MG: 1 INJECTION INTRAMUSCULAR; INTRAVENOUS at 14:00

## 2024-12-12 RX ADMIN — ETOMIDATE 4 MG: 20 INJECTION, SOLUTION INTRAVENOUS at 14:13

## 2024-12-12 RX ADMIN — FENTANYL CITRATE 25 MCG: 50 INJECTION, SOLUTION INTRAMUSCULAR; INTRAVENOUS at 13:53

## 2024-12-12 RX ADMIN — MIDAZOLAM 1 MG: 1 INJECTION INTRAMUSCULAR; INTRAVENOUS at 13:57

## 2024-12-12 NOTE — H&P
Pre-Procedure History and Physical  [facility]      Patient:  Salinas Monroy  :  1950  MRN: [id number]    PCP:  Jm Marie DO  PHONE:  [phone]    Primary cardiologist: Fernando Pires MD    DATE: 2024  ID: Salinas Monroy is a 74 y.o. female     CC: Atrial fibrillation, 1 year watchman JAYA    Cardiac focused problem list:  Longstanding Persistent Atrial Fibrillation   CHADSvasc = 3 (HTN, female, Age >65)  Echocardiogram 3/2023: EF 60 to 65%, trace AI, trace MR, mild TR, RVSP 32 mmHg, LA 5.1 cm  Pharmacologic MPS 2023: No evidence of inducible ischemia, EF 71%  14 Day Holter Monitor 2023: 100% atrial fibrillation, average HR 83 bpm, max 146 bpm  Tikosyn Initiation and ECV to NSR 2023  Status post left atrial appendage occlusion with 24 mm Watchman FLX device, 2023, Dr. Thompson  Hypertension  Hypothyroidism/Hashimoto's thyroiditis- 2023 labwork shows low TSH, low T4  Essential tremors  Falls/weakness of lower extremities   Osteoarthritis  Obesity  Obstructive sleep apnea - on CPAP x 1 month     BRIEF HPI: Salinas Monroy is a 74-year-old female with past medical history of persistent atrial fibrillation, hypertension, hypothyroidism, essential tremors, and ISABEL who presents today for 1 year follow-up JAYA after watchman placement in 2023 by Dr. Thompson.  She has been compliant with aspirin. She maintains SR on Tikosyn. Denies dysphagia.     Allergies:      No Known Allergies    MEDICATIONS:  Current Outpatient Medications   Medication Instructions    acyclovir (ZOVIRAX) 400 MG tablet TAKE ONE TABLET BY MOUTH EVERY DAY    allopurinol (ZYLOPRIM) 200 mg, Oral, Daily    ASHWAGANDHA PO Daily    aspirin 81 mg, Oral, Daily    B Complex Vitamins (VITAMIN B COMPLEX PO) 1 tablet, Daily    B-12, Methylcobalamin, 1000 MCG sublingual tablet Every Other Day    buPROPion SR (WELLBUTRIN SR) 150 mg, Oral, Every Morning     Coenzyme Q10 200 MG capsule 1 capsule, 2 Times Daily    Digestive Enzymes (DIGESTIVE ENZYME PO) 1 tablet, Daily    dofetilide (TIKOSYN) 250 mcg, Oral, Every 12 Hours    escitalopram (LEXAPRO) 10 mg, Oral, Every Night at Bedtime    famotidine (PEPCID) 20 MG tablet TAKE ONE TABLET BY MOUTH TWICE DAILY    ferrous gluconate (FERGON) 324 mg, Oral, Daily With Breakfast    furosemide (LASIX) 20 MG tablet TAKE ONE TABLET BY MOUTH TWICE DAILY    levothyroxine (SYNTHROID, LEVOTHROID) 75 MCG tablet TAKE ONE TABLET BY MOUTH EVERY DAY    liothyronine (CYTOMEL) 100 mcg, Oral, Daily    MAGNESIUM PO 1,000 mg, Nightly    Methylsulfonylmethane (MSM PO) 1 tablet, Daily    Misc Natural Products (TART CHERRY ADVANCED PO) 2 capsules, Daily    Omega-3 Fatty Acids (OMEGA-3 FISH OIL PO) 1,000 Units, 3 Times Daily    potassium chloride 10 MEQ CR tablet 10 mEq, Oral, 2 Times Daily    Probiotic Product (PROBIOTIC PO) 1 tablet, Daily    TURMERIC PO 1 tablet, 2 times daily    VITAMIN A PO 1 capsule, Daily    vitamin C (ASCORBIC ACID) 21 mg, 3 Times Daily PRN    Vitamin D3 7,500 Units, Daily    vitamin E 400 Units, Daily    VITAMIN K  mcg, Daily    Zinc 30 MG tablet Daily       Past medical & surgical history, social and family history reviewed in the electronic medical record.    ROS: Pertinent positives listed in the HPI and problem list above. All others reviewed and negative.     Physical Exam:   /68   Pulse 80   Resp 16   LMP  (LMP Unknown)   SpO2 94%     Constitutional:    Alert, cooperative, in no acute distress   Neck:     No JVD, adenopathy, or thyromegaly noted.    Heart:    Regular rhythm and normal rate, normal S1 and S2, no murmur, gallop, rub, or click.    Lungs:     Clear to auscultation bilaterally, normal effort.   Abdomen:     Soft nontender, nondistended, normal bowel sounds   Extremities:   No gross deformities, edema, clubbing, or cyanosis.    Pulses:   Peripheral pulses palpable and equal bilaterally.      EKG/Tele: SR    IMPRESSION:  Salinas Monroy is a 74-year-old female with past medical history of persistent atrial fibrillation (on Tikosyn), hypertension, hypothyroidism, essential tremors, and ISABEL who presents today for 1 year follow-up JAYA after watchman placement in 12/20/2023 by Dr. Thompson.  She has been compliant with aspirin. She is maintaining SR on Tikosyn.     PLAN:  Procedure to perform: JAYA. Procedure details, risks, and benefits discussed with patient and family at bedside. She understands and is agreeable to proceed.  Further recommendations to follow.    She has scheduled follow up with Dr. Pires and Dr. Thompson.     VALENTINE Dukes

## 2024-12-12 NOTE — TELEPHONE ENCOUNTER
Rx Refill Note  Requested Prescriptions     Pending Prescriptions Disp Refills    allopurinol (ZYLOPRIM) 100 MG tablet [Pharmacy Med Name: allopurinol 100 mg tablet] 180 tablet 2     Sig: TAKE TWO TABLETS BY MOUTH DAILY    levothyroxine (SYNTHROID, LEVOTHROID) 75 MCG tablet [Pharmacy Med Name: levothyroxine 75 mcg tablet] 90 tablet 1     Sig: TAKE ONE TABLET BY MOUTH EVERY DAY      Last office visit with prescribing clinician: 10/21/2024   Last telemedicine visit with prescribing clinician: Visit date not found   Next office visit with prescribing clinician: 12/11/2024                         Would you like a call back once the refill request has been completed: [] Yes [] No    If the office needs to give you a call back, can they leave a voicemail: [] Yes [] No    Ann Chung MA  12/12/24, 17:53 EST

## 2025-01-11 DIAGNOSIS — I50.32 CHRONIC DIASTOLIC HEART FAILURE: ICD-10-CM

## 2025-01-11 DIAGNOSIS — I87.2 VENOUS INSUFFICIENCY OF BOTH LOWER EXTREMITIES: ICD-10-CM

## 2025-01-12 DIAGNOSIS — F31.62 BIPOLAR DISORDER, CURRENT EPISODE MIXED, MODERATE: ICD-10-CM

## 2025-01-13 RX ORDER — ASPIRIN 81 MG/1
81 TABLET, COATED ORAL DAILY
Qty: 90 TABLET | Refills: 3 | OUTPATIENT
Start: 2025-01-13

## 2025-01-13 RX ORDER — FUROSEMIDE 20 MG/1
20 TABLET ORAL 2 TIMES DAILY
Qty: 180 TABLET | Refills: 3 | Status: SHIPPED | OUTPATIENT
Start: 2025-01-13

## 2025-01-13 NOTE — TELEPHONE ENCOUNTER
Rx Refill Note  Requested Prescriptions     Pending Prescriptions Disp Refills    furosemide (LASIX) 20 MG tablet [Pharmacy Med Name: furosemide 20 mg tablet] 60 tablet 2     Sig: TAKE ONE TABLET BY MOUTH TWICE DAILY      Last office visit with prescribing clinician: 10/21/2024   Last telemedicine visit with prescribing clinician: Visit date not found   Next office visit with prescribing clinician: 2/18/2025                          Would you like a call back once the refill request has been completed: [] Yes [] No    If the office needs to give you a call back, can they leave a voicemail: [] Yes [] No    Anabella Cruz MA  01/13/25, 16:03 EST

## 2025-01-14 RX ORDER — ESCITALOPRAM OXALATE 10 MG/1
10 TABLET ORAL
Qty: 90 TABLET | Refills: 1 | Status: SHIPPED | OUTPATIENT
Start: 2025-01-14

## 2025-01-14 NOTE — TELEPHONE ENCOUNTER
Rx Refill Note  Requested Prescriptions     Pending Prescriptions Disp Refills    escitalopram (LEXAPRO) 10 MG tablet [Pharmacy Med Name: escitalopram 10 mg tablet] 90 tablet 1     Sig: TAKE ONE TABLET BY MOUTH EVERY NIGHT      Last office visit with prescribing clinician: 10/21/2024   Last telemedicine visit with prescribing clinician: Visit date not found   Next office visit with prescribing clinician: 2/18/2025                         Would you like a call back once the refill request has been completed: [] Yes [] No    If the office needs to give you a call back, can they leave a voicemail: [] Yes [] No    Ann Chung MA  01/14/25, 08:56 EST

## 2025-01-23 ENCOUNTER — HOSPITAL ENCOUNTER (EMERGENCY)
Facility: HOSPITAL | Age: 75
Discharge: OTHER INSTITUTION | End: 2025-01-23
Attending: STUDENT IN AN ORGANIZED HEALTH CARE EDUCATION/TRAINING PROGRAM
Payer: MEDICARE

## 2025-01-23 ENCOUNTER — APPOINTMENT (OUTPATIENT)
Dept: CT IMAGING | Facility: HOSPITAL | Age: 75
End: 2025-01-23
Payer: MEDICARE

## 2025-01-23 VITALS
TEMPERATURE: 97.7 F | HEART RATE: 73 BPM | RESPIRATION RATE: 14 BRPM | WEIGHT: 200 LBS | DIASTOLIC BLOOD PRESSURE: 76 MMHG | HEIGHT: 63 IN | OXYGEN SATURATION: 92 % | SYSTOLIC BLOOD PRESSURE: 147 MMHG | BODY MASS INDEX: 35.44 KG/M2

## 2025-01-23 DIAGNOSIS — K45.8 INTERNAL HERNIA: Primary | ICD-10-CM

## 2025-01-23 LAB
ALBUMIN SERPL-MCNC: 3.8 G/DL (ref 3.5–5.2)
ALBUMIN/GLOB SERPL: 1.2 G/DL
ALP SERPL-CCNC: 114 U/L (ref 39–117)
ALT SERPL W P-5'-P-CCNC: 18 U/L (ref 1–33)
ANION GAP SERPL CALCULATED.3IONS-SCNC: 15.7 MMOL/L (ref 5–15)
AST SERPL-CCNC: 19 U/L (ref 1–32)
BACTERIA UR QL AUTO: NORMAL /HPF
BASOPHILS # BLD AUTO: 0.02 10*3/MM3 (ref 0–0.2)
BASOPHILS NFR BLD AUTO: 0.2 % (ref 0–1.5)
BILIRUB SERPL-MCNC: 0.3 MG/DL (ref 0–1.2)
BILIRUB UR QL STRIP: NEGATIVE
BUN SERPL-MCNC: 54 MG/DL (ref 8–23)
BUN/CREAT SERPL: 66.7 (ref 7–25)
CALCIUM SPEC-SCNC: 9.5 MG/DL (ref 8.6–10.5)
CHLORIDE SERPL-SCNC: 104 MMOL/L (ref 98–107)
CLARITY UR: CLEAR
CO2 SERPL-SCNC: 21.3 MMOL/L (ref 22–29)
COLOR UR: YELLOW
CREAT SERPL-MCNC: 0.81 MG/DL (ref 0.57–1)
D-LACTATE SERPL-SCNC: 0.9 MMOL/L (ref 0.5–2)
DEPRECATED RDW RBC AUTO: 42 FL (ref 37–54)
EGFRCR SERPLBLD CKD-EPI 2021: 75.3 ML/MIN/1.73
EOSINOPHIL # BLD AUTO: 0.14 10*3/MM3 (ref 0–0.4)
EOSINOPHIL NFR BLD AUTO: 1.6 % (ref 0.3–6.2)
ERYTHROCYTE [DISTWIDTH] IN BLOOD BY AUTOMATED COUNT: 13 % (ref 12.3–15.4)
GEN 5 1HR TROPONIN T REFLEX: 28 NG/L
GLOBULIN UR ELPH-MCNC: 3.3 GM/DL
GLUCOSE SERPL-MCNC: 99 MG/DL (ref 65–99)
GLUCOSE UR STRIP-MCNC: NEGATIVE MG/DL
HCT VFR BLD AUTO: 42.8 % (ref 34–46.6)
HGB BLD-MCNC: 14.2 G/DL (ref 12–15.9)
HGB UR QL STRIP.AUTO: NEGATIVE
HOLD SPECIMEN: NORMAL
HOLD SPECIMEN: NORMAL
HYALINE CASTS UR QL AUTO: NORMAL /LPF
IMM GRANULOCYTES # BLD AUTO: 0.04 10*3/MM3 (ref 0–0.05)
IMM GRANULOCYTES NFR BLD AUTO: 0.5 % (ref 0–0.5)
KETONES UR QL STRIP: NEGATIVE
LEUKOCYTE ESTERASE UR QL STRIP.AUTO: ABNORMAL
LIPASE SERPL-CCNC: 54 U/L (ref 13–60)
LYMPHOCYTES # BLD AUTO: 2.05 10*3/MM3 (ref 0.7–3.1)
LYMPHOCYTES NFR BLD AUTO: 23.7 % (ref 19.6–45.3)
MCH RBC QN AUTO: 29.2 PG (ref 26.6–33)
MCHC RBC AUTO-ENTMCNC: 33.2 G/DL (ref 31.5–35.7)
MCV RBC AUTO: 87.9 FL (ref 79–97)
MONOCYTES # BLD AUTO: 0.71 10*3/MM3 (ref 0.1–0.9)
MONOCYTES NFR BLD AUTO: 8.2 % (ref 5–12)
NEUTROPHILS NFR BLD AUTO: 5.7 10*3/MM3 (ref 1.7–7)
NEUTROPHILS NFR BLD AUTO: 65.8 % (ref 42.7–76)
NITRITE UR QL STRIP: NEGATIVE
NRBC BLD AUTO-RTO: 0 /100 WBC (ref 0–0.2)
PH UR STRIP.AUTO: 6.5 [PH] (ref 5–8)
PLATELET # BLD AUTO: 272 10*3/MM3 (ref 140–450)
PMV BLD AUTO: 9.5 FL (ref 6–12)
POTASSIUM SERPL-SCNC: 4.5 MMOL/L (ref 3.5–5.2)
PROT SERPL-MCNC: 7.1 G/DL (ref 6–8.5)
PROT UR QL STRIP: NEGATIVE
RBC # BLD AUTO: 4.87 10*6/MM3 (ref 3.77–5.28)
RBC # UR STRIP: NORMAL /HPF
REF LAB TEST METHOD: NORMAL
SODIUM SERPL-SCNC: 141 MMOL/L (ref 136–145)
SP GR UR STRIP: 1.01 (ref 1–1.03)
SQUAMOUS #/AREA URNS HPF: NORMAL /HPF
TROPONIN T % DELTA: 12
TROPONIN T NUMERIC DELTA: 3 NG/L
TROPONIN T SERPL HS-MCNC: 25 NG/L
UROBILINOGEN UR QL STRIP: ABNORMAL
WBC # UR STRIP: NORMAL /HPF
WBC NRBC COR # BLD AUTO: 8.66 10*3/MM3 (ref 3.4–10.8)
WHOLE BLOOD HOLD COAG: NORMAL
WHOLE BLOOD HOLD SPECIMEN: NORMAL

## 2025-01-23 PROCEDURE — 80053 COMPREHEN METABOLIC PANEL: CPT | Performed by: STUDENT IN AN ORGANIZED HEALTH CARE EDUCATION/TRAINING PROGRAM

## 2025-01-23 PROCEDURE — 96360 HYDRATION IV INFUSION INIT: CPT

## 2025-01-23 PROCEDURE — 81001 URINALYSIS AUTO W/SCOPE: CPT | Performed by: EMERGENCY MEDICINE

## 2025-01-23 PROCEDURE — 83605 ASSAY OF LACTIC ACID: CPT | Performed by: STUDENT IN AN ORGANIZED HEALTH CARE EDUCATION/TRAINING PROGRAM

## 2025-01-23 PROCEDURE — 84484 ASSAY OF TROPONIN QUANT: CPT | Performed by: STUDENT IN AN ORGANIZED HEALTH CARE EDUCATION/TRAINING PROGRAM

## 2025-01-23 PROCEDURE — 96361 HYDRATE IV INFUSION ADD-ON: CPT

## 2025-01-23 PROCEDURE — 25810000003 SODIUM CHLORIDE 0.9 % SOLUTION: Performed by: STUDENT IN AN ORGANIZED HEALTH CARE EDUCATION/TRAINING PROGRAM

## 2025-01-23 PROCEDURE — 99285 EMERGENCY DEPT VISIT HI MDM: CPT | Performed by: STUDENT IN AN ORGANIZED HEALTH CARE EDUCATION/TRAINING PROGRAM

## 2025-01-23 PROCEDURE — 36415 COLL VENOUS BLD VENIPUNCTURE: CPT

## 2025-01-23 PROCEDURE — 85025 COMPLETE CBC W/AUTO DIFF WBC: CPT | Performed by: STUDENT IN AN ORGANIZED HEALTH CARE EDUCATION/TRAINING PROGRAM

## 2025-01-23 PROCEDURE — 83690 ASSAY OF LIPASE: CPT | Performed by: STUDENT IN AN ORGANIZED HEALTH CARE EDUCATION/TRAINING PROGRAM

## 2025-01-23 PROCEDURE — 74177 CT ABD & PELVIS W/CONTRAST: CPT

## 2025-01-23 PROCEDURE — 93005 ELECTROCARDIOGRAM TRACING: CPT | Performed by: STUDENT IN AN ORGANIZED HEALTH CARE EDUCATION/TRAINING PROGRAM

## 2025-01-23 PROCEDURE — 25510000001 IOPAMIDOL 61 % SOLUTION: Performed by: STUDENT IN AN ORGANIZED HEALTH CARE EDUCATION/TRAINING PROGRAM

## 2025-01-23 RX ORDER — IOPAMIDOL 612 MG/ML
100 INJECTION, SOLUTION INTRAVASCULAR
Status: COMPLETED | OUTPATIENT
Start: 2025-01-23 | End: 2025-01-23

## 2025-01-23 RX ORDER — SODIUM CHLORIDE 0.9 % (FLUSH) 0.9 %
10 SYRINGE (ML) INJECTION AS NEEDED
Status: DISCONTINUED | OUTPATIENT
Start: 2025-01-23 | End: 2025-01-23 | Stop reason: HOSPADM

## 2025-01-23 RX ORDER — SODIUM CHLORIDE 9 MG/ML
125 INJECTION, SOLUTION INTRAVENOUS CONTINUOUS
Status: DISCONTINUED | OUTPATIENT
Start: 2025-01-23 | End: 2025-01-23 | Stop reason: HOSPADM

## 2025-01-23 RX ADMIN — SODIUM CHLORIDE 75 ML/HR: 9 INJECTION, SOLUTION INTRAVENOUS at 16:26

## 2025-01-23 RX ADMIN — IOPAMIDOL 100 ML: 612 INJECTION, SOLUTION INTRAVENOUS at 14:48

## 2025-01-23 NOTE — ED NOTES
Oxygen did decrease to 88% on RA while patient was sleeping. Pt reports that she does have sleep apnea.  Pt placed on oxygen at 2L per NC. Increased oxygen to 96% on 2L.

## 2025-01-23 NOTE — ED NOTES
Att I called the [facility] for transfer request of this pt per our provider, we are awaiting a call back at this time.

## 2025-01-23 NOTE — ED PROVIDER NOTES
[facility]  Emergency Department Encounter  Emergency Medicine Physician Note       Pt Name: Salinas Monroy  MRN: [id number]  Pt :   1950  Room Number:    Date of encounter:  2025  PCP: Jm Marie DO  ED Physician: Clyde Rodrigues DO    HPI:  Salinas Monroy is a 75 y.o. female who presents to the ED with chief complaint of ABDOMINAL PAIN.  Patient reports gradual status and was on  night.  Pain is located in the epigastric region without radiation.  Intermittent.  No modifying factors.  Specifically, not related to oral intake.  No other associated symptoms including fever, chills, chest pain or shortness breath, cough, flank pain, nausea vomiting diarrhea, bloody stools, problems with urination, vaginal bleeding or discharge.    Pertinent past medical history: Diverticulitis, appendicitis, small bowel structuring, gastric bypass surgery in the 70s.    PAST MEDICAL HISTORY  Past Medical History:   Diagnosis Date    Anemia ?    under control    Anxiety     Arthritis     Atrial fibrillation     Bloating     Burping     Chest pain     was seen by Dr Pires and was released was just anxiety    CHF (congestive heart failure)     Colon polyp 2018    Coronary artery disease ??? Feb.2023    COVID-19 vaccine series completed     Depression     Diverticulosis     GERD (gastroesophageal reflux disease)     no longer bothers me...    Gout     Hashimoto's disease     Hernia 2012    repair surgery which failed in     History of medical problems AFib...Diagnosed 2023    Hypothyroid     Impingement syndrome of right shoulder 2016    Irritable bowel syndrome ??    Obesity     OCD (obsessive compulsive disorder)     Sleep apnea     Tremor     worse on left arm/hand    Wears glasses     reading glasses only     Current Outpatient Medications   Medication Instructions    acyclovir (ZOVIRAX) 400 MG tablet TAKE ONE TABLET BY MOUTH EVERY DAY     allopurinol (ZYLOPRIM) 200 mg, Oral, Daily    ASHWAGANDHA PO Daily    aspirin 81 mg, Oral, Daily    B Complex Vitamins (VITAMIN B COMPLEX PO) 1 tablet, Daily    B-12, Methylcobalamin, 1000 MCG sublingual tablet Every Other Day    buPROPion SR (WELLBUTRIN SR) 150 mg, Oral, Every Morning    Coenzyme Q10 200 MG capsule 1 capsule, 2 Times Daily    Digestive Enzymes (DIGESTIVE ENZYME PO) 1 tablet, Daily    dofetilide (TIKOSYN) 250 mcg, Oral, Every 12 Hours    escitalopram (LEXAPRO) 10 mg, Oral, Every Night at Bedtime    famotidine (PEPCID) 20 MG tablet TAKE ONE TABLET BY MOUTH TWICE DAILY    ferrous gluconate (FERGON) 324 mg, Oral, Daily With Breakfast    furosemide (LASIX) 20 mg, Oral, 2 Times Daily    levothyroxine (SYNTHROID, LEVOTHROID) 75 MCG tablet TAKE ONE TABLET BY MOUTH EVERY DAY    liothyronine (CYTOMEL) 100 mcg, Oral, Daily    MAGNESIUM PO 1,000 mg, Nightly    Methylsulfonylmethane (MSM PO) 1 tablet, Daily    Misc Natural Products (TART CHERRY ADVANCED PO) 2 capsules, Daily    Omega-3 Fatty Acids (OMEGA-3 FISH OIL PO) 1,000 Units, 3 Times Daily    potassium chloride 10 MEQ CR tablet 10 mEq, Oral, 2 Times Daily    Probiotic Product (PROBIOTIC PO) 1 tablet, Daily    TURMERIC PO 1 tablet, 2 times daily    VITAMIN A PO 1 capsule, Daily    vitamin C (ASCORBIC ACID) 21 mg, 3 Times Daily PRN    Vitamin D3 7,500 Units, Daily    vitamin E 400 Units, Daily    VITAMIN K  mcg, Daily    Zinc 30 MG tablet Daily      PAST SURGICAL HISTORY  Past Surgical History:   Procedure Laterality Date    APPENDECTOMY      ATRIAL APPENDAGE EXCLUSION LEFT WITH TRANSESOPHAGEAL ECHOCARDIOGRAM N/A 12/4/2023    Procedure: Atrial Appendage Occlusion;  Surgeon: Guanako Thompson MD;  Location: [facility] INVASIVE LOCATION;  Service: Cardiology;  Laterality: N/A;    BARIATRIC SURGERY  1979    CARDIOVERSION  09/13/2022    CATARACT EXTRACTION W/ INTRAOCULAR LENS IMPLANT Right 08/08/2022    Procedure: CATARACT PHACO EXTRACTION WITH  INTRAOCULAR LENS IMPLANT RIGHT;  Surgeon: Eunice Brooks MD;  Location: [facility] OR;  Service: Ophthalmology;  Laterality: Right;    CATARACT EXTRACTION W/ INTRAOCULAR LENS IMPLANT Left 08/22/2022    Procedure: CATARACT PHACO EXTRACTION WITH INTRAOCULAR LENS IMPLANT LEFT;  Surgeon: Eunice Brooks MD;  Location: [facility] OR;  Service: Ophthalmology;  Laterality: Left;    COLONOSCOPY  2012    failed due to hernia...    COLONOSCOPY N/A 06/08/2017    Procedure: COLONOSCOPY with cold snare polypectomies, argon thermal ablation, ns injection, yanira ink injection;  Surgeon: Rafiq Huang MD;  Location: [facility] ENDOSCOPY;  Service:     COLONOSCOPY N/A 06/05/2018    Procedure: COLONOSCOPY WITH BIOPSIES;  Surgeon: Rafiq Huang MD;  Location: [facility] ENDOSCOPY;  Service: Gastroenterology    ENDOSCOPY N/A 01/27/2021    Procedure: ESOPHAGOGASTRODUODENOSCOPY WITH BIOPSIES AND CLIPS;  Surgeon: Katie Shannon MD;  Location: [facility] ENDOSCOPY;  Service: Gastroenterology;  Laterality: N/A;    GASTRIC BYPASS  1978    HERNIA MESH REMOVAL  2014    BOWEL OBSTRUCTION DUE TO MESH    HERNIA REPAIR  2012    HYSTERECTOMY      complete    JOINT REPLACEMENT  2009 & 2012    Left & Right Full Hip Replacements    SMALL INTESTINE SURGERY  2014    emergency bowel obstruction from hernia failure    TOTAL ABDOMINAL HYSTERECTOMY WITH SALPINGO OOPHORECTOMY  1990    fibroids    TUBAL ABDOMINAL LIGATION      UPPER GASTROINTESTINAL ENDOSCOPY  2010       FAMILY HISTORY  Family History   Problem Relation Age of Onset    Obesity Mother     Rheum arthritis Mother     Thyroid disease Mother     Hypertension Mother         Passed 2004    Stroke Mother         Most of her problems were caused by Rheumatoid Ar.    Hyperlipidemia Mother     Arthritis Mother         Rheumatoid Arthritis..Passed 2004    Heart disease Mother         CHF    Cancer Father         Passed 1997    Kidney cancer Father     Liver cancer Father     Heart disease Father         AFib    No  Known Problems Brother     Diabetes Maternal Aunt         Passed     Cancer Maternal Uncle     Lung cancer Maternal Uncle     Alcohol abuse Maternal Uncle     No Known Problems Paternal Aunt     Stroke Paternal Uncle     Hypertension Paternal Uncle     Hyperlipidemia Paternal Uncle     Heart disease Paternal Uncle     No Known Problems Brother     Asthma Sister         under control    Alcohol abuse Maternal Uncle     Colon cancer Neg Hx     Breast cancer Neg Hx     Ovarian cancer Neg Hx        SOCIAL HISTORY  Social History     Socioeconomic History    Marital status:    Tobacco Use    Smoking status: Former     Current packs/day: 0.00     Average packs/day: 1 pack/day for 41.0 years (41.0 ttl pk-yrs)     Types: Cigarettes     Start date: 1967     Quit date: 2008     Years since quittin.0     Passive exposure: Past    Smokeless tobacco: Never   Vaping Use    Vaping status: Never Used   Substance and Sexual Activity    Alcohol use: Not Currently     Comment: very rarely    Drug use: No    Sexual activity: Not Currently     Partners: Male     Birth control/protection: None, Hysterectomy     ALLERGIES  Patient has no known allergies.    REVIEW OF SYSTEMS  All systems reviewed and negative except for those discussed in HPI.     PHYSICAL EXAM  ED Triage Vitals [25 1218]   Temp Heart Rate Resp BP SpO2   97.7 °F (36.5 °C) 89 20 154/58 95 %      Temp src Heart Rate Source Patient Position BP Location FiO2 (%)   Oral Monitor Sitting Left arm --     I have reviewed the triage vital signs and nursing notes.    General: Alert.  Nontoxic appearance.  No acute distress.  Head: Normocephalic.  Atraumatic.  Eyes: No scleral icterus.  ENT: Moist mucous membranes.  Cardiovascular: Regular rate and rhythm.  No murmurs.  No rubs.  2+ distal pulses bilaterally.  Respiratory: Equal breath sounds bilaterally. No wheezing. No rales.  No rhonchi.  GI: Abdomen is soft.  Nondistended.  + Tenderness epigastric  region.  No rebound.  No guarding.  No CVA tenderness.  Negative Rhodes sign.  MSK: Moves all 4 extremities.  Neurologic: Oriented x 3.  No focal deficits.  Skin: No edema. No erythema. No pallor. No cyanosis.  Psych: Normal mood and affect.    LAB RESULTS  Recent Results (from the past 24 hours)   Comprehensive Metabolic Panel    Collection Time: 01/23/25 12:40 PM    Specimen: Blood   Result Value Ref Range    Glucose 99 65 - 99 mg/dL    BUN 54 (H) 8 - 23 mg/dL    Creatinine 0.81 0.57 - 1.00 mg/dL    Sodium 141 136 - 145 mmol/L    Potassium 4.5 3.5 - 5.2 mmol/L    Chloride 104 98 - 107 mmol/L    CO2 21.3 (L) 22.0 - 29.0 mmol/L    Calcium 9.5 8.6 - 10.5 mg/dL    Total Protein 7.1 6.0 - 8.5 g/dL    Albumin 3.8 3.5 - 5.2 g/dL    ALT (SGPT) 18 1 - 33 U/L    AST (SGOT) 19 1 - 32 U/L    Alkaline Phosphatase 114 39 - 117 U/L    Total Bilirubin 0.3 0.0 - 1.2 mg/dL    Globulin 3.3 gm/dL    A/G Ratio 1.2 g/dL    BUN/Creatinine Ratio 66.7 (H) 7.0 - 25.0    Anion Gap 15.7 (H) 5.0 - 15.0 mmol/L    eGFR 75.3 >60.0 mL/min/1.73   Lipase    Collection Time: 01/23/25 12:40 PM    Specimen: Blood   Result Value Ref Range    Lipase 54 13 - 60 U/L   Green Top (Gel)    Collection Time: 01/23/25 12:40 PM   Result Value Ref Range    Extra Tube Hold for add-ons.    Lavender Top    Collection Time: 01/23/25 12:40 PM   Result Value Ref Range    Extra Tube hold for add-on    Gold Top - SST    Collection Time: 01/23/25 12:40 PM   Result Value Ref Range    Extra Tube Hold for add-ons.    Light Blue Top    Collection Time: 01/23/25 12:40 PM   Result Value Ref Range    Extra Tube Hold for add-ons.    CBC Auto Differential    Collection Time: 01/23/25 12:40 PM    Specimen: Blood   Result Value Ref Range    WBC 8.66 3.40 - 10.80 10*3/mm3    RBC 4.87 3.77 - 5.28 10*6/mm3    Hemoglobin 14.2 12.0 - 15.9 g/dL    Hematocrit 42.8 34.0 - 46.6 %    MCV 87.9 79.0 - 97.0 fL    MCH 29.2 26.6 - 33.0 pg    MCHC 33.2 31.5 - 35.7 g/dL    RDW 13.0 12.3 - 15.4 %     RDW-SD 42.0 37.0 - 54.0 fl    MPV 9.5 6.0 - 12.0 fL    Platelets 272 140 - 450 10*3/mm3    Neutrophil % 65.8 42.7 - 76.0 %    Lymphocyte % 23.7 19.6 - 45.3 %    Monocyte % 8.2 5.0 - 12.0 %    Eosinophil % 1.6 0.3 - 6.2 %    Basophil % 0.2 0.0 - 1.5 %    Immature Grans % 0.5 0.0 - 0.5 %    Neutrophils, Absolute 5.70 1.70 - 7.00 10*3/mm3    Lymphocytes, Absolute 2.05 0.70 - 3.10 10*3/mm3    Monocytes, Absolute 0.71 0.10 - 0.90 10*3/mm3    Eosinophils, Absolute 0.14 0.00 - 0.40 10*3/mm3    Basophils, Absolute 0.02 0.00 - 0.20 10*3/mm3    Immature Grans, Absolute 0.04 0.00 - 0.05 10*3/mm3    nRBC 0.0 0.0 - 0.2 /100 WBC   High Sensitivity Troponin T    Collection Time: 01/23/25 12:40 PM    Specimen: Blood   Result Value Ref Range    HS Troponin T 25 (H) <14 ng/L   High Sensitivity Troponin T 1Hr    Collection Time: 01/23/25  1:40 PM    Specimen: Blood   Result Value Ref Range    HS Troponin T 28 (H) <14 ng/L    Troponin T Numeric Delta 3 ng/L    Troponin T % Delta 12 Abnormal if >/= 20%       RADIOLOGY  CT Abdomen Pelvis With Contrast    Result Date: 1/23/2025  PROCEDURE: CT ABDOMEN PELVIS W CONTRAST-  TECHNIQUE: IV contrast enhanced exam  HISTORY: Epigastric abdominal pain  COMPARISON: 04/17/2024.  FINDINGS:  ABDOMEN: Mild splenomegaly is noted. Remaining solid organs are normal. Gallbladder is normal.  Large, wide central abdominal wall hernia is noted containing numerous large and small bowel loops as well as a small portion of liver.  Postoperative changes of gastric bypass are seen. There is no free fluid or free air. There is stranding within the mesenteric fat with some swirling of the small bowel and vascular engorgement raising the question of internal hernia.  PELVIS: Pelvic bowel loops are unremarkable. There is no free fluid. The ileocecal junction is involved within the central abdominal wall hernia located within the right lower portion of the hernia.      Findings suspicious for an internal hernia although  no active obstruction is present. There is stranding of the mesenteric fat with some swirling of the mesenteric vessels and vascular engorgement typical for internal hernia.   This study was performed with techniques to keep radiation doses as low as reasonably achievable (ALARA). Individualized dose reduction techniques using automated exposure control or adjustment of vA and/or kV according to the patient size were employed.  This report was signed and finalized on 1/23/2025 3:32 PM by Marty Porter MD.       PROCEDURES  Procedures    RISK STRATIFICATION    MEDICAL DECISION MAKING  75 y.o. female with past medical history listed above who presents with epigastric abdominal pain.    Vital signs within normal limits.    Based on clinical presentation and physical exam, differential diagnosis includes, but is not limited to, gastritis, pancreatitis, biliary colic, cholecystitis, small bowel obstruction.    Patient declined offer for pain medication.    At least 3 different tests have been ordered on this patient.    Medications administered in ED:  Medications   sodium chloride 0.9 % flush 10 mL (has no administration in time range)   sodium chloride 0.9 % infusion (has no administration in time range)   iopamidol (ISOVUE-300) 61 % injection 100 mL (100 mL Intravenous Given 1/23/25 1448)     Please see ED course below for my interpretation of the ED workup.  ED Course as of 01/24/25 0615   Thu Jan 23, 2025   1252 ECG 12 Lead Other; Abdominal pain  EKG per my interpretation normal sinus rhythm, rate 80, leftward axis, no ST segment elevation or depression, normal QRS QTC intervals.   [JS]   1537 I reviewed the labs listed above.     Notable findings are highlighted below.    Old laboratory data was reviewed from the medical records and compared to today's results.   [JS]   1538 HS Troponin T(!): 25 [JS]   1544 CT Abdomen Pelvis With Contrast  I have independently reviewed and interpreted the CT abdomen pelvis.  My  interpretation is negative for small bowel obstruction.    Radiologist notes the following: Findings suspicious for an internal hernia although no active obstruction is present. There is stranding of the mesenteric fat with some swirling of the mesenteric vessels and vascular engorgement typical for internal hernia.   [JS]   1544 Case discussed with Dr. Colindres (general surgery). Unable to take care of this condition locally. Recommends consultation with bariatric surgery at [facility] for likely transfer. [JS]   1555 HS Troponin T(!): 28 [JS]   1555 Troponin T Numeric Delta: 3 [JS]   1600 Case discussed with Dr. Jean (triage physician) at [facility]. Will discuss case with bariatric surgery and call me back. [JS]   1603  called back. Unfortunately ED is on diversion and bariatric surgeon is unavailable at the moment. Unable to accept the patient for transfer. [JS]   1613 I received signout on this patient from Dr. Funes. Disposition pending consultation with bariatric surgery and clinical re-evaluation.  Please see note below for complete details. [JS]   1631 Patient's case discussed with [facility] who accepted the patient for transfer and felt like observation would be appropriate.  Will discuss with hospitalist once he get a bed, no beds available at this time.  He does recommend repeat labs at midnight and maintaining IV fluids, n.p.o. [CR]   1648 Patient requesting that we check her urine with her abdominal pain, we will assess per her request at this time. [CR]   1658 Dr. [email or web address] [place] accepted admission.   [CR]   173 Lactate: 0.9 [CR]      ED Course User Index  [CR] Robert Funes DO  [JS] Clyde Rodrigues DO     REPEAT VITAL SIGNS  AS OF 16:13 EST VITALS:  BP - 140/83  HR - 71  TEMP - 97.7 °F (36.5 °C) (Oral)  O2 SATS - 90%    DIAGNOSIS  PENDING    DISPOSITION  PENDING    Please note that portions of this  document were completed with voice recognition software.        Clyde Rodrigues DO  01/24/25 0616

## 2025-01-23 NOTE — ED PROVIDER NOTES
[facility] EMERGENCY DEPARTMENT  Emergency Department Encounter  Emergency Medicine Physician Signout     Pt Name:Salinas Monroy  MRN: [id number]  Birthdate 1950  Date of evaluation: 1/23/2025  PCP:  Jm Marie DO  Note Started: 4:18 PM EST    ADDENDUM:        Care of this patient was assumed from Dr. Rodrigues    at   1600   .  The patient was seen for Abdominal Pain  .  The patient's initial evaluation and plan have been discussed with the prior provider who initially evaluated the patient.  Nursing Notes, Past Medical Hx, Past Surgical Hx, Social Hx, Allergies, and Family Hx were all reviewed.    Patient presented with abdominal pain, workup demonstrated internal hernia.  Patient is passing bowel movement, passing gas.  Patient abdomen nondistended.  Patient does have large external hernia.  Plan to evaluate lactate, plan for transfer as general surgery who recommends evaluation by a bariatric surgery team.    I performed a repeat evaluation of the patient and reviewed tests completed so far.        ED Course     ED Course as of 01/23/25 2127   Thu Jan 23, 2025   1252 ECG 12 Lead Other; Abdominal pain  EKG per my interpretation normal sinus rhythm, rate 80, leftward axis, no ST segment elevation or depression, normal QRS QTC intervals.   [JS]   1537 I reviewed the labs listed above.     Notable findings are highlighted below.    Old laboratory data was reviewed from the medical records and compared to today's results.   [JS]   1538 HS Troponin T(!): 25 [JS]   1544 CT Abdomen Pelvis With Contrast  I have independently reviewed and interpreted the CT abdomen pelvis.  My interpretation is negative for small bowel obstruction.    Radiologist notes the following: Findings suspicious for an internal hernia although no active obstruction is present. There is stranding of the mesenteric fat with some swirling of the mesenteric vessels and vascular engorgement typical for internal  hernia.   [JS]   1544 Case discussed with Dr. Colindres (general surgery). Unable to take care of this condition locally. Recommends consultation with bariatric surgery at tertiary [facility] for likely transfer. [JS]   1555 HS Troponin T(!): 28 [JS]   1555 Troponin T Numeric Delta: 3 [JS]   1600 Case discussed with Dr. Jean (triage physician) at [facility]. Will discuss case with bariatric surgery and call me back. [JS]   1603  called back. Unfortunately ED is on diversion and bariatric surgeon is unavailable at the moment. Unable to accept the patient for transfer. [JS]   1613 I received signout on this patient from Dr. Funes. Disposition pending consultation with bariatric surgery and clinical re-evaluation.  Please see note below for complete details. [JS]   1631 Patient's case discussed with [facility] who accepted the patient for transfer and felt like observation would be appropriate.  Will discuss with hospitalist once he get a bed, no beds available at this time.  He does recommend repeat labs at midnight and maintaining IV fluids, n.p.o. [CR]   1648 Patient requesting that we check her urine with her abdominal pain, we will assess per her request at this time. [CR]   1658 Dr. Saldana.[facility] ist at [place] accepted admission.   [CR]   1734 Lactate: 0.9 [CR]      ED Course User Index  [CR] Robert Funes DO  [JS] Clyde Rodrigues DO       The patient was given the following medications:  [unfilled]    RECENT VITALS:  BP: 140/83, Temp: 97.7 °F (36.5 °C), Heart Rate: 71, Resp: 20     RADIOLOGY:All plain film, CT, MRI, and formal ultrasound images (except ED bedside ultrasound) are read by the radiologist and the images and interpretations are directly viewed by the emergency physician.   CT Abdomen Pelvis With Contrast   Final Result   Findings suspicious for an internal hernia although no   active obstruction is present. There is  stranding of the mesenteric fat   with some swirling of the mesenteric vessels and vascular engorgement   typical for internal hernia.           This study was performed with techniques to keep radiation doses as low   as reasonably achievable (ALARA). Individualized dose reduction   techniques using automated exposure control or adjustment of vA and/or   kV according to the patient size were employed.       This report was signed and finalized on 1/23/2025 3:32 PM by Marty Porter MD.                LABS: All lab results were reviewed by myself, and all abnormals are listed below.  Labs Reviewed   COMPREHENSIVE METABOLIC PANEL - Abnormal; Notable for the following components:       Result Value    BUN 54 (*)     CO2 21.3 (*)     BUN/Creatinine Ratio 66.7 (*)     Anion Gap 15.7 (*)     All other components within normal limits    Narrative:     GFR Categories in Chronic Kidney Disease (CKD)      GFR Category          GFR (mL/min/1.73)    Interpretation  G1                     90 or greater         Normal or high (1)  G2                      60-89                Mild decrease (1)  G3a                   45-59                Mild to moderate decrease  G3b                   30-44                Moderate to severe decrease  G4                    15-29                Severe decrease  G5                    14 or less           Kidney failure          (1)In the absence of evidence of kidney disease, neither GFR category G1 or G2 fulfill the criteria for CKD.    eGFR calculation 2021 CKD-EPI creatinine equation, which does not include race as a factor   TROPONIN - Abnormal; Notable for the following components:    HS Troponin T 25 (*)     All other components within normal limits    Narrative:     High Sensitive Troponin T Reference Range:  <14.0 ng/L- Negative Female for AMI  <22.0 ng/L- Negative Male for AMI  >=14 - Abnormal Female indicating possible myocardial injury.  >=22 - Abnormal Male indicating possible  myocardial injury.   Clinicians would have to utilize clinical acumen, EKG, Troponin, and serial changes to determine if it is an Acute Myocardial Infarction or myocardial injury due to an underlying chronic condition.        HIGH SENSITIVITIY TROPONIN T 1HR - Abnormal; Notable for the following components:    HS Troponin T 28 (*)     All other components within normal limits    Narrative:     High Sensitive Troponin T Reference Range:  <14.0 ng/L- Negative Female for AMI  <22.0 ng/L- Negative Male for AMI  >=14 - Abnormal Female indicating possible myocardial injury.  >=22 - Abnormal Male indicating possible myocardial injury.   Clinicians would have to utilize clinical acumen, EKG, Troponin, and serial changes to determine if it is an Acute Myocardial Infarction or myocardial injury due to an underlying chronic condition.        LIPASE - Normal   CBC WITH AUTO DIFFERENTIAL - Normal   RAINBOW DRAW    Narrative:     The following orders were created for panel order [place] Draw.  Procedure                               Abnormality         Status                     ---------                               -----------         ------                     Green Top (Gel)[[id number]]                                  Final result               Lavender Top[[id number]]                                     Final result               Gold Top - SST[[id number]]                                   Final result               Light Blue Top[[id number]]                                   Final result                 Please view results for these tests on the individual orders.   LACTIC ACID, PLASMA   CBC AND DIFFERENTIAL    Narrative:     The following orders were created for panel order CBC & Differential.  Procedure                               Abnormality         Status                     ---------                               -----------         ------                     CBC Auto Differential[[id number]]        Normal               Final result                 Please view results for these tests on the individual orders.   GREEN TOP   LAVENDER TOP   GOLD TOP - SST   LIGHT BLUE TOP           Disposition   DISPOSITION:    ED Disposition       None            CLINICAL IMPRESSION:  No diagnosis found.    PATIENT REFERRED TO:  No follow-up provider specified.    DISCHARGE MEDICATIONS:     Medication List        ASK your doctor about these medications      acyclovir 400 MG tablet  Commonly known as: ZOVIRAX  TAKE ONE TABLET BY MOUTH EVERY DAY     allopurinol 100 MG tablet  Commonly known as: ZYLOPRIM  TAKE TWO TABLETS BY MOUTH DAILY     ASHWAGANDHA PO     aspirin 81 MG EC tablet  Take 1 tablet by mouth Daily.     B-12 (Methylcobalamin) 1000 MCG sublingual tablet     buPROPion  MG 12 hr tablet  Commonly known as: WELLBUTRIN SR  Take 1 tablet by mouth Every Morning.     Coenzyme Q10 200 MG capsule     DIGESTIVE ENZYME PO     dofetilide 250 MCG capsule  Commonly known as: TIKOSYN  Take 1 capsule by mouth Every 12 (Twelve) Hours.     escitalopram 10 MG tablet  Commonly known as: LEXAPRO  TAKE ONE TABLET BY MOUTH EVERY NIGHT     famotidine 20 MG tablet  Commonly known as: PEPCID  TAKE ONE TABLET BY MOUTH TWICE DAILY     ferrous gluconate 324 MG tablet  Commonly known as: FERGON  Take 1 tablet by mouth Daily With Breakfast.     furosemide 20 MG tablet  Commonly known as: LASIX  Take 1 tablet by mouth 2 (Two) Times a Day.     levothyroxine 75 MCG tablet  Commonly known as: SYNTHROID, LEVOTHROID  TAKE ONE TABLET BY MOUTH EVERY DAY     liothyronine 50 MCG tablet  Commonly known as: CYTOMEL  TAKE TWO TABLETS BY MOUTH EVERY DAY     MAGNESIUM PO     MSM PO     OMEGA-3 FISH OIL PO     potassium chloride 10 MEQ CR tablet  TAKE ONE TABLET BY MOUTH TWICE DAILY     PROBIOTIC PO     TART PASTRANA ADVANCED PO     TURMERIC PO     VITAMIN A PO     VITAMIN B COMPLEX PO     vitamin C 500 MG tablet  Commonly known as: ASCORBIC ACID     Vitamin D3 250 MCG (56138 UT)  tablet     vitamin E 400 UNIT capsule     VITAMIN K PO     Zinc 30 MG tablet            Robert Funes DO  Attending Emergency Physician      Robert Funes,   01/23/25 2122

## 2025-01-24 NOTE — ED NOTES
Pt to be going to [facility] 3rd Floor Tele. Number for report is [phone].

## 2025-01-24 NOTE — ED NOTES
Called to give report to [facility], chaitanya nurse was not available to receive report at this time to call back at 2000.

## 2025-01-28 ENCOUNTER — TELEPHONE (OUTPATIENT)
Age: 75
End: 2025-01-28
Payer: MEDICARE

## 2025-01-28 ENCOUNTER — TELEPHONE (OUTPATIENT)
Dept: CARDIOLOGY | Facility: CLINIC | Age: 75
End: 2025-01-28
Payer: MEDICARE

## 2025-01-28 DIAGNOSIS — K43.9 VENTRAL HERNIA WITHOUT OBSTRUCTION OR GANGRENE: Primary | ICD-10-CM

## 2025-01-28 NOTE — TELEPHONE ENCOUNTER
I tried to call the patient to let her know that her Tikosyn is here at the office for her to , but she did not answer. I left a message for her to call me back at the office.

## 2025-01-28 NOTE — TELEPHONE ENCOUNTER
PT CALLED AND REQUESTED A REFERRAL TO A GENERAL SURGERY FOR HERNIA SURGERY. SHE WAS ASKING IF SHE CAN GET ONE WITHOUT A APT TO SEE DR DELGADILLO

## 2025-02-06 ENCOUNTER — HOSPITAL ENCOUNTER (OUTPATIENT)
Dept: MAMMOGRAPHY | Facility: HOSPITAL | Age: 75
Discharge: HOME | End: 2025-02-06
Admitting: FAMILY MEDICINE
Payer: MEDICARE

## 2025-02-06 DIAGNOSIS — Z12.31 ENCOUNTER FOR SCREENING MAMMOGRAM FOR BREAST CANCER: ICD-10-CM

## 2025-02-06 PROCEDURE — 77063 BREAST TOMOSYNTHESIS BI: CPT

## 2025-02-06 PROCEDURE — 77067 SCR MAMMO BI INCL CAD: CPT

## 2025-02-12 NOTE — TELEPHONE ENCOUNTER
Rx Refill Note  Requested Prescriptions     Pending Prescriptions Disp Refills    acyclovir (ZOVIRAX) 400 MG tablet [Pharmacy Med Name: acyclovir 400 mg tablet] 30 tablet 2     Sig: TAKE ONE TABLET BY MOUTH EVERY DAY      Last office visit with prescribing clinician: 10/21/2024   Last telemedicine visit with prescribing clinician: Visit date not found   Next office visit with prescribing clinician: 2/18/2025     Anabella Cruz MA  02/12/25, 09:27 EST

## 2025-02-13 RX ORDER — ACYCLOVIR 400 MG/1
TABLET ORAL
Qty: 90 TABLET | Refills: 2 | Status: SHIPPED | OUTPATIENT
Start: 2025-02-13

## 2025-02-18 ENCOUNTER — OFFICE VISIT (OUTPATIENT)
Age: 75
End: 2025-02-18
Payer: MEDICARE

## 2025-02-18 VITALS
HEIGHT: 63 IN | BODY MASS INDEX: 36.14 KG/M2 | SYSTOLIC BLOOD PRESSURE: 120 MMHG | DIASTOLIC BLOOD PRESSURE: 78 MMHG | OXYGEN SATURATION: 97 % | WEIGHT: 204 LBS | HEART RATE: 65 BPM

## 2025-02-18 DIAGNOSIS — N30.80 ACUTE BACTERIAL SIMPLE CYSTITIS: ICD-10-CM

## 2025-02-18 DIAGNOSIS — I48.11 LONGSTANDING PERSISTENT ATRIAL FIBRILLATION: ICD-10-CM

## 2025-02-18 DIAGNOSIS — R39.9 LOWER URINARY TRACT SYMPTOMS (LUTS): Primary | ICD-10-CM

## 2025-02-18 DIAGNOSIS — I50.32 CHRONIC DIASTOLIC HEART FAILURE: ICD-10-CM

## 2025-02-18 DIAGNOSIS — B96.89 ACUTE BACTERIAL SIMPLE CYSTITIS: ICD-10-CM

## 2025-02-18 DIAGNOSIS — E03.9 ACQUIRED HYPOTHYROIDISM: Chronic | ICD-10-CM

## 2025-02-18 DIAGNOSIS — K43.9 VENTRAL HERNIA WITHOUT OBSTRUCTION OR GANGRENE: ICD-10-CM

## 2025-02-18 DIAGNOSIS — I87.2 VENOUS INSUFFICIENCY OF BOTH LOWER EXTREMITIES: ICD-10-CM

## 2025-02-18 LAB
BILIRUB BLD-MCNC: NEGATIVE MG/DL
CLARITY, POC: CLEAR
COLOR UR: YELLOW
EXPIRATION DATE: ABNORMAL
GLUCOSE UR STRIP-MCNC: NEGATIVE MG/DL
KETONES UR QL: NEGATIVE
LEUKOCYTE EST, POC: ABNORMAL
Lab: ABNORMAL
NITRITE UR-MCNC: NEGATIVE MG/ML
PH UR: 5.5 [PH] (ref 5–8)
PROT UR STRIP-MCNC: NEGATIVE MG/DL
RBC # UR STRIP: NEGATIVE /UL
SP GR UR: 1.01 (ref 1–1.03)
UROBILINOGEN UR QL: NORMAL

## 2025-02-18 PROCEDURE — 84439 ASSAY OF FREE THYROXINE: CPT | Performed by: FAMILY MEDICINE

## 2025-02-18 PROCEDURE — 81003 URINALYSIS AUTO W/O SCOPE: CPT | Performed by: FAMILY MEDICINE

## 2025-02-18 PROCEDURE — 84482 T3 REVERSE: CPT | Performed by: FAMILY MEDICINE

## 2025-02-18 PROCEDURE — 99214 OFFICE O/P EST MOD 30 MIN: CPT | Performed by: FAMILY MEDICINE

## 2025-02-18 PROCEDURE — 84481 FREE ASSAY (FT-3): CPT | Performed by: FAMILY MEDICINE

## 2025-02-18 PROCEDURE — 84443 ASSAY THYROID STIM HORMONE: CPT | Performed by: FAMILY MEDICINE

## 2025-02-18 PROCEDURE — 1126F AMNT PAIN NOTED NONE PRSNT: CPT | Performed by: FAMILY MEDICINE

## 2025-02-18 RX ORDER — LANOLIN ALCOHOL/MO/W.PET/CERES
500 CREAM (GRAM) TOPICAL DAILY
COMMUNITY

## 2025-02-18 RX ORDER — PHYTONADIONE (VIT K1) 100 MCG
100 TABLET ORAL
COMMUNITY

## 2025-02-18 RX ORDER — CEFDINIR 300 MG/1
300 CAPSULE ORAL DAILY
Qty: 7 CAPSULE | Refills: 0 | Status: SHIPPED | OUTPATIENT
Start: 2025-02-18 | End: 2025-02-25

## 2025-02-18 NOTE — PROGRESS NOTES
Established Patient        Chief Complaint:   Chief Complaint   Patient presents with    Hospital Follow Up Visit     Hernia         Salinas Monroy is a 75 y.o. female    History of Present Illness:   Answers submitted by the patient for this visit:  Problem not listed (Submitted on 2/16/2025)  Chief Complaint: Other medical problem  Reason for appointment: Follow Up and Labs  anorexia: No  joint pain: No  change in stool: No  joint swelling: No  swollen glands: No  vertigo: No  visual change: No  Additional information: Follow up    Patient was recently hospitalized due to high-grade ventral hernia with initial concern for obstruction.  Patient's symptoms improved with conservative treatment, did not require surgical intervention.  She reports tolerance of nutritional intake, reports good hydration habits.  Denies hematuria.  Denies BRB/BTS.  No chest pain, syncope, palpitations or vertigo.    Mild dysuria; denies fever, chills or night sweats.  Subjective     The following portions of the patient's history were reviewed and updated as appropriate: allergies, current medications, past family history, past medical history, past social history, past surgical history and problem list.    No Known Allergies    Review of Systems  Constitutional: Negative for fever. Negative for chills, diaphoresis, fatigue and unexpected weight change.   HENT: No dysphagia; no changes to vision/hearing/smell/taste; no epistaxis.  Otherwise, as per above.  Eyes: Negative for redness and visual disturbance.   Respiratory: negative for shortness of breath. Negative for chest pain . Negative for cough and chest tightness.   Cardiovascular: Negative for chest pain and palpitations.   Gastrointestinal: As per above.  Endocrine: Negative for cold intolerance and heat intolerance.   Genitourinary: As per above.  Musculoskeletal: Chronic arthralgias, back pain and myalgias.   Skin: No new rashes or lesions.  Neurological: Negative  "for syncope, weakness and headaches.  Chronic tremors.  Hematological: Negative for adenopathy. Does not bruise/bleed easily.   Psychiatric/Behavioral: Negative for confusion. The patient is not nervous/anxious at present.    Objective     Physical Exam   Vital Signs: /78   Pulse 65   Ht 160 cm (63\")   Wt 92.5 kg (204 lb)   LMP  (LMP Unknown)   SpO2 97%   BMI 36.14 kg/m²     General Appearance: alert, oriented x 3, no acute distress.  Skin: warm and dry.    HEENT: Atraumatic.  pupils round and reactive to light and accommodation, oral mucosa pink and moist.  Nares patent without epistaxis.  External auditory canals are patent tympanic membranes intact.  Boggy/inflamed nasal turbinates with mild postnasal drainage, no pustules or exudate.  Serous effusion noted to bilateral tympanic membranes, however mobility is intact on Valsalva and insufflation.  Neck: supple, no JVD, trachea midline.  No thyromegaly  Lungs: CTA, unlabored breathing effort.  Heart: RR, normal S1 and S2, no S3, no rub.  Abdomen: soft, non-tender, no palpable bladder, present bowel sounds to auscultation ×4.  No guarding or rigidity.  Ventral hernia noted to the abdomen.  No CVA tenderness.  Extremities: no clubbing, cyanosis.  Good range of motion actively and passively.  Symmetric muscle strength and development.  Gravity dependent edema noted to bilateral lower extremities, extending to the mid tibias bilaterally.  Slightly pitting in nature.  Ponce/Sparks sign negative.  Independently ambulatory.  Neuro: normal speech and mental status.  Cranial nerves II through XII intact.  No anosmia. DTR 2+; proprioception intact.  Significantly improved tremulousness.        Assessment and Plan      Assessment:   Diagnoses and all orders for this visit:    1. Lower urinary tract symptoms (LUTS) (Primary)  -     POCT urinalysis dipstick, automated    2. Ventral hernia without obstruction or gangrene    3. Longstanding persistent atrial " fibrillation    4. Chronic diastolic heart failure    5. Venous insufficiency of both lower extremities    6. Acute bacterial simple cystitis  -     cefdinir (OMNICEF) 300 MG capsule; Take 1 capsule by mouth Daily for 7 days.  Dispense: 7 capsule; Refill: 0    7. Acquired hypothyroidism  -     TSH  -     T4, Free  -     T3, free  -     T3, Reverse        Plan:  Keep scheduled appt with Dr. Kinsey    Thyroid labs today.    Tx of simple UTI, will follow clinically.    Vital signs demonstrate hemodynamic stability.    Discussion Summary:    Discussed plan of care in detail with pt today; pt verb understanding and agrees.    I spent 30 minutes caring for Salinas on this date of service. This time includes time spent by me in the following activities:preparing for the visit, obtaining and/or reviewing a separately obtained history, performing a medically appropriate examination and/or evaluation , counseling and educating the patient/family/caregiver, ordering medications, tests, or procedures, documenting information in the medical record, and care coordination    I confirm accuracy of unchanged data/findings which have been carried forward from previous visit, as well as I have updated appropriately those that have changed.    Follow up:  No follow-ups on file.     There are no Patient Instructions on file for this visit.    Jm Marie,   02/18/25  15:19 EST

## 2025-02-19 LAB
T3FREE SERPL-MCNC: 3.37 PG/ML (ref 2–4.4)
T4 FREE SERPL-MCNC: 0.62 NG/DL (ref 0.92–1.68)
TSH SERPL DL<=0.05 MIU/L-ACNC: <0.005 UIU/ML (ref 0.27–4.2)

## 2025-02-22 ENCOUNTER — HOSPITAL ENCOUNTER (EMERGENCY)
Facility: HOSPITAL | Age: 75
Discharge: HOME | End: 2025-02-23
Attending: STUDENT IN AN ORGANIZED HEALTH CARE EDUCATION/TRAINING PROGRAM | Admitting: STUDENT IN AN ORGANIZED HEALTH CARE EDUCATION/TRAINING PROGRAM
Payer: MEDICARE

## 2025-02-22 ENCOUNTER — APPOINTMENT (OUTPATIENT)
Dept: CT IMAGING | Facility: HOSPITAL | Age: 75
End: 2025-02-22
Payer: MEDICARE

## 2025-02-22 DIAGNOSIS — K43.9 VENTRAL HERNIA WITHOUT OBSTRUCTION OR GANGRENE: Primary | ICD-10-CM

## 2025-02-22 LAB
ALBUMIN SERPL-MCNC: 4.1 G/DL (ref 3.5–5.2)
ALBUMIN/GLOB SERPL: 1.3 G/DL
ALP SERPL-CCNC: 113 U/L (ref 39–117)
ALT SERPL W P-5'-P-CCNC: 28 U/L (ref 1–33)
ANION GAP SERPL CALCULATED.3IONS-SCNC: 14.4 MMOL/L (ref 5–15)
AST SERPL-CCNC: 33 U/L (ref 1–32)
BASOPHILS # BLD AUTO: 0.03 10*3/MM3 (ref 0–0.2)
BASOPHILS NFR BLD AUTO: 0.4 % (ref 0–1.5)
BILIRUB SERPL-MCNC: 0.2 MG/DL (ref 0–1.2)
BUN SERPL-MCNC: 71 MG/DL (ref 8–23)
BUN/CREAT SERPL: 78 (ref 7–25)
CALCIUM SPEC-SCNC: 9.7 MG/DL (ref 8.6–10.5)
CHLORIDE SERPL-SCNC: 102 MMOL/L (ref 98–107)
CO2 SERPL-SCNC: 22.6 MMOL/L (ref 22–29)
CREAT SERPL-MCNC: 0.91 MG/DL (ref 0.57–1)
DEPRECATED RDW RBC AUTO: 42.7 FL (ref 37–54)
EGFRCR SERPLBLD CKD-EPI 2021: 65.5 ML/MIN/1.73
EOSINOPHIL # BLD AUTO: 0.29 10*3/MM3 (ref 0–0.4)
EOSINOPHIL NFR BLD AUTO: 3.5 % (ref 0.3–6.2)
ERYTHROCYTE [DISTWIDTH] IN BLOOD BY AUTOMATED COUNT: 12.9 % (ref 12.3–15.4)
GLOBULIN UR ELPH-MCNC: 3.1 GM/DL
GLUCOSE SERPL-MCNC: 100 MG/DL (ref 65–99)
HCT VFR BLD AUTO: 44.4 % (ref 34–46.6)
HGB BLD-MCNC: 14.4 G/DL (ref 12–15.9)
HOLD SPECIMEN: NORMAL
HOLD SPECIMEN: NORMAL
IMM GRANULOCYTES # BLD AUTO: 0.02 10*3/MM3 (ref 0–0.05)
IMM GRANULOCYTES NFR BLD AUTO: 0.2 % (ref 0–0.5)
LIPASE SERPL-CCNC: 151 U/L (ref 13–60)
LYMPHOCYTES # BLD AUTO: 2.74 10*3/MM3 (ref 0.7–3.1)
LYMPHOCYTES NFR BLD AUTO: 33.5 % (ref 19.6–45.3)
MCH RBC QN AUTO: 29.2 PG (ref 26.6–33)
MCHC RBC AUTO-ENTMCNC: 32.4 G/DL (ref 31.5–35.7)
MCV RBC AUTO: 90.1 FL (ref 79–97)
MONOCYTES # BLD AUTO: 0.84 10*3/MM3 (ref 0.1–0.9)
MONOCYTES NFR BLD AUTO: 10.3 % (ref 5–12)
NEUTROPHILS NFR BLD AUTO: 4.26 10*3/MM3 (ref 1.7–7)
NEUTROPHILS NFR BLD AUTO: 52.1 % (ref 42.7–76)
NRBC BLD AUTO-RTO: 0 /100 WBC (ref 0–0.2)
PLATELET # BLD AUTO: 249 10*3/MM3 (ref 140–450)
PMV BLD AUTO: 9.7 FL (ref 6–12)
POTASSIUM SERPL-SCNC: 4.9 MMOL/L (ref 3.5–5.2)
PROT SERPL-MCNC: 7.2 G/DL (ref 6–8.5)
RBC # BLD AUTO: 4.93 10*6/MM3 (ref 3.77–5.28)
SODIUM SERPL-SCNC: 139 MMOL/L (ref 136–145)
WBC NRBC COR # BLD AUTO: 8.18 10*3/MM3 (ref 3.4–10.8)
WHOLE BLOOD HOLD COAG: NORMAL
WHOLE BLOOD HOLD SPECIMEN: NORMAL

## 2025-02-22 PROCEDURE — 80053 COMPREHEN METABOLIC PANEL: CPT | Performed by: STUDENT IN AN ORGANIZED HEALTH CARE EDUCATION/TRAINING PROGRAM

## 2025-02-22 PROCEDURE — 25510000001 IOPAMIDOL 61 % SOLUTION

## 2025-02-22 PROCEDURE — 85025 COMPLETE CBC W/AUTO DIFF WBC: CPT | Performed by: STUDENT IN AN ORGANIZED HEALTH CARE EDUCATION/TRAINING PROGRAM

## 2025-02-22 PROCEDURE — 99285 EMERGENCY DEPT VISIT HI MDM: CPT | Performed by: STUDENT IN AN ORGANIZED HEALTH CARE EDUCATION/TRAINING PROGRAM

## 2025-02-22 PROCEDURE — 83690 ASSAY OF LIPASE: CPT | Performed by: STUDENT IN AN ORGANIZED HEALTH CARE EDUCATION/TRAINING PROGRAM

## 2025-02-22 PROCEDURE — 74177 CT ABD & PELVIS W/CONTRAST: CPT

## 2025-02-22 RX ORDER — SODIUM CHLORIDE 0.9 % (FLUSH) 0.9 %
10 SYRINGE (ML) INJECTION AS NEEDED
Status: DISCONTINUED | OUTPATIENT
Start: 2025-02-22 | End: 2025-02-23 | Stop reason: HOSPADM

## 2025-02-22 RX ORDER — IOPAMIDOL 612 MG/ML
100 INJECTION, SOLUTION INTRAVASCULAR
Status: COMPLETED | OUTPATIENT
Start: 2025-02-23 | End: 2025-02-23

## 2025-02-23 VITALS
BODY MASS INDEX: 37.92 KG/M2 | DIASTOLIC BLOOD PRESSURE: 77 MMHG | OXYGEN SATURATION: 95 % | TEMPERATURE: 98.1 F | RESPIRATION RATE: 18 BRPM | HEIGHT: 63 IN | SYSTOLIC BLOOD PRESSURE: 140 MMHG | WEIGHT: 214 LBS | HEART RATE: 81 BPM

## 2025-02-23 LAB — T3REVERSE SERPL-MCNC: 8.6 NG/DL (ref 9.2–24.1)

## 2025-02-23 PROCEDURE — 25010000002 MORPHINE PER 10 MG: Performed by: NURSE PRACTITIONER

## 2025-02-23 PROCEDURE — 25510000001 IOPAMIDOL 61 % SOLUTION: Performed by: STUDENT IN AN ORGANIZED HEALTH CARE EDUCATION/TRAINING PROGRAM

## 2025-02-23 PROCEDURE — 96374 THER/PROPH/DIAG INJ IV PUSH: CPT

## 2025-02-23 RX ORDER — HYDROCODONE BITARTRATE AND ACETAMINOPHEN 5; 325 MG/1; MG/1
1 TABLET ORAL ONCE
Status: DISCONTINUED | OUTPATIENT
Start: 2025-02-23 | End: 2025-02-23 | Stop reason: HOSPADM

## 2025-02-23 RX ADMIN — MORPHINE SULFATE 4 MG: 4 INJECTION, SOLUTION INTRAMUSCULAR; INTRAVENOUS at 00:09

## 2025-02-23 RX ADMIN — IOPAMIDOL 100 ML: 612 INJECTION, SOLUTION INTRAVENOUS at 00:05

## 2025-02-23 NOTE — ED PROVIDER NOTES
Pt Name: Salinas Monroy  MRN: [id number]  : 1950  Date of Encounter: 2025    PCP: Jm Marie DO      Subjective    History of Present Illness:    Chief Complaint: Abdominal pain    History of Present Illness: Salinas Monroy is a 75 y.o. female who presents to the ER complaining of abdominal pain that started several weeks ago but worsened earlier today.  Patient was seen in this emergency room on 2025 was diagnosed with internal hernia transferred to [facility] for higher level of care.  Patient was discharged from [facility] and was told to follow-up with [facility] general surgery due to the complex nature of her previous abdominal surgeries.  Patient was seen as an outpatient at the [facility] and told that before they can do any kind of abdominal surgery she would need to lose some weight.  Patient states her pain worsened yesterday and she is concerned for a bowel obstruction..  Pain is described as Dull, Constant, and does not radiate  Patient rates pain as a 6 on a ten scale.    Triage Vitals:    ED Triage Vitals [25 2232]   Temp Heart Rate Resp BP SpO2   97.6 °F (36.4 °C) 94 18 176/80 95 %      Temp src Heart Rate Source Patient Position BP Location FiO2 (%)   Oral Monitor Sitting Left arm --       Nurses Notes reviewed and agree, including vitals, allergies, social history and prior medical history.     Patient has no known allergies.    Past Medical History:   Diagnosis Date    Anemia ?    under control    Anxiety     Arthritis     Atrial fibrillation     Bloating     Burping     Chest pain     was seen by Dr Pires and was released was just anxiety    CHF (congestive heart failure)     Colon polyp 2018    Coronary artery disease ??? 2023    COVID-19 vaccine series completed     Depression     Diverticulosis     GERD (gastroesophageal reflux disease)     no longer bothers me...    Gout      Hashimoto's disease     Hernia 03/2012    repair surgery which failed in 2014    History of medical problems AFib...Diagnosed Feb., 2023    Hypothyroid     Impingement syndrome of right shoulder 05/08/2016    Irritable bowel syndrome ??    Obesity     OCD (obsessive compulsive disorder)     Sleep apnea     Tremor     worse on left arm/hand    Wears glasses     reading glasses only       Past Surgical History:   Procedure Laterality Date    APPENDECTOMY      ATRIAL APPENDAGE EXCLUSION LEFT WITH TRANSESOPHAGEAL ECHOCARDIOGRAM N/A 12/4/2023    Procedure: Atrial Appendage Occlusion;  Surgeon: Guanako Thompson MD;  Location: [facility] EP INVASIVE LOCATION;  Service: Cardiology;  Laterality: N/A;    BARIATRIC SURGERY  1979    CARDIOVERSION  09/13/2022    CATARACT EXTRACTION W/ INTRAOCULAR LENS IMPLANT Right 08/08/2022    Procedure: CATARACT PHACO EXTRACTION WITH INTRAOCULAR LENS IMPLANT RIGHT;  Surgeon: Eunice Brooks MD;  Location: [facility] OR;  Service: Ophthalmology;  Laterality: Right;    CATARACT EXTRACTION W/ INTRAOCULAR LENS IMPLANT Left 08/22/2022    Procedure: CATARACT PHACO EXTRACTION WITH INTRAOCULAR LENS IMPLANT LEFT;  Surgeon: Eunice Brooks MD;  Location: [facility] OR;  Service: Ophthalmology;  Laterality: Left;    COLONOSCOPY  2012    failed due to hernia...    COLONOSCOPY N/A 06/08/2017    Procedure: COLONOSCOPY with cold snare polypectomies, argon thermal ablation, ns injection, yanira ink injection;  Surgeon: Rafiq Huang MD;  Location: [facility] ENDOSCOPY;  Service:     COLONOSCOPY N/A 06/05/2018    Procedure: COLONOSCOPY WITH BIOPSIES;  Surgeon: Rafiq Huang MD;  Location: [facility] ENDOSCOPY;  Service: Gastroenterology    ENDOSCOPY N/A 01/27/2021    Procedure: ESOPHAGOGASTRODUODENOSCOPY WITH BIOPSIES AND CLIPS;  Surgeon: Katie Shannon MD;  Location: [facility] ENDOSCOPY;  Service: Gastroenterology;  Laterality: N/A;    GASTRIC BYPASS  1978    HERNIA MESH REMOVAL  2014    BOWEL OBSTRUCTION DUE TO MESH     HERNIA REPAIR  2012    HYSTERECTOMY      complete    JOINT REPLACEMENT   &     Left & Right Full Hip Replacements    SMALL INTESTINE SURGERY  2014    emergency bowel obstruction from hernia failure    TOTAL ABDOMINAL HYSTERECTOMY WITH SALPINGO OOPHORECTOMY      fibroids    TUBAL ABDOMINAL LIGATION      UPPER GASTROINTESTINAL ENDOSCOPY  2010       Social History     Socioeconomic History    Marital status:    Tobacco Use    Smoking status: Former     Current packs/day: 0.00     Average packs/day: 1 pack/day for 41.0 years (41.0 ttl pk-yrs)     Types: Cigarettes     Start date: 1967     Quit date: 2008     Years since quittin.1     Passive exposure: Past    Smokeless tobacco: Never   Vaping Use    Vaping status: Never Used   Substance and Sexual Activity    Alcohol use: Not Currently     Comment: very rarely    Drug use: No    Sexual activity: Not Currently     Partners: Male     Birth control/protection: None, Hysterectomy       Family History   Problem Relation Age of Onset    Obesity Mother     Rheum arthritis Mother     Thyroid disease Mother     Hypertension Mother         Passed     Stroke Mother         Most of her problems were caused by Rheumatoid Ar.    Hyperlipidemia Mother     Arthritis Mother         Rheumatoid Arthritis..Passed     Heart disease Mother         CHF    Cancer Father         Passed     Kidney cancer Father     Liver cancer Father     Heart disease Father         AFib    No Known Problems Brother     Diabetes Maternal Aunt         Passed     Cancer Maternal Uncle     Lung cancer Maternal Uncle     Alcohol abuse Maternal Uncle     No Known Problems Paternal Aunt     Stroke Paternal Uncle     Hypertension Paternal Uncle     Hyperlipidemia Paternal Uncle     Heart disease Paternal Uncle     No Known Problems Brother     Asthma Sister         under control    Alcohol abuse Maternal Uncle     Colon cancer Neg Hx     Breast cancer Neg Hx      Ovarian cancer Neg Hx        REVIEW OF SYSTEMS:     All systems reviewed and not pertinent unless noted.    Review of Systems   Gastrointestinal:  Positive for abdominal distention and abdominal pain.   All other systems reviewed and are negative.      Objective    Physical Exam  Vitals and nursing note reviewed.   Constitutional:       Appearance: Normal appearance.   HENT:      Head: Normocephalic and atraumatic.   Eyes:      Extraocular Movements: Extraocular movements intact.      Pupils: Pupils are equal, round, and reactive to light.   Cardiovascular:      Rate and Rhythm: Normal rate and regular rhythm.      Pulses: Normal pulses.      Heart sounds: Normal heart sounds.   Pulmonary:      Effort: Pulmonary effort is normal.      Breath sounds: Normal breath sounds.   Abdominal:      General: Abdomen is flat. Bowel sounds are normal.      Palpations: Abdomen is soft.      Tenderness:  in the epigastric area      Hernia: A hernia is present. Hernia is present in the ventral area.   Musculoskeletal:      Cervical back: Normal range of motion and neck supple.   Skin:     Capillary Refill: Capillary refill takes less than 2 seconds.   Neurological:      General: No focal deficit present.      Mental Status: She is alert and oriented to person, place, and time. Mental status is at baseline.      GCS: GCS eye subscore is 4. GCS verbal subscore is 5. GCS motor subscore is 6.      Sensory: Sensation is intact.      Motor: Motor function is intact.      Gait: Gait is intact.   Psychiatric:         Attention and Perception: Attention and perception normal.         Mood and Affect: Mood and affect normal.         Speech: Speech normal.         Behavior: Behavior normal. Behavior is cooperative.                           Procedures    ED Course:    No orders to display       ED Course as of 02/23/25 0259   Sun Feb 23, 2025   0256 Discussed findings of the CT scans with patient and have offered admission versus discharge  and follow-up with [facility] surgical clinic for her large hernia.  Patient states she does not want to be admitted now will gladly follow-[facility].  Patient states pain has improved. [KH]      ED Course User Index  [KH] Roscoe Torres APRN       Orders placed during this visit:    Orders Placed This Encounter   Procedures    CT Abdomen Pelvis With Contrast    [place] Draw    Comprehensive Metabolic Panel    Lipase    Urinalysis With Microscopic If Indicated (No Culture) - Urine, Clean Catch    CBC Auto Differential    Insert Peripheral IV    Green Top (Gel)    Lavender Top    Gold Top - SST    Light Blue Top    CBC & Differential       LAB Results:    Lab Results (last 24 hours)       Procedure Component Value Units Date/Time    CBC & Differential [[id number]]  (Normal) Collected: 02/22/25 2252    Specimen: Blood Updated: 02/22/25 2314    Narrative:      The following orders were created for panel order CBC & Differential.  Procedure                               Abnormality         Status                     ---------                               -----------         ------                     CBC Auto Differential[[id number]]        Normal              Final result                 Please view results for these tests on the individual orders.    Comprehensive Metabolic Panel [[id number]]  (Abnormal) Collected: 02/22/25 2252    Specimen: Blood Updated: 02/22/25 2326     Glucose 100 mg/dL      BUN 71 mg/dL      Creatinine 0.91 mg/dL      Sodium 139 mmol/L      Potassium 4.9 mmol/L      Chloride 102 mmol/L      CO2 22.6 mmol/L      Calcium 9.7 mg/dL      Total Protein 7.2 g/dL      Albumin 4.1 g/dL      ALT (SGPT) 28 U/L      AST (SGOT) 33 U/L      Alkaline Phosphatase 113 U/L      Total Bilirubin 0.2 mg/dL      Globulin 3.1 gm/dL      A/G Ratio 1.3 g/dL      BUN/Creatinine Ratio 78.0     Anion Gap 14.4 mmol/L      eGFR 65.5 mL/min/1.73     Narrative:      GFR Categories  in Chronic Kidney Disease (CKD)      GFR Category          GFR (mL/min/1.73)    Interpretation  G1                     90 or greater         Normal or high (1)  G2                      60-89                Mild decrease (1)  G3a                   45-59                Mild to moderate decrease  G3b                   30-44                Moderate to severe decrease  G4                    15-29                Severe decrease  G5                    14 or less           Kidney failure          (1)In the absence of evidence of kidney disease, neither GFR category G1 or G2 fulfill the criteria for CKD.    eGFR calculation 2021 CKD-EPI creatinine equation, which does not include race as a factor    Lipase [[id number]]  (Abnormal) Collected: 02/22/25 2252    Specimen: Blood Updated: 02/22/25 2326     Lipase 151 U/L     CBC Auto Differential [[id number]]  (Normal) Collected: 02/22/25 2252    Specimen: Blood Updated: 02/22/25 2314     WBC 8.18 10*3/mm3      RBC 4.93 10*6/mm3      Hemoglobin 14.4 g/dL      Hematocrit 44.4 %      MCV 90.1 fL      MCH 29.2 pg      MCHC 32.4 g/dL      RDW 12.9 %      RDW-SD 42.7 fl      MPV 9.7 fL      Platelets 249 10*3/mm3      Neutrophil % 52.1 %      Lymphocyte % 33.5 %      Monocyte % 10.3 %      Eosinophil % 3.5 %      Basophil % 0.4 %      Immature Grans % 0.2 %      Neutrophils, Absolute 4.26 10*3/mm3      Lymphocytes, Absolute 2.74 10*3/mm3      Monocytes, Absolute 0.84 10*3/mm3      Eosinophils, Absolute 0.29 10*3/mm3      Basophils, Absolute 0.03 10*3/mm3      Immature Grans, Absolute 0.02 10*3/mm3      nRBC 0.0 /100 WBC              If labs were ordered, I have independently reviewed the results and considered them in the diagnosis and treatment plan for the patient    RADIOLOGY    CT Abdomen Pelvis With Contrast    Result Date: 2/23/2025  FINAL REPORT TECHNIQUE: null CLINICAL HISTORY: Epigastric abdominal pain, nausea COMPARISON: null FINDINGS: CT abdomen and pelvis with IV contrast.  Comparison: CT/SR - CT ABDOMEN PELVIS W CONTRAST - 1/23/25 14:38 EST FINDINGS: Lung bases: No soft tissue nodule, infiltrate, or pleural effusion. Upper Abdomen: The liver, spleen, pancreas, and gallbladder are within normal limits. No biliary dilatation. Retroperitoneum/Pelvis: Severe calcific atheromatous change of the abdominal aorta without aneurysm or dissection. No adrenal mass. Normal-sized kidneys. Small cortical cysts bilaterally. No solid renal mass, hydronephrosis, or nephrolithiasis. Nondilated ureters. Urinary bladder is nondistended and is obscured by metallic artifact from bilateral total hip arthroplasties. Uterus appears absent. No adnexal mass. Bowel/Mesentery: Moderate solid stool throughout the colon. Distended cecum containing stool. The hepatic flexure segment of the colon and proximal transverse colon segments extended to a epigastric abdominal wall hernia without evidence of obstruction. Cecum terminates in the mid abdomen. The appendix is not identified. Ramakrishna-en-Y gastric bypass changes. Enteric anastomosis in the mid left abdomen without evidence of obstruction. Excluded stomach is moderately dilated, fluid and air-filled. No free air or fluid. Bone/Extraperitoneal Soft Tissues: Multilevel severe spondylosis with grade 1 anterolisthesis of L4 on L5. No acute fracture of the lumbar spine or bony pelvis.     Impression: IMPRESSION: 1. Moderate-sized epigastric abdominal wall hernia containing part of the right hemicolon, without obstruction and part of the distal stomach. 2. Moderate solid stool throughout the colon. No colonic inflammation. 3. Ramakrishna-en-Y gastric bypass changes with moderately dilated excluded stomach which may be compressed distally by the epigastric hernia. 4. Other findings above. Authenticated and Electronically Signed by Raymond Calvin MD on 02/23/2025 02:50:19 AM      If I have ordered, I have independently reviewed the above noted radiographic studies.  Please see the  radiologist dictation for the official interpretation    Medications given to patient in the ER    Medications   sodium chloride 0.9 % flush 10 mL (has no administration in time range)   HYDROcodone-acetaminophen (NORCO) 5-325 MG per tablet 1 tablet (0 tablets Oral Hold 2/23/25 0220)   morphine injection 4 mg (4 mg Intravenous Given 2/23/25 0009)   iopamidol (ISOVUE-300) 61 % injection 100 mL (100 mL Intravenous Given 2/23/25 0005)       AS OF 02:59 EST VITALS:    BP - 146/76  HR - 78  TEMP - 97.6 °F (36.4 °C) (Oral)  O2 SATS - 93%         Shared Decision Making: After my consideration of the clinical presentation and laboratory/radiology studies obtained, I have discussed the findings with the patient/patient representative who is in agreement with the treatment plan and final disposition. Risks and benefits of discharge and/or observation admission were discussed.  Final disposition of the patient will be discharged home.  Patient is requested to follow-up with primary care provider and specialist in 1 week following final discharge.      Medical Decision Making  Salinas Monroy is a 75 y.o. female who presents to the ER complaining of abdominal pain that started several weeks ago but worsened earlier today.  Patient was seen in this emergency room on 1- was diagnosed with internal hernia transferred to [facility] for higher level of care.  Patient was discharged from [facility] and was told to follow-up with [facility] general surgery due to the complex nature of her previous abdominal surgeries.  Patient was seen as an outpatient at the [facility] and told that before they can do any kind of abdominal surgery she would need to lose some weight.  Patient states her pain worsened yesterday and she is concerned for a bowel obstruction..  Pain is described as Dull, Constant, and does not radiate  Patient rates pain as a 6 on a ten scale.    DDX:  includes but is not limited to: Bowel obstruction, ventral hernia, abdominal pain, constipation, other    Problems Addressed:  Ventral hernia without obstruction or gangrene: complicated acute illness or injury    Amount and/or Complexity of Data Reviewed  External Data Reviewed: labs, radiology, ECG and notes.     Details: I have personally reviewed labs, radiology EKG and notes from patient's chart  Labs: ordered. Decision-making details documented in ED Course.     Details: I have personally reviewed and documented all results  Radiology: ordered.     Details: I have personally reviewed and documented all results  Discussion of management or test interpretation with external provider(s): Discussed assessment, treatment and plan with ER attending    Risk  Prescription drug management.  Risk Details: I have discussed with patient the finding of the test preformed today. Patient has been diagnosed with ventral hernia without obstruction and will be discharged home. Advised on return precautions the importance of close follow-up with primary care provider.  Strict return precautions have been given and patient verbalizes understanding        Final diagnoses:   Ventral hernia without obstruction or gangrene       Please note that portions of this document were completed using voice recognition dictation software.       Roscoe Torres, APRN  02/23/25 0259

## 2025-03-12 DIAGNOSIS — E03.9 HYPOTHYROIDISM, ADULT: ICD-10-CM

## 2025-03-13 RX ORDER — LIOTHYRONINE SODIUM 50 UG/1
100 TABLET ORAL DAILY
Qty: 60 TABLET | Refills: 3 | Status: SHIPPED | OUTPATIENT
Start: 2025-03-13

## 2025-03-13 NOTE — TELEPHONE ENCOUNTER
Rx Refill Note  Requested Prescriptions     Pending Prescriptions Disp Refills    liothyronine (CYTOMEL) 50 MCG tablet [Pharmacy Med Name: liothyronine 50 mcg tablet] 60 tablet 3     Sig: TAKE TWO TABLETS BY MOUTH EVERY DAY      Last office visit with prescribing clinician: 2/18/2025   Last telemedicine visit with prescribing clinician: Visit date not found   Next office visit with prescribing clinician: Visit date not found                         Would you like a call back once the refill request has been completed: [] Yes [] No    If the office needs to give you a call back, can they leave a voicemail: [] Yes [] No    Ann Chung MA  03/13/25, 13:17 EDT

## 2025-03-20 DIAGNOSIS — F31.62 BIPOLAR DISORDER, CURRENT EPISODE MIXED, MODERATE: ICD-10-CM

## 2025-03-20 RX ORDER — BUPROPION HYDROCHLORIDE 150 MG/1
150 TABLET, EXTENDED RELEASE ORAL EVERY MORNING
Qty: 90 TABLET | Refills: 3 | Status: SHIPPED | OUTPATIENT
Start: 2025-03-20

## 2025-03-20 RX ORDER — BUPROPION HYDROCHLORIDE 150 MG/1
150 TABLET, EXTENDED RELEASE ORAL EVERY MORNING
Qty: 180 TABLET | Refills: 2 | Status: SHIPPED | OUTPATIENT
Start: 2025-03-20 | End: 2025-03-20

## 2025-03-20 RX ORDER — BUPROPION HYDROCHLORIDE 150 MG/1
150 TABLET, EXTENDED RELEASE ORAL EVERY MORNING
Qty: 180 TABLET | Refills: 2 | OUTPATIENT
Start: 2025-03-20

## 2025-03-20 NOTE — TELEPHONE ENCOUNTER
Rx Refill Note  Requested Prescriptions     Pending Prescriptions Disp Refills    buPROPion SR (WELLBUTRIN SR) 150 MG 12 hr tablet 180 tablet 2     Sig: Take 1 tablet by mouth Every Morning.      Last office visit with prescribing clinician: 2/18/2025   Last telemedicine visit with prescribing clinician: Visit date not found   Next office visit with prescribing clinician: Visit date not found       Anabella Cruz MA  03/20/25, 16:29 EDT

## 2025-04-04 ENCOUNTER — APPOINTMENT (OUTPATIENT)
Dept: BONE DENSITY | Facility: HOSPITAL | Age: 75
End: 2025-04-04
Payer: MEDICARE

## 2025-04-04 DIAGNOSIS — Z78.0 POSTMENOPAUSAL: ICD-10-CM

## 2025-04-04 DIAGNOSIS — Z13.820 ENCOUNTER FOR SCREENING FOR OSTEOPOROSIS: ICD-10-CM

## 2025-04-04 PROCEDURE — 77080 DXA BONE DENSITY AXIAL: CPT

## 2025-04-11 DIAGNOSIS — K21.9 GASTROESOPHAGEAL REFLUX DISEASE, UNSPECIFIED WHETHER ESOPHAGITIS PRESENT: Chronic | ICD-10-CM

## 2025-04-11 RX ORDER — FAMOTIDINE 20 MG/1
20 TABLET, FILM COATED ORAL EVERY 12 HOURS SCHEDULED
Qty: 180 TABLET | Refills: 3 | Status: SHIPPED | OUTPATIENT
Start: 2025-04-11

## 2025-04-11 NOTE — TELEPHONE ENCOUNTER
Rx Refill Note  Requested Prescriptions     Pending Prescriptions Disp Refills    famotidine (PEPCID) 20 MG tablet [Pharmacy Med Name: famotidine 20 mg tablet] 60 tablet 5     Sig: TAKE ONE TABLET BY MOUTH TWICE DAILY      Last office visit with prescribing clinician: 2/18/2025   Last telemedicine visit with prescribing clinician: Visit date not found   Next office visit with prescribing clinician: Visit date not found                         Would you like a call back once the refill request has been completed: [] Yes [] No    If the office needs to give you a call back, can they leave a voicemail: [] Yes [] No    Anabella Cruz MA  04/11/25, 12:26 EDT

## 2025-05-19 ENCOUNTER — OFFICE VISIT (OUTPATIENT)
Dept: CARDIOLOGY | Facility: CLINIC | Age: 75
End: 2025-05-19
Payer: MEDICARE

## 2025-05-19 VITALS
HEIGHT: 63 IN | DIASTOLIC BLOOD PRESSURE: 86 MMHG | BODY MASS INDEX: 37.03 KG/M2 | WEIGHT: 209 LBS | RESPIRATION RATE: 20 BRPM | HEART RATE: 81 BPM | SYSTOLIC BLOOD PRESSURE: 152 MMHG | OXYGEN SATURATION: 97 %

## 2025-05-19 DIAGNOSIS — E66.01 SEVERE OBESITY (BMI 35.0-39.9) WITH COMORBIDITY: ICD-10-CM

## 2025-05-19 DIAGNOSIS — I50.32 CHRONIC DIASTOLIC HEART FAILURE: ICD-10-CM

## 2025-05-19 DIAGNOSIS — Z95.818 PRESENCE OF WATCHMAN LEFT ATRIAL APPENDAGE CLOSURE DEVICE: ICD-10-CM

## 2025-05-19 DIAGNOSIS — I48.11 LONGSTANDING PERSISTENT ATRIAL FIBRILLATION: Primary | ICD-10-CM

## 2025-05-19 PROCEDURE — 93000 ELECTROCARDIOGRAM COMPLETE: CPT | Performed by: INTERNAL MEDICINE

## 2025-05-19 PROCEDURE — 99213 OFFICE O/P EST LOW 20 MIN: CPT | Performed by: INTERNAL MEDICINE

## 2025-05-19 NOTE — PROGRESS NOTES
[facility] Cardiology Office Follow Up Note    Salinas Monroy  [id number]  2025    Primary Care Provider: Jm Marie DO    Chief Complaint: Routine follow-up    History of Present Illness:   Mrs. Salinas Monroy is a 75 y.o. female who presents to the Cardiology Clinic for routine follow-up.  The patient has a past medical history significant for hypertension, diastolic heart failure, hypothyroidism in the setting of prior Hashimoto's thyroiditis, Parkinson's disease, osteoarthritis, obesity, and ISABEL (but no longer uses CPAP).   She has a past cardiac history significant for longstanding persistent atrial fibrillation.  She has previously undergone cardioversion with initiation of Tikosyn.  She also has a Watchman device in place.  Today, the patient reports she is doing well from a cardiac standpoint.  She has not had any recurrent episodes of palpitations or symptoms to suggest recurrent symptomatic PAF.  She continues to tolerate TSH within without difficulty.  No recent lower extremity edema, orthopnea, or PND.  She denies exertional dyspnea.  No other specific complaints today.    Past Cardiac Testin. Last Coronary Angio: None  2. Prior Stress Testing: MPS      Normal pharmacologic MPS with no evidence of inducible ischemia.   Hyperdynamic LV systolic function, LVEF 71%.   3. Last Echo:  3/23              1.  Normal left ventricular size and systolic function, LVEF 60-65%.              2.  Mild concentric LVH.              3.  Normal LV diastolic filling pattern.              4.  Normal right ventricular size and systolic function.              5.  Moderately increased left atrial volume index.              6.  Trace AI and MR.              7.  Mild tricuspid regurgitation, RVSP 32 mmHg.  4.  Holter monitor: 14-day               1.  100% AF burden, average heart rate 83 bpm, max 146 bpm    Review of Systems:   Review of Systems    Constitutional:  Negative for activity change, appetite change, chills, diaphoresis, fatigue, fever, unexpected weight gain and unexpected weight loss.   Eyes:  Negative for blurred vision and double vision.   Respiratory:  Negative for cough, chest tightness, shortness of breath and wheezing.    Cardiovascular:  Negative for chest pain, palpitations and leg swelling.   Gastrointestinal:  Negative for abdominal pain, anal bleeding, blood in stool and GERD.   Endocrine: Negative for cold intolerance and heat intolerance.   Genitourinary:  Negative for hematuria.   Neurological:  Negative for dizziness, syncope, weakness and light-headedness.   Hematological:  Does not bruise/bleed easily.   Psychiatric/Behavioral:  Negative for depressed mood and stress. The patient is not nervous/anxious.        I have reviewed and/or updated the patient's past medical, past surgical, family, social history, problem list and allergies as appropriate.     Medications:     Current Outpatient Medications:     acyclovir (ZOVIRAX) 400 MG tablet, TAKE ONE TABLET BY MOUTH EVERY DAY, Disp: 90 tablet, Rfl: 2    allopurinol (ZYLOPRIM) 100 MG tablet, TAKE TWO TABLETS BY MOUTH DAILY, Disp: 180 tablet, Rfl: 2    ASHWAGANDHA PO, Take  by mouth Daily., Disp: , Rfl:     aspirin 81 MG EC tablet, Take 1 tablet by mouth Daily., Disp: 90 tablet, Rfl: 3    B Complex Vitamins (VITAMIN B COMPLEX PO), Take 1 tablet by mouth Daily., Disp: , Rfl:     buPROPion SR (WELLBUTRIN SR) 150 MG 12 hr tablet, Take 1 tablet by mouth Every Morning., Disp: 90 tablet, Rfl: 3    Cholecalciferol (VITAMIN D3) 10260 UNITS tablet, Take 7,500 Units by mouth Daily., Disp: , Rfl:     Coenzyme Q10 400 MG capsule, Take 1 capsule by mouth Daily., Disp: , Rfl:     Digestive Enzymes (DIGESTIVE ENZYME PO), Take 1 tablet by mouth Daily., Disp: , Rfl:     dofetilide (TIKOSYN) 250 MCG capsule, Take 1 capsule by mouth Every 12 (Twelve) Hours., Disp: 60 capsule, Rfl: 0    escitalopram  (LEXAPRO) 10 MG tablet, TAKE ONE TABLET BY MOUTH EVERY NIGHT, Disp: 90 tablet, Rfl: 1    famotidine (PEPCID) 20 MG tablet, TAKE ONE TABLET BY MOUTH TWICE DAILY, Disp: 180 tablet, Rfl: 3    ferrous gluconate (FERGON) 324 MG tablet, Take 1 tablet by mouth Daily With Breakfast. (Patient taking differently: Take 1 tablet by mouth Every Night.), Disp: 30 tablet, Rfl: 5    furosemide (LASIX) 20 MG tablet, Take 1 tablet by mouth 2 (Two) Times a Day., Disp: 180 tablet, Rfl: 3    levothyroxine (SYNTHROID, LEVOTHROID) 75 MCG tablet, TAKE ONE TABLET BY MOUTH EVERY DAY, Disp: 90 tablet, Rfl: 1    liothyronine (CYTOMEL) 50 MCG tablet, TAKE TWO TABLETS BY MOUTH EVERY DAY, Disp: 60 tablet, Rfl: 3    MAGNESIUM PO, Take 1,000 mg by mouth Every Night., Disp: , Rfl:     Methylsulfonylmethane (MSM PO), Take 1 tablet by mouth Daily., Disp: , Rfl:     Misc Natural Products (TART CHERRY ADVANCED PO), Take 2 capsules by mouth Daily., Disp: , Rfl:     Omega-3 Fatty Acids (OMEGA-3 FISH OIL PO), Take 1,000 Units by mouth 3 (Three) Times a Day., Disp: , Rfl:     potassium chloride 10 MEQ CR tablet, TAKE ONE TABLET BY MOUTH TWICE DAILY, Disp: 60 tablet, Rfl: 1    Probiotic Product (PROBIOTIC PO), Take 1 tablet by mouth Daily., Disp: , Rfl:     TURMERIC PO, Take 1 tablet by mouth 2 (two) times a day., Disp: , Rfl:     VITAMIN A PO, Take 1 capsule by mouth Daily., Disp: , Rfl:     vitamin B-12 (CYANOCOBALAMIN) 1000 MCG tablet, Take 0.5 tablets by mouth Daily., Disp: , Rfl:     vitamin C (ASCORBIC ACID) 500 MG tablet, Take 21 mg by mouth 3 (Three) Times a Day As Needed., Disp: , Rfl:     vitamin E 400 UNIT capsule, Take 1 capsule by mouth Daily., Disp: , Rfl:     VITAMIN K PO, Take 100 mcg by mouth Daily., Disp: , Rfl:     Zinc 30 MG tablet, Take  by mouth Daily., Disp: , Rfl:     Coenzyme Q10 200 MG capsule, Take 1 capsule by mouth 2 (Two) Times a Day. (Patient not taking: Reported on 5/19/2025), Disp: , Rfl:     Vitamin K, Phytonadione, 100 MCG  "tablet, Take 100 mcg by mouth., Disp: , Rfl:     Physical Exam:  Vital Signs:   Vitals:    05/19/25 1414   BP: 152/86   BP Location: Right arm   Patient Position: Sitting   Cuff Size: Adult   Pulse: 81   Resp: 20   SpO2: 97%   Weight: 94.8 kg (209 lb)   Height: 160 cm (62.99\")       Physical Exam  Constitutional:       General: She is not in acute distress.     Appearance: Normal appearance. She is well-developed. She is not diaphoretic.   HENT:      Head: Normocephalic and atraumatic.   Eyes:      General: No scleral icterus.     Pupils: Pupils are equal, round, and reactive to light.   Neck:      Trachea: No tracheal deviation.   Cardiovascular:      Rate and Rhythm: Normal rate and regular rhythm.      Heart sounds: Normal heart sounds. No murmur heard.     No friction rub. No gallop.      Comments: Normal JVD.    Pulmonary:      Effort: Pulmonary effort is normal. No respiratory distress.      Breath sounds: Normal breath sounds. No stridor. No wheezing or rales.   Chest:      Chest wall: No tenderness.   Abdominal:      General: Bowel sounds are normal. There is no distension.      Palpations: Abdomen is soft.      Tenderness: There is no abdominal tenderness. There is no guarding or rebound.   Musculoskeletal:         General: No swelling. Normal range of motion.      Cervical back: Neck supple. No tenderness.   Lymphadenopathy:      Cervical: No cervical adenopathy.   Skin:     General: Skin is warm and dry.      Findings: No erythema.   Neurological:      General: No focal deficit present.      Mental Status: She is alert and oriented to person, place, and time.   Psychiatric:         Mood and Affect: Mood normal.         Behavior: Behavior normal.         Results Review:   I reviewed the patient's new clinical results.      ECG 12 Lead    Date/Time: 5/19/2025 2:48 PM  Performed by: Jose Antonio Pires MD    Authorized by: Jose Antonio Pires MD  Comparison: not compared with previous ECG   Rhythm: sinus " rhythm  Rate: normal  QRS axis: left    Clinical impression: abnormal EKG          Assessment / Plan:     1.  Longstanding persistent atrial fibrillation  -- Remains in sinus rhythm today  -- No recent episodes of palpitations  -- Continue Tikosyn, followed by electrophysiology  --Has Watchman device in place     2.  Chronic diastolic heart failure  -- Euvolemic on exam today  --Continue Lasix     3.  Obesity, BMI 37  -- Weight loss through diet and exercise advised    Follow Up:   Return in about 6 months (around 11/19/2025).      Thank you for allowing me to participate in the care of your patient. Please to not hesitate to contact me with additional questions or concerns.     JEWELL Pires MD  Interventional Cardiology   05/19/2025  14:08 EDT

## 2025-05-20 ENCOUNTER — TELEPHONE (OUTPATIENT)
Age: 75
End: 2025-05-20
Payer: MEDICARE

## 2025-05-20 NOTE — TELEPHONE ENCOUNTER
"Caller: Salinas Monroy \"Del\"    Relationship: Self    Best call back number: [phone]    What medication are you requesting:     PHENTERMINE    What are your current symptoms:     SHE HAS A LARGE HERNIA IN STOMACH.  CARDIOLOGIST SUGGESTED SHE HAS PCP FOR WEIGHT LOSS DRUG SINCE SURGEON WANTS HER TO GET DOWN  LBS.  PATIENT IS INTERESTED IN TAKING PHENTERMINE.  HER BROTHER AND SISTER HAVE USED IT    If a prescription is needed, what is your preferred pharmacy and phone number:      [facility] [address] [phone] [facility] [phone]      Additional notes:    PLEASE CALL PATIENT TO LET HER KNOW       "

## 2025-05-20 NOTE — TELEPHONE ENCOUNTER
"Relay     \"Please let patient know she needs an appointment with Dr Marie to discuss potential use of new medicine. Okay to schedule her if she calls back.\"      "

## 2025-05-21 NOTE — TELEPHONE ENCOUNTER
"Name: Salinas Monroyye \"Del\"      Relationship: Self      Best Callback Number: [phone]      HUB PROVIDED THE RELAY MESSAGE FROM THE OFFICE      PATIENT: VOICED UNDERSTANDING AND HAS NO FURTHER QUESTIONS AT THIS TIME    ADDITIONAL INFORMATION: PATIENT MADE APPOINTMENT  "

## 2025-05-27 ENCOUNTER — OFFICE VISIT (OUTPATIENT)
Age: 75
End: 2025-05-27
Payer: MEDICARE

## 2025-05-27 VITALS
BODY MASS INDEX: 36.68 KG/M2 | OXYGEN SATURATION: 95 % | TEMPERATURE: 98.4 F | HEART RATE: 76 BPM | SYSTOLIC BLOOD PRESSURE: 137 MMHG | DIASTOLIC BLOOD PRESSURE: 75 MMHG | HEIGHT: 63 IN | WEIGHT: 207 LBS

## 2025-05-27 DIAGNOSIS — F31.62 BIPOLAR DISORDER, CURRENT EPISODE MIXED, MODERATE: ICD-10-CM

## 2025-05-27 DIAGNOSIS — E66.812 CLASS 2 SEVERE OBESITY DUE TO EXCESS CALORIES WITH SERIOUS COMORBIDITY AND BODY MASS INDEX (BMI) OF 36.0 TO 36.9 IN ADULT: ICD-10-CM

## 2025-05-27 DIAGNOSIS — K43.9 VENTRAL HERNIA WITHOUT OBSTRUCTION OR GANGRENE: ICD-10-CM

## 2025-05-27 DIAGNOSIS — E03.9 ACQUIRED HYPOTHYROIDISM: Chronic | ICD-10-CM

## 2025-05-27 DIAGNOSIS — I87.2 VENOUS INSUFFICIENCY OF BOTH LOWER EXTREMITIES: ICD-10-CM

## 2025-05-27 DIAGNOSIS — G47.33 OSA ON CPAP: Primary | ICD-10-CM

## 2025-05-27 DIAGNOSIS — I48.11 LONGSTANDING PERSISTENT ATRIAL FIBRILLATION: ICD-10-CM

## 2025-05-27 DIAGNOSIS — M15.0 PRIMARY OSTEOARTHRITIS INVOLVING MULTIPLE JOINTS: ICD-10-CM

## 2025-05-27 DIAGNOSIS — I50.32 CHRONIC DIASTOLIC HEART FAILURE: ICD-10-CM

## 2025-05-27 DIAGNOSIS — E66.01 CLASS 2 SEVERE OBESITY DUE TO EXCESS CALORIES WITH SERIOUS COMORBIDITY AND BODY MASS INDEX (BMI) OF 36.0 TO 36.9 IN ADULT: ICD-10-CM

## 2025-05-27 NOTE — PROGRESS NOTES
Established Patient        Chief Complaint:   Chief Complaint   Patient presents with    Obesity     Patient wants to talk about weight loss medications        Salinas Monroy is a 75 y.o. female    History of Present Illness:   Answers submitted by the patient for this visit:  Weight Management (Submitted on 5/25/2025)  Chief Complaint: Weight Management  Weight: unchanged  Weight loss treatment: low calorie, low carb diet, portion control  Eating habit changes: No changes  Energy level: decreased  Physical activity tolerance: stable  Treatment barriers: none      Needs weight loss before recommended surgical repair of ventral hernia; pt has limited ability to substantially increase physical activity.  She would like to utilize a GLP-1 medication.    Has failed numerous dietary modifications, and activity as able.    She denies chest pain, syncope, palpitations or vertigo.  Denies hematuria/dysuria, no BRB/BTS.  Reports good hydration habits.  Subjective     The following portions of the patient's history were reviewed and updated as appropriate: allergies, current medications, past family history, past medical history, past social history, past surgical history and problem list.    No Known Allergies    Review of Systems  Constitutional: Negative for fever. Negative for chills, diaphoresis, fatigue and unexpected weight change.   HENT: No dysphagia; no changes to vision/hearing/smell/taste; no epistaxis.  Otherwise, as per above.  Eyes: Negative for redness and visual disturbance.   Respiratory: negative for shortness of breath. Negative for chest pain . Negative for cough and chest tightness.   Cardiovascular: Negative for chest pain and palpitations.   Gastrointestinal: As per above.  Endocrine: Negative for cold intolerance and heat intolerance.   Genitourinary: As per above.  Musculoskeletal: Chronic arthralgias, back pain and myalgias.   Skin: No new rashes or lesions.  Neurological: Negative for  "syncope, weakness and headaches.  Chronic tremors.  Hematological: Negative for adenopathy. Does not bruise/bleed easily.   Psychiatric/Behavioral: Negative for confusion. The patient is not nervous/anxious at present.    Objective     Physical Exam   Vital Signs: /75   Pulse 76   Temp 98.4 °F (36.9 °C)   Ht 160 cm (62.99\")   Wt 93.9 kg (207 lb)   LMP  (LMP Unknown)   SpO2 95%   BMI 36.68 kg/m²     General Appearance: alert, oriented x 3, no acute distress.  Skin: warm and dry.    HEENT: Atraumatic.  pupils round and reactive to light and accommodation, oral mucosa pink and moist.  Nares patent without epistaxis.  External auditory canals are patent tympanic membranes intact.  Boggy/inflamed nasal turbinates with mild postnasal drainage, no pustules or exudate.  Serous effusion noted to bilateral tympanic membranes, however mobility is intact on Valsalva and insufflation.  Neck: supple, no JVD, trachea midline.  No thyromegaly  Lungs: CTA, unlabored breathing effort.  Heart: RR, normal S1 and S2, no S3, no rub.  Abdomen: soft, non-tender, no palpable bladder, present bowel sounds to auscultation ×4.  No guarding or rigidity.  Ventral hernia noted to the abdomen.  No CVA tenderness.  Extremities: no clubbing, cyanosis.  Good range of motion actively and passively.  Symmetric muscle strength and development.  Gravity dependent edema noted to bilateral lower extremities, extending to the mid tibias bilaterally.  Slightly pitting in nature.  Ponce/Sparks sign negative.  Independently ambulatory.  Neuro: normal speech and mental status.  Cranial nerves II through XII intact.  No anosmia. DTR 2+; proprioception intact.  Significantly improved tremulousness.        Assessment and Plan      Assessment:   Diagnoses and all orders for this visit:    1. ISABEL on CPAP (Primary)  -     Tirzepatide-Weight Management (ZEPBOUND) 2.5 MG/0.5ML solution auto-injector; Inject 0.5 mL under the skin into the appropriate area " as directed 1 (One) Time Per Week.  Dispense: 6 mL; Refill: 0    2. Primary osteoarthritis involving multiple joints    3. Bipolar disorder, current episode mixed, moderate    4. Acquired hypothyroidism    5. Chronic diastolic heart failure  -     Tirzepatide-Weight Management (ZEPBOUND) 2.5 MG/0.5ML solution auto-injector; Inject 0.5 mL under the skin into the appropriate area as directed 1 (One) Time Per Week.  Dispense: 6 mL; Refill: 0    6. Longstanding persistent atrial fibrillation    7. Venous insufficiency of both lower extremities  -     Tirzepatide-Weight Management (ZEPBOUND) 2.5 MG/0.5ML solution auto-injector; Inject 0.5 mL under the skin into the appropriate area as directed 1 (One) Time Per Week.  Dispense: 6 mL; Refill: 0    8. Ventral hernia without obstruction or gangrene  -     Tirzepatide-Weight Management (ZEPBOUND) 2.5 MG/0.5ML solution auto-injector; Inject 0.5 mL under the skin into the appropriate area as directed 1 (One) Time Per Week.  Dispense: 6 mL; Refill: 0    9. Class 2 severe obesity due to excess calories with serious comorbidity and body mass index (BMI) of 36.0 to 36.9 in adult  -     Tirzepatide-Weight Management (ZEPBOUND) 2.5 MG/0.5ML solution auto-injector; Inject 0.5 mL under the skin into the appropriate area as directed 1 (One) Time Per Week.  Dispense: 6 mL; Refill: 0        Plan:  Due to underlying ISABEL, on CPAP, and other chronic comorbid conditions, including chronic diastolic CHF, atrial fibrillation, GERD and venous insufficiency of the lower extremities, planned trial of GLP-1 medication.  I have asked patient to notify the office should she develop any ill effects to the new medication.    VSS, appears HD asymptomatic.  Keep scheduled f/u appt with cardiology, Dr. Pires, concerning her atrial fibrillation.    Discussed need for stress/anxiety reducing techniques such as prayer/meditation/breathing and counting exercises and avoidance of stress producing  environments/situations; will follow clinically.    Keep scheduled follow-up appointment with behavioral health.      Continue thyroid supplementation, patient defers lab evaluation at today's visit.  She will have these done at her next office visit.    Discussion Summary:    Discussed plan of care in detail with pt today; pt verb understanding and agrees.    I spent 35 minutes caring for Salinas on this date of service. This time includes time spent by me in the following activities:preparing for the visit, performing a medically appropriate examination and/or evaluation , counseling and educating the patient/family/caregiver, ordering medications, tests, or procedures, documenting information in the medical record, and care coordination    I confirm accuracy of unchanged data/findings which have been carried forward from previous visit, as well as I have updated appropriately those that have changed.    Follow up:  Return in about 3 months (around 8/27/2025) for Recheck.     There are no Patient Instructions on file for this visit.    Jm Marie DO  05/27/25  13:47 EDT

## 2025-05-29 ENCOUNTER — TELEPHONE (OUTPATIENT)
Age: 75
End: 2025-05-29

## 2025-05-29 NOTE — TELEPHONE ENCOUNTER
"  Caller: Salinas Monroy \"Del\"    Relationship: Self    Best call back number: [phone]       What was the call regarding: PATIENT STATES THAT ZEPBOUND HAS BEEN DENIED BY INSURANCE BECAUSE IT STATES THAT IT WAS FOR WEIGHT LOSS BUT SHE WANTS TO SEE IF DR DELGADILLO CAN ALSO PUT DOWN HER SLEEP APNEA AND SURGERY FOR HERNIA SHE NEEDS AND IF THIS IS NOT APPROVED SHE WANTS TO SEE WHAT ELSE CAN BE DONE    Is it okay if the provider responds through MyChart:         "

## 2025-06-10 DIAGNOSIS — E03.9 HYPOTHYROIDISM, ADULT: ICD-10-CM

## 2025-06-10 DIAGNOSIS — E03.9 ACQUIRED HYPOTHYROIDISM: Chronic | ICD-10-CM

## 2025-06-10 RX ORDER — LEVOTHYROXINE SODIUM 75 UG/1
75 TABLET ORAL DAILY
Qty: 90 TABLET | Refills: 1 | Status: SHIPPED | OUTPATIENT
Start: 2025-06-10

## 2025-06-10 RX ORDER — LIOTHYRONINE SODIUM 50 UG/1
100 TABLET ORAL DAILY
Qty: 60 TABLET | Refills: 3 | Status: SHIPPED | OUTPATIENT
Start: 2025-06-10

## 2025-06-10 NOTE — TELEPHONE ENCOUNTER
Rx Refill Note  Requested Prescriptions     Pending Prescriptions Disp Refills    levothyroxine (SYNTHROID, LEVOTHROID) 75 MCG tablet [Pharmacy Med Name: levothyroxine 75 mcg tablet] 90 tablet 1     Sig: TAKE ONE TABLET BY MOUTH EVERY DAY    liothyronine (CYTOMEL) 50 MCG tablet [Pharmacy Med Name: liothyronine 50 mcg tablet] 60 tablet 3     Sig: TAKE TWO TABLETS BY MOUTH EVERY DAY      Last office visit with prescribing clinician: 5/27/2025   Last telemedicine visit with prescribing clinician: Visit date not found   Next office visit with prescribing clinician: 8/26/2025                         Would you like a call back once the refill request has been completed: [] Yes [] No    If the office needs to give you a call back, can they leave a voicemail: [] Yes [] No    Ann Chung MA  06/10/25, 16:10 EDT

## 2025-06-25 DIAGNOSIS — I50.32 CHRONIC DIASTOLIC HEART FAILURE: ICD-10-CM

## 2025-06-25 DIAGNOSIS — E66.812 CLASS 2 SEVERE OBESITY DUE TO EXCESS CALORIES WITH SERIOUS COMORBIDITY AND BODY MASS INDEX (BMI) OF 36.0 TO 36.9 IN ADULT: ICD-10-CM

## 2025-06-25 DIAGNOSIS — I87.2 VENOUS INSUFFICIENCY OF BOTH LOWER EXTREMITIES: ICD-10-CM

## 2025-06-25 DIAGNOSIS — E66.01 CLASS 2 SEVERE OBESITY DUE TO EXCESS CALORIES WITH SERIOUS COMORBIDITY AND BODY MASS INDEX (BMI) OF 36.0 TO 36.9 IN ADULT: ICD-10-CM

## 2025-06-25 DIAGNOSIS — K43.9 VENTRAL HERNIA WITHOUT OBSTRUCTION OR GANGRENE: ICD-10-CM

## 2025-06-25 DIAGNOSIS — G47.33 OSA ON CPAP: ICD-10-CM

## 2025-06-25 NOTE — TELEPHONE ENCOUNTER
"  Caller: Salinas Monroy \"Del\"    Relationship: Self    Best call back number: [phone]     What medication are you requesting: ZEPBOUND 5 MG  INJECTIONS 1 ONCE A WEEK    If a prescription is needed, what is your preferred pharmacy and phone number: [facility] [address] [place] [address] [phone] [facility] [phone]      Additional notes:PATIENT IS CURRENTLY TAKING THE 2.5 MG AND IS WANTING TO UP THAT DOSAGE TO THE 5 MG. SHE BELIEVES SHE IS DOING WELL ON THIS AND WANTS TO UP THE DOSAGE.       "

## 2025-06-30 NOTE — TELEPHONE ENCOUNTER
"Caller: Salinas Monroy \"Del\"    Relationship: Self    Best call back number: [phone]    What was the call regarding: PATIENT IS REQUESTING PRESCRIPTION SENT TODAY ASAP    "

## 2025-07-01 NOTE — TELEPHONE ENCOUNTER
"     Caller: Salinas Monroy \"Del\"    Relationship: Self    Best call back number: [phone]    Which medication are you concerned about:  Tirzepatide-Weight Management (ZEPBOUND)     Who prescribed you this medication: DR. DELGADILLO    When did you start taking this medication: LAST MONTH    What are your concerns: SHE IS READY FOR NEXT LEVEL, NEEDS TO TAKE SHOT TOMORROW. SHE ONLY PICKED UP 1 MONTH OF THE PREVIOUS DOSE AND TOLERATED IT WELL SO WANTS TO MOVE UP.      [facility] [address] [place] [address] [address] [place] [address] [phone] [facility] [phone] FX     CALLED A FEW TIMES  "

## 2025-07-01 NOTE — TELEPHONE ENCOUNTER
Rx Refill Note  Requested Prescriptions     Pending Prescriptions Disp Refills    Tirzepatide-Weight Management (ZEPBOUND) 5 MG/0.5ML solution auto-injector 2 mL 1     Sig: Inject 0.5 mL under the skin into the appropriate area as directed 1 (One) Time Per Week.    Tirzepatide-Weight Management (ZEPBOUND) 2.5 MG/0.5ML solution auto-injector 6 mL 0     Sig: Inject 0.5 mL under the skin into the appropriate area as directed 1 (One) Time Per Week.      Last office visit with prescribing clinician: 5/27/2025   Last telemedicine visit with prescribing clinician: Visit date not found   Next office visit with prescribing clinician: 8/26/2025                         Would you like a call back once the refill request has been completed: [] Yes [] No    If the office needs to give you a call back, can they leave a voicemail: [] Yes [] No    Ann Chung MA  07/01/25, 11:14 EDT

## 2025-07-08 ENCOUNTER — TELEPHONE (OUTPATIENT)
Dept: CARDIOLOGY | Facility: CLINIC | Age: 75
End: 2025-07-08
Payer: MEDICARE

## 2025-07-09 ENCOUNTER — OFFICE VISIT (OUTPATIENT)
Dept: CARDIOLOGY | Facility: CLINIC | Age: 75
End: 2025-07-09
Payer: MEDICARE

## 2025-07-09 VITALS
WEIGHT: 200.6 LBS | SYSTOLIC BLOOD PRESSURE: 114 MMHG | HEIGHT: 63 IN | BODY MASS INDEX: 35.54 KG/M2 | DIASTOLIC BLOOD PRESSURE: 80 MMHG | OXYGEN SATURATION: 97 % | HEART RATE: 80 BPM

## 2025-07-09 DIAGNOSIS — G47.33 OSA ON CPAP: ICD-10-CM

## 2025-07-09 DIAGNOSIS — I48.0 PAF (PAROXYSMAL ATRIAL FIBRILLATION): ICD-10-CM

## 2025-07-09 DIAGNOSIS — I50.32 CHRONIC DIASTOLIC HEART FAILURE: ICD-10-CM

## 2025-07-09 DIAGNOSIS — Z95.818 PRESENCE OF WATCHMAN LEFT ATRIAL APPENDAGE CLOSURE DEVICE: Primary | ICD-10-CM

## 2025-07-09 NOTE — PROGRESS NOTES
Cardiac Electrophysiology Outpatient Follow Up Note            [facility]    Follow Up Office Visit      Salinas Monroy  [id number]  07/09/2025      Primary Care Physician: Jm Marie DO        Subjective     Chief Complaint:   Diagnoses and all orders for this visit:    1. Presence of Watchman left atrial appendage closure device (Primary)    2. ISABEL on CPAP    3. Chronic diastolic heart failure    4. PAF (paroxysmal atrial fibrillation)      Chief Complaint   Patient presents with    Chronic diastolic heart failure     Pt denies current symptoms       History of Present Illness:   Salinas Monroy is a 75 y.o. female who presents to  electrophysiology clinic for follow up of atrial fibrillation, Tikosyn therapy, hypertension and previous Watchman device.  Since last seen by our office she is doing well.  She is maintaining sinus rhythm on Tikosyn.  Blood pressure is controlled.  She has had no chest pain dizziness near syncope syncope.  No strokelike symptoms.  She does have abdominal hernia which may require surgery with UK. She follows with Dr. Pires     Cardiac PMH: (Old records have been reviewed and summarized below)  Longstanding Persistent Atrial Fibrillation   CHADSvasc = 3 (HTN, female, Age >65)  Echocardiogram 3/2023: EF 60 to 65%, trace AI, trace MR, mild TR, RVSP 32 mmHg, LA 5.1 cm  Pharmacologic MPS 4/2023: No evidence of inducible ischemia, EF 71%  14 Day Holter Monitor 5/2023: 100% atrial fibrillation, average HR 83 bpm, max 146 bpm  Tikosyn Initiation and ECV to NSR 9/2023  Status post left atrial appendage occlusion with 24 mm Watchman FLX device, 12/04/2023, Dr. Thompson  Hypertension  Hypothyroidism/Hashimoto's thyroiditis- 6/2023 labwork shows low TSH, low T4  Essential tremors  Falls/weakness of lower extremities   Osteoarthritis  Obesity  Obstructive sleep apnea - on CPAP x 1 month   Past Medical History:   Past  Medical History:   Diagnosis Date    Abnormal ECG Feb., 2023    First Diagnosed AFib    Anemia ?    under control    Anxiety     Arrhythmia Feb., 2023    Had CardioVersion on 9/13/2023    Arthritis     Arthritis of back     Atrial fibrillation Feb., 2023    Had CardioVersion on 9/13/2023    Bloating     Burping     Chest pain 2020    was seen by Dr Pires and was released was just anxiety    CHF (congestive heart failure)     Colon polyp 06/05/2018    Coronary artery disease ??? Feb., 2023    Diagnosed AFib 2-2023    COVID-19 vaccine series completed     Depression     Diastolic dysfunction ?    Diverticulitis of colon     Diverticulosis     GERD (gastroesophageal reflux disease)     no longer bothers me...    Gout     Hashimoto's disease     Hernia 3-2012 & PRESENT    repair surgery which failed in 2014    Hip arthrosis     History of medical problems AFib...Diagnosed Feb., 2023    Hypothyroid     Impingement syndrome of right shoulder 05/08/2016    Irritable bowel syndrome ??    Knee swelling     Movement disorder Hand Tremors    Obesity     OCD (obsessive compulsive disorder)     Pleural effusion In ER scan..Feb., 2023    Sleep apnea 2019 & 2023 Sleep Tests    Sleep apnea, obstructive sleep study..May, 2019 & May, 2023    used CPAP July, 2019 until Dec., 2020..On CPAP July, 2023 - PRESENT    Tremor     worse on left arm/hand    Vitamin D deficiency Not now...I keep the level between 60-80    Wears glasses     reading glasses only       Past Surgical History:   Past Surgical History:   Procedure Laterality Date    ABDOMINAL SURGERY  1979, 2014    APPENDECTOMY      ATRIAL APPENDAGE EXCLUSION LEFT WITH TRANSESOPHAGEAL ECHOCARDIOGRAM N/A 12/04/2023    Procedure: Atrial Appendage Occlusion;  Surgeon: Guanako Thompson MD;  Location: [facility] INVASIVE LOCATION;  Service: Cardiology;  Laterality: N/A;    BARIATRIC SURGERY  1979    CARDIOVERSION  09/13/2022    CATARACT EXTRACTION W/ INTRAOCULAR LENS IMPLANT Right  08/08/2022    Procedure: CATARACT PHACO EXTRACTION WITH INTRAOCULAR LENS IMPLANT RIGHT;  Surgeon: Eunice Brooks MD;  Location: [facility] OR;  Service: Ophthalmology;  Laterality: Right;    CATARACT EXTRACTION W/ INTRAOCULAR LENS IMPLANT Left 08/22/2022    Procedure: CATARACT PHACO EXTRACTION WITH INTRAOCULAR LENS IMPLANT LEFT;  Surgeon: Eunice Brooks MD;  Location: [facility] OR;  Service: Ophthalmology;  Laterality: Left;    COLONOSCOPY  2012    failed due to hernia...    COLONOSCOPY N/A 06/08/2017    Procedure: COLONOSCOPY with cold snare polypectomies, argon thermal ablation, ns injection, yanira ink injection;  Surgeon: Rafiq Huang MD;  Location: [facility] ENDOSCOPY;  Service:     COLONOSCOPY N/A 06/05/2018    Procedure: COLONOSCOPY WITH BIOPSIES;  Surgeon: Rafiq Huang MD;  Location: [facility] ENDOSCOPY;  Service: Gastroenterology    ENDOSCOPY N/A 01/27/2021    Procedure: ESOPHAGOGASTRODUODENOSCOPY WITH BIOPSIES AND CLIPS;  Surgeon: Katie Shannon MD;  Location: [facility] ENDOSCOPY;  Service: Gastroenterology;  Laterality: N/A;    GASTRIC BYPASS  1978    GASTRIC RESTRICTION SURGERY  1979 Gastric Bypass    HERNIA MESH REMOVAL  2014    BOWEL OBSTRUCTION DUE TO MESH    HERNIA REPAIR  2012 2014..emergency  bowel obstruction fix    HYSTERECTOMY      complete    JOINT REPLACEMENT  2009 & 2012    Left & Right Full Hip Replacements    SMALL INTESTINE SURGERY  2014    emergency bowel obstruction from hernia failure    TOTAL ABDOMINAL HYSTERECTOMY WITH SALPINGO OOPHORECTOMY  1990    fibroids    TUBAL ABDOMINAL LIGATION      UPPER GASTROINTESTINAL ENDOSCOPY         Family History:   Family History   Problem Relation Age of Onset    Obesity Mother         Passed 2004    Rheum arthritis Mother     Thyroid disease Mother     Hypertension Mother         Passed 2004    Stroke Mother         Most of her problems were caused by Rheumatoid Ar.    Hyperlipidemia Mother     Arthritis Mother         Rheumatoid Arthritis..Passed  2004    Heart disease Mother         CHF    Cancer Father         Passed     Kidney cancer Father     Liver cancer Father     Heart disease Father         AFib    Arrhythmia Father     No Known Problems Brother     Diabetes Maternal Aunt         Passed     Cancer Maternal Uncle     Lung cancer Maternal Uncle     Alcohol abuse Maternal Uncle     No Known Problems Paternal Aunt     Stroke Paternal Uncle     Hypertension Paternal Uncle     Hyperlipidemia Paternal Uncle     Heart disease Paternal Uncle     No Known Problems Brother     Asthma Sister         under control    Alcohol abuse Maternal Uncle     Stroke Paternal Uncle     Asthma Sister         under control    Stroke Paternal Uncle     Colon cancer Neg Hx     Breast cancer Neg Hx     Ovarian cancer Neg Hx        Social History:   Social History     Socioeconomic History    Marital status:    Tobacco Use    Smoking status: Former     Current packs/day: 0.00     Average packs/day: 1 pack/day for 41.0 years (41.0 ttl pk-yrs)     Types: Cigarettes     Start date: 1967     Quit date: 2008     Years since quittin.5     Passive exposure: Past    Smokeless tobacco: Never   Vaping Use    Vaping status: Never Used   Substance and Sexual Activity    Alcohol use: Not Currently     Comment: very rarely    Drug use: No    Sexual activity: Not Currently     Partners: Male     Birth control/protection: None, Hysterectomy       Medications:     Current Outpatient Medications:     acyclovir (ZOVIRAX) 400 MG tablet, TAKE ONE TABLET BY MOUTH EVERY DAY, Disp: 90 tablet, Rfl: 2    allopurinol (ZYLOPRIM) 100 MG tablet, TAKE TWO TABLETS BY MOUTH DAILY, Disp: 180 tablet, Rfl: 2    ASHWAGANDHA PO, Take  by mouth Daily., Disp: , Rfl:     aspirin 81 MG EC tablet, Take 1 tablet by mouth Daily., Disp: 90 tablet, Rfl: 3    B Complex Vitamins (VITAMIN B COMPLEX PO), Take 1 tablet by mouth Daily., Disp: , Rfl:     buPROPion SR (WELLBUTRIN SR) 150 MG 12 hr tablet,  Take 1 tablet by mouth Every Morning., Disp: 90 tablet, Rfl: 3    Cholecalciferol (VITAMIN D3) 14624 UNITS tablet, Take 7,500 Units by mouth Daily., Disp: , Rfl:     Coenzyme Q10 400 MG capsule, Take 1 capsule by mouth Daily., Disp: , Rfl:     Digestive Enzymes (DIGESTIVE ENZYME PO), Take 1 tablet by mouth Daily., Disp: , Rfl:     dofetilide (TIKOSYN) 250 MCG capsule, Take 1 capsule by mouth Every 12 (Twelve) Hours., Disp: 60 capsule, Rfl: 0    escitalopram (LEXAPRO) 10 MG tablet, TAKE ONE TABLET BY MOUTH EVERY NIGHT, Disp: 90 tablet, Rfl: 1    famotidine (PEPCID) 20 MG tablet, TAKE ONE TABLET BY MOUTH TWICE DAILY, Disp: 180 tablet, Rfl: 3    ferrous gluconate (FERGON) 324 MG tablet, Take 1 tablet by mouth Daily With Breakfast. (Patient taking differently: Take 1 tablet by mouth Every Night.), Disp: 30 tablet, Rfl: 5    furosemide (LASIX) 20 MG tablet, Take 1 tablet by mouth 2 (Two) Times a Day., Disp: 180 tablet, Rfl: 3    levothyroxine (SYNTHROID, LEVOTHROID) 75 MCG tablet, TAKE ONE TABLET BY MOUTH EVERY DAY, Disp: 90 tablet, Rfl: 1    liothyronine (CYTOMEL) 50 MCG tablet, TAKE TWO TABLETS BY MOUTH EVERY DAY, Disp: 60 tablet, Rfl: 3    MAGNESIUM PO, Take 1,000 mg by mouth Every Night., Disp: , Rfl:     Methylsulfonylmethane (MSM PO), Take 1 tablet by mouth Daily., Disp: , Rfl:     Misc Natural Products (TART CHERRY ADVANCED PO), Take 2 capsules by mouth Daily., Disp: , Rfl:     Omega-3 Fatty Acids (OMEGA-3 FISH OIL PO), Take 1,000 Units by mouth 3 (Three) Times a Day., Disp: , Rfl:     potassium chloride 10 MEQ CR tablet, TAKE ONE TABLET BY MOUTH TWICE DAILY, Disp: 60 tablet, Rfl: 1    Probiotic Product (PROBIOTIC PO), Take 1 tablet by mouth Daily., Disp: , Rfl:     Tirzepatide-Weight Management (ZEPBOUND) 5 MG/0.5ML solution auto-injector, Inject 0.5 mL under the skin into the appropriate area as directed 1 (One) Time Per Week., Disp: 2 mL, Rfl: 1    TURMERIC PO, Take 1 tablet by mouth 2 (two) times a day., Disp:  ", Rfl:     VITAMIN A PO, Take 1 capsule by mouth Daily., Disp: , Rfl:     vitamin B-12 (CYANOCOBALAMIN) 1000 MCG tablet, Take 0.5 tablets by mouth Daily., Disp: , Rfl:     vitamin C (ASCORBIC ACID) 500 MG tablet, Take 21 mg by mouth 3 (Three) Times a Day As Needed., Disp: , Rfl:     vitamin E 400 UNIT capsule, Take 1 capsule by mouth Daily., Disp: , Rfl:     VITAMIN K PO, Take 100 mcg by mouth Daily., Disp: , Rfl:     Vitamin K, Phytonadione, 100 MCG tablet, Take 100 mcg by mouth., Disp: , Rfl:     Zinc 30 MG tablet, Take  by mouth Daily., Disp: , Rfl:     Tirzepatide-Weight Management (ZEPBOUND) 2.5 MG/0.5ML solution auto-injector, Inject 0.5 mL under the skin into the appropriate area as directed 1 (One) Time Per Week. (Patient not taking: Reported on 7/8/2025), Disp: 6 mL, Rfl: 0    Allergies:   No Known Allergies    Objective   Vital Signs:   Vitals:    07/09/25 1103   BP: 114/80   BP Location: Right arm   Patient Position: Sitting   Cuff Size: Adult   Pulse: 80   SpO2: 97%   Weight: 91 kg (200 lb 9.6 oz)   Height: 160 cm (63\")         PHYSICAL EXAM  General appearance: Awake, alert, cooperative  Head: Normocephalic, without obvious abnormality, atraumatic  Eyes: Conjunctivae/corneas clear, EOMs intact  Neck: no adenopathy, no carotid bruit, no JVD, and thyroid: not enlarged  Lungs: clear to auscultation bilaterally and no rhonchi or crackles\", ' symmetric  Heart: regular rate and rhythm, S1, S2 normal, no murmur, click, rub or gallop  Abdomen: Soft, non-tender, bowel sounds normal,  no organomegaly  Extremities: extremities normal, atraumatic, no cyanosis or edema  Skin: Skin color, turgor normal, no rashes or lesions  Neurologic: Grossly normal     Lab Results   Component Value Date    GLUCOSE 100 (H) 02/22/2025    CALCIUM 9.7 02/22/2025     02/22/2025    K 4.9 02/22/2025    CO2 22.6 02/22/2025     02/22/2025    BUN 71 (H) 02/22/2025    CREATININE 0.91 02/22/2025    EGFRIFAFRI 81 12/15/2021    " EGFRIFNONA 71 12/15/2021    BCR 78.0 (H) 02/22/2025    ANIONGAP 14.4 02/22/2025     Lab Results   Component Value Date    WBC 8.18 02/22/2025    HGB 14.4 02/22/2025    HCT 44.4 02/22/2025    MCV 90.1 02/22/2025     02/22/2025     Lab Results   Component Value Date    INR 1.12 09/11/2023    PROTIME 14.6 (H) 09/11/2023     Lab Results   Component Value Date    TSH <0.005 (L) 02/18/2025    W4LQCJH 80.2 10/24/2019    O4EONCU 4.50 (L) 10/06/2016    THYROIDAB 104 (H) 04/18/2019       Cardiac Testing:           Ekg    Date/Time: 7/9/2025 11:43 AM  Performed by: Chavez Mckinley PA    Authorized by: Clyde Rodrigues DO  Comparison: compared with previous ECG from 5/19/2025  Similar to previous ECG  Rhythm: sinus rhythm  Rate: normal  Conduction: conduction normal  ST Segments: ST segments normal  QRS axis: normal    Clinical impression: normal ECG               Assessment & Plan    Diagnoses and all orders for this visit:    1. Presence of Watchman left atrial appendage closure device (Primary)    2. ISABEL on CPAP    3. Chronic diastolic heart failure    4. PAF (paroxysmal atrial fibrillation)         Diagnosis Plan   1. PAF Tikosyn rx, EKg stable. QTC 418ms      2. ISABEL on CPAP    Stable      3. Chronic diastolic heart failure  Stable, followed by Dr. Pires      4. Watchman device  She is on aspirin daily.  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         Electronically signed by ANDREI Cesar, 07/09/25, 11:43 AM EDT.

## 2025-07-25 ENCOUNTER — TELEPHONE (OUTPATIENT)
Dept: CARDIOLOGY | Facility: CLINIC | Age: 75
End: 2025-07-25
Payer: MEDICARE

## 2025-07-28 ENCOUNTER — TELEPHONE (OUTPATIENT)
Dept: CARDIOLOGY | Facility: CLINIC | Age: 75
End: 2025-07-28
Payer: MEDICARE

## 2025-07-29 NOTE — TELEPHONE ENCOUNTER
PT CALLED BACK AND WANTED TO CHECK ON STATUS OF CARDIAC CLEARANCE. PT ASKED IF OFFICE FAXED OVER CLEARANCE NOTE TO NUMBER PROVIDED ON REQUEST.PLEASE ADVISE PT.    FAX:[phone]

## 2025-08-08 RX ORDER — DOFETILIDE 0.25 MG/1
250 CAPSULE ORAL EVERY 12 HOURS
Qty: 60 CAPSULE | Refills: 6 | Status: SHIPPED | OUTPATIENT
Start: 2025-08-08

## 2025-08-21 RX ORDER — TIRZEPATIDE 5 MG/.5ML
5 INJECTION, SOLUTION SUBCUTANEOUS WEEKLY
Qty: 4 ML | Refills: 0 | Status: SHIPPED | OUTPATIENT
Start: 2025-08-21 | End: 2025-08-22

## 2025-08-22 RX ORDER — TIRZEPATIDE 5 MG/.5ML
5 INJECTION, SOLUTION SUBCUTANEOUS WEEKLY
Qty: 4 ML | Refills: 0 | Status: SHIPPED | OUTPATIENT
Start: 2025-08-22

## 2025-08-26 PROCEDURE — 80053 COMPREHEN METABOLIC PANEL: CPT | Performed by: FAMILY MEDICINE

## 2025-08-26 PROCEDURE — 85025 COMPLETE CBC W/AUTO DIFF WBC: CPT | Performed by: FAMILY MEDICINE

## 2025-08-26 PROCEDURE — 84481 FREE ASSAY (FT-3): CPT | Performed by: FAMILY MEDICINE

## 2025-08-26 PROCEDURE — 84482 T3 REVERSE: CPT | Performed by: FAMILY MEDICINE

## 2025-08-26 PROCEDURE — 84443 ASSAY THYROID STIM HORMONE: CPT | Performed by: FAMILY MEDICINE

## 2025-08-26 PROCEDURE — 84439 ASSAY OF FREE THYROXINE: CPT | Performed by: FAMILY MEDICINE

## (undated) DEVICE — TOWEL,OR,DSP,ST,BLUE,STD,4/PK,20PK/CS: Brand: MEDLINE

## (undated) DEVICE — GW DIAG .032

## (undated) DEVICE — STERILE (15.2 TAPERED TO 7.6 X 183CM) POLYETHYLENE ACCORDION-FOLDED COVER FOR USE WITH SIEMENS ACUNAV ULTRASOUND CATHETER FAMILY CONNECTOR: Brand: SWIFTLINK TRANSDUCER COVER

## (undated) DEVICE — ADULT NASAL CO2 SAMPLING WITH O2 DELIVERY CANNULA FOR CAPNOFLEX MODULE: Brand: VITAL SIGNS™

## (undated) DEVICE — ST EXT IV SMRTSTE 2VLV FIX M LL 6ML 41

## (undated) DEVICE — DOME MONITORING W BONDED STPCK BIOTRANS2

## (undated) DEVICE — 0.8MM CLEARPORT PARA KNIFE: Brand: SHARPOINT

## (undated) DEVICE — CATH DIAG EXPO .052 PIG 6F 110CM

## (undated) DEVICE — INDIA INK

## (undated) DEVICE — INTRO SHEATH ENGAGE W/50 GW .038 8F12

## (undated) DEVICE — FRCP BIOP COLD ENDOJAW ALLGTR W/NDL 2.8X2300MM BLU

## (undated) DEVICE — Device

## (undated) DEVICE — SINGLE USE MEDICAL DEVICE FOR OPHTHALMIC SURGERY: Brand: 23G IRR HANDPIECE SMOOTH 12B

## (undated) DEVICE — SYS CLS VASC/VENI VASCADE MVP 6TO12F

## (undated) DEVICE — Device: Brand: MEDEX

## (undated) DEVICE — SNAR POLYP SENSATION STDOVL 27 240 BX40

## (undated) DEVICE — SET PRIMARY GRVTY 10DP MALE LL 104IN

## (undated) DEVICE — NDL SCLEROTHERAPY INTERJECT 25G 4 240CM

## (undated) DEVICE — JELLY,LUBE,STERILE,FLIP TOP,TUBE,2-OZ: Brand: MEDLINE

## (undated) DEVICE — SYR PREFIL W/SALINE FLSH 10ML

## (undated) DEVICE — ACQGUIDE MINI-S, 65CM - L2 WITH ACQCROSS QX: Brand: ACQGUIDE MINI-S

## (undated) DEVICE — SOL IRRIG H2O 1000ML STRL

## (undated) DEVICE — EYE CARE POST OP KIT: Brand: MEDLINE INDUSTRIES, INC.

## (undated) DEVICE — GLV SURG BIOGEL M LTX PF 6 1/2

## (undated) DEVICE — DECANT BG O JET

## (undated) DEVICE — ENDOSCOPY PORT CONNECTOR FOR OLYMPUS® SCOPES: Brand: ERBE

## (undated) DEVICE — Device: Brand: SOUNDSTAR

## (undated) DEVICE — ENDOGATOR AUXILIARY WATER JET CONNECTOR: Brand: ENDOGATOR

## (undated) DEVICE — VLV SXN AIR/H2O ORCAPOD3 1P/U STRL

## (undated) DEVICE — CANN IRR/INJ AIR ANT CHAMBER 6MM BEND 27G

## (undated) DEVICE — CANN HYDRODISSECTION

## (undated) DEVICE — KT MANIFLD EP

## (undated) DEVICE — TRAP,MUCUS SPECIMEN,40CC: Brand: MEDLINE

## (undated) DEVICE — INTRO SHEATH FAST/CATH LG/LUM 11F .038IN 12CM

## (undated) DEVICE — SINGLE USE MEDICAL DEVICE FOR OPHTHALMIC SURGERY: Brand: 23G ASP HANDPIECE ROUGH 12/BOX

## (undated) DEVICE — LEX ELECTRO PHYSIOLOGY: Brand: MEDLINE INDUSTRIES, INC.

## (undated) DEVICE — DRSNG SURESITE123 4X4.8IN

## (undated) DEVICE — SUCTION CANISTER, 1500CC, RIGID: Brand: DEROYAL

## (undated) DEVICE — CONTRL CONTRST CHMBRD W/TBG72IN REUS

## (undated) DEVICE — DRP SUP TRIDENT FACE

## (undated) DEVICE — CONMED SCOPE SAVER BITE BLOCK, 20X27 MM: Brand: SCOPE SAVER

## (undated) DEVICE — HYBRID TUBING/CAP SET FOR OLYMPUS® SCOPES: Brand: ERBE

## (undated) DEVICE — FIAPC® PROBE W/ FILTER 2200 SC OD 2.3MM/6.9FR; L 2.2M/7.2FT: Brand: ERBE

## (undated) DEVICE — CLEAR CORNEAL KNIFE 2.4MM ANG: Brand: SHARPOINT

## (undated) DEVICE — ADULT, W/LG. BACK PAD, RADIOTRANSPARENT ELEMENT AND LEAD WIRE COMPATIBLE W/: Brand: DEFIBRILLATION ELECTRODES

## (undated) DEVICE — ACCESS SHEATH WITH DILATOR: Brand: WATCHMAN FXD CURVE™ ACCESS SYSTEM

## (undated) DEVICE — PAD GRND REM POLYHESIVE A/ DISP

## (undated) DEVICE — ST INF PRI SMRTSTE 20DRP 2VLV 24ML 117